# Patient Record
Sex: FEMALE | Race: WHITE | NOT HISPANIC OR LATINO | Employment: OTHER | URBAN - METROPOLITAN AREA
[De-identification: names, ages, dates, MRNs, and addresses within clinical notes are randomized per-mention and may not be internally consistent; named-entity substitution may affect disease eponyms.]

---

## 2017-01-03 ENCOUNTER — PATIENT MESSAGE (OUTPATIENT)
Dept: ENDOCRINOLOGY | Facility: CLINIC | Age: 67
End: 2017-01-03

## 2017-01-06 RX ORDER — MONTELUKAST SODIUM 10 MG/1
TABLET ORAL
Qty: 90 TABLET | Refills: 3 | Status: SHIPPED | OUTPATIENT
Start: 2017-01-06 | End: 2017-11-29

## 2017-01-19 RX ORDER — POTASSIUM CHLORIDE 20 MEQ/1
TABLET, EXTENDED RELEASE ORAL
Qty: 90 TABLET | Refills: 0 | Status: SHIPPED | OUTPATIENT
Start: 2017-01-19 | End: 2017-04-15 | Stop reason: SDUPTHER

## 2017-01-22 ENCOUNTER — PATIENT MESSAGE (OUTPATIENT)
Dept: ENDOCRINOLOGY | Facility: CLINIC | Age: 67
End: 2017-01-22

## 2017-01-23 ENCOUNTER — CLINICAL SUPPORT (OUTPATIENT)
Dept: CARDIOLOGY | Facility: CLINIC | Age: 67
End: 2017-01-23
Payer: MEDICARE

## 2017-01-23 ENCOUNTER — LAB VISIT (OUTPATIENT)
Dept: LAB | Facility: HOSPITAL | Age: 67
End: 2017-01-23
Attending: NURSE PRACTITIONER
Payer: MEDICARE

## 2017-01-23 DIAGNOSIS — I50.9 CHF (NYHA CLASS III, ACC/AHA STAGE C): ICD-10-CM

## 2017-01-23 DIAGNOSIS — Z95.810 ICD (IMPLANTABLE CARDIOVERTER-DEFIBRILLATOR) IN PLACE: Chronic | ICD-10-CM

## 2017-01-23 DIAGNOSIS — I44.7 LBBB (LEFT BUNDLE BRANCH BLOCK): ICD-10-CM

## 2017-01-23 DIAGNOSIS — Z79.4 TYPE 2 DIABETES MELLITUS WITH HYPERGLYCEMIA, WITH LONG-TERM CURRENT USE OF INSULIN: ICD-10-CM

## 2017-01-23 DIAGNOSIS — E11.65 TYPE 2 DIABETES MELLITUS WITH HYPERGLYCEMIA, WITH LONG-TERM CURRENT USE OF INSULIN: ICD-10-CM

## 2017-01-23 LAB
ALBUMIN SERPL BCP-MCNC: 4 G/DL
ALP SERPL-CCNC: 82 U/L
ALT SERPL W/O P-5'-P-CCNC: 25 U/L
ANION GAP SERPL CALC-SCNC: 9 MMOL/L
AST SERPL-CCNC: 19 U/L
BILIRUB SERPL-MCNC: 1.8 MG/DL
BUN SERPL-MCNC: 18 MG/DL
CALCIUM SERPL-MCNC: 9.8 MG/DL
CHLORIDE SERPL-SCNC: 105 MMOL/L
CO2 SERPL-SCNC: 26 MMOL/L
CREAT SERPL-MCNC: 0.9 MG/DL
EST. GFR  (AFRICAN AMERICAN): >60 ML/MIN/1.73 M^2
EST. GFR  (NON AFRICAN AMERICAN): >60 ML/MIN/1.73 M^2
GLUCOSE SERPL-MCNC: 117 MG/DL
POTASSIUM SERPL-SCNC: 4.6 MMOL/L
PROT SERPL-MCNC: 7.2 G/DL
SODIUM SERPL-SCNC: 140 MMOL/L

## 2017-01-23 PROCEDURE — 93295 DEV INTERROG REMOTE 1/2/MLT: CPT | Mod: ,,, | Performed by: INTERNAL MEDICINE

## 2017-01-23 PROCEDURE — 93296 REM INTERROG EVL PM/IDS: CPT | Mod: PBBFAC,PO | Performed by: INTERNAL MEDICINE

## 2017-01-24 ENCOUNTER — PATIENT MESSAGE (OUTPATIENT)
Dept: ENDOCRINOLOGY | Facility: CLINIC | Age: 67
End: 2017-01-24

## 2017-01-24 DIAGNOSIS — I50.9 CHF (NYHA CLASS III, ACC/AHA STAGE C): ICD-10-CM

## 2017-01-24 DIAGNOSIS — Z95.810 ICD (IMPLANTABLE CARDIOVERTER-DEFIBRILLATOR) IN PLACE: Primary | ICD-10-CM

## 2017-01-24 LAB
ESTIMATED AVG GLUCOSE: 186 MG/DL
HBA1C MFR BLD HPLC: 8.1 %

## 2017-01-27 ENCOUNTER — OFFICE VISIT (OUTPATIENT)
Dept: ENDOCRINOLOGY | Facility: CLINIC | Age: 67
End: 2017-01-27
Payer: MEDICARE

## 2017-01-27 VITALS
DIASTOLIC BLOOD PRESSURE: 78 MMHG | HEART RATE: 83 BPM | WEIGHT: 205.56 LBS | HEIGHT: 62 IN | SYSTOLIC BLOOD PRESSURE: 112 MMHG | BODY MASS INDEX: 37.83 KG/M2

## 2017-01-27 DIAGNOSIS — I50.9 CHF (NYHA CLASS III, ACC/AHA STAGE C): ICD-10-CM

## 2017-01-27 DIAGNOSIS — E78.5 HYPERLIPIDEMIA, UNSPECIFIED HYPERLIPIDEMIA TYPE: Chronic | ICD-10-CM

## 2017-01-27 DIAGNOSIS — E66.9 OBESITY (BMI 30-39.9): ICD-10-CM

## 2017-01-27 DIAGNOSIS — I10 ESSENTIAL HYPERTENSION: ICD-10-CM

## 2017-01-27 DIAGNOSIS — I25.10 CORONARY ARTERY DISEASE INVOLVING NATIVE CORONARY ARTERY WITHOUT ANGINA PECTORIS, UNSPECIFIED WHETHER NATIVE OR TRANSPLANTED HEART: ICD-10-CM

## 2017-01-27 DIAGNOSIS — Z79.4 TYPE 2 DIABETES MELLITUS WITH COMPLICATION, WITH LONG-TERM CURRENT USE OF INSULIN: Primary | ICD-10-CM

## 2017-01-27 DIAGNOSIS — E89.0 POSTOPERATIVE HYPOTHYROIDISM: ICD-10-CM

## 2017-01-27 DIAGNOSIS — Z95.810 ICD (IMPLANTABLE CARDIOVERTER-DEFIBRILLATOR) IN PLACE: Chronic | ICD-10-CM

## 2017-01-27 DIAGNOSIS — E11.65 TYPE 2 DIABETES MELLITUS WITH HYPERGLYCEMIA, WITHOUT LONG-TERM CURRENT USE OF INSULIN: ICD-10-CM

## 2017-01-27 DIAGNOSIS — C73 THYROID CANCER: Chronic | ICD-10-CM

## 2017-01-27 DIAGNOSIS — E11.8 TYPE 2 DIABETES MELLITUS WITH COMPLICATION, WITH LONG-TERM CURRENT USE OF INSULIN: Primary | ICD-10-CM

## 2017-01-27 DIAGNOSIS — Z87.19 HISTORY OF PANCREATITIS: ICD-10-CM

## 2017-01-27 PROCEDURE — 99999 PR PBB SHADOW E&M-EST. PATIENT-LVL IV: CPT | Mod: PBBFAC,,, | Performed by: NURSE PRACTITIONER

## 2017-01-27 PROCEDURE — 99214 OFFICE O/P EST MOD 30 MIN: CPT | Mod: S$PBB,,, | Performed by: NURSE PRACTITIONER

## 2017-01-27 PROCEDURE — 99214 OFFICE O/P EST MOD 30 MIN: CPT | Mod: PBBFAC,PO | Performed by: NURSE PRACTITIONER

## 2017-01-27 RX ORDER — METFORMIN HYDROCHLORIDE 500 MG/1
500 TABLET, EXTENDED RELEASE ORAL 2 TIMES DAILY WITH MEALS
Qty: 180 TABLET | Refills: 3 | Status: SHIPPED | OUTPATIENT
Start: 2017-01-27 | End: 2018-01-17 | Stop reason: SDUPTHER

## 2017-01-27 NOTE — PROGRESS NOTES
CC: Ms. Mckenzie Mari arrives today for management of Type 2 DM and review of chronic medical conditions.     HPI: Ms. Mckenzie Mari was diagnosed with Type 2 DM in 2004. She was diagnosed based on lab work. Initial treatment consisted of metformin and glimepiride. Insulin added in 8/2016 - began Toujeo. She states that she felt that this caused nausea, abd pain, chest pain. Lantus caused throat swelling, left arm pain. She was then changed to Novolog 70/30 but this was only used for a short period because her insurance didn't cover.  + FH of DM in maternal aunt and cousin. Denies hospitalizations due to DM. Previously seen in endocrine by MAGALI Gruber, NP. Also follows with by Dr. Ramos for DM history of thyroid cancer/post-surgical hypothyroidism.     Since last visit, we converted her insulin to Humalog 50-50 since she had tolerated both Humalog and protamine in the past.  She is afraid to try other insulins due to past experiences with new medications.  Significant allergy list.    She recently realized that she was only taking half of her prescribed metformin dose. She added 500 mg at night last week and has noticed an improvement in her readings.     BG readings are checked 2x/day. Brings log.             Hypoglycemia: No but feels symptomatic with BG < 100    Missing Insulin/PO medication doses: No  Timing prandial insulin 5-15 minutes before meals: taking PM dose after dinner    Exercise: No    Dietary Habits: Eats 3 meals/day. Each meal contains carb. Rare snacking. Avoids sugary drinks.    She follows with cardiology for cardiomyopathy, CAD. Goes every 6 months - 1 year.      CURRENT DIABETIC MEDS: metformin  mg TWICE DAILY, Humalog 50-50  24 units BID  Vial or pen: pen  Glucometer type: One Touch Verio    Previous DM treatments:   Invokana - nausea  Glimepiride - stopped when prandial insulin added  Touejo -  Nausea, abd pain, chest pain  Lantus - throat swelling, left arm pain  Novolog  "70/30 - not covered by insurance    Last Eye Exam: 2016 - sees outside provider. Recent cataract sx.   Last Podiatry Exam: no    REVIEW OF SYSTEMS  Constitutional: no c/o fatigue, weakness, or weight loss  Eyes: + blurry vision when BG >300  Cardiac: no palpitations, denies chest pain.  Respiratory: c/o occasional cough. Reports chronic dyspnea but able to walk. Cardiologist aware.  GI: no c/o abdominal pain, diarrhea, or nausea.   Skin: no lesions or rashes.  Neuro: no numbness, tingling, or parasthesias.  Endocrine: denies polyphagia, polydipsia, polyuria      Personally reviewed Past Medical, Surgical, Social History.    Vital Signs  Visit Vitals    /78 (BP Location: Right arm, Patient Position: Sitting, BP Method: Manual)    Pulse 83    Ht 5' 2" (1.575 m)    Wt 93.2 kg (205 lb 9.3 oz)    BMI 37.6 kg/m2       Personally reviewed the below labs:    Hemoglobin A1C   Date Value Ref Range Status   01/23/2017 8.1 (H) 4.5 - 6.2 % Final     Comment:     According to ADA guidelines, hemoglobin A1C <7.0% represents  optimal control in non-pregnant diabetic patients.  Different  metrics may apply to specific populations.   Standards of Medical Care in Diabetes - 2016.  For the purpose of screening for the presence of diabetes:  <5.7%     Consistent with the absence of diabetes  5.7-6.4%  Consistent with increasing risk for diabetes   (prediabetes)  >or=6.5%  Consistent with diabetes  Currently no consensus exists for use of hemoglobin A1C  for diagnosis of diabetes for children.     10/05/2016 9.2 (H) 4.5 - 6.2 % Final     Comment:     According to ADA guidelines, hemoglobin A1C <7.0% represents  optimal control in non-pregnant diabetic patients.  Different  metrics may apply to specific populations.   Standards of Medical Care in Diabetes - 2016.  For the purpose of screening for the presence of diabetes:  <5.7%     Consistent with the absence of diabetes  5.7-6.4%  Consistent with increasing risk for diabetes "   (prediabetes)  >or=6.5%  Consistent with diabetes  Currently no consensus exists for use of hemoglobin A1C  for diagnosis of diabetes for children.     08/29/2016 9.5 (H) 4.5 - 6.2 % Final     Comment:     According to ADA guidelines, hemoglobin A1C <7.0% represents  optimal control in non-pregnant diabetic patients.  Different  metrics may apply to specific populations.   Standards of Medical Care in Diabetes - 2016.  For the purpose of screening for the presence of diabetes:  <5.7%     Consistent with the absence of diabetes  5.7-6.4%  Consistent with increasing risk for diabetes   (prediabetes)  >or=6.5%  Consistent with diabetes  Currently no consensus exists for use of hemoglobin A1C  for diagnosis of diabetes for children.         Chemistry        Component Value Date/Time     01/23/2017 0734    K 4.6 01/23/2017 0734     01/23/2017 0734    CO2 26 01/23/2017 0734    BUN 18 01/23/2017 0734    CREATININE 0.9 01/23/2017 0734     (H) 01/23/2017 0734        Component Value Date/Time    CALCIUM 9.8 01/23/2017 0734    ALKPHOS 82 01/23/2017 0734    AST 19 01/23/2017 0734    ALT 25 01/23/2017 0734    BILITOT 1.8 (H) 01/23/2017 0734          Lab Results   Component Value Date    CHOL 154 05/30/2016    CHOL 306 (H) 02/18/2016    CHOL 265 (H) 11/17/2015     Lab Results   Component Value Date    HDL 51 05/30/2016    HDL 46 02/18/2016    HDL 45 11/17/2015     Lab Results   Component Value Date    LDLCALC 74.4 05/30/2016    LDLCALC 202.8 (H) 02/18/2016    LDLCALC 165.4 (H) 11/17/2015     Lab Results   Component Value Date    TRIG 143 05/30/2016    TRIG 286 (H) 02/18/2016    TRIG 273 (H) 11/17/2015     Lab Results   Component Value Date    CHOLHDL 33.1 05/30/2016    CHOLHDL 15.0 (L) 02/18/2016    CHOLHDL 17.0 (L) 11/17/2015       Lab Results   Component Value Date    MICALBCREAT Unable to calculate 06/09/2015     Lab Results   Component Value Date    TSH 0.918 10/05/2016       Estimated Creatinine  Clearance: 66.3 mL/min (based on Cr of 0.9).    Vit D, 25-Hydroxy   Date Value Ref Range Status   11/08/2014 51 30 - 96 ng/mL Final     Comment:     Vitamin D deficiency.........<10 ng/mL                              Vitamin D insufficiency......10-29 ng/mL       Vitamin D sufficiency........> or equal to 30 ng/mL  Vitamin D toxicity............>100 ng/mL         PHYSICAL EXAMINATION  Constitutional: Appears well, no distress  Neck: Supple, trachea midline; transverse scar to anterior neck from thyroidectomy.   Respiratory: CTA, even and unlabored.  Cardiovascular: RRR, no murmurs, no carotid bruits. DP pulses 2+ bilaterally; trace edema to BLE.   Lymph: no cervical or supraclavicular lymphadenopathy.  Skin: warm and dry; no lipohypertrophy, or acanthosis nigracans observed.  Neuro: DTR 2+ BUE/1+ BLE.  Feet: appropriate footwear.      Assessment/Plan  1. Type 2 diabetes mellitus with complication, without long-term current use of insulin  -- A1c has decreased to 8.1% and recent glucoses suggest better control more recently.   -- continue current medications.  -- discussed proper timing of insulin before breakfast and dinner.   -- check BG 4x/day.     -- Discussed diagnosis of DM, A1c goals, progression of disease, long term complications and tx options.  Advised patient to check BG before activities, such as driving or exercise.  -- Reviewed hypoglycemia management: treat with 1/2 glass of juice, 1/2 can regular coke, or 4 glucose tablets. Monitor and repeat treatment every 15 minutes until BG is >70 Then have a snack, which includes a complex carbohydrate and protein.     2. Coronary artery disease involving native coronary artery without angina pectoris, unspecified whether native or transplanted heart  -- avoid hyopglycemia   3. CHF (NYHA class III, ACC/AHA stage C)  -- follows with cardiology   4. ICD (implantable cardioverter-defibrillator) in place  -- as above   5. Thyroid cancer  -- recommend annual follow  up with Dr. Ramos. Due in 10/2017   6. Postoperative hypothyroidism  -- follows with Dr. Ramos  -- Continue Synthroid at current dose.  Lab Results   Component Value Date    TSH 0.918 10/05/2016      7. Essential hypertension  -- controlled  -- on ARB   8. Hyperlipidemia, unspecified hyperlipidemia type  -- continue statin  -- managed by cardiologist  Lab Results   Component Value Date    LDLCALC 74.4 05/30/2016      9. Obesity (BMI 30-39.9)  -- increases insulin resistance  Body mass index is 37.6 kg/(m^2).   10. History of pancreatitis  -- avoid GLP-1RA and DPP IV-i         FOLLOW UP  Return in about 3 months (around 4/27/2017).   Patient instructed to bring BG logs to each follow up   Patient encouraged to call for any BG/medication issues, concerns, or questions.    Orders Placed This Encounter   Procedures    Hemoglobin A1c    Comprehensive metabolic panel    Lipid panel    Microalbumin/creatinine urine ratio

## 2017-01-27 NOTE — MR AVS SNAPSHOT
[facility]  [address]  [place] [address]  Phone: [phone]  Fax: [phone]                  Mckenzie Mari   2017 10:00 AM   Office Visit    Description:  Female : 1950   Provider:  SAAD Giordano FNP-C   Department:  [facility]           Reason for Visit     Diabetes Mellitus           Diagnoses this Visit        Comments    Type 2 diabetes mellitus with complication, with long-term current use of insulin    -  Primary     Coronary artery disease involving native coronary artery without angina pectoris, unspecified whether native or transplanted heart         CHF (NYHA class III, ACC/AHA stage C)         ICD (implantable cardioverter-defibrillator) in place         Essential hypertension         Hyperlipidemia, unspecified hyperlipidemia type         Thyroid cancer         Postoperative hypothyroidism         History of pancreatitis         Obesity (BMI 30-39.9)         Type 2 diabetes mellitus with hyperglycemia, without long-term current use of insulin                To Do List           Future Appointments        Provider Department Dept Phone    2017 7:00 AM SPECIMEN, [place] [facility] [phone]    2017 8:00 AM LAB, [place] [facility] [phone]    2017 11:00 AM [place] [facility] [phone]    2017 11:30 AM Joseph Hansen MD [facility] Card[facility] [phone]    2017 9:30 AM SAAD Giordano,[place] [facility] Endocrinology [phone]      Goals (5 Years of Data)     None      Follow-Up and Disposition     Return in about 3 months (around 2017).       These Medications        Disp Refills Start End    metformin (GLUCOPHAGE-XR) 500 MG 24 hr tablet 180 tablet 3 2017     Take 1 tablet (500 mg total) by mouth 2 (two) times daily with meals. - Oral    Pharmacy: [facility] #[id number] - [place] -  [address]  #: [phone]         [facility]     [facility] Nurse Care Line - 24/7 Assistance  Registered nurses in the [facility] provide clinical advisement, health education, appointment booking, and other advisory services.  Call for this free service at [phone].             Medications           Message regarding Medications     Verify the changes and/or additions to your medication regime listed below are the same as discussed with your clinician today.  If any of these changes or additions are incorrect, please notify your healthcare provider.             Verify that the below list of medications is an accurate representation of the medications you are currently taking.  If none reported, the list may be blank. If incorrect, please contact your healthcare provider. Carry this list with you in case of emergency.           Current Medications     aspirin (ECOTRIN) 81 MG EC tablet Take 81 mg by mouth once daily.    azithromycin (Z-DIDIER) 250 MG tablet Take this antibiotic for 5 days total according to the following instructions: Take 2 po on Day #1, then take one po daily on days 2-5    biotin 2,500 mcg Cap Take 5,000 mcg by mouth.    blood sugar diagnostic Strp 1 strip by Misc.(Non-Drug; Combo Route) route 4 (four) times daily. One Touch Verio strips    CALCIUM CARBONATE/VITAMIN D3 (VITAMIN D-3 ORAL) Take by mouth.    carvedilol (COREG) 25 MG tablet TAKE 1 TABLET (25 MG TOTAL) BY MOUTH 2 (TWO) TIMES DAILY WITH MEALS.    CRESTOR 10 mg tablet TAKE 1 TABLET BY MOUTH EVERY DAY    esomeprazole (NEXIUM) 40 MG capsule TAKE 1 CAPSULE BY MOUTH EVERY DAY    estradiol (ESTRING) 2 mg vaginal ring Place 2 mg vaginally every 3 (three) months. follow package directions     furosemide (LASIX) 40 MG tablet Take 1 tablet (40 mg total) by mouth once daily.    insulin lispro protamin-lispro 100 unit/mL (50-50) InPn Inject 24 Units into the skin 2 (two) times daily before meals. Take before  "breakfast and dinner    lactobacillus rhamnosus GG (CULTURELLE) 10 billion cell capsule Take 1 capsule by mouth once daily.    lancets (ACCU-CHEK FASTCLIX) Misc 1 each by Misc.(Non-Drug; Combo Route) route 3 (three) times daily.    metformin (GLUCOPHAGE-XR) 500 MG 24 hr tablet Take 1 tablet (500 mg total) by mouth 2 (two) times daily with meals.    montelukast (SINGULAIR) 10 mg tablet TAKE 1 TABLET BY MOUTH EVERY EVENING.    multivit-mineral-iron-lutein (CENTRUM SILVER ULTRA WOMEN'S) Tab Take by mouth.    pen needle, diabetic (BD ULTRA-FINE SHANTELLE PEN NEEDLES) 32 gauge x 5/32" Ndle Uses up to 4 daily, on multiple daily insulin injections    potassium chloride SA (K-DUR,KLOR-CON) 20 MEQ tablet TAKE 1 TABLET EVERY DAY    spironolactone (ALDACTONE) 25 MG tablet TAKE 1 TABLET EVERY DAY    SYNTHROID 137 mcg Tab tablet Take 1 tablet (137 mcg total) by mouth before breakfast.    valsartan (DIOVAN) 80 MG tablet TAKE 1 TABLET (80 MG TOTAL) BY MOUTH ONCE DAILY.           Clinical Reference Information           Vital Signs - Last Recorded  Most recent update: 1/27/2017 10:02 AM by Benton Gallardo LPN    BP Pulse Ht Wt BMI    112/78 (BP Location: Right arm, Patient Position: Sitting, BP Method: Manual) 83 5' 2" (1.575 m) 93.2 kg (205 lb 9.3 oz) 37.6 kg/m2      Blood Pressure          Most Recent Value    BP  112/78      Allergies as of 1/27/2017     Cayenne Pepper    Lantus [Insulin Glargine]    Other    Adhesive Tape-silicones    Amoxil [Amoxicillin]    Aspirin    Avelox [Moxifloxacin]    Betadine [Povidone-iodine]    Cephalexin    Doxycycline    Erythromycin    Hydrocodone    Lactose Intolerance [Lactase]    Latex    Levaquin [Levofloxacin]    Morphine    Tramadol    Latex, Natural Rubber    Penicillins    Sulfa (Sulfonamide Antibiotics)      Immunizations Administered on Date of Encounter - 1/27/2017     None      Orders Placed During Today's Visit     Future Labs/Procedures Expected by Expires    Comprehensive metabolic " panel  1/27/2017 3/28/2018    Hemoglobin A1c  1/27/2017 3/28/2018    Lipid panel  1/27/2017 3/28/2018    Microalbumin/creatinine urine ratio  1/27/2017 3/28/2018

## 2017-01-30 RX ORDER — SPIRONOLACTONE 25 MG/1
TABLET ORAL
Qty: 90 TABLET | Refills: 1 | Status: SHIPPED | OUTPATIENT
Start: 2017-01-30 | End: 2017-08-05 | Stop reason: SDUPTHER

## 2017-02-01 ENCOUNTER — OFFICE VISIT (OUTPATIENT)
Dept: FAMILY MEDICINE | Facility: CLINIC | Age: 67
End: 2017-02-01
Payer: MEDICARE

## 2017-02-01 ENCOUNTER — NURSE TRIAGE (OUTPATIENT)
Dept: ADMINISTRATIVE | Facility: CLINIC | Age: 67
End: 2017-02-01

## 2017-02-01 VITALS
OXYGEN SATURATION: 96 % | WEIGHT: 206.38 LBS | HEART RATE: 81 BPM | DIASTOLIC BLOOD PRESSURE: 72 MMHG | SYSTOLIC BLOOD PRESSURE: 126 MMHG | BODY MASS INDEX: 37.98 KG/M2 | HEIGHT: 62 IN

## 2017-02-01 DIAGNOSIS — J20.9 ACUTE BRONCHITIS, UNSPECIFIED ORGANISM: ICD-10-CM

## 2017-02-01 DIAGNOSIS — J02.9 SORE THROAT: Primary | ICD-10-CM

## 2017-02-01 DIAGNOSIS — J00 ACUTE NASOPHARYNGITIS: ICD-10-CM

## 2017-02-01 DIAGNOSIS — J45.21 MILD INTERMITTENT ASTHMA WITH ACUTE EXACERBATION: ICD-10-CM

## 2017-02-01 PROCEDURE — 99999 PR PBB SHADOW E&M-EST. PATIENT-LVL III: CPT | Mod: PBBFAC,,, | Performed by: INTERNAL MEDICINE

## 2017-02-01 PROCEDURE — 99213 OFFICE O/P EST LOW 20 MIN: CPT | Mod: PBBFAC,PO | Performed by: INTERNAL MEDICINE

## 2017-02-01 PROCEDURE — 99213 OFFICE O/P EST LOW 20 MIN: CPT | Mod: S$PBB,,, | Performed by: INTERNAL MEDICINE

## 2017-02-01 RX ORDER — FLUTICASONE PROPIONATE 50 MCG
2 SPRAY, SUSPENSION (ML) NASAL DAILY
Qty: 16 G | Refills: 11 | Status: SHIPPED | OUTPATIENT
Start: 2017-02-01 | End: 2017-03-14

## 2017-02-01 RX ORDER — FLUTICASONE PROPIONATE AND SALMETEROL 250; 50 UG/1; UG/1
1 POWDER RESPIRATORY (INHALATION) 2 TIMES DAILY
Qty: 1 EACH | Refills: 1 | Status: SHIPPED | OUTPATIENT
Start: 2017-02-01 | End: 2017-03-14

## 2017-02-01 RX ORDER — AZITHROMYCIN 250 MG/1
TABLET, FILM COATED ORAL
Qty: 6 TABLET | Refills: 0 | Status: SHIPPED | OUTPATIENT
Start: 2017-02-01 | End: 2017-02-06

## 2017-02-01 NOTE — PROGRESS NOTES
This 66-year-old white female, patient of Dr. Gold, presents today with   approximate two to three-day history of arthralgia and somewhat progressive sore   throat, slight nasal congestion and rhinorrhea, mostly clear, but some purulent   discharge and also in the last day-and-a-half productive cough both   green-yellow and clear sputum.  Weakness and fatigue apparently also along with   the sore throat.  No significant dysphagia noted at present, has not been on   antibiotics prior or any history of infectious contact by history.    Also no nausea, vomiting, diarrhea, acute arthropathy or skin lesions or   exanthems found or associated.    The patient has significant comorbidities and also allergies and sensitivities,   particularly and especially with antibiotics.  She has had rather significant   cardiomyopathy in the past by history remarkably cleared with her condition and   in the remote past was placed on the cardiac transplant list, but taken off   subsequently with her improvement.    Importantly, no further evidence of abnormalities in her review of systems.  No   evidence of severe chills, sweats, or headache, neck stiffness or pleurisy or   hemoptysis or surgery associated.  No evidence of unstable angina, CHF, or   arrhythmia.    PHYSICAL EXAMINATION:  GENERAL:  Reveals a well-developed and well-nourished white female.  VITAL SIGNS:  See vital signs and measurements.  Clinically afebrile.  ARTERIES AND VEINS:  Good peripheral pulsation.  SKIN:  Clear.  No lesions or exanthem.  NODES:  None felt, but slight tenderness noted in the right proximal anterior   cervical region but no clear or distinct gland noted.  No further adenopathy.  HEENT:  Pharynx is slightly reddened and edematous, but no exudate.  There was   definite evidence of erythema and edema noted bilaterally, greater on the left   and slight purulent exudate noted in the left nasal antrum.  The patient has few   coarse rales noted in  the upper anterior lung fields with a few transient   wheezes noted in the upper anterior lung fields.  HEART:  Regular rhythm, 68 beats per minute.  No gallop or arrhythmia.  ABDOMEN:  Soft.  No localized pain, tenderness, or organomegaly.    IMPRESSION:  At this time;  1.  Acute URI and possible early rhinosinusitis.  2.  Early bronchitis with asthmatic component.    RECOMMENDATIONS:  At this time, forced cold fluids.  Continue her nasal saline   two sprays t.i.d. in each nostril as she has been along with Claritin 10 mg p.o.   daily, add prescription for Flonase two sprays daily in each nostril, Z-Nestor,   Delsyn as she has had before on a p.r.n. cough basis and Advair 250/50 one   b.i.d. Instructions will be included in her prescription.    The patient was counseled regarding her present condition to call or contact if   no improvement or worsening over the next 3-5 days and precautions regarding any   other cardiopulmonary symptoms.            /anaid [id number] blank(s)        TERESA/PATRICIA  dd: 02/01/2017 15:34:38 (CST)  td: 02/02/2017 01:06:46 (CST)  Doc ID   #[id number]  Job ID #[id number]    CC:    This office note has been dictated.

## 2017-02-01 NOTE — MR AVS SNAPSHOT
[facility]  [address] [facility]  [facility] [address]  Phone: [phone]  Fax: [phone]                  Mckenzie Mari   2017 3:00 PM   Office Visit    Description:  Female : 1950   Provider:  Ton Fraser MD   Department:  [facility]           Reason for Visit     Congestion, Sore throat, Chest pain           Diagnoses this Visit        Comments    Sore throat    -  Primary     Acute nasopharyngitis         Acute bronchitis, unspecified organism         Mild intermittent asthma with acute exacerbation                To Do List           Future Appointments        Provider Department Dept Phone    2017 7:00 AM SPECIMEN, [place] [facility] [phone]    2017 8:00 AM LAB, [place] [facility] [phone]    2017 11:00 AM [place] [facility] Cardiology [phone]    2017 11:30 AM Joseph Hansen MD [facility] Cardiology [phone]    2017 9:30 AM SAAD Giordano,FNP-C [place] - Endocrinology [phone]      Goals (5 Years of Data)     None      Follow-Up and Disposition     Return if symptoms worsen or fail to improve.       These Medications        Disp Refills Start End    fluticasone (FLONASE) 50 mcg/actuation nasal spray 16 g 11 2017     2 sprays by Each Nare route once daily. - Each Nare    Pharmacy: [facility]/pharmacy #[id number] - [place] - [address] Ph #: [phone]       azithromycin (Z-DIDIER) 250 MG tablet 6 tablet 0 2017    Take 2 tablets by mouth on day 1; Take 1 tablet by mouth on days 2-5    Pharmacy: [facility]/pharmacy #[id number] - [place] - [address] Ph #: [phone]       fluticasone-salmeterol 250-50 mcg/dose (ADVAIR) 250-50 mcg/dose diskus inhaler 1 each 1 2017    Inhale 1 puff into the lungs 2 (two) times daily. Controller - Inhalation    Pharmacy: [facility]/pharmacy #[id number] - [place] [address] Ph  #: [phone]       Notes to Pharmacy: Please provide instructions of use.w.b. [facility]     [facility] -  Assistance  Registered nurses in the [facility] provide clinical advisement, health education, appointment booking, and other advisory services.  Call for this free service at [phone].             Medications           Message regarding Medications     Verify the changes and/or additions to your medication regime listed below are the same as discussed with your clinician today.  If any of these changes or additions are incorrect, please notify your healthcare provider.        START taking these NEW medications        Refills    fluticasone (FLONASE) 50 mcg/actuation nasal spray 11    Si sprays by Each Nare route once daily.    Class: Normal    Route: Each Nare    azithromycin (Z-DIDIER) 250 MG tablet 0    Sig: Take 2 tablets by mouth on day 1; Take 1 tablet by mouth on days 2-5    Class: Normal    fluticasone-salmeterol 250-50 mcg/dose (ADVAIR) 250-50 mcg/dose diskus inhaler 1    Sig: Inhale 1 puff into the lungs 2 (two) times daily. Controller    Class: Normal    Route: Inhalation           Verify that the below list of medications is an accurate representation of the medications you are currently taking.  If none reported, the list may be blank. If incorrect, please contact your healthcare provider. Carry this list with you in case of emergency.           Current Medications     aspirin (ECOTRIN) 81 MG EC tablet Take 81 mg by mouth once daily.    azithromycin (Z-DIDIER) 250 MG tablet Take 2 tablets by mouth on day 1; Take 1 tablet by mouth on days 2-5    biotin 2,500 mcg Cap Take 5,000 mcg by mouth.    blood sugar diagnostic Strp 1 strip by Misc.(Non-Drug; Combo Route) route 4 (four) times daily. One Touch Verio strips    CALCIUM CARBONATE/VITAMIN D3 (VITAMIN D-3 ORAL) Take by mouth.    carvedilol (COREG) 25 MG tablet TAKE 1 TABLET (25 MG TOTAL) BY MOUTH  "2 (TWO) TIMES DAILY WITH MEALS.    CRESTOR 10 mg tablet TAKE 1 TABLET BY MOUTH EVERY DAY    esomeprazole (NEXIUM) 40 MG capsule TAKE 1 CAPSULE BY MOUTH EVERY DAY    estradiol (ESTRING) 2 mg vaginal ring Place 2 mg vaginally every 3 (three) months. follow package directions     fluticasone (FLONASE) 50 mcg/actuation nasal spray 2 sprays by Each Nare route once daily.    fluticasone-salmeterol 250-50 mcg/dose (ADVAIR) 250-50 mcg/dose diskus inhaler Inhale 1 puff into the lungs 2 (two) times daily. Controller    furosemide (LASIX) 40 MG tablet Take 1 tablet (40 mg total) by mouth once daily.    insulin lispro protamin-lispro 100 unit/mL (50-50) InPn Inject 24 Units into the skin 2 (two) times daily before meals. Take before breakfast and dinner    lactobacillus rhamnosus GG (CULTURELLE) 10 billion cell capsule Take 1 capsule by mouth once daily.    lancets (ACCU-CHEK FASTCLIX) Misc 1 each by Misc.(Non-Drug; Combo Route) route 3 (three) times daily.    metformin (GLUCOPHAGE-XR) 500 MG 24 hr tablet Take 1 tablet (500 mg total) by mouth 2 (two) times daily with meals.    montelukast (SINGULAIR) 10 mg tablet TAKE 1 TABLET BY MOUTH EVERY EVENING.    multivit-mineral-iron-lutein (CENTRUM SILVER ULTRA WOMEN'S) Tab Take by mouth.    pen needle, diabetic (BD ULTRA-FINE SHANTELLE PEN NEEDLES) 32 gauge x 5/32" Ndle Uses up to 4 daily, on multiple daily insulin injections    potassium chloride SA (K-DUR,KLOR-CON) 20 MEQ tablet TAKE 1 TABLET EVERY DAY    spironolactone (ALDACTONE) 25 MG tablet TAKE 1 TABLET EVERY DAY    SYNTHROID 137 mcg Tab tablet Take 1 tablet (137 mcg total) by mouth before breakfast.    valsartan (DIOVAN) 80 MG tablet TAKE 1 TABLET (80 MG TOTAL) BY MOUTH ONCE DAILY.           Clinical Reference Information           Vital Signs - Last Recorded  Most recent update: 2/1/2017  3:10 PM by Krissy L. Sandifer, LPN    BP Pulse Ht Wt SpO2 BMI    126/72 81 5' 2" (1.575 m) 93.6 kg (206 lb 5.6 oz) 96% 37.74 kg/m2      Blood " Pressure          Most Recent Value    BP  126/72      Allergies as of 2/1/2017     Cayenne Pepper    Lantus [Insulin Glargine]    Other    Adhesive Tape-silicones    Amoxil [Amoxicillin]    Aspirin    Avelox [Moxifloxacin]    Betadine [Povidone-iodine]    Cephalexin    Doxycycline    Erythromycin    Hydrocodone    Lactose Intolerance [Lactase]    Latex    Levaquin [Levofloxacin]    Morphine    Tramadol    Latex, Natural Rubber    Penicillins    Sulfa (Sulfonamide Antibiotics)      Immunizations Administered on Date of Encounter - 2/1/2017     None      Instructions    See dictation.[email or web address].

## 2017-02-01 NOTE — TELEPHONE ENCOUNTER
Reason for Disposition   Patient wants to be seen    Protocols used: ST CHEST PAIN-A-[place]    Mckenzie is calling to report she has been having chest congestion, sore throat and has chest pain off and on.  States she feels the chest pain is from the congestion.  Appointment scheduled.

## 2017-02-09 ENCOUNTER — PATIENT MESSAGE (OUTPATIENT)
Dept: CARDIOLOGY | Facility: CLINIC | Age: 67
End: 2017-02-09

## 2017-02-09 RX ORDER — FUROSEMIDE 40 MG/1
40 TABLET ORAL DAILY
Qty: 90 TABLET | Refills: 1 | Status: SHIPPED | OUTPATIENT
Start: 2017-02-09 | End: 2017-08-05 | Stop reason: SDUPTHER

## 2017-02-14 ENCOUNTER — PATIENT MESSAGE (OUTPATIENT)
Dept: FAMILY MEDICINE | Facility: CLINIC | Age: 67
End: 2017-02-14

## 2017-02-16 ENCOUNTER — TELEPHONE (OUTPATIENT)
Dept: FAMILY MEDICINE | Facility: CLINIC | Age: 67
End: 2017-02-16

## 2017-02-16 DIAGNOSIS — J32.9 CHRONIC SINUSITIS, UNSPECIFIED LOCATION: Primary | ICD-10-CM

## 2017-02-16 NOTE — TELEPHONE ENCOUNTER
Attempted to contact pt in regards to message below , no answer and unable to leave a message. CLC

## 2017-02-16 NOTE — TELEPHONE ENCOUNTER
Please call the patient and tell her after careful review particularly of her anabiotic ALLERGIES, it would be further advisable to see ENT prior to determine the extent of her present condition.  An ENT consult of an urgent type ordered.  WB

## 2017-02-16 NOTE — TELEPHONE ENCOUNTER
There are several ENTs in the area.  As far as I know they're all very good I just don't have that much familiarity with them.  I know Dr. Haas is very good, Dr. Jarrett as well.

## 2017-02-16 NOTE — TELEPHONE ENCOUNTER
Informed patient od advice per Dr. Fraser. PAtient states she does not care for ENT at [facility]. She wants to see someone else. Does not know who. Is there someone you would recommend?

## 2017-02-17 NOTE — TELEPHONE ENCOUNTER
Notified patient of ENTs that Dr Gold had mentioned. Patient verbalized understanding and stated that she had seen them on the Internet and was considering them.

## 2017-03-01 ENCOUNTER — PATIENT MESSAGE (OUTPATIENT)
Dept: ENDOCRINOLOGY | Facility: CLINIC | Age: 67
End: 2017-03-01

## 2017-03-02 ENCOUNTER — TELEPHONE (OUTPATIENT)
Dept: CARDIOLOGY | Facility: CLINIC | Age: 67
End: 2017-03-02

## 2017-03-09 ENCOUNTER — TELEPHONE (OUTPATIENT)
Dept: CARDIOLOGY | Facility: CLINIC | Age: 67
End: 2017-03-09

## 2017-03-09 NOTE — TELEPHONE ENCOUNTER
----- Message from Maddi Duran sent at 3/9/2017 10:12 AM CST -----  Contact: Dr Gaurav Mcdowell   Wants call back   Regarding  Cardiac clearance   Fax    Call back      Date of surgery 03 15 2017

## 2017-03-14 ENCOUNTER — PATIENT MESSAGE (OUTPATIENT)
Dept: CARDIOLOGY | Facility: CLINIC | Age: 67
End: 2017-03-14

## 2017-03-14 ENCOUNTER — TELEPHONE (OUTPATIENT)
Dept: CARDIOLOGY | Facility: CLINIC | Age: 67
End: 2017-03-14

## 2017-03-14 NOTE — TELEPHONE ENCOUNTER
Patient is scheduled to have Endoscopic sinus surgery under general anesthesia. Does she need to stop any blood thinners.

## 2017-03-14 NOTE — TELEPHONE ENCOUNTER
LMOM for Kaia letting her know clearance was not done yet, but to call in am to see if he will authorize.

## 2017-03-14 NOTE — TELEPHONE ENCOUNTER
----- Message from Barbara Honeycutt sent at 3/14/2017  3:16 PM CDT -----  Contact: Kaia graham/ Dr Song graham/ Dr Zuleta calling in regards to following up on the Cardiac Clearance. The patient is scheduled for surgery tomorrow, 3/15/17. She is sending another fax now. Please advise. Call to pod. No answer.  Call back Kaia at .  Thanks!

## 2017-03-15 PROBLEM — J34.89 NASAL OBSTRUCTION: Status: ACTIVE | Noted: 2017-03-15

## 2017-03-15 PROBLEM — J34.2 DEVIATED NASAL SEPTUM: Status: ACTIVE | Noted: 2017-03-15

## 2017-03-15 PROBLEM — J01.00 SUBACUTE MAXILLARY SINUSITIS: Status: ACTIVE | Noted: 2017-03-15

## 2017-03-15 PROBLEM — J34.3 HYPERTROPHY OF NASAL TURBINATES: Status: ACTIVE | Noted: 2017-03-15

## 2017-03-15 NOTE — TELEPHONE ENCOUNTER
----- Message from Liv Hodge sent at 3/14/2017  4:40 PM CDT -----  Patient is having surgery tomorrow on her nose. Office was supposed to have sent her surgery clearance from last week to Dr. Zuleta. This is urgent and his office needs this now. Patient states that office has the fax number.

## 2017-03-20 ENCOUNTER — TELEPHONE (OUTPATIENT)
Dept: CARDIOLOGY | Facility: CLINIC | Age: 67
End: 2017-03-20

## 2017-03-20 NOTE — TELEPHONE ENCOUNTER
----- Message from Cyn Torrez sent at 3/20/2017  8:05 AM CDT -----  Contact: Tyrel Mari   called regarding the patient being at [facility] for chest pains and difficulty breathing., Told to f/u as soon as possible. Please contact [phone] ([place])

## 2017-03-20 NOTE — TELEPHONE ENCOUNTER
"Spoke with patient she has been having chest pains and shortness of breath since Friday.  S/p sinus surgery.  She went to [facility] ER for evaluation Friday and was released and told to see Dr. Mtz this week.  I asked patient if she notified her surgeon to make him aware of her sob and chest pain since having surgery she replied"just shut up and give me the yaquelin appointment"! " I explained to her that I was going to give her a appointment but she need to inform her surgeon what she was feeling just incase he wanted to order tests or see her in office.  She replied"Are you refusing to give me an appoinment"? I explained to her that I would inform Dr. Mtz in am of this situation, and that I would put her on his scheduled for tomorrow.    "

## 2017-03-21 ENCOUNTER — OFFICE VISIT (OUTPATIENT)
Dept: CARDIOLOGY | Facility: CLINIC | Age: 67
End: 2017-03-21
Payer: MEDICARE

## 2017-03-21 VITALS
BODY MASS INDEX: 38.14 KG/M2 | WEIGHT: 207.25 LBS | HEART RATE: 75 BPM | DIASTOLIC BLOOD PRESSURE: 74 MMHG | SYSTOLIC BLOOD PRESSURE: 134 MMHG | HEIGHT: 62 IN

## 2017-03-21 DIAGNOSIS — Z79.4 TYPE 2 DIABETES MELLITUS WITH COMPLICATION, WITH LONG-TERM CURRENT USE OF INSULIN: ICD-10-CM

## 2017-03-21 DIAGNOSIS — Z95.810 ICD (IMPLANTABLE CARDIOVERTER-DEFIBRILLATOR) IN PLACE: Chronic | ICD-10-CM

## 2017-03-21 DIAGNOSIS — I44.7 LBBB (LEFT BUNDLE BRANCH BLOCK): ICD-10-CM

## 2017-03-21 DIAGNOSIS — E11.8 TYPE 2 DIABETES MELLITUS WITH COMPLICATION, WITH LONG-TERM CURRENT USE OF INSULIN: ICD-10-CM

## 2017-03-21 DIAGNOSIS — I50.9 CHF (NYHA CLASS III, ACC/AHA STAGE C): Primary | ICD-10-CM

## 2017-03-21 DIAGNOSIS — I10 ESSENTIAL HYPERTENSION: ICD-10-CM

## 2017-03-21 PROCEDURE — 99999 PR PBB SHADOW E&M-EST. PATIENT-LVL II: CPT | Mod: PBBFAC,,, | Performed by: INTERNAL MEDICINE

## 2017-03-21 PROCEDURE — 99212 OFFICE O/P EST SF 10 MIN: CPT | Mod: PBBFAC,PO | Performed by: INTERNAL MEDICINE

## 2017-03-21 PROCEDURE — 99214 OFFICE O/P EST MOD 30 MIN: CPT | Mod: S$PBB,,, | Performed by: INTERNAL MEDICINE

## 2017-03-21 RX ORDER — AZITHROMYCIN 250 MG/1
TABLET, FILM COATED ORAL
COMMUNITY
Start: 2017-03-15 | End: 2017-04-25

## 2017-03-21 NOTE — PROGRESS NOTES
Subjective:    Patient ID:  Mckenzie Mari is a 66 y.o. female who presents for follow-up of Chest Pain      HPI  Admitted to [facility] last week with atypical chest pain, (-) cardiac w/u  Doing ok now    Review of Systems   Constitution: Negative for decreased appetite, weakness, malaise/fatigue, weight gain and weight loss.   Cardiovascular: Negative for chest pain, dyspnea on exertion, leg swelling, palpitations and syncope.   Respiratory: Negative for cough and shortness of breath.    Gastrointestinal: Negative.    All other systems reviewed and are negative.       Objective:    Physical Exam   Constitutional: She is oriented to person, place, and time. She appears well-developed and well-nourished.   HENT:   Head: Normocephalic.   Eyes: Pupils are equal, round, and reactive to light.   Neck: Normal range of motion. Neck supple. No JVD present. Carotid bruit is not present. No thyromegaly present.   Cardiovascular: Normal rate, regular rhythm, normal heart sounds, intact distal pulses and normal pulses.  PMI is not displaced.  Exam reveals no gallop.    No murmur heard.  Pulmonary/Chest: Effort normal and breath sounds normal.   Abdominal: Soft. Normal appearance. She exhibits no mass. There is no hepatosplenomegaly. There is no tenderness.   Musculoskeletal: Normal range of motion. She exhibits no edema.   Neurological: She is alert and oriented to person, place, and time. She has normal strength and normal reflexes. No sensory deficit.   Skin: Skin is warm and intact.   Psychiatric: She has a normal mood and affect.   Nursing note and vitals reviewed.        Assessment:       1. CHF (NYHA class III, ACC/AHA stage C)    2. LBBB (left bundle branch block)    3. Essential hypertension    4. Type 2 diabetes mellitus with complication, with long-term current use of insulin    5. ICD (implantable cardioverter-defibrillator) in place         Plan:     Continue all cardiac medications  Regular exercise program  9 m  f/u

## 2017-03-26 ENCOUNTER — PATIENT MESSAGE (OUTPATIENT)
Dept: ENDOCRINOLOGY | Facility: CLINIC | Age: 67
End: 2017-03-26

## 2017-03-26 RX ORDER — FLUCONAZOLE 150 MG/1
TABLET ORAL
Qty: 1 TABLET | Refills: 1 | Status: SHIPPED | OUTPATIENT
Start: 2017-03-26 | End: 2017-08-23 | Stop reason: SDUPTHER

## 2017-03-26 RX ORDER — ROSUVASTATIN CALCIUM 10 MG/1
TABLET, COATED ORAL
Qty: 90 TABLET | Refills: 1 | Status: SHIPPED | OUTPATIENT
Start: 2017-03-26 | End: 2017-09-22 | Stop reason: SDUPTHER

## 2017-03-28 RX ORDER — LANCETS 33 GAUGE
1 EACH MISCELLANEOUS 4 TIMES DAILY
Qty: 150 EACH | Refills: 11 | Status: SHIPPED | OUTPATIENT
Start: 2017-03-28 | End: 2017-04-05 | Stop reason: SDUPTHER

## 2017-04-05 DIAGNOSIS — E11.8 TYPE 2 DIABETES MELLITUS WITH COMPLICATION, WITH LONG-TERM CURRENT USE OF INSULIN: Primary | ICD-10-CM

## 2017-04-05 DIAGNOSIS — Z79.4 TYPE 2 DIABETES MELLITUS WITH COMPLICATION, WITH LONG-TERM CURRENT USE OF INSULIN: Primary | ICD-10-CM

## 2017-04-05 RX ORDER — LANCETS 33 GAUGE
1 EACH MISCELLANEOUS 4 TIMES DAILY
Qty: 150 EACH | Refills: 11 | Status: SHIPPED | OUTPATIENT
Start: 2017-04-05 | End: 2017-04-05 | Stop reason: SDUPTHER

## 2017-04-05 RX ORDER — LANCETS 33 GAUGE
1 EACH MISCELLANEOUS 4 TIMES DAILY
Qty: 150 EACH | Refills: 11 | Status: SHIPPED | OUTPATIENT
Start: 2017-04-05 | End: 2019-03-08 | Stop reason: CLARIF

## 2017-04-05 NOTE — TELEPHONE ENCOUNTER
Spoke with [facility], states the rx for the lancets needs to have the ICD 10 code an a signature, will resend the rx to MAGALI Lucio NP.

## 2017-04-12 ENCOUNTER — PATIENT MESSAGE (OUTPATIENT)
Dept: ENDOCRINOLOGY | Facility: CLINIC | Age: 67
End: 2017-04-12

## 2017-04-12 RX ORDER — GLIMEPIRIDE 4 MG/1
4 TABLET ORAL
Qty: 90 TABLET | Refills: 3 | Status: SHIPPED | OUTPATIENT
Start: 2017-04-12 | End: 2017-11-14

## 2017-04-15 RX ORDER — POTASSIUM CHLORIDE 20 MEQ/1
TABLET, EXTENDED RELEASE ORAL
Qty: 90 TABLET | Refills: 0 | Status: SHIPPED | OUTPATIENT
Start: 2017-04-15 | End: 2017-07-26 | Stop reason: SDUPTHER

## 2017-04-20 ENCOUNTER — LAB VISIT (OUTPATIENT)
Dept: LAB | Facility: HOSPITAL | Age: 67
End: 2017-04-20
Attending: NURSE PRACTITIONER
Payer: MEDICARE

## 2017-04-20 ENCOUNTER — PATIENT MESSAGE (OUTPATIENT)
Dept: ENDOCRINOLOGY | Facility: CLINIC | Age: 67
End: 2017-04-20

## 2017-04-20 DIAGNOSIS — Z79.4 TYPE 2 DIABETES MELLITUS WITH COMPLICATION, WITH LONG-TERM CURRENT USE OF INSULIN: ICD-10-CM

## 2017-04-20 DIAGNOSIS — E11.8 TYPE 2 DIABETES MELLITUS WITH COMPLICATION, WITH LONG-TERM CURRENT USE OF INSULIN: ICD-10-CM

## 2017-04-20 LAB
CREAT UR-MCNC: 145 MG/DL
MICROALBUMIN UR DL<=1MG/L-MCNC: 11 UG/ML
MICROALBUMIN/CREATININE RATIO: 7.6 UG/MG

## 2017-04-20 PROCEDURE — 82570 ASSAY OF URINE CREATININE: CPT

## 2017-04-21 ENCOUNTER — PATIENT MESSAGE (OUTPATIENT)
Dept: ENDOCRINOLOGY | Facility: CLINIC | Age: 67
End: 2017-04-21

## 2017-04-25 ENCOUNTER — OFFICE VISIT (OUTPATIENT)
Dept: CARDIOLOGY | Facility: CLINIC | Age: 67
End: 2017-04-25
Payer: MEDICARE

## 2017-04-25 ENCOUNTER — CLINICAL SUPPORT (OUTPATIENT)
Dept: CARDIOLOGY | Facility: CLINIC | Age: 67
End: 2017-04-25
Payer: MEDICARE

## 2017-04-25 VITALS
WEIGHT: 200 LBS | HEIGHT: 62 IN | DIASTOLIC BLOOD PRESSURE: 71 MMHG | SYSTOLIC BLOOD PRESSURE: 121 MMHG | HEART RATE: 70 BPM | BODY MASS INDEX: 36.8 KG/M2

## 2017-04-25 DIAGNOSIS — E78.5 HYPERLIPIDEMIA, UNSPECIFIED HYPERLIPIDEMIA TYPE: Chronic | ICD-10-CM

## 2017-04-25 DIAGNOSIS — I50.9 CHF (NYHA CLASS III, ACC/AHA STAGE C): ICD-10-CM

## 2017-04-25 DIAGNOSIS — I44.7 LBBB (LEFT BUNDLE BRANCH BLOCK): ICD-10-CM

## 2017-04-25 DIAGNOSIS — Z95.810 ICD (IMPLANTABLE CARDIOVERTER-DEFIBRILLATOR) IN PLACE: ICD-10-CM

## 2017-04-25 DIAGNOSIS — E11.8 TYPE 2 DIABETES MELLITUS WITH COMPLICATION, WITH LONG-TERM CURRENT USE OF INSULIN: ICD-10-CM

## 2017-04-25 DIAGNOSIS — I50.42 CHRONIC COMBINED SYSTOLIC AND DIASTOLIC CONGESTIVE HEART FAILURE: Chronic | ICD-10-CM

## 2017-04-25 DIAGNOSIS — Z95.810 ICD (IMPLANTABLE CARDIOVERTER-DEFIBRILLATOR) IN PLACE: Chronic | ICD-10-CM

## 2017-04-25 DIAGNOSIS — I25.10 CORONARY ARTERY DISEASE INVOLVING NATIVE CORONARY ARTERY WITHOUT ANGINA PECTORIS, UNSPECIFIED WHETHER NATIVE OR TRANSPLANTED HEART: Primary | ICD-10-CM

## 2017-04-25 DIAGNOSIS — I10 ESSENTIAL HYPERTENSION: ICD-10-CM

## 2017-04-25 DIAGNOSIS — Z79.4 TYPE 2 DIABETES MELLITUS WITH COMPLICATION, WITH LONG-TERM CURRENT USE OF INSULIN: ICD-10-CM

## 2017-04-25 PROCEDURE — 93284 PRGRMG EVAL IMPLANTABLE DFB: CPT | Mod: PBBFAC,PO | Performed by: INTERNAL MEDICINE

## 2017-04-25 PROCEDURE — 99212 OFFICE O/P EST SF 10 MIN: CPT | Mod: PBBFAC,PO,25 | Performed by: INTERNAL MEDICINE

## 2017-04-25 PROCEDURE — 99214 OFFICE O/P EST MOD 30 MIN: CPT | Mod: S$PBB,,, | Performed by: INTERNAL MEDICINE

## 2017-04-25 PROCEDURE — 99999 PR PBB SHADOW E&M-EST. PATIENT-LVL II: CPT | Mod: PBBFAC,,, | Performed by: INTERNAL MEDICINE

## 2017-04-25 NOTE — PROGRESS NOTES
Subjective:    Patient ID:  Mckenzie Mari is a 66 y.o. female who presents for follow-up of Cough (worse at night) and Shortness of Breath      HPI  She comes with no chest pain, though shortness of breath at nite      Review of Systems   Constitution: Negative for decreased appetite, weakness, malaise/fatigue, weight gain and weight loss.   Cardiovascular: Negative for chest pain, dyspnea on exertion, leg swelling, palpitations and syncope.   Respiratory: Negative for cough and shortness of breath.    Gastrointestinal: Negative.    All other systems reviewed and are negative.       Objective:    Physical Exam   Constitutional: She is oriented to person, place, and time. She appears well-developed and well-nourished.   HENT:   Head: Normocephalic.   Eyes: Pupils are equal, round, and reactive to light.   Neck: Normal range of motion. Neck supple. No JVD present. Carotid bruit is not present. No thyromegaly present.   Cardiovascular: Normal rate, regular rhythm, normal heart sounds, intact distal pulses and normal pulses.  PMI is not displaced.  Exam reveals no gallop.    No murmur heard.  Pulmonary/Chest: Effort normal and breath sounds normal.   Abdominal: Soft. Normal appearance. She exhibits no mass. There is no hepatosplenomegaly. There is no tenderness.   Musculoskeletal: Normal range of motion. She exhibits no edema.   Neurological: She is alert and oriented to person, place, and time. She has normal strength and normal reflexes. No sensory deficit.   Skin: Skin is warm and intact.   Psychiatric: She has a normal mood and affect.   Nursing note and vitals reviewed.        Assessment:       1. Coronary artery disease involving native coronary artery without angina pectoris, unspecified whether native or transplanted heart    2. Chronic combined systolic and diastolic congestive heart failure    3. CHF (NYHA class III, ACC/AHA stage C)    4. Essential hypertension    5. LBBB (left bundle branch block)    6.  Type 2 diabetes mellitus with complication, with long-term current use of insulin    7. Hyperlipidemia, unspecified hyperlipidemia type    8. ICD (implantable cardioverter-defibrillator) in place         Plan:     Continue all cardiac medications  Regular exercise program  Weight loss  9 m f/u

## 2017-04-28 ENCOUNTER — OFFICE VISIT (OUTPATIENT)
Dept: ENDOCRINOLOGY | Facility: CLINIC | Age: 67
End: 2017-04-28
Payer: MEDICARE

## 2017-04-28 VITALS
HEART RATE: 83 BPM | SYSTOLIC BLOOD PRESSURE: 110 MMHG | DIASTOLIC BLOOD PRESSURE: 62 MMHG | BODY MASS INDEX: 37.47 KG/M2 | HEIGHT: 62 IN | WEIGHT: 203.63 LBS

## 2017-04-28 DIAGNOSIS — E89.0 POSTOPERATIVE HYPOTHYROIDISM: ICD-10-CM

## 2017-04-28 DIAGNOSIS — Z87.19 HISTORY OF PANCREATITIS: ICD-10-CM

## 2017-04-28 DIAGNOSIS — C73 THYROID CANCER: Chronic | ICD-10-CM

## 2017-04-28 DIAGNOSIS — I50.9 CHF (NYHA CLASS III, ACC/AHA STAGE C): ICD-10-CM

## 2017-04-28 DIAGNOSIS — Z79.4 TYPE 2 DIABETES MELLITUS WITH COMPLICATION, WITH LONG-TERM CURRENT USE OF INSULIN: Primary | ICD-10-CM

## 2017-04-28 DIAGNOSIS — I10 ESSENTIAL HYPERTENSION: ICD-10-CM

## 2017-04-28 DIAGNOSIS — Z95.810 ICD (IMPLANTABLE CARDIOVERTER-DEFIBRILLATOR) IN PLACE: Chronic | ICD-10-CM

## 2017-04-28 DIAGNOSIS — I25.10 CORONARY ARTERY DISEASE INVOLVING NATIVE CORONARY ARTERY WITHOUT ANGINA PECTORIS, UNSPECIFIED WHETHER NATIVE OR TRANSPLANTED HEART: ICD-10-CM

## 2017-04-28 DIAGNOSIS — E66.9 OBESITY (BMI 30-39.9): ICD-10-CM

## 2017-04-28 DIAGNOSIS — E11.8 TYPE 2 DIABETES MELLITUS WITH COMPLICATION, WITH LONG-TERM CURRENT USE OF INSULIN: Primary | ICD-10-CM

## 2017-04-28 DIAGNOSIS — E78.5 HYPERLIPIDEMIA, UNSPECIFIED HYPERLIPIDEMIA TYPE: Chronic | ICD-10-CM

## 2017-04-28 PROCEDURE — 99214 OFFICE O/P EST MOD 30 MIN: CPT | Mod: S$PBB,,, | Performed by: NURSE PRACTITIONER

## 2017-04-28 PROCEDURE — 99215 OFFICE O/P EST HI 40 MIN: CPT | Mod: PBBFAC,PO | Performed by: NURSE PRACTITIONER

## 2017-04-28 PROCEDURE — 99999 PR PBB SHADOW E&M-EST. PATIENT-LVL V: CPT | Mod: PBBFAC,,, | Performed by: NURSE PRACTITIONER

## 2017-04-28 NOTE — PROGRESS NOTES
CC: Ms. Mckenzie Mari arrives today for management of Type 2 DM and review of chronic medical conditions.     HPI: Ms. Mckenzie Mari was diagnosed with Type 2 DM in 2004. She was diagnosed based on lab work. Initial treatment consisted of metformin and glimepiride. Insulin added in 8/2016 - began Toujeo. She states that she felt that this caused nausea, abd pain, chest pain. Lantus caused throat swelling, left arm pain. She was then changed to Novolog 70/30 but this was only used for a short period because her insurance didn't cover.  Then changed to Humalog 50-50 in 12/2016.  + FH of DM in maternal aunt and cousin. Denies hospitalizations due to DM. Previously seen in endocrine by MAGALI Gruber, NP. Also follows with by Dr. Ramos for DM history of thyroid cancer/post-surgical hypothyroidism.       BG readings are checked 2-3x/day. Brings log.                  Hypoglycemia: No but feels symptomatic with BG < 120. States she feels like she can't pick herself up off of couch.    Missing Insulin/PO medication doses: No  Timing prandial insulin 5-15 minutes before meals: yes    Exercise: No    Dietary Habits: Eats 3 meals/day. Rare snacking. Avoids sugary drinks.    She follows regularly with cardiology for cardiomyopathy, CAD.     Recently had sinus surgery.        CURRENT DIABETIC MEDS: metformin  mg BID, Humalog 50-50  24 units BID.  Vial or pen: pen  Glucometer type: One Touch Verio    Previous DM treatments:   Invokana - nausea  Glimepiride - stopped when prandial insulin added  Touejo -  Nausea, abd pain, chest pain  Lantus - throat swelling, left arm pain  Novolog 70/30 - not covered by insurance    Last Eye Exam: 2016 - sees outside provider. Recent cataract sx.   Last Podiatry Exam: no    REVIEW OF SYSTEMS  Constitutional: no c/o fatigue, weakness, or weight loss  Eyes: denies visual disturbances  Cardiac: no palpitations, denies chest pain.  Respiratory: c/o occasional cough.   GI: no c/o  "abdominal pain, diarrhea. When BG < 120, feels nauseated.   Skin: no lesions or rashes. + easy bruising.  Neuro: + numbness, tingling in BLE that waxes and wanes.  Endocrine: denies polyphagia, polydipsia, polyuria      Personally reviewed Past Medical, Surgical, Social History.    Vital Signs  /62 (BP Location: Right arm, Patient Position: Sitting, BP Method: Manual)  Pulse 83  Ht 5' 2" (1.575 m)  Wt 92.4 kg (203 lb 9.5 oz)  BMI 37.24 kg/m2    Personally reviewed the below labs:    Hemoglobin A1C   Date Value Ref Range Status   04/20/2017 7.3 (H) 4.5 - 6.2 % Final     Comment:     According to ADA guidelines, hemoglobin A1C <7.0% represents  optimal control in non-pregnant diabetic patients.  Different  metrics may apply to specific populations.   Standards of Medical Care in Diabetes - 2016.  For the purpose of screening for the presence of diabetes:  <5.7%     Consistent with the absence of diabetes  5.7-6.4%  Consistent with increasing risk for diabetes   (prediabetes)  >or=6.5%  Consistent with diabetes  Currently no consensus exists for use of hemoglobin A1C  for diagnosis of diabetes for children.     01/23/2017 8.1 (H) 4.5 - 6.2 % Final     Comment:     According to ADA guidelines, hemoglobin A1C <7.0% represents  optimal control in non-pregnant diabetic patients.  Different  metrics may apply to specific populations.   Standards of Medical Care in Diabetes - 2016.  For the purpose of screening for the presence of diabetes:  <5.7%     Consistent with the absence of diabetes  5.7-6.4%  Consistent with increasing risk for diabetes   (prediabetes)  >or=6.5%  Consistent with diabetes  Currently no consensus exists for use of hemoglobin A1C  for diagnosis of diabetes for children.     10/05/2016 9.2 (H) 4.5 - 6.2 % Final     Comment:     According to ADA guidelines, hemoglobin A1C <7.0% represents  optimal control in non-pregnant diabetic patients.  Different  metrics may apply to specific populations. "   Standards of Medical Care in Diabetes - 2016.  For the purpose of screening for the presence of diabetes:  <5.7%     Consistent with the absence of diabetes  5.7-6.4%  Consistent with increasing risk for diabetes   (prediabetes)  >or=6.5%  Consistent with diabetes  Currently no consensus exists for use of hemoglobin A1C  for diagnosis of diabetes for children.         Chemistry        Component Value Date/Time     04/20/2017 0731    K 4.4 04/20/2017 0731     04/20/2017 0731    CO2 26 04/20/2017 0731    BUN 16 04/20/2017 0731    CREATININE 0.9 04/20/2017 0731     (H) 04/20/2017 0731        Component Value Date/Time    CALCIUM 9.5 04/20/2017 0731    ALKPHOS 77 04/20/2017 0731    AST 19 04/20/2017 0731    ALT 25 04/20/2017 0731    BILITOT 1.4 (H) 04/20/2017 0731          Lab Results   Component Value Date    CHOL 148 04/20/2017    CHOL 154 05/30/2016    CHOL 306 (H) 02/18/2016     Lab Results   Component Value Date    HDL 47 04/20/2017    HDL 51 05/30/2016    HDL 46 02/18/2016     Lab Results   Component Value Date    LDLCALC 62.4 (L) 04/20/2017    LDLCALC 74.4 05/30/2016    LDLCALC 202.8 (H) 02/18/2016     Lab Results   Component Value Date    TRIG 193 (H) 04/20/2017    TRIG 143 05/30/2016    TRIG 286 (H) 02/18/2016     Lab Results   Component Value Date    CHOLHDL 31.8 04/20/2017    CHOLHDL 33.1 05/30/2016    CHOLHDL 15.0 (L) 02/18/2016       Lab Results   Component Value Date    MICALBCREAT 7.6 04/20/2017     Lab Results   Component Value Date    TSH 0.918 10/05/2016       CrCl cannot be calculated (Unknown ideal weight.).    Vit D, 25-Hydroxy   Date Value Ref Range Status   11/08/2014 51 30 - 96 ng/mL Final     Comment:     Vitamin D deficiency.........<10 ng/mL                              Vitamin D insufficiency......10-29 ng/mL       Vitamin D sufficiency........> or equal to 30 ng/mL  Vitamin D toxicity............>100 ng/mL         PHYSICAL EXAMINATION  Constitutional: Appears well, no  distress  Neck: Supple, trachea midline; transverse scar to anterior neck from thyroidectomy.   Respiratory: CTA, even and unlabored.  Cardiovascular: RRR, no murmurs, no carotid bruits. DP pulses 1+ bilaterally; trace edema to BLE.   Lymph: no cervical or supraclavicular lymphadenopathy.  Skin: warm and dry; no lipohypertrophy, or acanthosis nigracans observed.  Neuro: DTR 2+ BUE/1+ BLE.  Feet: appropriate footwear.        A1c goal < 8%, due to CAD, CHF, and since she is afraid to try other insulins due to past experiences with new medications.        Assessment/Plan  1. Type 2 diabetes mellitus with complication, without long-term current use of insulin  -- A1c has improved significantly. Has hypoglycemic symptoms with BG < 120  -- continue current medications. Would not increase dose at this time due to high threshold for hypo symptoms.   -- continue metformin  -- check BG 4x/day.     -- Discussed diagnosis of DM, A1c goals, progression of disease, long term complications and tx options.  Advised patient to check BG before activities, such as driving or exercise.  -- Reviewed hypoglycemia management: treat with 1/2 glass of juice, 1/2 can regular coke, or 4 glucose tablets. Monitor and repeat treatment every 15 minutes until BG is >70 Then have a snack, which includes a complex carbohydrate and protein.     2. Coronary artery disease involving native coronary artery without angina pectoris, unspecified whether native or transplanted heart  -- avoid hyopglycemia   3. CHF (NYHA class III, ACC/AHA stage C)  -- follows with cardiology   4. ICD (implantable cardioverter-defibrillator) in place  -- as above   5. Thyroid cancer  -- recommend annual follow up with Dr. Ramos. Due in 10/2017   6. Postoperative hypothyroidism  -- follows with Dr. Ramos  -- Continue Synthroid at current dose.  Lab Results   Component Value Date    TSH 0.918 10/05/2016      7. Essential hypertension  -- controlled  -- on ARB   8.  Hyperlipidemia, unspecified hyperlipidemia type  -- continue statin  -- managed by cardiologist  Lab Results   Component Value Date    LDLCALC 62.4 (L) 04/20/2017      9. Obesity (BMI 30-39.9)  -- increases insulin resistance  Body mass index is 37.24 kg/(m^2).   10. History of pancreatitis  -- avoid GLP-1RA and DPP IV-i         FOLLOW UP  Return in about 4 months (around 8/28/2017).   Patient instructed to bring BG logs to each follow up   Patient encouraged to call for any BG/medication issues, concerns, or questions.    Orders Placed This Encounter   Procedures    Hemoglobin A1c    Basic metabolic panel

## 2017-04-28 NOTE — MR AVS SNAPSHOT
[facility]in[facility]  [address]  [place] [address]  Phone: [phone]  Fax: [phone]                  Mckenzie Mari   2017 9:30 AM   Office Visit    Description:  Female : 1950   Provider:  SAAD Giordano,ROSALBA   Department:  [place] - Endocrinology           Reason for Visit     Diabetes Mellitus           Diagnoses this Visit        Comments    Type 2 diabetes mellitus with complication, with long-term current use of insulin    -  Primary     Coronary artery disease involving native coronary artery without angina pectoris, unspecified whether native or transplanted heart         CHF (NYHA class III, ACC/AHA stage C)         ICD (implantable cardioverter-defibrillator) in place         Thyroid cancer         Postoperative hypothyroidism         Essential hypertension         Hyperlipidemia, unspecified hyperlipidemia type         Obesity (BMI 30-39.9)         History of pancreatitis                To Do List           Future Appointments        Provider Department Dept Phone    2017 10:30 AM LAB, [place] [facility] [phone]    2017 9:30 AM SAAD Giordano,ROSALBA [facility] [phone]      Goals (5 Years of Data)     None      Follow-Up and Disposition     Return in about 4 months (around 2017).      [facility]     [facility] Care [facility] -  Assistance  Unless otherwise directed by your provider, please contact [facility], our nurse care line that is available for  assistance.     Registered nurses in the [facility] provide: appointment scheduling, clinical advisement, health education, and other advisory services.  Call: [phone] (toll free)               Medications           Message regarding Medications     Verify the changes and/or additions to your medication regime listed below are the same as discussed with your clinician today.   "If any of these changes or additions are incorrect, please notify your healthcare provider.             Verify that the below list of medications is an accurate representation of the medications you are currently taking.  If none reported, the list may be blank. If incorrect, please contact your healthcare provider. Carry this list with you in case of emergency.           Current Medications     biotin 2,500 mcg Cap Take 5,000 mcg by mouth.    blood sugar diagnostic Strp 1 strip by Misc.(Non-Drug; Combo Route) route 4 (four) times daily. One Touch Verio strips    CALCIUM CARBONATE/VITAMIN D3 (VITAMIN D-3 ORAL) Take by mouth.    carvedilol (COREG) 25 MG tablet TAKE 1 TABLET (25 MG TOTAL) BY MOUTH 2 (TWO) TIMES DAILY WITH MEALS.    esomeprazole (NEXIUM) 40 MG capsule TAKE 1 CAPSULE BY MOUTH EVERY DAY    estradiol (ESTRING) 2 mg vaginal ring Place 2 mg vaginally every 3 (three) months. follow package directions     fluconazole (DIFLUCAN) 150 MG Tab TAKE 1 TABLET (150 MG TOTAL) BY MOUTH ONCE DAILY.    furosemide (LASIX) 40 MG tablet Take 1 tablet (40 mg total) by mouth once daily.    glimepiride (AMARYL) 4 MG tablet Take 1 tablet (4 mg total) by mouth before breakfast.    insulin lispro protamin-lispro 100 unit/mL (50-50) InPn Inject 24 Units into the skin 2 (two) times daily before meals.    lactobacillus rhamnosus GG (CULTURELLE) 10 billion cell capsule Take 1 capsule by mouth once daily.    lancets 33 gauge Misc 1 lancet by Misc.(Non-Drug; Combo Route) route 4 (four) times daily. Medically necessary, on insulin. ICD 10 code E11.8.    metformin (GLUCOPHAGE-XR) 500 MG 24 hr tablet Take 1 tablet (500 mg total) by mouth 2 (two) times daily with meals.    montelukast (SINGULAIR) 10 mg tablet TAKE 1 TABLET BY MOUTH EVERY EVENING.    multivit-mineral-iron-lutein (CENTRUM SILVER ULTRA WOMEN'S) Tab Take by mouth.    pen needle, diabetic (BD ULTRA-FINE SHANTELLE PEN NEEDLES) 32 gauge x 5/32" Ndle Uses up to 4 daily, on multiple " "daily insulin injections    potassium chloride SA (K-DUR,KLOR-CON) 20 MEQ tablet TAKE 1 TABLET EVERY DAY    rosuvastatin (CRESTOR) 10 MG tablet TAKE 1 TABLET BY MOUTH EVERY DAY    spironolactone (ALDACTONE) 25 MG tablet TAKE 1 TABLET EVERY DAY    SYNTHROID 137 mcg Tab tablet Take 1 tablet (137 mcg total) by mouth before breakfast.    valsartan (DIOVAN) 80 MG tablet TAKE 1 TABLET (80 MG TOTAL) BY MOUTH ONCE DAILY.           Clinical Reference Information           Your Vitals Were     BP Pulse Height Weight BMI    110/62 (BP Location: Right arm, Patient Position: Sitting, BP Method: Manual) 83 5' 2" (1.575 m) 92.4 kg (203 lb 9.5 oz) 37.24 kg/m2      Blood Pressure          Most Recent Value    BP  110/62      Allergies as of 4/28/2017     Cayenne Pepper    Lantus [Insulin Glargine]    Other    Adhesive Tape-silicones    Amoxil [Amoxicillin]    Aspirin    Avelox [Moxifloxacin]    Betadine [Povidone-iodine]    Cephalexin    Doxycycline    Erythromycin    Hydrocodone    Lactose Intolerance [Lactase]    Latex    Levaquin [Levofloxacin]    Morphine    Tramadol    Latex, Natural Rubber    Penicillins    Sulfa (Sulfonamide Antibiotics)      Immunizations Administered on Date of Encounter - 4/28/2017     None      Orders Placed During Today's Visit     Future Labs/Procedures Expected by Expires    Basic metabolic panel  4/28/2017 6/27/2018    Hemoglobin A1c  4/28/2017 6/27/2018      Language Assistance Services     ATTENTION: Language assistance services are available, free of charge. Please call [phone].      ATENCIÓN: Si habla español, tiene a cochran disposición servicios gratuitos de asistencia lingüística. Llame al [phone].     [facility] Ý: N?u b?n nói Ti?ng Vi?t, có các d?ch v? h? tr? ngôn ng? mi?n phí dành cho b?n. G?i s? [phone].         [facility] complies with applicable Federal civil rights laws and does not discriminate on the basis of race, color, national origin, age, disability, or " sex.

## 2017-06-19 PROBLEM — J01.00 SUBACUTE MAXILLARY SINUSITIS: Status: RESOLVED | Noted: 2017-03-15 | Resolved: 2017-06-19

## 2017-06-29 ENCOUNTER — PATIENT OUTREACH (OUTPATIENT)
Dept: ADMINISTRATIVE | Facility: HOSPITAL | Age: 67
End: 2017-06-29

## 2017-06-29 NOTE — PROGRESS NOTES
Overdue eye exam report, due your annual diabetic eye exam, osteoporosis screening, and possibly some immunizations (pneumonia, shingles, and tetanus)

## 2017-07-06 ENCOUNTER — PATIENT MESSAGE (OUTPATIENT)
Dept: ENDOCRINOLOGY | Facility: CLINIC | Age: 67
End: 2017-07-06

## 2017-07-11 ENCOUNTER — PATIENT MESSAGE (OUTPATIENT)
Dept: FAMILY MEDICINE | Facility: CLINIC | Age: 67
End: 2017-07-11

## 2017-07-24 RX ORDER — LEVOTHYROXINE SODIUM 137 UG/1
137 TABLET ORAL
Qty: 30 TABLET | Refills: 11 | Status: SHIPPED | OUTPATIENT
Start: 2017-07-24 | End: 2018-03-07 | Stop reason: SDUPTHER

## 2017-07-25 ENCOUNTER — OFFICE VISIT (OUTPATIENT)
Dept: PRIMARY CARE CLINIC | Facility: CLINIC | Age: 67
End: 2017-07-25
Payer: MEDICARE

## 2017-07-25 ENCOUNTER — PATIENT OUTREACH (OUTPATIENT)
Dept: ADMINISTRATIVE | Facility: HOSPITAL | Age: 67
End: 2017-07-25

## 2017-07-25 VITALS
HEART RATE: 80 BPM | HEIGHT: 62 IN | WEIGHT: 206.56 LBS | OXYGEN SATURATION: 96 % | TEMPERATURE: 98 F | BODY MASS INDEX: 38.01 KG/M2

## 2017-07-25 DIAGNOSIS — J01.00 ACUTE MAXILLARY SINUSITIS, RECURRENCE NOT SPECIFIED: Primary | ICD-10-CM

## 2017-07-25 PROCEDURE — 99999 PR PBB SHADOW E&M-EST. PATIENT-LVL V: CPT | Mod: PBBFAC,,, | Performed by: NURSE PRACTITIONER

## 2017-07-25 PROCEDURE — 99213 OFFICE O/P EST LOW 20 MIN: CPT | Mod: S$PBB,,, | Performed by: NURSE PRACTITIONER

## 2017-07-25 PROCEDURE — 99215 OFFICE O/P EST HI 40 MIN: CPT | Mod: PBBFAC,PO | Performed by: NURSE PRACTITIONER

## 2017-07-25 RX ORDER — AZITHROMYCIN 250 MG/1
TABLET, FILM COATED ORAL
Qty: 6 TABLET | Refills: 0 | Status: SHIPPED | OUTPATIENT
Start: 2017-07-25 | End: 2017-08-29

## 2017-07-25 NOTE — PATIENT INSTRUCTIONS
Sinus infections have many causes, including viral and bacterial infections.    Sinus infections that resolve in 7-10 days are typically viral.  Antibiotics do not cure viral infections and should not be used, as over-use of antibiotics can develop resistant organisms that are much harder to cure.    The following medications have been prescribed for you today:    Azithromycin antibiotic  Flonase or saline nasal spray to reduce swelling and congestion in the sinuses.    In addition, the following symptomatic measures may be helpful:    HOME CARE(1):  · Drink plenty of water, hot tea, and other liquids to stay well hydrated. This thins the mucus and promotes sinus drainage.  · Apply heat to the painful areas of the face. Use a towel soaked in warm water. Or,  the shower and direct the hot spray onto your face. This is a good way to inhale warm water vapor and get heat on your face at the same time. (Cover your mouth and nose with your hands so you can still breathe as you do this.)  · Use a vaporizer with products such as Vicks VapoRub (contains menthol) at night. Suck on peppermint, menthol or eucalyptus hard candies during the day.  · An expectorant containing guaifenesin (such as Robitussin, Mucinex), helps to thin the mucus and promote drainage from the sinuses.  · Over-the-counter decongestants (pseudoephedrine or phenylephrine) may be used unless you have high blood pressure. Nasal sprays work the fastest. Use one that contains phenylephrine (Ag-synephrine, Sinex and others) or oxymetazoline (Afrin). First blow the nose gently to remove mucus, then apply the drops. Do not use these medicines more often than directed on the label or for more than three to five days or symptoms may worsen. You may also use tablets containing pseudoephedrine (Sudafed). Many sinus remedies combine ingredients, which may increase side effects. Read the labels or ask the pharmacist for help. NOTE: Persons with high blood  "pressure should not use decongestants or cold medications with the letter "D" in the name. They can raise blood pressure.  Instead, choose a product designated for people with high blood pressure (HBP on the label, like Coricidin HBP).  · Antihistamines are useful if allergies are a cause of your sinusitis. The mildest one is chlorpheniramine (available without a prescription). The dose for adults is 8-12mg three times a day. [NOTE: Do not use chlorpheniramine if you have glaucoma or if you are a man with trouble urinating due to an enlarged prostate.] Claritin (loratidine) is an antihistamine that causes less drowsiness and is a good alternative for daytime use.  Allegra is another medication that is similar.  · Do not use nasal rinses or irrigation during an acute sinus infection, unless advised by your doctor. Rinsing may spread the infection to other sinuses.  · You may use acetaminophen (Tylenol) or ibuprofen (Motrin, Advil) to control pain, unless another pain medicine was prescribed. [ NOTE: If you have chronic liver or kidney disease or ever had a stomach ulcer, talk with your doctor before using these medicines.] (Aspirin should never be used in anyone under 18 years of age who is ill with a fever. It may cause severe liver damage.)  · Finish the full course of antibiotic, if prescribed, even if you are feeling better after a few days.  FOLLOW UP with your primary care provider in one week or as instructed if not improving.  GET PROMPT MEDICAL ATTENTION if any of the following occur:  · Facial pain or headache becomes more severe  · Stiff neck  · Unusual drowsiness or confusion, or not acting like your normal self  · Swelling of the forehead or eyelids  · Vision problems including blurred or double vision  · Fever of 100.4ºF (38ºC) or higher, or as directed by your healthcare provider  · Seizure  (1) © 1849-1518 [employer], [address], [place] [address]. All rights reserved. This " information is not intended as a substitute for professional medical care. Always follow your healthcare professional's instructions.    If not better in 3-5 days, worse or concerns, call.    If you have any questions, please call.  You can reach us at [phone] Monday through Friday (except holidays) 12 nooon to 8 p.m.    Thank you for using the [facility]!    SAAD Salazar, CNP, FNP-BC  [facility]  [facility]

## 2017-07-25 NOTE — PROGRESS NOTES
"Mckenzie Mari is a 66 y.o. female patient of Bacilio Gold MD who presents to the clinic today for   Chief Complaint   Patient presents with    Sinusitis     started 1 week, drainage from nose, green and yellow in color    Cough     non productive, has not been on any sinus medication    Sore Throat   .    HPI    Pt, who is not known to me, reports a new problem to me:  "sinus", including crustiness of the nose, congestion, soreness on the right side of the nose, no RN--more "dried" up but can blow out colored mucus, post nasal drip.  Feels feverish but hasn't documented an infection.    These symptoms began 1 week  ago and status is unchanged.     Symptoms are + acute.    Pt denies the following symptoms:  fever    Aggravating factors include nothing .    Relieving factors include nothing tried .    OTC Medications tried are nothing tried.    Prescription medications taken for symptoms are none.    Pertinent medical history:  H/o chronic sinusitis and rhinitis (see 2012 note from Dr. Ruano).    Smoking status:  nonsmoker    ROS    Constitutional:   No  fever, no fatigue, no change in appetite.    Head:   + headache  Ears:   No pain.  Eyes:  Cakiness and runny eyes from allergies  Nose:   + right max sinus pain, no congestion, no runny nose but can blow out colored mucus, + post nasal drip.  Throat:  Mild scratchy ST pain.    Heart:  No change palpitations, chest pain.    Lungs:  + difficulty breathing r/t heart problems intermittently, has been coughing, no sputum product    GI/:  Drip is causing stomach ache and diarrhea    MS:  No new bone, joint or muscle problems.    Skin:  No rashes, itching.      PAST MEDICAL HISTORY:  Past Medical History:   Diagnosis Date    Allergy     Arthritis     Breast cancer 2005    s/p lumpectomy, chemo and now cancer free over 5 years    Bundle branch block     Cardiomyopathy     EF 35 - 40%    CHF (congestive heart failure)     FC II    Chronic sinusitis     " CVID (common variable immunodeficiency)     Diabetes mellitus     GERD (gastroesophageal reflux disease)     Headache(784.0)     HTN (hypertension)     Hyperlipidemia     Hypothyroid 7/25/2012    Otitis media     Presence of combination internal cardiac defibrillator (ICD) and pacemaker     Thyroid cancer     Thyroid cancer 7/25/2012    Thyroid disease     hypothyroid after papillary thyroid cancer with thyroid resection       PAST SURGICAL HISTORY:  Past Surgical History:   Procedure Laterality Date    BREAST LUMPECTOMY      left    CARDIAC PACEMAKER PLACEMENT      CATARACT EXTRACTION W/  INTRAOCULAR LENS IMPLANT      left eye    CHOLECYSTECTOMY      COLONOSCOPY N/A 5/11/2016    Procedure: COLONOSCOPY;  Surgeon: Alfonso Serrano Jr., MD;  Location: [facility];  Service: Endoscopy;  Laterality: N/A;    COLONOSCOPY W/ POLYPECTOMY  10/05/2009    [facility]   One 2 mm polyp in the cecum.  TUBULAR ADENOMA.  Tortuous colon.    ESOPHAGOGASTRODUODENOSCOPY  09/12/2006    [facility]   Dysphagia.  NERD.  Patulous LES.  Atrophic mucosa.  Benign fundal polyps.  Dilated, 42 Fr.    HYSTERECTOMY      JOINT REPLACEMENT Bilateral     pacemaker defibrillator      THYROID SURGERY  x2    TONSILLECTOMY         SOCIAL HISTORY:  Social History     Social History    Marital status:      Spouse name: N/A    Number of children: N/A    Years of education: N/A     Occupational History    Not on file.     Social History Main Topics    Smoking status: Never Smoker    Smokeless tobacco: Never Used    Alcohol use No    Drug use: No    Sexual activity: Not on file     Other Topics Concern    Not on file     Social History Narrative    No narrative on file       FAMILY HISTORY:  Family History   Problem Relation Age of Onset    Allergic rhinitis Mother     Cancer Mother      bladder    Heart disease Mother     Allergic rhinitis Father     Heart disease Father     Allergic rhinitis Brother     Heart disease  "Brother     Cancer Brother      lung    Cancer Maternal Aunt      breast     Cancer Paternal Aunt       breast    Heart disease Brother     Diabetes Paternal Aunt     Cancer Paternal Aunt      lung    Allergic rhinitis Sister     Angioedema Neg Hx     Asthma Neg Hx     Atopy Neg Hx     Eczema Neg Hx     Immunodeficiency Neg Hx     Urticaria Neg Hx        ALLERGIES AND MEDICATIONS: updated and reviewed.  Review of patient's allergies indicates:   Allergen Reactions    Cayenne pepper Swelling    Lantus [insulin glargine] Shortness Of Breath and Swelling     Toujeo also    Other Nausea Only     "Mycin" drugs    Adhesive tape-silicones Itching    Amoxil [amoxicillin]      Once diagnosed with penicillin allergy.  Underwent skin testing that was apparently negative in approximately 2011.  However developed a rash and swelling after taking amoxicillin in 2012.    Aspirin Swelling     Other reaction(s): Stomach upset    Avelox [moxifloxacin] Itching    Betadine [povidone-iodine] Itching    Cephalexin Other (See Comments)     Blurred vision    Doxycycline Itching    Erythromycin      Other reaction(s): Stomach upset  Other reaction(s): Flatulence    Hydrocodone      Other reaction(s): Rash  Other reaction(s): Hives    Lactose intolerance [lactase] Diarrhea    Latex      Other reaction(s): Rash    Levaquin [levofloxacin] Hives     Other reaction(s): Achilles tendinitis/bursitis    Morphine Itching     Other reaction(s): Nausea    Tramadol Other (See Comments)     Other reaction(s): twitching of muscles  Other reaction(s): jumping of muscles  Pain in muscles    Penicillins Rash     Other reaction(s): Stomach upset  Other reaction(s): Rash  Other reaction(s): Itching  Other reaction(s): Hives  Other reaction(s): Edema    Sulfa (sulfonamide antibiotics) Rash     Other reaction(s): Unknown     Current Outpatient Prescriptions   Medication Sig Dispense Refill    blood sugar diagnostic Strp 1 strip " "by Misc.(Non-Drug; Combo Route) route 4 (four) times daily. One Touch Verio strips 150 each 12    CALCIUM CARBONATE/VITAMIN D3 (VITAMIN D-3 ORAL) Take by mouth.      carvedilol (COREG) 25 MG tablet TAKE 1 TABLET (25 MG TOTAL) BY MOUTH 2 (TWO) TIMES DAILY WITH MEALS. 180 tablet 3    esomeprazole (NEXIUM) 40 MG capsule TAKE 1 CAPSULE BY MOUTH EVERY DAY 90 capsule 1    estradiol (ESTRING) 2 mg vaginal ring Place 2 mg vaginally every 3 (three) months. follow package directions       fluconazole (DIFLUCAN) 150 MG Tab TAKE 1 TABLET (150 MG TOTAL) BY MOUTH ONCE DAILY. 1 tablet 1    furosemide (LASIX) 40 MG tablet Take 1 tablet (40 mg total) by mouth once daily. (Patient taking differently: Take 40 mg by mouth daily as needed. ) 90 tablet 1    glimepiride (AMARYL) 4 MG tablet Take 1 tablet (4 mg total) by mouth before breakfast. 90 tablet 3    insulin lispro protamin-lispro 100 unit/mL (50-50) InPn Inject 24 Units into the skin 2 (two) times daily before meals. 1 Box 6    lactobacillus rhamnosus GG (CULTURELLE) 10 billion cell capsule Take 1 capsule by mouth once daily.      lancets 33 gauge Misc 1 lancet by Misc.(Non-Drug; Combo Route) route 4 (four) times daily. Medically necessary, on insulin. ICD 10 code E11.8. 150 each 11    metformin (GLUCOPHAGE-XR) 500 MG 24 hr tablet Take 1 tablet (500 mg total) by mouth 2 (two) times daily with meals. 180 tablet 3    montelukast (SINGULAIR) 10 mg tablet TAKE 1 TABLET BY MOUTH EVERY EVENING. (Patient taking differently: TAKE 1 TABLET BY MOUTH EVERY AM) 90 tablet 3    multivit-mineral-iron-lutein (CENTRUM SILVER ULTRA WOMEN'S) Tab Take by mouth.      pen needle, diabetic (BD ULTRA-FINE SHANTELLE PEN NEEDLES) 32 gauge x 5/32" Ndle Uses up to 4 daily, on multiple daily insulin injections 400 each 3    potassium chloride SA (K-DUR,KLOR-CON) 20 MEQ tablet TAKE 1 TABLET EVERY DAY 90 tablet 0    rosuvastatin (CRESTOR) 10 MG tablet TAKE 1 TABLET BY MOUTH EVERY DAY 90 tablet 1    " spironolactone (ALDACTONE) 25 MG tablet TAKE 1 TABLET EVERY DAY 90 tablet 1    SYNTHROID 137 mcg Tab tablet TAKE 1 TABLET (137 MCG TOTAL) BY MOUTH BEFORE BREAKFAST. 30 tablet 11    valsartan (DIOVAN) 80 MG tablet TAKE 1 TABLET (80 MG TOTAL) BY MOUTH ONCE DAILY. 90 tablet 3    biotin 2,500 mcg Cap Take 5,000 mcg by mouth.       No current facility-administered medications for this visit.        PHYSICAL EXAM    Alert, coop 66 y.o. female patient in no acute distress.    Vitals:    07/25/17 1740   Pulse: 80   Temp: 98.3 °F (36.8 °C)     VS reviewed.  VS stable.  CC, nursing note, medications & PMH all reviewed today.    Head:  Normocephalic, atraumatic.    EENT:  EACs patent.  TMs no erythema, dull LR, no effusions, no TM perforation.                              Eye lids normal, no discharge present.       Sinus tenderness to palp--bilat max.               Nares--+ edema, + small amount d/c present.    Pharynx not injected.                Tonsils absent.    No anterior, no posterior cervical lymph nodes palpable.    NO submental, submandibular or supraclavicular lymph nodes palp.             Resp:  Respirations even, unlabored   Lungs CTA bilat.  No wheezing.  No crackles.  Moves air to bases bilat.    Heart:  RRR, no MRG.    MS:  Ambulates normally.             NEURO:  Alert and oriented x 4.  Responds appropriately during interaction.    Skin:  Warm, dry, color good.    Acute maxillary sinusitis, recurrence not specified  -     azithromycin (ZITHROMAX Z-DIDIER) 250 MG tablet; Take 2 pills day 1, then 1 pill day 2-5.  Dispense: 6 tablet; Refill: 0      Pt today presents with 7 days of unimproved max sinus tenderness, green nasal d/c, congestion and sinus pressure.  She has many, many medication allergies and states zpack is what she can take and it helps sinus infection..    This is a new problem to me.  No work up is planned.        Pt advised to perform comfort measures recommended on patient instruction sheet  .    If not better in 3-5 days, the patient is advised to call us.  If worse or concerns, the patient is advised to call us.  Explained exam findings, diagnosis and treatment plan to patient.  Questions answered and patient states understanding.

## 2017-07-25 NOTE — PROGRESS NOTES
Eye exam report, due your annual diabetic eye exam, osteoporosis screening, and possibly some immunizations (tetanus, shingles, and pneumonia)

## 2017-07-25 NOTE — Clinical Note
Bacilio Gold MD,  I saw your patient today in the [facility].  If you have any questions, please do not hesitate to contact me.  Thank you!  SKIP Salazar

## 2017-07-25 NOTE — LETTER
August 7, 2017    Mckenzie Mari  [address] [address]             [facility]  [address]  [place] [address]  Phone: [phone] Dear Mrs. Mari:    We have tried to reach you by mychart unsuccessfully.    [facility] is committed to your overall health.  To help you get the most out of each of your visits, we will review your information to make sure you are up to date on all of your recommended tests and/or procedures.       We have Dr. Bacilio Gold listed as your primary care provider. He has found that you may be due for your annual diabetic eye exam, osteoporosis screening, and possibly some immunizations (tetanus, shingles, and pneumonia).     Medicare does not cover all immunizations to be given in the clinic.  Check your benefits to ensure that you do not need to receive your immunizations at the pharmacy.     If you have had any of the above done at an outside facility, please let us know so I can update your record.  If you have a copy of these records, please provide a copy for us to scan into your chart.  If not, please provide that provider/facilities contact information so that we may obtain copies from that facility.     Otherwise, please schedule these appointments at your earliest convenience.  You are due for your annual follow up with Dr. Gold in December of 2017.     If you have any questions or concerns, please don't hesitate to call.    Thank you for letting us care for you,  Barb Torrez LPN Clinical Care Coordinator  [facility] [place]  [phone]

## 2017-07-25 NOTE — PROGRESS NOTES
Mckenzie Mari is a 66 y.o. female patient of Bacilio Gold MD who presents to the clinic today for   Chief Complaint   Patient presents with    Sinusitis     started 1 week, drainage from nose, green and yellow in color    Cough     non productive, has not been on any sinus medication    Sore Throat   .    HPI    Pt, who is *** known to me, reports *** .    These symptoms began ***  ago and status is ***.     Symptoms are *** acute  *** exac of chronic ***.    Pt denies the following symptoms:  ***    Aggravating factors include *** .    Relieving factors include *** .    OTC Medications tried are ***.    Prescription medications taken for symptoms are ***.    Pertinent medical history:  ***.    Smoking status:  ***    ROS    Constitutional:   ***  fever, *** fatigue, *** in appetite.    Head:   *** headache  Ears:   *** pain.  Eyes:  No sxs  Nose:   *** sinus pain, *** congestion, *** runny nose, *** post nasal drip.  Throat:  *** ST pain, *** exudate, *** difficulty swallowing ***.    Heart:  No palpitations, chest pain.    Lungs:  *** difficulty breathing, *** coughing, *** sputum production, *** wheezing.              Symptoms are *** acquired.    GI/:  No sxs    MS:  No new bone, joint or muscle problems.    Skin:  No rashes, itching.      PAST MEDICAL HISTORY:  Past Medical History:   Diagnosis Date    Allergy     Arthritis     Breast cancer 2005    s/p lumpectomy, chemo and now cancer free over 5 years    Bundle branch block     Cardiomyopathy     EF 35 - 40%    CHF (congestive heart failure)     FC II    Chronic sinusitis     CVID (common variable immunodeficiency)     Diabetes mellitus     GERD (gastroesophageal reflux disease)     Headache(784.0)     HTN (hypertension)     Hyperlipidemia     Hypothyroid 7/25/2012    Otitis media     Presence of combination internal cardiac defibrillator (ICD) and pacemaker     Thyroid cancer     Thyroid cancer 7/25/2012    Thyroid disease      hypothyroid after papillary thyroid cancer with thyroid resection       PAST SURGICAL HISTORY:  Past Surgical History:   Procedure Laterality Date    BREAST LUMPECTOMY      left    CARDIAC PACEMAKER PLACEMENT      CATARACT EXTRACTION W/  INTRAOCULAR LENS IMPLANT      left eye    CHOLECYSTECTOMY      COLONOSCOPY N/A 5/11/2016    Procedure: COLONOSCOPY;  Surgeon: Alfonso Serrano Jr., MD;  Location: [facility];  Service: Endoscopy;  Laterality: N/A;    COLONOSCOPY W/ POLYPECTOMY  10/05/2009    [facility]   One 2 mm polyp in the cecum.  TUBULAR ADENOMA.  Tortuous colon.    ESOPHAGOGASTRODUODENOSCOPY  09/12/2006    [facility]   Dysphagia.  NERD.  Patulous LES.  Atrophic mucosa.  Benign fundal polyps.  Dilated, 42 Fr.    HYSTERECTOMY      JOINT REPLACEMENT Bilateral     pacemaker defibrillator      THYROID SURGERY  x2    TONSILLECTOMY         SOCIAL HISTORY:  Social History     Social History    Marital status:      Spouse name: N/A    Number of children: N/A    Years of education: N/A     Occupational History    Not on file.     Social History Main Topics    Smoking status: Never Smoker    Smokeless tobacco: Never Used    Alcohol use No    Drug use: No    Sexual activity: Not on file     Other Topics Concern    Not on file     Social History Narrative    No narrative on file       FAMILY HISTORY:  Family History   Problem Relation Age of Onset    Allergic rhinitis Mother     Cancer Mother      bladder    Heart disease Mother     Allergic rhinitis Father     Heart disease Father     Allergic rhinitis Brother     Heart disease Brother     Cancer Brother      lung    Cancer Maternal Aunt      breast     Cancer Paternal Aunt       breast    Heart disease Brother     Diabetes Paternal Aunt     Cancer Paternal Aunt      lung    Allergic rhinitis Sister     Angioedema Neg Hx     Asthma Neg Hx     Atopy Neg Hx     Eczema Neg Hx     Immunodeficiency Neg Hx     Urticaria Neg Hx   "      ALLERGIES AND MEDICATIONS: updated and reviewed.  Review of patient's allergies indicates:   Allergen Reactions    Cayenne pepper Swelling    Lantus [insulin glargine] Shortness Of Breath and Swelling     Toujeo also    Other Nausea Only     "Mycin" drugs    Adhesive tape-silicones Itching    Amoxil [amoxicillin]      Once diagnosed with penicillin allergy.  Underwent skin testing that was apparently negative in approximately 2011.  However developed a rash and swelling after taking amoxicillin in 2012.    Aspirin Swelling     Other reaction(s): Stomach upset    Avelox [moxifloxacin] Itching    Betadine [povidone-iodine] Itching    Cephalexin Other (See Comments)     Blurred vision    Doxycycline Itching    Erythromycin      Other reaction(s): Stomach upset  Other reaction(s): Flatulence    Hydrocodone      Other reaction(s): Rash  Other reaction(s): Hives    Lactose intolerance [lactase] Diarrhea    Latex      Other reaction(s): Rash    Levaquin [levofloxacin] Hives     Other reaction(s): Achilles tendinitis/bursitis    Morphine Itching     Other reaction(s): Nausea    Tramadol Other (See Comments)     Other reaction(s): twitching of muscles  Other reaction(s): jumping of muscles  Pain in muscles    Penicillins Rash     Other reaction(s): Stomach upset  Other reaction(s): Rash  Other reaction(s): Itching  Other reaction(s): Hives  Other reaction(s): Edema    Sulfa (sulfonamide antibiotics) Rash     Other reaction(s): Unknown     Current Outpatient Prescriptions   Medication Sig Dispense Refill    blood sugar diagnostic Strp 1 strip by Misc.(Non-Drug; Combo Route) route 4 (four) times daily. One Touch Verio strips 150 each 12    CALCIUM CARBONATE/VITAMIN D3 (VITAMIN D-3 ORAL) Take by mouth.      carvedilol (COREG) 25 MG tablet TAKE 1 TABLET (25 MG TOTAL) BY MOUTH 2 (TWO) TIMES DAILY WITH MEALS. 180 tablet 3    esomeprazole (NEXIUM) 40 MG capsule TAKE 1 CAPSULE BY MOUTH EVERY DAY 90 " "capsule 1    estradiol (ESTRING) 2 mg vaginal ring Place 2 mg vaginally every 3 (three) months. follow package directions       fluconazole (DIFLUCAN) 150 MG Tab TAKE 1 TABLET (150 MG TOTAL) BY MOUTH ONCE DAILY. 1 tablet 1    furosemide (LASIX) 40 MG tablet Take 1 tablet (40 mg total) by mouth once daily. (Patient taking differently: Take 40 mg by mouth daily as needed. ) 90 tablet 1    glimepiride (AMARYL) 4 MG tablet Take 1 tablet (4 mg total) by mouth before breakfast. 90 tablet 3    insulin lispro protamin-lispro 100 unit/mL (50-50) InPn Inject 24 Units into the skin 2 (two) times daily before meals. 1 Box 6    lactobacillus rhamnosus GG (CULTURELLE) 10 billion cell capsule Take 1 capsule by mouth once daily.      lancets 33 gauge Misc 1 lancet by Misc.(Non-Drug; Combo Route) route 4 (four) times daily. Medically necessary, on insulin. ICD 10 code E11.8. 150 each 11    metformin (GLUCOPHAGE-XR) 500 MG 24 hr tablet Take 1 tablet (500 mg total) by mouth 2 (two) times daily with meals. 180 tablet 3    montelukast (SINGULAIR) 10 mg tablet TAKE 1 TABLET BY MOUTH EVERY EVENING. (Patient taking differently: TAKE 1 TABLET BY MOUTH EVERY AM) 90 tablet 3    multivit-mineral-iron-lutein (CENTRUM SILVER ULTRA WOMEN'S) Tab Take by mouth.      pen needle, diabetic (BD ULTRA-FINE SHANTELLE PEN NEEDLES) 32 gauge x 5/32" Ndle Uses up to 4 daily, on multiple daily insulin injections 400 each 3    potassium chloride SA (K-DUR,KLOR-CON) 20 MEQ tablet TAKE 1 TABLET EVERY DAY 90 tablet 0    rosuvastatin (CRESTOR) 10 MG tablet TAKE 1 TABLET BY MOUTH EVERY DAY 90 tablet 1    spironolactone (ALDACTONE) 25 MG tablet TAKE 1 TABLET EVERY DAY 90 tablet 1    SYNTHROID 137 mcg Tab tablet TAKE 1 TABLET (137 MCG TOTAL) BY MOUTH BEFORE BREAKFAST. 30 tablet 11    valsartan (DIOVAN) 80 MG tablet TAKE 1 TABLET (80 MG TOTAL) BY MOUTH ONCE DAILY. 90 tablet 3    biotin 2,500 mcg Cap Take 5,000 mcg by mouth.       No current " facility-administered medications for this visit.              PHYSICAL EXAM    Alert, coop 66 y.o. female patient in *** distress ***.    Vitals:    07/25/17 1740   Pulse: 80   Temp: 98.3 °F (36.8 °C)     VS reviewed.  VS ***.  CC, nursing note, medications & PMH all reviewed today.    Head:  Normocephalic, atraumatic.    EENT:  EACs ***.  TMs *** erythema, *** LR, *** effusions (suppurative; nonsupurative), *** TM perforation.                              Eye lids normal, no discharge present.       Sinus tenderness to palp--***.               Nares--*** edema, *** d/c present.    Pharynx *** injected.                Tonsils *** absent, *** erythematous , *** enlarged, *** exudate present.    *** anterior, *** posterior cervical lymph nodes palpable.    *** submental, submandibular or supraclavicular lymph nodes palp.             Resp:  Respirations ***   Lungs *** bilat.  *** wheezing.  *** crackles.  *** air to bases bilat.    Heart:  RRR, no MRG.    MS:  Ambulates ***.             NEURO:  Alert and oriented x 4.  Responds appropriately during interaction.    Skin:  Warm, dry, color good.

## 2017-07-26 RX ORDER — POTASSIUM CHLORIDE 20 MEQ/1
TABLET, EXTENDED RELEASE ORAL
Qty: 90 TABLET | Refills: 1 | Status: SHIPPED | OUTPATIENT
Start: 2017-07-26 | End: 2018-02-08 | Stop reason: SDUPTHER

## 2017-08-05 RX ORDER — SPIRONOLACTONE 25 MG/1
TABLET ORAL
Qty: 90 TABLET | Refills: 1 | Status: SHIPPED | OUTPATIENT
Start: 2017-08-05 | End: 2018-01-18 | Stop reason: SDUPTHER

## 2017-08-07 ENCOUNTER — CLINICAL SUPPORT (OUTPATIENT)
Dept: CARDIOLOGY | Facility: CLINIC | Age: 67
End: 2017-08-07
Payer: MEDICARE

## 2017-08-07 DIAGNOSIS — Z95.810 ICD (IMPLANTABLE CARDIOVERTER-DEFIBRILLATOR) IN PLACE: ICD-10-CM

## 2017-08-07 DIAGNOSIS — I50.9 CHF (NYHA CLASS III, ACC/AHA STAGE C): ICD-10-CM

## 2017-08-07 PROCEDURE — 93295 DEV INTERROG REMOTE 1/2/MLT: CPT | Mod: ,,, | Performed by: INTERNAL MEDICINE

## 2017-08-07 PROCEDURE — 93296 REM INTERROG EVL PM/IDS: CPT | Mod: PBBFAC,PO | Performed by: INTERNAL MEDICINE

## 2017-08-07 RX ORDER — FUROSEMIDE 40 MG/1
40 TABLET ORAL DAILY
Qty: 90 TABLET | Refills: 1 | Status: SHIPPED | OUTPATIENT
Start: 2017-08-07 | End: 2019-02-04 | Stop reason: SDUPTHER

## 2017-08-07 RX ORDER — CARVEDILOL 25 MG/1
TABLET ORAL
Qty: 180 TABLET | Refills: 3 | Status: SHIPPED | OUTPATIENT
Start: 2017-08-07 | End: 2018-11-08 | Stop reason: SDUPTHER

## 2017-08-09 DIAGNOSIS — I50.9 CHF (NYHA CLASS III, ACC/AHA STAGE C): ICD-10-CM

## 2017-08-09 DIAGNOSIS — Z95.810 ICD (IMPLANTABLE CARDIOVERTER-DEFIBRILLATOR) IN PLACE: Primary | ICD-10-CM

## 2017-08-11 ENCOUNTER — TELEPHONE (OUTPATIENT)
Dept: FAMILY MEDICINE | Facility: CLINIC | Age: 67
End: 2017-08-11

## 2017-08-11 NOTE — TELEPHONE ENCOUNTER
----- Message from Idania Angelo sent at 8/11/2017 11:35 AM CDT -----  Please call patient to schedule an annual appointment for the month of December.  Please call patient at [phone].

## 2017-08-23 RX ORDER — FLUCONAZOLE 150 MG/1
TABLET ORAL
Qty: 1 TABLET | Refills: 1 | Status: SHIPPED | OUTPATIENT
Start: 2017-08-23 | End: 2017-12-17 | Stop reason: SDUPTHER

## 2017-08-25 ENCOUNTER — LAB VISIT (OUTPATIENT)
Dept: LAB | Facility: HOSPITAL | Age: 67
End: 2017-08-25
Attending: FAMILY MEDICINE
Payer: MEDICARE

## 2017-08-25 DIAGNOSIS — E11.8 TYPE 2 DIABETES MELLITUS WITH COMPLICATION, WITH LONG-TERM CURRENT USE OF INSULIN: ICD-10-CM

## 2017-08-25 DIAGNOSIS — Z79.4 TYPE 2 DIABETES MELLITUS WITH COMPLICATION, WITH LONG-TERM CURRENT USE OF INSULIN: ICD-10-CM

## 2017-08-25 LAB
ANION GAP SERPL CALC-SCNC: 12 MMOL/L
BUN SERPL-MCNC: 20 MG/DL
CALCIUM SERPL-MCNC: 9.5 MG/DL
CHLORIDE SERPL-SCNC: 106 MMOL/L
CO2 SERPL-SCNC: 24 MMOL/L
CREAT SERPL-MCNC: 0.8 MG/DL
EST. GFR  (AFRICAN AMERICAN): >60 ML/MIN/1.73 M^2
EST. GFR  (NON AFRICAN AMERICAN): >60 ML/MIN/1.73 M^2
ESTIMATED AVG GLUCOSE: 186 MG/DL
GLUCOSE SERPL-MCNC: 131 MG/DL
HBA1C MFR BLD HPLC: 8.1 %
POTASSIUM SERPL-SCNC: 4.1 MMOL/L
SODIUM SERPL-SCNC: 142 MMOL/L

## 2017-08-25 PROCEDURE — 83036 HEMOGLOBIN GLYCOSYLATED A1C: CPT

## 2017-08-25 PROCEDURE — 80048 BASIC METABOLIC PNL TOTAL CA: CPT

## 2017-08-25 PROCEDURE — 36415 COLL VENOUS BLD VENIPUNCTURE: CPT | Mod: PO

## 2017-08-28 RX ORDER — VALSARTAN 80 MG/1
TABLET ORAL
Qty: 90 TABLET | Refills: 3 | Status: SHIPPED | OUTPATIENT
Start: 2017-08-28 | End: 2018-05-15 | Stop reason: SDUPTHER

## 2017-08-29 ENCOUNTER — OFFICE VISIT (OUTPATIENT)
Dept: ENDOCRINOLOGY | Facility: CLINIC | Age: 67
End: 2017-08-29
Payer: MEDICARE

## 2017-08-29 VITALS
SYSTOLIC BLOOD PRESSURE: 112 MMHG | WEIGHT: 207.81 LBS | HEIGHT: 62 IN | DIASTOLIC BLOOD PRESSURE: 72 MMHG | BODY MASS INDEX: 38.24 KG/M2 | HEART RATE: 77 BPM

## 2017-08-29 DIAGNOSIS — Z79.4 TYPE 2 DIABETES MELLITUS WITH COMPLICATION, WITH LONG-TERM CURRENT USE OF INSULIN: Primary | ICD-10-CM

## 2017-08-29 DIAGNOSIS — I25.10 CORONARY ARTERY DISEASE INVOLVING NATIVE CORONARY ARTERY WITHOUT ANGINA PECTORIS, UNSPECIFIED WHETHER NATIVE OR TRANSPLANTED HEART: ICD-10-CM

## 2017-08-29 DIAGNOSIS — E78.5 HYPERLIPIDEMIA, UNSPECIFIED HYPERLIPIDEMIA TYPE: Chronic | ICD-10-CM

## 2017-08-29 DIAGNOSIS — E66.9 OBESITY (BMI 30-39.9): ICD-10-CM

## 2017-08-29 DIAGNOSIS — I10 ESSENTIAL HYPERTENSION: ICD-10-CM

## 2017-08-29 DIAGNOSIS — C73 THYROID CANCER: Chronic | ICD-10-CM

## 2017-08-29 DIAGNOSIS — E89.0 POSTOPERATIVE HYPOTHYROIDISM: ICD-10-CM

## 2017-08-29 DIAGNOSIS — I50.9 CHF (NYHA CLASS III, ACC/AHA STAGE C): ICD-10-CM

## 2017-08-29 DIAGNOSIS — E11.8 TYPE 2 DIABETES MELLITUS WITH COMPLICATION, WITH LONG-TERM CURRENT USE OF INSULIN: Primary | ICD-10-CM

## 2017-08-29 DIAGNOSIS — Z87.19 HISTORY OF PANCREATITIS: ICD-10-CM

## 2017-08-29 DIAGNOSIS — Z95.810 ICD (IMPLANTABLE CARDIOVERTER-DEFIBRILLATOR) IN PLACE: Chronic | ICD-10-CM

## 2017-08-29 PROCEDURE — 1126F AMNT PAIN NOTED NONE PRSNT: CPT | Mod: ,,, | Performed by: NURSE PRACTITIONER

## 2017-08-29 PROCEDURE — 99999 PR PBB SHADOW E&M-EST. PATIENT-LVL V: CPT | Mod: PBBFAC,,, | Performed by: NURSE PRACTITIONER

## 2017-08-29 PROCEDURE — 99214 OFFICE O/P EST MOD 30 MIN: CPT | Mod: S$PBB,,, | Performed by: NURSE PRACTITIONER

## 2017-08-29 PROCEDURE — 3045F PR MOST RECENT HEMOGLOBIN A1C LEVEL 7.0-9.0%: CPT | Mod: ,,, | Performed by: NURSE PRACTITIONER

## 2017-08-29 PROCEDURE — 4010F ACE/ARB THERAPY RXD/TAKEN: CPT | Mod: ,,, | Performed by: NURSE PRACTITIONER

## 2017-08-29 PROCEDURE — 99215 OFFICE O/P EST HI 40 MIN: CPT | Mod: PBBFAC,PO | Performed by: NURSE PRACTITIONER

## 2017-08-29 PROCEDURE — 3078F DIAST BP <80 MM HG: CPT | Mod: ,,, | Performed by: NURSE PRACTITIONER

## 2017-08-29 PROCEDURE — 1159F MED LIST DOCD IN RCRD: CPT | Mod: ,,, | Performed by: NURSE PRACTITIONER

## 2017-08-29 PROCEDURE — 3074F SYST BP LT 130 MM HG: CPT | Mod: ,,, | Performed by: NURSE PRACTITIONER

## 2017-08-29 NOTE — PROGRESS NOTES
"CC: Ms. Mckenzie Mari arrives today for management of Type 2 DM and review of chronic medical conditions.     HPI: Ms. Mckenzie Mari was diagnosed with Type 2 DM in 2004. She was diagnosed based on lab work. Initial treatment consisted of metformin and glimepiride. Insulin added in 8/2016 - began Toujeo. She states that she felt that this caused nausea, abd pain, chest pain. Lantus caused throat swelling, left arm pain. She was then changed to Novolog 70/30 but this was only used for a short period because her insurance didn't cover.  Then changed to Humalog 50-50 in 12/2016.  + FH of DM in maternal aunt and cousin. Denies hospitalizations due to DM. Previously seen in endocrine by MAGALI Gruber, NP. Also follows with by Dr. Ramos for DM history of thyroid cancer/post-surgical hypothyroidism.     She reports that last month was a "bad month," due to birthdays, anniversaries, travel.     BG readings are checked 2-3x/day. Brings log.                  Hypoglycemia: No but feels symptomatic with BG < 130. She states she has lethargy and feels ill overall. She would rather stay around 140-150.     Missing Insulin/PO medication doses: No  Timing prandial insulin 5-15 minutes before meals: yes    Exercise: No    Dietary Habits: Eats 3 meals/day. Rare snacking. Avoids sugary drinks.    She follows regularly with cardiology for cardiomyopathy, CAD.     Her adult daughter and her adult grandson are living with her. She reports that this has caused stress in the household.      CURRENT DIABETIC MEDS: metformin  mg BID, glimepiride 4 mg daily, Humalog 50-50  24 units BID.  Vial or pen: pen  Glucometer type: One Touch Verio    Previous DM treatments:   Invokana - nausea  Glimepiride - stopped when prandial insulin added  Touejo -  Nausea, abd pain, chest pain  Lantus - throat swelling, left arm pain  Novolog 70/30 - not covered by insurance    Last Eye Exam: 5/2017 - sees outside provider. Denies DR  Last " "Podiatry Exam: no    REVIEW OF SYSTEMS  Constitutional: no c/o fatigue, weakness, or weight loss. + weight gain  Eyes: + occasional blurry vision  Cardiac: no palpitations, chest pain.  Respiratory: c/o occasional cough.   GI: no c/o abdominal pain, nausea.  Skin: no lesions or rashes.   Neuro: + occasional numbness, tingling in BLE that waxes and wanes.  Endocrine: denies polyphagia, polydipsia, polyuria      Personally reviewed Past Medical, Surgical, Social History.    Vital Signs  /72   Pulse 77   Ht 5' 2" (1.575 m)   Wt 94.3 kg (207 lb 12.5 oz)   BMI 38.00 kg/m²     Personally reviewed the below labs:    Hemoglobin A1C   Date Value Ref Range Status   08/25/2017 8.1 (H) 4.0 - 5.6 % Final     Comment:     According to ADA guidelines, hemoglobin A1c <7.0% represents  optimal control in non-pregnant diabetic patients. Different  metrics may apply to specific patient populations.   Standards of Medical Care in Diabetes-2016.  For the purpose of screening for the presence of diabetes:  <5.7%     Consistent with the absence of diabetes  5.7-6.4%  Consistent with increasing risk for diabetes   (prediabetes)  >or=6.5%  Consistent with diabetes  Currently, no consensus exists for use of hemoglobin A1c  for diagnosis of diabetes for children.  This Hemoglobin A1c assay has significant interference with fetal   hemoglobin   (HbF). The results are invalid for patients with abnormal amounts of   HbF,   including those with known Hereditary Persistence   of Fetal Hemoglobin. Heterozygous hemoglobin variants (HbAS, HbAC,   HbAD, HbAE, HbA2) do not significantly interfere with this assay;   however, presence of multiple variants in a sample may impact the %   interference.     04/20/2017 7.3 (H) 4.5 - 6.2 % Final     Comment:     According to ADA guidelines, hemoglobin A1C <7.0% represents  optimal control in non-pregnant diabetic patients.  Different  metrics may apply to specific populations.   Standards of Medical " Care in Diabetes - 2016.  For the purpose of screening for the presence of diabetes:  <5.7%     Consistent with the absence of diabetes  5.7-6.4%  Consistent with increasing risk for diabetes   (prediabetes)  >or=6.5%  Consistent with diabetes  Currently no consensus exists for use of hemoglobin A1C  for diagnosis of diabetes for children.     01/23/2017 8.1 (H) 4.5 - 6.2 % Final     Comment:     According to ADA guidelines, hemoglobin A1C <7.0% represents  optimal control in non-pregnant diabetic patients.  Different  metrics may apply to specific populations.   Standards of Medical Care in Diabetes - 2016.  For the purpose of screening for the presence of diabetes:  <5.7%     Consistent with the absence of diabetes  5.7-6.4%  Consistent with increasing risk for diabetes   (prediabetes)  >or=6.5%  Consistent with diabetes  Currently no consensus exists for use of hemoglobin A1C  for diagnosis of diabetes for children.         Chemistry        Component Value Date/Time     08/25/2017 0744    K 4.1 08/25/2017 0744     08/25/2017 0744    CO2 24 08/25/2017 0744    BUN 20 08/25/2017 0744    CREATININE 0.8 08/25/2017 0744     (H) 08/25/2017 0744        Component Value Date/Time    CALCIUM 9.5 08/25/2017 0744    ALKPHOS 77 04/20/2017 0731    AST 19 04/20/2017 0731    ALT 25 04/20/2017 0731    BILITOT 1.4 (H) 04/20/2017 0731          Lab Results   Component Value Date    CHOL 148 04/20/2017    CHOL 154 05/30/2016    CHOL 306 (H) 02/18/2016     Lab Results   Component Value Date    HDL 47 04/20/2017    HDL 51 05/30/2016    HDL 46 02/18/2016     Lab Results   Component Value Date    LDLCALC 62.4 (L) 04/20/2017    LDLCALC 74.4 05/30/2016    LDLCALC 202.8 (H) 02/18/2016     Lab Results   Component Value Date    TRIG 193 (H) 04/20/2017    TRIG 143 05/30/2016    TRIG 286 (H) 02/18/2016     Lab Results   Component Value Date    CHOLHDL 31.8 04/20/2017    CHOLHDL 33.1 05/30/2016    CHOLHDL 15.0 (L) 02/18/2016        Lab Results   Component Value Date    MICALBCREAT 7.6 04/20/2017     Lab Results   Component Value Date    TSH 0.918 10/05/2016       Estimated Creatinine Clearance: 74 mL/min (based on Cr of 0.8).    Vit D, 25-Hydroxy   Date Value Ref Range Status   11/08/2014 51 30 - 96 ng/mL Final     Comment:     Vitamin D deficiency.........<10 ng/mL                              Vitamin D insufficiency......10-29 ng/mL       Vitamin D sufficiency........> or equal to 30 ng/mL  Vitamin D toxicity............>100 ng/mL         PHYSICAL EXAMINATION  Constitutional: Appears well, no distress  Neck: Supple, trachea midline; transverse scar to anterior neck from thyroidectomy.   Respiratory: CTA, even and unlabored.  Cardiovascular: RRR, no murmurs, no carotid bruits. DP pulses 1+ bilaterally; trace edema to BLE.   Lymph: no cervical or supraclavicular lymphadenopathy.  Skin: warm and dry; no lipohypertrophy, or acanthosis nigracans observed.  Neuro: no loss of protective sensation via 10 gm monofilament or vibratory exam bilaterally, DTR 2+ BUE/2+BLE.  Feet: no open wounds or callouses; appropriate footwear.        A1c goal < 8%, due to CAD, CHF, and since she is afraid to try other insulins due to past experiences with new medications.        Assessment/Plan  1. Type 2 diabetes mellitus with complication, without long-term current use of insulin  -- A1c is elevated. Has hypoglycemic symptoms with BG < 120 and she is very verbal about not wanting glucoses in this range. We discussed consequences of hyperglycemia.   -- increase AM Humalog 50-50 to 28 units. Continue 24 units with dinner.   -- continue metformin  -- can do a trial period off of glimepiride. Not sure if this is adding much benefit.  -- check BG 4x/day.     -- Discussed diagnosis of DM, A1c goals, progression of disease, long term complications and tx options.  Advised patient to check BG before activities, such as driving or exercise.  -- Reviewed hypoglycemia  management: treat with 1/2 glass of juice, 1/2 can regular coke, or 4 glucose tablets. Monitor and repeat treatment every 15 minutes until BG is >70 Then have a snack, which includes a complex carbohydrate and protein.     2. Coronary artery disease involving native coronary artery without angina pectoris, unspecified whether native or transplanted heart  -- avoid hyopglycemia   3. CHF (NYHA class III, ACC/AHA stage C)  -- follows with cardiology   4. ICD (implantable cardioverter-defibrillator) in place  -- as above   5. Thyroid cancer  -- recommend annual follow up with Dr. Ramos. Due in 10/2017   6. Postoperative hypothyroidism  -- follows with Dr. Ramos  -- Continue Synthroid at current dose.  Lab Results   Component Value Date    TSH 0.918 10/05/2016      7. Essential hypertension  -- controlled  -- on ARB   8. Hyperlipidemia, unspecified hyperlipidemia type  -- continue statin  -- managed by cardiologist  Lab Results   Component Value Date    LDLCALC 62.4 (L) 04/20/2017      9. Obesity (BMI 30-39.9)  -- increases insulin resistance  Body mass index is 38 kg/m².   10. History of pancreatitis  -- avoid GLP-1RA and DPP IV-i         FOLLOW UP  Return in about 3 months (around 11/29/2017).   Patient instructed to bring BG logs to each follow up   Patient encouraged to call for any BG/medication issues, concerns, or questions.    Orders Placed This Encounter   Procedures    Hemoglobin A1c    Comprehensive metabolic panel    TSH     DIABETES FOOT EXAM

## 2017-09-22 RX ORDER — ROSUVASTATIN CALCIUM 10 MG/1
TABLET, COATED ORAL
Qty: 90 TABLET | Refills: 1 | Status: SHIPPED | OUTPATIENT
Start: 2017-09-22 | End: 2018-03-26 | Stop reason: SDUPTHER

## 2017-09-22 NOTE — PROGRESS NOTES
Refill Authorization Note     is requesting a refill authorization.    Brief assessment and rational for refill: APPROVE: prr  Name of medication: ROSUVASTATIN CALCIUM 10 MG TAB  How patient will take medication: t1t po daily  Amount/Quantity of medication ordered: 90d            Refills Authorized: Yes  If authorized number of refills: 1        Medication Therapy Plan: Last lipids 4/2017.  Will approve for 6 mo   Comments:   Lab Results   Component Value Date    CHOL 148 04/20/2017    CHOL 154 05/30/2016    CHOL 306 (H) 02/18/2016     Lab Results   Component Value Date    HDL 47 04/20/2017    HDL 51 05/30/2016    HDL 46 02/18/2016     Lab Results   Component Value Date    LDLCALC 62.4 (L) 04/20/2017    LDLCALC 74.4 05/30/2016    LDLCALC 202.8 (H) 02/18/2016     Lab Results   Component Value Date    TRIG 193 (H) 04/20/2017    TRIG 143 05/30/2016    TRIG 286 (H) 02/18/2016     Lab Results   Component Value Date    CHOLHDL 31.8 04/20/2017    CHOLHDL 33.1 05/30/2016    CHOLHDL 15.0 (L) 02/18/2016

## 2017-10-06 PROBLEM — C50.112 MALIGNANT NEOPLASM OF CENTRAL PORTION OF LEFT FEMALE BREAST: Status: ACTIVE | Noted: 2017-10-06

## 2017-10-20 ENCOUNTER — PATIENT MESSAGE (OUTPATIENT)
Dept: ENDOCRINOLOGY | Facility: CLINIC | Age: 67
End: 2017-10-20

## 2017-11-09 ENCOUNTER — CLINICAL SUPPORT (OUTPATIENT)
Dept: CARDIOLOGY | Facility: CLINIC | Age: 67
End: 2017-11-09
Payer: MEDICARE

## 2017-11-09 DIAGNOSIS — I50.9 CHF (NYHA CLASS III, ACC/AHA STAGE C): ICD-10-CM

## 2017-11-09 DIAGNOSIS — Z95.810 ICD (IMPLANTABLE CARDIOVERTER-DEFIBRILLATOR) IN PLACE: ICD-10-CM

## 2017-11-09 PROCEDURE — 93284 PRGRMG EVAL IMPLANTABLE DFB: CPT | Mod: PBBFAC,PO | Performed by: INTERNAL MEDICINE

## 2017-11-14 ENCOUNTER — OFFICE VISIT (OUTPATIENT)
Dept: PRIMARY CARE CLINIC | Facility: CLINIC | Age: 67
End: 2017-11-14
Payer: MEDICARE

## 2017-11-14 VITALS
BODY MASS INDEX: 38.09 KG/M2 | DIASTOLIC BLOOD PRESSURE: 74 MMHG | HEART RATE: 76 BPM | TEMPERATURE: 99 F | SYSTOLIC BLOOD PRESSURE: 130 MMHG | OXYGEN SATURATION: 97 % | WEIGHT: 207 LBS | HEIGHT: 62 IN

## 2017-11-14 DIAGNOSIS — J01.00 ACUTE MAXILLARY SINUSITIS, RECURRENCE NOT SPECIFIED: Primary | ICD-10-CM

## 2017-11-14 PROCEDURE — 99999 PR PBB SHADOW E&M-EST. PATIENT-LVL V: CPT | Mod: PBBFAC,,, | Performed by: NURSE PRACTITIONER

## 2017-11-14 PROCEDURE — 99213 OFFICE O/P EST LOW 20 MIN: CPT | Mod: S$PBB,,, | Performed by: NURSE PRACTITIONER

## 2017-11-14 PROCEDURE — 99215 OFFICE O/P EST HI 40 MIN: CPT | Mod: PBBFAC,PO | Performed by: NURSE PRACTITIONER

## 2017-11-14 RX ORDER — AZITHROMYCIN 250 MG/1
TABLET, FILM COATED ORAL
Qty: 6 TABLET | Refills: 0 | Status: SHIPPED | OUTPATIENT
Start: 2017-11-14 | End: 2017-11-19

## 2017-11-14 NOTE — MEDICAL/APP STUDENT
"Subjective:       Patient ID: Mckenzie Mari is a 67 y.o. female.    Chief Complaint: Sinusitis    HPI     Patient presents to clinic with complaint of of sore throat, PND, rhinorrhea, sinus pressure and pain, with green/yellow nasal drainage at night and clear drainage during the day. Reports feeling chills and body aches. Is taking Aleve with some improvement. Denies any cough. States her SOB occurs in the afternoon but is her "norm".       Review of Systems   Constitutional: Positive for chills and fatigue (body aches). Negative for fever.   HENT: Positive for congestion, postnasal drip, rhinorrhea, sinus pain, sinus pressure, sneezing and sore throat.    Respiratory: Positive for shortness of breath (no change from baseline). Negative for cough.    Cardiovascular: Negative.    Gastrointestinal: Positive for abdominal pain and nausea. Negative for vomiting.   Genitourinary: Negative.    Musculoskeletal: Positive for myalgias.   Skin: Negative for rash and wound.   Neurological: Negative.  Negative for headaches.       Objective:      Physical Exam   Constitutional: She is oriented to person, place, and time. She appears well-developed and well-nourished.   HENT:   Head: Normocephalic and atraumatic.   Right Ear: A middle ear effusion is present.   Left Ear: A middle ear effusion is present.   Nose: Mucosal edema and rhinorrhea present. Right sinus exhibits maxillary sinus tenderness and frontal sinus tenderness. Left sinus exhibits maxillary sinus tenderness and frontal sinus tenderness.   Mouth/Throat: Uvula is midline, oropharynx is clear and moist and mucous membranes are normal. No oropharyngeal exudate, posterior oropharyngeal edema or posterior oropharyngeal erythema.   Neck: Normal range of motion. Neck supple.   Cardiovascular: Normal rate, regular rhythm, normal heart sounds and intact distal pulses.    Pulmonary/Chest: Effort normal and breath sounds normal. No respiratory distress. She has no wheezes. " She exhibits no tenderness.   Lymphadenopathy:     She has cervical adenopathy.   Neurological: She is alert and oriented to person, place, and time.   Skin: Skin is warm and dry. Capillary refill takes less than 2 seconds.   Psychiatric: She has a normal mood and affect. Her behavior is normal.       Assessment:       No diagnosis found.    Plan:       Acute maxillary sinusitis, recurrence not specified  -     azithromycin (Z-DIDIER) 250 MG tablet; Take 2 tablets by mouth on day 1; Take 1 tablet by mouth on days 2-5  Dispense: 6 tablet; Refill: 0    RTC if no improvement or worsen in 3-5 days. Stay hydrated and rest. Take OTC analgesics for symptom relief.  Medication reviewed with patient, no questions or concerns at this time.

## 2017-11-14 NOTE — PATIENT INSTRUCTIONS
Sinusitis (Antibiotic Treatment)    The sinuses are air-filled spaces within the bones of the face. They connect to the inside of the nose. Sinusitis is an inflammation of the tissue lining the sinus cavity. Sinus inflammation can occur during a cold. It can also be due to allergies to pollens and other particles in the air. Sinusitis can cause symptoms of sinus congestion and fullness. A sinus infection causes fever, headache and facial pain. There is often green or yellow drainage from the nose or into the back of the throat (post-nasal drip). You have been given antibiotics to treat this condition.  Home care:  · Take the full course of antibiotics as instructed. Do not stop taking them, even if you feel better.  · Drink plenty of water, hot tea, and other liquids. This may help thin mucus. It also may promote sinus drainage.  · Heat may help soothe painful areas of the face. Use a towel soaked in hot water. Or,  the shower and direct the hot spray onto your face. Using a vaporizer along with a menthol rub at night may also help.   · An expectorant containing guaifenesin may help thin the mucus and promote drainage from the sinuses.  · Over-the-counter decongestants may be used unless a similar medicine was prescribed. Nasal sprays work the fastest. Use one that contains phenylephrine or oxymetazoline. First blow the nose gently. Then use the spray. Do not use these medicines more often than directed on the label or symptoms may get worse. You may also use tablets containing pseudoephedrine. Avoid products that combine ingredients, because side effects may be increased. Read labels. You can also ask the pharmacist for help. (NOTE: Persons with high blood pressure should not use decongestants. They can raise blood pressure.)  · Over-the-counter antihistamines may help if allergies contributed to your sinusitis.    · Do not use nasal rinses or irrigation during an acute sinus infection, unless told to by  your health care provider. Rinsing may spread the infection to other sinuses.  · Use acetaminophen or ibuprofen to control pain, unless another pain medicine was prescribed. (If you have chronic liver or kidney disease or ever had a stomach ulcer, talk with your doctor before using these medicines. Aspirin should never be used in anyone under 18 years of age who is ill with a fever. It may cause severe liver damage.)  · Don't smoke. This can worsen symptoms.  Follow-up care  Follow up with your healthcare provider or our staff if you are not improving within the next week.  When to seek medical advice  Call your healthcare provider if any of these occur:  · Facial pain or headache becoming more severe  · Stiff neck  · Unusual drowsiness or confusion  · Swelling of the forehead or eyelids  · Vision problems, including blurred or double vision  · Fever of 100.4ºF (38ºC) or higher, or as directed by your healthcare provider  · Seizure  · Breathing problems  · Symptoms not resolving within 10 days  Date Last Reviewed: 4/13/2015  © 3444-4534 [employer]. [address] [place] [address]. All rights reserved. This information is not intended as a substitute for professional medical care. Always follow your healthcare professional's instructions.    If you are not better in 3-5 days, if worse or you have concerns or questions, please do not hesitate to call.  You can reach us at [phone] Tuesday through Friday (except holidays) 10 a.m. to 6 p.m.    Thank you for using the [facility]!    SAAD Salazar, CNP, FNP-BC  [facility]  [facility]

## 2017-11-14 NOTE — Clinical Note
Bacilio Gold MD,  I saw your patient today in the [facility].  If you have any questions, please do not hesitate to contact me.  Thank you!  SKIP Salazar

## 2017-11-15 NOTE — PROGRESS NOTES
"Subjective:       Patient ID: Mckenzie Mari is a 67 y.o. female.     Chief Complaint: Sinusitis     HPI      Patient presents to clinic with complaint of of sore throat, PND, rhinorrhea, sinus pressure and pain, with green/yellow nasal drainage at night and clear drainage during the day. Reports feeling chills and body aches. Is taking Aleve with some improvement. Denies any cough. States her SOB occurs in the afternoon but is her "norm".         Review of Systems   Constitutional: Positive for chills and fatigue (body aches). Negative for fever.   HENT: Positive for congestion, postnasal drip, rhinorrhea, sinus pain, max sinus pressure, sneezing and sore throat.    Respiratory: Positive for shortness of breath (no change from baseline). Negative for cough.    Cardiovascular: Negative.    Gastrointestinal: Positive for abdominal pain and nausea briefly this morning, maybe from the drip or cough. Negative for vomiting.   Genitourinary: Negative.    Musculoskeletal: Positive for myalgias.   Skin: Negative for rash and wound.   Neurological: Negative.  Negative for headaches.       Objective:      Physical Exam   Constitutional: She is oriented to person, place, and time. She appears well-developed and well-nourished.   HENT:   Head: Normocephalic and atraumatic.   Right Ear:TMs with diffuse LR bilat.   Left Ear: TMs with diffuse LR bilat.  Nose: Mucosal edema and rhinorrhea present. Right sinus exhibits maxillary sinus tenderness. Left sinus exhibits maxillary sinus tenderness.   Mouth/Throat: Uvula is midline, oropharynx is clear and moist and mucous membranes are normal. No oropharyngeal exudate, posterior oropharyngeal edema or posterior oropharyngeal erythema.   Neck: Normal range of motion. Neck supple.   Cardiovascular: Normal rate, regular rhythm, normal heart sounds and intact distal pulses.    Pulmonary/Chest: Effort normal and breath sounds normal. No respiratory distress. She has no wheezes. She exhibits " no tenderness.   Lymphadenopathy:     She has cervical adenopathy (bilat submandibular).   Neurological: She is alert and oriented to person, place, and time.   Skin: Skin is warm and dry. Capillary refill takes less than 2 seconds.   Psychiatric: She has a normal mood and affect. Her behavior is normal.       Acute maxillary sinusitis, recurrence not specified  -     azithromycin (Z-DIDIER) 250 MG tablet; Take 2 tablets by mouth on day 1; Take 1 tablet by mouth on days 2-5  Dispense: 6 tablet; Refill: 0      Pt today presents with worsening sinus pain, drip, cough, swollen glands and sinus pressure with purulent d/c for > a week.   Feels feverish.  Also with h/o fibromyalgia and CHF.    This is a new problem to me.  No work up is planned.        Pt advised to perform comfort measures recommended on patient instruction sheet .    If not better in 3-5 days, the patient is advised to call us.  If worse or concerns, the patient is advised to call us.  Explained exam findings, diagnosis and treatment plan to patient.  Questions answered and patient states understanding.

## 2017-11-20 ENCOUNTER — LAB VISIT (OUTPATIENT)
Dept: LAB | Facility: HOSPITAL | Age: 67
End: 2017-11-20
Attending: NURSE PRACTITIONER
Payer: MEDICARE

## 2017-11-20 DIAGNOSIS — Z79.4 TYPE 2 DIABETES MELLITUS WITH COMPLICATION, WITH LONG-TERM CURRENT USE OF INSULIN: ICD-10-CM

## 2017-11-20 DIAGNOSIS — E11.8 TYPE 2 DIABETES MELLITUS WITH COMPLICATION, WITH LONG-TERM CURRENT USE OF INSULIN: ICD-10-CM

## 2017-11-20 LAB
ALBUMIN SERPL BCP-MCNC: 3.6 G/DL
ALP SERPL-CCNC: 80 U/L
ALT SERPL W/O P-5'-P-CCNC: 25 U/L
ANION GAP SERPL CALC-SCNC: 10 MMOL/L
AST SERPL-CCNC: 21 U/L
BILIRUB SERPL-MCNC: 1.9 MG/DL
BUN SERPL-MCNC: 14 MG/DL
CALCIUM SERPL-MCNC: 9.5 MG/DL
CHLORIDE SERPL-SCNC: 104 MMOL/L
CO2 SERPL-SCNC: 25 MMOL/L
CREAT SERPL-MCNC: 0.9 MG/DL
EST. GFR  (AFRICAN AMERICAN): >60 ML/MIN/1.73 M^2
EST. GFR  (NON AFRICAN AMERICAN): >60 ML/MIN/1.73 M^2
GLUCOSE SERPL-MCNC: 172 MG/DL
POTASSIUM SERPL-SCNC: 4.1 MMOL/L
PROT SERPL-MCNC: 6.7 G/DL
SODIUM SERPL-SCNC: 139 MMOL/L
TSH SERPL DL<=0.005 MIU/L-ACNC: 0.94 UIU/ML

## 2017-11-20 PROCEDURE — 80053 COMPREHEN METABOLIC PANEL: CPT

## 2017-11-20 PROCEDURE — 36415 COLL VENOUS BLD VENIPUNCTURE: CPT | Mod: PO

## 2017-11-20 PROCEDURE — 84443 ASSAY THYROID STIM HORMONE: CPT

## 2017-11-21 ENCOUNTER — PATIENT MESSAGE (OUTPATIENT)
Dept: ENDOCRINOLOGY | Facility: CLINIC | Age: 67
End: 2017-11-21

## 2017-11-27 ENCOUNTER — PATIENT OUTREACH (OUTPATIENT)
Dept: ADMINISTRATIVE | Facility: HOSPITAL | Age: 67
End: 2017-11-27

## 2017-11-27 NOTE — LETTER
December 4, 2017    Mckenzie Nulller  [address] [address] [facility] [address]             [facility]  [address]  [facility] [address]  Phone: [phone] Dear Mrs. Mari:    We have tried to reach you by mychart unsuccessfully.    [facility] is committed to your overall health.  To help you get the most out of each of your visits, we will review your information to make sure you are up to date on all of your recommended tests and/or procedures.       Dr. Bacilio Gold has found that your chart shows you may be due for some labs for your diabetes, osteoporosis screening, and possibly some immunizations (tetanus, shingles, pneumonia, and flu).     Medicare does not cover all immunizations to be given in the clinic.  Check your benefits to ensure that you do not need to receive your immunizations at the pharmacy.     If you have had any of the above done at another facility, please bring the records or information with you so that your record at [facility] will be complete.  If you would like to schedule any of these, please contact me.     If you are currently taking medication, please bring it with you to your appointment for review.     Also, if you have any type of Advanced Directives, please bring them with you to your office visit so we may scan them into your chart.     If you have any questions or concerns, please don't hesitate to call.    Thank you for letting us care for you,  Barb Torrez LPN Clinical Care Coordinator  [facility] [place]  [phone]

## 2017-11-29 ENCOUNTER — OFFICE VISIT (OUTPATIENT)
Dept: ENDOCRINOLOGY | Facility: CLINIC | Age: 67
End: 2017-11-29
Payer: MEDICARE

## 2017-11-29 ENCOUNTER — TELEPHONE (OUTPATIENT)
Dept: ENDOCRINOLOGY | Facility: CLINIC | Age: 67
End: 2017-11-29

## 2017-11-29 ENCOUNTER — PATIENT MESSAGE (OUTPATIENT)
Dept: ENDOCRINOLOGY | Facility: CLINIC | Age: 67
End: 2017-11-29

## 2017-11-29 VITALS
WEIGHT: 209.19 LBS | HEIGHT: 62 IN | HEART RATE: 76 BPM | DIASTOLIC BLOOD PRESSURE: 70 MMHG | SYSTOLIC BLOOD PRESSURE: 130 MMHG | BODY MASS INDEX: 38.5 KG/M2

## 2017-11-29 DIAGNOSIS — E78.5 HYPERLIPIDEMIA, UNSPECIFIED HYPERLIPIDEMIA TYPE: Chronic | ICD-10-CM

## 2017-11-29 DIAGNOSIS — E11.65 TYPE 2 DIABETES MELLITUS WITH HYPERGLYCEMIA, WITHOUT LONG-TERM CURRENT USE OF INSULIN: ICD-10-CM

## 2017-11-29 DIAGNOSIS — Z79.4 TYPE 2 DIABETES MELLITUS WITH COMPLICATION, WITH LONG-TERM CURRENT USE OF INSULIN: Primary | ICD-10-CM

## 2017-11-29 DIAGNOSIS — I10 ESSENTIAL HYPERTENSION: ICD-10-CM

## 2017-11-29 DIAGNOSIS — I25.10 CORONARY ARTERY DISEASE INVOLVING NATIVE CORONARY ARTERY WITHOUT ANGINA PECTORIS, UNSPECIFIED WHETHER NATIVE OR TRANSPLANTED HEART: ICD-10-CM

## 2017-11-29 DIAGNOSIS — Z95.810 ICD (IMPLANTABLE CARDIOVERTER-DEFIBRILLATOR) IN PLACE: Chronic | ICD-10-CM

## 2017-11-29 DIAGNOSIS — C73 THYROID CANCER: Chronic | ICD-10-CM

## 2017-11-29 DIAGNOSIS — I50.9 CHF (NYHA CLASS III, ACC/AHA STAGE C): ICD-10-CM

## 2017-11-29 DIAGNOSIS — Z87.19 HISTORY OF PANCREATITIS: ICD-10-CM

## 2017-11-29 DIAGNOSIS — E66.9 OBESITY (BMI 30-39.9): ICD-10-CM

## 2017-11-29 DIAGNOSIS — E11.8 TYPE 2 DIABETES MELLITUS WITH COMPLICATION, WITH LONG-TERM CURRENT USE OF INSULIN: Primary | ICD-10-CM

## 2017-11-29 DIAGNOSIS — E89.0 POSTOPERATIVE HYPOTHYROIDISM: ICD-10-CM

## 2017-11-29 PROCEDURE — 99999 PR PBB SHADOW E&M-EST. PATIENT-LVL V: CPT | Mod: PBBFAC,,, | Performed by: NURSE PRACTITIONER

## 2017-11-29 PROCEDURE — 99215 OFFICE O/P EST HI 40 MIN: CPT | Mod: S$PBB,,, | Performed by: NURSE PRACTITIONER

## 2017-11-29 PROCEDURE — 99215 OFFICE O/P EST HI 40 MIN: CPT | Mod: PBBFAC,PN | Performed by: NURSE PRACTITIONER

## 2017-11-29 RX ORDER — INSULIN LISPRO 100 [IU]/ML
12 INJECTION, SOLUTION INTRAVENOUS; SUBCUTANEOUS
Qty: 15 ML | Refills: 6 | Status: SHIPPED | OUTPATIENT
Start: 2017-11-29 | End: 2018-07-06 | Stop reason: SDUPTHER

## 2017-11-29 RX ORDER — INSULIN DEGLUDEC 100 U/ML
30 INJECTION, SOLUTION SUBCUTANEOUS NIGHTLY
Qty: 15 ML | Refills: 6 | Status: SHIPPED | OUTPATIENT
Start: 2017-11-29 | End: 2017-12-29 | Stop reason: SDUPTHER

## 2017-11-29 RX ORDER — PEN NEEDLE, DIABETIC 30 GX3/16"
NEEDLE, DISPOSABLE MISCELLANEOUS
Qty: 400 EACH | Refills: 3 | Status: SHIPPED | OUTPATIENT
Start: 2017-11-29 | End: 2018-05-31 | Stop reason: SDUPTHER

## 2017-11-29 NOTE — PATIENT INSTRUCTIONS
Hypoglycemia (Low Blood Sugar)     Fast-acting sugar includes a cup of nonfat milk.     Too little sugar (glucose) in your blood is called hypoglycemia or low blood sugar. Low blood sugar usually means anything lower than 70 mg/dL. Talk with your healthcare provider about your target range and what level is too low for you. Diabetes itself doesnt cause low blood sugar. But some of the treatments for diabetes, such as pills or insulin, may raise your risk for it. Low blood sugar may cause you to pass out or have a seizure. So always treat low blood sugar right away, but don't overeat.  Special note: Always carry a source of fast-acting sugar and a snack in case of hypoglycemia.   What you may notice  If you have low blood sugar, you may have one or more of these symptoms:  · Shakiness or dizziness  · Cold, clammy skin or sweating  · Feelings of hunger  · Headache  · Nervousness  · A hard, fast heartbeat  · Weakness  · Confusion or irritability  · Blurred vision  · Having nightmares or waking up confused or sweating  · Numbness or tingling in the lips or tongue  What you should do  Here are tips to follow if you have hypoglycemia:   · First check your blood sugar. If it is too low (out of your target range), eat or drink 15 to 20 grams of fast-acting sugar. This may be 3 to 4 glucose tablets, 4 ounces (half a cup) of fruit juice or regular (nondiet) soda, 8 ounces (1 cup) of fat-free milk, or 1 tablespoon of honey. Dont take more than this, or your blood sugar may go too high.  · Wait 15 minutes. Then recheck your blood sugar if you can.  · If your blood sugar is still too low, repeat the steps above and check your blood sugar again. If your blood sugar still has not returned to your target range, contact your healthcare provider or seek emergency care.  · Once your blood sugar returns to target range, eat a snack or meal.  Preventing low blood sugar  Things you can do include the following:   · If your condition  needs a strict treatment plan, eat your meals and snacks at the same times each day. Dont skip meals!  · If your treatment plan lets you change when you eat and what you eat, learn how to change the time and dose of your rapid-acting insulin to match this.   · Ask your healthcare provider if it is safe for you to drink alcohol. Never drink on an empty stomach.  · Take your medicine at the prescribed times.  · Always carry a source of fast-acting sugar and a snack when youre away from home.  Other things to do  Additional tips include the following:  · Carry a medical ID card, a compact USB drive, or wear a medical alert bracelet or necklace. It should say that you have diabetes. It should also say what to do if you pass out or have a seizure.  · Make sure your family, friends, and coworkers know the signs of low blood sugar. Tell them what to do if your blood sugar falls very low and you cant treat yourself.  · Keep a glucagon emergency kit handy. Be sure your family, friends, and coworkers know how and when to use it. Check it regularly and replace the glucagon before it expires.  · Talk with your health care team about other things you can do to prevent low blood sugar.     If you have unexplained hypoglycemia or hypoglycemia several times, call your healthcare provider.   Date Last Reviewed: 5/1/2016  © 7413-1479 [employer]. [address] [place] [address], [place] [address]. All rights reserved. This information is not intended as a substitute for professional medical care. Always follow your healthcare professional's instructions.

## 2017-11-29 NOTE — TELEPHONE ENCOUNTER
Please contact patient's pharmacy regarding the Tresiba that was sent in this afternoon. She states it is expensive but I'm wondering if they need a PA. She had allergic reactions to both Lantus and Toujeo so can't use those. Remaining option would be Levemir. Thank you

## 2017-11-29 NOTE — PROGRESS NOTES
"CC: Ms. Mckenzie Mari arrives today for management of Type 2 DM and review of chronic medical conditions.     HPI: Ms. Mckenzie Mari was diagnosed with Type 2 DM in 2004. She was diagnosed based on lab work. Initial treatment consisted of metformin and glimepiride. Insulin added in 8/2016 - began Toujeo. She states that she felt that this caused nausea, abd pain, chest pain. Lantus caused throat swelling, left arm pain. She was then changed to Novolog 70/30 but this was only used for a short period because her insurance didn't cover.  Then changed to Humalog 50-50 in 12/2016.  + FH of DM in maternal aunt and cousin. Denies hospitalizations due to DM. Previously seen in endocrine by MAGALI Gruber, NP. Also follows with by Dr. Ramos for DM history of thyroid cancer/post-surgical hypothyroidism.   Of note, she has a h/o pancreatitis.     At last visit, morning insulin dose was increased.     She is upset about insulin. She thinks that he diabetes worsened when she began using and reports seeing higher sugars after dose increases. Also has had weight gain.     BG readings are checked 2-3x/day. Brings log.               Hypoglycemia: No but feels symptomatic with BG < 120.     Missing Insulin/PO medication doses: No  Timing prandial insulin 5-15 minutes before meals: yes    Exercise: No    Dietary Habits: Eats 2-3 meals/day. Rare snacking. Avoids sugary drinks.    She follows regularly with cardiology for cardiomyopathy, CAD.  Last seen in August.    Has had a "bad month," due to death of her best friend.        CURRENT DIABETIC MEDS: metformin  mg BID, Humalog 50-50  28 units QAM, 24 units QPM.  Vial or pen: pen  Glucometer type: One Touch Verio    Previous DM treatments:   Invokana - nausea  Glimepiride - stopped when prandial insulin added  Touejo -  Nausea, abd pain, chest pain  Lantus - throat swelling, left arm pain  Novolog 70/30 - not covered by insurance    Last Eye Exam: 5/2017 - sees outside " "provider. Denies   Last Podiatry Exam: no    REVIEW OF SYSTEMS  Constitutional: no c/o weakness or weight loss. + weight gain, fatigue  Eyes: no c/o visual disturbances.   Cardiac: no palpitations, chest pain.  Respiratory: c/o occasional cough.   GI: no c/o abdominal pain. + rare occasions of nausea.  Skin: no lesions or rashes.   Neuro: + occasional numbness, tingling in left leg and hands. This waxes and wanes.  Endocrine: denies polyphagia, polydipsia, polyuria      Personally reviewed Past Medical, Surgical, Social History.    Vital Signs  /70   Pulse 76   Ht 5' 2" (1.575 m)   Wt 94.9 kg (209 lb 3.5 oz)   BMI 38.27 kg/m²     Personally reviewed the below labs:    Hemoglobin A1C   Date Value Ref Range Status   08/25/2017 8.1 (H) 4.0 - 5.6 % Final     Comment:     According to ADA guidelines, hemoglobin A1c <7.0% represents  optimal control in non-pregnant diabetic patients. Different  metrics may apply to specific patient populations.   Standards of Medical Care in Diabetes-2016.  For the purpose of screening for the presence of diabetes:  <5.7%     Consistent with the absence of diabetes  5.7-6.4%  Consistent with increasing risk for diabetes   (prediabetes)  >or=6.5%  Consistent with diabetes  Currently, no consensus exists for use of hemoglobin A1c  for diagnosis of diabetes for children.  This Hemoglobin A1c assay has significant interference with fetal   hemoglobin   (HbF). The results are invalid for patients with abnormal amounts of   HbF,   including those with known Hereditary Persistence   of Fetal Hemoglobin. Heterozygous hemoglobin variants (HbAS, HbAC,   HbAD, HbAE, HbA2) do not significantly interfere with this assay;   however, presence of multiple variants in a sample may impact the %   interference.     04/20/2017 7.3 (H) 4.5 - 6.2 % Final     Comment:     According to ADA guidelines, hemoglobin A1C <7.0% represents  optimal control in non-pregnant diabetic patients.  " Different  metrics may apply to specific populations.   Standards of Medical Care in Diabetes - 2016.  For the purpose of screening for the presence of diabetes:  <5.7%     Consistent with the absence of diabetes  5.7-6.4%  Consistent with increasing risk for diabetes   (prediabetes)  >or=6.5%  Consistent with diabetes  Currently no consensus exists for use of hemoglobin A1C  for diagnosis of diabetes for children.     01/23/2017 8.1 (H) 4.5 - 6.2 % Final     Comment:     According to ADA guidelines, hemoglobin A1C <7.0% represents  optimal control in non-pregnant diabetic patients.  Different  metrics may apply to specific populations.   Standards of Medical Care in Diabetes - 2016.  For the purpose of screening for the presence of diabetes:  <5.7%     Consistent with the absence of diabetes  5.7-6.4%  Consistent with increasing risk for diabetes   (prediabetes)  >or=6.5%  Consistent with diabetes  Currently no consensus exists for use of hemoglobin A1C  for diagnosis of diabetes for children.         Chemistry        Component Value Date/Time     11/20/2017 1151    K 4.1 11/20/2017 1151     11/20/2017 1151    CO2 25 11/20/2017 1151    BUN 14 11/20/2017 1151    CREATININE 0.9 11/20/2017 1151     (H) 11/20/2017 1151        Component Value Date/Time    CALCIUM 9.5 11/20/2017 1151    ALKPHOS 80 11/20/2017 1151    AST 21 11/20/2017 1151    ALT 25 11/20/2017 1151    BILITOT 1.9 (H) 11/20/2017 1151          Lab Results   Component Value Date    CHOL 148 04/20/2017    CHOL 154 05/30/2016    CHOL 306 (H) 02/18/2016     Lab Results   Component Value Date    HDL 47 04/20/2017    HDL 51 05/30/2016    HDL 46 02/18/2016     Lab Results   Component Value Date    LDLCALC 62.4 (L) 04/20/2017    LDLCALC 74.4 05/30/2016    LDLCALC 202.8 (H) 02/18/2016     Lab Results   Component Value Date    TRIG 193 (H) 04/20/2017    TRIG 143 05/30/2016    TRIG 286 (H) 02/18/2016     Lab Results   Component Value Date    CHOLHDL  31.8 04/20/2017    CHOLHDL 33.1 05/30/2016    CHOLHDL 15.0 (L) 02/18/2016       Lab Results   Component Value Date    MICALBCREAT 7.6 04/20/2017     Lab Results   Component Value Date    TSH 0.944 11/20/2017       CrCl cannot be calculated (Patient's most recent lab result is older than the maximum 7 days allowed.).    Vit D, 25-Hydroxy   Date Value Ref Range Status   11/08/2014 51 30 - 96 ng/mL Final     Comment:     Vitamin D deficiency.........<10 ng/mL                              Vitamin D insufficiency......10-29 ng/mL       Vitamin D sufficiency........> or equal to 30 ng/mL  Vitamin D toxicity............>100 ng/mL         PHYSICAL EXAMINATION  Constitutional: Appears well, no distress  Neck: Supple, trachea midline; transverse scar to anterior neck from thyroidectomy.   Respiratory: CTA, even and unlabored.  Cardiovascular: RRR, no murmurs, no carotid bruits. DP pulses 1+ bilaterally; no edema.  Lymph: no cervical or supraclavicular lymphadenopathy.  Skin: warm and dry; no lipohypertrophy, or acanthosis nigracans observed.  Neuro: DTR 2+ BUE/1+BLE. Previously, no loss of protective sensation via 10 gm monofilament or vibratory exam bilaterally  Feet: appropriate footwear.        A1c goal < 8%, due to CAD, CHF.        Assessment/Plan  1. Type 2 diabetes mellitus with complication, without long-term current use of insulin  -- Unfortunately, A1c was not linked to recent labs so was not drawn. Pt declined to have this obtained, as she is a hard stick.   -- BG logs contain elevated readings. Very lengthy discussion regarding the MOA of insulin and that her elevated readings are due to insufficient doses and/or limitations of Humalog 50/50.  -- after much discussion, she is willing to try new insulin regimen, as pills will not be able to achieve a favorable effect.  -- start Tresiba 30 units QHS  -- Humalog 12 units with each meal. Skip dose if skipping meal. Start correction scale, target 150, ISF 50. Discussed  proper use of sliding scale.   -- continue metformin  -- check BG 4x/day.     -- Discussed diagnosis of DM, A1c goals, progression of disease, long term complications and tx options.  Advised patient to check BG before activities, such as driving or exercise.  -- Reviewed hypoglycemia management: treat with 1/2 glass of juice, 1/2 can regular coke, or 4 glucose tablets. Monitor and repeat treatment every 15 minutes until BG is >70 Then have a snack, which includes a complex carbohydrate and protein.     2. Coronary artery disease involving native coronary artery without angina pectoris, unspecified whether native or transplanted heart  -- avoid hyopglycemia   3. CHF (NYHA class III, ACC/AHA stage C)  -- follows with cardiology   4. ICD (implantable cardioverter-defibrillator) in place  -- as above   5. Thyroid cancer  -- recommend annual follow up with Dr. Ramos.   -- will schedule at her next visit. *   6. Postoperative hypothyroidism  -- follows with Dr. Ramos  -- Continue Synthroid at current dose.  Lab Results   Component Value Date    TSH 0.944 2017      7. Essential hypertension  -- controlled  -- on ARB   8. Hyperlipidemia, unspecified hyperlipidemia type  -- continue statin  -- managed by cardiologist  Lab Results   Component Value Date    LDLCALC 62.4 (L) 2017      9. Obesity (BMI 30-39.9)  -- increases insulin resistance  Body mass index is 38.27 kg/m².   10. History of pancreatitis  -- avoid GLP-1RA and DPP IV-i         Total Time and Counselin minutes, >50% time spent counseling as noted above in #1 A/P.       FOLLOW UP  Return in about 4 weeks (around 2017).   Patient instructed to bring BG logs to each follow up   Patient encouraged to call for any BG/medication issues, concerns, or questions.

## 2017-11-30 ENCOUNTER — TELEPHONE (OUTPATIENT)
Dept: ENDOCRINOLOGY | Facility: CLINIC | Age: 67
End: 2017-11-30

## 2017-11-30 ENCOUNTER — PATIENT MESSAGE (OUTPATIENT)
Dept: ENDOCRINOLOGY | Facility: CLINIC | Age: 67
End: 2017-11-30

## 2017-11-30 NOTE — TELEPHONE ENCOUNTER
Spoke with [facility], [facility] will need a PA, will refax PA information to be done by office.

## 2017-12-01 ENCOUNTER — TELEPHONE (OUTPATIENT)
Dept: ENDOCRINOLOGY | Facility: CLINIC | Age: 67
End: 2017-12-01

## 2017-12-01 ENCOUNTER — PATIENT MESSAGE (OUTPATIENT)
Dept: ENDOCRINOLOGY | Facility: CLINIC | Age: 67
End: 2017-12-01

## 2017-12-11 ENCOUNTER — HOSPITAL ENCOUNTER (OUTPATIENT)
Dept: RADIOLOGY | Facility: HOSPITAL | Age: 67
Discharge: HOME | End: 2017-12-11
Attending: FAMILY MEDICINE
Payer: MEDICARE

## 2017-12-11 ENCOUNTER — OFFICE VISIT (OUTPATIENT)
Dept: FAMILY MEDICINE | Facility: CLINIC | Age: 67
End: 2017-12-11
Payer: MEDICARE

## 2017-12-11 VITALS
BODY MASS INDEX: 38.91 KG/M2 | DIASTOLIC BLOOD PRESSURE: 82 MMHG | HEART RATE: 76 BPM | SYSTOLIC BLOOD PRESSURE: 124 MMHG | TEMPERATURE: 98 F | WEIGHT: 212.75 LBS | RESPIRATION RATE: 20 BRPM

## 2017-12-11 DIAGNOSIS — I10 ESSENTIAL HYPERTENSION: Primary | ICD-10-CM

## 2017-12-11 DIAGNOSIS — Z78.0 MENOPAUSE: ICD-10-CM

## 2017-12-11 DIAGNOSIS — E78.5 HYPERLIPIDEMIA, UNSPECIFIED HYPERLIPIDEMIA TYPE: ICD-10-CM

## 2017-12-11 DIAGNOSIS — E03.9 HYPOTHYROIDISM, UNSPECIFIED TYPE: ICD-10-CM

## 2017-12-11 PROCEDURE — 99214 OFFICE O/P EST MOD 30 MIN: CPT | Mod: PBBFAC,PN | Performed by: FAMILY MEDICINE

## 2017-12-11 PROCEDURE — 99999 PR PBB SHADOW E&M-EST. PATIENT-LVL IV: CPT | Mod: PBBFAC,,, | Performed by: FAMILY MEDICINE

## 2017-12-11 PROCEDURE — 99214 OFFICE O/P EST MOD 30 MIN: CPT | Mod: S$PBB,,, | Performed by: FAMILY MEDICINE

## 2017-12-11 PROCEDURE — 77080 DXA BONE DENSITY AXIAL: CPT | Mod: 26,,, | Performed by: RADIOLOGY

## 2017-12-11 PROCEDURE — 77080 DXA BONE DENSITY AXIAL: CPT | Mod: TC,PO

## 2017-12-11 NOTE — PROGRESS NOTES
"Subjective:     THIS DOCUMENT WAS MADE IN PART WITH [employer]ATION SOFTWARE.  OCCASIONALLY THIS SOFTWARE WILL MISINTERPRET WORDS OR PHRASES.     Patient ID: Mckenzie Mari is a 67 y.o. female.    Chief Complaint: Annual Exam (moles on back, back pain, arthritis); Sinus Problem; and Abdominal Pain (lower, 2 weeks )    HPI    This is a 67-year-old female here today for her "annual exam"    She has a history of uncontrolled type 2 diabetes.  She is now being managed by endocrinology.  She is on insulin.  She reports that she has gained weight and her sugar has increased since starting insulin.  Her last A1c was 8.1 back in August.    Hypertension is chronic stable and well-controlled.    Hyperlipidemia has been stable on Crestor    Hypothyroidism, history of thyroid cancer.  TSH is currently normal    'moles on back', changing color     'arthritis', multiple joints, back , hip, with weather change  Low to mid back pain, down in to lower buttocks  apercreme helps  Worse with prolonged sitting    Feeling depressed, lost close friend    'sinuses'   , She has chronic nasal congestion, and ALLERGY symptoms.  She has a history of recurring sinusitis.  She reports in the last few weeks she's had increasing congestion.  She's currently not taking any medication for this.  In the past she reports she has not had much success with antihistamines or nasal steroids.    Active Ambulatory Problems     Diagnosis Date Noted    CHF (congestive heart failure)     CHF (NYHA class III, ACC/AHA stage C)     Hyperlipidemia     Chest pain 01/18/2012    HTN (hypertension)     CVID (common variable immunodeficiency)     Chronic sinusitis     ALLERGIC RHINITIS 01/23/2012    Penicillin allergy 01/30/2012    Thyroid cancer 07/25/2012    Postoperative hypothyroidism 07/25/2012    LBBB (left bundle branch block) 10/11/2012    ICD (implantable cardioverter-defibrillator) in place 10/23/2012    Sinusitis 10/01/2013    Dyspnea on " effort 03/25/2014    Chest pain on exertion 03/25/2014    CAD (coronary artery disease) 04/17/2014    Type 2 diabetes mellitus with complication, with long-term current use of insulin 05/11/2014    Myoclonus     History of colon polyps 05/11/2016    Obesity (BMI 30-39.9) 08/31/2016    History of pancreatitis 08/31/2016    Nasal obstruction 03/15/2017    Deviated nasal septum 03/15/2017    Hypertrophy of nasal turbinates 03/15/2017    Malignant neoplasm of central portion of left female breast 10/06/2017     Resolved Ambulatory Problems     Diagnosis Date Noted    Subacute maxillary sinusitis 03/15/2017     Past Medical History:   Diagnosis Date    Allergy     Arthritis     Breast cancer 2005    Bundle branch block     Cardiomyopathy     CHF (congestive heart failure)     Chronic sinusitis     CVID (common variable immunodeficiency)     Diabetes mellitus     GERD (gastroesophageal reflux disease)     Headache(784.0)     HTN (hypertension)     Hyperlipidemia     Hypothyroid 7/25/2012    Otitis media     Presence of combination internal cardiac defibrillator (ICD) and pacemaker     Thyroid cancer     Thyroid cancer 7/25/2012    Thyroid disease          Review of Systems   Constitutional: Positive for unexpected weight change. Negative for activity change.   HENT: Negative for hearing loss, rhinorrhea and trouble swallowing.    Eyes: Negative for discharge and visual disturbance.   Respiratory: Negative for chest tightness and wheezing.    Cardiovascular: Negative for chest pain and palpitations.   Gastrointestinal: Negative for blood in stool, constipation, diarrhea and vomiting.   Endocrine: Negative for polydipsia and polyuria.   Genitourinary: Negative for difficulty urinating, hematuria and menstrual problem.   Musculoskeletal: Positive for arthralgias and joint swelling. Negative for neck pain.   Neurological: Negative for weakness and headaches.   Psychiatric/Behavioral: Positive  for dysphoric mood. Negative for confusion.       Objective:      Physical Exam   Constitutional: She is oriented to person, place, and time. She appears well-developed and well-nourished.   HENT:   Head: Normocephalic and atraumatic.   Right Ear: Tympanic membrane, external ear and ear canal normal.   Left Ear: Tympanic membrane, external ear and ear canal normal.   Eyes: No scleral icterus.   Cardiovascular: Normal rate, regular rhythm and normal heart sounds.    No murmur heard.  Pulmonary/Chest: Effort normal and breath sounds normal. No respiratory distress.   Neurological: She is alert and oriented to person, place, and time.   Skin: Skin is dry. No rash noted. She is not diaphoretic.   Psychiatric: She has a normal mood and affect. Her behavior is normal.   Vitals reviewed.    multiple SKs on back, nothing   Assessment:       1. Essential hypertension    2. Hyperlipidemia, unspecified hyperlipidemia type    3. Hypothyroidism, unspecified type    4. Uncontrolled type 2 diabetes mellitus with complication, with long-term current use of insulin        Plan:       Mckenzie was seen today for annual exam, sinus problem and abdominal pain.    Diagnoses and all orders for this visit:    Essential hypertension  Stable and well controlled    Hyperlipidemia, unspecified hyperlipidemia type  Stable    Hypothyroidism, unspecified type  Stable    Uncontrolled type 2 diabetes mellitus with complication, with long-term current use of insulin  She will continue to follow with endocrinology    Menopause  -     DXA Bone Density Spine And Hip; Future    For back and hips, stretching heat, declined PT for now  Will report if worsening or not improving         Rx for Pneumovax 23, and Tdap  Recommend trying histamine and restarting Flonase.  She'll let me know if symptoms worsen.    Routine follow back in 6 months.

## 2017-12-15 ENCOUNTER — PATIENT MESSAGE (OUTPATIENT)
Dept: FAMILY MEDICINE | Facility: CLINIC | Age: 67
End: 2017-12-15

## 2017-12-15 DIAGNOSIS — J01.90 ACUTE SINUSITIS, RECURRENCE NOT SPECIFIED, UNSPECIFIED LOCATION: Primary | ICD-10-CM

## 2017-12-15 RX ORDER — AZITHROMYCIN 250 MG/1
TABLET, FILM COATED ORAL
Qty: 6 TABLET | Refills: 0 | Status: SHIPPED | OUTPATIENT
Start: 2017-12-15 | End: 2017-12-20

## 2017-12-17 DIAGNOSIS — E11.65 TYPE 2 DIABETES MELLITUS WITH HYPERGLYCEMIA, WITH LONG-TERM CURRENT USE OF INSULIN: ICD-10-CM

## 2017-12-17 DIAGNOSIS — Z79.4 TYPE 2 DIABETES MELLITUS WITH HYPERGLYCEMIA, WITH LONG-TERM CURRENT USE OF INSULIN: ICD-10-CM

## 2017-12-17 RX ORDER — FLUCONAZOLE 150 MG/1
TABLET ORAL
Qty: 1 TABLET | Refills: 1 | Status: SHIPPED | OUTPATIENT
Start: 2017-12-17 | End: 2018-03-19 | Stop reason: SDUPTHER

## 2017-12-18 RX ORDER — BLOOD-GLUCOSE METER
EACH MISCELLANEOUS
Qty: 150 STRIP | Refills: 7 | Status: SHIPPED | OUTPATIENT
Start: 2017-12-18 | End: 2018-04-30 | Stop reason: SDUPTHER

## 2017-12-29 ENCOUNTER — PATIENT MESSAGE (OUTPATIENT)
Dept: ENDOCRINOLOGY | Facility: CLINIC | Age: 67
End: 2017-12-29

## 2017-12-29 ENCOUNTER — OFFICE VISIT (OUTPATIENT)
Dept: ENDOCRINOLOGY | Facility: CLINIC | Age: 67
End: 2017-12-29
Payer: MEDICARE

## 2017-12-29 VITALS
WEIGHT: 209.31 LBS | HEIGHT: 62 IN | SYSTOLIC BLOOD PRESSURE: 118 MMHG | RESPIRATION RATE: 18 BRPM | HEART RATE: 84 BPM | BODY MASS INDEX: 38.52 KG/M2 | DIASTOLIC BLOOD PRESSURE: 68 MMHG

## 2017-12-29 DIAGNOSIS — C73 THYROID CANCER: Chronic | ICD-10-CM

## 2017-12-29 DIAGNOSIS — E66.9 OBESITY (BMI 30-39.9): ICD-10-CM

## 2017-12-29 DIAGNOSIS — E78.5 HYPERLIPIDEMIA, UNSPECIFIED HYPERLIPIDEMIA TYPE: Chronic | ICD-10-CM

## 2017-12-29 DIAGNOSIS — Z87.19 HISTORY OF PANCREATITIS: ICD-10-CM

## 2017-12-29 DIAGNOSIS — I10 ESSENTIAL HYPERTENSION: ICD-10-CM

## 2017-12-29 DIAGNOSIS — E11.8 TYPE 2 DIABETES MELLITUS WITH COMPLICATION, WITH LONG-TERM CURRENT USE OF INSULIN: Primary | ICD-10-CM

## 2017-12-29 DIAGNOSIS — I50.9 CHF (NYHA CLASS III, ACC/AHA STAGE C): ICD-10-CM

## 2017-12-29 DIAGNOSIS — I25.10 CORONARY ARTERY DISEASE INVOLVING NATIVE CORONARY ARTERY WITHOUT ANGINA PECTORIS, UNSPECIFIED WHETHER NATIVE OR TRANSPLANTED HEART: ICD-10-CM

## 2017-12-29 DIAGNOSIS — Z95.810 ICD (IMPLANTABLE CARDIOVERTER-DEFIBRILLATOR) IN PLACE: Chronic | ICD-10-CM

## 2017-12-29 DIAGNOSIS — Z79.4 TYPE 2 DIABETES MELLITUS WITH COMPLICATION, WITH LONG-TERM CURRENT USE OF INSULIN: Primary | ICD-10-CM

## 2017-12-29 DIAGNOSIS — E89.0 POSTOPERATIVE HYPOTHYROIDISM: ICD-10-CM

## 2017-12-29 PROCEDURE — 99214 OFFICE O/P EST MOD 30 MIN: CPT | Mod: S$PBB,,, | Performed by: NURSE PRACTITIONER

## 2017-12-29 PROCEDURE — 99215 OFFICE O/P EST HI 40 MIN: CPT | Mod: PBBFAC,PN | Performed by: NURSE PRACTITIONER

## 2017-12-29 PROCEDURE — 99999 PR PBB SHADOW E&M-EST. PATIENT-LVL V: CPT | Mod: PBBFAC,,, | Performed by: NURSE PRACTITIONER

## 2017-12-29 RX ORDER — INSULIN DEGLUDEC 100 U/ML
33 INJECTION, SOLUTION SUBCUTANEOUS NIGHTLY
Qty: 15 ML | Refills: 6
Start: 2017-12-29 | End: 2018-04-06 | Stop reason: SDUPTHER

## 2017-12-29 NOTE — PATIENT INSTRUCTIONS
Hypoglycemia (Low Blood Sugar)     Fast-acting sugar includes a cup of nonfat milk.     Too little sugar (glucose) in your blood is called hypoglycemia or low blood sugar. Low blood sugar usually means anything lower than 70 mg/dL. Talk with your healthcare provider about your target range and what level is too low for you. Diabetes itself doesnt cause low blood sugar. But some of the treatments for diabetes, such as pills or insulin, may raise your risk for it. Low blood sugar may cause you to pass out or have a seizure. So always treat low blood sugar right away, but don't overeat.  Special note: Always carry a source of fast-acting sugar and a snack in case of hypoglycemia.   What you may notice  If you have low blood sugar, you may have one or more of these symptoms:  · Shakiness or dizziness  · Cold, clammy skin or sweating  · Feelings of hunger  · Headache  · Nervousness  · A hard, fast heartbeat  · Weakness  · Confusion or irritability  · Blurred vision  · Having nightmares or waking up confused or sweating  · Numbness or tingling in the lips or tongue  What you should do  Here are tips to follow if you have hypoglycemia:   · First check your blood sugar. If it is too low (out of your target range), eat or drink 15 to 20 grams of fast-acting sugar. This may be 3 to 4 glucose tablets, 4 ounces (half a cup) of fruit juice or regular (nondiet) soda, 8 ounces (1 cup) of fat-free milk, or 1 tablespoon of honey. Dont take more than this, or your blood sugar may go too high.  · Wait 15 minutes. Then recheck your blood sugar if you can.  · If your blood sugar is still too low, repeat the steps above and check your blood sugar again. If your blood sugar still has not returned to your target range, contact your healthcare provider or seek emergency care.  · Once your blood sugar returns to target range, eat a snack or meal.  Preventing low blood sugar  Things you can do include the following:   · If your condition  needs a strict treatment plan, eat your meals and snacks at the same times each day. Dont skip meals!  · If your treatment plan lets you change when you eat and what you eat, learn how to change the time and dose of your rapid-acting insulin to match this.   · Ask your healthcare provider if it is safe for you to drink alcohol. Never drink on an empty stomach.  · Take your medicine at the prescribed times.  · Always carry a source of fast-acting sugar and a snack when youre away from home.  Other things to do  Additional tips include the following:  · Carry a medical ID card, a compact USB drive, or wear a medical alert bracelet or necklace. It should say that you have diabetes. It should also say what to do if you pass out or have a seizure.  · Make sure your family, friends, and coworkers know the signs of low blood sugar. Tell them what to do if your blood sugar falls very low and you cant treat yourself.  · Keep a glucagon emergency kit handy. Be sure your family, friends, and coworkers know how and when to use it. Check it regularly and replace the glucagon before it expires.  · Talk with your health care team about other things you can do to prevent low blood sugar.     If you have unexplained hypoglycemia or hypoglycemia several times, call your healthcare provider.   Date Last Reviewed: 5/1/2016  © 9164-0990 [employer]. [address] [place] [address], [place] [address]. All rights reserved. This information is not intended as a substitute for professional medical care. Always follow your healthcare professional's instructions.

## 2017-12-29 NOTE — PROGRESS NOTES
CC: Ms. Mckenzie Mari arrives today for management of Type 2 DM and review of chronic medical conditions.     HPI: Ms. Mckenzie Mari was diagnosed with Type 2 DM in 2004. She was diagnosed based on lab work. Initial treatment consisted of metformin and glimepiride. Insulin added in 8/2016 - began Toujeo. She states that she felt that this caused nausea, abd pain, chest pain. Lantus caused throat swelling, left arm pain. She was then changed to Novolog 70/30 but this was only used for a short period because her insurance didn't cover.  Then changed to Humalog 50-50 in 12/2016.  + FH of DM in maternal aunt and cousin. Denies hospitalizations due to DM. Previously seen in endocrine by MAGALI Gruber, NP. Also follows with by Dr. Ramos for DM history of thyroid cancer/post-surgical hypothyroidism.   Of note, she has a h/o pancreatitis.     At last visit, her regimen was changed to MDI with Tresiba and Humalog. She believes insulin has worsened her diabetes and has not been happy about weight gain.     She presents for BG log review today.     BG readings are checked 2-3x/day. Brings log.               Hypoglycemia: No but feels symptomatic with BG < 130.     Missing Insulin/PO medication doses: No  Timing prandial insulin 5-15 minutes before meals: yes    Exercise: No    Dietary Habits: Eats 2-3 meals/day. Rare snacking but recently has been snacking more often on cookies, bread during the holidays. Avoids sugary drinks.    She follows regularly with cardiology for cardiomyopathy, CAD.         CURRENT DIABETIC MEDS: metformin  mg BID, Tresiba 30 units QHS, Humalog 12 units AC + correction scale, target 150, ISF 50.  Vial or pen: pen  Glucometer type: One Touch Verio    Previous DM treatments:   Invokana - nausea  Glimepiride - stopped when prandial insulin added  Touejo -  Nausea, abd pain, chest pain  Lantus - throat swelling, left arm pain  Novolog 70/30 - not covered by insurance    Last Eye Exam:  "5/2017 - sees outside provider. Denies   Last Podiatry Exam: no    REVIEW OF SYSTEMS  Constitutional: no c/o weakness, fatigue, or weight loss.   Eyes: no c/o visual disturbances.   Cardiac: no palpitations, chest pain.  Respiratory: c/o occasional cough.   GI: no c/o abdominal pain, nausea.  Skin: no lesions or rashes.   Neuro: + occasional numbness, tingling in left leg and hands. This waxes and wanes.  Endocrine: denies polyphagia, polydipsia, polyuria      Personally reviewed Past Medical, Surgical, Social History.    Vital Signs  /68   Pulse 84   Resp 18   Ht 5' 2" (1.575 m)   Wt 95 kg (209 lb 5.2 oz)   BMI 38.29 kg/m²     Personally reviewed the below labs:    Hemoglobin A1C   Date Value Ref Range Status   08/25/2017 8.1 (H) 4.0 - 5.6 % Final     Comment:     According to ADA guidelines, hemoglobin A1c <7.0% represents  optimal control in non-pregnant diabetic patients. Different  metrics may apply to specific patient populations.   Standards of Medical Care in Diabetes-2016.  For the purpose of screening for the presence of diabetes:  <5.7%     Consistent with the absence of diabetes  5.7-6.4%  Consistent with increasing risk for diabetes   (prediabetes)  >or=6.5%  Consistent with diabetes  Currently, no consensus exists for use of hemoglobin A1c  for diagnosis of diabetes for children.  This Hemoglobin A1c assay has significant interference with fetal   hemoglobin   (HbF). The results are invalid for patients with abnormal amounts of   HbF,   including those with known Hereditary Persistence   of Fetal Hemoglobin. Heterozygous hemoglobin variants (HbAS, HbAC,   HbAD, HbAE, HbA2) do not significantly interfere with this assay;   however, presence of multiple variants in a sample may impact the %   interference.     04/20/2017 7.3 (H) 4.5 - 6.2 % Final     Comment:     According to ADA guidelines, hemoglobin A1C <7.0% represents  optimal control in non-pregnant diabetic patients.  " Different  metrics may apply to specific populations.   Standards of Medical Care in Diabetes - 2016.  For the purpose of screening for the presence of diabetes:  <5.7%     Consistent with the absence of diabetes  5.7-6.4%  Consistent with increasing risk for diabetes   (prediabetes)  >or=6.5%  Consistent with diabetes  Currently no consensus exists for use of hemoglobin A1C  for diagnosis of diabetes for children.     01/23/2017 8.1 (H) 4.5 - 6.2 % Final     Comment:     According to ADA guidelines, hemoglobin A1C <7.0% represents  optimal control in non-pregnant diabetic patients.  Different  metrics may apply to specific populations.   Standards of Medical Care in Diabetes - 2016.  For the purpose of screening for the presence of diabetes:  <5.7%     Consistent with the absence of diabetes  5.7-6.4%  Consistent with increasing risk for diabetes   (prediabetes)  >or=6.5%  Consistent with diabetes  Currently no consensus exists for use of hemoglobin A1C  for diagnosis of diabetes for children.         Chemistry        Component Value Date/Time     11/20/2017 1151    K 4.1 11/20/2017 1151     11/20/2017 1151    CO2 25 11/20/2017 1151    BUN 14 11/20/2017 1151    CREATININE 0.9 11/20/2017 1151     (H) 11/20/2017 1151        Component Value Date/Time    CALCIUM 9.5 11/20/2017 1151    ALKPHOS 80 11/20/2017 1151    AST 21 11/20/2017 1151    ALT 25 11/20/2017 1151    BILITOT 1.9 (H) 11/20/2017 1151          Lab Results   Component Value Date    CHOL 148 04/20/2017    CHOL 154 05/30/2016    CHOL 306 (H) 02/18/2016     Lab Results   Component Value Date    HDL 47 04/20/2017    HDL 51 05/30/2016    HDL 46 02/18/2016     Lab Results   Component Value Date    LDLCALC 62.4 (L) 04/20/2017    LDLCALC 74.4 05/30/2016    LDLCALC 202.8 (H) 02/18/2016     Lab Results   Component Value Date    TRIG 193 (H) 04/20/2017    TRIG 143 05/30/2016    TRIG 286 (H) 02/18/2016     Lab Results   Component Value Date    CHOLHDL  31.8 04/20/2017    CHOLHDL 33.1 05/30/2016    CHOLHDL 15.0 (L) 02/18/2016       Lab Results   Component Value Date    MICALBCREAT 7.6 04/20/2017     Lab Results   Component Value Date    TSH 0.944 11/20/2017       CrCl cannot be calculated (Patient's most recent lab result is older than the maximum 7 days allowed.).    Vit D, 25-Hydroxy   Date Value Ref Range Status   11/08/2014 51 30 - 96 ng/mL Final     Comment:     Vitamin D deficiency.........<10 ng/mL                              Vitamin D insufficiency......10-29 ng/mL       Vitamin D sufficiency........> or equal to 30 ng/mL  Vitamin D toxicity............>100 ng/mL               A1c goal < 8%, due to CAD, CHF.        Assessment/Plan  1. Type 2 diabetes mellitus with complication, without long-term current use of insulin  -- Glucoses are improving but she is still having higher numbers  -- I recommended increasing Tresiba to 33 units QHS. She may not do this since she believes that dose increases cause hyperglycemia.   -- continue with Humalog 12 units with each meal + correction scale, target 150, ISF 50. Discussed proper use of sliding scale.   -- continue low dose metformin  -- check BG 4x/day.     -- Discussed diagnosis of DM, A1c goals, progression of disease, long term complications and tx options.  Advised patient to check BG before activities, such as driving or exercise.  -- Reviewed hypoglycemia management: treat with 1/2 glass of juice, 1/2 can regular coke, or 4 glucose tablets. Monitor and repeat treatment every 15 minutes until BG is >70 Then have a snack, which includes a complex carbohydrate and protein.     2. Coronary artery disease involving native coronary artery without angina pectoris, unspecified whether native or transplanted heart  -- avoid hyopglycemia   3. CHF (NYHA class III, ACC/AHA stage C)  -- follows with cardiology   4. ICD (implantable cardioverter-defibrillator) in place  -- as above   5. Thyroid cancer  -- schedule annual  follow up with endocrinologist    6. Postoperative hypothyroidism  -- follows with Dr. Ramos  -- Continue Synthroid at current dose.  Lab Results   Component Value Date    TSH 0.944 11/20/2017      7. Essential hypertension  -- controlled  -- on ARB   8. Hyperlipidemia, unspecified hyperlipidemia type  -- continue statin  -- managed by cardiologist  -- triglycerides elevated.   Lab Results   Component Value Date    LDLCALC 62.4 (L) 04/20/2017      9. Obesity (BMI 30-39.9)  -- increases insulin resistance  Body mass index is 38.29 kg/m².   10. History of pancreatitis  -- avoid GLP-1RA and DPP IV-i          FOLLOW UP  Return in about 3 months (around 3/29/2018).   Patient instructed to bring BG logs to each follow up.   Patient encouraged to call for any BG/medication issues, concerns, or questions.      Orders Placed This Encounter   Procedures    Hemoglobin A1c    Comprehensive metabolic panel

## 2018-01-17 DIAGNOSIS — Z79.4 TYPE 2 DIABETES MELLITUS WITH COMPLICATION, WITH LONG-TERM CURRENT USE OF INSULIN: ICD-10-CM

## 2018-01-17 DIAGNOSIS — E11.8 TYPE 2 DIABETES MELLITUS WITH COMPLICATION, WITH LONG-TERM CURRENT USE OF INSULIN: ICD-10-CM

## 2018-01-18 RX ORDER — METFORMIN HYDROCHLORIDE 500 MG/1
500 TABLET, EXTENDED RELEASE ORAL 2 TIMES DAILY WITH MEALS
Qty: 180 TABLET | Refills: 3 | Status: SHIPPED | OUTPATIENT
Start: 2018-01-18 | End: 2018-07-11 | Stop reason: SDUPTHER

## 2018-01-18 NOTE — PROGRESS NOTES
Refill Authorization Note     is requesting a refill authorization.    Brief assessment and rationale for refill: APPROVE: prr  Amount/Quantity of medication ordered: 90d         Refills Authorized: Yes  If authorized number of refills: 0           Medication Therapy Plan: Kidney labs WNL: approve 3 more mo   Name and strength of medication: SPIRONOLACTONE 25 MG TABLET  How patient will take medication: t1t po daily   Medication reconciliation completed: No  Comments:   Lab Results   Component Value Date    CREATININE 0.9 11/20/2017    BUN 14 11/20/2017     11/20/2017    K 4.1 11/20/2017     11/20/2017    CO2 25 11/20/2017      BP Readings from Last 3 Encounters:   12/29/17 118/68   12/11/17 124/82   11/29/17 130/70

## 2018-01-19 RX ORDER — SPIRONOLACTONE 25 MG/1
TABLET ORAL
Qty: 90 TABLET | Refills: 0 | Status: SHIPPED | OUTPATIENT
Start: 2018-01-19 | End: 2018-04-16 | Stop reason: SDUPTHER

## 2018-02-11 RX ORDER — POTASSIUM CHLORIDE 20 MEQ/1
TABLET, EXTENDED RELEASE ORAL
Qty: 90 TABLET | Refills: 1 | Status: SHIPPED | OUTPATIENT
Start: 2018-02-11 | End: 2018-08-11 | Stop reason: SDUPTHER

## 2018-03-07 ENCOUNTER — PATIENT MESSAGE (OUTPATIENT)
Dept: FAMILY MEDICINE | Facility: CLINIC | Age: 68
End: 2018-03-07

## 2018-03-08 RX ORDER — LEVOTHYROXINE SODIUM 137 UG/1
137 TABLET ORAL
Qty: 90 TABLET | Refills: 2 | Status: SHIPPED | OUTPATIENT
Start: 2018-03-08 | End: 2018-12-01 | Stop reason: SDUPTHER

## 2018-03-08 NOTE — TELEPHONE ENCOUNTER
Refill Authorization Note     is requesting a refill authorization.    Brief assessment and rationale for refill: APPROVE; prr  Amount/Quantity of medication ordered: 90d         Refills Authorized: Yes  If authorized number of refills: 2           Medication Therapy Plan: Pt requesting 90d supply instead of 30d supply; saw endocrine and also agrees to continue current dosage; joycelyn 9 more mo   Name and strength of medication: SYNTHROID 137 mcg Tab tablet  How patient will take medication: t1t po daily   Medication reconciliation completed: No  Comments:   Lab Results   Component Value Date    TSH 0.944 11/20/2017    O0TFCXL 6.3 04/22/2009    FREET4 1.05 06/07/2016

## 2018-03-19 RX ORDER — FLUCONAZOLE 150 MG/1
TABLET ORAL
Qty: 1 TABLET | Refills: 1 | Status: SHIPPED | OUTPATIENT
Start: 2018-03-19 | End: 2018-08-21

## 2018-03-26 DIAGNOSIS — E11.65 TYPE 2 DIABETES MELLITUS WITH HYPERGLYCEMIA, WITH LONG-TERM CURRENT USE OF INSULIN: ICD-10-CM

## 2018-03-26 DIAGNOSIS — E78.5 HYPERLIPIDEMIA, UNSPECIFIED HYPERLIPIDEMIA TYPE: Primary | ICD-10-CM

## 2018-03-26 DIAGNOSIS — Z79.4 TYPE 2 DIABETES MELLITUS WITH HYPERGLYCEMIA, WITH LONG-TERM CURRENT USE OF INSULIN: ICD-10-CM

## 2018-03-26 RX ORDER — BLOOD-GLUCOSE METER
EACH MISCELLANEOUS
Qty: 150 STRIP | Refills: 11 | Status: SHIPPED | OUTPATIENT
Start: 2018-03-26 | End: 2018-04-06 | Stop reason: SDUPTHER

## 2018-03-27 RX ORDER — ROSUVASTATIN CALCIUM 10 MG/1
TABLET, COATED ORAL
Qty: 90 TABLET | Refills: 0 | Status: SHIPPED | OUTPATIENT
Start: 2018-03-27 | End: 2018-07-07 | Stop reason: SDUPTHER

## 2018-03-27 NOTE — PROGRESS NOTES
Refill Authorization Note     is requesting a refill authorization.    Brief assessment and rationale for refill: APPROVE: needs labs  Amount/Quantity of medication ordered: 90d         Refills Authorized: Yes  If authorized number of refills: 0        Medication-related problems identified: Requires labs  Medication Therapy Plan: Will order lipids; NTBS; joycelyn 3 more   Name and strength of medication: ROSUVASTATIN CALCIUM 10 MG TAB  How patient will take medication: t1t po daily   Medication reconciliation completed: No  Comments:   Lab Results   Component Value Date    CHOL 148 04/20/2017    CHOL 154 05/30/2016    CHOL 306 (H) 02/18/2016     Lab Results   Component Value Date    HDL 47 04/20/2017    HDL 51 05/30/2016    HDL 46 02/18/2016     Lab Results   Component Value Date    LDLCALC 62.4 (L) 04/20/2017    LDLCALC 74.4 05/30/2016    LDLCALC 202.8 (H) 02/18/2016     Lab Results   Component Value Date    TRIG 193 (H) 04/20/2017    TRIG 143 05/30/2016    TRIG 286 (H) 02/18/2016     Lab Results   Component Value Date    CHOLHDL 31.8 04/20/2017    CHOLHDL 33.1 05/30/2016    CHOLHDL 15.0 (L) 02/18/2016

## 2018-03-29 ENCOUNTER — LAB VISIT (OUTPATIENT)
Dept: LAB | Facility: HOSPITAL | Age: 68
End: 2018-03-29
Attending: NURSE PRACTITIONER
Payer: MEDICARE

## 2018-03-29 DIAGNOSIS — Z79.4 TYPE 2 DIABETES MELLITUS WITH COMPLICATION, WITH LONG-TERM CURRENT USE OF INSULIN: ICD-10-CM

## 2018-03-29 DIAGNOSIS — E11.8 TYPE 2 DIABETES MELLITUS WITH COMPLICATION, WITH LONG-TERM CURRENT USE OF INSULIN: ICD-10-CM

## 2018-03-29 DIAGNOSIS — E78.5 HYPERLIPIDEMIA, UNSPECIFIED HYPERLIPIDEMIA TYPE: ICD-10-CM

## 2018-03-29 LAB
ALBUMIN SERPL BCP-MCNC: 3.8 G/DL
ALP SERPL-CCNC: 81 U/L
ALT SERPL W/O P-5'-P-CCNC: 25 U/L
ANION GAP SERPL CALC-SCNC: 7 MMOL/L
AST SERPL-CCNC: 18 U/L
BILIRUB SERPL-MCNC: 1.8 MG/DL
BUN SERPL-MCNC: 17 MG/DL
CALCIUM SERPL-MCNC: 9.5 MG/DL
CHLORIDE SERPL-SCNC: 104 MMOL/L
CHOLEST SERPL-MCNC: 131 MG/DL
CHOLEST/HDLC SERPL: 2.8 {RATIO}
CO2 SERPL-SCNC: 28 MMOL/L
CREAT SERPL-MCNC: 0.8 MG/DL
EST. GFR  (AFRICAN AMERICAN): >60 ML/MIN/1.73 M^2
EST. GFR  (NON AFRICAN AMERICAN): >60 ML/MIN/1.73 M^2
ESTIMATED AVG GLUCOSE: 186 MG/DL
GLUCOSE SERPL-MCNC: 183 MG/DL
HBA1C MFR BLD HPLC: 8.1 %
HDLC SERPL-MCNC: 47 MG/DL
HDLC SERPL: 35.9 %
LDLC SERPL CALC-MCNC: 54.4 MG/DL
NONHDLC SERPL-MCNC: 84 MG/DL
POTASSIUM SERPL-SCNC: 4.5 MMOL/L
PROT SERPL-MCNC: 6.9 G/DL
SODIUM SERPL-SCNC: 139 MMOL/L
TRIGL SERPL-MCNC: 148 MG/DL

## 2018-03-29 PROCEDURE — 80061 LIPID PANEL: CPT

## 2018-03-29 PROCEDURE — 83036 HEMOGLOBIN GLYCOSYLATED A1C: CPT

## 2018-03-29 PROCEDURE — 80053 COMPREHEN METABOLIC PANEL: CPT

## 2018-03-29 PROCEDURE — 36415 COLL VENOUS BLD VENIPUNCTURE: CPT | Mod: PN

## 2018-04-04 ENCOUNTER — PATIENT MESSAGE (OUTPATIENT)
Dept: FAMILY MEDICINE | Facility: CLINIC | Age: 68
End: 2018-04-04

## 2018-04-05 ENCOUNTER — PATIENT MESSAGE (OUTPATIENT)
Dept: FAMILY MEDICINE | Facility: CLINIC | Age: 68
End: 2018-04-05

## 2018-04-06 ENCOUNTER — OFFICE VISIT (OUTPATIENT)
Dept: ENDOCRINOLOGY | Facility: CLINIC | Age: 68
End: 2018-04-06
Payer: MEDICARE

## 2018-04-06 VITALS
SYSTOLIC BLOOD PRESSURE: 120 MMHG | HEART RATE: 62 BPM | HEIGHT: 62 IN | WEIGHT: 210.88 LBS | BODY MASS INDEX: 38.8 KG/M2 | DIASTOLIC BLOOD PRESSURE: 70 MMHG

## 2018-04-06 DIAGNOSIS — I25.10 CORONARY ARTERY DISEASE INVOLVING NATIVE CORONARY ARTERY WITHOUT ANGINA PECTORIS, UNSPECIFIED WHETHER NATIVE OR TRANSPLANTED HEART: ICD-10-CM

## 2018-04-06 DIAGNOSIS — R17 ELEVATED BILIRUBIN: ICD-10-CM

## 2018-04-06 DIAGNOSIS — E78.5 HYPERLIPIDEMIA, UNSPECIFIED HYPERLIPIDEMIA TYPE: Chronic | ICD-10-CM

## 2018-04-06 DIAGNOSIS — E66.9 OBESITY (BMI 30-39.9): ICD-10-CM

## 2018-04-06 DIAGNOSIS — Z87.19 HISTORY OF PANCREATITIS: ICD-10-CM

## 2018-04-06 DIAGNOSIS — E11.8 TYPE 2 DIABETES MELLITUS WITH COMPLICATION, WITH LONG-TERM CURRENT USE OF INSULIN: Primary | ICD-10-CM

## 2018-04-06 DIAGNOSIS — E89.0 POSTOPERATIVE HYPOTHYROIDISM: ICD-10-CM

## 2018-04-06 DIAGNOSIS — I10 ESSENTIAL HYPERTENSION: ICD-10-CM

## 2018-04-06 DIAGNOSIS — I50.42 CHRONIC COMBINED SYSTOLIC AND DIASTOLIC CONGESTIVE HEART FAILURE: Chronic | ICD-10-CM

## 2018-04-06 DIAGNOSIS — Z79.4 TYPE 2 DIABETES MELLITUS WITH COMPLICATION, WITH LONG-TERM CURRENT USE OF INSULIN: Primary | ICD-10-CM

## 2018-04-06 PROCEDURE — 99999 PR PBB SHADOW E&M-EST. PATIENT-LVL V: CPT | Mod: PBBFAC,,, | Performed by: NURSE PRACTITIONER

## 2018-04-06 PROCEDURE — 99214 OFFICE O/P EST MOD 30 MIN: CPT | Mod: S$PBB,,, | Performed by: NURSE PRACTITIONER

## 2018-04-06 PROCEDURE — 99215 OFFICE O/P EST HI 40 MIN: CPT | Mod: PBBFAC,PN | Performed by: NURSE PRACTITIONER

## 2018-04-06 RX ORDER — INSULIN DEGLUDEC 100 U/ML
36 INJECTION, SOLUTION SUBCUTANEOUS NIGHTLY
Qty: 15 ML | Refills: 6
Start: 2018-04-06 | End: 2018-07-11 | Stop reason: SDUPTHER

## 2018-04-06 NOTE — PROGRESS NOTES
CC: Ms. Mckenzie Mari arrives today for management of Type 2 DM and review of chronic medical conditions.     HPI: Ms. Mckenzie Mari was diagnosed with Type 2 DM in 2004. She was diagnosed based on lab work. Initial treatment consisted of metformin and glimepiride. Insulin added in 8/2016 - began Toujeo. She states that she felt that this caused nausea, abd pain, chest pain. Lantus caused throat swelling, left arm pain. She was then changed to Novolog 70/30 but this was only used for a short period because her insurance didn't cover.  Then changed to Humalog 50-50 in 12/2016. In 2017, she began MDI with Tresiba and Humalog. + FH of DM in maternal aunt and cousin. Denies hospitalizations due to DM. Previously seen in endocrine by MAGALI Gruber, NP. Also follows with by Dr. Ramos for DM history of thyroid cancer/post-surgical hypothyroidism.   Of note, she has a h/o pancreatitis.     Last seen by me in December, at which time increased Tresiba dose was recommended.    She believes that insulin has worsened her diabetes and that her body rejects the insulin, therefore, doesn't want to increase the doses by much.  This has made dose titrations near impossible.     BG readings are checked 2-3x/day. Brings log.               Hypoglycemia: No but feels symptomatic with BG < 140.  She states that she gets headaches and feels ill. She claims that she does not want glucoses <140.    Missing Insulin/PO medication doses: No  Timing prandial insulin 5-15 minutes before meals: yes    Dietary Habits: Eats 3 meals/day. Occasional snacking. Avoids sugary drinks.    She follows annually with cardiology for cardiomyopathy, CAD.      She reports a history of fatty liver. Last abdominal US was in 2015.      CURRENT DIABETIC MEDS: metformin  mg BID, Tresiba 33 units QHS, Humalog 12 units AC + correction scale, target 150, ISF 50.  Vial or pen: pen  Glucometer type: One Touch Verio    Previous DM treatments:   Invokana -  "nausea  Glimepiride - stopped when prandial insulin added  Touejo -  Nausea, abd pain, chest pain  Lantus - throat swelling, left arm pain  Novolog 70/30 - not covered by insurance    Last Eye Exam: 5/2017 - sees outside provider. Denies   Last Podiatry Exam: no    REVIEW OF SYSTEMS  Constitutional: no c/o weakness, fatigue, or weight loss.   Eyes: no c/o visual disturbances.   Cardiac: no palpitations, chest pain. Reports occasional palpitations.  Respiratory: denies cough. C/o occasional dyspnea  GI: no c/o abdominal pain, nausea.  Skin: no lesions or rashes.   Neuro: denies numbness, tingling, paresthesias.   Endocrine: denies polyphagia, polydipsia, polyuria      Personally reviewed Past Medical, Surgical, Social History.    Vital Signs  /70   Pulse 62   Ht 5' 2" (1.575 m)   Wt 95.7 kg (210 lb 13.9 oz)   BMI 38.57 kg/m²     Personally reviewed the below labs:    Hemoglobin A1C   Date Value Ref Range Status   03/29/2018 8.1 (H) 4.0 - 5.6 % Final     Comment:     According to ADA guidelines, hemoglobin A1c <7.0% represents  optimal control in non-pregnant diabetic patients. Different  metrics may apply to specific patient populations.   Standards of Medical Care in Diabetes-2016.  For the purpose of screening for the presence of diabetes:  <5.7%     Consistent with the absence of diabetes  5.7-6.4%  Consistent with increasing risk for diabetes   (prediabetes)  >or=6.5%  Consistent with diabetes  Currently, no consensus exists for use of hemoglobin A1c  for diagnosis of diabetes for children.  This Hemoglobin A1c assay has significant interference with fetal   hemoglobin   (HbF). The results are invalid for patients with abnormal amounts of   HbF,   including those with known Hereditary Persistence   of Fetal Hemoglobin. Heterozygous hemoglobin variants (HbAS, HbAC,   HbAD, HbAE, HbA2) do not significantly interfere with this assay;   however, presence of multiple variants in a sample may impact the % "   interference.     08/25/2017 8.1 (H) 4.0 - 5.6 % Final     Comment:     According to ADA guidelines, hemoglobin A1c <7.0% represents  optimal control in non-pregnant diabetic patients. Different  metrics may apply to specific patient populations.   Standards of Medical Care in Diabetes-2016.  For the purpose of screening for the presence of diabetes:  <5.7%     Consistent with the absence of diabetes  5.7-6.4%  Consistent with increasing risk for diabetes   (prediabetes)  >or=6.5%  Consistent with diabetes  Currently, no consensus exists for use of hemoglobin A1c  for diagnosis of diabetes for children.  This Hemoglobin A1c assay has significant interference with fetal   hemoglobin   (HbF). The results are invalid for patients with abnormal amounts of   HbF,   including those with known Hereditary Persistence   of Fetal Hemoglobin. Heterozygous hemoglobin variants (HbAS, HbAC,   HbAD, HbAE, HbA2) do not significantly interfere with this assay;   however, presence of multiple variants in a sample may impact the %   interference.     04/20/2017 7.3 (H) 4.5 - 6.2 % Final     Comment:     According to ADA guidelines, hemoglobin A1C <7.0% represents  optimal control in non-pregnant diabetic patients.  Different  metrics may apply to specific populations.   Standards of Medical Care in Diabetes - 2016.  For the purpose of screening for the presence of diabetes:  <5.7%     Consistent with the absence of diabetes  5.7-6.4%  Consistent with increasing risk for diabetes   (prediabetes)  >or=6.5%  Consistent with diabetes  Currently no consensus exists for use of hemoglobin A1C  for diagnosis of diabetes for children.         Chemistry        Component Value Date/Time     03/29/2018 0926    K 4.5 03/29/2018 0926     03/29/2018 0926    CO2 28 03/29/2018 0926    BUN 17 03/29/2018 0926    CREATININE 0.8 03/29/2018 0926     (H) 03/29/2018 0926        Component Value Date/Time    CALCIUM 9.5 03/29/2018 0926     ALKPHOS 81 03/29/2018 0926    AST 18 03/29/2018 0926    ALT 25 03/29/2018 0926    BILITOT 1.8 (H) 03/29/2018 0926          Lab Results   Component Value Date    CHOL 131 03/29/2018    CHOL 148 04/20/2017    CHOL 154 05/30/2016     Lab Results   Component Value Date    HDL 47 03/29/2018    HDL 47 04/20/2017    HDL 51 05/30/2016     Lab Results   Component Value Date    LDLCALC 54.4 (L) 03/29/2018    LDLCALC 62.4 (L) 04/20/2017    LDLCALC 74.4 05/30/2016     Lab Results   Component Value Date    TRIG 148 03/29/2018    TRIG 193 (H) 04/20/2017    TRIG 143 05/30/2016     Lab Results   Component Value Date    CHOLHDL 35.9 03/29/2018    CHOLHDL 31.8 04/20/2017    CHOLHDL 33.1 05/30/2016       Lab Results   Component Value Date    MICALBCREAT 7.6 04/20/2017     Lab Results   Component Value Date    TSH 0.944 11/20/2017       CrCl cannot be calculated (Patient's most recent lab result is older than the maximum 7 days allowed.).    Vit D, 25-Hydroxy   Date Value Ref Range Status   11/08/2014 51 30 - 96 ng/mL Final     Comment:     Vitamin D deficiency.........<10 ng/mL                              Vitamin D insufficiency......10-29 ng/mL       Vitamin D sufficiency........> or equal to 30 ng/mL  Vitamin D toxicity............>100 ng/mL         PHYSICAL EXAMINATION  Constitutional: Appears well, no distress  Neck: Supple, trachea midline; transverse scar to anterior neck from thyroidectomy.   Respiratory: CTA, even and unlabored.  Cardiovascular: RRR, no murmurs, no carotid bruits. DP pulses 1+ bilaterally; no edema.  Lymph: no cervical or supraclavicular lymphadenopathy.  Skin: warm and dry; no lipohypertrophy, or acanthosis nigracans observed.  Neuro: DTR diminished to BUE/BLE. Previously, no loss of protective sensation via 10 gm monofilament or vibratory exam bilaterally  Feet: appropriate footwear.      A1c goal < 8%, due to CAD, CHF.        Assessment/Plan  1. Type 2 diabetes mellitus with complication, without long-term  current use of insulin  -- A1c remains elevated. Glucoses globally elevated on logs. She doesn't want level to decrease below 140, which complicates insulin dose adjustments.   -- cannot use Soliqua, due to Lantus allergy. Would not use Actos, due to CHF. GLP-1RA and DPP4-I contraindicated due to h/o pancreatitis.   -- IncreaseTresiba to 36 units QHS. If BG remain mostly >150, increase to 38 units.   -- continue Humalog 12 units with each meal + correction scale, target 150, ISF 50.   -- continue low dose metformin   -- check BG 4x/day.     -- Discussed diagnosis of DM, A1c goals, progression of disease, long term complications and tx options.  Advised patient to check BG before activities, such as driving or exercise.  -- Reviewed hypoglycemia management: treat with 1/2 glass of juice, 1/2 can regular coke, or 4 glucose tablets. Monitor and repeat treatment every 15 minutes until BG is >70 Then have a snack, which includes a complex carbohydrate and protein.     2. Coronary artery disease involving native coronary artery without angina pectoris, unspecified whether native or transplanted heart  -- avoid hyopglycemia   3. CHF (NYHA class III, ACC/AHA stage C)  -- follows with cardiology   4. Postoperative hypothyroidism  -- follows with Dr. Ramos  -- Continue Synthroid at current dose.  Lab Results   Component Value Date    TSH 0.944 11/20/2017      5. Essential hypertension  -- controlled  -- on ARB   6. Hyperlipidemia, unspecified hyperlipidemia type  -- continue statin  -- managed by cardiologist  -- triglycerides elevated.   Lab Results   Component Value Date    LDLCALC 54.4 (L) 03/29/2018      7. Obesity (BMI 30-39.9)  -- increases insulin resistance  Body mass index is 38.57 kg/m².   8. History of pancreatitis  -- avoid GLP-1RA and DPP IV-i   9. Elevated bilirubin -- discussed with Dr. Gold  -- he will order additional workup          FOLLOW UP  Follow-up in about 3 months (around 7/6/2018).   Patient  instructed to bring BG logs to each follow up.   Patient encouraged to call for any BG/medication issues, concerns, or questions.      Orders Placed This Encounter   Procedures    Hemoglobin A1c    Microalbumin/creatinine urine ratio

## 2018-04-12 ENCOUNTER — OFFICE VISIT (OUTPATIENT)
Dept: FAMILY MEDICINE | Facility: CLINIC | Age: 68
End: 2018-04-12
Payer: MEDICARE

## 2018-04-12 VITALS
BODY MASS INDEX: 38.44 KG/M2 | HEART RATE: 74 BPM | WEIGHT: 208.88 LBS | HEIGHT: 62 IN | DIASTOLIC BLOOD PRESSURE: 66 MMHG | OXYGEN SATURATION: 96 % | SYSTOLIC BLOOD PRESSURE: 108 MMHG | TEMPERATURE: 98 F

## 2018-04-12 DIAGNOSIS — R17 ELEVATED BILIRUBIN: ICD-10-CM

## 2018-04-12 DIAGNOSIS — J02.9 SORE THROAT: ICD-10-CM

## 2018-04-12 DIAGNOSIS — L50.9 URTICARIA: Primary | ICD-10-CM

## 2018-04-12 PROCEDURE — 87081 CULTURE SCREEN ONLY: CPT

## 2018-04-12 PROCEDURE — 99214 OFFICE O/P EST MOD 30 MIN: CPT | Mod: S$PBB,,, | Performed by: FAMILY MEDICINE

## 2018-04-12 PROCEDURE — 99999 PR PBB SHADOW E&M-EST. PATIENT-LVL IV: CPT | Mod: PBBFAC,,, | Performed by: FAMILY MEDICINE

## 2018-04-12 PROCEDURE — 99214 OFFICE O/P EST MOD 30 MIN: CPT | Mod: PBBFAC,PN | Performed by: FAMILY MEDICINE

## 2018-04-12 RX ORDER — FAMOTIDINE 20 MG/1
20 TABLET, FILM COATED ORAL 2 TIMES DAILY
Qty: 28 TABLET | Refills: 1 | Status: SHIPPED | OUTPATIENT
Start: 2018-04-12 | End: 2018-08-21

## 2018-04-12 RX ORDER — TRIAMCINOLONE ACETONIDE 0.25 MG/G
CREAM TOPICAL 2 TIMES DAILY
Qty: 80 G | Refills: 3 | Status: SHIPPED | OUTPATIENT
Start: 2018-04-12 | End: 2018-08-21

## 2018-04-12 RX ORDER — LEVOCETIRIZINE DIHYDROCHLORIDE 5 MG/1
5 TABLET, FILM COATED ORAL NIGHTLY
Qty: 14 TABLET | Refills: 1 | Status: SHIPPED | OUTPATIENT
Start: 2018-04-12 | End: 2018-08-21

## 2018-04-12 NOTE — PROGRESS NOTES
"Subjective:     THIS DOCUMENT WAS MADE IN PART WITH [employer] DICTATION SOFTWARE.  OCCASIONALLY THIS SOFTWARE WILL MISINTERPRET WORDS OR PHRASES.     Patient ID: Mckenzie Mari is a 67 y.o. female.    Chief Complaint: Rash (pt c/o rash on "arms, ears, eyeballs, and scalp" states everything is  very itching x about 1 week )    HPI      sore throat, about one week. Mild. Thought it was allergies but now has developed a rash. No fever or chills but according to patient "my body is different and I don't run fevers" mild nasal congestion. No cough or shortness of breath out of proportion to baseline     rash, itchy multiyear damages rash on arms trunks and legs. No obvious trigger. Food allergies that are known include cayenne pepper. Nothing else. She has not started any new medications and has not taken any medications known to causality.     Elevated bilirubin, product but mildly worsening. No jaundice. No abdominal pain. No recent illness other than the aforementioned sore throat and rash. No recent abdominal imaging. She does have a history of cancer    Active Ambulatory Problems     Diagnosis Date Noted    CHF (congestive heart failure)     CHF (NYHA class III, ACC/AHA stage C)     Hyperlipidemia     Chest pain 01/18/2012    HTN (hypertension)     CVID (common variable immunodeficiency)     Chronic sinusitis     ALLERGIC RHINITIS 01/23/2012    Penicillin allergy 01/30/2012    Thyroid cancer 07/25/2012    Postoperative hypothyroidism 07/25/2012    LBBB (left bundle branch block) 10/11/2012    ICD (implantable cardioverter-defibrillator) in place 10/23/2012    Sinusitis 10/01/2013    Dyspnea on effort 03/25/2014    Chest pain on exertion 03/25/2014    CAD (coronary artery disease) 04/17/2014    Type 2 diabetes mellitus with complication, with long-term current use of insulin 05/11/2014    Myoclonus     History of colon polyps 05/11/2016    Obesity (BMI 30-39.9) 08/31/2016    History of " pancreatitis 08/31/2016    Nasal obstruction 03/15/2017    Deviated nasal septum 03/15/2017    Hypertrophy of nasal turbinates 03/15/2017    Malignant neoplasm of central portion of left female breast 10/06/2017     Resolved Ambulatory Problems     Diagnosis Date Noted    Subacute maxillary sinusitis 03/15/2017     Past Medical History:   Diagnosis Date    Allergy     Arthritis     Breast cancer 2005    Bundle branch block     Cardiomyopathy     CHF (congestive heart failure)     Chronic sinusitis     CVID (common variable immunodeficiency)     Diabetes mellitus     GERD (gastroesophageal reflux disease)     Headache(784.0)     HTN (hypertension)     Hyperlipidemia     Hypothyroid 7/25/2012    Otitis media     Presence of combination internal cardiac defibrillator (ICD) and pacemaker     Thyroid cancer     Thyroid cancer 7/25/2012    Thyroid disease          Review of Systems   Constitutional: Positive for activity change. Negative for chills, fever and unexpected weight change.   HENT: Positive for congestion and sore throat.    Eyes: Negative for visual disturbance.   Respiratory: Negative for shortness of breath and wheezing.    Cardiovascular: Negative for chest pain and palpitations.   Gastrointestinal: Negative for abdominal pain, nausea and vomiting.   Genitourinary: Negative.    Skin: Positive for rash. Negative for wound.   Neurological: Negative.    Hematological: Negative.    Psychiatric/Behavioral: Negative.        Objective:      Physical Exam   Constitutional: She is oriented to person, place, and time. She appears well-developed and well-nourished.   HENT:   Head: Normocephalic and atraumatic.   Right Ear: Tympanic membrane, external ear and ear canal normal.   Left Ear: Tympanic membrane, external ear and ear canal normal.   Throat no erythema and no exudate.   Eyes: No scleral icterus.   Cardiovascular: Normal rate, regular rhythm and normal heart sounds.    No murmur  heard.  Pulmonary/Chest: Effort normal and breath sounds normal. No stridor. No respiratory distress. She has no wheezes. She has no rales.   Neurological: She is alert and oriented to person, place, and time.   Skin: Skin is dry. No rash noted. She is not diaphoretic.   Diffuse dry skin. She has a mild urticarial rash prominent on the arms trunk and legs.   Psychiatric: She has a normal mood and affect. Her behavior is normal.   Vitals reviewed.      Assessment:       1. Urticaria    2. Sore throat    3. Elevated bilirubin        Plan:       Mckenzie was seen today for rash.    Diagnoses and all orders for this visit:    Urticaria  -     levocetirizine (XYZAL) 5 MG tablet; Take 1 tablet (5 mg total) by mouth every evening.  -     famotidine (PEPCID) 20 MG tablet; Take 1 tablet (20 mg total) by mouth 2 (two) times daily.   will try to avoid steroids if possible because of her diabetes but have worsening may reconsider. If this persists may need to consult with allergy and immunology. This could be a result of many possibilities that I'm unable to narrow down at this time. Could be food allergy could be medication allergy or other  Sore throat  -     Strep A culture, throat    Elevated bilirubin  -     Hepatic function panel; Future  -     US Abdomen Complete; Future    Other orders  -     triamcinolone acetonide 0.025% (KENALOG) 0.025 % cream; Apply topically 2 (two) times daily.

## 2018-04-13 ENCOUNTER — PATIENT MESSAGE (OUTPATIENT)
Dept: FAMILY MEDICINE | Facility: CLINIC | Age: 68
End: 2018-04-13

## 2018-04-15 LAB — BACTERIA THROAT CULT: NORMAL

## 2018-04-17 RX ORDER — SPIRONOLACTONE 25 MG/1
TABLET ORAL
Qty: 90 TABLET | Refills: 0 | Status: SHIPPED | OUTPATIENT
Start: 2018-04-17 | End: 2018-05-17 | Stop reason: SDUPTHER

## 2018-04-24 ENCOUNTER — HOSPITAL ENCOUNTER (OUTPATIENT)
Dept: RADIOLOGY | Facility: HOSPITAL | Age: 68
Discharge: HOME | End: 2018-04-24
Attending: FAMILY MEDICINE
Payer: MEDICARE

## 2018-04-24 DIAGNOSIS — R17 ELEVATED BILIRUBIN: ICD-10-CM

## 2018-04-24 PROCEDURE — 76700 US EXAM ABDOM COMPLETE: CPT | Mod: TC,PO

## 2018-04-24 PROCEDURE — 76700 US EXAM ABDOM COMPLETE: CPT | Mod: 26,,, | Performed by: RADIOLOGY

## 2018-04-28 ENCOUNTER — PATIENT MESSAGE (OUTPATIENT)
Dept: ENDOCRINOLOGY | Facility: CLINIC | Age: 68
End: 2018-04-28

## 2018-04-28 ENCOUNTER — PATIENT MESSAGE (OUTPATIENT)
Dept: FAMILY MEDICINE | Facility: CLINIC | Age: 68
End: 2018-04-28

## 2018-04-28 DIAGNOSIS — E11.65 TYPE 2 DIABETES MELLITUS WITH HYPERGLYCEMIA, WITH LONG-TERM CURRENT USE OF INSULIN: ICD-10-CM

## 2018-04-28 DIAGNOSIS — Z79.4 TYPE 2 DIABETES MELLITUS WITH HYPERGLYCEMIA, WITH LONG-TERM CURRENT USE OF INSULIN: ICD-10-CM

## 2018-04-29 ENCOUNTER — PATIENT MESSAGE (OUTPATIENT)
Dept: FAMILY MEDICINE | Facility: CLINIC | Age: 68
End: 2018-04-29

## 2018-04-30 DIAGNOSIS — Z79.4 TYPE 2 DIABETES MELLITUS WITH HYPERGLYCEMIA, WITH LONG-TERM CURRENT USE OF INSULIN: ICD-10-CM

## 2018-04-30 DIAGNOSIS — E11.65 TYPE 2 DIABETES MELLITUS WITH HYPERGLYCEMIA, WITH LONG-TERM CURRENT USE OF INSULIN: ICD-10-CM

## 2018-05-15 ENCOUNTER — PATIENT MESSAGE (OUTPATIENT)
Dept: FAMILY MEDICINE | Facility: CLINIC | Age: 68
End: 2018-05-15

## 2018-05-15 DIAGNOSIS — I10 ESSENTIAL HYPERTENSION: Primary | ICD-10-CM

## 2018-05-15 DIAGNOSIS — K21.00 REFLUX ESOPHAGITIS: ICD-10-CM

## 2018-05-16 ENCOUNTER — PATIENT MESSAGE (OUTPATIENT)
Dept: CARDIOLOGY | Facility: CLINIC | Age: 68
End: 2018-05-16

## 2018-05-16 RX ORDER — VALSARTAN 80 MG/1
80 TABLET ORAL DAILY
Qty: 90 TABLET | Refills: 3 | Status: SHIPPED | OUTPATIENT
Start: 2018-05-16 | End: 2019-03-15

## 2018-05-16 RX ORDER — SPIRONOLACTONE 25 MG/1
25 TABLET ORAL DAILY
Qty: 90 TABLET | Refills: 0 | OUTPATIENT
Start: 2018-05-16

## 2018-05-16 NOTE — PROGRESS NOTES
Refill Authorization Note     is requesting a refill authorization.    Brief assessment and rationale for refill: Quick DC: RTS; needs labs                       Medication-related problems identified: Requires labs  Medication Therapy Plan: Will order CMP/magnes/b12 to be escheduled with other lab   Name and strength of medication: spironolactone (ALDACTONE) 25 MG tablet        Comments:   BP Readings from Last 3 Encounters:   04/12/18 108/66   04/06/18 120/70   12/29/17 118/68      Lab Results   Component Value Date    CREATININE 0.8 03/29/2018    BUN 17 03/29/2018     03/29/2018    K 4.5 03/29/2018     03/29/2018    CO2 28 03/29/2018

## 2018-05-17 RX ORDER — SPIRONOLACTONE 25 MG/1
25 TABLET ORAL DAILY
Qty: 90 TABLET | Refills: 0 | Status: SHIPPED | OUTPATIENT
Start: 2018-05-17 | End: 2018-08-26 | Stop reason: SDUPTHER

## 2018-05-18 ENCOUNTER — TELEPHONE (OUTPATIENT)
Dept: CARDIOLOGY | Facility: CLINIC | Age: 68
End: 2018-05-18

## 2018-05-18 NOTE — TELEPHONE ENCOUNTER
----- Message from Demi Mcclure sent at 5/18/2018  9:21 AM CDT -----  Contact: self  Patient [phone] has scheduled an annual check up with Dr Hansen for Thursday 06 21 18 at 9:45am/does she need any testing before her appt?/

## 2018-05-31 ENCOUNTER — PATIENT MESSAGE (OUTPATIENT)
Dept: ENDOCRINOLOGY | Facility: CLINIC | Age: 68
End: 2018-05-31

## 2018-05-31 ENCOUNTER — PATIENT MESSAGE (OUTPATIENT)
Dept: CARDIOLOGY | Facility: CLINIC | Age: 68
End: 2018-05-31

## 2018-05-31 DIAGNOSIS — E11.8 TYPE 2 DIABETES MELLITUS WITH COMPLICATION, WITH LONG-TERM CURRENT USE OF INSULIN: ICD-10-CM

## 2018-05-31 DIAGNOSIS — Z79.4 TYPE 2 DIABETES MELLITUS WITH COMPLICATION, WITH LONG-TERM CURRENT USE OF INSULIN: ICD-10-CM

## 2018-05-31 RX ORDER — PEN NEEDLE, DIABETIC 30 GX3/16"
NEEDLE, DISPOSABLE MISCELLANEOUS
Qty: 400 EACH | Refills: 3 | Status: SHIPPED | OUTPATIENT
Start: 2018-05-31 | End: 2019-01-14 | Stop reason: SDUPTHER

## 2018-05-31 NOTE — TELEPHONE ENCOUNTER
Contacted the pharmacy inference to patient medication request for spironoalctone and they said that the medication has bin filled.  I contacted the patient and told her what the pharmacy said.  The patient agrees and understands.

## 2018-06-21 ENCOUNTER — OFFICE VISIT (OUTPATIENT)
Dept: CARDIOLOGY | Facility: CLINIC | Age: 68
End: 2018-06-21
Payer: MEDICARE

## 2018-06-21 VITALS
DIASTOLIC BLOOD PRESSURE: 81 MMHG | SYSTOLIC BLOOD PRESSURE: 134 MMHG | WEIGHT: 209.88 LBS | HEART RATE: 87 BPM | BODY MASS INDEX: 38.62 KG/M2 | HEIGHT: 62 IN

## 2018-06-21 DIAGNOSIS — Z95.810 ICD (IMPLANTABLE CARDIOVERTER-DEFIBRILLATOR) IN PLACE: Chronic | ICD-10-CM

## 2018-06-21 DIAGNOSIS — I25.10 CORONARY ARTERY DISEASE INVOLVING NATIVE CORONARY ARTERY WITHOUT ANGINA PECTORIS, UNSPECIFIED WHETHER NATIVE OR TRANSPLANTED HEART: ICD-10-CM

## 2018-06-21 DIAGNOSIS — I50.9 CHF (NYHA CLASS III, ACC/AHA STAGE C): Primary | ICD-10-CM

## 2018-06-21 DIAGNOSIS — I44.7 LBBB (LEFT BUNDLE BRANCH BLOCK): ICD-10-CM

## 2018-06-21 DIAGNOSIS — I10 ESSENTIAL HYPERTENSION: ICD-10-CM

## 2018-06-21 PROCEDURE — 99214 OFFICE O/P EST MOD 30 MIN: CPT | Mod: S$PBB,,, | Performed by: INTERNAL MEDICINE

## 2018-06-21 PROCEDURE — 99999 PR PBB SHADOW E&M-EST. PATIENT-LVL II: CPT | Mod: PBBFAC,,, | Performed by: INTERNAL MEDICINE

## 2018-06-21 PROCEDURE — 99212 OFFICE O/P EST SF 10 MIN: CPT | Mod: PBBFAC,PO | Performed by: INTERNAL MEDICINE

## 2018-06-21 NOTE — PROGRESS NOTES
Subjective:    Patient ID:  Mckenzie Mari is a 67 y.o. female who presents for follow-up of CHF    HPI  She comes with no complaints, no chest pain, no shortness of breath  FC II    Review of Systems   Constitution: Negative for decreased appetite, weakness, malaise/fatigue, weight gain and weight loss.   Cardiovascular: Negative for chest pain, dyspnea on exertion, leg swelling, palpitations and syncope.   Respiratory: Negative for cough and shortness of breath.    Gastrointestinal: Negative.    All other systems reviewed and are negative.       Objective:    Physical Exam   Constitutional: She is oriented to person, place, and time. She appears well-developed and well-nourished.   HENT:   Head: Normocephalic.   Eyes: Pupils are equal, round, and reactive to light.   Neck: Normal range of motion. Neck supple. No JVD present. Carotid bruit is not present. No thyromegaly present.   Cardiovascular: Normal rate, regular rhythm, normal heart sounds, intact distal pulses and normal pulses.  PMI is not displaced.  Exam reveals no gallop.    No murmur heard.  Pulmonary/Chest: Effort normal and breath sounds normal.   Abdominal: Soft. Normal appearance. She exhibits no mass. There is no hepatosplenomegaly. There is no tenderness.   Musculoskeletal: Normal range of motion. She exhibits no edema.   Neurological: She is alert and oriented to person, place, and time. She has normal strength and normal reflexes. No sensory deficit.   Skin: Skin is warm and intact.   Psychiatric: She has a normal mood and affect.   Nursing note and vitals reviewed.        Assessment:       1. CHF (NYHA class III, ACC/AHA stage C)    2. LBBB (left bundle branch block)    3. Essential hypertension    4. Coronary artery disease involving native coronary artery without angina pectoris, unspecified whether native or transplanted heart    5. ICD (implantable cardioverter-defibrillator) in place         Plan:   Echocardiogram; call with  results  Continue all cardiac medications  Regular exercise program  Weight loss  9 m f/u

## 2018-06-26 ENCOUNTER — CLINICAL SUPPORT (OUTPATIENT)
Dept: CARDIOLOGY | Facility: CLINIC | Age: 68
End: 2018-06-26
Attending: INTERNAL MEDICINE
Payer: MEDICARE

## 2018-06-26 VITALS
HEART RATE: 74 BPM | HEIGHT: 62 IN | WEIGHT: 209 LBS | BODY MASS INDEX: 38.46 KG/M2 | SYSTOLIC BLOOD PRESSURE: 126 MMHG | DIASTOLIC BLOOD PRESSURE: 82 MMHG

## 2018-06-26 DIAGNOSIS — I10 ESSENTIAL HYPERTENSION: ICD-10-CM

## 2018-06-26 DIAGNOSIS — Z95.810 ICD (IMPLANTABLE CARDIOVERTER-DEFIBRILLATOR) IN PLACE: Chronic | ICD-10-CM

## 2018-06-26 DIAGNOSIS — I50.9 CHF (NYHA CLASS III, ACC/AHA STAGE C): ICD-10-CM

## 2018-06-26 DIAGNOSIS — I25.10 CORONARY ARTERY DISEASE INVOLVING NATIVE CORONARY ARTERY WITHOUT ANGINA PECTORIS, UNSPECIFIED WHETHER NATIVE OR TRANSPLANTED HEART: ICD-10-CM

## 2018-06-26 DIAGNOSIS — I44.7 LBBB (LEFT BUNDLE BRANCH BLOCK): ICD-10-CM

## 2018-06-26 PROCEDURE — 99999 PR PBB SHADOW E&M-EST. PATIENT-LVL II: CPT | Mod: PBBFAC,,,

## 2018-06-26 PROCEDURE — 99212 OFFICE O/P EST SF 10 MIN: CPT | Mod: PBBFAC,PO,25

## 2018-06-26 PROCEDURE — 93306 TTE W/DOPPLER COMPLETE: CPT | Mod: PBBFAC,PO | Performed by: INTERNAL MEDICINE

## 2018-06-27 LAB
ASCENDING AORTA: 2.82 CM
AV MEAN GRADIENT: 4.12 MMHG
AV PEAK GRADIENT: 7.13 MMHG
AV VALVE AREA: 2.1 CM2
BSA FOR ECHO PROCEDURE: 2.04 M2
CV ECHO LV RWT: 0.36 CM
DOP CALC AO PEAK VEL: 1.34 M/S
DOP CALC AO VTI: 26.52 CM
DOP CALC LVOT AREA: 3.08 CM2
DOP CALC LVOT DIAMETER: 1.98 CM
DOP CALC LVOT STROKE VOLUME: 55.73 CM3
DOP CALCLVOT PEAK VEL VTI: 18.11 CM
E WAVE DECELERATION TIME: 172.29 MSEC
E/A RATIO: 0.76
E/E' RATIO: 16.2
ECHO LV POSTERIOR WALL: 0.77 CM (ref 0.6–1.1)
FRACTIONAL SHORTENING: 31 % (ref 28–44)
INTERVENTRICULAR SEPTUM: 0.81 CM (ref 0.6–1.1)
IVRT: 0.16 MSEC
LA MAJOR: 4.52 CM
LA MINOR: 4.74 CM
LA WIDTH: 2.32 CM
LEFT ATRIUM SIZE: 2.93 CM
LEFT ATRIUM VOLUME INDEX: 13.1 ML/M2
LEFT ATRIUM VOLUME: 26.74 CM3
LEFT INTERNAL DIMENSION IN SYSTOLE: 3.01 CM (ref 2.1–4)
LEFT VENTRICLE MASS INDEX: 51.8 G/M2
LEFT VENTRICULAR INTERNAL DIMENSION IN DIASTOLE: 4.35 CM (ref 3.5–6)
LEFT VENTRICULAR MASS: 105.61 G
LV LATERAL E/E' RATIO: 13.5
LV SEPTAL E/E' RATIO: 20.25
MV PEAK A VEL: 1.06 M/S
MV PEAK E VEL: 0.81 M/S
MV STENOSIS PRESSURE HALF TIME: 49.96 MS
MV VALVE AREA P 1/2 METHOD: 4.4 CM2
PISA TR MAX VEL: 2.69 M/S
PULM VEIN S/D RATIO: 1.25
PV PEAK D VEL: 0.4 M/S
PV PEAK S VEL: 0.5 M/S
RA MAJOR: 3.86 CM
RA PRESSURE: 3 MMHG
RA WIDTH: 2.47 CM
RIGHT VENTRICULAR END-DIASTOLIC DIMENSION: 2.72 CM
SINUS: 3.02 CM
STJ: 2.44 CM
TDI LATERAL: 0.06
TDI SEPTAL: 0.04
TDI: 0.05
TR MAX PG: 28.94 MMHG
TRICUSPID ANNULAR PLANE SYSTOLIC EXCURSION: 0.02 CM
TV REST PULMONARY ARTERY PRESSURE: 31.94 MMHG

## 2018-07-06 ENCOUNTER — LAB VISIT (OUTPATIENT)
Dept: LAB | Facility: HOSPITAL | Age: 68
End: 2018-07-06
Attending: FAMILY MEDICINE
Payer: MEDICARE

## 2018-07-06 ENCOUNTER — PATIENT MESSAGE (OUTPATIENT)
Dept: ENDOCRINOLOGY | Facility: CLINIC | Age: 68
End: 2018-07-06

## 2018-07-06 DIAGNOSIS — R17 ELEVATED BILIRUBIN: ICD-10-CM

## 2018-07-06 DIAGNOSIS — Z79.4 TYPE 2 DIABETES MELLITUS WITH COMPLICATION, WITH LONG-TERM CURRENT USE OF INSULIN: ICD-10-CM

## 2018-07-06 DIAGNOSIS — K21.00 REFLUX ESOPHAGITIS: ICD-10-CM

## 2018-07-06 DIAGNOSIS — E11.8 TYPE 2 DIABETES MELLITUS WITH COMPLICATION, WITH LONG-TERM CURRENT USE OF INSULIN: ICD-10-CM

## 2018-07-06 DIAGNOSIS — I10 ESSENTIAL HYPERTENSION: ICD-10-CM

## 2018-07-06 LAB
ALBUMIN SERPL BCP-MCNC: 3.7 G/DL
ALBUMIN SERPL BCP-MCNC: 3.7 G/DL
ALP SERPL-CCNC: 77 U/L
ALP SERPL-CCNC: 77 U/L
ALT SERPL W/O P-5'-P-CCNC: 23 U/L
ALT SERPL W/O P-5'-P-CCNC: 23 U/L
ANION GAP SERPL CALC-SCNC: 11 MMOL/L
AST SERPL-CCNC: 19 U/L
AST SERPL-CCNC: 19 U/L
BILIRUB DIRECT SERPL-MCNC: 0.6 MG/DL
BILIRUB SERPL-MCNC: 1.6 MG/DL
BILIRUB SERPL-MCNC: 1.6 MG/DL
BUN SERPL-MCNC: 19 MG/DL
CALCIUM SERPL-MCNC: 9.5 MG/DL
CHLORIDE SERPL-SCNC: 103 MMOL/L
CO2 SERPL-SCNC: 26 MMOL/L
CREAT SERPL-MCNC: 0.9 MG/DL
EST. GFR  (AFRICAN AMERICAN): >60 ML/MIN/1.73 M^2
EST. GFR  (NON AFRICAN AMERICAN): >60 ML/MIN/1.73 M^2
ESTIMATED AVG GLUCOSE: 192 MG/DL
GLUCOSE SERPL-MCNC: 185 MG/DL
HBA1C MFR BLD HPLC: 8.3 %
MAGNESIUM SERPL-MCNC: 1.7 MG/DL
POTASSIUM SERPL-SCNC: 4.4 MMOL/L
PROT SERPL-MCNC: 6.5 G/DL
PROT SERPL-MCNC: 6.5 G/DL
SODIUM SERPL-SCNC: 140 MMOL/L
VIT B12 SERPL-MCNC: 504 PG/ML

## 2018-07-06 PROCEDURE — 80053 COMPREHEN METABOLIC PANEL: CPT

## 2018-07-06 PROCEDURE — 36415 COLL VENOUS BLD VENIPUNCTURE: CPT | Mod: PN

## 2018-07-06 PROCEDURE — 83036 HEMOGLOBIN GLYCOSYLATED A1C: CPT

## 2018-07-06 PROCEDURE — 82607 VITAMIN B-12: CPT

## 2018-07-06 PROCEDURE — 83735 ASSAY OF MAGNESIUM: CPT

## 2018-07-06 RX ORDER — INSULIN LISPRO 100 [IU]/ML
12 INJECTION, SOLUTION INTRAVENOUS; SUBCUTANEOUS
Qty: 15 SYRINGE | Refills: 6 | Status: SHIPPED | OUTPATIENT
Start: 2018-07-06 | End: 2018-07-11 | Stop reason: SDUPTHER

## 2018-07-07 DIAGNOSIS — E78.5 HYPERLIPIDEMIA, UNSPECIFIED HYPERLIPIDEMIA TYPE: ICD-10-CM

## 2018-07-09 NOTE — PROGRESS NOTES
Refill Authorization Note     is requesting a refill authorization.    Brief assessment and rationale for refill: APPROVE;prr  Amount/Quantity of medication ordered: 90d  Date of last appointment: 4/12/2018     Refills Authorized: Yes  If authorized number of refills: 2           Medication Therapy Plan: HLD- commented as stable (12/17); nov:7/11/18; Approve 9 more months   Name and strength of medication: rosuvastatin (CRESTOR) 10 MG tablet  How patient will take medication: t1t po qd  Medication reconciliation completed: No  Comments:   Lab Results   Component Value Date    CHOL 131 03/29/2018    CHOL 148 04/20/2017    CHOL 154 05/30/2016     Lab Results   Component Value Date    HDL 47 03/29/2018    HDL 47 04/20/2017    HDL 51 05/30/2016     Lab Results   Component Value Date    LDLCALC 54.4 (L) 03/29/2018    LDLCALC 62.4 (L) 04/20/2017    LDLCALC 74.4 05/30/2016     Lab Results   Component Value Date    TRIG 148 03/29/2018    TRIG 193 (H) 04/20/2017    TRIG 143 05/30/2016     Lab Results   Component Value Date    CHOLHDL 35.9 03/29/2018    CHOLHDL 31.8 04/20/2017    CHOLHDL 33.1 05/30/2016

## 2018-07-10 RX ORDER — ROSUVASTATIN CALCIUM 10 MG/1
TABLET, COATED ORAL
Qty: 90 TABLET | Refills: 2 | Status: SHIPPED | OUTPATIENT
Start: 2018-07-10 | End: 2019-05-28 | Stop reason: SDUPTHER

## 2018-07-11 ENCOUNTER — OFFICE VISIT (OUTPATIENT)
Dept: ENDOCRINOLOGY | Facility: CLINIC | Age: 68
End: 2018-07-11
Payer: MEDICARE

## 2018-07-11 ENCOUNTER — OFFICE VISIT (OUTPATIENT)
Dept: FAMILY MEDICINE | Facility: CLINIC | Age: 68
End: 2018-07-11
Payer: MEDICARE

## 2018-07-11 VITALS
BODY MASS INDEX: 38.64 KG/M2 | WEIGHT: 210 LBS | SYSTOLIC BLOOD PRESSURE: 124 MMHG | HEIGHT: 62 IN | HEART RATE: 84 BPM | DIASTOLIC BLOOD PRESSURE: 70 MMHG

## 2018-07-11 VITALS
OXYGEN SATURATION: 96 % | WEIGHT: 210.75 LBS | SYSTOLIC BLOOD PRESSURE: 126 MMHG | DIASTOLIC BLOOD PRESSURE: 60 MMHG | TEMPERATURE: 98 F | HEART RATE: 76 BPM | BODY MASS INDEX: 38.78 KG/M2 | HEIGHT: 62 IN

## 2018-07-11 DIAGNOSIS — R17 ELEVATED BILIRUBIN: ICD-10-CM

## 2018-07-11 DIAGNOSIS — Z79.4 TYPE 2 DIABETES MELLITUS WITH COMPLICATION, WITH LONG-TERM CURRENT USE OF INSULIN: ICD-10-CM

## 2018-07-11 DIAGNOSIS — K76.0 FATTY LIVER DISEASE, NONALCOHOLIC: ICD-10-CM

## 2018-07-11 DIAGNOSIS — E66.9 OBESITY (BMI 30-39.9): ICD-10-CM

## 2018-07-11 DIAGNOSIS — E89.0 POSTOPERATIVE HYPOTHYROIDISM: ICD-10-CM

## 2018-07-11 DIAGNOSIS — E11.8 TYPE 2 DIABETES MELLITUS WITH COMPLICATION, WITH LONG-TERM CURRENT USE OF INSULIN: ICD-10-CM

## 2018-07-11 DIAGNOSIS — E11.40 TYPE 2 DIABETES MELLITUS WITH DIABETIC NEUROPATHY, WITH LONG-TERM CURRENT USE OF INSULIN: ICD-10-CM

## 2018-07-11 DIAGNOSIS — E78.5 HYPERLIPIDEMIA, UNSPECIFIED HYPERLIPIDEMIA TYPE: Chronic | ICD-10-CM

## 2018-07-11 DIAGNOSIS — E11.8 TYPE 2 DIABETES MELLITUS WITH COMPLICATION, WITH LONG-TERM CURRENT USE OF INSULIN: Primary | ICD-10-CM

## 2018-07-11 DIAGNOSIS — Z79.4 TYPE 2 DIABETES MELLITUS WITH DIABETIC NEUROPATHY, WITH LONG-TERM CURRENT USE OF INSULIN: ICD-10-CM

## 2018-07-11 DIAGNOSIS — I10 ESSENTIAL HYPERTENSION: ICD-10-CM

## 2018-07-11 DIAGNOSIS — Z87.19 HISTORY OF PANCREATITIS: ICD-10-CM

## 2018-07-11 DIAGNOSIS — E03.9 HYPOTHYROIDISM, UNSPECIFIED TYPE: ICD-10-CM

## 2018-07-11 DIAGNOSIS — Z79.4 TYPE 2 DIABETES MELLITUS WITH COMPLICATION, WITH LONG-TERM CURRENT USE OF INSULIN: Primary | ICD-10-CM

## 2018-07-11 DIAGNOSIS — I50.9 CHF (NYHA CLASS III, ACC/AHA STAGE C): ICD-10-CM

## 2018-07-11 DIAGNOSIS — I10 ESSENTIAL HYPERTENSION: Primary | ICD-10-CM

## 2018-07-11 DIAGNOSIS — I25.10 CORONARY ARTERY DISEASE INVOLVING NATIVE CORONARY ARTERY WITHOUT ANGINA PECTORIS, UNSPECIFIED WHETHER NATIVE OR TRANSPLANTED HEART: ICD-10-CM

## 2018-07-11 PROCEDURE — 99999 PR PBB SHADOW E&M-EST. PATIENT-LVL III: CPT | Mod: PBBFAC,,, | Performed by: FAMILY MEDICINE

## 2018-07-11 PROCEDURE — 99215 OFFICE O/P EST HI 40 MIN: CPT | Mod: PBBFAC,PN | Performed by: NURSE PRACTITIONER

## 2018-07-11 PROCEDURE — 99213 OFFICE O/P EST LOW 20 MIN: CPT | Mod: PBBFAC,27,PN | Performed by: FAMILY MEDICINE

## 2018-07-11 PROCEDURE — 99214 OFFICE O/P EST MOD 30 MIN: CPT | Mod: S$PBB,,, | Performed by: FAMILY MEDICINE

## 2018-07-11 PROCEDURE — 99215 OFFICE O/P EST HI 40 MIN: CPT | Mod: S$PBB,,, | Performed by: NURSE PRACTITIONER

## 2018-07-11 PROCEDURE — 99999 PR PBB SHADOW E&M-EST. PATIENT-LVL V: CPT | Mod: PBBFAC,,, | Performed by: NURSE PRACTITIONER

## 2018-07-11 RX ORDER — INSULIN LISPRO 100 [IU]/ML
14 INJECTION, SOLUTION INTRAVENOUS; SUBCUTANEOUS
Qty: 15 SYRINGE | Refills: 6
Start: 2018-07-11 | End: 2018-12-04

## 2018-07-11 RX ORDER — INSULIN DEGLUDEC 100 U/ML
40 INJECTION, SOLUTION SUBCUTANEOUS NIGHTLY
Qty: 15 ML | Refills: 6
Start: 2018-07-11 | End: 2018-07-11 | Stop reason: SDUPTHER

## 2018-07-11 RX ORDER — INSULIN DEGLUDEC 100 U/ML
40 INJECTION, SOLUTION SUBCUTANEOUS DAILY
Qty: 15 ML | Refills: 6
Start: 2018-07-11 | End: 2018-09-10

## 2018-07-11 RX ORDER — METFORMIN HYDROCHLORIDE 500 MG/1
1000 TABLET, EXTENDED RELEASE ORAL 2 TIMES DAILY WITH MEALS
Qty: 360 TABLET | Refills: 3 | Status: SHIPPED | OUTPATIENT
Start: 2018-07-11 | End: 2018-12-04

## 2018-07-11 NOTE — PROGRESS NOTES
"CC: Ms. Mckenzie Mari arrives today for management of Type 2 DM and review of chronic medical conditions.     HPI: Ms. Mckenzie Mari was diagnosed with Type 2 DM in 2004. She was diagnosed based on lab work. Initial treatment consisted of metformin and glimepiride. Insulin added in 8/2016 - began Toujeo. She states that she felt that this caused nausea, abd pain, chest pain. Lantus caused throat swelling, left arm pain. She was then changed to Novolog 70/30 but this was only used for a short period because her insurance didn't cover.  Then changed to Humalog 50-50 in 12/2016. In 2017, she began MDI with Tresiba and Humalog. + FH of DM in maternal aunt and cousin. Denies hospitalizations due to DM. Previously seen in endocrine by Richard Gupta, and MAGALI Eng NP. She has a history of thyroid cancer/post-surgical hypothyroidism.   Of note, she has a h/o pancreatitis.     Last seen by me in April. Her Tresiba dose was increased.     Insulin management is challenging because patient believes insulin has worsened her diabetes and that her "body doesn't work like other people's."  She doesn't want her numbers less than 150 mg/dL and doesn't like increasing her insulin doses because of this reason.     BG readings are checked 2-3x/day. Brings log.                   Hypoglycemia: No but feels symptomatic with BG < 150.  She states that she gets headaches and feels ill. She claims that she does not want glucoses <150.    Missing Insulin/PO medication doses: No  Timing prandial insulin 5-15 minutes before meals: yes    Dietary Habits: Eats 3 meals/day. Snacking on chips but counts out her serving. Avoids sugary drinks.    She follows annually with cardiology for CHF, CAD.  Recently saw Dr. Hansen and states that everything is stable.     She had workup for elevated bilirubin with recent abdominal US in April, which revealed fatty infiltration. Liver enzymes remain stable.      CURRENT DIABETIC MEDS: metformin XR " "500 mg BID, Tresiba 36 units QHS, Humalog 12 units AC + correction scale, target 150, ISF 50.  Vial or pen: pen  Glucometer type: One Touch Verio    Previous DM treatments:   Invokana - nausea  Glimepiride - stopped when prandial insulin added  Touejo -  Nausea, abd pain, chest pain  Lantus - throat swelling, left arm pain  Novolog 70/30 - not covered by insurance    Last Eye Exam: 8/2017 - sees outside provider. Denies DR  Last Podiatry Exam: no    REVIEW OF SYSTEMS  Constitutional: no c/o weakness or weight loss. + fatigue  Eyes: no c/o visual disturbances.   Cardiac: no palpitations, chest pain.  Respiratory: denies cough. C/o occasional dyspnea  GI: no c/o abdominal pain, nausea. + H/o pancreatitis.   Skin: no lesions or rashes. Reports ~ 3 episodes of hives over the past month.   Neuro: denies numbness, tingling, paresthesias. Reports occasional headaches.   Endocrine: denies polyphagia, polydipsia, polyuria      Personally reviewed Past Medical, Surgical, Social History.    Vital Signs  /70   Pulse 84   Ht 5' 2" (1.575 m)   Wt 95.2 kg (209 lb 15.8 oz)   BMI 38.41 kg/m²     Personally reviewed the below labs:    Hemoglobin A1C   Date Value Ref Range Status   07/06/2018 8.3 (H) 4.0 - 5.6 % Final     Comment:     ADA Screening Guidelines:  5.7-6.4%  Consistent with prediabetes  >or=6.5%  Consistent with diabetes  High levels of fetal hemoglobin interfere with the HbA1C  assay. Heterozygous hemoglobin variants (HbS, HgC, etc)do  not significantly interfere with this assay.   However, presence of multiple variants may affect accuracy.     03/29/2018 8.1 (H) 4.0 - 5.6 % Final     Comment:     According to ADA guidelines, hemoglobin A1c <7.0% represents  optimal control in non-pregnant diabetic patients. Different  metrics may apply to specific patient populations.   Standards of Medical Care in Diabetes-2016.  For the purpose of screening for the presence of diabetes:  <5.7%     Consistent with the absence " of diabetes  5.7-6.4%  Consistent with increasing risk for diabetes   (prediabetes)  >or=6.5%  Consistent with diabetes  Currently, no consensus exists for use of hemoglobin A1c  for diagnosis of diabetes for children.  This Hemoglobin A1c assay has significant interference with fetal   hemoglobin   (HbF). The results are invalid for patients with abnormal amounts of   HbF,   including those with known Hereditary Persistence   of Fetal Hemoglobin. Heterozygous hemoglobin variants (HbAS, HbAC,   HbAD, HbAE, HbA2) do not significantly interfere with this assay;   however, presence of multiple variants in a sample may impact the %   interference.     08/25/2017 8.1 (H) 4.0 - 5.6 % Final     Comment:     According to ADA guidelines, hemoglobin A1c <7.0% represents  optimal control in non-pregnant diabetic patients. Different  metrics may apply to specific patient populations.   Standards of Medical Care in Diabetes-2016.  For the purpose of screening for the presence of diabetes:  <5.7%     Consistent with the absence of diabetes  5.7-6.4%  Consistent with increasing risk for diabetes   (prediabetes)  >or=6.5%  Consistent with diabetes  Currently, no consensus exists for use of hemoglobin A1c  for diagnosis of diabetes for children.  This Hemoglobin A1c assay has significant interference with fetal   hemoglobin   (HbF). The results are invalid for patients with abnormal amounts of   HbF,   including those with known Hereditary Persistence   of Fetal Hemoglobin. Heterozygous hemoglobin variants (HbAS, HbAC,   HbAD, HbAE, HbA2) do not significantly interfere with this assay;   however, presence of multiple variants in a sample may impact the %   interference.         Chemistry        Component Value Date/Time     07/06/2018 0823    K 4.4 07/06/2018 0823     07/06/2018 0823    CO2 26 07/06/2018 0823    BUN 19 07/06/2018 0823    CREATININE 0.9 07/06/2018 0823     (H) 07/06/2018 0823        Component Value  Date/Time    CALCIUM 9.5 07/06/2018 0823    ALKPHOS 77 07/06/2018 0823    ALKPHOS 77 07/06/2018 0823    AST 19 07/06/2018 0823    AST 19 07/06/2018 0823    ALT 23 07/06/2018 0823    ALT 23 07/06/2018 0823    BILITOT 1.6 (H) 07/06/2018 0823    BILITOT 1.6 (H) 07/06/2018 0823          Lab Results   Component Value Date    CHOL 131 03/29/2018    CHOL 148 04/20/2017    CHOL 154 05/30/2016     Lab Results   Component Value Date    HDL 47 03/29/2018    HDL 47 04/20/2017    HDL 51 05/30/2016     Lab Results   Component Value Date    LDLCALC 54.4 (L) 03/29/2018    LDLCALC 62.4 (L) 04/20/2017    LDLCALC 74.4 05/30/2016     Lab Results   Component Value Date    TRIG 148 03/29/2018    TRIG 193 (H) 04/20/2017    TRIG 143 05/30/2016     Lab Results   Component Value Date    CHOLHDL 35.9 03/29/2018    CHOLHDL 31.8 04/20/2017    CHOLHDL 33.1 05/30/2016       Lab Results   Component Value Date    MICALBCREAT 4.7 07/06/2018     Lab Results   Component Value Date    TSH 0.944 11/20/2017       CrCl cannot be calculated (Unknown ideal weight.).    Vit D, 25-Hydroxy   Date Value Ref Range Status   11/08/2014 51 30 - 96 ng/mL Final     Comment:     Vitamin D deficiency.........<10 ng/mL                              Vitamin D insufficiency......10-29 ng/mL       Vitamin D sufficiency........> or equal to 30 ng/mL  Vitamin D toxicity............>100 ng/mL         PHYSICAL EXAMINATION  Constitutional: Appears well, no distress  Neck: Supple, trachea midline; transverse scar to anterior neck from thyroidectomy.   Respiratory: CTA, even and unlabored.  Cardiovascular: RRR, no murmurs, no carotid bruits. DP pulses 1+ bilaterally; no edema.  Lymph: no cervical or supraclavicular lymphadenopathy.  Skin: warm and dry; no lipohypertrophy, or acanthosis nigracans observed.  Neuro: DTR diminished to BUE/BLE. No loss of protective sensation via 10 gm monofilament. Vibratory exam mildly decreased, bilaterally  Feet: appropriate footwear. No open  wounds or calluses.      A1c goal < 7.5%, due to CAD, CHF, desire for glucose to remain >150.         Assessment/Plan  1. Type 2 diabetes mellitus with diabetic neuropathy, with long-term current use of insulin  -- Uncontrolled. A1c is elevated. Appears to need further insulin dose adjustment. She would rather glucoses remain >150 mg/dL. I explained that she would likely become acclimated to glucoses below this level but she declined aiming for this goal.   -- Offered changing Tresiba to Levemir to try to rule out Tresiba as the cause of recent allergic symptoms but she declined. I would expect her symptoms to be more persistent if this was caused by her insulins. She has seen allergist in the past.   -- Increase Tresiba to 40 units and change timing to QAM, per her request. Advised pt to hold PM dose the night before.   -- Increase Humalog to 14 units with each meal + correction scale, target 150, ISF 50.   -- increase metformin XR to 1000 mg BID. Will continue to monitor LFTs.  -- check BG 4x/day.   -- cannot use Soliqua, due to Lantus allergy. Would not use Actos, due to CHF. GLP-1RA and DPP4-I contraindicated due to h/o pancreatitis.     -- Discussed diagnosis of DM, A1c goals, progression of disease, long term complications and tx options.  Advised patient to check BG before activities, such as driving or exercise.  -- Reviewed hypoglycemia management: treat with 1/2 glass of juice, 1/2 can regular coke, or 4 glucose tablets. Monitor and repeat treatment every 15 minutes until BG is >70 Then have a snack, which includes a complex carbohydrate and protein.     2. Coronary artery disease involving native coronary artery without angina pectoris, unspecified whether native or transplanted heart  -- avoid hyopglycemia   3. CHF (NYHA class III, ACC/AHA stage C)  -- stable, follows with cardiology   4. Postoperative hypothyroidism  -- stable  -- Continue Synthroid at current dose.  -- TSH with RTC  Lab Results    Component Value Date    TSH 0.944 2017      5. Essential hypertension  -- controlled  -- on ARB   6. Hyperlipidemia, unspecified hyperlipidemia type  -- controlled  -- continue statin  -- managed by cardiology  Lab Results   Component Value Date    LDLCALC 54.4 (L) 2018      7. Obesity (BMI 30-39.9)  -- increases insulin resistance  Body mass index is 38.41 kg/m².   8. History of pancreatitis  -- avoid GLP-1RA and DPP4-i        Total Time and Counselin minutes, >50% time spent counseling as noted above in #1 A/P.         FOLLOW UP  Follow-up in about 5 months (around 2018). Per pt's request, she would like to wait until I am back from leave.   Patient instructed to bring BG logs to each follow up.   Patient encouraged to call for any BG/medication issues, concerns, or questions.      Orders Placed This Encounter   Procedures    Hemoglobin A1c    Comprehensive metabolic panel    TSH     DIABETES FOOT EXAM

## 2018-07-11 NOTE — PROGRESS NOTES
The the THIS DOCUMENT WAS MADE IN PART WITH VOICE RECOGNITION SOFTWARE.  OCCASIONALLY THIS SOFTWARE WILL MISINTERPRET WORDS OR PHRASES.      Mckenzie Mari  1950    Subjective     Chief Complaint   Patient presents with    Follow-up       HPI      Essential hypertension   Chronic condition that is stable and well-controlled.  Hyperlipidemia, unspecified hyperlipidemia type   Chronic condition improved on most recent tests including triglycerides which are typically elevated    Type 2 diabetes mellitus with complication, with long-term current use of insulin   she is following with endocrinology and they are managing this. But we did discuss current treatment and recent changes.    Elevated bilirubin   She has an elevated total bilirubin and a mildly elevated direct bilirubin. Recent workup did not reveal obvious cause other than possibly fatty liver. She is also on a few medications that could contribute to this. But there has been mild improvement on most recent labs. She states she has been trying to eat a healthier diet which may have had the positive impact    Fatty liver disease, nonalcoholic   as above    Hypothyroidism, unspecified type   this has been stable      One episode of PND,  She denies any significant edema or orthopnea. I did mention the possibility of sleep apnea but refuse polysomnogram, would not even discuss the details. Completely closed minded to this possibility.    Active Ambulatory Problems     Diagnosis Date Noted    CHF (congestive heart failure)     CHF (NYHA class III, ACC/AHA stage C)     Hyperlipidemia     Chest pain 01/18/2012    HTN (hypertension)     CVID (common variable immunodeficiency)     Chronic sinusitis     ALLERGIC RHINITIS 01/23/2012    Penicillin allergy 01/30/2012    History of thyroid cancer 07/25/2012    Postoperative hypothyroidism 07/25/2012    LBBB (left bundle branch block) 10/11/2012    ICD (implantable cardioverter-defibrillator) in place  10/23/2012    Sinusitis 10/01/2013    Dyspnea on effort 03/25/2014    Chest pain on exertion 03/25/2014    CAD (coronary artery disease) 04/17/2014    Type 2 diabetes mellitus with diabetic neuropathy, with long-term current use of insulin 05/11/2014    Myoclonus     History of colon polyps 05/11/2016    Obesity (BMI 30-39.9) 08/31/2016    History of pancreatitis 08/31/2016    Nasal obstruction 03/15/2017    Deviated nasal septum 03/15/2017    Hypertrophy of nasal turbinates 03/15/2017    Malignant neoplasm of central portion of left female breast 10/06/2017     Resolved Ambulatory Problems     Diagnosis Date Noted    Subacute maxillary sinusitis 03/15/2017     Past Medical History:   Diagnosis Date    Allergy     Arthritis     Breast cancer 2005    Bundle branch block     Cardiomyopathy     CHF (congestive heart failure)     Chronic sinusitis     CVID (common variable immunodeficiency)     Diabetes mellitus     GERD (gastroesophageal reflux disease)     Headache(784.0)     HTN (hypertension)     Hyperlipidemia     Hypothyroid 7/25/2012    Otitis media     Presence of combination internal cardiac defibrillator (ICD) and pacemaker     Thyroid cancer     Thyroid cancer 7/25/2012    Thyroid disease          Review of Systems   Constitutional: Positive for activity change. Negative for chills, fever and unexpected weight change.   HENT: Positive for congestion and sinus pressure.         Has recently had some congestion and sinus pressure, was concerned about developing sinusitis but symptoms seem a little better in the last few days   Eyes: Negative for visual disturbance.   Respiratory: Negative for shortness of breath and wheezing.    Cardiovascular: Negative for chest pain and palpitations.   Gastrointestinal: Negative for abdominal pain, nausea and vomiting.   Genitourinary: Negative.    Skin: Positive for rash. Negative for wound.   Neurological: Negative.    Hematological:  "Negative.    Psychiatric/Behavioral: Negative.        Objective     Physical Exam   Constitutional: She is oriented to person, place, and time. She appears well-developed and well-nourished.   HENT:   Head: Normocephalic and atraumatic.   Right Ear: Tympanic membrane, external ear and ear canal normal.   Left Ear: Tympanic membrane, external ear and ear canal normal.   Nose: Mucosal edema present. No epistaxis. Right sinus exhibits frontal sinus tenderness. Right sinus exhibits no maxillary sinus tenderness. Left sinus exhibits frontal sinus tenderness. Left sinus exhibits no maxillary sinus tenderness.   Mouth/Throat: Uvula is midline and oropharynx is clear and moist. No oropharyngeal exudate, posterior oropharyngeal edema, posterior oropharyngeal erythema or tonsillar abscesses.   Throat no erythema and no exudate.   Eyes: No scleral icterus.   Cardiovascular: Normal rate, regular rhythm and normal heart sounds.    No murmur heard.  Pulmonary/Chest: Effort normal and breath sounds normal. No stridor. No respiratory distress. She has no wheezes. She has no rales.   Neurological: She is alert and oriented to person, place, and time.   Skin: Skin is dry. No rash noted. She is not diaphoretic.   Psychiatric: She has a normal mood and affect. Her behavior is normal.   Vitals reviewed.    Vitals:    07/11/18 1537   BP: 126/60   BP Location: Right arm   Patient Position: Sitting   BP Method: Large (Manual)   Pulse: 76   Temp: 97.9 °F (36.6 °C)   TempSrc: Oral   SpO2: 96%   Weight: 95.6 kg (210 lb 12.2 oz)   Height: 5' 2" (1.575 m)     Results for orders placed or performed in visit on 07/06/18   Hemoglobin A1c   Result Value Ref Range    Hemoglobin A1C 8.3 (H) 4.0 - 5.6 %    Estimated Avg Glucose 192 (H) 68 - 131 mg/dL   Hepatic function panel   Result Value Ref Range    Total Protein 6.5 6.0 - 8.4 g/dL    Albumin 3.7 3.5 - 5.2 g/dL    Total Bilirubin 1.6 (H) 0.1 - 1.0 mg/dL    Bilirubin, Direct 0.6 (H) 0.1 - 0.3 mg/dL "    AST 19 10 - 40 U/L    ALT 23 10 - 44 U/L    Alkaline Phosphatase 77 55 - 135 U/L   Comprehensive metabolic panel   Result Value Ref Range    Sodium 140 136 - 145 mmol/L    Potassium 4.4 3.5 - 5.1 mmol/L    Chloride 103 95 - 110 mmol/L    CO2 26 23 - 29 mmol/L    Glucose 185 (H) 70 - 110 mg/dL    BUN, Bld 19 8 - 23 mg/dL    Creatinine 0.9 0.5 - 1.4 mg/dL    Calcium 9.5 8.7 - 10.5 mg/dL    Total Protein 6.5 6.0 - 8.4 g/dL    Albumin 3.7 3.5 - 5.2 g/dL    Total Bilirubin 1.6 (H) 0.1 - 1.0 mg/dL    Alkaline Phosphatase 77 55 - 135 U/L    AST 19 10 - 40 U/L    ALT 23 10 - 44 U/L    Anion Gap 11 8 - 16 mmol/L    eGFR if African American >60.0 >60 mL/min/1.73 m^2    eGFR if non African American >60.0 >60 mL/min/1.73 m^2   Magnesium   Result Value Ref Range    Magnesium 1.7 1.6 - 2.6 mg/dL   Vitamin B12   Result Value Ref Range    Vitamin B-12 504 210 - 950 pg/mL         Mckenzie was seen today for follow-up.    Diagnoses and all orders for this visit:    Essential hypertension   stable  Hyperlipidemia, unspecified hyperlipidemia type  -     Lipid panel; Future   improved  Type 2 diabetes mellitus with complication, with long-term current use of insulin   reviewed numbers and medications. I gave her positive encouragement and she will continue to follow with endocrinology  Elevated bilirubin   mild improvement. Possibly multifactorial including fatty liver changes, possibly a few of her medications could be contribute in. At this time though I think benefit of current meds outweighs the risk. Discuss possible referral to hepatology and liver biopsy but she declined and I think this is reasonable as long as numbers are stable and or mildly improving. Repeat again in about 4 to 5 months  Fatty liver disease, nonalcoholic   as above  Hypothyroidism, unspecified type  -     TSH; Future

## 2018-08-13 RX ORDER — POTASSIUM CHLORIDE 20 MEQ/1
TABLET, EXTENDED RELEASE ORAL
Qty: 90 TABLET | Refills: 1 | Status: SHIPPED | OUTPATIENT
Start: 2018-08-13 | End: 2019-02-02 | Stop reason: SDUPTHER

## 2018-08-13 NOTE — PROGRESS NOTES
Refill Authorization Note     is requesting a refill authorization.    Brief assessment and rationale for refill: APPROVE: prr  Amount/Quantity of medication ordered: 90d        Refills Authorized: Yes  If authorized number of refills: 1           Medication Therapy Plan: Labs WNL; approve 6 more  Name and strength of medication: POTASSIUM CL ER 20 MEQ TABLET  How patient will take medication: t1t qd  Medication reconciliation completed: No  Comments:   Lab Results   Component Value Date    CREATININE 0.9 07/06/2018    BUN 19 07/06/2018     07/06/2018    K 4.4 07/06/2018     07/06/2018    CO2 26 07/06/2018

## 2018-08-20 ENCOUNTER — PATIENT MESSAGE (OUTPATIENT)
Dept: FAMILY MEDICINE | Facility: CLINIC | Age: 68
End: 2018-08-20

## 2018-08-21 ENCOUNTER — PATIENT MESSAGE (OUTPATIENT)
Dept: FAMILY MEDICINE | Facility: CLINIC | Age: 68
End: 2018-08-21

## 2018-08-21 ENCOUNTER — HOSPITAL ENCOUNTER (OUTPATIENT)
Dept: RADIOLOGY | Facility: HOSPITAL | Age: 68
Discharge: HOME | End: 2018-08-21
Attending: INTERNAL MEDICINE
Payer: MEDICARE

## 2018-08-21 ENCOUNTER — TELEPHONE (OUTPATIENT)
Dept: FAMILY MEDICINE | Facility: CLINIC | Age: 68
End: 2018-08-21

## 2018-08-21 ENCOUNTER — OFFICE VISIT (OUTPATIENT)
Dept: FAMILY MEDICINE | Facility: CLINIC | Age: 68
End: 2018-08-21
Payer: MEDICARE

## 2018-08-21 VITALS
DIASTOLIC BLOOD PRESSURE: 76 MMHG | HEIGHT: 62 IN | WEIGHT: 212 LBS | SYSTOLIC BLOOD PRESSURE: 130 MMHG | BODY MASS INDEX: 39.01 KG/M2 | TEMPERATURE: 98 F | HEART RATE: 72 BPM

## 2018-08-21 DIAGNOSIS — R19.7 DIARRHEA, UNSPECIFIED TYPE: ICD-10-CM

## 2018-08-21 DIAGNOSIS — R10.84 DIFFUSE ABDOMINAL PAIN: ICD-10-CM

## 2018-08-21 DIAGNOSIS — R10.84 DIFFUSE ABDOMINAL PAIN: Primary | ICD-10-CM

## 2018-08-21 DIAGNOSIS — E11.40 TYPE 2 DIABETES MELLITUS WITH DIABETIC NEUROPATHY, WITH LONG-TERM CURRENT USE OF INSULIN: ICD-10-CM

## 2018-08-21 DIAGNOSIS — I10 ESSENTIAL HYPERTENSION: ICD-10-CM

## 2018-08-21 DIAGNOSIS — E89.0 POSTOPERATIVE HYPOTHYROIDISM: ICD-10-CM

## 2018-08-21 DIAGNOSIS — R14.0 ABDOMINAL DISTENSION: ICD-10-CM

## 2018-08-21 DIAGNOSIS — Z87.19 HISTORY OF HEMORRHOIDS: ICD-10-CM

## 2018-08-21 DIAGNOSIS — K52.9 COLITIS: Primary | ICD-10-CM

## 2018-08-21 DIAGNOSIS — I50.9 CONGESTIVE HEART FAILURE, UNSPECIFIED HF CHRONICITY, UNSPECIFIED HEART FAILURE TYPE: Chronic | ICD-10-CM

## 2018-08-21 DIAGNOSIS — Z79.4 TYPE 2 DIABETES MELLITUS WITH DIABETIC NEUROPATHY, WITH LONG-TERM CURRENT USE OF INSULIN: ICD-10-CM

## 2018-08-21 PROCEDURE — 74177 CT ABD & PELVIS W/CONTRAST: CPT | Mod: 26,,, | Performed by: RADIOLOGY

## 2018-08-21 PROCEDURE — 99999 PR PBB SHADOW E&M-EST. PATIENT-LVL IV: CPT | Mod: PBBFAC,,, | Performed by: INTERNAL MEDICINE

## 2018-08-21 PROCEDURE — 74019 RADEX ABDOMEN 2 VIEWS: CPT | Mod: TC,PN

## 2018-08-21 PROCEDURE — 74019 RADEX ABDOMEN 2 VIEWS: CPT | Mod: 26,,, | Performed by: RADIOLOGY

## 2018-08-21 PROCEDURE — 99214 OFFICE O/P EST MOD 30 MIN: CPT | Mod: PBBFAC,25,PN | Performed by: INTERNAL MEDICINE

## 2018-08-21 PROCEDURE — 25500020 PHARM REV CODE 255: Mod: PO | Performed by: INTERNAL MEDICINE

## 2018-08-21 PROCEDURE — 74177 CT ABD & PELVIS W/CONTRAST: CPT | Mod: TC,PO

## 2018-08-21 PROCEDURE — 99214 OFFICE O/P EST MOD 30 MIN: CPT | Mod: S$PBB,,, | Performed by: INTERNAL MEDICINE

## 2018-08-21 RX ORDER — HYDROCORTISONE ACETATE 25 MG/1
25 SUPPOSITORY RECTAL 2 TIMES DAILY
Qty: 20 SUPPOSITORY | Refills: 0 | Status: SHIPPED | OUTPATIENT
Start: 2018-08-21 | End: 2018-08-31

## 2018-08-21 RX ORDER — CLARITHROMYCIN 500 MG/1
500 TABLET, FILM COATED ORAL EVERY 12 HOURS
Qty: 20 TABLET | Refills: 0 | Status: SHIPPED | OUTPATIENT
Start: 2018-08-21 | End: 2018-10-18

## 2018-08-21 RX ORDER — METRONIDAZOLE 500 MG/1
500 TABLET ORAL 3 TIMES DAILY
Qty: 30 TABLET | Refills: 0 | Status: SHIPPED | OUTPATIENT
Start: 2018-08-21 | End: 2018-12-04

## 2018-08-21 RX ADMIN — IOHEXOL 100 ML: 350 INJECTION, SOLUTION INTRAVENOUS at 02:08

## 2018-08-21 RX ADMIN — IOHEXOL 30 ML: 350 INJECTION, SOLUTION INTRAVENOUS at 02:08

## 2018-08-21 NOTE — PATIENT INSTRUCTIONS
Advised to go to ER; pt declines; aware of potential life threatening scenario; still declines; to get Xray abdomen, CT abd/pelvis and labs as stat; to reconsider going to ER if unimproved or worsens.

## 2018-08-21 NOTE — TELEPHONE ENCOUNTER
----- Message from Liv Hodge sent at 8/20/2018  4:06 PM CDT -----  Type:  Sooner Apoointment Request    Caller is requesting a sooner appointment.  Caller declined first available appointment listed below.  Caller will not accept being placed on the waitlist and is requesting a message be sent to doctor.    Name of Caller:  Patient  When is the first available appointment?  9/18/18  Symptoms:  Soreness in abdolmen  Best Call Back Number:  [phone]  Additional Information:  Only wants to see Dr. Gold

## 2018-08-21 NOTE — PROGRESS NOTES
"Subjective:       Patient ID: Mckenzie Mari is a 68 y.o. female.    Chief Complaint: Abdominal Pain and Bloated    HPI   abdominal bloating and discussed with patient at length  Chronic issue of which she has had been followed by GI physician.  She has a history of GERD on Nexium also has been having diarrhea recently does not feel she is impacted.  She does not want medication for her anxiety she has no flatulence but belches occasionally and no heartburn no nausea vomiting blood in stools or black stools no melena or bright red blood per rectum noted last solid BM was yesterday morning she has been averaging 1-2 per day last BM was yesterday evening as diarrhea though.      Objective:      Vitals:    08/21/18 0848   BP: 130/76   Pulse: 72   Temp: 98.4 °F (36.9 °C)   Weight: 96.2 kg (211 lb 15.6 oz)   Height: 5' 2" (1.575 m)     Body mass index is 38.77 kg/m².    Physical Exam   Constitutional: She is oriented to person, place, and time. She appears well-developed and well-nourished.   HENT:   Head: Normocephalic and atraumatic.   Eyes: EOM are normal.   Neck: Normal range of motion. Neck supple. No thyromegaly present.   Cardiovascular: Normal rate, regular rhythm and normal heart sounds. Exam reveals no gallop.   No murmur heard.  Pulmonary/Chest: Effort normal and breath sounds normal. No respiratory distress. She has no wheezes. She has no rales.   Abdominal: Soft. Bowel sounds are normal. She exhibits no distension. There is no tenderness. There is no rebound and no guarding.   Rectal exam declined; diffuse tenderness to palp worse lower abd and suprapubic. Dullness to percussion in lower quads w tympany to percussion other areas. Also w opal flank tenderness to palp. And no post CVA tenderness to palp. R mid abd tenderness more as well.    Musculoskeletal: Normal range of motion. She exhibits edema.   Obese LE's but w min edema; no calf tenderness to palp.   Lymphadenopathy:     She has no cervical " adenopathy.   Neurological: She is alert and oriented to person, place, and time.   Moves all 4 extremities fine.   Skin: No rash noted.   Psychiatric: She has a normal mood and affect. Her behavior is normal. Thought content normal.   Vitals reviewed.      Assessment:       1. Diffuse abdominal pain    2. Abdominal distension    3. Diarrhea, unspecified type    4. Essential hypertension    5. Postoperative hypothyroidism    6. Congestive heart failure, unspecified HF chronicity, unspecified heart failure type    7. Type 2 diabetes mellitus with diabetic neuropathy, with long-term current use of insulin        Plan:       Diffuse abdominal pain  -     CBC auto differential; Future; Expected date: 08/21/2018  -     Comprehensive metabolic panel; Future; Expected date: 08/21/2018  -     Urinalysis; Future; Expected date: 08/21/2018  -     Urinalysis Microscopic; Future; Expected date: 08/21/2018  -     Urine culture; Future; Expected date: 08/21/2018  -     Amylase; Future; Expected date: 08/21/2018  -     Lipase; Future; Expected date: 08/21/2018  -     X-Ray Abdomen Flat And Erect; Future; Expected date: 08/21/2018  -     CT Abdomen Pelvis W Wo Contrast; Future; Expected date: 08/21/2018  -     Creatinine, serum; Future; Expected date: 08/21/2018  -     metroNIDAZOLE (FLAGYL) 500 MG tablet; Take 1 tablet (500 mg total) by mouth 3 (three) times daily.  Dispense: 30 tablet; Refill: 0    Abdominal distension  -     CBC auto differential; Future; Expected date: 08/21/2018  -     Comprehensive metabolic panel; Future; Expected date: 08/21/2018  -     Urinalysis; Future; Expected date: 08/21/2018  -     Urinalysis Microscopic; Future; Expected date: 08/21/2018  -     Urine culture; Future; Expected date: 08/21/2018  -     Amylase; Future; Expected date: 08/21/2018  -     Lipase; Future; Expected date: 08/21/2018  -     X-Ray Abdomen Flat And Erect; Future; Expected date: 08/21/2018  -     CT Abdomen Pelvis W Wo Contrast;  Future; Expected date: 08/21/2018  -     Creatinine, serum; Future; Expected date: 08/21/2018  -     metroNIDAZOLE (FLAGYL) 500 MG tablet; Take 1 tablet (500 mg total) by mouth 3 (three) times daily.  Dispense: 30 tablet; Refill: 0    Diarrhea, unspecified type  -     CBC auto differential; Future; Expected date: 08/21/2018  -     Comprehensive metabolic panel; Future; Expected date: 08/21/2018  -     Amylase; Future; Expected date: 08/21/2018  -     Lipase; Future; Expected date: 08/21/2018  -     X-Ray Abdomen Flat And Erect; Future; Expected date: 08/21/2018  -     CT Abdomen Pelvis W Wo Contrast; Future; Expected date: 08/21/2018  -     Creatinine, serum; Future; Expected date: 08/21/2018  -     metroNIDAZOLE (FLAGYL) 500 MG tablet; Take 1 tablet (500 mg total) by mouth 3 (three) times daily.  Dispense: 30 tablet; Refill: 0    Essential hypertension. Maintain < 2 Gm Na a day diet, and monitor BP at home; keep a log.    Postoperative hypothyroidism    Congestive heart failure, unspecified HF chronicity, unspecified heart failure type  -     CBC auto differential; Future; Expected date: 08/21/2018  -     Comprehensive metabolic panel; Future; Expected date: 08/21/2018    Type 2 diabetes mellitus with diabetic neuropathy, with long-term current use of insulin  -     Comprehensive metabolic panel; Future; Expected date: 08/21/2018    Other orders  -     Cancel: CT Abdomen Pelvis W Wo Contrast; Future; Expected date: 08/21/2018

## 2018-08-22 ENCOUNTER — TELEPHONE (OUTPATIENT)
Dept: FAMILY MEDICINE | Facility: CLINIC | Age: 68
End: 2018-08-22

## 2018-08-22 ENCOUNTER — LAB VISIT (OUTPATIENT)
Dept: LAB | Facility: HOSPITAL | Age: 68
End: 2018-08-22
Attending: INTERNAL MEDICINE
Payer: MEDICARE

## 2018-08-22 DIAGNOSIS — R14.0 ABDOMINAL DISTENSION: ICD-10-CM

## 2018-08-22 DIAGNOSIS — R10.84 DIFFUSE ABDOMINAL PAIN: ICD-10-CM

## 2018-08-22 LAB
BILIRUB UR QL STRIP: NEGATIVE
CLARITY UR REFRACT.AUTO: CLEAR
COLOR UR AUTO: YELLOW
GLUCOSE UR QL STRIP: NEGATIVE
HGB UR QL STRIP: NEGATIVE
KETONES UR QL STRIP: NEGATIVE
LEUKOCYTE ESTERASE UR QL STRIP: NEGATIVE
MICROSCOPIC COMMENT: NORMAL
NITRITE UR QL STRIP: NEGATIVE
PH UR STRIP: 5 [PH] (ref 5–8)
PROT UR QL STRIP: NEGATIVE
SP GR UR STRIP: 1.02 (ref 1–1.03)
URN SPEC COLLECT METH UR: NORMAL
UROBILINOGEN UR STRIP-ACNC: NEGATIVE EU/DL

## 2018-08-22 PROCEDURE — 81001 URINALYSIS AUTO W/SCOPE: CPT

## 2018-08-22 PROCEDURE — 87086 URINE CULTURE/COLONY COUNT: CPT

## 2018-08-22 NOTE — TELEPHONE ENCOUNTER
Case discussed by patient by phone.  Including results of the abdominal x-ray showing no acute bowel abnormalities and CT of the abdomen and pelvis showing some circular wall thickening in the distal rectum raising the question of possible colitis or mi inflammation.  Patient is started on Flagyl and is starting to feel better with some improvement noted. She can take Zithromax lysine with further review of her antibiotics.  She has an extensive antibiotic allergy listing.  Will add Biaxin 500 mg every 12 hr for 10 days.  Also consult GI medicine Dr Serrano.  Pt had total colonoscopy 05/11/2016 with external hemorrhoids noted and small internal hemorrhoids; also place the patient on Anusol HC suppository twice a day for 10 days

## 2018-08-22 NOTE — TELEPHONE ENCOUNTER
----- Message from Barbara Honeycutt sent at 8/22/2018  8:38 AM CDT -----  Contact: Shanice graham/ [facility]  Type:  Pharmacy Calling to Clarify an RX    Name of Caller:  Shanice  [facility]  Prescription Name:  hydrocortisone (ANUSOL-HC) 25 mg suppository  What do they need to clarify?:  na  Best Call Back Number:    [id number] [place] - [address]  [address]  [place] [address]  Phone: [phone] Fax: [phone]  Additional Information: Calling to speak with the Nurse about the Hydrocortisone being too pricey and it isn't covered. She is requesting something that is covered and cheaper. Please advise.

## 2018-08-23 LAB — BACTERIA UR CULT: NORMAL

## 2018-08-23 NOTE — TELEPHONE ENCOUNTER
Please inquire on Anusol HC suppositories if she requested generic or if that is the Mu-ism price...

## 2018-08-26 RX ORDER — SPIRONOLACTONE 25 MG/1
TABLET ORAL
Qty: 90 TABLET | Refills: 0 | Status: SHIPPED | OUTPATIENT
Start: 2018-08-26 | End: 2018-11-08 | Stop reason: SDUPTHER

## 2018-08-27 NOTE — TELEPHONE ENCOUNTER
Please inquire from patient if that price was the generic Anusol HC suppository price or the brand name.  Please also ask what has worked for her in the past which is cheaper as well

## 2018-08-28 ENCOUNTER — PATIENT MESSAGE (OUTPATIENT)
Dept: FAMILY MEDICINE | Facility: CLINIC | Age: 68
End: 2018-08-28

## 2018-08-28 DIAGNOSIS — L03.039 ONYCHIA OF TOE, UNSPECIFIED LATERALITY: ICD-10-CM

## 2018-08-28 DIAGNOSIS — L60.9 NAIL PROBLEM: Primary | ICD-10-CM

## 2018-08-28 DIAGNOSIS — E11.8 TYPE 2 DIABETES MELLITUS WITH COMPLICATION, UNSPECIFIED WHETHER LONG TERM INSULIN USE: ICD-10-CM

## 2018-08-31 DIAGNOSIS — E11.9 TYPE 2 DIABETES MELLITUS WITHOUT COMPLICATION, UNSPECIFIED WHETHER LONG TERM INSULIN USE: ICD-10-CM

## 2018-09-05 ENCOUNTER — CLINICAL SUPPORT (OUTPATIENT)
Dept: CARDIOLOGY | Facility: CLINIC | Age: 68
End: 2018-09-05
Attending: INTERNAL MEDICINE
Payer: MEDICARE

## 2018-09-05 DIAGNOSIS — Z95.810 ICD (IMPLANTABLE CARDIOVERTER-DEFIBRILLATOR) IN PLACE: Primary | ICD-10-CM

## 2018-09-05 DIAGNOSIS — Z95.810 ICD (IMPLANTABLE CARDIOVERTER-DEFIBRILLATOR) IN PLACE: ICD-10-CM

## 2018-09-05 DIAGNOSIS — I50.9 CHF (NYHA CLASS III, ACC/AHA STAGE C): ICD-10-CM

## 2018-09-05 PROCEDURE — 93295 DEV INTERROG REMOTE 1/2/MLT: CPT | Mod: ,,, | Performed by: INTERNAL MEDICINE

## 2018-09-09 DIAGNOSIS — E11.8 TYPE 2 DIABETES MELLITUS WITH COMPLICATION, WITH LONG-TERM CURRENT USE OF INSULIN: ICD-10-CM

## 2018-09-09 DIAGNOSIS — Z79.4 TYPE 2 DIABETES MELLITUS WITH COMPLICATION, WITH LONG-TERM CURRENT USE OF INSULIN: ICD-10-CM

## 2018-09-10 RX ORDER — INSULIN DEGLUDEC 100 U/ML
INJECTION, SOLUTION SUBCUTANEOUS
Qty: 15 SYRINGE | Refills: 6 | Status: SHIPPED | OUTPATIENT
Start: 2018-09-10 | End: 2018-10-30 | Stop reason: SDUPTHER

## 2018-09-17 ENCOUNTER — OFFICE VISIT (OUTPATIENT)
Dept: PODIATRY | Facility: CLINIC | Age: 68
End: 2018-09-17
Payer: MEDICARE

## 2018-09-17 VITALS — WEIGHT: 212.06 LBS | HEIGHT: 62 IN | BODY MASS INDEX: 39.02 KG/M2

## 2018-09-17 DIAGNOSIS — E11.40 TYPE 2 DIABETES MELLITUS WITH DIABETIC NEUROPATHY, WITH LONG-TERM CURRENT USE OF INSULIN: ICD-10-CM

## 2018-09-17 DIAGNOSIS — L60.3 NAIL DYSTROPHY: Primary | ICD-10-CM

## 2018-09-17 DIAGNOSIS — Z79.4 TYPE 2 DIABETES MELLITUS WITH DIABETIC NEUROPATHY, WITH LONG-TERM CURRENT USE OF INSULIN: ICD-10-CM

## 2018-09-17 PROCEDURE — 99203 OFFICE O/P NEW LOW 30 MIN: CPT | Mod: S$PBB,,, | Performed by: PODIATRIST

## 2018-09-17 PROCEDURE — 99213 OFFICE O/P EST LOW 20 MIN: CPT | Mod: PBBFAC,PN | Performed by: PODIATRIST

## 2018-09-17 PROCEDURE — 87106 FUNGI IDENTIFICATION YEAST: CPT

## 2018-09-17 PROCEDURE — 99999 PR PBB SHADOW E&M-EST. PATIENT-LVL III: CPT | Mod: PBBFAC,,, | Performed by: PODIATRIST

## 2018-09-17 PROCEDURE — 87102 FUNGUS ISOLATION CULTURE: CPT

## 2018-09-17 NOTE — LETTER
September 17, 2018      Bacilio Gold MD  [address] [facility] [address] [place] [address]           [facility]  [address] [facility] [address] [place] [address]  Phone: [phone]          Patient: Mckenzie Mari   MR Number: [id number]   YOB: 1950   Date of Visit: 9/17/2018       Dear Dr. Bacilio Gold:    Thank you for referring Mckenzie Mari to me for evaluation. Attached you will find relevant portions of my assessment and plan of care.    If you have questions, please do not hesitate to call me. I look forward to following Mckenzie Mari along with you.    Sincerely,    August Garcia DPM    Enclosure  CC:  No Recipients    If you would like to receive this communication electronically, please contact [email or web address] or [phone] to request more information on [facility] access.    For providers and/or their staff who would like to refer a patient to [facility], please contact us through our one-stop-shop provider referral line, [facility], at [phone].    If you feel you have received this communication in error or would no longer like to receive these types of communications, please e-mail [email or web address]

## 2018-09-17 NOTE — PROGRESS NOTES
Subjective:      Patient ID: Mckenzie Mari is a 68 y.o. female.    Chief Complaint: Nail Problem (rt great toe)  Patient relates injuring the nail of the Rt. Great toe, several days ago, with subsequent lifting of the nail plate and discoloration.  Relates treating the injured portion of the nail by trimming it as close to the cuticle as possible.  States that once she experienced pain with self treatment, she decided it was time to be evaluated in clinic.  With today's exam, she denies experiencing pain in the nail.  Relates concern regarding the appearance of the affected nail plate and inquires as to whether the nail needs to be surgically removed.  Denies any additional pedal complaints.     Past Medical History:   Diagnosis Date    Allergy     Arthritis     Breast cancer 2005    s/p lumpectomy, chemo and now cancer free over 5 years    Bundle branch block     Cardiomyopathy     EF 35 - 40%    CHF (congestive heart failure)     FC II    Chronic sinusitis     CVID (common variable immunodeficiency)     Diabetes mellitus     GERD (gastroesophageal reflux disease)     Headache(784.0)     HTN (hypertension)     Hyperlipidemia     Hypothyroid 7/25/2012    Otitis media     Presence of combination internal cardiac defibrillator (ICD) and pacemaker     Thyroid cancer     Thyroid cancer 7/25/2012    Thyroid disease     hypothyroid after papillary thyroid cancer with thyroid resection       Past Surgical History:   Procedure Laterality Date    BREAST LUMPECTOMY      left    CARDIAC PACEMAKER PLACEMENT      CATARACT EXTRACTION W/  INTRAOCULAR LENS IMPLANT      left eye    CATHETERIZATION, HEART, LEFT Left 4/17/2014    [facility] CATH LAB    CAUTERIZATION OF TURBINATES N/A 3/15/2017    [facility] at [facility] OR    CHOLECYSTECTOMY      COLONOSCOPY N/A 5/11/2016    Procedure: COLONOSCOPY;  Surgeon: Alfonso Serrano Jr., MD;  Location: [facility]  ENDO;  Service: Endoscopy;  Laterality: N/A;    COLONOSCOPY N/A 5/11/2016    Performed by Alfonso Serrano Jr., MD at [facility] ENDO    COLONOSCOPY W/ POLYPECTOMY  10/05/2009    [facility]   One 2 mm polyp in the cecum.  TUBULAR ADENOMA.  Tortuous colon.    EGD (ESOPHAGOGASTRODUODENOSCOPY) N/A 2/16/2012    [facility] ENDO    ESOPHAGOGASTRODUODENOSCOPY  09/12/2006    [facility]   Dysphagia.  NERD.  Patulous LES.  Atrophic mucosa.  Benign fundal polyps.  Dilated, 42 Fr.    HYSTERECTOMY      JOINT REPLACEMENT Bilateral     pacemaker defibrillator      SEPTOPLASTY ENDOSCOPIC Bilateral 3/15/2017    [facility] at [facility] OR    SINUS SURGERY FUNCTIONAL ENDOSCOPIC WITH NAVIGATION Bilateral 3/15/2017    [facility] at [facility] OR    THYROID SURGERY  x2    TONSILLECTOMY         Family History   Problem Relation Age of Onset    Allergic rhinitis Mother     Cancer Mother         bladder    Heart disease Mother     Allergic rhinitis Father     Heart disease Father     Allergic rhinitis Brother     Heart disease Brother     Cancer Brother         lung    Cancer Maternal Aunt         breast     Cancer Paternal Aunt          breast    Heart disease Brother     Diabetes Paternal Aunt     Cancer Paternal Aunt         lung    Allergic rhinitis Sister     Angioedema Neg Hx     Asthma Neg Hx     Atopy Neg Hx     Eczema Neg Hx     Immunodeficiency Neg Hx     Urticaria Neg Hx        Social History     Socioeconomic History    Marital status:      Spouse name: None    Number of children: None    Years of education: None    Highest education level: None   Social Needs    Financial resource strain: None    Food insecurity - worry: None    Food insecurity - inability: None    Transportation needs - medical: None    Transportation needs - non-medical: None   Occupational History    None   Tobacco Use    Smoking status: Never Smoker     Smokeless tobacco: Never Used   Substance and Sexual Activity    Alcohol use: No    Drug use: No    Sexual activity: None   Other Topics Concern    None   Social History Narrative    None       Current Outpatient Medications   Medication Sig Dispense Refill    blood sugar diagnostic (ONETOUCH VERIO) Strp 1 each by Misc.(Non-Drug; Combo Route) route 4 (four) times daily. Dx code e11.8 150 strip 11    CALCIUM CARBONATE/VITAMIN D3 (VITAMIN D-3 ORAL) Take by mouth.      carvedilol (COREG) 25 MG tablet TAKE 1 TABLET (25 MG TOTAL) BY MOUTH 2 (TWO) TIMES DAILY WITH MEALS. 180 tablet 3    clarithromycin (BIAXIN) 500 MG tablet Take 1 tablet (500 mg total) by mouth every 12 (twelve) hours. For 10 days. Can take zithromycin. 20 tablet 0    esomeprazole (NEXIUM) 40 MG capsule TAKE 1 CAPSULE BY MOUTH EVERY DAY 90 capsule 1    estradiol (ESTRING) 2 mg vaginal ring Place 2 mg vaginally every 3 (three) months. follow package directions       furosemide (LASIX) 40 MG tablet TAKE 1 TABLET (40 MG TOTAL) BY MOUTH ONCE DAILY. (Patient taking differently: Take 40 mg by mouth as needed. ) 90 tablet 1    insulin lispro (HUMALOG KWIKPEN INSULIN) 100 unit/mL InPn pen Inject 14 Units into the skin 3 (three) times daily before meals. (Patient taking differently: Inject 14 Units into the skin 3 (three) times daily before meals. 14-16 units) 15 Syringe 6    lactobacillus rhamnosus GG (CULTURELLE) 10 billion cell capsule Take 1 capsule by mouth once daily.      lancets 33 gauge Misc 1 lancet by Misc.(Non-Drug; Combo Route) route 4 (four) times daily. Medically necessary, on insulin. ICD 10 code E11.8. 150 each 11    metFORMIN (GLUCOPHAGE-XR) 500 MG 24 hr tablet Take 2 tablets (1,000 mg total) by mouth 2 (two) times daily with meals. 360 tablet 3    metroNIDAZOLE (FLAGYL) 500 MG tablet Take 1 tablet (500 mg total) by mouth 3 (three) times daily. 30 tablet 0    multivit-mineral-iron-lutein (CENTRUM SILVER ULTRA WOMEN'S) Tab Take  "by mouth.      pen needle, diabetic (BD ULTRA-FINE SHANTELLE PEN NEEDLE) 32 gauge x 5/32" Ndle Uses up to 4 daily, on multiple daily insulin injections 400 each 3    potassium chloride SA (K-DUR,KLOR-CON) 20 MEQ tablet TAKE 1 TABLET EVERY DAY 90 tablet 1    rosuvastatin (CRESTOR) 10 MG tablet TAKE 1 TABLET BY MOUTH EVERY DAY 90 tablet 2    spironolactone (ALDACTONE) 25 MG tablet TAKE 1 TABLET BY MOUTH EVERY DAY 90 tablet 0    SYNTHROID 137 mcg Tab tablet Take 1 tablet (137 mcg total) by mouth before breakfast. 90 tablet 2    TRESIBA FLEXTOUCH U-100 100 unit/mL (3 mL) InPn INJECT 30 UNITS INTO THE SKIN EVERY EVENING. 15 Syringe 6    valsartan (DIOVAN) 80 MG tablet Take 1 tablet (80 mg total) by mouth once daily. 90 tablet 3     No current facility-administered medications for this visit.        Review of patient's allergies indicates:   Allergen Reactions    Cayenne pepper Swelling    Lantus [insulin glargine] Shortness Of Breath and Swelling     Toujeo also    Other Nausea Only     "Mycin" drugs    Adhesive tape-silicones Itching    Amoxil [amoxicillin]      Once diagnosed with penicillin allergy.  Underwent skin testing that was apparently negative in approximately 2011.  However developed a rash and swelling after taking amoxicillin in 2012.    Aspirin Swelling     Other reaction(s): Stomach upset    Avelox [moxifloxacin] Itching    Betadine [povidone-iodine] Itching    Cephalexin Other (See Comments)     Blurred vision    Doxycycline Itching    Erythromycin      Other reaction(s): Stomach upset  Other reaction(s): Flatulence    Hydrocodone      Other reaction(s): Rash  Other reaction(s): Hives    Lactose intolerance [lactase] Diarrhea    Latex      Other reaction(s): Rash    Levaquin [levofloxacin] Hives     Other reaction(s): Achilles tendinitis/bursitis    Morphine Itching     Other reaction(s): Nausea    Tramadol Other (See Comments)     Other reaction(s): twitching of muscles  Other " reaction(s): jumping of muscles  Pain in muscles    Penicillins Rash     Other reaction(s): Stomach upset  Other reaction(s): Rash  Other reaction(s): Itching  Other reaction(s): Hives  Other reaction(s): Edema    Sulfa (sulfonamide antibiotics) Rash     Other reaction(s): Unknown         Review of Systems   Constitution: Negative for chills and fever.   Cardiovascular: Negative for claudication.   Skin: Positive for color change and nail changes.   Musculoskeletal: Positive for arthritis and joint swelling. Negative for muscle cramps, muscle weakness and myalgias.   Neurological: Positive for numbness.   Psychiatric/Behavioral: Negative for altered mental status.           Objective:      Physical Exam   Constitutional: She is oriented to person, place, and time. She appears well-developed and well-nourished. No distress.   Cardiovascular:   Pulses:       Dorsalis pedis pulses are 2+ on the right side, and 2+ on the left side.        Posterior tibial pulses are 2+ on the right side, and 2+ on the left side.   CFT is < 3 seconds bilateral.  Pedal hair growth is decreased bilateral.  Mild varicosities noted bilateral.  Mild nonpitting edema noted to bilateral lower extremity.  Toes are warm to touch bilateral.     Musculoskeletal: She exhibits edema. She exhibits no tenderness.   Muscle strength 5/5 in all muscle groups bilateral.  No tenderness nor crepitation with ROM of foot/ankle joints bilateral.  No tenderness with palpation of bilateral foot and ankle.     Neurological: She is alert and oriented to person, place, and time. She has normal strength.   Light touch intact bilateral.    Skin: Skin is warm, dry and intact. Capillary refill takes less than 2 seconds. Bruising and ecchymosis noted. No abrasion, no burn, no laceration, no lesion, no petechiae and no rash noted. She is not diaphoretic. No erythema. No pallor.   Pedal skin has normal turgor, temperature, and texture bilateral.  Avulsion of 3/4 of the  distal nail plate of the RT. Hallux with exposed, calloused nail bed.  Remaing 1/4 of nail is ecchymotic and firmly adhered to the adjacent nail folds.  Remaining toenails x 9 appear normotrophic. Examination of the skin reveals no evidence of significant maceration, rashes, open lesions, suspicious appearing nevi or other concerning lesions.              Assessment:       Encounter Diagnoses   Name Primary?    Nail dystrophy Yes    Type 2 diabetes mellitus with diabetic neuropathy, with long-term current use of insulin          Plan:       Mckenzie was seen today for nail problem.    Diagnoses and all orders for this visit:    Nail dystrophy  -     CULTURE, FUNGUS    Type 2 diabetes mellitus with diabetic neuropathy, with long-term current use of insulin      I counseled the patient on her conditions, their implications and medical management.    With the patient's verbal consent, the remaining portion of the affected nail was trimmed with sterile nail nippers and filed smooth with the electric .  This was performed without incident, and the patient tolerated this quite well.      A portion of the nail was sent to Micro to determine if fungal elements are present.  If so, will further discussed treatment options to address onychomycosis.    Advised to begin applying petroleum based moisturizer to the exposed nail bed of the Rt. Hallux daily to discourage callus build up.    Advised to inspect feet once daily for signs of skin break down or localized infection.    RTC in 1 year for routine diabetic foot exam.    August Garcia DPM

## 2018-09-24 LAB
AV DELAY - LONGEST: 120 MSEC
AV DELAY - SHORTEST: 80 MSEC
BATTERY VOLTAGE (V): 3.06 V
CHARGE TIME (SEC): 10.1 SEC
HV IMPEDANCE (OHM): 64 OHM
IMPEDANCE RA LEAD (DONOR): 1170 OHMS
IMPEDANCE RA LEAD (NATIVE): 461 OHMS
IMPEDANCE RA LEAD: 447 OHMS
P/R-WAVE RA LEAD (DONOR): 10.3 MV
P/R-WAVE RA LEAD (NATIVE): 15.4 MV
P/R-WAVE RA LEAD: 3 MV
PV DELAY - FIXED: -40 MSEC
THRESHOLD MS RA LEAD: 0.4 MS
THRESHOLD V RA LEAD: 0.7 V

## 2018-09-25 ENCOUNTER — OFFICE VISIT (OUTPATIENT)
Dept: FAMILY MEDICINE | Facility: CLINIC | Age: 68
End: 2018-09-25
Payer: MEDICARE

## 2018-09-25 VITALS
SYSTOLIC BLOOD PRESSURE: 118 MMHG | DIASTOLIC BLOOD PRESSURE: 70 MMHG | BODY MASS INDEX: 37.77 KG/M2 | HEIGHT: 62 IN | WEIGHT: 205.25 LBS | TEMPERATURE: 98 F | HEART RATE: 60 BPM

## 2018-09-25 DIAGNOSIS — E03.9 HYPOTHYROIDISM, UNSPECIFIED TYPE: ICD-10-CM

## 2018-09-25 DIAGNOSIS — E11.8 TYPE 2 DIABETES MELLITUS WITH COMPLICATION, UNSPECIFIED WHETHER LONG TERM INSULIN USE: ICD-10-CM

## 2018-09-25 DIAGNOSIS — I10 ESSENTIAL HYPERTENSION: Primary | ICD-10-CM

## 2018-09-25 PROCEDURE — 99213 OFFICE O/P EST LOW 20 MIN: CPT | Mod: PBBFAC,PN,25 | Performed by: FAMILY MEDICINE

## 2018-09-25 PROCEDURE — 99214 OFFICE O/P EST MOD 30 MIN: CPT | Mod: S$PBB,,, | Performed by: FAMILY MEDICINE

## 2018-09-25 PROCEDURE — 99999 PR PBB SHADOW E&M-EST. PATIENT-LVL III: CPT | Mod: PBBFAC,,, | Performed by: FAMILY MEDICINE

## 2018-09-25 PROCEDURE — 90662 IIV NO PRSV INCREASED AG IM: CPT | Mod: PBBFAC,PN

## 2018-09-25 NOTE — PROGRESS NOTES
THIS DOCUMENT WAS MADE IN PART WITH VOICE RECOGNITION SOFTWARE.  OCCASIONALLY THIS SOFTWARE WILL MISINTERPRET WORDS OR PHRASES.      Mckenzie Mari  1950    Mckenzie was seen today for follow-up.    Diagnoses and all orders for this visit:    Essential hypertension  This is a chronic mission that is monitored regularly. It is currently stable and under satisfactory control. Medications reviewed. No adverse side effects or complications.    Hypothyroidism, unspecified type  This is a chronic condition that is also stable and under satisfactory control on the levothyroxine. Medication and lands reviewed    Type 2 diabetes mellitus with complication, unspecified whether long term insulin use   this is a chronic condition. She is followed by endocrinology, but we did review her recent labs and location. We had to discontinue Metformin because of side effects and complications. Therefore sought to make up for this and other places including diet and exercise.  Other orders  -     Influenza - High Dose (65+) (PF) (IM)          Subjective     Chief Complaint   Patient presents with    Follow-up       HPI    Breast biopsy yesterday, Dr. Tay, she will, keep me informed.    abd sx resolved after stopping metformin     Hypertension stable well-controlled     diabetes unfortunately worsening and nonideal    HPI elements addressed above in the assessment and plan including problems, diagnosis, stability/instability,  improving/worsening, and chronicity will not be duplicated in this section. Any important additional HPI topics will be discussed here if needed.    Active Ambulatory Problems     Diagnosis Date Noted    CHF (congestive heart failure)     CHF (NYHA class III, ACC/AHA stage C)     Hyperlipidemia     Chest pain 01/18/2012    HTN (hypertension)     CVID (common variable immunodeficiency)     Chronic sinusitis     ALLERGIC RHINITIS 01/23/2012    Penicillin allergy 01/30/2012    History of thyroid  cancer 07/25/2012    Postoperative hypothyroidism 07/25/2012    LBBB (left bundle branch block) 10/11/2012    ICD (implantable cardioverter-defibrillator) in place 10/23/2012    Sinusitis 10/01/2013    Dyspnea on effort 03/25/2014    Chest pain on exertion 03/25/2014    CAD (coronary artery disease) 04/17/2014    Type 2 diabetes mellitus with diabetic neuropathy, with long-term current use of insulin 05/11/2014    Myoclonus     History of colon polyps 05/11/2016    Obesity (BMI 30-39.9) 08/31/2016    History of pancreatitis 08/31/2016    Nasal obstruction 03/15/2017    Deviated nasal septum 03/15/2017    Hypertrophy of nasal turbinates 03/15/2017    Malignant neoplasm of central portion of left female breast 10/06/2017     Resolved Ambulatory Problems     Diagnosis Date Noted    Subacute maxillary sinusitis 03/15/2017     Past Medical History:   Diagnosis Date    Allergy     Arthritis     Breast cancer 2005    Bundle branch block     Cardiomyopathy     CHF (congestive heart failure)     Chronic sinusitis     CVID (common variable immunodeficiency)     Diabetes mellitus     GERD (gastroesophageal reflux disease)     Headache(784.0)     HTN (hypertension)     Hyperlipidemia     Hypothyroid 7/25/2012    Otitis media     Presence of combination internal cardiac defibrillator (ICD) and pacemaker     Thyroid cancer     Thyroid cancer 7/25/2012    Thyroid disease          Review of Systems   Constitutional: Positive for fatigue. Negative for fever and unexpected weight change.   HENT: Negative for sinus pressure and trouble swallowing.    Eyes: Negative for visual disturbance.   Respiratory: Negative for cough, chest tightness and shortness of breath.    Cardiovascular: Negative for chest pain, palpitations and leg swelling.   Gastrointestinal: Negative for abdominal pain, blood in stool, constipation, diarrhea, nausea and vomiting.   Genitourinary: Negative for dysuria, frequency and  "hematuria.   Musculoskeletal: Negative for arthralgias, gait problem, myalgias and neck pain.   Skin: Negative for rash and wound.   Neurological: Negative for dizziness, tremors, syncope, numbness and headaches.   Psychiatric/Behavioral: Negative for dysphoric mood and sleep disturbance. The patient is not nervous/anxious.        Objective     Physical Exam   Constitutional: She is oriented to person, place, and time. She appears well-developed and well-nourished.   HENT:   Head: Normocephalic and atraumatic.   Eyes: No scleral icterus.   Cardiovascular: Normal rate, regular rhythm and normal heart sounds.   No murmur heard.  Pulmonary/Chest: Effort normal and breath sounds normal. No respiratory distress.   Neurological: She is alert and oriented to person, place, and time.   Skin: Skin is dry. No rash noted. She is not diaphoretic.   Psychiatric: She has a normal mood and affect. Her behavior is normal.   Vitals reviewed.    Vitals:    09/25/18 1516   BP: 118/70   BP Location: Right arm   Patient Position: Sitting   BP Method: Large (Manual)   Pulse: 60   Temp: 98.3 °F (36.8 °C)   TempSrc: Oral   Weight: 93.1 kg (205 lb 4 oz)   Height: 5' 2" (1.575 m)       MOST RECENT LABS IN OUR ELECTRONIC MEDICAL RECORD:           "

## 2018-10-02 ENCOUNTER — OFFICE VISIT (OUTPATIENT)
Dept: FAMILY MEDICINE | Facility: CLINIC | Age: 68
End: 2018-10-02
Payer: MEDICARE

## 2018-10-02 VITALS
BODY MASS INDEX: 38.09 KG/M2 | WEIGHT: 207 LBS | HEART RATE: 73 BPM | DIASTOLIC BLOOD PRESSURE: 84 MMHG | SYSTOLIC BLOOD PRESSURE: 130 MMHG | RESPIRATION RATE: 18 BRPM | OXYGEN SATURATION: 97 % | HEIGHT: 62 IN

## 2018-10-02 DIAGNOSIS — Z79.4 TYPE 2 DIABETES MELLITUS WITH DIABETIC NEUROPATHY, WITH LONG-TERM CURRENT USE OF INSULIN: ICD-10-CM

## 2018-10-02 DIAGNOSIS — J02.9 PHARYNGITIS, UNSPECIFIED ETIOLOGY: Primary | ICD-10-CM

## 2018-10-02 DIAGNOSIS — E11.40 TYPE 2 DIABETES MELLITUS WITH DIABETIC NEUROPATHY, WITH LONG-TERM CURRENT USE OF INSULIN: ICD-10-CM

## 2018-10-02 DIAGNOSIS — R09.82 POST-NASAL DRIP: ICD-10-CM

## 2018-10-02 DIAGNOSIS — R06.7 SNEEZING: ICD-10-CM

## 2018-10-02 LAB
CTP QC/QA: YES
S PYO RRNA THROAT QL PROBE: NEGATIVE

## 2018-10-02 PROCEDURE — 87880 STREP A ASSAY W/OPTIC: CPT | Mod: PBBFAC,PO | Performed by: NURSE PRACTITIONER

## 2018-10-02 PROCEDURE — 99215 OFFICE O/P EST HI 40 MIN: CPT | Mod: PBBFAC,PO | Performed by: NURSE PRACTITIONER

## 2018-10-02 PROCEDURE — 99999 PR PBB SHADOW E&M-EST. PATIENT-LVL V: CPT | Mod: PBBFAC,,, | Performed by: NURSE PRACTITIONER

## 2018-10-02 PROCEDURE — 99213 OFFICE O/P EST LOW 20 MIN: CPT | Mod: S$PBB,,, | Performed by: NURSE PRACTITIONER

## 2018-10-02 RX ORDER — LEVOCETIRIZINE DIHYDROCHLORIDE 5 MG/1
5 TABLET, FILM COATED ORAL NIGHTLY
Qty: 30 TABLET | Refills: 1 | Status: SHIPPED | OUTPATIENT
Start: 2018-10-02 | End: 2018-12-04

## 2018-10-02 NOTE — PATIENT INSTRUCTIONS
Warm salt water gargle    Pharyngitis (Sore Throat), Report Pending    Pharyngitis (sore throat) is often due to a virus. It can also be caused by the streptococcus, or strep, bacterium, often called strep throat. Both viral and strep infections can cause throat pain that is worse when swallowing, aching all over with headache, and fever. Both types of infections are contagious. They may be spread by coughing, kissing, or touching others after touching your mouth or nose.  A test has been done to find out whether you (or your child, if your child is the patient) have strep throat. Call this facility or your healthcare provider if you were not given your test results. If the test is positive for strep infection, you will need to take antibiotic medicines. A prescription can be called into your pharmacy at that time. If the test is negative, you probably have a viral pharyngitis. This does not need to be treated with antibiotics. Until you receive the results of the strep test, you should stay home from work. If your child is being tested, he or she should stay home from school.  Home care  · Rest at home. Drink plenty of fluids so you won't get dehydrated.  · If the test is positive for strep, don't go to work or school for the first 2 days of taking the antibiotics. After this time, you will not be contagious. You can then return to work or school if you are feeling better.   · Take the antibiotic medicine for the full 10 days, even if you feel better. This is very important to make sure the infection is treated. It is also important to prevent drug-resistant germs from developing. If you were given an antibiotic shot, you won't need more antibiotics.  · For children: Use acetaminophen for fever, fussiness, or discomfort. In infants older than 6 months of age, you may use ibuprofen instead of acetaminophen. Talk with your child's healthcare provider before giving these medicines if your child has chronic liver or  kidney disease or ever had a stomach ulcer or GI bleeding. Never give aspirin to a child under 18 years of age who is ill with a fever. It may cause severe liver damage.  · For adults: Use acetaminophen or ibuprofen to control pain or fever, unless another medicine was prescribed for this. Talk with your healthcare provider before taking these medicines if you have chronic liver or kidney disease or ever had a stomach ulcer or GI bleeding.  · Use throat lozenges or numbing throat sprays to help reduce pain. Gargling with warm salt water will also help reduce throat pain. For this, dissolve 1/2 teaspoon of salt in 1 glass of warm water. To help soothe a sore throat, children can sip on juice or a popsicle. Children 5 years and older can also suck on a lollipop or hard candy.  · Don't eat salty or spicy foods. These can irritate the throat.  Follow-up care  Follow up with your healthcare provider or our staff if you don't get better over the next week.  When to seek medical advice  Call your healthcare provider right away if any of these occur:  · Fever as directed by your healthcare provider. For children, seek care if:  ¨ Your child is of any age and has repeated fevers above 104°F (40°C).  ¨ Your child is younger than 2 years of age and has a fever of 100.4°F (38°C) that continues for more than 1 day.  ¨ Your child is 2 years old or older and has a fever of 100.4°F (38°C) that continues for more than 3 days.  · New or worsening ear pain, sinus pain, or headache  · Painful lumps in the back of neck  · Stiff neck  · Lymph nodes are getting larger  · Inability to swallow liquids, excessive drooling, or inability to open mouth wide due to throat pain  · Signs of dehydration (very dark urine or no urine, sunken eyes, dizziness)  · Trouble breathing or noisy breathing  · Muffled voice  · New rash  · Child appears to be getting sicker  Date Last Reviewed: 4/13/2015  © 3745-0463 [employer]. [address] [place] [address]  Road, [place] [address]. All rights reserved. This information is not intended as a substitute for professional medical care. Always follow your healthcare professional's instructions.

## 2018-10-02 NOTE — PROGRESS NOTES
Subjective:       Patient ID: Mckenzie Mari is a 68 y.o. female.    Chief Complaint: Sore Throat  She was last seen in primary care by Alla on 09/25/2018. This is her first time seeing me in the clinic.  Sore Throat    This is a new problem. The current episode started yesterday. Neither side of throat is experiencing more pain than the other. There has been no fever. The pain is at a severity of 4/10. The pain is mild. Associated symptoms include trouble swallowing. Pertinent negatives include no coughing or shortness of breath. Treatments tried: warm salt water, neti. The treatment provided no relief.      She is here today for complaints of sore throat.  Vitals:    10/02/18 0944   BP: 130/84   Pulse: 73   Resp: 18     Review of Systems   Constitutional: Negative for fever.   HENT: Positive for sore throat and trouble swallowing.    Respiratory: Negative for cough and shortness of breath.        Lab Results   Component Value Date    HGBA1C 8.3 (H) 07/06/2018     Objective:      Physical Exam   Constitutional: She is oriented to person, place, and time. Vital signs are normal. She appears well-developed and well-nourished. She is active and cooperative.   HENT:   Head: Normocephalic and atraumatic.   Right Ear: Hearing, tympanic membrane, external ear and ear canal normal.   Left Ear: Hearing, tympanic membrane, external ear and ear canal normal.   Nose: Nose normal.   Mouth/Throat: Uvula is midline, oropharynx is clear and moist and mucous membranes are normal.   Eyes: Lids are normal.   Neck: Trachea normal, normal range of motion, full passive range of motion without pain and phonation normal.   Cardiovascular: Normal rate, regular rhythm and normal heart sounds.   Pulmonary/Chest: Effort normal and breath sounds normal.   Abdominal: Soft. Bowel sounds are normal. There is no tenderness.       Musculoskeletal: Normal range of motion.   Lymphadenopathy:        Head (right side): No submental, no  submandibular, no tonsillar, no preauricular, no posterior auricular and no occipital adenopathy present.        Head (left side): No submental, no submandibular, no tonsillar, no preauricular, no posterior auricular and no occipital adenopathy present.     She has no cervical adenopathy.   Neurological: She is alert and oriented to person, place, and time.   Skin: Skin is warm, dry and intact.   Psychiatric: She has a normal mood and affect. Her speech is normal and behavior is normal. Judgment and thought content normal.   Nursing note and vitals reviewed.      Assessment & Plan:       Pharyngitis, unspecified etiology  -     POCT Rapid Strep A    Post-nasal drip  -     levocetirizine (XYZAL) 5 MG tablet; Take 1 tablet (5 mg total) by mouth every evening.  Dispense: 30 tablet; Refill: 1    Sneezing  -     levocetirizine (XYZAL) 5 MG tablet; Take 1 tablet (5 mg total) by mouth every evening.  Dispense: 30 tablet; Refill: 1    Type 2 diabetes mellitus with diabetic neuropathy, with long-term current use of insulin             Medication List           Accurate as of 10/2/18 11:59 PM. If you have any questions, ask your nurse or doctor.               START taking these medications    levocetirizine 5 MG tablet  Commonly known as:  XYZAL  Take 1 tablet (5 mg total) by mouth every evening.  Started by:  Dalila Palmer, YENNIFER, APRN        CHANGE how you take these medications    furosemide 40 MG tablet  Commonly known as:  LASIX  TAKE 1 TABLET (40 MG TOTAL) BY MOUTH ONCE DAILY.  What changed:    · when to take this  · reasons to take this     insulin lispro 100 unit/mL Inpn pen  Commonly known as:  HumaLOG KwikPen Insulin  Inject 14 Units into the skin 3 (three) times daily before meals.  What changed:  additional instructions        CONTINUE taking these medications    blood sugar diagnostic Strp  Commonly known as:  ONETOUCH VERIO  1 each by Misc.(Non-Drug; Combo Route) route 4 (four) times daily. Dx code e11.8    "  carvedilol 25 MG tablet  Commonly known as:  COREG  TAKE 1 TABLET (25 MG TOTAL) BY MOUTH 2 (TWO) TIMES DAILY WITH MEALS.     CENTRUM SILVER ULTRA WOMEN'S Tab  Generic drug:  multivit-mineral-iron-lutein     clarithromycin 500 MG tablet  Commonly known as:  BIAXIN  Take 1 tablet (500 mg total) by mouth every 12 (twelve) hours. For 10 days. Can take zithromycin.     esomeprazole 40 MG capsule  Commonly known as:  NEXIUM  TAKE 1 CAPSULE BY MOUTH EVERY DAY     estradiol 2 mg (7.5 mcg /24 hour) vaginal ring  Commonly known as:  ESTRING     Lactobacillus rhamnosus GG 10 billion cell capsule  Commonly known as:  CULTURELLE     lancets 33 gauge Misc  1 lancet by Misc.(Non-Drug; Combo Route) route 4 (four) times daily. Medically necessary, on insulin. ICD 10 code E11.8.     metFORMIN 500 MG 24 hr tablet  Commonly known as:  GLUCOPHAGE-XR  Take 2 tablets (1,000 mg total) by mouth 2 (two) times daily with meals.     metroNIDAZOLE 500 MG tablet  Commonly known as:  FLAGYL  Take 1 tablet (500 mg total) by mouth 3 (three) times daily.     pen needle, diabetic 32 gauge x 5/32" Ndle  Commonly known as:  BD ULTRA-FINE SHANTELLE PEN NEEDLE  Uses up to 4 daily, on multiple daily insulin injections     potassium chloride SA 20 MEQ tablet  Commonly known as:  K-DUR,KLOR-CON  TAKE 1 TABLET EVERY DAY     rosuvastatin 10 MG tablet  Commonly known as:  CRESTOR  TAKE 1 TABLET BY MOUTH EVERY DAY     spironolactone 25 MG tablet  Commonly known as:  ALDACTONE  TAKE 1 TABLET BY MOUTH EVERY DAY     SYNTHROID 137 MCG Tab tablet  Generic drug:  levothyroxine  Take 1 tablet (137 mcg total) by mouth before breakfast.     TRESIBA FLEXTOUCH U-100 100 unit/mL (3 mL) Inpn  Generic drug:  insulin degludec  INJECT 30 UNITS INTO THE SKIN EVERY EVENING.     valsartan 80 MG tablet  Commonly known as:  DIOVAN  Take 1 tablet (80 mg total) by mouth once daily.     VITAMIN D-3 ORAL           Where to Get Your Medications      These medications were sent to " [id number] - [place] - [address]  [address], [place] [address]    Phone:  [phone]   · levocetirizine 5 MG tablet       No Follow-up on file.

## 2018-10-03 ENCOUNTER — PATIENT MESSAGE (OUTPATIENT)
Dept: FAMILY MEDICINE | Facility: CLINIC | Age: 68
End: 2018-10-03

## 2018-10-03 DIAGNOSIS — J02.9 PHARYNGITIS, UNSPECIFIED ETIOLOGY: Primary | ICD-10-CM

## 2018-10-03 RX ORDER — AZITHROMYCIN 250 MG/1
TABLET, FILM COATED ORAL
Qty: 6 TABLET | Refills: 0 | Status: SHIPPED | OUTPATIENT
Start: 2018-10-03 | End: 2018-10-08

## 2018-10-09 ENCOUNTER — LAB VISIT (OUTPATIENT)
Dept: LAB | Facility: HOSPITAL | Age: 68
End: 2018-10-09
Attending: INTERNAL MEDICINE
Payer: MEDICARE

## 2018-10-09 DIAGNOSIS — Z17.0 MALIGNANT NEOPLASM OF CENTRAL PORTION OF LEFT BREAST IN FEMALE, ESTROGEN RECEPTOR POSITIVE: ICD-10-CM

## 2018-10-09 DIAGNOSIS — C50.112 MALIGNANT NEOPLASM OF CENTRAL PORTION OF LEFT BREAST IN FEMALE, ESTROGEN RECEPTOR POSITIVE: ICD-10-CM

## 2018-10-09 LAB
ALBUMIN SERPL BCP-MCNC: 4.3 G/DL
ALP SERPL-CCNC: 81 U/L
ALT SERPL W/O P-5'-P-CCNC: 39 U/L
ANION GAP SERPL CALC-SCNC: 7 MMOL/L
AST SERPL-CCNC: 18 U/L
BASOPHILS # BLD AUTO: 0.05 K/UL
BASOPHILS NFR BLD: 0.7 %
BILIRUB SERPL-MCNC: 1.6 MG/DL
BUN SERPL-MCNC: 21 MG/DL
CALCIUM SERPL-MCNC: 9.3 MG/DL
CHLORIDE SERPL-SCNC: 103 MMOL/L
CO2 SERPL-SCNC: 29 MMOL/L
CREAT SERPL-MCNC: 0.84 MG/DL
DIFFERENTIAL METHOD: NORMAL
EOSINOPHIL # BLD AUTO: 0.3 K/UL
EOSINOPHIL NFR BLD: 3.9 %
ERYTHROCYTE [DISTWIDTH] IN BLOOD BY AUTOMATED COUNT: 13.2 %
EST. GFR  (AFRICAN AMERICAN): >60 ML/MIN/1.73 M^2
EST. GFR  (NON AFRICAN AMERICAN): >60 ML/MIN/1.73 M^2
GLUCOSE SERPL-MCNC: 179 MG/DL
HCT VFR BLD AUTO: 44.6 %
HGB BLD-MCNC: 14.4 G/DL
LYMPHOCYTES # BLD AUTO: 2.4 K/UL
LYMPHOCYTES NFR BLD: 32 %
MCH RBC QN AUTO: 30.1 PG
MCHC RBC AUTO-ENTMCNC: 32.3 G/DL
MCV RBC AUTO: 93 FL
MONOCYTES # BLD AUTO: 0.7 K/UL
MONOCYTES NFR BLD: 9.3 %
NEUTROPHILS # BLD AUTO: 4.1 K/UL
NEUTROPHILS NFR BLD: 54.1 %
NRBC BLD-RTO: 0 /100 WBC
PLATELET # BLD AUTO: 220 K/UL
PMV BLD AUTO: 11 FL
POTASSIUM SERPL-SCNC: 4.3 MMOL/L
PROT SERPL-MCNC: 7.2 G/DL
RBC # BLD AUTO: 4.79 M/UL
SODIUM SERPL-SCNC: 139 MMOL/L
WBC # BLD AUTO: 7.5 K/UL

## 2018-10-09 PROCEDURE — 80053 COMPREHEN METABOLIC PANEL: CPT | Mod: PN

## 2018-10-09 PROCEDURE — 36415 COLL VENOUS BLD VENIPUNCTURE: CPT | Mod: PN

## 2018-10-09 PROCEDURE — 85025 COMPLETE CBC W/AUTO DIFF WBC: CPT

## 2018-10-09 PROCEDURE — 85025 COMPLETE CBC W/AUTO DIFF WBC: CPT | Mod: PN

## 2018-10-09 PROCEDURE — 80053 COMPREHEN METABOLIC PANEL: CPT

## 2018-10-17 LAB — FUNGUS SPEC CULT: NORMAL

## 2018-10-18 DIAGNOSIS — B35.1 ONYCHOMYCOSIS DUE TO DERMATOPHYTE: Primary | ICD-10-CM

## 2018-10-18 RX ORDER — FLUCONAZOLE 150 MG/1
TABLET ORAL
Qty: 30 TABLET | Refills: 0 | Status: SHIPPED | OUTPATIENT
Start: 2018-10-18 | End: 2019-11-19

## 2018-10-30 ENCOUNTER — PATIENT MESSAGE (OUTPATIENT)
Dept: ENDOCRINOLOGY | Facility: CLINIC | Age: 68
End: 2018-10-30

## 2018-10-30 DIAGNOSIS — Z79.4 TYPE 2 DIABETES MELLITUS WITH COMPLICATION, WITH LONG-TERM CURRENT USE OF INSULIN: ICD-10-CM

## 2018-10-30 DIAGNOSIS — E11.8 TYPE 2 DIABETES MELLITUS WITH COMPLICATION, WITH LONG-TERM CURRENT USE OF INSULIN: ICD-10-CM

## 2018-10-30 RX ORDER — INSULIN DEGLUDEC 100 U/ML
INJECTION, SOLUTION SUBCUTANEOUS
Qty: 15 SYRINGE | Refills: 11 | Status: SHIPPED | OUTPATIENT
Start: 2018-10-30 | End: 2018-12-04

## 2018-11-08 ENCOUNTER — PATIENT MESSAGE (OUTPATIENT)
Dept: FAMILY MEDICINE | Facility: CLINIC | Age: 68
End: 2018-11-08

## 2018-11-08 RX ORDER — CARVEDILOL 25 MG/1
TABLET ORAL
Qty: 180 TABLET | Refills: 1 | Status: SHIPPED | OUTPATIENT
Start: 2018-11-08 | End: 2019-10-20 | Stop reason: SDUPTHER

## 2018-11-08 RX ORDER — SPIRONOLACTONE 25 MG/1
25 TABLET ORAL DAILY
Qty: 90 TABLET | Refills: 1 | Status: SHIPPED | OUTPATIENT
Start: 2018-11-08 | End: 2019-06-08 | Stop reason: SDUPTHER

## 2018-11-08 RX ORDER — CARVEDILOL 25 MG/1
TABLET ORAL
Qty: 180 TABLET | Refills: 3 | Status: SHIPPED | OUTPATIENT
Start: 2018-11-08 | End: 2018-11-08 | Stop reason: SDUPTHER

## 2018-11-08 RX ORDER — SPIRONOLACTONE 25 MG/1
TABLET ORAL
Qty: 90 TABLET | Refills: 0 | Status: SHIPPED | OUTPATIENT
Start: 2018-11-08 | End: 2018-11-08 | Stop reason: SDUPTHER

## 2018-11-26 ENCOUNTER — PATIENT MESSAGE (OUTPATIENT)
Dept: PODIATRY | Facility: CLINIC | Age: 68
End: 2018-11-26

## 2018-11-30 ENCOUNTER — LAB VISIT (OUTPATIENT)
Dept: LAB | Facility: HOSPITAL | Age: 68
End: 2018-11-30
Attending: FAMILY MEDICINE
Payer: MEDICARE

## 2018-11-30 DIAGNOSIS — E11.8 TYPE 2 DIABETES MELLITUS WITH COMPLICATION, WITH LONG-TERM CURRENT USE OF INSULIN: ICD-10-CM

## 2018-11-30 DIAGNOSIS — E03.9 HYPOTHYROIDISM, UNSPECIFIED TYPE: ICD-10-CM

## 2018-11-30 DIAGNOSIS — E78.5 HYPERLIPIDEMIA, UNSPECIFIED HYPERLIPIDEMIA TYPE: Chronic | ICD-10-CM

## 2018-11-30 DIAGNOSIS — Z79.4 TYPE 2 DIABETES MELLITUS WITH COMPLICATION, WITH LONG-TERM CURRENT USE OF INSULIN: ICD-10-CM

## 2018-11-30 LAB
ALBUMIN SERPL BCP-MCNC: 3.6 G/DL
ALP SERPL-CCNC: 87 U/L
ALT SERPL W/O P-5'-P-CCNC: 20 U/L
ANION GAP SERPL CALC-SCNC: 7 MMOL/L
AST SERPL-CCNC: 18 U/L
BILIRUB SERPL-MCNC: 1.4 MG/DL
BUN SERPL-MCNC: 15 MG/DL
CALCIUM SERPL-MCNC: 9.3 MG/DL
CHLORIDE SERPL-SCNC: 104 MMOL/L
CHOLEST SERPL-MCNC: 156 MG/DL
CHOLEST/HDLC SERPL: 3.4 {RATIO}
CO2 SERPL-SCNC: 27 MMOL/L
CREAT SERPL-MCNC: 0.8 MG/DL
EST. GFR  (AFRICAN AMERICAN): >60 ML/MIN/1.73 M^2
EST. GFR  (NON AFRICAN AMERICAN): >60 ML/MIN/1.73 M^2
ESTIMATED AVG GLUCOSE: 197 MG/DL
GLUCOSE SERPL-MCNC: 176 MG/DL
HBA1C MFR BLD HPLC: 8.5 %
HDLC SERPL-MCNC: 46 MG/DL
HDLC SERPL: 29.5 %
LDLC SERPL CALC-MCNC: 79 MG/DL
NONHDLC SERPL-MCNC: 110 MG/DL
POTASSIUM SERPL-SCNC: 4.5 MMOL/L
PROT SERPL-MCNC: 6.3 G/DL
SODIUM SERPL-SCNC: 138 MMOL/L
TRIGL SERPL-MCNC: 155 MG/DL
TSH SERPL DL<=0.005 MIU/L-ACNC: 1.25 UIU/ML
TSH SERPL DL<=0.005 MIU/L-ACNC: 1.25 UIU/ML

## 2018-11-30 PROCEDURE — 80061 LIPID PANEL: CPT

## 2018-11-30 PROCEDURE — 84443 ASSAY THYROID STIM HORMONE: CPT

## 2018-11-30 PROCEDURE — 83036 HEMOGLOBIN GLYCOSYLATED A1C: CPT

## 2018-11-30 PROCEDURE — 80053 COMPREHEN METABOLIC PANEL: CPT

## 2018-11-30 PROCEDURE — 36415 COLL VENOUS BLD VENIPUNCTURE: CPT | Mod: PN

## 2018-12-01 RX ORDER — LEVOTHYROXINE SODIUM 137 UG/1
137 TABLET ORAL
Qty: 90 TABLET | Refills: 1 | Status: SHIPPED | OUTPATIENT
Start: 2018-12-01 | End: 2019-06-28 | Stop reason: SDUPTHER

## 2018-12-04 ENCOUNTER — OFFICE VISIT (OUTPATIENT)
Dept: ENDOCRINOLOGY | Facility: CLINIC | Age: 68
End: 2018-12-04
Payer: MEDICARE

## 2018-12-04 VITALS
HEART RATE: 68 BPM | BODY MASS INDEX: 38.44 KG/M2 | SYSTOLIC BLOOD PRESSURE: 102 MMHG | HEIGHT: 62 IN | WEIGHT: 208.88 LBS | DIASTOLIC BLOOD PRESSURE: 74 MMHG

## 2018-12-04 DIAGNOSIS — I10 ESSENTIAL HYPERTENSION: ICD-10-CM

## 2018-12-04 DIAGNOSIS — Z79.4 TYPE 2 DIABETES MELLITUS WITH COMPLICATION, WITH LONG-TERM CURRENT USE OF INSULIN: ICD-10-CM

## 2018-12-04 DIAGNOSIS — I50.9 CONGESTIVE HEART FAILURE, UNSPECIFIED HF CHRONICITY, UNSPECIFIED HEART FAILURE TYPE: Chronic | ICD-10-CM

## 2018-12-04 DIAGNOSIS — Z79.4 TYPE 2 DIABETES MELLITUS WITH DIABETIC NEUROPATHY, WITH LONG-TERM CURRENT USE OF INSULIN: Primary | ICD-10-CM

## 2018-12-04 DIAGNOSIS — E11.8 TYPE 2 DIABETES MELLITUS WITH COMPLICATION, WITH LONG-TERM CURRENT USE OF INSULIN: ICD-10-CM

## 2018-12-04 DIAGNOSIS — I25.10 CORONARY ARTERY DISEASE INVOLVING NATIVE CORONARY ARTERY WITHOUT ANGINA PECTORIS, UNSPECIFIED WHETHER NATIVE OR TRANSPLANTED HEART: ICD-10-CM

## 2018-12-04 DIAGNOSIS — E89.0 POSTOPERATIVE HYPOTHYROIDISM: ICD-10-CM

## 2018-12-04 DIAGNOSIS — E78.5 HYPERLIPIDEMIA, UNSPECIFIED HYPERLIPIDEMIA TYPE: Chronic | ICD-10-CM

## 2018-12-04 DIAGNOSIS — E11.40 TYPE 2 DIABETES MELLITUS WITH DIABETIC NEUROPATHY, WITH LONG-TERM CURRENT USE OF INSULIN: Primary | ICD-10-CM

## 2018-12-04 PROCEDURE — 99214 OFFICE O/P EST MOD 30 MIN: CPT | Mod: S$PBB,,, | Performed by: NURSE PRACTITIONER

## 2018-12-04 PROCEDURE — 99999 PR PBB SHADOW E&M-EST. PATIENT-LVL V: CPT | Mod: PBBFAC,,, | Performed by: NURSE PRACTITIONER

## 2018-12-04 PROCEDURE — 99215 OFFICE O/P EST HI 40 MIN: CPT | Mod: PBBFAC,PN | Performed by: NURSE PRACTITIONER

## 2018-12-04 RX ORDER — INSULIN LISPRO 100 [IU]/ML
16 INJECTION, SOLUTION INTRAVENOUS; SUBCUTANEOUS
Qty: 15 SYRINGE | Refills: 6
Start: 2018-12-04 | End: 2019-03-08 | Stop reason: SDUPTHER

## 2018-12-04 RX ORDER — INSULIN DEGLUDEC 100 U/ML
INJECTION, SOLUTION SUBCUTANEOUS
Qty: 15 SYRINGE | Refills: 11
Start: 2018-12-04 | End: 2018-12-18 | Stop reason: SDUPTHER

## 2018-12-04 NOTE — PROGRESS NOTES
CC: Ms. Mckenzie Mari arrives today for management of Type 2 DM and review of chronic medical conditions.     HPI: Ms. Mckenzie Mari was diagnosed with Type 2 DM in 2004. She was diagnosed based on lab work. Initial treatment consisted of metformin and glimepiride. Insulin added in 8/2016 - began Toujeo. She states that she felt that this caused nausea, abd pain, chest pain. Lantus caused throat swelling, left arm pain. She was then changed to Novolog 70/30 but this was only used for a short period because her insurance didn't cover.  Then changed to Humalog 50-50 in 12/2016. In 2017, she began MDI with Tresiba and Humalog. + FH of DM in maternal aunt and cousin. Denies hospitalizations due to DM. Previously seen in endocrine by Richard Gupta, and MAGALI Eng, ARIELLE. She has a history of thyroid cancer/post-surgical hypothyroidism.   Of note, she has a h/o pancreatitis.   Insulin management is challenging because patient believes insulin is causing elevated BG levels.    Last seen by me in July. Both insulin doses were increased and metformin dose increased. However, she stopped taking metformin due to headaches.     She states that she had a stressful October - had 3 deaths in her group of friends/family.     BG readings are checked 2-3x/day. Brings log.                 Hypoglycemia: No but feels symptomatic with BG < 120.  She feels fatigued, lightheaded.  She prefers glucoses remain >150.    Missing Insulin/PO medication doses: No  Timing prandial insulin 5-15 minutes before meals: yes    Dietary Habits: Eats 3 meals/day. Tries to avoid snacking but goes through periods when she craves chocolate.  Avoids sugary drinks.    She follows annually with cardiology for CHF, CAD.  She plans to discuss w her cardiologist changing her valsartan due to recall.          CURRENT DIABETIC MEDS: Tresiba 40 units QHS, Humalog 14 units AC + correction scale, target 150, ISF 50.  Vial or pen: pen  Glucometer type: One Touch  "Verio    Previous DM treatments:   Invokana - nausea  Glimepiride - stopped when prandial insulin added  Touejo -  Nausea, abd pain, chest pain  Lantus - throat swelling, left arm pain  Novolog 70/30 - not covered by insurance  Metformin - headaches    Last Eye Exam: 4/10/2018 - sees Dr. Mcgregor. Denies DRSlava   Last Podiatry Exam: no    REVIEW OF SYSTEMS  Constitutional: no c/o weakness or weight loss. + fatigue related sleep habits. Some nights she may only sleep 3 hours.   Eyes: no c/o visual disturbances.   Cardiac: no palpitations, chest pain.  Respiratory: c/o occasional dry cough, dyspnea.   GI: no c/o abdominal pain, nausea. + H/o pancreatitis.   Skin: no lesions or rashes.   Neuro: reports occasional numbness, tingling that comes and goes.   Endocrine: denies polyphagia, polydipsia, polyuria      Personally reviewed Past Medical, Surgical, Social History.    Vital Signs  /74   Pulse 68   Ht 5' 2" (1.575 m)   Wt 94.7 kg (208 lb 14.2 oz)   BMI 38.21 kg/m²     Personally reviewed the below labs:    Hemoglobin A1C   Date Value Ref Range Status   11/30/2018 8.5 (H) 4.0 - 5.6 % Final     Comment:     ADA Screening Guidelines:  5.7-6.4%  Consistent with prediabetes  >or=6.5%  Consistent with diabetes  High levels of fetal hemoglobin interfere with the HbA1C  assay. Heterozygous hemoglobin variants (HbS, HgC, etc)do  not significantly interfere with this assay.   However, presence of multiple variants may affect accuracy.     07/06/2018 8.3 (H) 4.0 - 5.6 % Final     Comment:     ADA Screening Guidelines:  5.7-6.4%  Consistent with prediabetes  >or=6.5%  Consistent with diabetes  High levels of fetal hemoglobin interfere with the HbA1C  assay. Heterozygous hemoglobin variants (HbS, HgC, etc)do  not significantly interfere with this assay.   However, presence of multiple variants may affect accuracy.     03/29/2018 8.1 (H) 4.0 - 5.6 % Final     Comment:     According to ADA guidelines, hemoglobin A1c <7.0% " represents  optimal control in non-pregnant diabetic patients. Different  metrics may apply to specific patient populations.   Standards of Medical Care in Diabetes-2016.  For the purpose of screening for the presence of diabetes:  <5.7%     Consistent with the absence of diabetes  5.7-6.4%  Consistent with increasing risk for diabetes   (prediabetes)  >or=6.5%  Consistent with diabetes  Currently, no consensus exists for use of hemoglobin A1c  for diagnosis of diabetes for children.  This Hemoglobin A1c assay has significant interference with fetal   hemoglobin   (HbF). The results are invalid for patients with abnormal amounts of   HbF,   including those with known Hereditary Persistence   of Fetal Hemoglobin. Heterozygous hemoglobin variants (HbAS, HbAC,   HbAD, HbAE, HbA2) do not significantly interfere with this assay;   however, presence of multiple variants in a sample may impact the %   interference.         Chemistry        Component Value Date/Time     11/30/2018 0800    K 4.5 11/30/2018 0800     11/30/2018 0800    CO2 27 11/30/2018 0800    BUN 15 11/30/2018 0800    CREATININE 0.8 11/30/2018 0800     (H) 11/30/2018 0800        Component Value Date/Time    CALCIUM 9.3 11/30/2018 0800    ALKPHOS 87 11/30/2018 0800    AST 18 11/30/2018 0800    ALT 20 11/30/2018 0800    BILITOT 1.4 (H) 11/30/2018 0800          Lab Results   Component Value Date    CHOL 156 11/30/2018    CHOL 131 03/29/2018    CHOL 148 04/20/2017     Lab Results   Component Value Date    HDL 46 11/30/2018    HDL 47 03/29/2018    HDL 47 04/20/2017     Lab Results   Component Value Date    LDLCALC 79.0 11/30/2018    LDLCALC 54.4 (L) 03/29/2018    LDLCALC 62.4 (L) 04/20/2017     Lab Results   Component Value Date    TRIG 155 (H) 11/30/2018    TRIG 148 03/29/2018    TRIG 193 (H) 04/20/2017     Lab Results   Component Value Date    CHOLHDL 29.5 11/30/2018    CHOLHDL 35.9 03/29/2018    CHOLHDL 31.8 04/20/2017       Lab Results    Component Value Date    MICALBCREAT 4.7 07/06/2018     Lab Results   Component Value Date    TSH 1.245 11/30/2018    TSH 1.245 11/30/2018       Estimated Creatinine Clearance: 73.3 mL/min (based on SCr of 0.8 mg/dL).    Vit D, 25-Hydroxy   Date Value Ref Range Status   11/08/2014 51 30 - 96 ng/mL Final     Comment:     Vitamin D deficiency.........<10 ng/mL                              Vitamin D insufficiency......10-29 ng/mL       Vitamin D sufficiency........> or equal to 30 ng/mL  Vitamin D toxicity............>100 ng/mL         PHYSICAL EXAMINATION  Constitutional: Appears well, no distress  Neck: Supple, trachea midline; transverse scar to anterior neck from thyroidectomy.   Respiratory: CTA, even and unlabored.  Cardiovascular: RRR, no murmurs, no carotid bruits. DP pulses 1+ bilaterally; no edema.  Lymph: no cervical or supraclavicular lymphadenopathy.  Skin: warm and dry; no lipohypertrophy, or acanthosis nigracans observed.  Neuro: DTR diminished to BUE/BLE. Previously, no loss of protective sensation via 10 gm monofilament. Vibratory exam mildly decreased, bilaterally  Feet: appropriate footwear.       A1c goal < 7.5%, due to CAD, CHF, desire for glucose to remain >150.         Assessment/Plan  1. Type 2 diabetes mellitus with diabetic neuropathy, with long-term current use of insulin  -- Uncontrolled. A1c is elevated. Insulin titration is challenging because she would rather glucoses remain >150 mg/dL.   -- Increase Tresiba to 48 units QAM. She would like to start with 45 units for the first week.   -- Increase Humalog to 16 units with each meal. Change correction scale to target 180, ISF 25.   -- check BG 4x/day.   -- Diabetes Education for diet. I suspect variable carb content is contributing to hyperglycemia. Consistency would likely improve her readings.     -- cannot use Soliqua, due to Lantus allergy. Would not use Actos, due to CHF. GLP-1RA and DPP4-I contraindicated due to h/o pancreatitis.  Cannot tolerate metformin.     -- Discussed diagnosis of DM, A1c goals, progression of disease, long term complications and tx options.  Advised patient to check BG before activities, such as driving or exercise.  -- Reviewed hypoglycemia management: treat with 1/2 glass of juice, 1/2 can regular coke, or 4 glucose tablets. Monitor and repeat treatment every 15 minutes until BG is >70 Then have a snack, which includes a complex carbohydrate and protein.    -- takes statin and ARB     2. Coronary artery disease involving native coronary artery without angina pectoris, unspecified whether native or transplanted heart  -- stable, avoid hyopglycemia   3. CHF (NYHA class III, ACC/AHA stage C)  -- follows with cardiology   4. Postoperative hypothyroidism  -- stable  -- Continue Synthroid at current dose.   5. Essential hypertension  -- controlled on valsartan  -- I offered to change med, due to recall but she'd rather wait and discuss with cardiology.    6. Hyperlipidemia, unspecified hyperlipidemia type  -- acceptable. TRG marginally elevated.   -- optimize DM control  -- continue statin   7. Obesity (BMI 30-39.9)  -- increases insulin resistance  Body mass index is 38.21 kg/m².   8. History of pancreatitis  -- avoid GLP-1RA and DPP4-i           FOLLOW UP  Follow-up in about 3 months (around 3/4/2019).   Patient instructed to bring BG logs to each follow up.   Patient encouraged to call for any BG/medication issues, concerns, or questions.      Orders Placed This Encounter   Procedures    Hemoglobin A1c    Basic metabolic panel    Ambulatory Referral to Diabetes Education

## 2018-12-11 ENCOUNTER — CLINICAL SUPPORT (OUTPATIENT)
Dept: CARDIOLOGY | Facility: CLINIC | Age: 68
End: 2018-12-11
Attending: INTERNAL MEDICINE
Payer: MEDICARE

## 2018-12-11 DIAGNOSIS — I50.9 CHF (NYHA CLASS III, ACC/AHA STAGE C): ICD-10-CM

## 2018-12-11 DIAGNOSIS — Z95.810 ICD (IMPLANTABLE CARDIOVERTER-DEFIBRILLATOR) IN PLACE: ICD-10-CM

## 2018-12-11 LAB
AV DELAY - LONGEST: 120 MSEC
AV DELAY - SHORTEST: 80 MSEC
BATTERY VOLTAGE (V): 3.03 V
HV IMPEDANCE (OHM): 65 OHM
IMPEDANCE RA LEAD (DONOR): 1098 OHMS
IMPEDANCE RA LEAD (NATIVE): 461 OHMS
IMPEDANCE RA LEAD: 447 OHMS
P/R-WAVE RA LEAD (DONOR): 12.9 MV
P/R-WAVE RA LEAD (NATIVE): 16.4 MV
P/R-WAVE RA LEAD: 2.6 MV
PV DELAY - FIXED: -40 MSEC
THRESHOLD MS RA LEAD (DONOR): 0.4 MS
THRESHOLD MS RA LEAD (NATIVE): 0.4 MS
THRESHOLD MS RA LEAD: 0.4 MS
THRESHOLD MS RV LEAD: 1 V
THRESHOLD V RA LEAD (DONOR): 0.6 V
THRESHOLD V RA LEAD (NATIVE): 0.6 V
THRESHOLD V RA LEAD: 0.6 V
THRESHOLD V RV LEAD: 0.8 V
VV DELAY: 0 MSEC

## 2018-12-11 PROCEDURE — 93284 PRGRMG EVAL IMPLANTABLE DFB: CPT | Mod: PBBFAC,PO | Performed by: INTERNAL MEDICINE

## 2018-12-17 ENCOUNTER — PATIENT MESSAGE (OUTPATIENT)
Dept: ENDOCRINOLOGY | Facility: CLINIC | Age: 68
End: 2018-12-17

## 2018-12-17 DIAGNOSIS — Z79.4 TYPE 2 DIABETES MELLITUS WITH DIABETIC NEUROPATHY, WITH LONG-TERM CURRENT USE OF INSULIN: ICD-10-CM

## 2018-12-17 DIAGNOSIS — E11.40 TYPE 2 DIABETES MELLITUS WITH DIABETIC NEUROPATHY, WITH LONG-TERM CURRENT USE OF INSULIN: ICD-10-CM

## 2018-12-18 ENCOUNTER — OFFICE VISIT (OUTPATIENT)
Dept: FAMILY MEDICINE | Facility: CLINIC | Age: 68
End: 2018-12-18
Payer: MEDICARE

## 2018-12-18 VITALS
HEIGHT: 62 IN | TEMPERATURE: 98 F | DIASTOLIC BLOOD PRESSURE: 54 MMHG | HEART RATE: 54 BPM | WEIGHT: 211.19 LBS | SYSTOLIC BLOOD PRESSURE: 116 MMHG | RESPIRATION RATE: 18 BRPM | BODY MASS INDEX: 38.86 KG/M2

## 2018-12-18 DIAGNOSIS — Z79.4 TYPE 2 DIABETES MELLITUS WITH DIABETIC NEUROPATHY, WITH LONG-TERM CURRENT USE OF INSULIN: ICD-10-CM

## 2018-12-18 DIAGNOSIS — E11.40 TYPE 2 DIABETES MELLITUS WITH DIABETIC NEUROPATHY, WITH LONG-TERM CURRENT USE OF INSULIN: ICD-10-CM

## 2018-12-18 DIAGNOSIS — E03.9 HYPOTHYROIDISM, UNSPECIFIED TYPE: ICD-10-CM

## 2018-12-18 DIAGNOSIS — I10 ESSENTIAL HYPERTENSION: Primary | ICD-10-CM

## 2018-12-18 PROCEDURE — 99213 OFFICE O/P EST LOW 20 MIN: CPT | Mod: PBBFAC,PN | Performed by: FAMILY MEDICINE

## 2018-12-18 PROCEDURE — 99214 OFFICE O/P EST MOD 30 MIN: CPT | Mod: S$PBB,,, | Performed by: FAMILY MEDICINE

## 2018-12-18 PROCEDURE — 99999 PR PBB SHADOW E&M-EST. PATIENT-LVL III: CPT | Mod: PBBFAC,,, | Performed by: FAMILY MEDICINE

## 2018-12-18 RX ORDER — INSULIN DEGLUDEC 100 U/ML
INJECTION, SOLUTION SUBCUTANEOUS
Qty: 45 ML | Refills: 3 | Status: SHIPPED | OUTPATIENT
Start: 2018-12-18 | End: 2019-01-24

## 2018-12-18 NOTE — PROGRESS NOTES
THIS DOCUMENT WAS MADE IN PART WITH VOICE RECOGNITION SOFTWARE.  OCCASIONALLY THIS SOFTWARE WILL MISINTERPRET WORDS OR PHRASES.      Mckenzie Mari  1950    Mckenzie was seen today for hypertension.    Diagnoses and all orders for this visit:    Essential hypertension  This chronic condition is currently stable.    Hypothyroidism, unspecified type  This chronic condition is currently stable.    Type 2 diabetes mellitus with diabetic neuropathy, with long-term current use of insulin  This is a chronic condition, not ideal control, worsening control. She does follow with endocrinology but we do discuss and review this as well so it is documented appropriately. She continues to believe that her diabetes worsens as the medication dose is increased and feels it is because of medication. I advised her to do her best to improve diet, and exercise and to follow with endocrinology    Rx for Pneumovax  Declines Shingrix    Subjective     Chief Complaint   Patient presents with    Hypertension     follow up        HPI     all pertinent HPI elements addressed above.    HPI elements addressed above in the assessment and plan including problems, diagnosis, stability/instability,  improving/worsening, and chronicity will not be duplicated in this section. Any important additional HPI topics will be discussed here if needed.    Active Ambulatory Problems     Diagnosis Date Noted    CHF (congestive heart failure)     CHF (NYHA class III, ACC/AHA stage C)     Hyperlipidemia     Chest pain 01/18/2012    HTN (hypertension)     CVID (common variable immunodeficiency)     Chronic sinusitis     ALLERGIC RHINITIS 01/23/2012    Penicillin allergy 01/30/2012    History of thyroid cancer 07/25/2012    Postoperative hypothyroidism 07/25/2012    LBBB (left bundle branch block) 10/11/2012    ICD (implantable cardioverter-defibrillator) in place 10/23/2012    Sinusitis 10/01/2013    Dyspnea on effort 03/25/2014    Chest  pain on exertion 03/25/2014    CAD (coronary artery disease) 04/17/2014    Type 2 diabetes mellitus with diabetic neuropathy, with long-term current use of insulin 05/11/2014    Myoclonus     History of colon polyps 05/11/2016    Obesity (BMI 30-39.9) 08/31/2016    History of pancreatitis 08/31/2016    Nasal obstruction 03/15/2017    Deviated nasal septum 03/15/2017    Hypertrophy of nasal turbinates 03/15/2017    Malignant neoplasm of central portion of left female breast 10/06/2017     Resolved Ambulatory Problems     Diagnosis Date Noted    Subacute maxillary sinusitis 03/15/2017     Past Medical History:   Diagnosis Date    Allergy     Arthritis     Breast cancer 2005    Bundle branch block     Cardiomyopathy     CHF (congestive heart failure)     Chronic sinusitis     CVID (common variable immunodeficiency)     Diabetes mellitus     GERD (gastroesophageal reflux disease)     Headache(784.0)     HTN (hypertension)     Hyperlipidemia     Hypothyroid 7/25/2012    Otitis media     Presence of combination internal cardiac defibrillator (ICD) and pacemaker     Thyroid cancer     Thyroid cancer 7/25/2012    Thyroid disease          Review of Systems   Constitutional: Positive for fatigue. Negative for chills, fever and unexpected weight change.   HENT: Positive for congestion.    Eyes: Negative for visual disturbance.   Respiratory: Negative for shortness of breath and wheezing.    Cardiovascular: Negative for chest pain and palpitations.   Gastrointestinal: Negative for abdominal pain, nausea and vomiting.   Endocrine: Negative for polydipsia and polyuria.   Genitourinary: Negative.    Skin: Negative for wound.   Neurological: Negative.    Hematological: Negative.    Psychiatric/Behavioral: Negative.        Objective     Physical Exam   Constitutional: She is oriented to person, place, and time. She appears well-developed and well-nourished.   HENT:   Head: Normocephalic and atraumatic.  "  Eyes: No scleral icterus.   Cardiovascular: Regular rhythm and normal heart sounds. Bradycardia present.   No murmur heard.  Pulmonary/Chest: Effort normal and breath sounds normal. No stridor. No respiratory distress. She has no wheezes.   Neurological: She is alert and oriented to person, place, and time.   Skin: Skin is dry. No rash noted. She is not diaphoretic.   Psychiatric: She has a normal mood and affect. Her behavior is normal.   Vitals reviewed.    Vitals:    12/18/18 1407   BP: (!) 116/54   BP Location: Left arm   Patient Position: Sitting   BP Method: X-Large (Manual)   Pulse: (!) 54   Resp: 18   Temp: 97.9 °F (36.6 °C)   TempSrc: Oral   Weight: 95.8 kg (211 lb 3.2 oz)   Height: 5' 2" (1.575 m)       MOST RECENT LABS IN OUR ELECTRONIC MEDICAL RECORD:     Results for orders placed or performed in visit on 12/11/18   Cardiac device check - In Clinic (CUPID ONLY-[facility], [facility], [place])   Result Value Ref Range    AV Delay - Longest 120 msec    AV Delay - Shortest 80 msec    PV Delay - Fixed -40 msec    VV Delay 0 msec    Battery Voltage (V) 3.03 V    HV Impedance (Ohm) 65 Ohm    P/R-wave RA Lead 2.6 mV    P/R-wave RA Lead (native) 16.4 mV    P/R-wave RA Lead (donor) 12.9 mV    Impedance RA Lead 447 Ohms    Impedance RA Lead (native) 461 Ohms    Impedance RA Lead (donor) 1,098 Ohms    Threshold V RA Lead 0.6 V    Theshold ms RA Lead 0.4 ms    Threshold V RA Lead (native) 0.6 V    Threshold ms RA Lead (native) 0.4 ms    Threshold V RA Lead (donor) 0.6 V    Threshold ms RA Lead (donor) 0.4 ms    Threshold V RV Lead 0.8 V    Threshold ms RV Lead 1.0 V         "

## 2018-12-28 ENCOUNTER — PATIENT MESSAGE (OUTPATIENT)
Dept: FAMILY MEDICINE | Facility: CLINIC | Age: 68
End: 2018-12-28

## 2019-01-03 ENCOUNTER — PATIENT MESSAGE (OUTPATIENT)
Dept: FAMILY MEDICINE | Facility: CLINIC | Age: 69
End: 2019-01-03

## 2019-01-04 ENCOUNTER — CLINICAL SUPPORT (OUTPATIENT)
Dept: DIABETES | Facility: CLINIC | Age: 69
End: 2019-01-04
Payer: MEDICARE

## 2019-01-04 VITALS — HEIGHT: 62 IN | WEIGHT: 210.19 LBS | BODY MASS INDEX: 38.68 KG/M2

## 2019-01-04 DIAGNOSIS — E11.40 TYPE 2 DIABETES MELLITUS WITH DIABETIC NEUROPATHY, WITH LONG-TERM CURRENT USE OF INSULIN: Primary | ICD-10-CM

## 2019-01-04 DIAGNOSIS — Z79.4 TYPE 2 DIABETES MELLITUS WITH DIABETIC NEUROPATHY, WITH LONG-TERM CURRENT USE OF INSULIN: Primary | ICD-10-CM

## 2019-01-04 PROCEDURE — 99999 PR PBB SHADOW E&M-EST. PATIENT-LVL I: CPT | Mod: PBBFAC,,,

## 2019-01-04 PROCEDURE — G0108 DIAB MANAGE TRN  PER INDIV: HCPCS | Mod: PBBFAC,PN | Performed by: DIETITIAN, REGISTERED

## 2019-01-04 PROCEDURE — 99999 PR PBB SHADOW E&M-EST. PATIENT-LVL I: ICD-10-PCS | Mod: PBBFAC,,,

## 2019-01-04 PROCEDURE — 99211 OFF/OP EST MAY X REQ PHY/QHP: CPT | Mod: PBBFAC,PN

## 2019-01-04 NOTE — PROGRESS NOTES
Diabetes Education  Author: Vivi Cruz RD  Date: 1/4/2019    Diabetes Care Management Summary  Diabetes Education Record Assessment/Progress: Initial  Current Diabetes Risk Level: Low     Diabetes Type  Diabetes Type : Type II    Diabetes History  Diabetes Diagnosis: >10 years(Diagnosed in 2004)  Current Treatment: Insulin(Tresiba 48 units QAM, Humalog 16 units AC plus correction (ISF 25, Target 180))  Reviewed Problem List with Patient: Yes    Health Maintenance was reviewed today with patient. Discussed with patient importance of routine eye exams, foot exams/foot care, blood work (i.e.: A1c, microalbumin, and lipid), dental visits, yearly flu vaccine, and pneumonia vaccine as indicated by PCP. Patient verbalized understanding.     Health Maintenance Topics with due status: Not Due       Topic Last Completion Date    Colonoscopy 05/11/2016    DEXA SCAN 12/11/2017    Eye Exam 04/10/2018    Urine Microalbumin 07/06/2018    Foot Exam 07/11/2018    Mammogram 09/18/2018    Lipid Panel 11/30/2018    Hemoglobin A1c 11/30/2018    High Dose Statin 12/25/2018     Health Maintenance Due   Topic Date Due    TETANUS VACCINE  07/27/1969    Zoster Vaccine  07/27/2011       Nutrition  Meal Planning: water, 3 meals per day, snacks between meal, eats out seldom.  Patient states daughter and grandson moved in and that she and her spouse keep a small refrigerator of food in their bedroom to prevent it all from being eaten.    What type of sweetener do you use?: Sweet N Low  What type of beverages do you drink?: milk, water, diet soda/tea  Meal Plan 24 Hour Recall - Breakfast: apple cinnamon oatmeal packet made with water and creamer OR egg, 2 slices warren  Meal Plan 24 Hour Recall - Lunch: pork roast, small sweet potato, water  Meal Plan 24 Hour Recall - Dinner: sirloin, broccoli, rice  Meal Plan 24 Hour Recall - Snack: chocolate, prepared dessert (likes to cook desserts)    Monitoring   Monitoring: One Touch Verio IQ  Self  Monitoring : 3 times daily before meals  Blood Glucose Logs: Yes, no hypoglycemia, continues with prandial excursions mostly with increased eating in late afternoon or evening  Do you use a personal continuous glucose monitor?: No  In the last month, how often have you had a low blood sugar reaction?: never(Patient states she is symptomatic with glucose readings less than 150, she is not comfortable with pre prandial glucose readings in 120s )  Can you tell when your blood sugar is too high?: no          Exercise   Exercise Type: none(Has treadmill at home)    Current Diabetes Treatment   Current Treatment: Insulin(Tresiba 48 units QAM, Humalog 16 units AC plus correction (ISF 25, Target 180))    Social History  Preferred Learning Method: Face to Face, Reading Materials  Primary Support: Self, Spouse  Smoking Status: Never a Smoker  Alcohol Use: Never    Barriers to Change  Barriers to Change: None  Learning Challenges : None    Readiness to Learn   Readiness to Learn : Acceptance    Cultural Influences  Cultural Influences: None    Diabetes Education Assessment/Progress  Diabetes Disease Process (diabetes disease process and treatment options): Discussion, Comprehends Key Points.  Hgb A1c gradually increasing over past year.  Patient feels that since starting insulin therapy, her glucose level have worsened.  Discussed goal Hgb A1c of 7%--this will be difficult to obtain as patient would like glucose levels to remain 150 mg/dL or more. Emphasized need to stop Hgb A1c from rising gradually.       Nutrition (Incorporating nutritional management into one's lifestyle): Discussion, Demonstration, Return Demonstration, Instructed, Demonstrates Understanding/Competency (verbalizes/demonstrates).  Patient overall does not have poor diet---struggles with evening snacking and also is not incorporating many non-starchy vegetables at meals to increase satiety.  Patient states that her  is post gastric bypass and eats  "drastically different from her.  Discussed reading food labels for CHO content--patient encouraged to read total CHO and not sugar content on labels.  Patient given a CHO budget for meals and snacks.  Discussed patient trying to "budget" for dessert or chocolate at meals instead of simply adding after a meal.  Discussed better low/no carb snack options since most of snacks are being eaten in the evening.      Physical Activity (incorporating physical activity into one's lifestyle): Discussion, Instructed, Comprehends Key Points.  Patient is considering restarting to use her home treadmill.     Medications (states correct name, dose, onset, peak, duration, side effects & timing of meds): Discussion, Comprehends Key Points.  Compliant with diabetic medication as prescribed.  States she is taking Humalog 5-15 minutes prior to eating meals.      Monitoring (monitoring blood glucose/other parameters & using results): Discussion, Written Materials Provided, Comprehends Key Points.  Continue checking glucose 3 times daily before meals.  Patient given additional glucose logs as requested.      Behavioral (readiness for change, lifestyle practices, self-care behaviors): Discussion, Comprehends Key Points.  Patient in agreement to work on adding non starch vegetables at lunch and dinner.      Goals  Patient has selected/evaluated goals during today's session: Yes, selected  Healthy Eating: Set(Add non starchy veetables to lunch and dinner.  Have a protein and CHO source at breakfast)  Start Date: 01/04/19  Target Date: 04/04/19     Diabetes Care Plan/Intervention  Education Plan/Intervention:   1.  Continue checking glucose levels 3 times daily before meals.    2.  Add non starchy vegetables to lunch and dinner.  Also, work on balancing breakfast with a protein and a CHO source.  3.  Decrease evening snacking--better snack options (low/no carb) discussed with patient.  4.  Restart walking--goal of 20-30 minutes most days.  "     Diabetes Meal Plan  Calories: 1800  Carbohydrate Per Meal: 30-45g  Carbohydrate Per Snack : 7-15g    Today's Self-Management Care Plan was developed with the patient's input and is based on barriers identified during today's assessment.    The long and short-term goals in the care plan were written with the patient/caregiver's input. The patient has agreed to work toward these goals to improve her overall diabetes control.      The patient received a copy of today's self-management plan and verbalized understanding of the care plan, goals, and all of today's instructions.      The patient was encouraged to communicate with her physician and care team regarding her condition(s) and treatment.  I provided the patient with my contact information today and encouraged her to contact me via phone or patient portal as needed.     Education Units of Time   Time Spent: 45 min

## 2019-01-14 ENCOUNTER — PATIENT MESSAGE (OUTPATIENT)
Dept: ENDOCRINOLOGY | Facility: CLINIC | Age: 69
End: 2019-01-14

## 2019-01-14 DIAGNOSIS — E11.8 TYPE 2 DIABETES MELLITUS WITH COMPLICATION, WITH LONG-TERM CURRENT USE OF INSULIN: ICD-10-CM

## 2019-01-14 DIAGNOSIS — E11.65 TYPE 2 DIABETES MELLITUS WITH HYPERGLYCEMIA, WITH LONG-TERM CURRENT USE OF INSULIN: ICD-10-CM

## 2019-01-14 DIAGNOSIS — Z79.4 TYPE 2 DIABETES MELLITUS WITH HYPERGLYCEMIA, WITH LONG-TERM CURRENT USE OF INSULIN: ICD-10-CM

## 2019-01-14 DIAGNOSIS — Z79.4 TYPE 2 DIABETES MELLITUS WITH COMPLICATION, WITH LONG-TERM CURRENT USE OF INSULIN: ICD-10-CM

## 2019-01-14 RX ORDER — PEN NEEDLE, DIABETIC 30 GX3/16"
NEEDLE, DISPOSABLE MISCELLANEOUS
Qty: 150 EACH | Refills: 11 | Status: SHIPPED | OUTPATIENT
Start: 2019-01-14 | End: 2020-03-26

## 2019-01-23 ENCOUNTER — TELEPHONE (OUTPATIENT)
Dept: ENDOCRINOLOGY | Facility: CLINIC | Age: 69
End: 2019-01-23

## 2019-01-23 ENCOUNTER — PATIENT MESSAGE (OUTPATIENT)
Dept: ENDOCRINOLOGY | Facility: CLINIC | Age: 69
End: 2019-01-23

## 2019-01-23 NOTE — TELEPHONE ENCOUNTER
Spoke with pt, states blood sugars have not changed since education appt, will fax logs as requested for further review

## 2019-01-23 NOTE — TELEPHONE ENCOUNTER
Please call pt and inquire how blood sugars have been since seeing Education. If still having many >180, please advise her to send log for review in case she needs further adjustment.

## 2019-01-24 ENCOUNTER — TELEPHONE (OUTPATIENT)
Dept: ENDOCRINOLOGY | Facility: CLINIC | Age: 69
End: 2019-01-24

## 2019-01-24 ENCOUNTER — PATIENT MESSAGE (OUTPATIENT)
Dept: ENDOCRINOLOGY | Facility: CLINIC | Age: 69
End: 2019-01-24

## 2019-01-24 DIAGNOSIS — E11.40 TYPE 2 DIABETES MELLITUS WITH DIABETIC NEUROPATHY, WITH LONG-TERM CURRENT USE OF INSULIN: ICD-10-CM

## 2019-01-24 DIAGNOSIS — Z79.4 TYPE 2 DIABETES MELLITUS WITH DIABETIC NEUROPATHY, WITH LONG-TERM CURRENT USE OF INSULIN: ICD-10-CM

## 2019-01-24 RX ORDER — INSULIN DEGLUDEC 100 U/ML
INJECTION, SOLUTION SUBCUTANEOUS
Qty: 45 ML | Refills: 3
Start: 2019-01-24 | End: 2019-03-08

## 2019-01-24 NOTE — TELEPHONE ENCOUNTER
Spoke with pts , pt could not come to the phone, advised to check mychart for insulin dose adjustment, voiced understanding

## 2019-01-24 NOTE — TELEPHONE ENCOUNTER
Reviewed BG readings. please call pt and advise her to increase Tresiba to 54 units. Since she wants glucose >150, due to symptoms, we won't increase the Humalog at this time. If she eats a meal less containing than 30 grams of carbs, she can decrease Humalog dose by half (to 8 units). Otherwise, try to keep meals in 30-45 gram range and snacks < 15 grams, per Maria Teresa's recs from recent education appt. Thank you.

## 2019-01-29 ENCOUNTER — PATIENT MESSAGE (OUTPATIENT)
Dept: ENDOCRINOLOGY | Facility: CLINIC | Age: 69
End: 2019-01-29

## 2019-02-02 RX ORDER — POTASSIUM CHLORIDE 20 MEQ/1
TABLET, EXTENDED RELEASE ORAL
Qty: 90 TABLET | Refills: 1 | Status: SHIPPED | OUTPATIENT
Start: 2019-02-02 | End: 2019-03-04 | Stop reason: SDUPTHER

## 2019-02-03 ENCOUNTER — PATIENT MESSAGE (OUTPATIENT)
Dept: CARDIOLOGY | Facility: CLINIC | Age: 69
End: 2019-02-03

## 2019-02-04 RX ORDER — FUROSEMIDE 40 MG/1
40 TABLET ORAL DAILY
Qty: 90 TABLET | Refills: 1 | Status: SHIPPED | OUTPATIENT
Start: 2019-02-04 | End: 2020-09-04

## 2019-02-16 ENCOUNTER — PATIENT MESSAGE (OUTPATIENT)
Dept: ENDOCRINOLOGY | Facility: CLINIC | Age: 69
End: 2019-02-16

## 2019-03-04 ENCOUNTER — PATIENT MESSAGE (OUTPATIENT)
Dept: CARDIOLOGY | Facility: CLINIC | Age: 69
End: 2019-03-04

## 2019-03-04 ENCOUNTER — LAB VISIT (OUTPATIENT)
Dept: LAB | Facility: HOSPITAL | Age: 69
End: 2019-03-04
Attending: FAMILY MEDICINE
Payer: MEDICARE

## 2019-03-04 DIAGNOSIS — E11.40 TYPE 2 DIABETES MELLITUS WITH DIABETIC NEUROPATHY, WITH LONG-TERM CURRENT USE OF INSULIN: ICD-10-CM

## 2019-03-04 DIAGNOSIS — Z79.4 TYPE 2 DIABETES MELLITUS WITH DIABETIC NEUROPATHY, WITH LONG-TERM CURRENT USE OF INSULIN: ICD-10-CM

## 2019-03-04 LAB
ANION GAP SERPL CALC-SCNC: 6 MMOL/L
BUN SERPL-MCNC: 18 MG/DL
CALCIUM SERPL-MCNC: 9.9 MG/DL
CHLORIDE SERPL-SCNC: 106 MMOL/L
CO2 SERPL-SCNC: 29 MMOL/L
CREAT SERPL-MCNC: 0.9 MG/DL
EST. GFR  (AFRICAN AMERICAN): >60 ML/MIN/1.73 M^2
EST. GFR  (NON AFRICAN AMERICAN): >60 ML/MIN/1.73 M^2
ESTIMATED AVG GLUCOSE: 192 MG/DL
GLUCOSE SERPL-MCNC: 88 MG/DL
HBA1C MFR BLD HPLC: 8.3 %
POTASSIUM SERPL-SCNC: 4.4 MMOL/L
SODIUM SERPL-SCNC: 141 MMOL/L

## 2019-03-04 PROCEDURE — 83036 HEMOGLOBIN GLYCOSYLATED A1C: CPT

## 2019-03-04 PROCEDURE — 80048 BASIC METABOLIC PNL TOTAL CA: CPT

## 2019-03-04 PROCEDURE — 36415 COLL VENOUS BLD VENIPUNCTURE: CPT | Mod: PN

## 2019-03-04 RX ORDER — POTASSIUM CHLORIDE 20 MEQ/1
20 TABLET, EXTENDED RELEASE ORAL DAILY
Qty: 90 TABLET | Refills: 1 | Status: SHIPPED | OUTPATIENT
Start: 2019-03-04 | End: 2019-09-05 | Stop reason: SDUPTHER

## 2019-03-08 ENCOUNTER — PATIENT MESSAGE (OUTPATIENT)
Dept: ENDOCRINOLOGY | Facility: CLINIC | Age: 69
End: 2019-03-08

## 2019-03-08 ENCOUNTER — OFFICE VISIT (OUTPATIENT)
Dept: ENDOCRINOLOGY | Facility: CLINIC | Age: 69
End: 2019-03-08
Payer: MEDICARE

## 2019-03-08 VITALS
WEIGHT: 211.75 LBS | SYSTOLIC BLOOD PRESSURE: 110 MMHG | DIASTOLIC BLOOD PRESSURE: 70 MMHG | BODY MASS INDEX: 38.97 KG/M2 | HEIGHT: 62 IN | HEART RATE: 66 BPM

## 2019-03-08 DIAGNOSIS — E78.5 HYPERLIPIDEMIA, UNSPECIFIED HYPERLIPIDEMIA TYPE: Chronic | ICD-10-CM

## 2019-03-08 DIAGNOSIS — Z87.19 HISTORY OF PANCREATITIS: ICD-10-CM

## 2019-03-08 DIAGNOSIS — I50.9 CHF (NYHA CLASS III, ACC/AHA STAGE C): ICD-10-CM

## 2019-03-08 DIAGNOSIS — I10 ESSENTIAL HYPERTENSION: ICD-10-CM

## 2019-03-08 DIAGNOSIS — E66.9 OBESITY (BMI 30-39.9): ICD-10-CM

## 2019-03-08 DIAGNOSIS — I25.10 CORONARY ARTERY DISEASE INVOLVING NATIVE CORONARY ARTERY WITHOUT ANGINA PECTORIS, UNSPECIFIED WHETHER NATIVE OR TRANSPLANTED HEART: ICD-10-CM

## 2019-03-08 DIAGNOSIS — Z79.4 TYPE 2 DIABETES MELLITUS WITH DIABETIC NEUROPATHY, WITH LONG-TERM CURRENT USE OF INSULIN: Primary | ICD-10-CM

## 2019-03-08 DIAGNOSIS — E11.40 TYPE 2 DIABETES MELLITUS WITH DIABETIC NEUROPATHY, WITH LONG-TERM CURRENT USE OF INSULIN: Primary | ICD-10-CM

## 2019-03-08 DIAGNOSIS — E89.0 POSTOPERATIVE HYPOTHYROIDISM: ICD-10-CM

## 2019-03-08 PROCEDURE — 99214 OFFICE O/P EST MOD 30 MIN: CPT | Mod: S$PBB,,, | Performed by: NURSE PRACTITIONER

## 2019-03-08 PROCEDURE — 99214 PR OFFICE/OUTPT VISIT, EST, LEVL IV, 30-39 MIN: ICD-10-PCS | Mod: S$PBB,,, | Performed by: NURSE PRACTITIONER

## 2019-03-08 PROCEDURE — 99999 PR PBB SHADOW E&M-EST. PATIENT-LVL V: ICD-10-PCS | Mod: PBBFAC,,, | Performed by: NURSE PRACTITIONER

## 2019-03-08 PROCEDURE — 99215 OFFICE O/P EST HI 40 MIN: CPT | Mod: PBBFAC,PN | Performed by: NURSE PRACTITIONER

## 2019-03-08 PROCEDURE — 99999 PR PBB SHADOW E&M-EST. PATIENT-LVL V: CPT | Mod: PBBFAC,,, | Performed by: NURSE PRACTITIONER

## 2019-03-08 RX ORDER — INSULIN DEGLUDEC 200 U/ML
50 INJECTION, SOLUTION SUBCUTANEOUS DAILY
Qty: 27 ML | Refills: 3 | Status: SHIPPED | OUTPATIENT
Start: 2019-03-08 | End: 2019-07-12

## 2019-03-08 RX ORDER — INSULIN LISPRO 100 [IU]/ML
16 INJECTION, SOLUTION INTRAVENOUS; SUBCUTANEOUS
Qty: 5 BOX | Refills: 3 | Status: SHIPPED | OUTPATIENT
Start: 2019-03-08 | End: 2019-07-12

## 2019-03-08 NOTE — PROGRESS NOTES
"CC: Ms. Mckenzie Mari arrives today for management of Type 2 DM and review of chronic medical conditions.     HPI: Ms. Mckenzie Mari was diagnosed with Type 2 DM in 2004. She was diagnosed based on lab work. Initial treatment consisted of metformin and glimepiride. Insulin added in 8/2016 - began Toujeo. She states that she felt that this caused nausea, abd pain, chest pain. Lantus caused throat swelling, left arm pain. She was then changed to Novolog 70/30 but this was only used for a short period because her insurance didn't cover.  Then changed to Humalog 50-50 in 12/2016. In 2017, she began MDI with Tresiba and Humalog. + FH of DM in maternal aunt and cousin. Denies hospitalizations due to DM. Previously seen in endocrine by Richard Gupta, and MAGALI Eng NP. She has a history of thyroid cancer/post-surgical hypothyroidism.   Of note, she has a h/o pancreatitis.   Insulin management is challenging because patient believes insulin causes hyperglycemia.    She follows annually with cardiology for CHF, CAD.      Last seen by me in December. Both insulin doses were increased. Basal insulin was further increased in mid February.      She states she is hesitant to change insulin doses because her "body needs time to adjust."    She occasionally takes 18 units of Novolog, per correction scale, and believes this dose causes glucose to remain elevated.     BG readings are checked 3x/day. Brings log.               Hypoglycemia: No but feels symptomatic with BG < 120.  She feels fatigued, lightheaded.  She prefers glucoses remain >150.    Missing Insulin/PO medication doses: No  Timing prandial insulin 5-15 minutes before meals: yes    Dietary Habits: Eats 3 meals/day. Snacks after dinner on oatmeal bar or SF pudding. Avoids sugary drinks.    DM education: 1/2019    She reports that insulin can be expensive for 90 day supply but isn't interested in Relion insulins or Pt Assistance (also doubtful she would " "qualify).     CURRENT DIABETIC MEDS: Tresiba 50 units QHS, Humalog 16 units AC + correction scale, target 150, ISF 50.  Vial or pen: pen  Glucometer type: One Touch Verio     Previous DM treatments:   Invokana - nausea  Glimepiride - stopped when prandial insulin added  Touejo -  Nausea, abd pain, chest pain  Lantus - throat swelling, left arm pain  Novolog 70/30 - not covered by insurance  Metformin - headaches    Last Eye Exam: 4/2018 - sees Dr. Mcgregor. Denies DRSlava   Last Podiatry Exam: no    REVIEW OF SYSTEMS  Constitutional: no c/o weakness or weight loss. + fatigue   Eyes: no c/o visual disturbances.   Cardiac: no palpitations, chest pain.  Respiratory: no c/o dyspnea, c/o occasional dry cough  GI: no c/o abdominal pain, nausea. + H/o pancreatitis.   Skin: no lesions or rashes.   Neuro: reports occasional numbness, tingling in feet that comes and goes.   Endocrine: denies polyphagia, polydipsia, polyuria      Personally reviewed Past Medical, Surgical, Social History.    Vital Signs  /70   Pulse 66   Ht 5' 2" (1.575 m)   Wt 96.1 kg (211 lb 12 oz)   BMI 38.73 kg/m²     Personally reviewed the below labs:    Hemoglobin A1C   Date Value Ref Range Status   03/04/2019 8.3 (H) 4.0 - 5.6 % Final     Comment:     ADA Screening Guidelines:  5.7-6.4%  Consistent with prediabetes  >or=6.5%  Consistent with diabetes  High levels of fetal hemoglobin interfere with the HbA1C  assay. Heterozygous hemoglobin variants (HbS, HgC, etc)do  not significantly interfere with this assay.   However, presence of multiple variants may affect accuracy.     11/30/2018 8.5 (H) 4.0 - 5.6 % Final     Comment:     ADA Screening Guidelines:  5.7-6.4%  Consistent with prediabetes  >or=6.5%  Consistent with diabetes  High levels of fetal hemoglobin interfere with the HbA1C  assay. Heterozygous hemoglobin variants (HbS, HgC, etc)do  not significantly interfere with this assay.   However, presence of multiple variants may affect " accuracy.     07/06/2018 8.3 (H) 4.0 - 5.6 % Final     Comment:     ADA Screening Guidelines:  5.7-6.4%  Consistent with prediabetes  >or=6.5%  Consistent with diabetes  High levels of fetal hemoglobin interfere with the HbA1C  assay. Heterozygous hemoglobin variants (HbS, HgC, etc)do  not significantly interfere with this assay.   However, presence of multiple variants may affect accuracy.         Chemistry        Component Value Date/Time     03/04/2019 0907    K 4.4 03/04/2019 0907     03/04/2019 0907    CO2 29 03/04/2019 0907    BUN 18 03/04/2019 0907    CREATININE 0.9 03/04/2019 0907    GLU 88 03/04/2019 0907        Component Value Date/Time    CALCIUM 9.9 03/04/2019 0907    ALKPHOS 87 11/30/2018 0800    AST 18 11/30/2018 0800    ALT 20 11/30/2018 0800    BILITOT 1.4 (H) 11/30/2018 0800          Lab Results   Component Value Date    CHOL 156 11/30/2018    CHOL 131 03/29/2018    CHOL 148 04/20/2017     Lab Results   Component Value Date    HDL 46 11/30/2018    HDL 47 03/29/2018    HDL 47 04/20/2017     Lab Results   Component Value Date    LDLCALC 79.0 11/30/2018    LDLCALC 54.4 (L) 03/29/2018    LDLCALC 62.4 (L) 04/20/2017     Lab Results   Component Value Date    TRIG 155 (H) 11/30/2018    TRIG 148 03/29/2018    TRIG 193 (H) 04/20/2017     Lab Results   Component Value Date    CHOLHDL 29.5 11/30/2018    CHOLHDL 35.9 03/29/2018    CHOLHDL 31.8 04/20/2017       Lab Results   Component Value Date    MICALBCREAT 4.7 07/06/2018     Lab Results   Component Value Date    TSH 1.245 11/30/2018    TSH 1.245 11/30/2018       Estimated Creatinine Clearance: 65.6 mL/min (based on SCr of 0.9 mg/dL).    Vit D, 25-Hydroxy   Date Value Ref Range Status   11/08/2014 51 30 - 96 ng/mL Final     Comment:     Vitamin D deficiency.........<10 ng/mL                              Vitamin D insufficiency......10-29 ng/mL       Vitamin D sufficiency........> or equal to 30 ng/mL  Vitamin D toxicity............>100 ng/mL          PHYSICAL EXAMINATION  Constitutional: Appears well, no distress  Neck: Supple, trachea midline; transverse scar to anterior neck from thyroidectomy.   Respiratory: CTA, even and unlabored.  Cardiovascular: RRR, no murmurs, no carotid bruits. DP pulses 1+ bilaterally; no edema.  Lymph: no cervical or supraclavicular lymphadenopathy.  Skin: warm and dry; no lipohypertrophy, or acanthosis nigracans observed.  Neuro: DTR diminished to BUE/BLE. Previously, no loss of protective sensation via 10 gm monofilament. Vibratory exam mildly decreased, bilaterally  Feet: appropriate footwear.       A1c goal < 7.5%, due to CAD, CHF, desire for glucose to remain >150.         Assessment/Plan  1. Type 2 diabetes mellitus with diabetic neuropathy, with long-term current use of insulin  -- Uncontrolled. FBG 88 on labs, however, 220 with fingerstick that morning. Her meter is relatively new.   Orders Placed This Encounter   Procedures    GLUCOSE MONITORING CONTINUOUS MIN 72 HOURS   -- I suspect she could use more Tresiba at the least but would like to confirm glucose patterns with CGMS, due to inconsistency between recent lab and fingerstick.   -- she does not want to change Humalog dose because she believes that 18 units causes glucose to increase.   -- continue current insulin doses.   -- change Tresiba to U200 concentration  -- check BG 4x/day.     -- cannot use Soliqua, due to Lantus allergy. Would not use Actos, due to CHF. GLP-1RA and DPP4-I contraindicated due to h/o pancreatitis. Cannot tolerate metformin.     -- Discussed diagnosis of DM, A1c goals, progression of disease, long term complications and tx options.  Advised patient to check BG before activities, such as driving or exercise.  -- Reviewed hypoglycemia management: treat with 1/2 glass of juice, 1/2 can regular coke, or 4 glucose tablets. Monitor and repeat treatment every 15 minutes until BG is >70 Then have a snack, which includes a complex carbohydrate and  protein.    -- takes statin and ARB     2. Coronary artery disease involving native coronary artery without angina pectoris, unspecified whether native or transplanted heart  -- stable, avoid hyopglycemia   3. CHF (NYHA class III, ACC/AHA stage C)  -- follows with cardiology   4. Postoperative hypothyroidism  -- stable  -- Continue Synthroid at current dose.   5. Essential hypertension  -- controlled   -- continue ARB   6. Hyperlipidemia, unspecified hyperlipidemia type  -- acceptable. TRG marginally elevated.   -- optimize DM control  -- continue statin   7. Obesity (BMI 30-39.9)  -- increases insulin resistance  Body mass index is 38.73 kg/m².   8. History of pancreatitis  -- avoid GLP-1RA and DPP4-i           FOLLOW UP  Follow-up in about 4 weeks (around 4/5/2019). for CGMS review  Patient instructed to bring BG logs to each follow up.   Patient encouraged to call for any BG/medication issues, concerns, or questions.      Orders Placed This Encounter   Procedures    GLUCOSE MONITORING CONTINUOUS MIN 72 HOURS

## 2019-03-11 ENCOUNTER — PATIENT MESSAGE (OUTPATIENT)
Dept: ENDOCRINOLOGY | Facility: CLINIC | Age: 69
End: 2019-03-11

## 2019-03-15 ENCOUNTER — OFFICE VISIT (OUTPATIENT)
Dept: CARDIOLOGY | Facility: CLINIC | Age: 69
End: 2019-03-15
Payer: MEDICARE

## 2019-03-15 VITALS
HEART RATE: 69 BPM | DIASTOLIC BLOOD PRESSURE: 82 MMHG | SYSTOLIC BLOOD PRESSURE: 128 MMHG | BODY MASS INDEX: 39.23 KG/M2 | HEIGHT: 62 IN | WEIGHT: 213.19 LBS

## 2019-03-15 DIAGNOSIS — I44.7 LBBB (LEFT BUNDLE BRANCH BLOCK): ICD-10-CM

## 2019-03-15 DIAGNOSIS — I10 ESSENTIAL HYPERTENSION: ICD-10-CM

## 2019-03-15 DIAGNOSIS — Z95.810 ICD (IMPLANTABLE CARDIOVERTER-DEFIBRILLATOR) IN PLACE: Primary | Chronic | ICD-10-CM

## 2019-03-15 DIAGNOSIS — I50.9 CHF (NYHA CLASS III, ACC/AHA STAGE C): ICD-10-CM

## 2019-03-15 DIAGNOSIS — I25.10 CORONARY ARTERY DISEASE INVOLVING NATIVE CORONARY ARTERY WITHOUT ANGINA PECTORIS, UNSPECIFIED WHETHER NATIVE OR TRANSPLANTED HEART: ICD-10-CM

## 2019-03-15 PROCEDURE — 99214 OFFICE O/P EST MOD 30 MIN: CPT | Mod: S$PBB,,, | Performed by: INTERNAL MEDICINE

## 2019-03-15 PROCEDURE — 99214 PR OFFICE/OUTPT VISIT, EST, LEVL IV, 30-39 MIN: ICD-10-PCS | Mod: S$PBB,,, | Performed by: INTERNAL MEDICINE

## 2019-03-15 PROCEDURE — 99999 PR PBB SHADOW E&M-EST. PATIENT-LVL III: ICD-10-PCS | Mod: PBBFAC,,, | Performed by: INTERNAL MEDICINE

## 2019-03-15 PROCEDURE — 99999 PR PBB SHADOW E&M-EST. PATIENT-LVL III: CPT | Mod: PBBFAC,,, | Performed by: INTERNAL MEDICINE

## 2019-03-15 PROCEDURE — 99213 OFFICE O/P EST LOW 20 MIN: CPT | Mod: PBBFAC,PO | Performed by: INTERNAL MEDICINE

## 2019-03-15 RX ORDER — TELMISARTAN 20 MG/1
20 TABLET ORAL DAILY
Qty: 90 TABLET | Refills: 3 | Status: SHIPPED | OUTPATIENT
Start: 2019-03-15 | End: 2020-03-15

## 2019-03-15 NOTE — PROGRESS NOTES
Subjective:    Patient ID:  Mckenzie Mari is a 68 y.o. female who presents for follow-up of icm    HPI  She comes with no complaints, no chest pain, no shortness of breath  Lots of stress in her life    Review of Systems   Constitution: Negative for decreased appetite, weakness, malaise/fatigue, weight gain and weight loss.   Cardiovascular: Negative for chest pain, dyspnea on exertion, leg swelling, palpitations and syncope.   Respiratory: Negative for cough and shortness of breath.    Gastrointestinal: Negative.    All other systems reviewed and are negative.       Objective:      Physical Exam   Constitutional: She is oriented to person, place, and time. She appears well-developed and well-nourished.   HENT:   Head: Normocephalic.   Eyes: Pupils are equal, round, and reactive to light.   Neck: Normal range of motion. Neck supple. No JVD present. Carotid bruit is not present. No thyromegaly present.   Cardiovascular: Normal rate, regular rhythm, normal heart sounds, intact distal pulses and normal pulses. PMI is not displaced. Exam reveals no gallop.   No murmur heard.  Pulmonary/Chest: Effort normal and breath sounds normal.   Abdominal: Soft. Normal appearance. She exhibits no mass. There is no hepatosplenomegaly. There is no tenderness.   Musculoskeletal: Normal range of motion. She exhibits no edema.   Neurological: She is alert and oriented to person, place, and time. She has normal strength and normal reflexes. No sensory deficit.   Skin: Skin is warm and intact.   Psychiatric: She has a normal mood and affect.   Nursing note and vitals reviewed.        Assessment:       1. ICD (implantable cardioverter-defibrillator) in place    2. CHF (NYHA class III, ACC/AHA stage C)    3. Essential hypertension    4. LBBB (left bundle branch block)    5. Coronary artery disease involving native coronary artery without angina pectoris, unspecified whether native or transplanted heart         Plan:   Stop  diovan  telmisartan 20 qd  Continue all cardiac medications  Regular exercise program  Weight loss  9 m f/u with cali

## 2019-03-18 ENCOUNTER — CLINICAL SUPPORT (OUTPATIENT)
Dept: CARDIOLOGY | Facility: CLINIC | Age: 69
End: 2019-03-18
Attending: INTERNAL MEDICINE
Payer: MEDICARE

## 2019-03-18 ENCOUNTER — PATIENT MESSAGE (OUTPATIENT)
Dept: ENDOCRINOLOGY | Facility: CLINIC | Age: 69
End: 2019-03-18

## 2019-03-18 DIAGNOSIS — I50.9 CHF (NYHA CLASS III, ACC/AHA STAGE C): ICD-10-CM

## 2019-03-18 DIAGNOSIS — Z95.810 ICD (IMPLANTABLE CARDIOVERTER-DEFIBRILLATOR) IN PLACE: ICD-10-CM

## 2019-03-20 ENCOUNTER — PATIENT MESSAGE (OUTPATIENT)
Dept: ENDOCRINOLOGY | Facility: CLINIC | Age: 69
End: 2019-03-20

## 2019-03-20 ENCOUNTER — CLINICAL SUPPORT (OUTPATIENT)
Dept: DIABETES | Facility: CLINIC | Age: 69
End: 2019-03-20
Payer: MEDICARE

## 2019-03-20 DIAGNOSIS — Z79.4 TYPE 2 DIABETES MELLITUS WITH DIABETIC NEUROPATHY, WITH LONG-TERM CURRENT USE OF INSULIN: Primary | ICD-10-CM

## 2019-03-20 DIAGNOSIS — E11.40 TYPE 2 DIABETES MELLITUS WITH DIABETIC NEUROPATHY, WITH LONG-TERM CURRENT USE OF INSULIN: Primary | ICD-10-CM

## 2019-03-20 PROCEDURE — 99999 PR PBB SHADOW E&M-EST. PATIENT-LVL I: ICD-10-PCS | Mod: PBBFAC,,,

## 2019-03-20 PROCEDURE — 99211 OFF/OP EST MAY X REQ PHY/QHP: CPT | Mod: PBBFAC,PN

## 2019-03-20 PROCEDURE — 99999 PR PBB SHADOW E&M-EST. PATIENT-LVL I: CPT | Mod: PBBFAC,,,

## 2019-03-20 NOTE — PROGRESS NOTES
"DIABETES EDUCATOR NOTE   PLACEMENT OF FREESTYLE ERIKA PRO SENSOR  CONTINOUS GLUCOSE MONITORING SYSTEM (CGMS)    Patient is here in clinic today for placement of continuous glucose monitoring sensor.      Patient verified that they were here for CGMS procedure ordered by their provider and that they have a working glucose meter and supplies at home.   Patient provided with a Freestyle Erika Sensor and a copy of the Continuous Glucose Monitoring Patient Log to fill out during the study.   A detailed explanation of Continuous Glucose Monitoring was provided. Patient informed that this is a blind procedure and that they will not actually see the blood sugar tracing in real time.  Reviewed with patient the  patient education handout called "Your Freestyle Erika Pro sensor: What you need to know" to review self-care during the study to avoid sensor loosening or removal ie... bathing, swimming, dressing, and exercising.   Instructed patient to check blood sugar using home glucometer and to record the following on provided patient log sheets: Blood sugar taken at home, Meals and snacks, Activity, and Diabetes medications taken and dosage    Patient was brought to a private location.  Arm for insertion was selected and prepared and allowed to dry. Glucose Sensor Serial Number [id number]  was inserted to back of patient's right upper arm.    The following forms were given and reviewed in detail with patient and all questions answered.   · Continuous Glucose Monitoring Patient Log #[id number]  · Freestyle Manufacture Patient Handout "Your Freestyle Erika Pro Sensor: What you need to know"     Instructions: Time: 15 min   Insertion of sensor done individually in private:  Time: 15 minutes         "

## 2019-03-21 ENCOUNTER — CLINICAL SUPPORT (OUTPATIENT)
Dept: DIABETES | Facility: CLINIC | Age: 69
End: 2019-03-21
Payer: MEDICARE

## 2019-03-21 VITALS — WEIGHT: 212.94 LBS | BODY MASS INDEX: 38.95 KG/M2

## 2019-03-21 DIAGNOSIS — E11.40 TYPE 2 DIABETES MELLITUS WITH DIABETIC NEUROPATHY, WITH LONG-TERM CURRENT USE OF INSULIN: Primary | ICD-10-CM

## 2019-03-21 DIAGNOSIS — Z79.4 TYPE 2 DIABETES MELLITUS WITH DIABETIC NEUROPATHY, WITH LONG-TERM CURRENT USE OF INSULIN: Primary | ICD-10-CM

## 2019-03-21 PROCEDURE — 99999 PR PBB SHADOW E&M-EST. PATIENT-LVL I: CPT | Mod: PBBFAC,,,

## 2019-03-21 PROCEDURE — 99211 OFF/OP EST MAY X REQ PHY/QHP: CPT | Mod: PBBFAC,PO

## 2019-03-21 PROCEDURE — 99999 PR PBB SHADOW E&M-EST. PATIENT-LVL I: ICD-10-PCS | Mod: PBBFAC,,,

## 2019-03-21 NOTE — PROGRESS NOTES
"DIABETES EDUCATOR NOTE   PLACEMENT OF FREESTYLE ERIKA PRO SENSOR  CONTINOUS GLUCOSE MONITORING SYSTEM (CGMS)--Redo    Patient is here in clinic today for placement of continuous glucose monitoring sensor.  This is 2nd placement as patient knocked off the first sensor in less than an hour changing clothes.  Long discussion with patient to protect this area while sensor in and Skin Tac placed underneath sensor to increase adhesion to skin.  Patient counseled not to place any sticky bandages on top of sensor that would pull off sensor, can use Ace bandage if patient wants to cover the sensor.       Patient verified that they were here for CGMS procedure ordered by their provider and that they have a working glucose meter and supplies at home.   Patient provided with a Freestyle Erika Sensor and a copy of the Continuous Glucose Monitoring Patient Log to fill out during the study.   A detailed explanation of Continuous Glucose Monitoring was provided. Patient informed that this is a blind procedure and that they will not actually see the blood sugar tracing in real time.  Reviewed with patient the  patient education handout called "Your Freestyle Erika Pro sensor: What you need to know" to review self-care during the study to avoid sensor loosening or removal ie... bathing, swimming, dressing, and exercising.   Instructed patient to check blood sugar using home glucometer and to record the following on provided patient log sheets: Blood sugar taken at home, Meals and snacks, Activity, and Diabetes medications taken and dosage    Patient was brought to a private location.  Arm for insertion was selected and prepared and allowed to dry. Glucose Sensor Serial Number [id number]  was inserted to back of patient's right upper arm.    The following forms were given and reviewed in detail with patient and all questions answered.   · Continuous Glucose Monitoring Patient Log #[id number]  · Freestyle Manufacture Patient " "Handout "Your Freestyle Hung Pro Sensor: What you need to know"     Instructions: Time: 15 min   Insertion of sensor done individually in private:  Time: 15 minutes         "

## 2019-03-27 ENCOUNTER — CLINICAL SUPPORT (OUTPATIENT)
Dept: DIABETES | Facility: CLINIC | Age: 69
End: 2019-03-27
Payer: MEDICARE

## 2019-03-27 DIAGNOSIS — E11.40 TYPE 2 DIABETES MELLITUS WITH DIABETIC NEUROPATHY, WITH LONG-TERM CURRENT USE OF INSULIN: ICD-10-CM

## 2019-03-27 DIAGNOSIS — Z79.4 TYPE 2 DIABETES MELLITUS WITH DIABETIC NEUROPATHY, WITH LONG-TERM CURRENT USE OF INSULIN: ICD-10-CM

## 2019-03-27 PROCEDURE — 99212 OFFICE O/P EST SF 10 MIN: CPT | Mod: PBBFAC,PN

## 2019-03-27 PROCEDURE — 95250 CONT GLUC MNTR PHYS/QHP EQP: CPT | Mod: PBBFAC,PN

## 2019-03-27 PROCEDURE — 99999 PR PBB SHADOW E&M-EST. PATIENT-LVL II: ICD-10-PCS | Mod: PBBFAC,,,

## 2019-03-27 PROCEDURE — 99999 PR PBB SHADOW E&M-EST. PATIENT-LVL II: CPT | Mod: PBBFAC,,,

## 2019-03-27 NOTE — PROGRESS NOTES
DIABETES EDUCATOR NOTE   Return of the Freestyle Hung Pro Sensor and Patient Log.    Patient returned to clinic today to return Glucose Sensor and signed patient log form used in CGMS procedure.    The CGMS Sensor will be scanned and downloaded. All reports will be imported into the patient's electronic medical record.    Endocrine provider will complete data interpretation and make recommendations; will forward recommendations to the ordering provider for follow up with patient.

## 2019-03-30 ENCOUNTER — OFFICE VISIT (OUTPATIENT)
Dept: URGENT CARE | Facility: CLINIC | Age: 69
End: 2019-03-30
Payer: MEDICARE

## 2019-03-30 VITALS
WEIGHT: 200 LBS | TEMPERATURE: 99 F | HEIGHT: 62 IN | HEART RATE: 90 BPM | DIASTOLIC BLOOD PRESSURE: 71 MMHG | RESPIRATION RATE: 16 BRPM | OXYGEN SATURATION: 96 % | SYSTOLIC BLOOD PRESSURE: 123 MMHG | BODY MASS INDEX: 36.8 KG/M2

## 2019-03-30 DIAGNOSIS — J01.40 ACUTE PANSINUSITIS, RECURRENCE NOT SPECIFIED: Primary | ICD-10-CM

## 2019-03-30 LAB
CTP QC/QA: YES
FLUAV AG NPH QL: NEGATIVE
FLUBV AG NPH QL: NEGATIVE

## 2019-03-30 PROCEDURE — 99213 PR OFFICE/OUTPT VISIT, EST, LEVL III, 20-29 MIN: ICD-10-PCS | Mod: S$GLB,,, | Performed by: INTERNAL MEDICINE

## 2019-03-30 PROCEDURE — 99213 OFFICE O/P EST LOW 20 MIN: CPT | Mod: S$GLB,,, | Performed by: INTERNAL MEDICINE

## 2019-03-30 PROCEDURE — 87804 POCT INFLUENZA A/B: ICD-10-PCS | Mod: 59,QW,S$GLB, | Performed by: INTERNAL MEDICINE

## 2019-03-30 PROCEDURE — 87804 INFLUENZA ASSAY W/OPTIC: CPT | Mod: QW,S$GLB,, | Performed by: INTERNAL MEDICINE

## 2019-03-30 RX ORDER — METHYLPREDNISOLONE 4 MG/1
TABLET ORAL
Qty: 1 PACKAGE | Refills: 0 | Status: SHIPPED | OUTPATIENT
Start: 2019-03-30 | End: 2019-04-03

## 2019-03-30 RX ORDER — FLUTICASONE PROPIONATE 50 MCG
1 SPRAY, SUSPENSION (ML) NASAL 2 TIMES DAILY
Qty: 1 BOTTLE | Refills: 1 | Status: SHIPPED | OUTPATIENT
Start: 2019-03-30 | End: 2020-02-12

## 2019-03-30 RX ORDER — AZITHROMYCIN 500 MG/1
500 TABLET, FILM COATED ORAL DAILY
Qty: 5 TABLET | Refills: 0 | Status: SHIPPED | OUTPATIENT
Start: 2019-03-30 | End: 2019-04-03

## 2019-03-30 NOTE — PROGRESS NOTES
"Subjective:       Patient ID: Mckenzie Mari is a 68 y.o. female.    Vitals:  height is 5' 2" (1.575 m) and weight is 90.7 kg (200 lb). Her oral temperature is 98.6 °F (37 °C). Her blood pressure is 123/71 and her pulse is 90. Her respiration is 16 and oxygen saturation is 96%.     Chief Complaint: URI    Headaches, 99.9 temperature, purulent nasal drainage with teeth and facial pain, eyes hurt, sneezing, body aches & chills X 2; sore throat on and off for about a week, a little bit of cough. Her blood sugars have tolerated steroids in the past.     URI    This is a new problem. The current episode started in the past 7 days. The problem has been gradually worsening. Associated symptoms include congestion, coughing, headaches, nausea, sinus pain, sneezing, a sore throat, swollen glands and wheezing. Pertinent negatives include no ear pain, rash or vomiting.       Constitution: Positive for chills and fever. Negative for sweating and fatigue.   HENT: Positive for congestion, postnasal drip, sinus pain, sinus pressure and sore throat. Negative for ear pain, trouble swallowing and voice change.    Eyes: Negative for eye redness.   Respiratory: Positive for cough and wheezing. Negative for chest tightness, sputum production and shortness of breath.    Gastrointestinal: Positive for nausea. Negative for vomiting.   Musculoskeletal: Positive for muscle ache.   Skin: Negative for rash.   Allergic/Immunologic: Positive for sneezing. Negative for seasonal allergies.   Neurological: Positive for headaches.       Objective:      Physical Exam   Constitutional: She is oriented to person, place, and time. She appears well-developed and well-nourished. She is cooperative.  Non-toxic appearance. She does not appear ill. No distress.   HENT:   Head: Normocephalic and atraumatic.   Right Ear: Hearing, external ear and ear canal normal. A middle ear effusion is present.   Left Ear: Hearing, external ear and ear canal normal. A " middle ear effusion is present.   Nose: No mucosal edema, rhinorrhea or nasal deformity. Right sinus exhibits maxillary sinus tenderness and frontal sinus tenderness. Left sinus exhibits maxillary sinus tenderness and frontal sinus tenderness.   Mouth/Throat: Uvula is midline and mucous membranes are normal. Posterior oropharyngeal erythema present.   Eyes: Conjunctivae and lids are normal.   Neck: Trachea normal, full passive range of motion without pain and phonation normal. Neck supple.   Cardiovascular: Normal rate, regular rhythm, normal heart sounds, intact distal pulses and normal pulses.   Pulmonary/Chest: Effort normal and breath sounds normal. No respiratory distress.   Abdominal: Soft. Normal appearance and bowel sounds are normal. She exhibits no distension.   Musculoskeletal: Normal range of motion. She exhibits no edema or deformity.   Lymphadenopathy:     She has no cervical adenopathy.   Neurological: She is alert and oriented to person, place, and time. She exhibits normal muscle tone. Coordination normal.   Skin: Skin is warm, dry and intact. She is not diaphoretic. No pallor.   Psychiatric: She has a normal mood and affect. Her speech is normal and behavior is normal. Cognition and memory are normal.   Nursing note and vitals reviewed.      Assessment:       1. Acute pansinusitis, recurrence not specified        Plan:         Acute pansinusitis, recurrence not specified  -     POCT Influenza A/B  -     azithromycin (ZITHROMAX) 500 MG tablet; Take 1 tablet (500 mg total) by mouth once daily. for 5 days  Dispense: 5 tablet; Refill: 0  -     fluticasone (FLONASE) 50 mcg/actuation nasal spray; 1 spray (50 mcg total) by Each Nare route 2 (two) times daily.  Dispense: 1 Bottle; Refill: 1  -     methylPREDNISolone (MEDROL DOSEPACK) 4 mg tablet; Take all pills on each row once daily  Dispense: 1 Package; Refill: 0

## 2019-04-02 ENCOUNTER — TELEPHONE (OUTPATIENT)
Dept: URGENT CARE | Facility: CLINIC | Age: 69
End: 2019-04-02

## 2019-04-03 ENCOUNTER — OFFICE VISIT (OUTPATIENT)
Dept: ENDOCRINOLOGY | Facility: CLINIC | Age: 69
End: 2019-04-03
Payer: MEDICARE

## 2019-04-03 VITALS
HEART RATE: 67 BPM | SYSTOLIC BLOOD PRESSURE: 116 MMHG | DIASTOLIC BLOOD PRESSURE: 70 MMHG | HEIGHT: 62 IN | WEIGHT: 211.06 LBS | BODY MASS INDEX: 38.84 KG/M2

## 2019-04-03 DIAGNOSIS — I25.10 CORONARY ARTERY DISEASE INVOLVING NATIVE CORONARY ARTERY WITHOUT ANGINA PECTORIS, UNSPECIFIED WHETHER NATIVE OR TRANSPLANTED HEART: ICD-10-CM

## 2019-04-03 DIAGNOSIS — E11.40 TYPE 2 DIABETES MELLITUS WITH DIABETIC NEUROPATHY, WITH LONG-TERM CURRENT USE OF INSULIN: Primary | ICD-10-CM

## 2019-04-03 DIAGNOSIS — Z51.81 MEDICATION MONITORING ENCOUNTER: ICD-10-CM

## 2019-04-03 DIAGNOSIS — Z79.4 TYPE 2 DIABETES MELLITUS WITH DIABETIC NEUROPATHY, WITH LONG-TERM CURRENT USE OF INSULIN: Primary | ICD-10-CM

## 2019-04-03 PROCEDURE — 95251 CONT GLUC MNTR ANALYSIS I&R: CPT | Mod: ,,, | Performed by: NURSE PRACTITIONER

## 2019-04-03 PROCEDURE — 95251 PR GLUCOSE MONITOR, 72 HOUR, PHYS INTERP: ICD-10-PCS | Mod: ,,, | Performed by: NURSE PRACTITIONER

## 2019-04-03 PROCEDURE — 99215 OFFICE O/P EST HI 40 MIN: CPT | Mod: PBBFAC,PN | Performed by: NURSE PRACTITIONER

## 2019-04-03 PROCEDURE — 99213 OFFICE O/P EST LOW 20 MIN: CPT | Mod: S$PBB,,, | Performed by: NURSE PRACTITIONER

## 2019-04-03 PROCEDURE — 99999 PR PBB SHADOW E&M-EST. PATIENT-LVL V: CPT | Mod: PBBFAC,,, | Performed by: NURSE PRACTITIONER

## 2019-04-03 PROCEDURE — 99213 PR OFFICE/OUTPT VISIT, EST, LEVL III, 20-29 MIN: ICD-10-PCS | Mod: S$PBB,,, | Performed by: NURSE PRACTITIONER

## 2019-04-03 PROCEDURE — 99999 PR PBB SHADOW E&M-EST. PATIENT-LVL V: ICD-10-PCS | Mod: PBBFAC,,, | Performed by: NURSE PRACTITIONER

## 2019-04-03 NOTE — PROGRESS NOTES
CC: Ms. Mckenzie Mari arrives today for management of Type 2 DM and review of chronic medical conditions.     HPI: Ms. Mckenzie Mari was diagnosed with Type 2 DM in 2004. She was diagnosed based on lab work. Initial treatment consisted of metformin and glimepiride. Insulin added in 8/2016 - began Toujeo. She states that she felt that this caused nausea, abd pain, chest pain. Lantus caused throat swelling, left arm pain. She was then changed to Novolog 70/30 but this was only used for a short period because her insurance didn't cover.  Then changed to Humalog 50-50 in 12/2016. In 2017, she began MDI with Tresiba and Humalog. + FH of DM in maternal aunt and cousin. Denies hospitalizations due to DM. Previously seen in endocrine by Richard Gupta, and MAGALI Eng, ARIELLE. She has a history of thyroid cancer/post-surgical hypothyroidism.   Of note, she has a h/o pancreatitis.   Insulin management is challenging because patient believes insulin causes hyperglycemia.    She follows annually with cardiology for CHF, CAD.      Last seen by me 4 weeks ago. She presents today for CGMS results.    She is very upset today about news that she received from her breast surgeon on Monday. She may need single mastectomy due to scar tissue from previous lumpectomy and radiation.     Had steroid injection for sinus infection over the weekend and glucoses increased to 230s range. These have decreased back to normal and FBG was 105 this AM.     BG readings are checked 3x/day.     Hypoglycemia: No but she prefers glucoses remain >150, otherwise feels symptomatic     Missing Insulin/PO medication doses: No  Timing prandial insulin 5-15 minutes before meals: yes    Dietary Habits: Eats 3 meals/day. Snacks on 1 piece of chocolate or SF pudding. Avoids sugary drinks.    Last DM education: 1/2019     CURRENT DIABETIC MEDS: Tresiba 50 units QHS, Humalog 16 units AC + correction scale, target 150, ISF 50.  Vial or pen: pen  Glucometer type:  "One Touch Verio     Previous DM treatments:   Invokana - nausea  Glimepiride - stopped when prandial insulin added  Touejo -  Nausea, abd pain, chest pain  Lantus - throat swelling, left arm pain  Novolog 70/30 - not covered by insurance  Metformin - headaches    Last Eye Exam: 4/2018 - sees Dr. Mcgregor. Denies DRSlava   Last Podiatry Exam: no    REVIEW OF SYSTEMS  Constitutional: no c/o weakness or weight loss. + fatigue   Eyes: no c/o visual disturbances.   Cardiac: no palpitations, chest pain.  Respiratory: no c/o dyspnea, cough  GI: no c/o abdominal pain, nausea. + H/o pancreatitis.   Skin: no lesions or rashes.   Neuro: reports occasional numbness, tingling in feet that comes and goes.   Endocrine: denies polyphagia, polydipsia, polyuria      Personally reviewed Past Medical, Surgical, Social History.    Vital Signs  /70   Pulse 67   Ht 5' 2" (1.575 m)   Wt 95.8 kg (211 lb 1.5 oz)   BMI 38.61 kg/m²     Personally reviewed the below labs:    Hemoglobin A1C   Date Value Ref Range Status   03/04/2019 8.3 (H) 4.0 - 5.6 % Final     Comment:     ADA Screening Guidelines:  5.7-6.4%  Consistent with prediabetes  >or=6.5%  Consistent with diabetes  High levels of fetal hemoglobin interfere with the HbA1C  assay. Heterozygous hemoglobin variants (HbS, HgC, etc)do  not significantly interfere with this assay.   However, presence of multiple variants may affect accuracy.     11/30/2018 8.5 (H) 4.0 - 5.6 % Final     Comment:     ADA Screening Guidelines:  5.7-6.4%  Consistent with prediabetes  >or=6.5%  Consistent with diabetes  High levels of fetal hemoglobin interfere with the HbA1C  assay. Heterozygous hemoglobin variants (HbS, HgC, etc)do  not significantly interfere with this assay.   However, presence of multiple variants may affect accuracy.     07/06/2018 8.3 (H) 4.0 - 5.6 % Final     Comment:     ADA Screening Guidelines:  5.7-6.4%  Consistent with prediabetes  >or=6.5%  Consistent with diabetes  High " levels of fetal hemoglobin interfere with the HbA1C  assay. Heterozygous hemoglobin variants (HbS, HgC, etc)do  not significantly interfere with this assay.   However, presence of multiple variants may affect accuracy.         Chemistry        Component Value Date/Time     03/04/2019 0907    K 4.4 03/04/2019 0907     03/04/2019 0907    CO2 29 03/04/2019 0907    BUN 18 03/04/2019 0907    CREATININE 0.9 03/04/2019 0907    GLU 88 03/04/2019 0907        Component Value Date/Time    CALCIUM 9.9 03/04/2019 0907    ALKPHOS 87 11/30/2018 0800    AST 18 11/30/2018 0800    ALT 20 11/30/2018 0800    BILITOT 1.4 (H) 11/30/2018 0800          Lab Results   Component Value Date    CHOL 156 11/30/2018    CHOL 131 03/29/2018    CHOL 148 04/20/2017     Lab Results   Component Value Date    HDL 46 11/30/2018    HDL 47 03/29/2018    HDL 47 04/20/2017     Lab Results   Component Value Date    LDLCALC 79.0 11/30/2018    LDLCALC 54.4 (L) 03/29/2018    LDLCALC 62.4 (L) 04/20/2017     Lab Results   Component Value Date    TRIG 155 (H) 11/30/2018    TRIG 148 03/29/2018    TRIG 193 (H) 04/20/2017     Lab Results   Component Value Date    CHOLHDL 29.5 11/30/2018    CHOLHDL 35.9 03/29/2018    CHOLHDL 31.8 04/20/2017       Lab Results   Component Value Date    MICALBCREAT 4.7 07/06/2018     Lab Results   Component Value Date    TSH 1.245 11/30/2018    TSH 1.245 11/30/2018       CrCl cannot be calculated (Patient's most recent lab result is older than the maximum 7 days allowed.).    Vit D, 25-Hydroxy   Date Value Ref Range Status   11/08/2014 51 30 - 96 ng/mL Final     Comment:     Vitamin D deficiency.........<10 ng/mL                              Vitamin D insufficiency......10-29 ng/mL       Vitamin D sufficiency........> or equal to 30 ng/mL  Vitamin D toxicity............>100 ng/mL           CGMS results: Fasting glucose is acceptable for what patient will allow (feels symptomatic with glucose < 150). Two fasting glucoses didn't  correlate with SMBG glucose on her log. Minimal prandial excursions are noted. No true hypoglycemia but rebound noted after borderline nocturnal episode. Eats 3 meals/day, some of which are lower carb, and snacks on either SF pudding or small piece of chocolate.   Average glucose: 138  % above target: 2%  % in target: 98%  % below target: 0%              A1c goal < 7.5%, due to CAD, CHF, desire for glucose to remain >150.         Assessment/Plan  1. Type 2 diabetes mellitus with diabetic neuropathy, with long-term current use of insulin  -- Improving. Average glucose 133 mg/dL on CGMS.  -- continue current insulin doses.   -- check BG 4x/day and notify me for BG < 100 or > 180 consistently.     -- cannot use Soliqua, due to insulin glargine allergy. Would not use Actos, due to CHF. GLP-1RA and DPP4-I contraindicated due to h/o pancreatitis. Cannot tolerate metformin.     -- Discussed diagnosis of DM, A1c goals, progression of disease, long term complications and tx options.  Advised patient to check BG before activities, such as driving or exercise.  -- Reviewed hypoglycemia management: treat with 1/2 glass of juice, 1/2 can regular coke, or 4 glucose tablets. Monitor and repeat treatment every 15 minutes until BG is >70 Then have a snack, which includes a complex carbohydrate and protein.    -- takes statin and ARB     2. Coronary artery disease involving native coronary artery without angina pectoris, unspecified whether native or transplanted heart  -- stable, avoid hyopglycemia   3. Medication monitoring encounter -- Total Time and Counseling: 15 minutes, >50% time spent counseling as noted above in #1 A/P.            FOLLOW UP  Follow up in about 3 months (around 7/3/2019).   Patient instructed to bring BG logs to each follow up.   Patient encouraged to call for any BG/medication issues, concerns, or questions.      Orders Placed This Encounter   Procedures    Hemoglobin A1c    Comprehensive metabolic panel     Microalbumin/creatinine urine ratio

## 2019-04-04 ENCOUNTER — PATIENT OUTREACH (OUTPATIENT)
Dept: ADMINISTRATIVE | Facility: HOSPITAL | Age: 69
End: 2019-04-04

## 2019-04-14 ENCOUNTER — PATIENT MESSAGE (OUTPATIENT)
Dept: FAMILY MEDICINE | Facility: CLINIC | Age: 69
End: 2019-04-14

## 2019-04-15 ENCOUNTER — TELEPHONE (OUTPATIENT)
Dept: ADMINISTRATIVE | Facility: HOSPITAL | Age: 69
End: 2019-04-15

## 2019-04-15 NOTE — LETTER
April 30, 2019    Dr Mcgregor             [facility]  [address]  [facility] [address]  Phone: [phone] April 30, 2019     Patient: Mckenzie Mari    YOB: 1950   Date of Visit: 4/15/2019       To Whom It May Concern:    We are seeing Mckenzie Mari in the clinic at [facility].  Bacilio Gold MD is their PCP.  She/He has an outstanding lab/procedure at the time we reviewed their chart.  To help with our Health Maintenance records will you please supply the following report(s):      [x]  Eye Exam                           Please Fax to [facility] at .     Thank you for your help.  If my Care Coordinator can be of any assistance, you can call JR Langford at [phone].         If you have any questions or concerns, please don't hesitate to call.    Sincerely,        Bacilio Gold MD

## 2019-04-15 NOTE — LETTER
April 15, 2019    Dr Mcgregor             [facility]  [address]  [facility] [address]  Phone: [phone] April 15, 2019     Patient: Mckenzie Mari    YOB: 1950   Date of Visit: 4/15/2019       To Whom It May Concern:    We are seeing Mckenzie Mari in the clinic at [facility].  Bacilio Gold MD is their PCP.  She/He has an outstanding lab/procedure at the time we reviewed their chart.  To help with our Health Maintenance records will you please supply the following report(s):      [x]  Eye Exam                           Please Fax to [facility] at .     Thank you for your help.  If my Care Coordinator can be of any assistance, you can call JR Langford at [phone].         If you have any questions or concerns, please don't hesitate to call.    Sincerely,        Bacilio Gold MD

## 2019-04-18 ENCOUNTER — OFFICE VISIT (OUTPATIENT)
Dept: FAMILY MEDICINE | Facility: CLINIC | Age: 69
End: 2019-04-18
Payer: MEDICARE

## 2019-04-18 VITALS
TEMPERATURE: 98 F | DIASTOLIC BLOOD PRESSURE: 60 MMHG | RESPIRATION RATE: 16 BRPM | HEIGHT: 62 IN | SYSTOLIC BLOOD PRESSURE: 120 MMHG | WEIGHT: 211.88 LBS | BODY MASS INDEX: 38.99 KG/M2 | HEART RATE: 72 BPM

## 2019-04-18 DIAGNOSIS — E03.9 HYPOTHYROIDISM, UNSPECIFIED TYPE: ICD-10-CM

## 2019-04-18 DIAGNOSIS — I10 ESSENTIAL HYPERTENSION: Primary | ICD-10-CM

## 2019-04-18 DIAGNOSIS — E78.5 DYSLIPIDEMIA: ICD-10-CM

## 2019-04-18 DIAGNOSIS — J32.9 SINUSITIS, UNSPECIFIED CHRONICITY, UNSPECIFIED LOCATION: ICD-10-CM

## 2019-04-18 PROCEDURE — 99214 PR OFFICE/OUTPT VISIT, EST, LEVL IV, 30-39 MIN: ICD-10-PCS | Mod: S$PBB,,, | Performed by: FAMILY MEDICINE

## 2019-04-18 PROCEDURE — 99999 PR PBB SHADOW E&M-EST. PATIENT-LVL IV: ICD-10-PCS | Mod: PBBFAC,,, | Performed by: FAMILY MEDICINE

## 2019-04-18 PROCEDURE — 99214 OFFICE O/P EST MOD 30 MIN: CPT | Mod: PBBFAC,PN | Performed by: FAMILY MEDICINE

## 2019-04-18 PROCEDURE — 99214 OFFICE O/P EST MOD 30 MIN: CPT | Mod: S$PBB,,, | Performed by: FAMILY MEDICINE

## 2019-04-18 PROCEDURE — 99999 PR PBB SHADOW E&M-EST. PATIENT-LVL IV: CPT | Mod: PBBFAC,,, | Performed by: FAMILY MEDICINE

## 2019-04-18 RX ORDER — BLOOD-GLUCOSE METER
EACH MISCELLANEOUS
Refills: 11 | COMMUNITY
Start: 2019-03-12 | End: 2020-01-28

## 2019-04-18 RX ORDER — AZITHROMYCIN 250 MG/1
TABLET, FILM COATED ORAL
Qty: 11 TABLET | Refills: 0 | Status: SHIPPED | OUTPATIENT
Start: 2019-04-18 | End: 2019-06-04

## 2019-04-18 NOTE — PROGRESS NOTES
THIS DOCUMENT WAS MADE IN PART WITH VOICE RECOGNITION SOFTWARE.  OCCASIONALLY THIS SOFTWARE WILL MISINTERPRET WORDS OR PHRASES.      Mckenzie Mari  1950    Mckenzie was seen today for hypertension.    Diagnoses and all orders for this visit:    Essential hypertension  -     Lipid panel; Future    Hypothyroidism, unspecified type  -     TSH; Future    Dyslipidemia  -     Lipid panel; Future    Sinusitis, unspecified chronicity, unspecified location  -     azithromycin (Z-DIDIER) 250 MG tablet; Take this antibiotic for 5 days total according to the following instructions: Take 2 po on Day #1, then take one po daily on days 2-10        Subjective     Chief Complaint   Patient presents with    Hypertension     follow up        HPI    Sinus trouble.  Right facial pain and frontal pain.  Off and on for more than 3 weeks.  Three weeks ago did go to an urgent care was placed on Zithromax fell really well for about 2 weeks but he came back quickly.  She is having colored drainage, subjective fever and chills.  Postnasal drainage.  Some coughing.  No wheezing or shortness of breath.  Some aching and body aches.  No significant neck stiffness.  She also did receive a steroid pack.    Hypertension chronic stable controlled.    Type 2 diabetes, not ideal, relatively stable though.  Followed by endocrinology    HPI elements addressed above in the assessment and plan including problems, diagnosis, stability/instability,  improving/worsening, and chronicity will not be duplicated in this section. Any important additional HPI topics will be discussed here if needed.    Active Ambulatory Problems     Diagnosis Date Noted    CHF (congestive heart failure)     CHF (NYHA class III, ACC/AHA stage C)     Hyperlipidemia     Chest pain 01/18/2012    HTN (hypertension)     CVID (common variable immunodeficiency)     Chronic sinusitis     ALLERGIC RHINITIS 01/23/2012    Penicillin allergy 01/30/2012    History of thyroid  cancer 07/25/2012    Postoperative hypothyroidism 07/25/2012    LBBB (left bundle branch block) 10/11/2012    ICD (implantable cardioverter-defibrillator) in place 10/23/2012    Sinusitis 10/01/2013    Dyspnea on effort 03/25/2014    Chest pain on exertion 03/25/2014    CAD (coronary artery disease) 04/17/2014    Type 2 diabetes mellitus with diabetic neuropathy, with long-term current use of insulin 05/11/2014    Myoclonus     History of colon polyps 05/11/2016    Obesity (BMI 30-39.9) 08/31/2016    History of pancreatitis 08/31/2016    Nasal obstruction 03/15/2017    Deviated nasal septum 03/15/2017    Hypertrophy of nasal turbinates 03/15/2017    Malignant neoplasm of central portion of left female breast 10/06/2017     Resolved Ambulatory Problems     Diagnosis Date Noted    Subacute maxillary sinusitis 03/15/2017     Past Medical History:   Diagnosis Date    Allergy     Arthritis     Breast cancer 2005    Bundle branch block     Cardiomyopathy     CHF (congestive heart failure)     Chronic sinusitis     CVID (common variable immunodeficiency)     Diabetes mellitus     GERD (gastroesophageal reflux disease)     Headache(784.0)     HTN (hypertension)     Hyperlipidemia     Hypothyroid 7/25/2012    Otitis media     Presence of combination internal cardiac defibrillator (ICD) and pacemaker     Thyroid cancer     Thyroid cancer 7/25/2012    Thyroid disease        Review of Systems   Constitutional: Negative for activity change and unexpected weight change.   HENT: Positive for postnasal drip, sinus pressure and sinus pain. Negative for hearing loss, rhinorrhea and trouble swallowing.    Eyes: Positive for discharge. Negative for visual disturbance.   Respiratory: Negative for chest tightness and wheezing.    Cardiovascular: Negative for chest pain and palpitations.   Gastrointestinal: Negative for blood in stool, constipation, diarrhea and vomiting.   Endocrine: Negative for  "polydipsia and polyuria.   Genitourinary: Negative for difficulty urinating, dysuria, hematuria and menstrual problem.   Musculoskeletal: Positive for arthralgias and joint swelling. Negative for neck pain.   Neurological: Positive for headaches. Negative for weakness.   Psychiatric/Behavioral: Negative for confusion and dysphoric mood.       Objective     Physical Exam   Constitutional: She is oriented to person, place, and time. She appears well-developed and well-nourished. No distress.   HENT:   Head: Normocephalic and atraumatic.   Right Ear: Tympanic membrane, external ear and ear canal normal.   Left Ear: Tympanic membrane, external ear and ear canal normal.   Nose: Mucosal edema present. Right sinus exhibits maxillary sinus tenderness and frontal sinus tenderness.   Mouth/Throat: Oropharynx is clear and moist. No oropharyngeal exudate, posterior oropharyngeal edema, posterior oropharyngeal erythema or tonsillar abscesses.   Eyes: Pupils are equal, round, and reactive to light. Conjunctivae are normal. Right eye exhibits no discharge. Left eye exhibits no discharge. No scleral icterus.   Neck: Normal range of motion. Neck supple.   Cardiovascular: Normal rate, regular rhythm and normal heart sounds.   No murmur heard.  Pulmonary/Chest: Effort normal and breath sounds normal. No stridor. No respiratory distress. She has no wheezes.   Lymphadenopathy:     She has no cervical adenopathy.   Neurological: She is alert and oriented to person, place, and time.   Skin: Skin is dry. No rash noted. She is not diaphoretic.   Psychiatric: She has a normal mood and affect. Her behavior is normal.   Vitals reviewed.    Vitals:    04/18/19 1035   BP: 120/60   BP Location: Left arm   Patient Position: Sitting   BP Method: Large (Manual)   Pulse: 72   Resp: 16   Temp: 98.4 °F (36.9 °C)   TempSrc: Oral   Weight: 96.1 kg (211 lb 13.8 oz)   Height: 5' 2" (1.575 m)       "

## 2019-05-28 ENCOUNTER — PATIENT MESSAGE (OUTPATIENT)
Dept: CARDIOLOGY | Facility: CLINIC | Age: 69
End: 2019-05-28

## 2019-05-28 DIAGNOSIS — E78.5 HYPERLIPIDEMIA, UNSPECIFIED HYPERLIPIDEMIA TYPE: ICD-10-CM

## 2019-05-28 RX ORDER — ROSUVASTATIN CALCIUM 10 MG/1
10 TABLET, COATED ORAL DAILY
Qty: 90 TABLET | Refills: 2 | Status: SHIPPED | OUTPATIENT
Start: 2019-05-28 | End: 2020-07-02

## 2019-06-02 ENCOUNTER — PATIENT MESSAGE (OUTPATIENT)
Dept: FAMILY MEDICINE | Facility: CLINIC | Age: 69
End: 2019-06-02

## 2019-06-04 ENCOUNTER — OFFICE VISIT (OUTPATIENT)
Dept: FAMILY MEDICINE | Facility: CLINIC | Age: 69
End: 2019-06-04
Payer: MEDICARE

## 2019-06-04 VITALS
HEIGHT: 62 IN | BODY MASS INDEX: 39.66 KG/M2 | DIASTOLIC BLOOD PRESSURE: 64 MMHG | TEMPERATURE: 98 F | OXYGEN SATURATION: 96 % | WEIGHT: 215.5 LBS | SYSTOLIC BLOOD PRESSURE: 100 MMHG | HEART RATE: 71 BPM

## 2019-06-04 DIAGNOSIS — R39.9 UTI SYMPTOMS: Primary | ICD-10-CM

## 2019-06-04 LAB
BACTERIA #/AREA URNS AUTO: NORMAL /HPF
BILIRUB UR QL STRIP: NEGATIVE
CLARITY UR REFRACT.AUTO: ABNORMAL
COLOR UR AUTO: ABNORMAL
GLUCOSE UR QL STRIP: NEGATIVE
HGB UR QL STRIP: NEGATIVE
KETONES UR QL STRIP: NEGATIVE
LEUKOCYTE ESTERASE UR QL STRIP: NEGATIVE
MICROSCOPIC COMMENT: NORMAL
NITRITE UR QL STRIP: NEGATIVE
PH UR STRIP: 5 [PH] (ref 5–8)
PROT UR QL STRIP: NEGATIVE
RBC #/AREA URNS AUTO: 0 /HPF (ref 0–4)
SP GR UR STRIP: 1.02 (ref 1–1.03)
SQUAMOUS #/AREA URNS AUTO: 4 /HPF
URN SPEC COLLECT METH UR: ABNORMAL
WBC #/AREA URNS AUTO: 0 /HPF (ref 0–5)

## 2019-06-04 PROCEDURE — 99214 OFFICE O/P EST MOD 30 MIN: CPT | Mod: S$PBB,,, | Performed by: NURSE PRACTITIONER

## 2019-06-04 PROCEDURE — 99999 PR PBB SHADOW E&M-EST. PATIENT-LVL V: ICD-10-PCS | Mod: PBBFAC,,, | Performed by: NURSE PRACTITIONER

## 2019-06-04 PROCEDURE — 99215 OFFICE O/P EST HI 40 MIN: CPT | Mod: PBBFAC,PN | Performed by: NURSE PRACTITIONER

## 2019-06-04 PROCEDURE — 81001 URINALYSIS AUTO W/SCOPE: CPT

## 2019-06-04 PROCEDURE — 99999 PR PBB SHADOW E&M-EST. PATIENT-LVL V: CPT | Mod: PBBFAC,,, | Performed by: NURSE PRACTITIONER

## 2019-06-04 PROCEDURE — 99214 PR OFFICE/OUTPT VISIT, EST, LEVL IV, 30-39 MIN: ICD-10-PCS | Mod: S$PBB,,, | Performed by: NURSE PRACTITIONER

## 2019-06-04 PROCEDURE — 87086 URINE CULTURE/COLONY COUNT: CPT

## 2019-06-04 NOTE — PROGRESS NOTES
This dictation has been generated using Modal Fluency Dictation some phonetic errors may occur. Please contact author for clarification if needed.     Problem List Items Addressed This Visit     None      Visit Diagnoses     UTI symptoms    -  Primary    Relevant Orders    Urinalysis    Urine culture          Orders Placed This Encounter    Urine culture    Urinalysis     UTI symptoms.  Unable to void at the clinic.  Urine urine culture as above home collected      Follow up if symptoms worsen or fail to improve.    ________________________________________________________________  ________________________________________________________________      Chief Complaint   Patient presents with    Back Pain     lower/mid back pain     History of present illness  This 68 y.o. presents today for complaint of low back pain over the last 2-3 weeks.  She does have a history of a back injury but notes these symptoms are different.  Walking does seem to exacerbate to some extent but she also notes urinary symptoms since onset of different back pain.  She has had frequency and dysuria.  She denies any fever or chills.  She notes she has had a yeast infection currently being treated.  Review of systems  Denies fever and chills  No hematuria noted. Urinary frequency noted.  No nausea vomiting or diarrhea.  No abdominal pain.  Lumbar pain without radiculopathy.  No change in bowel or bladder habits/incontinence.  Patient denies rash or itching.  No urticaria.  Answers for HPI/ROS submitted by the patient on 6/4/2019   activity change: No  unexpected weight change: No  neck pain: No  hearing loss: No  rhinorrhea: No  trouble swallowing: No  eye discharge: No  visual disturbance: No  chest tightness: No  wheezing: No  chest pain: No  palpitations: No  blood in stool: No  constipation: No  vomiting: No  diarrhea: No  polydipsia: No  polyuria: No  difficulty urinating: No  hematuria: No  menstrual problem: No  dysuria: No  joint  swelling: No  arthralgias: Yes  headaches: No  weakness: No  confusion: No  dysphoric mood: No    Past medical and social history reviewed.  Patient new to me.  Follows with in the clinic.    Past Medical History:   Diagnosis Date    Allergy     Arthritis     Breast cancer 2005    s/p lumpectomy, chemo and now cancer free over 5 years    Bundle branch block     Cardiomyopathy     EF 35 - 40%    CHF (congestive heart failure)     FC II    Chronic sinusitis     CVID (common variable immunodeficiency)     Diabetes mellitus     GERD (gastroesophageal reflux disease)     Headache(784.0)     HTN (hypertension)     Hyperlipidemia     Hypothyroid 7/25/2012    Otitis media     Presence of combination internal cardiac defibrillator (ICD) and pacemaker     Thyroid cancer     Thyroid cancer 7/25/2012    Thyroid disease     hypothyroid after papillary thyroid cancer with thyroid resection       Past Surgical History:   Procedure Laterality Date    BREAST LUMPECTOMY      left    CARDIAC PACEMAKER PLACEMENT      CATARACT EXTRACTION W/  INTRAOCULAR LENS IMPLANT      left eye    CATHETERIZATION, HEART, LEFT Left 4/17/2014    [facility] CATH LAB    CAUTERIZATION OF TURBINATES N/A 3/15/2017    [facility] at [facility] OR    CHOLECYSTECTOMY      COLONOSCOPY N/A 5/11/2016    Per[facility] by Alfonso Serrano Jr., MD at [facility] ENDO    COLONOSCOPY W/ POLYPECTOMY  10/05/2009    [facility]   One 2 mm polyp in the cecum.  TUBULAR ADENOMA.  Tortuous colon.    EGD (ESOPHAGOGASTRODUODENOSCOPY) N/A 2/16/2012    [facility] Jr[facility] at [facility] ENDO    ESOPHAGOGASTRODUODENOSCOPY  09/12/2006    [facility]   Dysphagia.  NERD.  Patulous LES.  Atrophic mucosa.  Benign fundal polyps.  Dilated, 42 Fr.    HYSTERECTOMY      JOINT REPLACEMENT Bilateral     pacemaker defibrillator      SEPTOPLASTY ENDOSCOPIC Bilateral 3/15/2017    [facility] OR     SINUS SURGERY FUNCTIONAL ENDOSCOPIC WITH NAVIGATION Bilateral 3/15/2017    [facility] OR    THYROID SURGERY  x2    TONSILLECTOMY         Family History   Problem Relation Age of Onset    Allergic rhinitis Mother     Cancer Mother         bladder    Heart disease Mother     Allergic rhinitis Father     Heart disease Father     Allergic rhinitis Brother     Heart disease Brother     Cancer Brother         lung    Cancer Maternal Aunt         breast     Cancer Paternal Aunt          breast    Heart disease Brother     Diabetes Paternal Aunt     Cancer Paternal Aunt         lung    Allergic rhinitis Sister     Angioedema Neg Hx     Asthma Neg Hx     Atopy Neg Hx     Eczema Neg Hx     Immunodeficiency Neg Hx     Urticaria Neg Hx        Social History     Socioeconomic History    Marital status:      Spouse name: Not on file    Number of children: Not on file    Years of education: Not on file    Highest education level: Not on file   Occupational History    Not on file   Social Needs    Financial resource strain: Not hard at all    Food insecurity:     Worry: Never true     Inability: Never true    Transportation needs:     Medical: No     Non-medical: No   Tobacco Use    Smoking status: Never Smoker    Smokeless tobacco: Never Used   Substance and Sexual Activity    Alcohol use: No     Frequency: Never     Drinks per session: Patient refused     Binge frequency: Patient refused    Drug use: No    Sexual activity: Not on file   Lifestyle    Physical activity:     Days per week: 3 days     Minutes per session: 30 min    Stress: To some extent   Relationships    Social connections:     Talks on phone: More than three times a week     Gets together: Patient refused     Attends [employer] service: Not on file     Active member of club or organization: Patient refused     Attends meetings of clubs or organizations: Patient refused     Relationship  "status:    Other Topics Concern    Not on file   Social History Narrative    Not on file       Current Outpatient Medications   Medication Sig Dispense Refill    CALCIUM CARBONATE/VITAMIN D3 (VITAMIN D-3 ORAL) Take by mouth.      carvedilol (COREG) 25 MG tablet TAKE 1 TABLET (25 MG TOTAL) BY MOUTH 2 (TWO) TIMES DAILY WITH MEALS. 180 tablet 1    esomeprazole (NEXIUM) 40 MG capsule TAKE 1 CAPSULE BY MOUTH EVERY DAY 90 capsule 1    estradiol (ESTRING) 2 mg vaginal ring Place 2 mg vaginally every 3 (three) months. follow package directions       fluconazole (DIFLUCAN) 150 MG Tab Take one (150mg) tablet once weekly for 6 months. 30 tablet 0    insulin degludec (TRESIBA FLEXTOUCH U-200) 200 unit/mL (3 mL) InPn Inject 50 Units into the skin once daily. 27 mL 3    insulin lispro (HUMALOG KWIKPEN INSULIN) 100 unit/mL pen Inject 16 Units into the skin 3 (three) times daily before meals. Plus correction scale, max TDD 78 5 Box 3    lactobacillus rhamnosus GG (CULTURELLE) 10 billion cell capsule Take 1 capsule by mouth once daily.      multivit-mineral-iron-lutein (CENTRUM SILVER ULTRA WOMEN'S) Tab Take by mouth.      ONETOUCH VERIO Strp U QID UTD  11    pen needle, diabetic (BD ULTRA-FINE SHANTELLE PEN NEEDLE) 32 gauge x 5/32" Ndle Uses up to 4 daily, on multiple daily insulin injections. Dx code E11.8 150 each 11    potassium chloride SA (K-DUR,KLOR-CON) 20 MEQ tablet Take 1 tablet (20 mEq total) by mouth once daily. 90 tablet 1    rosuvastatin (CRESTOR) 10 MG tablet Take 1 tablet (10 mg total) by mouth once daily. 90 tablet 2    spironolactone (ALDACTONE) 25 MG tablet Take 1 tablet (25 mg total) by mouth once daily. 90 tablet 1    SYNTHROID 137 mcg Tab tablet TAKE 1 TABLET (137 MCG TOTAL) BY MOUTH BEFORE BREAKFAST. 90 tablet 1    telmisartan (MICARDIS) 20 MG Tab Take 1 tablet (20 mg total) by mouth once daily. 90 tablet 3    fluticasone (FLONASE) 50 mcg/actuation nasal spray 1 spray (50 mcg total) by " "Each Nare route 2 (two) times daily. 1 Bottle 1    furosemide (LASIX) 40 MG tablet Take 1 tablet (40 mg total) by mouth once daily. (Patient taking differently: Take 40 mg by mouth as needed. ) 90 tablet 1     No current facility-administered medications for this visit.        Review of patient's allergies indicates:   Allergen Reactions    Cayenne pepper Swelling    Lantus [insulin glargine] Shortness Of Breath and Swelling     Toujeo also    Adhesive      rash    Iodine and iodide containing products      Topical iodine reaction-rash/itching    Other Nausea Only     "Mycin" drugs    Adhesive tape-silicones Itching    Amoxil [amoxicillin]      Once diagnosed with penicillin allergy.  Underwent skin testing that was apparently negative in approximately 2011.  However developed a rash and swelling after taking amoxicillin in 2012.    Aspirin Swelling     Other reaction(s): Stomach upset    Avelox [moxifloxacin] Itching    Betadine [povidone-iodine] Itching    Cephalexin Other (See Comments)     Blurred vision    Doxycycline Itching    Erythromycin      Other reaction(s): Stomach upset  Other reaction(s): Flatulence    Hydrocodone      Other reaction(s): Rash  Other reaction(s): Hives    Lactose intolerance [lactase] Diarrhea    Latex      Other reaction(s): Rash    Levaquin [levofloxacin] Hives     Other reaction(s): Achilles tendinitis/bursitis    Morphine Itching     Other reaction(s): Nausea    Tramadol Other (See Comments)     Other reaction(s): twitching of muscles  Other reaction(s): jumping of muscles  Pain in muscles    Penicillins Rash     Other reaction(s): Stomach upset  Other reaction(s): Rash  Other reaction(s): Itching  Other reaction(s): Hives  Other reaction(s): Edema    Sulfa (sulfonamide antibiotics) Rash     Other reaction(s): Unknown       Physical examination  Vitals Reviewed  Gen. Well-dressed well-nourished   Skin warm dry and intact.  No rashes noted.  Neck is supple " without adenopathy  Chest.  Respirations are even unlabored.  Lungs are clear to auscultation.  Cardiac regular rate and rhythm.  No chest wall adenopathy noted.  Abdomen is soft and not distended.  Bowel sounds are present.  No tenderness during palpation of the abdomen.  No Hepatosplenomegaly noted.  No hernia noted.  No CVA tenderness to percussion.    Neuro. Awake alert oriented x4.  Normal judgment and cognition noted.  Extremities no clubbing cyanosis or edema noted.     Call or return to clinic prn if these symptoms worsen or fail to improve as anticipated.

## 2019-06-06 LAB
BACTERIA UR CULT: NORMAL
BACTERIA UR CULT: NORMAL

## 2019-06-07 ENCOUNTER — PATIENT MESSAGE (OUTPATIENT)
Dept: FAMILY MEDICINE | Facility: CLINIC | Age: 69
End: 2019-06-07

## 2019-06-08 RX ORDER — SPIRONOLACTONE 25 MG/1
TABLET ORAL
Qty: 90 TABLET | Refills: 1 | Status: SHIPPED | OUTPATIENT
Start: 2019-06-08 | End: 2019-12-02 | Stop reason: SDUPTHER

## 2019-06-09 ENCOUNTER — PATIENT MESSAGE (OUTPATIENT)
Dept: FAMILY MEDICINE | Facility: CLINIC | Age: 69
End: 2019-06-09

## 2019-06-09 DIAGNOSIS — R39.9 UTI SYMPTOMS: Primary | ICD-10-CM

## 2019-06-10 ENCOUNTER — PATIENT MESSAGE (OUTPATIENT)
Dept: FAMILY MEDICINE | Facility: CLINIC | Age: 69
End: 2019-06-10

## 2019-06-11 ENCOUNTER — LAB VISIT (OUTPATIENT)
Dept: LAB | Facility: HOSPITAL | Age: 69
End: 2019-06-11
Attending: NURSE PRACTITIONER
Payer: MEDICARE

## 2019-06-11 DIAGNOSIS — R39.9 UTI SYMPTOMS: ICD-10-CM

## 2019-06-11 LAB
BACTERIA #/AREA URNS AUTO: ABNORMAL /HPF
BILIRUB UR QL STRIP: NEGATIVE
CLARITY UR REFRACT.AUTO: CLEAR
COLOR UR AUTO: YELLOW
GLUCOSE UR QL STRIP: ABNORMAL
HGB UR QL STRIP: NEGATIVE
HYALINE CASTS UR QL AUTO: 2 /LPF
KETONES UR QL STRIP: NEGATIVE
LEUKOCYTE ESTERASE UR QL STRIP: NEGATIVE
MICROSCOPIC COMMENT: ABNORMAL
NITRITE UR QL STRIP: NEGATIVE
PH UR STRIP: 5 [PH] (ref 5–8)
PROT UR QL STRIP: NEGATIVE
RBC #/AREA URNS AUTO: 0 /HPF (ref 0–4)
SP GR UR STRIP: 1.02 (ref 1–1.03)
SQUAMOUS #/AREA URNS AUTO: 1 /HPF
URN SPEC COLLECT METH UR: ABNORMAL
WBC #/AREA URNS AUTO: 1 /HPF (ref 0–5)

## 2019-06-11 PROCEDURE — 81001 URINALYSIS AUTO W/SCOPE: CPT

## 2019-06-11 PROCEDURE — 87086 URINE CULTURE/COLONY COUNT: CPT

## 2019-06-13 ENCOUNTER — PATIENT MESSAGE (OUTPATIENT)
Dept: FAMILY MEDICINE | Facility: CLINIC | Age: 69
End: 2019-06-13

## 2019-06-13 DIAGNOSIS — R30.0 DYSURIA: Primary | ICD-10-CM

## 2019-06-13 LAB
BACTERIA UR CULT: NORMAL
BACTERIA UR CULT: NORMAL

## 2019-06-27 ENCOUNTER — CLINICAL SUPPORT (OUTPATIENT)
Dept: CARDIOLOGY | Facility: CLINIC | Age: 69
End: 2019-06-27
Attending: INTERNAL MEDICINE
Payer: MEDICARE

## 2019-06-27 DIAGNOSIS — Z95.810 ICD (IMPLANTABLE CARDIOVERTER-DEFIBRILLATOR) IN PLACE: ICD-10-CM

## 2019-06-27 DIAGNOSIS — I44.7 LBBB (LEFT BUNDLE BRANCH BLOCK): ICD-10-CM

## 2019-06-27 PROCEDURE — 99999 PR PBB SHADOW E&M-EST. PATIENT-LVL I: ICD-10-PCS | Mod: PBBFAC,,,

## 2019-06-27 PROCEDURE — 99999 PR PBB SHADOW E&M-EST. PATIENT-LVL I: CPT | Mod: PBBFAC,,,

## 2019-06-27 PROCEDURE — 99211 OFF/OP EST MAY X REQ PHY/QHP: CPT | Mod: PBBFAC,PO

## 2019-06-28 ENCOUNTER — LAB VISIT (OUTPATIENT)
Dept: LAB | Facility: HOSPITAL | Age: 69
End: 2019-06-28
Attending: NURSE PRACTITIONER
Payer: MEDICARE

## 2019-06-28 ENCOUNTER — OFFICE VISIT (OUTPATIENT)
Dept: FAMILY MEDICINE | Facility: CLINIC | Age: 69
End: 2019-06-28
Payer: MEDICARE

## 2019-06-28 VITALS
TEMPERATURE: 98 F | WEIGHT: 215.38 LBS | SYSTOLIC BLOOD PRESSURE: 120 MMHG | HEART RATE: 75 BPM | DIASTOLIC BLOOD PRESSURE: 74 MMHG | OXYGEN SATURATION: 98 % | BODY MASS INDEX: 39.63 KG/M2 | RESPIRATION RATE: 18 BRPM | HEIGHT: 62 IN

## 2019-06-28 DIAGNOSIS — E11.40 TYPE 2 DIABETES MELLITUS WITH DIABETIC NEUROPATHY, WITH LONG-TERM CURRENT USE OF INSULIN: ICD-10-CM

## 2019-06-28 DIAGNOSIS — E03.9 HYPOTHYROIDISM, UNSPECIFIED TYPE: ICD-10-CM

## 2019-06-28 DIAGNOSIS — I10 ESSENTIAL HYPERTENSION: ICD-10-CM

## 2019-06-28 DIAGNOSIS — B96.89 ACUTE BACTERIAL SINUSITIS: Primary | ICD-10-CM

## 2019-06-28 DIAGNOSIS — Z79.4 TYPE 2 DIABETES MELLITUS WITH DIABETIC NEUROPATHY, WITH LONG-TERM CURRENT USE OF INSULIN: ICD-10-CM

## 2019-06-28 DIAGNOSIS — J01.90 ACUTE BACTERIAL SINUSITIS: Primary | ICD-10-CM

## 2019-06-28 DIAGNOSIS — E78.5 DYSLIPIDEMIA: ICD-10-CM

## 2019-06-28 LAB
ALBUMIN SERPL BCP-MCNC: 3.6 G/DL (ref 3.5–5.2)
ALP SERPL-CCNC: 82 U/L (ref 55–135)
ALT SERPL W/O P-5'-P-CCNC: 22 U/L (ref 10–44)
ANION GAP SERPL CALC-SCNC: 9 MMOL/L (ref 8–16)
AST SERPL-CCNC: 18 U/L (ref 10–40)
BILIRUB SERPL-MCNC: 1.2 MG/DL (ref 0.1–1)
BUN SERPL-MCNC: 15 MG/DL (ref 8–23)
CALCIUM SERPL-MCNC: 9.7 MG/DL (ref 8.7–10.5)
CHLORIDE SERPL-SCNC: 104 MMOL/L (ref 95–110)
CHOLEST SERPL-MCNC: 201 MG/DL (ref 120–199)
CHOLEST/HDLC SERPL: 4.3 {RATIO} (ref 2–5)
CO2 SERPL-SCNC: 25 MMOL/L (ref 23–29)
CREAT SERPL-MCNC: 0.8 MG/DL (ref 0.5–1.4)
EST. GFR  (AFRICAN AMERICAN): >60 ML/MIN/1.73 M^2
EST. GFR  (NON AFRICAN AMERICAN): >60 ML/MIN/1.73 M^2
GLUCOSE SERPL-MCNC: 129 MG/DL (ref 70–110)
HDLC SERPL-MCNC: 47 MG/DL (ref 40–75)
HDLC SERPL: 23.4 % (ref 20–50)
LDLC SERPL CALC-MCNC: 119.6 MG/DL (ref 63–159)
NONHDLC SERPL-MCNC: 154 MG/DL
POTASSIUM SERPL-SCNC: 4 MMOL/L (ref 3.5–5.1)
PROT SERPL-MCNC: 6.7 G/DL (ref 6–8.4)
SODIUM SERPL-SCNC: 138 MMOL/L (ref 136–145)
TRIGL SERPL-MCNC: 172 MG/DL (ref 30–150)
TSH SERPL DL<=0.005 MIU/L-ACNC: 0.91 UIU/ML (ref 0.4–4)

## 2019-06-28 PROCEDURE — 99999 PR PBB SHADOW E&M-EST. PATIENT-LVL III: CPT | Mod: PBBFAC,,, | Performed by: FAMILY MEDICINE

## 2019-06-28 PROCEDURE — 99999 PR PBB SHADOW E&M-EST. PATIENT-LVL III: ICD-10-PCS | Mod: PBBFAC,,, | Performed by: FAMILY MEDICINE

## 2019-06-28 PROCEDURE — 99213 OFFICE O/P EST LOW 20 MIN: CPT | Mod: PBBFAC,PN | Performed by: FAMILY MEDICINE

## 2019-06-28 PROCEDURE — 80053 COMPREHEN METABOLIC PANEL: CPT

## 2019-06-28 PROCEDURE — 99213 PR OFFICE/OUTPT VISIT, EST, LEVL III, 20-29 MIN: ICD-10-PCS | Mod: S$PBB,,, | Performed by: FAMILY MEDICINE

## 2019-06-28 PROCEDURE — 80061 LIPID PANEL: CPT

## 2019-06-28 PROCEDURE — 83036 HEMOGLOBIN GLYCOSYLATED A1C: CPT

## 2019-06-28 PROCEDURE — 84443 ASSAY THYROID STIM HORMONE: CPT

## 2019-06-28 PROCEDURE — 36415 COLL VENOUS BLD VENIPUNCTURE: CPT | Mod: PN

## 2019-06-28 PROCEDURE — 99213 OFFICE O/P EST LOW 20 MIN: CPT | Mod: S$PBB,,, | Performed by: FAMILY MEDICINE

## 2019-06-28 RX ORDER — ALBUTEROL SULFATE 90 UG/1
2 AEROSOL, METERED RESPIRATORY (INHALATION) EVERY 6 HOURS PRN
Qty: 18 G | Refills: 0 | Status: SHIPPED | OUTPATIENT
Start: 2019-06-28 | End: 2019-11-19

## 2019-06-28 RX ORDER — LEVOTHYROXINE SODIUM 137 UG/1
TABLET ORAL
Qty: 90 TABLET | Refills: 1 | Status: SHIPPED | OUTPATIENT
Start: 2019-06-28 | End: 2019-12-29

## 2019-06-28 RX ORDER — AZITHROMYCIN 250 MG/1
TABLET, FILM COATED ORAL
Qty: 11 TABLET | Refills: 0 | Status: SHIPPED | OUTPATIENT
Start: 2019-06-28 | End: 2019-09-18

## 2019-06-28 NOTE — PROGRESS NOTES
"  Mckenzie Mari  1950    Mckenzie was seen today for chest congestion, sore throat and fever.    Diagnoses and all orders for this visit:    Sinusitis, unspecified chronicity, unspecified location  -     azithromycin (Z-DIDIER) 250 MG tablet; Take this antibiotic for 5 days total according to the following instructions: Take 2 po on Day #1, then take one po daily on days 2-10  -     albuterol (PROAIR HFA) 90 mcg/actuation inhaler; Inhale 2 puffs into the lungs every 6 (six) hours as needed for Wheezing.  Side effects and precautions of medication use reviewed with patient, expressed understanding. No questions or concerns. Expected course of illness and sx tx incl otc med use reviewed. Notify MD if sx persist or worsen.       Subjective     Chief Complaint   Patient presents with    Chest Congestion    Sore Throat    Fever       Ms. Mckenzie Mari is a 67yo F who presented today with a sore throat and feeling feverish.    She states that this feels very similar to sinus infections she has had in the past but with added chest congestion.  She could feel it coming on for at least 1-2 weeks but she has been feeling worse the past few days and decided to come in.  She feels feverish in the mornings and evenings but is not febrile on her thermometer at home.  She has a "gravely" sore throat, headache around her eyes, sneeze,and dry cough.  She experienced excessive sweating while sleeping 2d ago and felt nauseous and clammy when she woke up.  She feels chest congestion but states that it doesn't feel like the cardiac congestion she has had in the past.  She had 1 episode of right ear pain that resolved around the time her symptoms started.    She also still has some intermittent lower back pain and dysuria, but she is seeing urology next week.        Active Ambulatory Problems     Diagnosis Date Noted    CHF (congestive heart failure)     CHF (NYHA class III, ACC/AHA stage C)     Hyperlipidemia     Chest pain " 01/18/2012    HTN (hypertension)     CVID (common variable immunodeficiency)     Chronic sinusitis     ALLERGIC RHINITIS 01/23/2012    Penicillin allergy 01/30/2012    History of thyroid cancer 07/25/2012    Postoperative hypothyroidism 07/25/2012    LBBB (left bundle branch block) 10/11/2012    ICD (implantable cardioverter-defibrillator) in place 10/23/2012    Sinusitis 10/01/2013    Dyspnea on effort 03/25/2014    Chest pain on exertion 03/25/2014    CAD (coronary artery disease) 04/17/2014    Type 2 diabetes mellitus with diabetic neuropathy, with long-term current use of insulin 05/11/2014    Myoclonus     History of colon polyps 05/11/2016    Obesity (BMI 30-39.9) 08/31/2016    History of pancreatitis 08/31/2016    Nasal obstruction 03/15/2017    Deviated nasal septum 03/15/2017    Hypertrophy of nasal turbinates 03/15/2017    Malignant neoplasm of central portion of left female breast 10/06/2017     Resolved Ambulatory Problems     Diagnosis Date Noted    Subacute maxillary sinusitis 03/15/2017     Past Medical History:   Diagnosis Date    Allergy     Arthritis     Breast cancer 2005    Bundle branch block     Cardiomyopathy     CHF (congestive heart failure)     Chronic sinusitis     CVID (common variable immunodeficiency)     Diabetes mellitus     GERD (gastroesophageal reflux disease)     Headache(784.0)     HTN (hypertension)     Hyperlipidemia     Hypothyroid 7/25/2012    Otitis media     Presence of combination internal cardiac defibrillator (ICD) and pacemaker     Thyroid cancer     Thyroid cancer 7/25/2012    Thyroid disease          Review of Systems   Constitutional: Positive for chills and fever. Negative for fatigue.   HENT: Positive for congestion, postnasal drip, rhinorrhea, sinus pressure, sneezing and sore throat.    Respiratory: Positive for cough (dry) and wheezing (intermittet end expiratory wheeze). Negative for chest tightness and shortness of  "breath.    Cardiovascular: Negative for chest pain, palpitations and leg swelling.   Gastrointestinal: Positive for nausea. Negative for blood in stool, constipation, diarrhea and vomiting.   Genitourinary: Positive for dysuria and flank pain. Negative for difficulty urinating.   Musculoskeletal: Negative for joint swelling and myalgias.   Neurological: Positive for headaches. Negative for light-headedness.       Objective     Physical Exam   Constitutional: She is oriented to person, place, and time. She appears well-developed and well-nourished. No distress.   HENT:   Head: Normocephalic and atraumatic.   Right Ear: External ear normal. Tympanic membrane is not erythematous. A middle ear effusion is present.   Left Ear: External ear normal. Tympanic membrane is not erythematous. A middle ear effusion is present.   Nose: Mucosal edema and rhinorrhea present.   Mouth/Throat: Uvula is midline and oropharynx is clear and moist. No oropharyngeal exudate.   Cobblestoning op   Eyes: Conjunctivae are normal. Right eye exhibits no discharge. Left eye exhibits no discharge.   Neck: Normal range of motion. Neck supple.   Cardiovascular: Normal rate and regular rhythm.   Murmur heard.  Pulmonary/Chest: Effort normal and breath sounds normal. No respiratory distress. She has no wheezes.   Abdominal: Soft. Bowel sounds are normal. She exhibits no distension. There is no tenderness.   Lymphadenopathy:     She has no cervical adenopathy.   Neurological: She is alert and oriented to person, place, and time.   Skin: Skin is warm and dry.   Nursing note and vitals reviewed.    Vitals:    06/28/19 0807   BP: 120/74   BP Location: Right arm   Patient Position: Sitting   BP Method: X-Large (Manual)   Pulse: 75   Resp: 18   Temp: 98.1 °F (36.7 °C)   TempSrc: Oral   SpO2: 98%   Weight: 97.7 kg (215 lb 6.2 oz)   Height: 5' 2" (1.575 m)       MOST RECENT LABS IN OUR ELECTRONIC MEDICAL RECORD:       Mckenzie was seen today for chest " congestion, sore throat and fever.    Diagnoses and all orders for this visit:    Sinusitis, unspecified chronicity, unspecified location  -     azithromycin (Z-DIDIER) 250 MG tablet; Take this antibiotic for 5 days total according to the following instructions: Take 2 po on Day #1, then take one po daily on days 2-10  -     albuterol (PROAIR HFA) 90 mcg/actuation inhaler; Inhale 2 puffs into the lungs every 6 (six) hours as needed for Wheezing.      Pio Dolan, MS4        I have seen and examined the patient independently, and reviewed the note per medical student. I agree with the assessment and plan as documented.  Side effects and precautions of medication use reviewed with patient, expressed understanding. No questions or concerns. Expected course of illness and sx tx incl otc med use reviewed. Notify MD if sx persist or worsen.   Reviewed: mucinex, humidifier, nasal saline rinse, flonase. To start zpak if not improving. Recommend albuterol trial in lieu of prednisone given DM2.   Sandra Olivier MD

## 2019-06-29 LAB
ESTIMATED AVG GLUCOSE: 174 MG/DL (ref 68–131)
HBA1C MFR BLD HPLC: 7.7 % (ref 4–5.6)

## 2019-07-03 ENCOUNTER — OFFICE VISIT (OUTPATIENT)
Dept: UROLOGY | Facility: CLINIC | Age: 69
End: 2019-07-03
Payer: MEDICARE

## 2019-07-03 VITALS — BODY MASS INDEX: 39.56 KG/M2 | WEIGHT: 214.94 LBS | HEIGHT: 62 IN

## 2019-07-03 DIAGNOSIS — R10.9 FLANK PAIN: ICD-10-CM

## 2019-07-03 DIAGNOSIS — R30.0 DYSURIA: Primary | ICD-10-CM

## 2019-07-03 LAB
BILIRUB SERPL-MCNC: ABNORMAL MG/DL
BLOOD URINE, POC: ABNORMAL
COLOR, POC UA: ABNORMAL
GLUCOSE UR QL STRIP: ABNORMAL
KETONES UR QL STRIP: ABNORMAL
LEUKOCYTE ESTERASE URINE, POC: ABNORMAL
NITRITE, POC UA: ABNORMAL
PH, POC UA: 5.5
PROTEIN, POC: ABNORMAL
SPECIFIC GRAVITY, POC UA: 1025
UROBILINOGEN, POC UA: ABNORMAL

## 2019-07-03 PROCEDURE — 81002 URINALYSIS NONAUTO W/O SCOPE: CPT | Mod: PBBFAC,PO | Performed by: UROLOGY

## 2019-07-03 PROCEDURE — 99213 OFFICE O/P EST LOW 20 MIN: CPT | Mod: PBBFAC,PO | Performed by: UROLOGY

## 2019-07-03 PROCEDURE — 99204 PR OFFICE/OUTPT VISIT, NEW, LEVL IV, 45-59 MIN: ICD-10-PCS | Mod: S$PBB,,, | Performed by: UROLOGY

## 2019-07-03 PROCEDURE — 99204 OFFICE O/P NEW MOD 45 MIN: CPT | Mod: S$PBB,,, | Performed by: UROLOGY

## 2019-07-03 PROCEDURE — 99999 PR PBB SHADOW E&M-EST. PATIENT-LVL III: ICD-10-PCS | Mod: PBBFAC,,, | Performed by: UROLOGY

## 2019-07-03 PROCEDURE — 99999 PR PBB SHADOW E&M-EST. PATIENT-LVL III: CPT | Mod: PBBFAC,,, | Performed by: UROLOGY

## 2019-07-03 NOTE — PROGRESS NOTES
Subjective:       Patient ID: Mckenzie Mari is a 68 y.o. female.    Chief Complaint: Dysuria (consult)    HPI     68-year-old with bilateral back pain which has been an ongoing problem for several years. She has known disk problems in her back but feels that this is something different.  She has no urinary symptoms.  She denies hematuria but says she has occasional burning sensation when she voids.  Her UA is clear today.  She gets rare urinary tract infections.  She has no history of stones.  She has never smoked.  her mother has a history of bladder cancer.  CT scan was obtained 9 months ago and I reviewed those images.  Her kidneys appear normal at that time there were no stones or obstruction.  There is a left renal cyst.  Urine dipstick shows negative for all components.    Past Medical History:   Diagnosis Date    Allergy     Arthritis     Breast cancer 2005    s/p lumpectomy, chemo and now cancer free over 5 years    Bundle branch block     Cardiomyopathy     EF 35 - 40%    CHF (congestive heart failure)     FC II    Chronic sinusitis     CVID (common variable immunodeficiency)     Diabetes mellitus     GERD (gastroesophageal reflux disease)     Headache(784.0)     HTN (hypertension)     Hyperlipidemia     Hypothyroid 7/25/2012    Otitis media     Presence of combination internal cardiac defibrillator (ICD) and pacemaker     Thyroid cancer     Thyroid cancer 7/25/2012    Thyroid disease     hypothyroid after papillary thyroid cancer with thyroid resection     Past Surgical History:   Procedure Laterality Date    BREAST LUMPECTOMY      left    CARDIAC PACEMAKER PLACEMENT      CATARACT EXTRACTION W/  INTRAOCULAR LENS IMPLANT      left eye    CATHETERIZATION, HEART, LEFT Left 4/17/2014    Per[facility] C[facility] LAB    CAUTERIZATION OF TURBINATES N/A 3/15/2017    [facility] OR    CHOLECYSTECTOMY      COLONOSCOPY N/A 5/11/2016     Performed by Alfonso Serrano Jr., MD at [facility] ENDO    COLONOSCOPY W/ POLYPECTOMY  10/05/2009    MONI   One 2 mm polyp in the cecum.  TUBULAR ADENOMA.  Tortuous colon.    EGD (ESOPHAGOGASTRODUODENOSCOPY) N/A 2/16/2012    Performed by Alfonso Serrano Jr., MD at [facility] ENDO    ESOPHAGOGASTRODUODENOSCOPY  09/12/2006    MONI   Dysphagia.  NERD.  Patulous LES.  Atrophic mucosa.  Benign fundal polyps.  Dilated, 42 Fr.    HYSTERECTOMY      JOINT REPLACEMENT Bilateral     pacemaker defibrillator      SEPTOPLASTY ENDOSCOPIC Bilateral 3/15/2017    Performed by G[facility]av [facility]uleta MD at [facility] OR    SINUS SURGERY FUNCTIONAL ENDOSCOPIC WITH NAVIGATION Bilateral 3/15/2017    Performed by Gaurav [facility]uleta MD at [facility] OR    THYROID SURGERY  x2    TONSILLECTOMY         Current Outpatient Medications:     albuterol (PROAIR HFA) 90 mcg/actuation inhaler, Inhale 2 puffs into the lungs every 6 (six) hours as needed for Wheezing., Disp: 18 g, Rfl: 0    azithromycin (Z-DIDIER) 250 MG tablet, Take this antibiotic for 5 days total according to the following instructions: Take 2 po on Day #1, then take one po daily on days 2-10, Disp: 11 tablet, Rfl: 0    CALCIUM CARBONATE/VITAMIN D3 (VITAMIN D-3 ORAL), Take by mouth., Disp: , Rfl:     carvedilol (COREG) 25 MG tablet, TAKE 1 TABLET (25 MG TOTAL) BY MOUTH 2 (TWO) TIMES DAILY WITH MEALS., Disp: 180 tablet, Rfl: 1    esomeprazole (NEXIUM) 40 MG capsule, TAKE 1 CAPSULE BY MOUTH EVERY DAY, Disp: 90 capsule, Rfl: 1    estradiol (ESTRING) 2 mg vaginal ring, Place 2 mg vaginally every 3 (three) months. follow package directions , Disp: , Rfl:     fluconazole (DIFLUCAN) 150 MG Tab, Take one (150mg) tablet once weekly for 6 months., Disp: 30 tablet, Rfl: 0    fluticasone (FLONASE) 50 mcg/actuation nasal spray, 1 spray (50 mcg total) by Each Nare route 2 (two) times daily., Disp: 1 Bottle, Rfl: 1    furosemide (LASIX) 40 MG tablet, Take 1 tablet (40 mg total) by mouth once daily.  "(Patient taking differently: Take 40 mg by mouth as needed. ), Disp: 90 tablet, Rfl: 1    insulin degludec (TRESIBA FLEXTOUCH U-200) 200 unit/mL (3 mL) InPn, Inject 50 Units into the skin once daily., Disp: 27 mL, Rfl: 3    insulin lispro (HUMALOG KWIKPEN INSULIN) 100 unit/mL pen, Inject 16 Units into the skin 3 (three) times daily before meals. Plus correction scale, max TDD 78, Disp: 5 Box, Rfl: 3    lactobacillus rhamnosus GG (CULTURELLE) 10 billion cell capsule, Take 1 capsule by mouth once daily., Disp: , Rfl:     multivit-mineral-iron-lutein (CENTRUM SILVER ULTRA WOMEN'S) Tab, Take by mouth., Disp: , Rfl:     ONETOUCH VERIO Strp, U QID UTD, Disp: , Rfl: 11    pen needle, diabetic (BD ULTRA-FINE SHANTELLE PEN NEEDLE) 32 gauge x 5/32" Ndle, Uses up to 4 daily, on multiple daily insulin injections. Dx code E11.8, Disp: 150 each, Rfl: 11    potassium chloride SA (K-DUR,KLOR-CON) 20 MEQ tablet, Take 1 tablet (20 mEq total) by mouth once daily., Disp: 90 tablet, Rfl: 1    rosuvastatin (CRESTOR) 10 MG tablet, Take 1 tablet (10 mg total) by mouth once daily., Disp: 90 tablet, Rfl: 2    spironolactone (ALDACTONE) 25 MG tablet, TAKE 1 TABLET BY MOUTH EVERY DAY, Disp: 90 tablet, Rfl: 1    SYNTHROID 137 mcg Tab tablet, TAKE 1 TABLET BY MOUTH EVERY MORNING BEFORE BREAKFAST, Disp: 90 tablet, Rfl: 1    telmisartan (MICARDIS) 20 MG Tab, Take 1 tablet (20 mg total) by mouth once daily., Disp: 90 tablet, Rfl: 3      Review of Systems   Constitutional: Negative for fever.   Eyes: Negative for visual disturbance.   Respiratory: Negative for shortness of breath.    Cardiovascular: Negative for chest pain.   Gastrointestinal: Negative for abdominal pain.   Genitourinary: Negative for dysuria, flank pain and hematuria.   Musculoskeletal: Positive for back pain.   Skin: Negative for rash.   Neurological: Negative for seizures.   Psychiatric/Behavioral: Negative for confusion.       Objective:      Physical Exam "   Constitutional: She is oriented to person, place, and time. She appears well-developed and well-nourished.   HENT:   Head: Normocephalic.   Eyes: Conjunctivae and EOM are normal.   Neck: Normal range of motion.   Cardiovascular: Normal rate.   Pulmonary/Chest: Effort normal.   Abdominal: Soft. She exhibits no distension and no mass. There is generalized tenderness. There is no CVA tenderness.   Genitourinary:   Genitourinary Comments: Bladder non-tender and nondistended  No CVA tenderness   Musculoskeletal: She exhibits no edema.   Neurological: She is alert and oriented to person, place, and time.   Skin: Skin is warm and dry. No rash noted. No erythema.   Psychiatric: She has a normal mood and affect. Her behavior is normal.   Vitals reviewed.      Assessment:       1. Dysuria    2. Flank pain        Plan:       Dysuria  -     POCT URINE DIPSTICK WITHOUT MICROSCOPE    Flank pain  -     US Retroperitoneal Complete (Kidney and; Future; Expected date: 07/03/2019

## 2019-07-03 NOTE — LETTER
July 3, 2019      Derek Holcomb, NP  [address]  [place] [address]           [facility]  [address] [facility] [address] [place] [address]  Phone: [phone]  Fax: [phone]          Patient: Mckenzie Mari   MR Number: [id number]   YOB: 1950   Date of Visit: 7/3/2019       Dear Derek Holcomb:    Thank you for referring Mckenzie Mari to me for evaluation. Attached you will find relevant portions of my assessment and plan of care.    If you have questions, please do not hesitate to call me. I look forward to following Mckenzie Mari along with you.    Sincerely,    NAKIA Daniels MD    Enclosure  CC:  No Recipients    If you would like to receive this communication electronically, please contact [email or web address] or [phone] to request more information on [employer] Link access.    For providers and/or their staff who would like to refer a patient to [facility], please contact us through our one-stop-shop provider referral line, [facility], at [phone].    If you feel you have received this communication in error or would no longer like to receive these types of communications, please e-mail [email or web address]

## 2019-07-05 DIAGNOSIS — Z95.810 ICD (IMPLANTABLE CARDIOVERTER-DEFIBRILLATOR) IN PLACE: Primary | ICD-10-CM

## 2019-07-05 DIAGNOSIS — I44.7 LBBB (LEFT BUNDLE BRANCH BLOCK): ICD-10-CM

## 2019-07-12 ENCOUNTER — OFFICE VISIT (OUTPATIENT)
Dept: ENDOCRINOLOGY | Facility: CLINIC | Age: 69
End: 2019-07-12
Payer: MEDICARE

## 2019-07-12 VITALS
DIASTOLIC BLOOD PRESSURE: 70 MMHG | HEART RATE: 66 BPM | HEIGHT: 62 IN | SYSTOLIC BLOOD PRESSURE: 138 MMHG | BODY MASS INDEX: 39.92 KG/M2 | WEIGHT: 216.94 LBS

## 2019-07-12 DIAGNOSIS — Z79.4 TYPE 2 DIABETES MELLITUS WITH DIABETIC NEUROPATHY, WITH LONG-TERM CURRENT USE OF INSULIN: Primary | ICD-10-CM

## 2019-07-12 DIAGNOSIS — Z87.19 HISTORY OF PANCREATITIS: ICD-10-CM

## 2019-07-12 DIAGNOSIS — E11.40 TYPE 2 DIABETES MELLITUS WITH DIABETIC NEUROPATHY, WITH LONG-TERM CURRENT USE OF INSULIN: Primary | ICD-10-CM

## 2019-07-12 DIAGNOSIS — I25.10 CORONARY ARTERY DISEASE INVOLVING NATIVE CORONARY ARTERY WITHOUT ANGINA PECTORIS, UNSPECIFIED WHETHER NATIVE OR TRANSPLANTED HEART: ICD-10-CM

## 2019-07-12 DIAGNOSIS — E66.9 OBESITY (BMI 30-39.9): ICD-10-CM

## 2019-07-12 DIAGNOSIS — E89.0 POSTOPERATIVE HYPOTHYROIDISM: ICD-10-CM

## 2019-07-12 DIAGNOSIS — I50.9 CHF (NYHA CLASS III, ACC/AHA STAGE C): ICD-10-CM

## 2019-07-12 DIAGNOSIS — E78.5 HYPERLIPIDEMIA, UNSPECIFIED HYPERLIPIDEMIA TYPE: Chronic | ICD-10-CM

## 2019-07-12 DIAGNOSIS — G47.9 SLEEP DISTURBANCE: ICD-10-CM

## 2019-07-12 DIAGNOSIS — I10 ESSENTIAL HYPERTENSION: ICD-10-CM

## 2019-07-12 PROCEDURE — 99999 PR PBB SHADOW E&M-EST. PATIENT-LVL V: ICD-10-PCS | Mod: PBBFAC,,, | Performed by: NURSE PRACTITIONER

## 2019-07-12 PROCEDURE — 99214 OFFICE O/P EST MOD 30 MIN: CPT | Mod: S$PBB,,, | Performed by: NURSE PRACTITIONER

## 2019-07-12 PROCEDURE — 99999 PR PBB SHADOW E&M-EST. PATIENT-LVL V: CPT | Mod: PBBFAC,,, | Performed by: NURSE PRACTITIONER

## 2019-07-12 PROCEDURE — 99215 OFFICE O/P EST HI 40 MIN: CPT | Mod: PBBFAC,PN | Performed by: NURSE PRACTITIONER

## 2019-07-12 PROCEDURE — 99214 PR OFFICE/OUTPT VISIT, EST, LEVL IV, 30-39 MIN: ICD-10-PCS | Mod: S$PBB,,, | Performed by: NURSE PRACTITIONER

## 2019-07-12 RX ORDER — INSULIN LISPRO 100 [IU]/ML
16 INJECTION, SOLUTION INTRAVENOUS; SUBCUTANEOUS
Qty: 5 BOX | Refills: 3
Start: 2019-07-12 | End: 2019-11-13

## 2019-07-12 RX ORDER — INSULIN DEGLUDEC 200 U/ML
52 INJECTION, SOLUTION SUBCUTANEOUS DAILY
Qty: 27 ML | Refills: 3 | Status: SHIPPED | OUTPATIENT
Start: 2019-07-12 | End: 2019-11-13 | Stop reason: SDUPTHER

## 2019-07-12 NOTE — PROGRESS NOTES
CC: Ms. Mckenzie Mari arrives today for management of Type 2 DM and review of chronic medical conditions.     HPI: Ms. Mckenzie Mari was diagnosed with Type 2 DM in 2004. She was diagnosed based on lab work. Initial treatment consisted of metformin and glimepiride. Insulin added in 8/2016 - began Toujeo. She states that she felt that this caused nausea, abd pain, chest pain. Lantus caused throat swelling, left arm pain. She was then changed to Novolog 70/30 but this was only used for a short period because her insurance didn't cover.  Then changed to Humalog 50-50 in 12/2016. In 2017, she began MDI with Tresiba and Humalog. + FH of DM in maternal aunt and cousin. Denies hospitalizations due to DM. Previously seen in endocrine by Richard Gupta, and MAGALI Eng, ARIELLE. She has a history of thyroid cancer/post-surgical hypothyroidism.   Of note, she has a h/o pancreatitis.   Insulin management is challenging because patient believes insulin causes hyperglycemia.    She follows annually with cardiology for CHF, CAD.      Last seen by me in April.     She states that she gets headaches and feels listless with glucoses < 150.     BG readings are checked 3x/day.     Hypoglycemia: No     Missing Insulin/PO medication doses: No  Timing prandial insulin 5-15 minutes before meals: yes    Dietary Habits: Eats 2-3 meals/day, based on her sleep pattern. Snacks on [place] cheese snack or small ice cream stick or SF pudding. Avoids sugary drinks.    Last DM education: 1/2019    Her sleeping pattern has been irregular lately. For example. She woke up early this AM at 1:00 am and has been awake since. She plans to go to sleep after her appt until later this evening.   She states that her  has not told her that she stops breathing while sleeping or snores.     CURRENT DIABETIC MEDS: Tresiba 50 units QAM, Humalog 16 units AC + correction scale, target 180, ISF 25.  Vial or pen: pen  Glucometer type: One Touch Verio  "    Previous DM treatments:   Invokana - nausea  Glimepiride - stopped when prandial insulin added  Touejo -  Nausea, abd pain, chest pain  Lantus - throat swelling, left arm pain  Novolog 70/30 - not covered by insurance  Metformin - headaches    Last Eye Exam: 4/9/2019 - sees Dr. Mcgregor. No  Report on file.  Last Podiatry Exam: no    REVIEW OF SYSTEMS  Constitutional: no c/o weakness or weight loss. + fatigue   Eyes: no c/o visual disturbances.   Cardiac: no palpitations, chest pain. Reports occasional BLE edema. Takes furosemide as needed.  Respiratory: no c/o dyspnea, cough.  GI: no c/o abdominal pain, nausea. + H/o pancreatitis.   Skin: no lesions or rashes.   Musculoskeletal: reports back pain that she attributes to previous injury and worsening arthritis.  Neuro: reports occasional numbness, tingling in feet that comes and goes.   Endocrine: denies polyphagia, polydipsia, polyuria      Personally reviewed Past Medical, Surgical, Social History.    Vital Signs  /70   Pulse 66   Ht 5' 2" (1.575 m)   Wt 98.4 kg (216 lb 14.9 oz)   BMI 39.68 kg/m²     Personally reviewed the below labs:    Hemoglobin A1C   Date Value Ref Range Status   06/28/2019 7.7 (H) 4.0 - 5.6 % Final     Comment:     ADA Screening Guidelines:  5.7-6.4%  Consistent with prediabetes  >or=6.5%  Consistent with diabetes  High levels of fetal hemoglobin interfere with the HbA1C  assay. Heterozygous hemoglobin variants (HbS, HgC, etc)do  not significantly interfere with this assay.   However, presence of multiple variants may affect accuracy.     03/04/2019 8.3 (H) 4.0 - 5.6 % Final     Comment:     ADA Screening Guidelines:  5.7-6.4%  Consistent with prediabetes  >or=6.5%  Consistent with diabetes  High levels of fetal hemoglobin interfere with the HbA1C  assay. Heterozygous hemoglobin variants (HbS, HgC, etc)do  not significantly interfere with this assay.   However, presence of multiple variants may affect accuracy.   "   11/30/2018 8.5 (H) 4.0 - 5.6 % Final     Comment:     ADA Screening Guidelines:  5.7-6.4%  Consistent with prediabetes  >or=6.5%  Consistent with diabetes  High levels of fetal hemoglobin interfere with the HbA1C  assay. Heterozygous hemoglobin variants (HbS, HgC, etc)do  not significantly interfere with this assay.   However, presence of multiple variants may affect accuracy.         Chemistry        Component Value Date/Time     06/28/2019 0720    K 4.0 06/28/2019 0720     06/28/2019 0720    CO2 25 06/28/2019 0720    BUN 15 06/28/2019 0720    CREATININE 0.8 06/28/2019 0720     (H) 06/28/2019 0720        Component Value Date/Time    CALCIUM 9.7 06/28/2019 0720    ALKPHOS 82 06/28/2019 0720    AST 18 06/28/2019 0720    ALT 22 06/28/2019 0720    BILITOT 1.2 (H) 06/28/2019 0720          Lab Results   Component Value Date    CHOL 201 (H) 06/28/2019    CHOL 156 11/30/2018    CHOL 131 03/29/2018     Lab Results   Component Value Date    HDL 47 06/28/2019    HDL 46 11/30/2018    HDL 47 03/29/2018     Lab Results   Component Value Date    LDLCALC 119.6 06/28/2019    LDLCALC 79.0 11/30/2018    LDLCALC 54.4 (L) 03/29/2018     Lab Results   Component Value Date    TRIG 172 (H) 06/28/2019    TRIG 155 (H) 11/30/2018    TRIG 148 03/29/2018     Lab Results   Component Value Date    CHOLHDL 23.4 06/28/2019    CHOLHDL 29.5 11/30/2018    CHOLHDL 35.9 03/29/2018       Lab Results   Component Value Date    MICALBCREAT 14.0 06/28/2019     Lab Results   Component Value Date    TSH 0.911 06/28/2019       CrCl cannot be calculated (Patient's most recent lab result is older than the maximum 7 days allowed.).    Vit D, 25-Hydroxy   Date Value Ref Range Status   11/08/2014 51 30 - 96 ng/mL Final     Comment:     Vitamin D deficiency.........<10 ng/mL                              Vitamin D insufficiency......10-29 ng/mL       Vitamin D sufficiency........> or equal to 30 ng/mL  Vitamin D toxicity............>100 ng/mL            March 2019 CGMS results: Fasting glucose is acceptable for what patient will allow (feels symptomatic with glucose < 150). Two fasting glucoses didn't correlate with SMBG glucose on her log. Minimal prandial excursions are noted. No true hypoglycemia but rebound noted after borderline nocturnal episode. Eats 3 meals/day, some of which are lower carb, and snacks on either SF pudding or small piece of chocolate.   Average glucose: 138  % above target: 2%  % in target: 98%  % below target: 0%      PHYSICAL EXAMINATION  Constitutional: Appears well, no distress  Neck: Supple, trachea midline; transverse scar to anterior neck from thyroidectomy.   Respiratory: CTA, even and unlabored.  Cardiovascular: RRR, no murmurs, no carotid bruits. DP pulses 1+ bilaterally; no edema.  Lymph: no cervical or supraclavicular lymphadenopathy.  Skin: warm and dry; no lipohypertrophy, or acanthosis nigracans observed.  Neuro: DTR diminished to BUE/BLE. Previously, no loss of protective sensation via 10 gm monofilament. Vibratory exam mildly decreased, bilaterally  Feet: appropriate footwear.       A1c goal < 7.5%, due to CAD, CHF, desire for glucose to remain >150.         Assessment/Plan  1. Type 2 diabetes mellitus with diabetic neuropathy, with long-term current use of insulin  -- Improving. Continues with hyperglycemia across the board but without much variability.  -- slightly increase Tresiba to 52 units.  -- continue Humalog 16 units AC + SSI (180/25)  -- check BG 4x/day   -- Would not use Actos, due to CHF. GLP-1RA and DPP4-I contraindicated due to h/o pancreatitis. Cannot tolerate metformin.     -- Discussed diagnosis of DM, A1c goals, progression of disease, long term complications and tx options.  Advised patient to check BG before activities, such as driving or exercise.  -- Reviewed hypoglycemia management: treat with 1/2 glass of juice, 1/2 can regular coke, or 4 glucose tablets. Monitor and repeat treatment every 15  minutes until BG is >70 Then have a snack, which includes a complex carbohydrate and protein.    -- takes statin and ARB     2. Coronary artery disease involving native coronary artery without angina pectoris, unspecified whether native or transplanted heart  -- stable, avoid hyopglycemia     3. CHF (NYHA class III, ACC/AHA stage C)  -- follows with cardiology   4. Postoperative hypothyroidism  -- stable  -- Continue Synthroid at current dose.   5. Essential hypertension  -- controlled   -- continue ARB   6. Hyperlipidemia, unspecified hyperlipidemia type  -- uncontrolled with elevated triglycerides.   -- optimize DM control  -- continue statin   7. Obesity (BMI 30-39.9)  -- increases insulin resistance  Body mass index is 38.73 kg/m².   8. History of pancreatitis  -- avoid GLP-1RA and DPP4-i   9. Sleep disturbance -- we discussed helpful tactics to help improve sleep, such as limiting daytime naps so she can sleep better at night.   -- if persistent, may consider sleep study       FOLLOW UP  Follow up in about 4 months (around 11/12/2019).   Patient instructed to bring BG logs to each follow up.   Patient encouraged to call for any BG/medication issues, concerns, or questions.      Orders Placed This Encounter   Procedures    Hemoglobin A1c    Basic metabolic panel

## 2019-08-16 ENCOUNTER — CLINICAL SUPPORT (OUTPATIENT)
Dept: CARDIOLOGY | Facility: CLINIC | Age: 69
End: 2019-08-16
Attending: INTERNAL MEDICINE
Payer: MEDICARE

## 2019-08-16 DIAGNOSIS — I44.7 LBBB (LEFT BUNDLE BRANCH BLOCK): ICD-10-CM

## 2019-08-16 DIAGNOSIS — Z95.810 ICD (IMPLANTABLE CARDIOVERTER-DEFIBRILLATOR) IN PLACE: ICD-10-CM

## 2019-09-08 RX ORDER — POTASSIUM CHLORIDE 20 MEQ/1
TABLET, EXTENDED RELEASE ORAL
Qty: 90 TABLET | Refills: 0 | Status: SHIPPED | OUTPATIENT
Start: 2019-09-08 | End: 2019-12-02 | Stop reason: SDUPTHER

## 2019-09-18 ENCOUNTER — OFFICE VISIT (OUTPATIENT)
Dept: FAMILY MEDICINE | Facility: CLINIC | Age: 69
End: 2019-09-18
Payer: MEDICARE

## 2019-09-18 ENCOUNTER — TELEPHONE (OUTPATIENT)
Dept: ENDOCRINOLOGY | Facility: CLINIC | Age: 69
End: 2019-09-18

## 2019-09-18 ENCOUNTER — HOSPITAL ENCOUNTER (OUTPATIENT)
Dept: RADIOLOGY | Facility: HOSPITAL | Age: 69
Discharge: HOME | End: 2019-09-18
Attending: FAMILY MEDICINE
Payer: MEDICARE

## 2019-09-18 VITALS
HEIGHT: 62 IN | HEART RATE: 64 BPM | WEIGHT: 216.94 LBS | SYSTOLIC BLOOD PRESSURE: 110 MMHG | DIASTOLIC BLOOD PRESSURE: 70 MMHG | BODY MASS INDEX: 39.92 KG/M2

## 2019-09-18 DIAGNOSIS — R51.9 ACUTE INTRACTABLE HEADACHE, UNSPECIFIED HEADACHE TYPE: Primary | ICD-10-CM

## 2019-09-18 DIAGNOSIS — G44.89 CHRONIC MIXED HEADACHE SYNDROME: ICD-10-CM

## 2019-09-18 DIAGNOSIS — R51.9 ACUTE INTRACTABLE HEADACHE, UNSPECIFIED HEADACHE TYPE: ICD-10-CM

## 2019-09-18 DIAGNOSIS — I10 ESSENTIAL HYPERTENSION: ICD-10-CM

## 2019-09-18 PROCEDURE — 70450 CT HEAD WITHOUT CONTRAST: ICD-10-PCS | Mod: 26,,, | Performed by: RADIOLOGY

## 2019-09-18 PROCEDURE — 99999 PR PBB SHADOW E&M-EST. PATIENT-LVL III: CPT | Mod: PBBFAC,,, | Performed by: FAMILY MEDICINE

## 2019-09-18 PROCEDURE — 99214 PR OFFICE/OUTPT VISIT, EST, LEVL IV, 30-39 MIN: ICD-10-PCS | Mod: S$PBB,,, | Performed by: FAMILY MEDICINE

## 2019-09-18 PROCEDURE — 99999 PR PBB SHADOW E&M-EST. PATIENT-LVL III: ICD-10-PCS | Mod: PBBFAC,,, | Performed by: FAMILY MEDICINE

## 2019-09-18 PROCEDURE — 70450 CT HEAD/BRAIN W/O DYE: CPT | Mod: 26,,, | Performed by: RADIOLOGY

## 2019-09-18 PROCEDURE — 99213 OFFICE O/P EST LOW 20 MIN: CPT | Mod: PBBFAC,25,PN | Performed by: FAMILY MEDICINE

## 2019-09-18 PROCEDURE — 70450 CT HEAD/BRAIN W/O DYE: CPT | Mod: TC,PO

## 2019-09-18 PROCEDURE — 99214 OFFICE O/P EST MOD 30 MIN: CPT | Mod: S$PBB,,, | Performed by: FAMILY MEDICINE

## 2019-09-18 NOTE — PROGRESS NOTES
THIS DOCUMENT WAS MADE IN PART WITH VOICE RECOGNITION SOFTWARE.  OCCASIONALLY THIS SOFTWARE WILL MISINTERPRET WORDS OR PHRASES.      Mckenzie Mari  1950    Mckenzie was seen today for follow-up.    Diagnoses and all orders for this visit:    Acute intractable headache, unspecified headache type  -     CT Head Without Contrast; Future  -     Sedimentation rate; Future  Sed rate to screen for temporal arteritis.  Because of the new headache and intractability, feel imaging is warranted.  She has an iodine allergy and because of her cardiac device cannot have an MRI so will start within plain CT head.  If normal but symptoms persist consider Neurology.   If symptoms worsen, fail to improve, or significantly change then contact us or follow back for reevaluation.    Hypertension is stable.  Diabetes chronic not stable, I defer this to Endocrinology    Subjective     Chief Complaint   Patient presents with    Follow-up     5 months--       HPI    Headache, right side, side of face feels funny,   Sore in temporal area  Not remembering some words   x 2 weeks  Back in august teeth on right side sore, went to dentist, antibiotics help teeth, then headaches started    Stress  Glucose high    HPI elements addressed above in the assessment and plan including problems, diagnosis, stability/instability,  improving/worsening, and chronicity will not be duplicated in this section. Any important additional HPI topics will be discussed here if needed.    Active Ambulatory Problems     Diagnosis Date Noted    CHF (congestive heart failure)     CHF (NYHA class III, ACC/AHA stage C)     Hyperlipidemia     Chest pain 01/18/2012    HTN (hypertension)     CVID (common variable immunodeficiency)     Chronic sinusitis     ALLERGIC RHINITIS 01/23/2012    Penicillin allergy 01/30/2012    History of thyroid cancer 07/25/2012    Postoperative hypothyroidism 07/25/2012    LBBB (left bundle branch block) 10/11/2012    ICD  (implantable cardioverter-defibrillator) in place 10/23/2012    Sinusitis 10/01/2013    Dyspnea on effort 03/25/2014    Chest pain on exertion 03/25/2014    CAD (coronary artery disease) 04/17/2014    Type 2 diabetes mellitus with diabetic neuropathy, with long-term current use of insulin 05/11/2014    Myoclonus     History of colon polyps 05/11/2016    Obesity (BMI 30-39.9) 08/31/2016    History of pancreatitis 08/31/2016    Nasal obstruction 03/15/2017    Deviated nasal septum 03/15/2017    Hypertrophy of nasal turbinates 03/15/2017    Malignant neoplasm of central portion of left female breast 10/06/2017     Resolved Ambulatory Problems     Diagnosis Date Noted    Subacute maxillary sinusitis 03/15/2017     Past Medical History:   Diagnosis Date    Allergy     Arthritis     Breast cancer 2005    Bundle branch block     Cardiomyopathy     Diabetes mellitus     GERD (gastroesophageal reflux disease)     Headache(784.0)     Hypothyroid 7/25/2012    Otitis media     Presence of combination internal cardiac defibrillator (ICD) and pacemaker     Thyroid cancer     Thyroid cancer 7/25/2012    Thyroid disease          Review of Systems   Constitutional: Negative for activity change and unexpected weight change.   HENT: Negative for hearing loss, rhinorrhea and trouble swallowing.    Eyes: Negative for discharge and visual disturbance.   Respiratory: Negative for chest tightness and wheezing.    Cardiovascular: Negative for chest pain and palpitations.   Gastrointestinal: Negative for blood in stool, constipation, diarrhea and vomiting.   Endocrine: Negative for polydipsia and polyuria.   Genitourinary: Negative for difficulty urinating, dysuria, hematuria and menstrual problem.   Musculoskeletal: Negative for arthralgias, joint swelling and neck pain.   Neurological: Positive for headaches. Negative for weakness.   Psychiatric/Behavioral: Negative for confusion and dysphoric mood.  "      Objective     Physical Exam   Constitutional: She is oriented to person, place, and time. She appears well-developed and well-nourished.   HENT:   Head: Normocephalic and atraumatic.   Right Ear: Tympanic membrane, external ear and ear canal normal.   Left Ear: Tympanic membrane, external ear and ear canal normal.   Nose: Mucosal edema present. Right sinus exhibits no maxillary sinus tenderness and no frontal sinus tenderness. Left sinus exhibits no maxillary sinus tenderness and no frontal sinus tenderness.   Mouth/Throat: Oropharynx is clear and moist.   Eyes: Pupils are equal, round, and reactive to light. Conjunctivae and EOM are normal. Right eye exhibits no discharge. No scleral icterus.   I am unable to see adequately for funduscopic exam but there are no gross apparent abnormalities   Neck: Normal range of motion. Neck supple. No JVD present. No tracheal deviation present. No thyromegaly present.   Cardiovascular: Normal rate, regular rhythm and normal heart sounds.   No murmur heard.  Pulmonary/Chest: Effort normal and breath sounds normal. No respiratory distress.   Neurological: She is alert and oriented to person, place, and time.   Skin: Skin is dry. No rash noted. She is not diaphoretic.   Psychiatric: She has a normal mood and affect. Her behavior is normal.   Vitals reviewed.    Vitals:    09/18/19 1041   BP: 110/70   Pulse: 64   Weight: 98.4 kg (216 lb 14.9 oz)   Height: 5' 2" (1.575 m)       MOST RECENT LABS IN OUR ELECTRONIC MEDICAL RECORD:     Results for orders placed or performed in visit on 07/03/19   POCT URINE DIPSTICK WITHOUT MICROSCOPE   Result Value Ref Range    Color, UA y/c     Spec Grav UA 1,025     pH, UA 5.5     WBC, UA n     Nitrite, UA n     Protein tr     Glucose, UA n     Ketones, UA n     Urobilinogen, UA n     Bilirubin n     Blood, UA n          "

## 2019-09-19 ENCOUNTER — PATIENT MESSAGE (OUTPATIENT)
Dept: FAMILY MEDICINE | Facility: CLINIC | Age: 69
End: 2019-09-19

## 2019-09-19 DIAGNOSIS — B96.89 ACUTE BACTERIAL SINUSITIS: ICD-10-CM

## 2019-09-19 DIAGNOSIS — J01.90 ACUTE BACTERIAL SINUSITIS: ICD-10-CM

## 2019-09-19 RX ORDER — AZITHROMYCIN 250 MG/1
TABLET, FILM COATED ORAL
Qty: 11 TABLET | Refills: 0 | Status: SHIPPED | OUTPATIENT
Start: 2019-09-19 | End: 2019-10-14

## 2019-10-09 ENCOUNTER — LAB VISIT (OUTPATIENT)
Dept: LAB | Facility: HOSPITAL | Age: 69
End: 2019-10-09
Attending: INTERNAL MEDICINE
Payer: MEDICARE

## 2019-10-09 DIAGNOSIS — C50.112 MALIGNANT NEOPLASM OF CENTRAL PORTION OF LEFT BREAST IN FEMALE, ESTROGEN RECEPTOR POSITIVE: ICD-10-CM

## 2019-10-09 DIAGNOSIS — Z17.0 MALIGNANT NEOPLASM OF CENTRAL PORTION OF LEFT BREAST IN FEMALE, ESTROGEN RECEPTOR POSITIVE: ICD-10-CM

## 2019-10-09 LAB
ALBUMIN SERPL BCP-MCNC: 4.2 G/DL (ref 3.5–5.2)
ALP SERPL-CCNC: 79 U/L (ref 38–145)
ALT SERPL W/O P-5'-P-CCNC: 25 U/L (ref 10–44)
ANION GAP SERPL CALC-SCNC: 8 MMOL/L (ref 8–16)
AST SERPL-CCNC: 29 U/L (ref 14–36)
BASOPHILS # BLD AUTO: 0.06 K/UL (ref 0–0.2)
BASOPHILS NFR BLD: 0.7 % (ref 0–1.9)
BILIRUB SERPL-MCNC: 1.8 MG/DL (ref 0.2–1.3)
BUN SERPL-MCNC: 18 MG/DL (ref 7–18)
CALCIUM SERPL-MCNC: 9.8 MG/DL (ref 8.4–10.2)
CHLORIDE SERPL-SCNC: 101 MMOL/L (ref 95–110)
CO2 SERPL-SCNC: 29 MMOL/L (ref 22–31)
CREAT SERPL-MCNC: 0.75 MG/DL (ref 0.5–1.4)
DIFFERENTIAL METHOD: ABNORMAL
EOSINOPHIL # BLD AUTO: 0.5 K/UL (ref 0–0.5)
EOSINOPHIL NFR BLD: 5.6 % (ref 0–8)
ERYTHROCYTE [DISTWIDTH] IN BLOOD BY AUTOMATED COUNT: 13.1 % (ref 11.5–14.5)
EST. GFR  (AFRICAN AMERICAN): >60 ML/MIN/1.73 M^2
EST. GFR  (NON AFRICAN AMERICAN): >60 ML/MIN/1.73 M^2
GLUCOSE SERPL-MCNC: 193 MG/DL (ref 70–110)
HCT VFR BLD AUTO: 44.6 % (ref 37–48.5)
HGB BLD-MCNC: 14.6 G/DL (ref 12–16)
IMM GRANULOCYTES # BLD AUTO: 0.07 K/UL (ref 0–0.04)
IMM GRANULOCYTES NFR BLD AUTO: 0.8 % (ref 0–0.5)
LYMPHOCYTES # BLD AUTO: 2 K/UL (ref 1–4.8)
LYMPHOCYTES NFR BLD: 23.2 % (ref 18–48)
MCH RBC QN AUTO: 30.8 PG (ref 27–31)
MCHC RBC AUTO-ENTMCNC: 32.7 G/DL (ref 32–36)
MCV RBC AUTO: 94 FL (ref 82–98)
MONOCYTES # BLD AUTO: 0.8 K/UL (ref 0.3–1)
MONOCYTES NFR BLD: 9.3 % (ref 4–15)
NEUTROPHILS # BLD AUTO: 5.2 K/UL (ref 1.8–7.7)
NEUTROPHILS NFR BLD: 60.4 % (ref 38–73)
NRBC BLD-RTO: 0 /100 WBC
PLATELET # BLD AUTO: 204 K/UL (ref 150–350)
PMV BLD AUTO: 10.8 FL (ref 9.2–12.9)
POTASSIUM SERPL-SCNC: 4.7 MMOL/L (ref 3.5–5.1)
PROT SERPL-MCNC: 7.1 G/DL (ref 6–8.4)
RBC # BLD AUTO: 4.74 M/UL (ref 4–5.4)
SODIUM SERPL-SCNC: 138 MMOL/L (ref 136–145)
WBC # BLD AUTO: 8.56 K/UL (ref 3.9–12.7)

## 2019-10-09 PROCEDURE — 36415 COLL VENOUS BLD VENIPUNCTURE: CPT | Mod: PN

## 2019-10-09 PROCEDURE — 80053 COMPREHEN METABOLIC PANEL: CPT | Mod: PN

## 2019-10-09 PROCEDURE — 80053 COMPREHEN METABOLIC PANEL: CPT

## 2019-10-09 PROCEDURE — 85025 COMPLETE CBC W/AUTO DIFF WBC: CPT

## 2019-10-09 PROCEDURE — 85025 COMPLETE CBC W/AUTO DIFF WBC: CPT | Mod: PN

## 2019-10-18 ENCOUNTER — CLINICAL SUPPORT (OUTPATIENT)
Dept: CARDIOLOGY | Facility: CLINIC | Age: 69
End: 2019-10-18
Payer: MEDICARE

## 2019-10-18 PROCEDURE — 93297 CARDIAC DEVICE CHECK - REMOTE: ICD-10-PCS | Mod: ,,, | Performed by: INTERNAL MEDICINE

## 2019-10-18 PROCEDURE — 93297 REM INTERROG DEV EVAL ICPMS: CPT | Mod: ,,, | Performed by: INTERNAL MEDICINE

## 2019-10-20 RX ORDER — CARVEDILOL 25 MG/1
TABLET ORAL
Qty: 180 TABLET | Refills: 1 | Status: SHIPPED | OUTPATIENT
Start: 2019-10-20 | End: 2020-04-19

## 2019-10-31 ENCOUNTER — PATIENT MESSAGE (OUTPATIENT)
Dept: ENDOCRINOLOGY | Facility: CLINIC | Age: 69
End: 2019-10-31

## 2019-10-31 RX ORDER — LANCETS 33 GAUGE
EACH MISCELLANEOUS
Qty: 150 EACH | Refills: 11 | Status: SHIPPED | OUTPATIENT
Start: 2019-10-31 | End: 2020-01-28

## 2019-11-05 ENCOUNTER — PATIENT MESSAGE (OUTPATIENT)
Dept: FAMILY MEDICINE | Facility: CLINIC | Age: 69
End: 2019-11-05

## 2019-11-05 DIAGNOSIS — R30.0 DYSURIA: Primary | ICD-10-CM

## 2019-11-05 NOTE — TELEPHONE ENCOUNTER
Pt is having painful/irriating when urinating, pt states has been happening for three days  Requested an order for a urine to be collected  Pt scheduled 11/6 for blood work  Please advise

## 2019-11-06 ENCOUNTER — LAB VISIT (OUTPATIENT)
Dept: LAB | Facility: HOSPITAL | Age: 69
End: 2019-11-06
Attending: FAMILY MEDICINE
Payer: MEDICARE

## 2019-11-06 DIAGNOSIS — Z79.4 TYPE 2 DIABETES MELLITUS WITH DIABETIC NEUROPATHY, WITH LONG-TERM CURRENT USE OF INSULIN: ICD-10-CM

## 2019-11-06 DIAGNOSIS — E11.40 TYPE 2 DIABETES MELLITUS WITH DIABETIC NEUROPATHY, WITH LONG-TERM CURRENT USE OF INSULIN: ICD-10-CM

## 2019-11-06 LAB
ANION GAP SERPL CALC-SCNC: 8 MMOL/L (ref 8–16)
BUN SERPL-MCNC: 14 MG/DL (ref 8–23)
CALCIUM SERPL-MCNC: 9.6 MG/DL (ref 8.7–10.5)
CHLORIDE SERPL-SCNC: 102 MMOL/L (ref 95–110)
CO2 SERPL-SCNC: 28 MMOL/L (ref 23–29)
CREAT SERPL-MCNC: 0.9 MG/DL (ref 0.5–1.4)
EST. GFR  (AFRICAN AMERICAN): >60 ML/MIN/1.73 M^2
EST. GFR  (NON AFRICAN AMERICAN): >60 ML/MIN/1.73 M^2
ESTIMATED AVG GLUCOSE: 197 MG/DL (ref 68–131)
GLUCOSE SERPL-MCNC: 149 MG/DL (ref 70–110)
HBA1C MFR BLD HPLC: 8.5 % (ref 4–5.6)
POTASSIUM SERPL-SCNC: 3.9 MMOL/L (ref 3.5–5.1)
SODIUM SERPL-SCNC: 138 MMOL/L (ref 136–145)

## 2019-11-06 PROCEDURE — 83036 HEMOGLOBIN GLYCOSYLATED A1C: CPT

## 2019-11-06 PROCEDURE — 80048 BASIC METABOLIC PNL TOTAL CA: CPT

## 2019-11-06 PROCEDURE — 36415 COLL VENOUS BLD VENIPUNCTURE: CPT | Mod: PN

## 2019-11-13 ENCOUNTER — PATIENT MESSAGE (OUTPATIENT)
Dept: ENDOCRINOLOGY | Facility: CLINIC | Age: 69
End: 2019-11-13

## 2019-11-13 ENCOUNTER — OFFICE VISIT (OUTPATIENT)
Dept: ENDOCRINOLOGY | Facility: CLINIC | Age: 69
End: 2019-11-13
Payer: MEDICARE

## 2019-11-13 VITALS
HEART RATE: 76 BPM | DIASTOLIC BLOOD PRESSURE: 56 MMHG | BODY MASS INDEX: 38.95 KG/M2 | HEIGHT: 63 IN | WEIGHT: 219.81 LBS | SYSTOLIC BLOOD PRESSURE: 118 MMHG

## 2019-11-13 DIAGNOSIS — Z87.19 HISTORY OF PANCREATITIS: ICD-10-CM

## 2019-11-13 DIAGNOSIS — Z79.4 TYPE 2 DIABETES MELLITUS WITH DIABETIC NEUROPATHY, WITH LONG-TERM CURRENT USE OF INSULIN: Primary | ICD-10-CM

## 2019-11-13 DIAGNOSIS — E66.9 OBESITY (BMI 30-39.9): ICD-10-CM

## 2019-11-13 DIAGNOSIS — I50.9 CHF (NYHA CLASS III, ACC/AHA STAGE C): ICD-10-CM

## 2019-11-13 DIAGNOSIS — I10 ESSENTIAL HYPERTENSION: ICD-10-CM

## 2019-11-13 DIAGNOSIS — I25.10 CORONARY ARTERY DISEASE INVOLVING NATIVE CORONARY ARTERY WITHOUT ANGINA PECTORIS, UNSPECIFIED WHETHER NATIVE OR TRANSPLANTED HEART: ICD-10-CM

## 2019-11-13 DIAGNOSIS — F43.9 SITUATIONAL STRESS: ICD-10-CM

## 2019-11-13 DIAGNOSIS — E89.0 POSTOPERATIVE HYPOTHYROIDISM: ICD-10-CM

## 2019-11-13 DIAGNOSIS — E78.5 HYPERLIPIDEMIA, UNSPECIFIED HYPERLIPIDEMIA TYPE: Chronic | ICD-10-CM

## 2019-11-13 DIAGNOSIS — E11.40 TYPE 2 DIABETES MELLITUS WITH DIABETIC NEUROPATHY, WITH LONG-TERM CURRENT USE OF INSULIN: Primary | ICD-10-CM

## 2019-11-13 PROCEDURE — 99214 PR OFFICE/OUTPT VISIT, EST, LEVL IV, 30-39 MIN: ICD-10-PCS | Mod: S$PBB,,, | Performed by: NURSE PRACTITIONER

## 2019-11-13 PROCEDURE — 99999 PR PBB SHADOW E&M-EST. PATIENT-LVL V: CPT | Mod: PBBFAC,,, | Performed by: NURSE PRACTITIONER

## 2019-11-13 PROCEDURE — 99214 OFFICE O/P EST MOD 30 MIN: CPT | Mod: S$PBB,,, | Performed by: NURSE PRACTITIONER

## 2019-11-13 PROCEDURE — 99215 OFFICE O/P EST HI 40 MIN: CPT | Mod: PBBFAC,PN | Performed by: NURSE PRACTITIONER

## 2019-11-13 PROCEDURE — 99999 PR PBB SHADOW E&M-EST. PATIENT-LVL V: ICD-10-PCS | Mod: PBBFAC,,, | Performed by: NURSE PRACTITIONER

## 2019-11-13 RX ORDER — INSULIN DEGLUDEC 200 U/ML
54 INJECTION, SOLUTION SUBCUTANEOUS DAILY
Qty: 9 ML | Refills: 6 | Status: SHIPPED | OUTPATIENT
Start: 2019-11-13 | End: 2020-02-05 | Stop reason: SDUPTHER

## 2019-11-13 RX ORDER — INSULIN LISPRO 100 [IU]/ML
18 INJECTION, SOLUTION INTRAVENOUS; SUBCUTANEOUS
Qty: 5 BOX | Refills: 3
Start: 2019-11-13 | End: 2020-05-06

## 2019-11-13 NOTE — PROGRESS NOTES
CC: Ms. Mckenzie Mari arrives today for management of Type 2 DM and review of chronic medical conditions.     HPI: Ms. Mckenzie Mari was diagnosed with Type 2 DM in 2004. She was diagnosed based on lab work. Initial treatment consisted of metformin and glimepiride. Insulin added in 8/2016 - began Toujeo. She states that she felt that this caused nausea, abd pain, chest pain. Lantus caused throat swelling, left arm pain. She was then changed to Novolog 70/30 but this was only used for a short period because her insurance didn't cover.  Then changed to Humalog 50-50 in 12/2016. In 2017, she began MDI with Tresiba and Humalog. + FH of DM in maternal aunt and cousin. Denies hospitalizations due to DM. Previously seen in endocrine by Richard Gupta, and MAGALI Eng, ARIELLE. She has a history of thyroid cancer/post-surgical hypothyroidism.   Of note, she has a h/o pancreatitis.   Insulin management is challenging because patient believes insulin causes hyperglycemia.    She follows annually with cardiology for CHF, CAD.      Last seen by me in July. At this time, Tresiba dose was increased.    She has had a very stressful past few months with her brother's rapidly deteriorating health. He passed away last month. Then her dog became ill.     BG readings are checked 3x/day.             Hypoglycemia: No, however, gets headaches and feels listless with glucoses < 150.     Missing Insulin/PO medication doses: No  Timing prandial insulin 5-15 minutes before meals: yes    Dietary Habits: Eats 3 meals/day. Snacks on SF pudding, [place] cheese/nut packs, small piece of chocoloate. Avoids sugary drinks.    Last DM education: 1/2019       CURRENT DIABETIC MEDS: Tresiba 54 units QAM, Humalog 16 units AC + correction scale, target 180, ISF 25.  Vial or pen: pen  Glucometer type: One Touch Verio     Previous DM treatments:   Invokana - nausea  Glimepiride - stopped when prandial insulin added  Touejo -  Nausea, abd pain, chest  "pain  Lantus - throat swelling, left arm pain  Novolog 70/30 - not covered by insurance  Metformin - headaches    Last Eye Exam: 4/9/2019 - sees Dr. Mcgregor. No DRSlava Report on file.  Last Podiatry Exam: no    REVIEW OF SYSTEMS  Constitutional: no c/o weakness or weight loss. + fatigue   Eyes: no c/o visual disturbances.   Cardiac: no palpitations, chest pain.   Respiratory: no c/o dyspnea, cough.  GI: no c/o abdominal pain, nausea. + H/o pancreatitis.   Skin: no lesions or rashes.   Neuro: reports rare occasions of numbness, tingling in feet   Endocrine: denies polyphagia, polydipsia, polyuria      Personally reviewed Past Medical, Surgical, Social History.    Vital Signs  BP (!) 118/56   Pulse 76   Ht 5' 3" (1.6 m)   Wt 99.7 kg (219 lb 12.8 oz)   BMI 38.94 kg/m²     Personally reviewed the below labs:    Hemoglobin A1C   Date Value Ref Range Status   11/06/2019 8.5 (H) 4.0 - 5.6 % Final     Comment:     ADA Screening Guidelines:  5.7-6.4%  Consistent with prediabetes  >or=6.5%  Consistent with diabetes  High levels of fetal hemoglobin interfere with the HbA1C  assay. Heterozygous hemoglobin variants (HbS, HgC, etc)do  not significantly interfere with this assay.   However, presence of multiple variants may affect accuracy.     06/28/2019 7.7 (H) 4.0 - 5.6 % Final     Comment:     ADA Screening Guidelines:  5.7-6.4%  Consistent with prediabetes  >or=6.5%  Consistent with diabetes  High levels of fetal hemoglobin interfere with the HbA1C  assay. Heterozygous hemoglobin variants (HbS, HgC, etc)do  not significantly interfere with this assay.   However, presence of multiple variants may affect accuracy.     03/04/2019 8.3 (H) 4.0 - 5.6 % Final     Comment:     ADA Screening Guidelines:  5.7-6.4%  Consistent with prediabetes  >or=6.5%  Consistent with diabetes  High levels of fetal hemoglobin interfere with the HbA1C  assay. Heterozygous hemoglobin variants (HbS, HgC, etc)do  not significantly interfere with this " assay.   However, presence of multiple variants may affect accuracy.         Chemistry        Component Value Date/Time     11/06/2019 0806    K 3.9 11/06/2019 0806     11/06/2019 0806    CO2 28 11/06/2019 0806    BUN 14 11/06/2019 0806    CREATININE 0.9 11/06/2019 0806     (H) 11/06/2019 0806        Component Value Date/Time    CALCIUM 9.6 11/06/2019 0806    ALKPHOS 79 10/09/2019 1205    AST 29 10/09/2019 1205    ALT 25 10/09/2019 1205    BILITOT 1.8 (H) 10/09/2019 1205          Lab Results   Component Value Date    CHOL 201 (H) 06/28/2019    CHOL 156 11/30/2018    CHOL 131 03/29/2018     Lab Results   Component Value Date    HDL 47 06/28/2019    HDL 46 11/30/2018    HDL 47 03/29/2018     Lab Results   Component Value Date    LDLCALC 119.6 06/28/2019    LDLCALC 79.0 11/30/2018    LDLCALC 54.4 (L) 03/29/2018     Lab Results   Component Value Date    TRIG 172 (H) 06/28/2019    TRIG 155 (H) 11/30/2018    TRIG 148 03/29/2018     Lab Results   Component Value Date    CHOLHDL 23.4 06/28/2019    CHOLHDL 29.5 11/30/2018    CHOLHDL 35.9 03/29/2018       Lab Results   Component Value Date    MICALBCREAT 14.0 06/28/2019     Lab Results   Component Value Date    TSH 0.911 06/28/2019       CrCl cannot be calculated (Patient's most recent lab result is older than the maximum 7 days allowed.).    Vit D, 25-Hydroxy   Date Value Ref Range Status   11/08/2014 51 30 - 96 ng/mL Final     Comment:     Vitamin D deficiency.........<10 ng/mL                              Vitamin D insufficiency......10-29 ng/mL       Vitamin D sufficiency........> or equal to 30 ng/mL  Vitamin D toxicity............>100 ng/mL           March 2019 CGMS results: Fasting glucose is acceptable for what patient will allow (feels symptomatic with glucose < 150). Two fasting glucoses didn't correlate with SMBG glucose on her log. Minimal prandial excursions are noted. No true hypoglycemia but rebound noted after borderline nocturnal episode.  Eats 3 meals/day, some of which are lower carb, and snacks on either SF pudding or small piece of chocolate.   Average glucose: 138  % above target: 2%  % in target: 98%  % below target: 0%      PHYSICAL EXAMINATION  Constitutional: Appears well, no distress  Neck: Supple, trachea midline; transverse scar to anterior neck from thyroidectomy.   Respiratory: CTA, even and unlabored.  Cardiovascular: RRR, no murmurs, no carotid bruits. DP pulses 1+ bilaterally; no edema.  Lymph: no cervical or supraclavicular lymphadenopathy.  Skin: warm and dry; no lipohypertrophy, or acanthosis nigracans observed.  Neuro: DTR diminished to BUE/BLE. No loss of protective sensation via 10 gm monofilament. Vibratory exam mildly decreased, bilaterally.  Feet: appropriate footwear. No open wounds. + calluses noted to lateral aspects of halluces, heels.      A1c goal < 7.5%, due to CAD, CHF, patient's desire for glucose to remain >150.         Assessment/Plan  1. Type 2 diabetes mellitus with diabetic neuropathy, with long-term current use of insulin  -- Worsening. Pt has had increased stress over the past few months but this is now improving. Appears to need more prandial coverage. Pt will not tolerate fasting glucoses lower than present state.   -- continue Tresiba 54 units QAM  -- increase Humalog to 18 units AC + SSI (180/25)  -- check BG 4x/day   -- Would not use Actos, due to CHF. GLP-1RA and DPP4-I contraindicated due to h/o pancreatitis. Cannot tolerate metformin.     -- Discussed diagnosis of DM, A1c goals, progression of disease, long term complications and tx options.  Advised patient to check BG before activities, such as driving or exercise.  -- Reviewed hypoglycemia management: treat with 1/2 glass of juice, 1/2 can regular coke, or 4 glucose tablets. Monitor and repeat treatment every 15 minutes until BG is >70 Then have a snack, which includes a complex carbohydrate and protein.    -- takes statin and ARB     2. Coronary  artery disease involving native coronary artery without angina pectoris, unspecified whether native or transplanted heart  -- stable, avoid hyopglycemia     3. CHF (NYHA class III, ACC/AHA stage C)  -- follows with cardiology   4. Postoperative hypothyroidism  -- stable  -- Continue Synthroid at current dose.  -- TSH with RTC   5. Essential hypertension  -- controlled   -- continue ARB   6. Hyperlipidemia, unspecified hyperlipidemia type  -- uncontrolled with elevated triglycerides.   -- optimize DM control   -- continue statin   7. Obesity (BMI 30-39.9)  -- increases insulin resistance  Body mass index is 38.94 kg/m².   8. History of pancreatitis  -- avoid GLP-1RA and DPP4-i   9. Situational stress -- this is recently improving.   -- may worsen DM control.        FOLLOW UP  Follow up in about 4 months (around 3/13/2020).   Patient instructed to bring BG logs to each follow up.   Patient encouraged to call for any BG/medication issues, concerns, or questions.      Orders Placed This Encounter   Procedures    Hemoglobin A1c    Comprehensive metabolic panel    TSH    HM DIABETES FOOT EXAM

## 2019-11-19 ENCOUNTER — HOSPITAL ENCOUNTER (OUTPATIENT)
Dept: RADIOLOGY | Facility: HOSPITAL | Age: 69
Discharge: HOME | End: 2019-11-19
Attending: FAMILY MEDICINE
Payer: MEDICARE

## 2019-11-19 ENCOUNTER — OFFICE VISIT (OUTPATIENT)
Dept: FAMILY MEDICINE | Facility: CLINIC | Age: 69
End: 2019-11-19
Payer: MEDICARE

## 2019-11-19 VITALS
HEART RATE: 70 BPM | DIASTOLIC BLOOD PRESSURE: 76 MMHG | HEIGHT: 63 IN | SYSTOLIC BLOOD PRESSURE: 138 MMHG | BODY MASS INDEX: 39.05 KG/M2 | WEIGHT: 220.38 LBS | TEMPERATURE: 99 F | OXYGEN SATURATION: 96 %

## 2019-11-19 DIAGNOSIS — B96.89 ACUTE BACTERIAL BRONCHITIS: Primary | ICD-10-CM

## 2019-11-19 DIAGNOSIS — J20.8 ACUTE BACTERIAL BRONCHITIS: Primary | ICD-10-CM

## 2019-11-19 DIAGNOSIS — B96.89 ACUTE BACTERIAL BRONCHITIS: ICD-10-CM

## 2019-11-19 DIAGNOSIS — J20.8 ACUTE BACTERIAL BRONCHITIS: ICD-10-CM

## 2019-11-19 PROCEDURE — 99214 OFFICE O/P EST MOD 30 MIN: CPT | Mod: PBBFAC,25,PN | Performed by: FAMILY MEDICINE

## 2019-11-19 PROCEDURE — 71046 X-RAY EXAM CHEST 2 VIEWS: CPT | Mod: 26,,, | Performed by: RADIOLOGY

## 2019-11-19 PROCEDURE — 71046 X-RAY EXAM CHEST 2 VIEWS: CPT | Mod: TC,PN

## 2019-11-19 PROCEDURE — 71046 XR CHEST PA AND LATERAL: ICD-10-PCS | Mod: 26,,, | Performed by: RADIOLOGY

## 2019-11-19 PROCEDURE — 99999 PR PBB SHADOW E&M-EST. PATIENT-LVL IV: ICD-10-PCS | Mod: PBBFAC,,, | Performed by: FAMILY MEDICINE

## 2019-11-19 PROCEDURE — 99214 PR OFFICE/OUTPT VISIT, EST, LEVL IV, 30-39 MIN: ICD-10-PCS | Mod: S$PBB,,, | Performed by: FAMILY MEDICINE

## 2019-11-19 PROCEDURE — 99214 OFFICE O/P EST MOD 30 MIN: CPT | Mod: S$PBB,,, | Performed by: FAMILY MEDICINE

## 2019-11-19 PROCEDURE — 99999 PR PBB SHADOW E&M-EST. PATIENT-LVL IV: CPT | Mod: PBBFAC,,, | Performed by: FAMILY MEDICINE

## 2019-11-19 RX ORDER — AZITHROMYCIN 250 MG/1
TABLET, FILM COATED ORAL
Qty: 6 TABLET | Refills: 0 | Status: SHIPPED | OUTPATIENT
Start: 2019-11-19 | End: 2019-11-22

## 2019-11-19 RX ORDER — ALBUTEROL SULFATE 90 UG/1
2 AEROSOL, METERED RESPIRATORY (INHALATION) EVERY 6 HOURS PRN
Qty: 1 INHALER | Refills: 2 | Status: SHIPPED | OUTPATIENT
Start: 2019-11-19 | End: 2020-02-12

## 2019-11-19 NOTE — PROGRESS NOTES
THIS DOCUMENT WAS MADE IN PART WITH VOICE RECOGNITION SOFTWARE.  OCCASIONALLY THIS SOFTWARE WILL MISINTERPRET WORDS OR PHRASES.      Mckenzie Mari  1950    Mckenzie was seen today for wheezing, chest pain, fever, chills and generalized body aches.    Diagnoses and all orders for this visit:    Acute bacterial bronchitis  -     X-Ray Chest PA And Lateral; Future  -     azithromycin (Z-DIDIER) 250 MG tablet; Take this antibiotic for 5 days total according to the following instructions: Take 2 po on Day #1, then take one po daily on days 2-5  -     albuterol (PROVENTIL HFA) 90 mcg/actuation inhaler; Inhale 2 puffs into the lungs every 6 (six) hours as needed for Wheezing. Rescue    Patient was advised if symptoms worsen, fail to improve, or significantly change then contact us or follow back for reevaluation.      Subjective     Chief Complaint   Patient presents with    Wheezing    Chest Pain    Fever    Chills    Generalized Body Aches       Wheezing    This is a new problem. The current episode started 1 to 4 weeks ago. The problem occurs intermittently. The problem has been gradually worsening. Associated symptoms include coughing, a fever, headaches and a sore throat. Pertinent negatives include no abdominal pain, diarrhea, ear pain, rash or vomiting. Nothing aggravates the symptoms. She has tried OTC cough suppressant for the symptoms. The treatment provided no relief.   Fever    This is a new problem. The current episode started today. The problem occurs intermittently. The problem has been gradually worsening. The maximum temperature noted was 99 to 99.9 F. The temperature was taken using an oral thermometer. Associated symptoms include congestion, coughing, headaches, a sore throat and wheezing. Pertinent negatives include no abdominal pain, diarrhea, ear pain, muscle aches, nausea, rash, sleepiness, urinary pain or vomiting. She has tried NSAIDs and fluids for the symptoms. The treatment provided  mild relief.           Active Ambulatory Problems     Diagnosis Date Noted    CHF (congestive heart failure)     CHF (NYHA class III, ACC/AHA stage C)     Hyperlipidemia     Chest pain 01/18/2012    HTN (hypertension)     CVID (common variable immunodeficiency)     Chronic sinusitis     ALLERGIC RHINITIS 01/23/2012    Penicillin allergy 01/30/2012    History of thyroid cancer 07/25/2012    Postoperative hypothyroidism 07/25/2012    LBBB (left bundle branch block) 10/11/2012    ICD (implantable cardioverter-defibrillator) in place 10/23/2012    Sinusitis 10/01/2013    Dyspnea on effort 03/25/2014    Chest pain on exertion 03/25/2014    CAD (coronary artery disease) 04/17/2014    Type 2 diabetes mellitus with diabetic neuropathy, with long-term current use of insulin 05/11/2014    Myoclonus     History of colon polyps 05/11/2016    Obesity (BMI 30-39.9) 08/31/2016    History of pancreatitis 08/31/2016    Nasal obstruction 03/15/2017    Deviated nasal septum 03/15/2017    Hypertrophy of nasal turbinates 03/15/2017    Malignant neoplasm of central portion of left female breast 10/06/2017     Resolved Ambulatory Problems     Diagnosis Date Noted    Subacute maxillary sinusitis 03/15/2017     Past Medical History:   Diagnosis Date    Allergy     Arthritis     Breast cancer 2005    Bundle branch block     Cardiomyopathy     Diabetes mellitus     GERD (gastroesophageal reflux disease)     Headache(784.0)     Hypothyroid 7/25/2012    Otitis media     Presence of combination internal cardiac defibrillator (ICD) and pacemaker     Thyroid cancer     Thyroid cancer 7/25/2012    Thyroid disease          Review of Systems   Constitutional: Positive for fever.   HENT: Positive for congestion and sore throat. Negative for ear pain.    Eyes: Negative for visual disturbance.   Respiratory: Positive for cough, chest tightness (with wheezing) and wheezing.         Cough worse when flat, no  "increase in shortness of breath when flat   Cardiovascular: Positive for leg swelling (mild, usual amount).   Gastrointestinal: Negative for abdominal pain, diarrhea, nausea and vomiting.   Endocrine: Negative for polydipsia and polyuria.   Genitourinary: Negative for dysuria.   Skin: Negative for rash.   Neurological: Positive for headaches.       Objective     Physical Exam   Constitutional: She is oriented to person, place, and time. She appears well-developed and well-nourished. No distress.   HENT:   Head: Normocephalic and atraumatic.   Right Ear: Tympanic membrane, external ear and ear canal normal.   Left Ear: Tympanic membrane, external ear and ear canal normal.   Nose: Mucosal edema present.   Mouth/Throat: Oropharynx is clear and moist. No oropharyngeal exudate, posterior oropharyngeal edema, posterior oropharyngeal erythema or tonsillar abscesses.   Eyes: Pupils are equal, round, and reactive to light. Conjunctivae are normal. Right eye exhibits no discharge. Left eye exhibits no discharge. No scleral icterus.   Neck: Normal range of motion. Neck supple. No JVD present.   Cardiovascular: Normal rate, regular rhythm and normal heart sounds.   No murmur heard.  Pulmonary/Chest: Effort normal.   Scattered coarse sounds, no distress   Lymphadenopathy:     She has no cervical adenopathy.   Neurological: She is alert and oriented to person, place, and time.   Skin: Skin is dry. No rash noted. She is not diaphoretic.   Psychiatric: She has a normal mood and affect. Her behavior is normal.   Vitals reviewed.    Vitals:    11/19/19 0803   BP: 138/76   BP Location: Right arm   Patient Position: Sitting   BP Method: Large (Manual)   Pulse: 70   Temp: 98.6 °F (37 °C)   TempSrc: Oral   SpO2: 96%   Weight: 100 kg (220 lb 5.6 oz)   Height: 5' 3" (1.6 m)       MOST RECENT LABS IN OUR ELECTRONIC MEDICAL RECORD:     Results for orders placed or performed in visit on 11/06/19   Hemoglobin A1c   Result Value Ref Range    " Hemoglobin A1C 8.5 (H) 4.0 - 5.6 %    Estimated Avg Glucose 197 (H) 68 - 131 mg/dL   Basic metabolic panel   Result Value Ref Range    Sodium 138 136 - 145 mmol/L    Potassium 3.9 3.5 - 5.1 mmol/L    Chloride 102 95 - 110 mmol/L    CO2 28 23 - 29 mmol/L    Glucose 149 (H) 70 - 110 mg/dL    BUN, Bld 14 8 - 23 mg/dL    Creatinine 0.9 0.5 - 1.4 mg/dL    Calcium 9.6 8.7 - 10.5 mg/dL    Anion Gap 8 8 - 16 mmol/L    eGFR if African American >60.0 >60 mL/min/1.73 m^2    eGFR if non African American >60.0 >60 mL/min/1.73 m^2

## 2019-11-20 ENCOUNTER — PATIENT MESSAGE (OUTPATIENT)
Dept: FAMILY MEDICINE | Facility: CLINIC | Age: 69
End: 2019-11-20

## 2019-11-20 NOTE — TELEPHONE ENCOUNTER
Spoke to patient and reviewed message. Patient will see if she improved overnight, if worsens will go to Urgent Care or ER. Patient will call back tomorrow to review and schedule with available provider if needed.

## 2019-11-20 NOTE — TELEPHONE ENCOUNTER
The x-ray was assessed. To summarize there are no worrisome findings or emergent concern. More concern would be if symptoms are worsening. The fever may not resolve immediately after taking the first dose or even second or even third dose of an antibiotic. What I look at is if the person is overall starting to feel better. And it may take a few days to feel better. If however the person is progressively worsening, certainly if rapidly worsening then reevaluation must occur. If symptoms are worse than maybe to repeat an xray and compare. If the primary problem is wheezing then we could consider a steroid but given her already not so well-controlled diabetes this is a concern but breathing must come first. So assess her tonight or tomorrow she may need to see someone in my absence if worsening.

## 2019-11-22 ENCOUNTER — PATIENT MESSAGE (OUTPATIENT)
Dept: FAMILY MEDICINE | Facility: CLINIC | Age: 69
End: 2019-11-22

## 2019-11-22 ENCOUNTER — LAB VISIT (OUTPATIENT)
Dept: LAB | Facility: HOSPITAL | Age: 69
End: 2019-11-22
Attending: FAMILY MEDICINE
Payer: MEDICARE

## 2019-11-22 ENCOUNTER — OFFICE VISIT (OUTPATIENT)
Dept: FAMILY MEDICINE | Facility: CLINIC | Age: 69
End: 2019-11-22
Payer: MEDICARE

## 2019-11-22 VITALS
WEIGHT: 215.06 LBS | DIASTOLIC BLOOD PRESSURE: 68 MMHG | HEIGHT: 63 IN | SYSTOLIC BLOOD PRESSURE: 138 MMHG | BODY MASS INDEX: 38.11 KG/M2 | OXYGEN SATURATION: 92 % | TEMPERATURE: 100 F | HEART RATE: 92 BPM

## 2019-11-22 DIAGNOSIS — J32.9 SINUSITIS, UNSPECIFIED CHRONICITY, UNSPECIFIED LOCATION: Primary | ICD-10-CM

## 2019-11-22 DIAGNOSIS — J32.9 SINUSITIS, UNSPECIFIED CHRONICITY, UNSPECIFIED LOCATION: ICD-10-CM

## 2019-11-22 DIAGNOSIS — J02.9 SORE THROAT: ICD-10-CM

## 2019-11-22 LAB
BASOPHILS # BLD AUTO: 0.05 K/UL (ref 0–0.2)
BASOPHILS NFR BLD: 0.7 % (ref 0–1.9)
DIFFERENTIAL METHOD: ABNORMAL
EOSINOPHIL # BLD AUTO: 0.2 K/UL (ref 0–0.5)
EOSINOPHIL NFR BLD: 2.3 % (ref 0–8)
ERYTHROCYTE [DISTWIDTH] IN BLOOD BY AUTOMATED COUNT: 12.8 % (ref 11.5–14.5)
HCT VFR BLD AUTO: 45.2 % (ref 37–48.5)
HGB BLD-MCNC: 14.9 G/DL (ref 12–16)
IMM GRANULOCYTES # BLD AUTO: 0.04 K/UL (ref 0–0.04)
IMM GRANULOCYTES NFR BLD AUTO: 0.5 % (ref 0–0.5)
LYMPHOCYTES # BLD AUTO: 1.1 K/UL (ref 1–4.8)
LYMPHOCYTES NFR BLD: 14.2 % (ref 18–48)
MCH RBC QN AUTO: 31.6 PG (ref 27–31)
MCHC RBC AUTO-ENTMCNC: 33 G/DL (ref 32–36)
MCV RBC AUTO: 96 FL (ref 82–98)
MONOCYTES # BLD AUTO: 0.9 K/UL (ref 0.3–1)
MONOCYTES NFR BLD: 12.3 % (ref 4–15)
NEUTROPHILS # BLD AUTO: 5.2 K/UL (ref 1.8–7.7)
NEUTROPHILS NFR BLD: 70 % (ref 38–73)
NRBC BLD-RTO: 0 /100 WBC
PLATELET # BLD AUTO: 157 K/UL (ref 150–350)
PMV BLD AUTO: 11.8 FL (ref 9.2–12.9)
RBC # BLD AUTO: 4.71 M/UL (ref 4–5.4)
WBC # BLD AUTO: 7.47 K/UL (ref 3.9–12.7)

## 2019-11-22 PROCEDURE — 1125F PR PAIN SEVERITY QUANTIFIED, PAIN PRESENT: ICD-10-PCS | Mod: ,,, | Performed by: FAMILY MEDICINE

## 2019-11-22 PROCEDURE — 1159F PR MEDICATION LIST DOCUMENTED IN MEDICAL RECORD: ICD-10-PCS | Mod: ,,, | Performed by: FAMILY MEDICINE

## 2019-11-22 PROCEDURE — 36415 COLL VENOUS BLD VENIPUNCTURE: CPT | Mod: PN

## 2019-11-22 PROCEDURE — 99999 PR PBB SHADOW E&M-EST. PATIENT-LVL III: CPT | Mod: PBBFAC,,, | Performed by: FAMILY MEDICINE

## 2019-11-22 PROCEDURE — 85025 COMPLETE CBC W/AUTO DIFF WBC: CPT

## 2019-11-22 PROCEDURE — 99214 OFFICE O/P EST MOD 30 MIN: CPT | Mod: S$PBB,,, | Performed by: FAMILY MEDICINE

## 2019-11-22 PROCEDURE — 99999 PR PBB SHADOW E&M-EST. PATIENT-LVL III: ICD-10-PCS | Mod: PBBFAC,,, | Performed by: FAMILY MEDICINE

## 2019-11-22 PROCEDURE — 99214 PR OFFICE/OUTPT VISIT, EST, LEVL IV, 30-39 MIN: ICD-10-PCS | Mod: S$PBB,,, | Performed by: FAMILY MEDICINE

## 2019-11-22 PROCEDURE — 1125F AMNT PAIN NOTED PAIN PRSNT: CPT | Mod: ,,, | Performed by: FAMILY MEDICINE

## 2019-11-22 PROCEDURE — 1159F MED LIST DOCD IN RCRD: CPT | Mod: ,,, | Performed by: FAMILY MEDICINE

## 2019-11-22 PROCEDURE — 99213 OFFICE O/P EST LOW 20 MIN: CPT | Mod: PBBFAC,PN | Performed by: FAMILY MEDICINE

## 2019-11-22 RX ORDER — AZITHROMYCIN 500 MG/1
500 TABLET, FILM COATED ORAL DAILY
Qty: 5 TABLET | Refills: 0 | Status: SHIPPED | OUTPATIENT
Start: 2019-11-22 | End: 2020-02-12

## 2019-11-22 RX ORDER — PREDNISONE 20 MG/1
40 TABLET ORAL DAILY
Qty: 10 TABLET | Refills: 0 | Status: SHIPPED | OUTPATIENT
Start: 2019-11-22 | End: 2019-11-27

## 2019-11-22 NOTE — TELEPHONE ENCOUNTER
----- Message from Domingo Paulino sent at 11/22/2019 11:33 AM CST -----  Contact: same  Patient called in and stated she was just seen on 11/19 & was told to call today if she was not feeling any better and patient stated she is not.  Patient would like a call back at [phone]

## 2019-11-22 NOTE — PROGRESS NOTES
" THIS DOCUMENT WAS MADE IN PART WITH VOICE RECOGNITION SOFTWARE.  OCCASIONALLY THIS SOFTWARE WILL MISINTERPRET WORDS OR PHRASES.      Mckenzie Mari  1950    Mckenzie was seen today for fever, cough, chest congestion and nasal congestion.    Diagnoses and all orders for this visit:    Sinusitis, unspecified chronicity, unspecified location  -     azithromycin (ZITHROMAX) 500 MG tablet; Take 1 tablet (500 mg total) by mouth once daily.  -     predniSONE (DELTASONE) 20 MG tablet; Take 2 tablets (40 mg total) by mouth once daily. for 5 days  -     CBC auto differential; Future    Sore throat  -     POCT Influenza A/B  -     POCT Rapid Strep A     Primary symptoms are related to sinus congestion, pressure, postnasal drainage, fixed freedoms and cough. Antibiotic choices are limited so will increase the dosage of Zithromax. After careful consideration and discussion will place her on five days of prednisone. This will raise her glucose but with refractory symptoms of fuel this will help the most. She will monitor closely and keep me informed if not improving    Subjective     Chief Complaint   Patient presents with    Fever    Cough    Chest Congestion    Nasal Congestion       HPI    Sore throat, swollen glands, fever, fatigue  Cough, no wheezing or shortness of breath  Still  "Fever" though today below 101. She's worried because "my body never runs a fever" primary symptom seems to be the cough and some thick mucus. No orthopnea. No wheezing. No difficulty with antibiotics. She does have a history of congestive heart failure though has not had any exacerbation in many years. I did review her chest x-ray and look at it personally today. There is some poor expansion but no obvious infiltrate.    Active Ambulatory Problems     Diagnosis Date Noted    CHF (congestive heart failure)     CHF (NYHA class III, ACC/AHA stage C)     Hyperlipidemia     Chest pain 01/18/2012    HTN (hypertension)     CVID (common " variable immunodeficiency)     Chronic sinusitis     ALLERGIC RHINITIS 01/23/2012    Penicillin allergy 01/30/2012    History of thyroid cancer 07/25/2012    Postoperative hypothyroidism 07/25/2012    LBBB (left bundle branch block) 10/11/2012    ICD (implantable cardioverter-defibrillator) in place 10/23/2012    Sinusitis 10/01/2013    Dyspnea on effort 03/25/2014    Chest pain on exertion 03/25/2014    CAD (coronary artery disease) 04/17/2014    Type 2 diabetes mellitus with diabetic neuropathy, with long-term current use of insulin 05/11/2014    Myoclonus     History of colon polyps 05/11/2016    Obesity (BMI 30-39.9) 08/31/2016    History of pancreatitis 08/31/2016    Nasal obstruction 03/15/2017    Deviated nasal septum 03/15/2017    Hypertrophy of nasal turbinates 03/15/2017    Malignant neoplasm of central portion of left female breast 10/06/2017     Resolved Ambulatory Problems     Diagnosis Date Noted    Subacute maxillary sinusitis 03/15/2017     Past Medical History:   Diagnosis Date    Allergy     Arthritis     Breast cancer 2005    Bundle branch block     Cardiomyopathy     Diabetes mellitus     GERD (gastroesophageal reflux disease)     Headache(784.0)     Hypothyroid 7/25/2012    Otitis media     Presence of combination internal cardiac defibrillator (ICD) and pacemaker     Thyroid cancer     Thyroid cancer 7/25/2012    Thyroid disease          Review of Systems   HENT: Positive for congestion and postnasal drip.    Respiratory: Positive for cough. Negative for chest tightness and wheezing.    Cardiovascular: Negative for chest pain.   Gastrointestinal: Negative.    Genitourinary: Negative.    Musculoskeletal: Negative.        Objective     Physical Exam   Constitutional: She is oriented to person, place, and time. She appears well-developed and well-nourished. No distress.   HENT:   Head: Normocephalic and atraumatic.   Right Ear: Tympanic membrane, external ear and  "ear canal normal.   Left Ear: Tympanic membrane, external ear and ear canal normal.   Nose: Mucosal edema present. Right sinus exhibits maxillary sinus tenderness. Left sinus exhibits maxillary sinus tenderness.   Mouth/Throat: Oropharynx is clear and moist. No oropharyngeal exudate, posterior oropharyngeal edema, posterior oropharyngeal erythema or tonsillar abscesses.   Eyes: Pupils are equal, round, and reactive to light. Conjunctivae are normal. Right eye exhibits no discharge. Left eye exhibits no discharge. No scleral icterus.   Neck: Normal range of motion. Neck supple. No JVD present.   Cardiovascular: Normal rate, regular rhythm and normal heart sounds.   No murmur heard.  Pulmonary/Chest: Effort normal. No respiratory distress. She has no wheezes.   Lungs are clear today   Lymphadenopathy:     She has no cervical adenopathy.   Neurological: She is alert and oriented to person, place, and time.   Skin: Skin is dry. No rash noted. She is not diaphoretic.   Psychiatric: She has a normal mood and affect. Her behavior is normal.   Vitals reviewed.    Vitals:    11/22/19 1448   BP: 138/68   Pulse: 92   Temp: 100 °F (37.8 °C)   TempSrc: Oral   SpO2: (!) 92%   Weight: 97.6 kg (215 lb 0.9 oz)   Height: 5' 3" (1.6 m)       MOST RECENT LABS IN OUR ELECTRONIC MEDICAL RECORD:     Results for orders placed or performed in visit on 11/22/19   CBC auto differential   Result Value Ref Range    WBC 7.47 3.90 - 12.70 K/uL    RBC 4.71 4.00 - 5.40 M/uL    Hemoglobin 14.9 12.0 - 16.0 g/dL    Hematocrit 45.2 37.0 - 48.5 %    Mean Corpuscular Volume 96 82 - 98 fL    Mean Corpuscular Hemoglobin 31.6 (H) 27.0 - 31.0 pg    Mean Corpuscular Hemoglobin Conc 33.0 32.0 - 36.0 g/dL    RDW 12.8 11.5 - 14.5 %    Platelets 157 150 - 350 K/uL    MPV 11.8 9.2 - 12.9 fL    Immature Granulocytes 0.5 0.0 - 0.5 %    Gran # (ANC) 5.2 1.8 - 7.7 K/uL    Immature Grans (Abs) 0.04 0.00 - 0.04 K/uL    Lymph # 1.1 1.0 - 4.8 K/uL    Mono # 0.9 0.3 - 1.0 " K/uL    Eos # 0.2 0.0 - 0.5 K/uL    Baso # 0.05 0.00 - 0.20 K/uL    nRBC 0 0 /100 WBC    Gran% 70.0 38.0 - 73.0 %    Lymph% 14.2 (L) 18.0 - 48.0 %    Mono% 12.3 4.0 - 15.0 %    Eosinophil% 2.3 0.0 - 8.0 %    Basophil% 0.7 0.0 - 1.9 %    Differential Method Automated

## 2019-11-26 ENCOUNTER — PATIENT MESSAGE (OUTPATIENT)
Dept: FAMILY MEDICINE | Facility: CLINIC | Age: 69
End: 2019-11-26

## 2019-11-27 ENCOUNTER — TELEPHONE (OUTPATIENT)
Dept: FAMILY MEDICINE | Facility: CLINIC | Age: 69
End: 2019-11-27

## 2019-11-27 DIAGNOSIS — R05.9 COUGH: Primary | ICD-10-CM

## 2019-11-27 RX ORDER — BENZONATATE 100 MG/1
100 CAPSULE ORAL 3 TIMES DAILY PRN
Qty: 30 CAPSULE | Refills: 0 | Status: SHIPPED | OUTPATIENT
Start: 2019-11-27 | End: 2019-12-07

## 2019-11-27 NOTE — TELEPHONE ENCOUNTER
Spoke with pt states she has been using otc delsym cough medication and the albuterol does not really help.   Pharmacy is correct on file

## 2019-11-27 NOTE — TELEPHONE ENCOUNTER
I have sent benzonatate. If symptoms are worsening despite this or developing new symptoms, she should be seen.

## 2019-12-02 RX ORDER — SPIRONOLACTONE 25 MG/1
TABLET ORAL
Qty: 90 TABLET | Refills: 1 | Status: SHIPPED | OUTPATIENT
Start: 2019-12-02 | End: 2020-06-11

## 2019-12-02 RX ORDER — POTASSIUM CHLORIDE 20 MEQ/1
TABLET, EXTENDED RELEASE ORAL
Qty: 90 TABLET | Refills: 0 | Status: SHIPPED | OUTPATIENT
Start: 2019-12-02 | End: 2020-03-15

## 2019-12-12 ENCOUNTER — CLINICAL SUPPORT (OUTPATIENT)
Dept: CARDIOLOGY | Facility: CLINIC | Age: 69
End: 2019-12-12
Attending: INTERNAL MEDICINE
Payer: MEDICARE

## 2019-12-12 DIAGNOSIS — Z95.810 ICD (IMPLANTABLE CARDIOVERTER-DEFIBRILLATOR) IN PLACE: ICD-10-CM

## 2019-12-12 DIAGNOSIS — I44.7 LBBB (LEFT BUNDLE BRANCH BLOCK): ICD-10-CM

## 2019-12-12 PROCEDURE — 99999 PR PBB SHADOW E&M-EST. PATIENT-LVL I: CPT | Mod: PBBFAC,,,

## 2019-12-12 PROCEDURE — 99211 OFF/OP EST MAY X REQ PHY/QHP: CPT | Mod: PBBFAC,PO

## 2019-12-12 PROCEDURE — 99999 PR PBB SHADOW E&M-EST. PATIENT-LVL I: ICD-10-PCS | Mod: PBBFAC,,,

## 2019-12-29 RX ORDER — LEVOTHYROXINE SODIUM 137 UG/1
TABLET ORAL
Qty: 90 TABLET | Refills: 1 | Status: SHIPPED | OUTPATIENT
Start: 2019-12-29 | End: 2020-06-22

## 2020-01-28 ENCOUNTER — TELEPHONE (OUTPATIENT)
Dept: ENDOCRINOLOGY | Facility: CLINIC | Age: 70
End: 2020-01-28

## 2020-01-28 DIAGNOSIS — Z79.4 TYPE 2 DIABETES MELLITUS WITH DIABETIC NEUROPATHY, WITH LONG-TERM CURRENT USE OF INSULIN: Primary | ICD-10-CM

## 2020-01-28 DIAGNOSIS — E11.40 TYPE 2 DIABETES MELLITUS WITH DIABETIC NEUROPATHY, WITH LONG-TERM CURRENT USE OF INSULIN: Primary | ICD-10-CM

## 2020-01-28 RX ORDER — INSULIN PUMP SYRINGE, 3 ML
EACH MISCELLANEOUS
Qty: 1 EACH | Refills: 0 | Status: SHIPPED | OUTPATIENT
Start: 2020-01-28 | End: 2021-02-23

## 2020-01-28 RX ORDER — LANCETS
1 EACH MISCELLANEOUS 4 TIMES DAILY
Qty: 100 EACH | Refills: 1 | Status: SHIPPED | OUTPATIENT
Start: 2020-01-28 | End: 2020-03-31

## 2020-01-28 NOTE — TELEPHONE ENCOUNTER
Pharmacy states patient has new monitor and is now using accu-chek ely. They want to know if you can send a new rx for this brand.They want to know if you can send a new Rx for softclix lancet Please advise

## 2020-02-04 ENCOUNTER — PATIENT MESSAGE (OUTPATIENT)
Dept: ENDOCRINOLOGY | Facility: CLINIC | Age: 70
End: 2020-02-04

## 2020-02-05 DIAGNOSIS — Z79.4 TYPE 2 DIABETES MELLITUS WITH DIABETIC NEUROPATHY, WITH LONG-TERM CURRENT USE OF INSULIN: ICD-10-CM

## 2020-02-05 DIAGNOSIS — E11.40 TYPE 2 DIABETES MELLITUS WITH DIABETIC NEUROPATHY, WITH LONG-TERM CURRENT USE OF INSULIN: ICD-10-CM

## 2020-02-05 RX ORDER — INSULIN DEGLUDEC 200 U/ML
56 INJECTION, SOLUTION SUBCUTANEOUS DAILY
Qty: 27 ML | Refills: 3 | Status: SHIPPED | OUTPATIENT
Start: 2020-02-05 | End: 2020-03-13

## 2020-02-12 ENCOUNTER — OFFICE VISIT (OUTPATIENT)
Dept: FAMILY MEDICINE | Facility: CLINIC | Age: 70
End: 2020-02-12
Payer: MEDICARE

## 2020-02-12 VITALS
WEIGHT: 218.94 LBS | RESPIRATION RATE: 18 BRPM | SYSTOLIC BLOOD PRESSURE: 110 MMHG | BODY MASS INDEX: 38.79 KG/M2 | TEMPERATURE: 98 F | HEART RATE: 68 BPM | HEIGHT: 63 IN | DIASTOLIC BLOOD PRESSURE: 62 MMHG

## 2020-02-12 DIAGNOSIS — J01.91 ACUTE RECURRENT SINUSITIS, UNSPECIFIED LOCATION: Primary | ICD-10-CM

## 2020-02-12 PROCEDURE — 99214 PR OFFICE/OUTPT VISIT, EST, LEVL IV, 30-39 MIN: ICD-10-PCS | Mod: S$PBB,,, | Performed by: FAMILY MEDICINE

## 2020-02-12 PROCEDURE — 99999 PR PBB SHADOW E&M-EST. PATIENT-LVL III: ICD-10-PCS | Mod: PBBFAC,,, | Performed by: FAMILY MEDICINE

## 2020-02-12 PROCEDURE — 99999 PR PBB SHADOW E&M-EST. PATIENT-LVL III: CPT | Mod: PBBFAC,,, | Performed by: FAMILY MEDICINE

## 2020-02-12 PROCEDURE — 99214 OFFICE O/P EST MOD 30 MIN: CPT | Mod: S$PBB,,, | Performed by: FAMILY MEDICINE

## 2020-02-12 PROCEDURE — 99213 OFFICE O/P EST LOW 20 MIN: CPT | Mod: PBBFAC,PN | Performed by: FAMILY MEDICINE

## 2020-02-12 RX ORDER — FLUTICASONE PROPIONATE 50 MCG
2 SPRAY, SUSPENSION (ML) NASAL DAILY
Qty: 16 G | Refills: 3
Start: 2020-02-12 | End: 2020-08-14 | Stop reason: SDUPTHER

## 2020-02-12 RX ORDER — AZITHROMYCIN 500 MG/1
500 TABLET, FILM COATED ORAL DAILY
Qty: 7 TABLET | Refills: 0 | Status: SHIPPED | OUTPATIENT
Start: 2020-02-12 | End: 2020-03-26 | Stop reason: SDUPTHER

## 2020-02-12 NOTE — PROGRESS NOTES
THIS DOCUMENT WAS MADE IN PART WITH VOICE RECOGNITION SOFTWARE.  OCCASIONALLY THIS SOFTWARE WILL MISINTERPRET WORDS OR PHRASES.      Mckenzie Mari  1950    Mckenzie was seen today for sore throat and post nasal drip.    Diagnoses and all orders for this visit:    Acute recurrent sinusitis, unspecified location        Subjective     Chief Complaint   Patient presents with    Sore Throat     Patient reported her sx started x 1 week ago     post nasal drip       Sore Throat    This is a new problem. The current episode started in the past 7 days. The problem has been gradually worsening. Neither side of throat is experiencing more pain than the other. There has been no fever. The pain is at a severity of 2/10. The pain is mild. Associated symptoms include congestion, headaches, a hoarse voice, swollen glands and trouble swallowing. Pertinent negatives include no abdominal pain, coughing, diarrhea, drooling, ear discharge, ear pain, plugged ear sensation, neck pain, shortness of breath, stridor or vomiting. She has had no exposure to strep or mono. She has tried NSAIDs for the symptoms. The treatment provided mild relief.   Sinusitis   This is a recurrent problem. The current episode started 1 to 4 weeks ago. The problem has been gradually worsening since onset. There has been no fever. The pain is moderate. Associated symptoms include congestion, headaches, a hoarse voice, sinus pressure, a sore throat and swollen glands. Pertinent negatives include no coughing, ear pain, neck pain or shortness of breath.         Active Ambulatory Problems     Diagnosis Date Noted    CHF (congestive heart failure)     CHF (NYHA class III, ACC/AHA stage C)     Hyperlipidemia     Chest pain 01/18/2012    HTN (hypertension)     CVID (common variable immunodeficiency)     Chronic sinusitis     ALLERGIC RHINITIS 01/23/2012    Penicillin allergy 01/30/2012    History of thyroid cancer 07/25/2012    Postoperative  hypothyroidism 07/25/2012    LBBB (left bundle branch block) 10/11/2012    ICD (implantable cardioverter-defibrillator) in place 10/23/2012    Sinusitis 10/01/2013    Dyspnea on effort 03/25/2014    Chest pain on exertion 03/25/2014    CAD (coronary artery disease) 04/17/2014    Type 2 diabetes mellitus with diabetic neuropathy, with long-term current use of insulin 05/11/2014    Myoclonus     History of colon polyps 05/11/2016    Obesity (BMI 30-39.9) 08/31/2016    History of pancreatitis 08/31/2016    Nasal obstruction 03/15/2017    Deviated nasal septum 03/15/2017    Hypertrophy of nasal turbinates 03/15/2017    Malignant neoplasm of central portion of left female breast 10/06/2017     Resolved Ambulatory Problems     Diagnosis Date Noted    Subacute maxillary sinusitis 03/15/2017     Past Medical History:   Diagnosis Date    Allergy     Arthritis     Breast cancer 2005    Bundle branch block     Cardiomyopathy     Diabetes mellitus     GERD (gastroesophageal reflux disease)     Headache(784.0)     Hypothyroid 7/25/2012    Otitis media     Presence of combination internal cardiac defibrillator (ICD) and pacemaker     Thyroid cancer     Thyroid cancer 7/25/2012    Thyroid disease          Review of Systems   HENT: Positive for congestion, hoarse voice, postnasal drip, sinus pressure, sinus pain, sore throat and trouble swallowing. Negative for drooling, ear discharge and ear pain.    Respiratory: Negative for cough, shortness of breath and stridor.    Gastrointestinal: Negative for abdominal pain, diarrhea and vomiting.   Musculoskeletal: Negative for neck pain.   Neurological: Positive for headaches.       Objective     Physical Exam   Constitutional: She is oriented to person, place, and time. She appears well-developed and well-nourished. No distress.   HENT:   Head: Normocephalic and atraumatic.   Right Ear: Tympanic membrane, external ear and ear canal normal.   Left Ear:  "Tympanic membrane, external ear and ear canal normal.   Nose: Mucosal edema present. Right sinus exhibits maxillary sinus tenderness. Left sinus exhibits maxillary sinus tenderness.   Mouth/Throat: Oropharynx is clear and moist. No oropharyngeal exudate, posterior oropharyngeal edema, posterior oropharyngeal erythema or tonsillar abscesses.   Eyes: Pupils are equal, round, and reactive to light. Conjunctivae are normal. Right eye exhibits no discharge. Left eye exhibits no discharge. No scleral icterus.   Neck: Normal range of motion. Neck supple. No JVD present.   Cardiovascular: Normal rate, regular rhythm and normal heart sounds.   No murmur heard.  Pulmonary/Chest: Effort normal and breath sounds normal. No respiratory distress. She has no wheezes. She has no rales.   Lymphadenopathy:     She has no cervical adenopathy.   Neurological: She is alert and oriented to person, place, and time.   Skin: Skin is dry. No rash noted. She is not diaphoretic.   Psychiatric: She has a normal mood and affect. Her behavior is normal.   Vitals reviewed.    Vitals:    02/12/20 1027   BP: 110/62   BP Location: Right arm   Patient Position: Sitting   BP Method: Large (Manual)   Pulse: 68   Resp: 18   Temp: 98.4 °F (36.9 °C)   TempSrc: Oral   Weight: 99.3 kg (218 lb 14.7 oz)   Height: 5' 3" (1.6 m)       MOST RECENT LABS IN OUR ELECTRONIC MEDICAL RECORD:     Results for orders placed or performed in visit on 11/22/19   CBC auto differential   Result Value Ref Range    WBC 7.47 3.90 - 12.70 K/uL    RBC 4.71 4.00 - 5.40 M/uL    Hemoglobin 14.9 12.0 - 16.0 g/dL    Hematocrit 45.2 37.0 - 48.5 %    Mean Corpuscular Volume 96 82 - 98 fL    Mean Corpuscular Hemoglobin 31.6 (H) 27.0 - 31.0 pg    Mean Corpuscular Hemoglobin Conc 33.0 32.0 - 36.0 g/dL    RDW 12.8 11.5 - 14.5 %    Platelets 157 150 - 350 K/uL    MPV 11.8 9.2 - 12.9 fL    Immature Granulocytes 0.5 0.0 - 0.5 %    Gran # (ANC) 5.2 1.8 - 7.7 K/uL    Immature Grans (Abs) 0.04 0.00 " - 0.04 K/uL    Lymph # 1.1 1.0 - 4.8 K/uL    Mono # 0.9 0.3 - 1.0 K/uL    Eos # 0.2 0.0 - 0.5 K/uL    Baso # 0.05 0.00 - 0.20 K/uL    nRBC 0 0 /100 WBC    Gran% 70.0 38.0 - 73.0 %    Lymph% 14.2 (L) 18.0 - 48.0 %    Mono% 12.3 4.0 - 15.0 %    Eosinophil% 2.3 0.0 - 8.0 %    Basophil% 0.7 0.0 - 1.9 %    Differential Method Automated

## 2020-03-06 ENCOUNTER — LAB VISIT (OUTPATIENT)
Dept: LAB | Facility: HOSPITAL | Age: 70
End: 2020-03-06
Payer: MEDICARE

## 2020-03-06 DIAGNOSIS — E11.40 TYPE 2 DIABETES MELLITUS WITH DIABETIC NEUROPATHY, WITH LONG-TERM CURRENT USE OF INSULIN: ICD-10-CM

## 2020-03-06 DIAGNOSIS — Z79.4 TYPE 2 DIABETES MELLITUS WITH DIABETIC NEUROPATHY, WITH LONG-TERM CURRENT USE OF INSULIN: ICD-10-CM

## 2020-03-06 DIAGNOSIS — E89.0 POSTOPERATIVE HYPOTHYROIDISM: ICD-10-CM

## 2020-03-06 LAB
ALBUMIN SERPL BCP-MCNC: 3.8 G/DL (ref 3.5–5.2)
ALP SERPL-CCNC: 88 U/L (ref 55–135)
ALT SERPL W/O P-5'-P-CCNC: 22 U/L (ref 10–44)
ANION GAP SERPL CALC-SCNC: 9 MMOL/L (ref 8–16)
AST SERPL-CCNC: 32 U/L (ref 10–40)
BILIRUB SERPL-MCNC: 1.8 MG/DL (ref 0.1–1)
BUN SERPL-MCNC: 20 MG/DL (ref 8–23)
CALCIUM SERPL-MCNC: 9.8 MG/DL (ref 8.7–10.5)
CHLORIDE SERPL-SCNC: 104 MMOL/L (ref 95–110)
CO2 SERPL-SCNC: 28 MMOL/L (ref 23–29)
CREAT SERPL-MCNC: 0.9 MG/DL (ref 0.5–1.4)
EST. GFR  (AFRICAN AMERICAN): >60 ML/MIN/1.73 M^2
EST. GFR  (NON AFRICAN AMERICAN): >60 ML/MIN/1.73 M^2
GLUCOSE SERPL-MCNC: 113 MG/DL (ref 70–110)
POTASSIUM SERPL-SCNC: 4.8 MMOL/L (ref 3.5–5.1)
PROT SERPL-MCNC: 7.2 G/DL (ref 6–8.4)
SODIUM SERPL-SCNC: 141 MMOL/L (ref 136–145)
TSH SERPL DL<=0.005 MIU/L-ACNC: 0.72 UIU/ML (ref 0.4–4)

## 2020-03-06 PROCEDURE — 80053 COMPREHEN METABOLIC PANEL: CPT

## 2020-03-06 PROCEDURE — 83036 HEMOGLOBIN GLYCOSYLATED A1C: CPT

## 2020-03-06 PROCEDURE — 84443 ASSAY THYROID STIM HORMONE: CPT

## 2020-03-06 PROCEDURE — 36415 COLL VENOUS BLD VENIPUNCTURE: CPT | Mod: PN

## 2020-03-07 LAB
ESTIMATED AVG GLUCOSE: 200 MG/DL (ref 68–131)
HBA1C MFR BLD HPLC: 8.6 % (ref 4–5.6)

## 2020-03-11 ENCOUNTER — PATIENT OUTREACH (OUTPATIENT)
Dept: ADMINISTRATIVE | Facility: OTHER | Age: 70
End: 2020-03-11

## 2020-03-13 ENCOUNTER — OFFICE VISIT (OUTPATIENT)
Dept: ENDOCRINOLOGY | Facility: CLINIC | Age: 70
End: 2020-03-13
Payer: MEDICARE

## 2020-03-13 VITALS
WEIGHT: 218.94 LBS | BODY MASS INDEX: 38.79 KG/M2 | HEIGHT: 63 IN | HEART RATE: 65 BPM | SYSTOLIC BLOOD PRESSURE: 124 MMHG | DIASTOLIC BLOOD PRESSURE: 72 MMHG

## 2020-03-13 DIAGNOSIS — I25.10 CORONARY ARTERY DISEASE INVOLVING NATIVE CORONARY ARTERY WITHOUT ANGINA PECTORIS, UNSPECIFIED WHETHER NATIVE OR TRANSPLANTED HEART: ICD-10-CM

## 2020-03-13 DIAGNOSIS — E66.9 OBESITY (BMI 30-39.9): ICD-10-CM

## 2020-03-13 DIAGNOSIS — I50.9 CONGESTIVE HEART FAILURE, UNSPECIFIED HF CHRONICITY, UNSPECIFIED HEART FAILURE TYPE: Chronic | ICD-10-CM

## 2020-03-13 DIAGNOSIS — E89.0 POSTOPERATIVE HYPOTHYROIDISM: ICD-10-CM

## 2020-03-13 DIAGNOSIS — I10 ESSENTIAL HYPERTENSION: ICD-10-CM

## 2020-03-13 DIAGNOSIS — E11.40 TYPE 2 DIABETES MELLITUS WITH DIABETIC NEUROPATHY, WITH LONG-TERM CURRENT USE OF INSULIN: Primary | ICD-10-CM

## 2020-03-13 DIAGNOSIS — Z87.19 HISTORY OF PANCREATITIS: ICD-10-CM

## 2020-03-13 DIAGNOSIS — Z79.4 TYPE 2 DIABETES MELLITUS WITH DIABETIC NEUROPATHY, WITH LONG-TERM CURRENT USE OF INSULIN: Primary | ICD-10-CM

## 2020-03-13 DIAGNOSIS — E78.5 HYPERLIPIDEMIA, UNSPECIFIED HYPERLIPIDEMIA TYPE: Chronic | ICD-10-CM

## 2020-03-13 PROCEDURE — 99214 OFFICE O/P EST MOD 30 MIN: CPT | Mod: S$PBB,,, | Performed by: NURSE PRACTITIONER

## 2020-03-13 PROCEDURE — 99999 PR PBB SHADOW E&M-EST. PATIENT-LVL V: ICD-10-PCS | Mod: PBBFAC,,, | Performed by: NURSE PRACTITIONER

## 2020-03-13 PROCEDURE — 99999 PR PBB SHADOW E&M-EST. PATIENT-LVL V: CPT | Mod: PBBFAC,,, | Performed by: NURSE PRACTITIONER

## 2020-03-13 PROCEDURE — 99214 PR OFFICE/OUTPT VISIT, EST, LEVL IV, 30-39 MIN: ICD-10-PCS | Mod: S$PBB,,, | Performed by: NURSE PRACTITIONER

## 2020-03-13 PROCEDURE — 99215 OFFICE O/P EST HI 40 MIN: CPT | Mod: PBBFAC,PN | Performed by: NURSE PRACTITIONER

## 2020-03-13 RX ORDER — BACITRACIN ZINC 500 UNIT/G
OINTMENT (GRAM) TOPICAL
COMMUNITY
Start: 2020-03-09 | End: 2020-06-19

## 2020-03-13 RX ORDER — INSULIN DEGLUDEC 200 U/ML
60 INJECTION, SOLUTION SUBCUTANEOUS DAILY
Qty: 27 ML | Refills: 3
Start: 2020-03-13 | End: 2020-07-02

## 2020-03-13 NOTE — PROGRESS NOTES
CC: Ms. Mckenzie Mari arrives today for management of Type 2 DM and review of chronic medical conditions.     HPI: Ms. Mckenzie Mari was diagnosed with Type 2 DM in 2004. She was diagnosed based on lab work. Initial treatment consisted of metformin and glimepiride. Insulin added in 8/2016 - began Toujeo. She states that she felt that this caused nausea, abd pain, chest pain. Lantus caused throat swelling, left arm pain. She was then changed to Novolog 70/30 but this was only used for a short period because her insurance didn't cover.  Then changed to Humalog 50-50 in 12/2016. In 2017, she began MDI with Tresiba and Humalog. + FH of DM in maternal aunt and cousin. Denies hospitalizations due to DM. Previously seen in endocrine by Richard Gupta, and MAGALI Eng, ARIELLE. She has a history of thyroid cancer/post-surgical hypothyroidism.   Of note, she has a h/o pancreatitis.   Insulin management is challenging because patient believes insulin causes hyperglycemia.    She follows annually with cardiology for CHF, CAD.      Last seen by me in November. Humalog dose was increased at this time.     Today, she states that when she takes a total of 20 units of Humalog (18 + 2 of correction), her sugars are higher in the morning.     BG readings are checked 3x/day.               Hypoglycemia: No, however, gets headaches and feels symptoms with glucoses < 150.     Missing Insulin/PO medication doses: No  Timing prandial insulin 5-15 minutes before meals: yes    Dietary Habits: Eats 3 meals/day. Snacks on cheese, nuts, Greek yogurt with pinch of raisins, walnuts. Avoids sugary drinks.    Last DM education: 1/2019       CURRENT DIABETIC MEDS: Tresiba 56 units QAM, Humalog 18 units AC + correction scale, target 180, ISF 25.  Vial or pen: pen  Glucometer type: One Touch Verio     Previous DM treatments:   Invokana - nausea  Glimepiride - stopped when prandial insulin added  Touejo -  Nausea, abd pain, chest pain  Lantus - throat  "swelling, left arm pain  Novolog 70/30 - not covered by insurance  Metformin - headaches    Last Eye Exam: 2/26/2020 - No DRSlava Report on file. Cannot recall provider.   Last Podiatry Exam: no    REVIEW OF SYSTEMS  Constitutional: no c/o weakness or weight loss. + fatigue   Eyes: no c/o visual disturbances.   Cardiac: no palpitations, chest pain.   Respiratory: no cough. C/o dyspnea that is chronic.   GI: no c/o abdominal pain, nausea. + H/o pancreatitis.   Skin: no lesions or rashes.   Neuro: reports rare occasions of numbness, tingling in feet   Endocrine: denies polyphagia, polydipsia, polyuria      Personally reviewed Past Medical, Surgical, Social History.    Vital Signs  /72   Pulse 65   Ht 5' 3" (1.6 m)   Wt 99.3 kg (218 lb 14.7 oz)   BMI 38.78 kg/m²     Personally reviewed the below labs:    Hemoglobin A1C   Date Value Ref Range Status   03/06/2020 8.6 (H) 4.0 - 5.6 % Final     Comment:     ADA Screening Guidelines:  5.7-6.4%  Consistent with prediabetes  >or=6.5%  Consistent with diabetes  High levels of fetal hemoglobin interfere with the HbA1C  assay. Heterozygous hemoglobin variants (HbS, HgC, etc)do  not significantly interfere with this assay.   However, presence of multiple variants may affect accuracy.     11/06/2019 8.5 (H) 4.0 - 5.6 % Final     Comment:     ADA Screening Guidelines:  5.7-6.4%  Consistent with prediabetes  >or=6.5%  Consistent with diabetes  High levels of fetal hemoglobin interfere with the HbA1C  assay. Heterozygous hemoglobin variants (HbS, HgC, etc)do  not significantly interfere with this assay.   However, presence of multiple variants may affect accuracy.     06/28/2019 7.7 (H) 4.0 - 5.6 % Final     Comment:     ADA Screening Guidelines:  5.7-6.4%  Consistent with prediabetes  >or=6.5%  Consistent with diabetes  High levels of fetal hemoglobin interfere with the HbA1C  assay. Heterozygous hemoglobin variants (HbS, HgC, etc)do  not significantly interfere with this " assay.   However, presence of multiple variants may affect accuracy.         Chemistry        Component Value Date/Time     03/06/2020 0735    K 4.8 03/06/2020 0735     03/06/2020 0735    CO2 28 03/06/2020 0735    BUN 20 03/06/2020 0735    CREATININE 0.9 03/06/2020 0735     (H) 03/06/2020 0735        Component Value Date/Time    CALCIUM 9.8 03/06/2020 0735    ALKPHOS 88 03/06/2020 0735    AST 32 03/06/2020 0735    ALT 22 03/06/2020 0735    BILITOT 1.8 (H) 03/06/2020 0735          Lab Results   Component Value Date    CHOL 201 (H) 06/28/2019    CHOL 156 11/30/2018    CHOL 131 03/29/2018     Lab Results   Component Value Date    HDL 47 06/28/2019    HDL 46 11/30/2018    HDL 47 03/29/2018     Lab Results   Component Value Date    LDLCALC 119.6 06/28/2019    LDLCALC 79.0 11/30/2018    LDLCALC 54.4 (L) 03/29/2018     Lab Results   Component Value Date    TRIG 172 (H) 06/28/2019    TRIG 155 (H) 11/30/2018    TRIG 148 03/29/2018     Lab Results   Component Value Date    CHOLHDL 23.4 06/28/2019    CHOLHDL 29.5 11/30/2018    CHOLHDL 35.9 03/29/2018       Lab Results   Component Value Date    MICALBCREAT 14.0 06/28/2019     Lab Results   Component Value Date    TSH 0.720 03/06/2020       CrCl cannot be calculated (Patient's most recent lab result is older than the maximum 7 days allowed.).    Vit D, 25-Hydroxy   Date Value Ref Range Status   11/08/2014 51 30 - 96 ng/mL Final     Comment:     Vitamin D deficiency.........<10 ng/mL                              Vitamin D insufficiency......10-29 ng/mL       Vitamin D sufficiency........> or equal to 30 ng/mL  Vitamin D toxicity............>100 ng/mL           March 2019 CGMS results: Fasting glucose is acceptable for what patient will allow (feels symptomatic with glucose < 150). Two fasting glucoses didn't correlate with SMBG glucose on her log. Minimal prandial excursions are noted. No true hypoglycemia but rebound noted after borderline nocturnal episode.  Eats 3 meals/day, some of which are lower carb, and snacks on either SF pudding or small piece of chocolate.   Average glucose: 138  % above target: 2%  % in target: 98%  % below target: 0%      PHYSICAL EXAMINATION  Constitutional: Appears well, no distress.  Neck: Supple, trachea midline; transverse scar to anterior neck from thyroidectomy.   Respiratory: CTA, even and unlabored.  Cardiovascular: RRR, no murmurs, no carotid bruits. DP pulses 1+ bilaterally; no edema.  GI: active bowel sounds, no hernia  Skin: warm and dry; no lipohypertrophy, or acanthosis nigracans observed.  Neuro: DTR diminished to BUE/BLE. previously, no loss of protective sensation via 10 gm monofilament. Vibratory exam mildly decreased, bilaterally.  Feet: appropriate footwear.      A1c goal < 7.5%, due to CAD, CHF, patient's desire for glucose to remain >150.         Assessment/Plan  1. Type 2 diabetes mellitus with diabetic neuropathy, with long-term current use of insulin  -- Uncontrolled. While most glucoses are elevated, patient doesn't want her levels to drop much below 150. Glucoses on logs yield lower average than A1c suggests. Will order fructosamine with next follow up.   -- increase Tresiba to 60 units QAM  -- continue Humalog 18 units AC + SSI (180/25)  -- check BG 4x/day   -- Would not use Actos, due to CHF. GLP-1RA and DPP4-I contraindicated due to h/o pancreatitis. Cannot tolerate metformin.     -- Discussed diagnosis of DM, A1c goals, progression of disease, long term complications and tx options.  Advised patient to check BG before activities, such as driving or exercise.  -- Reviewed hypoglycemia management: treat with 1/2 glass of juice, 1/2 can regular coke, or 4 glucose tablets. Monitor and repeat treatment every 15 minutes until BG is >70 Then have a snack, which includes a complex carbohydrate and protein.    -- takes statin and ARB     2. Coronary artery disease involving native coronary artery without angina  pectoris, unspecified whether native or transplanted heart  -- stable, avoid hyopglycemia     3. CHF (NYHA class III, ACC/AHA stage C)  -- follows with cardiology   4. Postoperative hypothyroidism  -- stable  -- Continue Synthroid 137 mcg daily   5. Essential hypertension  -- controlled   -- continue ARB   6. Hyperlipidemia, unspecified hyperlipidemia type  -- uncontrolled with elevated triglycerides.   -- optimize DM control   -- continue Crestor   7. Obesity (BMI 30-39.9)  -- increases insulin resistance  Body mass index is 38.78 kg/m².   8. History of pancreatitis  -- avoid GLP-1RA and DPP4-i       FOLLOW UP  Follow up in about 4 months (around 7/13/2020).   Patient instructed to bring BG logs to each follow up.   Patient encouraged to call for any BG/medication issues, concerns, or questions.      Orders Placed This Encounter   Procedures    Hemoglobin A1c    Lipid panel    Basic metabolic panel    Microalbumin/creatinine urine ratio    Fructosamine

## 2020-03-15 RX ORDER — TELMISARTAN 20 MG/1
TABLET ORAL
Qty: 90 TABLET | Refills: 3 | Status: SHIPPED | OUTPATIENT
Start: 2020-03-15 | End: 2021-03-03

## 2020-03-15 RX ORDER — POTASSIUM CHLORIDE 20 MEQ/1
TABLET, EXTENDED RELEASE ORAL
Qty: 90 TABLET | Refills: 0 | Status: SHIPPED | OUTPATIENT
Start: 2020-03-15 | End: 2020-06-11

## 2020-03-25 ENCOUNTER — PATIENT MESSAGE (OUTPATIENT)
Dept: FAMILY MEDICINE | Facility: CLINIC | Age: 70
End: 2020-03-25

## 2020-03-25 DIAGNOSIS — J01.91 ACUTE RECURRENT SINUSITIS, UNSPECIFIED LOCATION: ICD-10-CM

## 2020-03-26 DIAGNOSIS — E11.8 TYPE 2 DIABETES MELLITUS WITH COMPLICATION, WITH LONG-TERM CURRENT USE OF INSULIN: ICD-10-CM

## 2020-03-26 DIAGNOSIS — Z79.4 TYPE 2 DIABETES MELLITUS WITH COMPLICATION, WITH LONG-TERM CURRENT USE OF INSULIN: ICD-10-CM

## 2020-03-26 RX ORDER — PEN NEEDLE, DIABETIC 30 GX3/16"
NEEDLE, DISPOSABLE MISCELLANEOUS
Qty: 400 EACH | Refills: 3 | Status: SHIPPED | OUTPATIENT
Start: 2020-03-26 | End: 2021-05-21

## 2020-03-26 RX ORDER — AZITHROMYCIN 500 MG/1
500 TABLET, FILM COATED ORAL DAILY
Qty: 7 TABLET | Refills: 0 | Status: SHIPPED | OUTPATIENT
Start: 2020-03-26 | End: 2020-06-19

## 2020-03-26 NOTE — TELEPHONE ENCOUNTER
Please advise as patient is refusing VV and is wanting something called in for a sinus infection.

## 2020-03-26 NOTE — TELEPHONE ENCOUNTER
I just do not think she is going to be agreeable at this time to comply with what everyone else is doing and she does have a history of chronic rhinitis.  I will authorize an antibiotic at this point but I can not promise I will continue do so.  At some point she will have to comply like everyone else in this difficult time.

## 2020-03-31 DIAGNOSIS — Z79.4 TYPE 2 DIABETES MELLITUS WITH DIABETIC NEUROPATHY, WITH LONG-TERM CURRENT USE OF INSULIN: ICD-10-CM

## 2020-03-31 DIAGNOSIS — E11.40 TYPE 2 DIABETES MELLITUS WITH DIABETIC NEUROPATHY, WITH LONG-TERM CURRENT USE OF INSULIN: ICD-10-CM

## 2020-03-31 RX ORDER — LANCETS
EACH MISCELLANEOUS
Qty: 400 EACH | Refills: 3 | Status: SHIPPED | OUTPATIENT
Start: 2020-03-31 | End: 2020-05-11 | Stop reason: SDUPTHER

## 2020-04-19 RX ORDER — CARVEDILOL 25 MG/1
TABLET ORAL
Qty: 180 TABLET | Refills: 1 | Status: SHIPPED | OUTPATIENT
Start: 2020-04-19 | End: 2020-10-15

## 2020-05-04 ENCOUNTER — TELEPHONE (OUTPATIENT)
Dept: ENDOCRINOLOGY | Facility: CLINIC | Age: 70
End: 2020-05-04

## 2020-05-06 ENCOUNTER — PATIENT MESSAGE (OUTPATIENT)
Dept: ADMINISTRATIVE | Facility: HOSPITAL | Age: 70
End: 2020-05-06

## 2020-05-06 DIAGNOSIS — Z79.4 TYPE 2 DIABETES MELLITUS WITH DIABETIC NEUROPATHY, WITH LONG-TERM CURRENT USE OF INSULIN: ICD-10-CM

## 2020-05-06 DIAGNOSIS — E11.40 TYPE 2 DIABETES MELLITUS WITH DIABETIC NEUROPATHY, WITH LONG-TERM CURRENT USE OF INSULIN: ICD-10-CM

## 2020-05-06 RX ORDER — INSULIN LISPRO 100 [IU]/ML
18 INJECTION, SOLUTION INTRAVENOUS; SUBCUTANEOUS
Qty: 75 ML | Refills: 3 | Status: SHIPPED | OUTPATIENT
Start: 2020-05-06 | End: 2020-12-23

## 2020-05-10 ENCOUNTER — PATIENT MESSAGE (OUTPATIENT)
Dept: ENDOCRINOLOGY | Facility: CLINIC | Age: 70
End: 2020-05-10

## 2020-05-11 DIAGNOSIS — Z79.4 TYPE 2 DIABETES MELLITUS WITH DIABETIC NEUROPATHY, WITH LONG-TERM CURRENT USE OF INSULIN: ICD-10-CM

## 2020-05-11 DIAGNOSIS — E11.40 TYPE 2 DIABETES MELLITUS WITH DIABETIC NEUROPATHY, WITH LONG-TERM CURRENT USE OF INSULIN: ICD-10-CM

## 2020-05-12 RX ORDER — LANCETS
EACH MISCELLANEOUS
Qty: 400 EACH | Refills: 3 | Status: SHIPPED | OUTPATIENT
Start: 2020-05-12 | End: 2020-10-22 | Stop reason: SDUPTHER

## 2020-05-13 ENCOUNTER — PATIENT MESSAGE (OUTPATIENT)
Dept: ENDOCRINOLOGY | Facility: CLINIC | Age: 70
End: 2020-05-13

## 2020-06-15 ENCOUNTER — PATIENT MESSAGE (OUTPATIENT)
Dept: ENDOCRINOLOGY | Facility: CLINIC | Age: 70
End: 2020-06-15

## 2020-06-19 ENCOUNTER — PATIENT MESSAGE (OUTPATIENT)
Dept: FAMILY MEDICINE | Facility: CLINIC | Age: 70
End: 2020-06-19

## 2020-06-19 ENCOUNTER — OFFICE VISIT (OUTPATIENT)
Dept: FAMILY MEDICINE | Facility: CLINIC | Age: 70
End: 2020-06-19
Payer: MEDICARE

## 2020-06-19 VITALS
BODY MASS INDEX: 38.65 KG/M2 | DIASTOLIC BLOOD PRESSURE: 66 MMHG | HEIGHT: 63 IN | TEMPERATURE: 99 F | HEART RATE: 76 BPM | RESPIRATION RATE: 18 BRPM | WEIGHT: 218.13 LBS | SYSTOLIC BLOOD PRESSURE: 130 MMHG

## 2020-06-19 DIAGNOSIS — J01.91 ACUTE RECURRENT SINUSITIS, UNSPECIFIED LOCATION: Primary | ICD-10-CM

## 2020-06-19 PROCEDURE — 99214 OFFICE O/P EST MOD 30 MIN: CPT | Mod: S$PBB,,, | Performed by: FAMILY MEDICINE

## 2020-06-19 PROCEDURE — 99999 PR PBB SHADOW E&M-EST. PATIENT-LVL V: CPT | Mod: PBBFAC,,, | Performed by: FAMILY MEDICINE

## 2020-06-19 PROCEDURE — 99999 PR PBB SHADOW E&M-EST. PATIENT-LVL V: ICD-10-PCS | Mod: PBBFAC,,, | Performed by: FAMILY MEDICINE

## 2020-06-19 PROCEDURE — 99215 OFFICE O/P EST HI 40 MIN: CPT | Mod: PBBFAC,PN | Performed by: FAMILY MEDICINE

## 2020-06-19 PROCEDURE — 99214 PR OFFICE/OUTPT VISIT, EST, LEVL IV, 30-39 MIN: ICD-10-PCS | Mod: S$PBB,,, | Performed by: FAMILY MEDICINE

## 2020-06-19 RX ORDER — AZITHROMYCIN 250 MG/1
TABLET, FILM COATED ORAL
Qty: 6 TABLET | Refills: 0 | Status: SHIPPED | OUTPATIENT
Start: 2020-06-19 | End: 2020-07-14 | Stop reason: SDUPTHER

## 2020-06-19 NOTE — TELEPHONE ENCOUNTER
Called patient gave her the next available with Dr. Gold which happened to be today. Patient gave a verbal understanding.

## 2020-06-19 NOTE — PROGRESS NOTES
THIS DOCUMENT WAS MADE IN PART WITH VOICE RECOGNITION SOFTWARE.  OCCASIONALLY THIS SOFTWARE WILL MISINTERPRET WORDS OR PHRASES.      Mckenzie Mari  1950    Mckenzie was seen today for post nasal drip.    Diagnoses and all orders for this visit:    Acute recurrent sinusitis, unspecified location  -     azithromycin (Z-DIDIER) 250 MG tablet; Take this antibiotic for 5 days total according to the following instructions: Take 2 po on Day #1, then take one po daily on days 2-5        Subjective     Chief Complaint   Patient presents with    post nasal drip     x 2 weeks       Sinusitis  This is a recurrent problem. Episode onset: x 2 weeks. The problem has been gradually worsening since onset. There has been no fever. The pain is moderate. Associated symptoms include congestion, coughing, headaches, sinus pressure, sneezing and a sore throat. Pertinent negatives include no chills, ear pain or shortness of breath. Past treatments include saline sprays. The treatment provided mild relief.           Active Ambulatory Problems     Diagnosis Date Noted    CHF (congestive heart failure)     CHF (NYHA class III, ACC/AHA stage C)     Hyperlipidemia     Chest pain 01/18/2012    HTN (hypertension)     CVID (common variable immunodeficiency)     Chronic sinusitis     ALLERGIC RHINITIS 01/23/2012    Penicillin allergy 01/30/2012    History of thyroid cancer 07/25/2012    Postoperative hypothyroidism 07/25/2012    LBBB (left bundle branch block) 10/11/2012    ICD (implantable cardioverter-defibrillator) in place 10/23/2012    Sinusitis 10/01/2013    Dyspnea on effort 03/25/2014    Chest pain on exertion 03/25/2014    CAD (coronary artery disease) 04/17/2014    Type 2 diabetes mellitus with diabetic neuropathy, with long-term current use of insulin 05/11/2014    Myoclonus     History of colon polyps 05/11/2016    Obesity (BMI 30-39.9) 08/31/2016    History of pancreatitis 08/31/2016    Nasal obstruction  03/15/2017    Deviated nasal septum 03/15/2017    Hypertrophy of nasal turbinates 03/15/2017    Malignant neoplasm of central portion of left female breast 10/06/2017     Resolved Ambulatory Problems     Diagnosis Date Noted    Subacute maxillary sinusitis 03/15/2017     Past Medical History:   Diagnosis Date    Allergy     Arthritis     Breast cancer 2005    Bundle branch block     Cardiomyopathy     Diabetes mellitus     GERD (gastroesophageal reflux disease)     Headache(784.0)     Hypothyroid 7/25/2012    Otitis media     Presence of combination internal cardiac defibrillator (ICD) and pacemaker     Thyroid cancer     Thyroid cancer 7/25/2012    Thyroid disease          Review of Systems   Constitutional: Negative for chills.   HENT: Positive for congestion, sinus pressure, sneezing and sore throat. Negative for ear pain.    Respiratory: Positive for cough. Negative for shortness of breath.    Neurological: Positive for headaches.       Objective     Physical Exam  Vitals signs reviewed.   Constitutional:       General: She is not in acute distress.     Appearance: She is well-developed. She is not diaphoretic.   HENT:      Head: Normocephalic and atraumatic.      Right Ear: Tympanic membrane, ear canal and external ear normal. There is no impacted cerumen.      Left Ear: Tympanic membrane, ear canal and external ear normal. There is no impacted cerumen.      Nose: Mucosal edema and congestion present.      Right Sinus: Maxillary sinus tenderness present.      Left Sinus: Maxillary sinus tenderness present.      Mouth/Throat:      Pharynx: No oropharyngeal exudate or posterior oropharyngeal erythema.      Tonsils: No tonsillar abscesses.   Eyes:      General: No scleral icterus.        Right eye: No discharge.         Left eye: No discharge.      Conjunctiva/sclera: Conjunctivae normal.      Pupils: Pupils are equal, round, and reactive to light.   Neck:      Musculoskeletal: Normal range of  "motion and neck supple.      Vascular: No JVD.   Cardiovascular:      Rate and Rhythm: Normal rate and regular rhythm.      Heart sounds: Normal heart sounds. No murmur.   Pulmonary:      Effort: Pulmonary effort is normal. No respiratory distress.      Breath sounds: No wheezing.   Lymphadenopathy:      Cervical: No cervical adenopathy.   Skin:     General: Skin is dry.      Findings: No rash.   Neurological:      Mental Status: She is alert and oriented to person, place, and time.   Psychiatric:         Behavior: Behavior normal.       Vitals:    06/19/20 1559   BP: 130/66   BP Location: Left arm   Patient Position: Sitting   BP Method: Large (Manual)   Pulse: 76   Resp: 18   Temp: 98.6 °F (37 °C)   TempSrc: Temporal   Weight: 98.9 kg (218 lb 2.3 oz)   Height: 5' 3" (1.6 m)       MOST RECENT LABS IN OUR ELECTRONIC MEDICAL RECORD:     Results for orders placed or performed in visit on 03/06/20   Hemoglobin A1c   Result Value Ref Range    Hemoglobin A1C 8.6 (H) 4.0 - 5.6 %    Estimated Avg Glucose 200 (H) 68 - 131 mg/dL   Comprehensive metabolic panel   Result Value Ref Range    Sodium 141 136 - 145 mmol/L    Potassium 4.8 3.5 - 5.1 mmol/L    Chloride 104 95 - 110 mmol/L    CO2 28 23 - 29 mmol/L    Glucose 113 (H) 70 - 110 mg/dL    BUN, Bld 20 8 - 23 mg/dL    Creatinine 0.9 0.5 - 1.4 mg/dL    Calcium 9.8 8.7 - 10.5 mg/dL    Total Protein 7.2 6.0 - 8.4 g/dL    Albumin 3.8 3.5 - 5.2 g/dL    Total Bilirubin 1.8 (H) 0.1 - 1.0 mg/dL    Alkaline Phosphatase 88 55 - 135 U/L    AST 32 10 - 40 U/L    ALT 22 10 - 44 U/L    Anion Gap 9 8 - 16 mmol/L    eGFR if African American >60.0 >60 mL/min/1.73 m^2    eGFR if non African American >60.0 >60 mL/min/1.73 m^2   TSH   Result Value Ref Range    TSH 0.720 0.400 - 4.000 uIU/mL           "

## 2020-06-25 ENCOUNTER — LAB VISIT (OUTPATIENT)
Dept: LAB | Facility: HOSPITAL | Age: 70
End: 2020-06-25
Attending: NURSE PRACTITIONER
Payer: MEDICARE

## 2020-06-25 DIAGNOSIS — Z79.4 TYPE 2 DIABETES MELLITUS WITH DIABETIC NEUROPATHY, WITH LONG-TERM CURRENT USE OF INSULIN: ICD-10-CM

## 2020-06-25 DIAGNOSIS — E11.40 TYPE 2 DIABETES MELLITUS WITH DIABETIC NEUROPATHY, WITH LONG-TERM CURRENT USE OF INSULIN: ICD-10-CM

## 2020-06-25 LAB
ANION GAP SERPL CALC-SCNC: 12 MMOL/L (ref 8–16)
BUN SERPL-MCNC: 15 MG/DL (ref 8–23)
CALCIUM SERPL-MCNC: 9 MG/DL (ref 8.7–10.5)
CHLORIDE SERPL-SCNC: 102 MMOL/L (ref 95–110)
CHOLEST SERPL-MCNC: 253 MG/DL (ref 120–199)
CHOLEST/HDLC SERPL: 5.3 {RATIO} (ref 2–5)
CO2 SERPL-SCNC: 23 MMOL/L (ref 23–29)
CREAT SERPL-MCNC: 0.8 MG/DL (ref 0.5–1.4)
EST. GFR  (AFRICAN AMERICAN): >60 ML/MIN/1.73 M^2
EST. GFR  (NON AFRICAN AMERICAN): >60 ML/MIN/1.73 M^2
ESTIMATED AVG GLUCOSE: 174 MG/DL (ref 68–131)
GLUCOSE SERPL-MCNC: 156 MG/DL (ref 70–110)
HBA1C MFR BLD HPLC: 7.7 % (ref 4–5.6)
HDLC SERPL-MCNC: 48 MG/DL (ref 40–75)
HDLC SERPL: 19 % (ref 20–50)
LDLC SERPL CALC-MCNC: 155.8 MG/DL (ref 63–159)
NONHDLC SERPL-MCNC: 205 MG/DL
POTASSIUM SERPL-SCNC: 4.5 MMOL/L (ref 3.5–5.1)
SODIUM SERPL-SCNC: 137 MMOL/L (ref 136–145)
TRIGL SERPL-MCNC: 246 MG/DL (ref 30–150)

## 2020-06-25 PROCEDURE — 83036 HEMOGLOBIN GLYCOSYLATED A1C: CPT

## 2020-06-25 PROCEDURE — 36415 COLL VENOUS BLD VENIPUNCTURE: CPT | Mod: PN

## 2020-06-25 PROCEDURE — 82985 ASSAY OF GLYCATED PROTEIN: CPT

## 2020-06-25 PROCEDURE — 80061 LIPID PANEL: CPT

## 2020-06-25 PROCEDURE — 80048 BASIC METABOLIC PNL TOTAL CA: CPT

## 2020-06-26 ENCOUNTER — CLINICAL SUPPORT (OUTPATIENT)
Dept: CARDIOLOGY | Facility: CLINIC | Age: 70
End: 2020-06-26
Payer: MEDICARE

## 2020-06-29 LAB — FRUCTOSAMINE SERPL-SCNC: 291 UMOL /L

## 2020-06-30 ENCOUNTER — PATIENT OUTREACH (OUTPATIENT)
Dept: ADMINISTRATIVE | Facility: OTHER | Age: 70
End: 2020-06-30

## 2020-06-30 NOTE — PROGRESS NOTES
Requested updates within Care Everywhere.  Patient's chart was reviewed for overdue ELIZABETH topics.  Immunizations reconciled.

## 2020-07-02 ENCOUNTER — OFFICE VISIT (OUTPATIENT)
Dept: ENDOCRINOLOGY | Facility: CLINIC | Age: 70
End: 2020-07-02
Payer: MEDICARE

## 2020-07-02 VITALS
WEIGHT: 220.25 LBS | HEIGHT: 63 IN | SYSTOLIC BLOOD PRESSURE: 110 MMHG | BODY MASS INDEX: 39.02 KG/M2 | HEART RATE: 76 BPM | DIASTOLIC BLOOD PRESSURE: 70 MMHG

## 2020-07-02 DIAGNOSIS — I50.9 CHF (NYHA CLASS III, ACC/AHA STAGE C): ICD-10-CM

## 2020-07-02 DIAGNOSIS — I10 ESSENTIAL HYPERTENSION: ICD-10-CM

## 2020-07-02 DIAGNOSIS — E89.0 POSTOPERATIVE HYPOTHYROIDISM: ICD-10-CM

## 2020-07-02 DIAGNOSIS — Z87.19 HISTORY OF PANCREATITIS: ICD-10-CM

## 2020-07-02 DIAGNOSIS — E78.5 HYPERLIPIDEMIA, UNSPECIFIED HYPERLIPIDEMIA TYPE: Chronic | ICD-10-CM

## 2020-07-02 DIAGNOSIS — E66.9 OBESITY (BMI 30-39.9): ICD-10-CM

## 2020-07-02 DIAGNOSIS — E11.40 TYPE 2 DIABETES MELLITUS WITH DIABETIC NEUROPATHY, WITH LONG-TERM CURRENT USE OF INSULIN: Primary | ICD-10-CM

## 2020-07-02 DIAGNOSIS — I25.10 CORONARY ARTERY DISEASE INVOLVING NATIVE CORONARY ARTERY WITHOUT ANGINA PECTORIS, UNSPECIFIED WHETHER NATIVE OR TRANSPLANTED HEART: ICD-10-CM

## 2020-07-02 DIAGNOSIS — Z79.4 TYPE 2 DIABETES MELLITUS WITH DIABETIC NEUROPATHY, WITH LONG-TERM CURRENT USE OF INSULIN: Primary | ICD-10-CM

## 2020-07-02 PROCEDURE — 99214 PR OFFICE/OUTPT VISIT, EST, LEVL IV, 30-39 MIN: ICD-10-PCS | Mod: S$PBB,,, | Performed by: NURSE PRACTITIONER

## 2020-07-02 PROCEDURE — 99999 PR PBB SHADOW E&M-EST. PATIENT-LVL V: ICD-10-PCS | Mod: PBBFAC,,, | Performed by: NURSE PRACTITIONER

## 2020-07-02 PROCEDURE — 99215 OFFICE O/P EST HI 40 MIN: CPT | Mod: PBBFAC,PN | Performed by: NURSE PRACTITIONER

## 2020-07-02 PROCEDURE — 99214 OFFICE O/P EST MOD 30 MIN: CPT | Mod: S$PBB,,, | Performed by: NURSE PRACTITIONER

## 2020-07-02 PROCEDURE — 99999 PR PBB SHADOW E&M-EST. PATIENT-LVL V: CPT | Mod: PBBFAC,,, | Performed by: NURSE PRACTITIONER

## 2020-07-02 RX ORDER — INSULIN DEGLUDEC 200 U/ML
58 INJECTION, SOLUTION SUBCUTANEOUS DAILY
Qty: 27 ML | Refills: 3
Start: 2020-07-02 | End: 2021-01-25 | Stop reason: SDUPTHER

## 2020-07-02 RX ORDER — ROSUVASTATIN CALCIUM 20 MG/1
20 TABLET, COATED ORAL NIGHTLY
Qty: 90 TABLET | Refills: 3 | Status: SHIPPED | OUTPATIENT
Start: 2020-07-02 | End: 2021-06-28

## 2020-07-02 NOTE — PROGRESS NOTES
CC: Ms. Mckenzie Mari arrives today for management of Type 2 DM and review of chronic medical conditions.     HPI: Ms. Mckenzie Mari was diagnosed with Type 2 DM in 2004. She was diagnosed based on lab work. Initial treatment consisted of metformin and glimepiride. Insulin added in 8/2016 - began Toujeo. She states that she felt that this caused nausea, abd pain, chest pain. Lantus caused throat swelling, left arm pain. She was then changed to Novolog 70/30 but this was only used for a short period because her insurance didn't cover.  Then changed to Humalog 50-50 in 12/2016. In 2017, she began MDI with Tresiba and Humalog. + FH of DM in maternal aunt and cousin. Denies hospitalizations due to DM. Previously seen in endocrine by Richard Gupta, and MAGALI Eng NP. She has a history of thyroid cancer/post-surgical hypothyroidism.   Of note, she has a h/o pancreatitis.   She follows annually with cardiology for CHF, CAD.    Insulin management is challenging because patient believes insulin causes hyperglycemia.    Last seen by me in March. Tresiba dose was increased at this time.    She states that she fell 1 week ago after tripping on raised pavement on her driveway when going out to get her mail after a rain shower. She landed on her face and the nose guards of her glasses caused some bruising to bridge of nose, which then led to bruising under her eyes. She had a small laceration on her forehead but didn't lose consciousness.     BG readings are checked 3x/day.             Hypoglycemia: No, however, gets headaches and feels symptoms of hypoglycemia with glucoses < 150.     Missing Insulin/PO medication doses: No  Timing prandial insulin 5-15 minutes before meals: yes    Exercise: walking most days for a few blocks and also swims.     Dietary Habits: Eats 3 meals/day. Occasional snacking on cookie. Avoids sugary drinks.    Last DM education: 1/2019       CURRENT DIABETIC MEDS: Tresiba 58 units QAM, Humalog 18  "units AC + correction scale, target 180, ISF 25  Vial or pen: pen  Glucometer type: One Touch Verio     Previous DM treatments:   Invokana - nausea  Glimepiride - stopped when prandial insulin added  Touejo -  Nausea, abd pain, chest pain  Lantus - throat swelling, left arm pain  Novolog 70/30 - not covered by insurance  Metformin - headaches    Last Eye Exam: 2/26/2020 - No DR. Report on file. Cannot recall provider.   Last Podiatry Exam: no    REVIEW OF SYSTEMS  Constitutional: no c/o weakness or weight loss. + fatigue   Eyes: no c/o visual disturbances.   Cardiac: no palpitations, chest pain.   Respiratory: no cough. C/o dyspnea that is chronic.   GI: no c/o abdominal pain, nausea. + H/o pancreatitis.   Skin: no lesions or rashes.   Neuro: + numbness, tingling in feet that come and go.  Endocrine: denies polyphagia, polydipsia, polyuria      Personally reviewed Past Medical, Surgical, Social History.    Vital Signs  /70   Pulse 76   Ht 5' 3" (1.6 m)   Wt 99.9 kg (220 lb 3.8 oz)   BMI 39.01 kg/m²     Personally reviewed the below labs:    Hemoglobin A1C   Date Value Ref Range Status   06/25/2020 7.7 (H) 4.0 - 5.6 % Final     Comment:     ADA Screening Guidelines:  5.7-6.4%  Consistent with prediabetes  >or=6.5%  Consistent with diabetes  High levels of fetal hemoglobin interfere with the HbA1C  assay. Heterozygous hemoglobin variants (HbS, HgC, etc)do  not significantly interfere with this assay.   However, presence of multiple variants may affect accuracy.     03/06/2020 8.6 (H) 4.0 - 5.6 % Final     Comment:     ADA Screening Guidelines:  5.7-6.4%  Consistent with prediabetes  >or=6.5%  Consistent with diabetes  High levels of fetal hemoglobin interfere with the HbA1C  assay. Heterozygous hemoglobin variants (HbS, HgC, etc)do  not significantly interfere with this assay.   However, presence of multiple variants may affect accuracy.     11/06/2019 8.5 (H) 4.0 - 5.6 % Final     Comment:     ADA Screening " Guidelines:  5.7-6.4%  Consistent with prediabetes  >or=6.5%  Consistent with diabetes  High levels of fetal hemoglobin interfere with the HbA1C  assay. Heterozygous hemoglobin variants (HbS, HgC, etc)do  not significantly interfere with this assay.   However, presence of multiple variants may affect accuracy.         Chemistry        Component Value Date/Time     06/25/2020 0726    K 4.5 06/25/2020 0726     06/25/2020 0726    CO2 23 06/25/2020 0726    BUN 15 06/25/2020 0726    CREATININE 0.8 06/25/2020 0726     (H) 06/25/2020 0726        Component Value Date/Time    CALCIUM 9.0 06/25/2020 0726    ALKPHOS 88 03/06/2020 0735    AST 32 03/06/2020 0735    ALT 22 03/06/2020 0735    BILITOT 1.8 (H) 03/06/2020 0735          Lab Results   Component Value Date    CHOL 253 (H) 06/25/2020    CHOL 201 (H) 06/28/2019    CHOL 156 11/30/2018     Lab Results   Component Value Date    HDL 48 06/25/2020    HDL 47 06/28/2019    HDL 46 11/30/2018     Lab Results   Component Value Date    LDLCALC 155.8 06/25/2020    LDLCALC 119.6 06/28/2019    LDLCALC 79.0 11/30/2018     Lab Results   Component Value Date    TRIG 246 (H) 06/25/2020    TRIG 172 (H) 06/28/2019    TRIG 155 (H) 11/30/2018     Lab Results   Component Value Date    CHOLHDL 19.0 (L) 06/25/2020    CHOLHDL 23.4 06/28/2019    CHOLHDL 29.5 11/30/2018       Lab Results   Component Value Date    MICALBCREAT 15.9 06/25/2020     Lab Results   Component Value Date    TSH 0.720 03/06/2020       CrCl cannot be calculated (Patient's most recent lab result is older than the maximum 7 days allowed.).    Vit D, 25-Hydroxy   Date Value Ref Range Status   11/08/2014 51 30 - 96 ng/mL Final     Comment:     Vitamin D deficiency.........<10 ng/mL                              Vitamin D insufficiency......10-29 ng/mL       Vitamin D sufficiency........> or equal to 30 ng/mL  Vitamin D toxicity............>100 ng/mL          Ref. Range 6/25/2020 07:27   FRUCTOSAMINE Latest Ref  Range: SeeBelow umol /L 291       March 2019 CGMS results: Fasting glucose is acceptable for what patient will allow (feels symptomatic with glucose < 150). Two fasting glucoses didn't correlate with SMBG glucose on her log. Minimal prandial excursions are noted. No true hypoglycemia but rebound noted after borderline nocturnal episode. Eats 3 meals/day, some of which are lower carb, and snacks on either SF pudding or small piece of chocolate.   Average glucose: 138  % above target: 2%  % in target: 98%  % below target: 0%      PHYSICAL EXAMINATION  Constitutional: Appears well, no distress.  Neck: Supple, trachea midline; transverse scar to anterior neck from thyroidectomy.   Respiratory: CTA, even and unlabored.  Cardiovascular: RRR, no murmurs, no carotid bruits. No edema.  GI: active bowel sounds, no hernia  Skin: warm and dry; no lipohypertrophy, or acanthosis nigracans observed.  Neuro: oriented to person, place, and time. Previously, no loss of protective sensation via 10 gm monofilament. Vibratory exam mildly decreased, bilaterally.  Feet: appropriate footwear.      A1c goal < 7.5%, due to CAD, CHF, patient's desire for glucose to remain >150.         Assessment/Plan  1. Type 2 diabetes mellitus with diabetic neuropathy, with long-term current use of insulin  -- Improving. Fructosamine level equates to a 2 week average blood sugar near 150, which is lower than what the A1c suggests for a 3 month average blood sugar.   -- continueTresiba 58 units QAM  -- continue Humalog 18 units AC + SSI (180/25)  -- check BG 3x/day   -- Would not use Actos, due to CHF. GLP-1RA and DPP4-I contraindicated due to h/o pancreatitis. Cannot tolerate metformin.     -- Discussed diagnosis of DM, A1c goals, progression of disease, long term complications and tx options.  Advised patient to check BG before activities, such as driving or exercise.  -- Reviewed hypoglycemia management: treat with 1/2 glass of juice, 1/2 can regular  coke, or 4 glucose tablets. Monitor and repeat treatment every 15 minutes until BG is >70 Then have a snack, which includes a complex carbohydrate and protein.    -- takes statin and ARB     2. Coronary artery disease involving native coronary artery without angina pectoris, unspecified whether native or transplanted heart  -- stable, avoid hyopglycemia     3. CHF (NYHA class III, ACC/AHA stage C)  -- follows with cardiology   4. Postoperative hypothyroidism  -- stable  -- Continue Synthroid 137 mcg daily   5. Essential hypertension  -- controlled   -- continue ARB   6. Hyperlipidemia, unspecified hyperlipidemia type  -- uncontrolled with elevated triglycerides and LDL  -- increase Crestor to 20 mg nightly  -- recheck lipid panel with RTC   7. Obesity (BMI 30-39.9)  -- increases insulin resistance  Body mass index is 39.01 kg/m².   8. History of pancreatitis  -- avoid GLP-1RA and DPP4-i       FOLLOW UP  Follow up in about 3 months (around 10/2/2020).   Patient instructed to bring BG logs to each follow up.   Patient encouraged to call for any BG/medication issues, concerns, or questions.      Orders Placed This Encounter   Procedures    Hemoglobin A1C    Comprehensive metabolic panel    Lipid Panel

## 2020-07-30 ENCOUNTER — TELEPHONE (OUTPATIENT)
Dept: CARDIOLOGY | Facility: CLINIC | Age: 70
End: 2020-07-30

## 2020-07-30 DIAGNOSIS — I44.7 LBBB (LEFT BUNDLE BRANCH BLOCK): ICD-10-CM

## 2020-07-30 DIAGNOSIS — Z95.810 ICD (IMPLANTABLE CARDIOVERTER-DEFIBRILLATOR) IN PLACE: Primary | ICD-10-CM

## 2020-07-30 NOTE — TELEPHONE ENCOUNTER
Spoke with pt about ICD appt.  Pt wanted to know when her next appt is.  Pt had last in clinic appt 12/2020 so pt is not due until 12/2021.  Pt has HM and is working properly.  Pt thought her battery might need to be changed.  Pt getting close to [facility] but not there yet.  Told pt that if she reaches [facility] before her December appt we will call her to come in to clinic.  Pt verbalized understanding.

## 2020-07-31 ENCOUNTER — CLINICAL SUPPORT (OUTPATIENT)
Dept: CARDIOLOGY | Facility: CLINIC | Age: 70
End: 2020-07-31
Payer: MEDICARE

## 2020-07-31 ENCOUNTER — PATIENT OUTREACH (OUTPATIENT)
Dept: ADMINISTRATIVE | Facility: OTHER | Age: 70
End: 2020-07-31

## 2020-07-31 DIAGNOSIS — Z95.810 PRESENCE OF AUTOMATIC (IMPLANTABLE) CARDIAC DEFIBRILLATOR: ICD-10-CM

## 2020-07-31 PROCEDURE — 93297 CARDIAC DEVICE CHECK - REMOTE: ICD-10-PCS | Mod: ,,, | Performed by: INTERNAL MEDICINE

## 2020-07-31 PROCEDURE — 93297 REM INTERROG DEV EVAL ICPMS: CPT | Mod: ,,, | Performed by: INTERNAL MEDICINE

## 2020-08-03 ENCOUNTER — OFFICE VISIT (OUTPATIENT)
Dept: CARDIOLOGY | Facility: CLINIC | Age: 70
End: 2020-08-03
Payer: MEDICARE

## 2020-08-03 VITALS
WEIGHT: 219.56 LBS | HEIGHT: 62 IN | DIASTOLIC BLOOD PRESSURE: 61 MMHG | BODY MASS INDEX: 40.4 KG/M2 | SYSTOLIC BLOOD PRESSURE: 108 MMHG | HEART RATE: 68 BPM

## 2020-08-03 DIAGNOSIS — I50.9 CONGESTIVE HEART FAILURE, UNSPECIFIED HF CHRONICITY, UNSPECIFIED HEART FAILURE TYPE: Primary | ICD-10-CM

## 2020-08-03 DIAGNOSIS — I44.7 LBBB (LEFT BUNDLE BRANCH BLOCK): ICD-10-CM

## 2020-08-03 DIAGNOSIS — E66.9 OBESITY (BMI 30-39.9): ICD-10-CM

## 2020-08-03 DIAGNOSIS — E78.5 HYPERLIPIDEMIA, UNSPECIFIED HYPERLIPIDEMIA TYPE: ICD-10-CM

## 2020-08-03 DIAGNOSIS — E11.40 TYPE 2 DIABETES MELLITUS WITH DIABETIC NEUROPATHY, WITH LONG-TERM CURRENT USE OF INSULIN: ICD-10-CM

## 2020-08-03 DIAGNOSIS — I10 ESSENTIAL HYPERTENSION: ICD-10-CM

## 2020-08-03 DIAGNOSIS — Z95.810 ICD (IMPLANTABLE CARDIOVERTER-DEFIBRILLATOR) IN PLACE: ICD-10-CM

## 2020-08-03 DIAGNOSIS — Z79.4 TYPE 2 DIABETES MELLITUS WITH DIABETIC NEUROPATHY, WITH LONG-TERM CURRENT USE OF INSULIN: ICD-10-CM

## 2020-08-03 DIAGNOSIS — I25.10 CORONARY ARTERY DISEASE INVOLVING NATIVE CORONARY ARTERY WITHOUT ANGINA PECTORIS, UNSPECIFIED WHETHER NATIVE OR TRANSPLANTED HEART: ICD-10-CM

## 2020-08-03 PROCEDURE — 99999 PR PBB SHADOW E&M-EST. PATIENT-LVL IV: ICD-10-PCS | Mod: PBBFAC,,, | Performed by: INTERNAL MEDICINE

## 2020-08-03 PROCEDURE — 99214 PR OFFICE/OUTPT VISIT, EST, LEVL IV, 30-39 MIN: ICD-10-PCS | Mod: S$PBB,,, | Performed by: INTERNAL MEDICINE

## 2020-08-03 PROCEDURE — 99214 OFFICE O/P EST MOD 30 MIN: CPT | Mod: S$PBB,,, | Performed by: INTERNAL MEDICINE

## 2020-08-03 PROCEDURE — 99999 PR PBB SHADOW E&M-EST. PATIENT-LVL IV: CPT | Mod: PBBFAC,,, | Performed by: INTERNAL MEDICINE

## 2020-08-03 PROCEDURE — 99214 OFFICE O/P EST MOD 30 MIN: CPT | Mod: PBBFAC,PO | Performed by: INTERNAL MEDICINE

## 2020-08-03 NOTE — PROGRESS NOTES
Subjective:    Patient ID:  Mckenzie Mari is a 70 y.o. female who presents for follow-up of Hypertension f/u and Shortness of Breath      HPI  She comes with no major complaints, no chest pain, though shortness of breath has become more prominent  FC II    Review of Systems   Constitution: Negative for decreased appetite, malaise/fatigue, weight gain and weight loss.   Cardiovascular: Negative for chest pain, dyspnea on exertion, leg swelling, palpitations and syncope.   Respiratory: Negative for cough and shortness of breath.    Gastrointestinal: Negative.    Neurological: Negative for weakness.   All other systems reviewed and are negative.       Objective:      Physical Exam   Constitutional: She is oriented to person, place, and time. She appears well-developed and well-nourished.   HENT:   Head: Normocephalic.   Eyes: Pupils are equal, round, and reactive to light.   Neck: Normal range of motion. Neck supple. No JVD present. Carotid bruit is not present. No thyromegaly present.   Cardiovascular: Normal rate, regular rhythm, normal heart sounds, intact distal pulses and normal pulses. PMI is not displaced. Exam reveals no gallop.   No murmur heard.  Pulmonary/Chest: Effort normal and breath sounds normal.   Abdominal: Soft. Normal appearance. She exhibits no mass. There is no hepatosplenomegaly. There is no abdominal tenderness.   Musculoskeletal: Normal range of motion.         General: No edema.   Neurological: She is alert and oriented to person, place, and time. She has normal strength and normal reflexes. No sensory deficit.   Skin: Skin is warm and intact.   Psychiatric: She has a normal mood and affect.   Nursing note and vitals reviewed.        Assessment:       1. Congestive heart failure, unspecified HF chronicity, unspecified heart failure type    2. ICD (implantable cardioverter-defibrillator) in place    3. LBBB (left bundle branch block)    4. Essential hypertension    5. Hyperlipidemia,  unspecified hyperlipidemia type    6. Coronary artery disease involving native coronary artery without angina pectoris, unspecified whether native or transplanted heart    7. Obesity (BMI 30-39.9)    8. Type 2 diabetes mellitus with diabetic neuropathy, with long-term current use of insulin         Plan:   [facility], echo; call with results  Continue all cardiac medications  Regular exercise program  Weight loss  9 m f/u if tests ok

## 2020-08-04 ENCOUNTER — CLINICAL SUPPORT (OUTPATIENT)
Dept: CARDIOLOGY | Facility: CLINIC | Age: 70
End: 2020-08-04
Attending: INTERNAL MEDICINE
Payer: MEDICARE

## 2020-08-04 VITALS — BODY MASS INDEX: 40.3 KG/M2 | WEIGHT: 219 LBS | HEIGHT: 62 IN

## 2020-08-04 LAB
ASCENDING AORTA: 3.1 CM
AV INDEX (PROSTH): 0.6
AV MEAN GRADIENT: 6 MMHG
AV PEAK GRADIENT: 9 MMHG
AV VALVE AREA: 2.1 CM2
AV VELOCITY RATIO: 0.65
BSA FOR ECHO PROCEDURE: 2.08 M2
CV ECHO LV RWT: 0.28 CM
DOP CALC AO PEAK VEL: 1.53 M/S
DOP CALC AO VTI: 36.99 CM
DOP CALC LVOT AREA: 3.5 CM2
DOP CALC LVOT DIAMETER: 2.12 CM
DOP CALC LVOT PEAK VEL: 0.99 M/S
DOP CALC LVOT STROKE VOLUME: 77.69 CM3
DOP CALCLVOT PEAK VEL VTI: 22.02 CM
E WAVE DECELERATION TIME: 201.11 MSEC
E/A RATIO: 1.23
E/E' RATIO: 15.85 M/S
ECHO LV POSTERIOR WALL: 0.69 CM (ref 0.6–1.1)
FRACTIONAL SHORTENING: 39 % (ref 28–44)
INTERVENTRICULAR SEPTUM: 0.95 CM (ref 0.6–1.1)
IVRT: 119.89 MSEC
LA MAJOR: 4.51 CM
LA MINOR: 4.71 CM
LA WIDTH: 4.05 CM
LEFT ATRIUM SIZE: 3.73 CM
LEFT ATRIUM VOLUME INDEX: 29.8 ML/M2
LEFT ATRIUM VOLUME: 59.17 CM3
LEFT INTERNAL DIMENSION IN SYSTOLE: 3.03 CM (ref 2.1–4)
LEFT VENTRICLE DIASTOLIC VOLUME INDEX: 57.65 ML/M2
LEFT VENTRICLE DIASTOLIC VOLUME: 114.54 ML
LEFT VENTRICLE MASS INDEX: 69 G/M2
LEFT VENTRICLE SYSTOLIC VOLUME INDEX: 18 ML/M2
LEFT VENTRICLE SYSTOLIC VOLUME: 35.81 ML
LEFT VENTRICULAR INTERNAL DIMENSION IN DIASTOLE: 4.93 CM (ref 3.5–6)
LEFT VENTRICULAR MASS: 136.86 G
LV LATERAL E/E' RATIO: 14.71 M/S
LV SEPTAL E/E' RATIO: 17.17 M/S
MV PEAK A VEL: 0.84 M/S
MV PEAK E VEL: 1.03 M/S
MV STENOSIS PRESSURE HALF TIME: 58.32 MS
MV VALVE AREA P 1/2 METHOD: 3.77 CM2
PISA MRMAX VEL: 0.04 M/S
PISA TR MAX VEL: 2.6 M/S
PULM VEIN S/D RATIO: 1.25
PV PEAK D VEL: 0.4 M/S
PV PEAK S VEL: 0.5 M/S
RA MAJOR: 3.45 CM
RA PRESSURE: 8 MMHG
RA WIDTH: 3.31 CM
RIGHT VENTRICULAR END-DIASTOLIC DIMENSION: 4.18 CM
SINUS: 2.73 CM
STJ: 2.56 CM
TDI LATERAL: 0.07 M/S
TDI SEPTAL: 0.06 M/S
TDI: 0.07 M/S
TR MAX PG: 27 MMHG
TRICUSPID ANNULAR PLANE SYSTOLIC EXCURSION: 2.54 CM
TV REST PULMONARY ARTERY PRESSURE: 35 MMHG

## 2020-08-04 PROCEDURE — 99999 PR PBB SHADOW E&M-EST. PATIENT-LVL II: CPT | Mod: PBBFAC,,,

## 2020-08-04 PROCEDURE — 99999 PR PBB SHADOW E&M-EST. PATIENT-LVL II: ICD-10-PCS | Mod: PBBFAC,,,

## 2020-08-04 PROCEDURE — 99212 OFFICE O/P EST SF 10 MIN: CPT | Mod: PBBFAC,PO

## 2020-08-05 ENCOUNTER — TELEPHONE (OUTPATIENT)
Dept: CARDIOLOGY | Facility: CLINIC | Age: 70
End: 2020-08-05

## 2020-08-11 ENCOUNTER — PATIENT MESSAGE (OUTPATIENT)
Dept: FAMILY MEDICINE | Facility: CLINIC | Age: 70
End: 2020-08-11

## 2020-08-11 ENCOUNTER — HOSPITAL ENCOUNTER (OUTPATIENT)
Dept: RADIOLOGY | Facility: HOSPITAL | Age: 70
Discharge: HOME | End: 2020-08-11
Attending: INTERNAL MEDICINE
Payer: MEDICARE

## 2020-08-11 ENCOUNTER — CLINICAL SUPPORT (OUTPATIENT)
Dept: CARDIOLOGY | Facility: CLINIC | Age: 70
End: 2020-08-11
Attending: INTERNAL MEDICINE
Payer: MEDICARE

## 2020-08-11 VITALS — HEIGHT: 62 IN | BODY MASS INDEX: 40.3 KG/M2 | WEIGHT: 219 LBS

## 2020-08-11 DIAGNOSIS — I25.10 CORONARY ARTERY DISEASE INVOLVING NATIVE CORONARY ARTERY WITHOUT ANGINA PECTORIS, UNSPECIFIED WHETHER NATIVE OR TRANSPLANTED HEART: ICD-10-CM

## 2020-08-11 DIAGNOSIS — E66.9 OBESITY (BMI 30-39.9): ICD-10-CM

## 2020-08-11 DIAGNOSIS — Z95.810 ICD (IMPLANTABLE CARDIOVERTER-DEFIBRILLATOR) IN PLACE: Chronic | ICD-10-CM

## 2020-08-11 DIAGNOSIS — I10 ESSENTIAL HYPERTENSION: ICD-10-CM

## 2020-08-11 DIAGNOSIS — E11.40 TYPE 2 DIABETES MELLITUS WITH DIABETIC NEUROPATHY, WITH LONG-TERM CURRENT USE OF INSULIN: ICD-10-CM

## 2020-08-11 DIAGNOSIS — E78.5 HYPERLIPIDEMIA, UNSPECIFIED HYPERLIPIDEMIA TYPE: Chronic | ICD-10-CM

## 2020-08-11 DIAGNOSIS — I44.7 LBBB (LEFT BUNDLE BRANCH BLOCK): ICD-10-CM

## 2020-08-11 DIAGNOSIS — Z79.4 TYPE 2 DIABETES MELLITUS WITH DIABETIC NEUROPATHY, WITH LONG-TERM CURRENT USE OF INSULIN: ICD-10-CM

## 2020-08-11 DIAGNOSIS — I50.9 CONGESTIVE HEART FAILURE, UNSPECIFIED HF CHRONICITY, UNSPECIFIED HEART FAILURE TYPE: Chronic | ICD-10-CM

## 2020-08-11 LAB
CV STRESS BASE HR: 74 BPM
DIASTOLIC BLOOD PRESSURE: 70 MMHG
NUC REST EJECTION FRACTION: 55
OHS CV CPX 1 MINUTE RECOVERY HEART RATE: 78 BPM
OHS CV CPX 85 PERCENT MAX PREDICTED HEART RATE MALE: 123
OHS CV CPX MAX PREDICTED HEART RATE: 145
OHS CV CPX PATIENT IS FEMALE: 1
OHS CV CPX PATIENT IS MALE: 0
OHS CV CPX PEAK DIASTOLIC BLOOD PRESSURE: 58 MMHG
OHS CV CPX PEAK HEAR RATE: 111 BPM
OHS CV CPX PEAK RATE PRESSURE PRODUCT: NORMAL
OHS CV CPX PEAK SYSTOLIC BLOOD PRESSURE: 119 MMHG
OHS CV CPX PERCENT MAX PREDICTED HEART RATE ACHIEVED: 76
OHS CV CPX RATE PRESSURE PRODUCT PRESENTING: 7918
SYSTOLIC BLOOD PRESSURE: 107 MMHG

## 2020-08-11 PROCEDURE — 78452 STRESS TEST WITH MYOCARDIAL PERFUSION (CUPID ONLY): ICD-10-PCS | Mod: 26,,, | Performed by: INTERNAL MEDICINE

## 2020-08-11 PROCEDURE — 93018 CV STRESS TEST I&R ONLY: CPT | Mod: S$PBB,,, | Performed by: INTERNAL MEDICINE

## 2020-08-11 PROCEDURE — 93017 CV STRESS TEST TRACING ONLY: CPT | Mod: PBBFAC,PO | Performed by: INTERNAL MEDICINE

## 2020-08-11 PROCEDURE — 93016 CV STRESS TEST SUPVJ ONLY: CPT | Mod: S$PBB,,, | Performed by: INTERNAL MEDICINE

## 2020-08-11 PROCEDURE — 93016 STRESS TEST WITH MYOCARDIAL PERFUSION (CUPID ONLY): ICD-10-PCS | Mod: S$PBB,,, | Performed by: INTERNAL MEDICINE

## 2020-08-11 PROCEDURE — 99999 PR PBB SHADOW E&M-EST. PATIENT-LVL I: ICD-10-PCS | Mod: PBBFAC,,,

## 2020-08-11 PROCEDURE — 78452 HT MUSCLE IMAGE SPECT MULT: CPT | Mod: 26,,, | Performed by: INTERNAL MEDICINE

## 2020-08-11 PROCEDURE — 93018 STRESS TEST WITH MYOCARDIAL PERFUSION (CUPID ONLY): ICD-10-PCS | Mod: S$PBB,,, | Performed by: INTERNAL MEDICINE

## 2020-08-11 PROCEDURE — 99211 OFF/OP EST MAY X REQ PHY/QHP: CPT | Mod: PBBFAC,25,PO

## 2020-08-11 PROCEDURE — 99999 PR PBB SHADOW E&M-EST. PATIENT-LVL I: CPT | Mod: PBBFAC,,,

## 2020-08-14 ENCOUNTER — OFFICE VISIT (OUTPATIENT)
Dept: FAMILY MEDICINE | Facility: CLINIC | Age: 70
End: 2020-08-14
Payer: MEDICARE

## 2020-08-14 VITALS
WEIGHT: 218.06 LBS | BODY MASS INDEX: 40.13 KG/M2 | HEIGHT: 62 IN | TEMPERATURE: 98 F | DIASTOLIC BLOOD PRESSURE: 66 MMHG | HEART RATE: 70 BPM | RESPIRATION RATE: 18 BRPM | SYSTOLIC BLOOD PRESSURE: 116 MMHG

## 2020-08-14 DIAGNOSIS — J01.91 ACUTE RECURRENT SINUSITIS, UNSPECIFIED LOCATION: ICD-10-CM

## 2020-08-14 DIAGNOSIS — H69.91 EUSTACHIAN TUBE DYSFUNCTION, RIGHT: ICD-10-CM

## 2020-08-14 DIAGNOSIS — N30.00 ACUTE CYSTITIS WITHOUT HEMATURIA: ICD-10-CM

## 2020-08-14 DIAGNOSIS — R39.9 LOWER URINARY TRACT SYMPTOMS (LUTS): Primary | ICD-10-CM

## 2020-08-14 PROCEDURE — 99215 OFFICE O/P EST HI 40 MIN: CPT | Mod: PBBFAC,PN | Performed by: FAMILY MEDICINE

## 2020-08-14 PROCEDURE — 99999 PR PBB SHADOW E&M-EST. PATIENT-LVL V: CPT | Mod: PBBFAC,,, | Performed by: FAMILY MEDICINE

## 2020-08-14 PROCEDURE — 99999 PR PBB SHADOW E&M-EST. PATIENT-LVL V: ICD-10-PCS | Mod: PBBFAC,,, | Performed by: FAMILY MEDICINE

## 2020-08-14 PROCEDURE — 99214 PR OFFICE/OUTPT VISIT, EST, LEVL IV, 30-39 MIN: ICD-10-PCS | Mod: S$PBB,,, | Performed by: FAMILY MEDICINE

## 2020-08-14 PROCEDURE — 99214 OFFICE O/P EST MOD 30 MIN: CPT | Mod: S$PBB,,, | Performed by: FAMILY MEDICINE

## 2020-08-14 RX ORDER — FLUTICASONE PROPIONATE 50 MCG
2 SPRAY, SUSPENSION (ML) NASAL DAILY
Qty: 16 G | Refills: 3
Start: 2020-08-14 | End: 2022-05-18

## 2020-08-14 RX ORDER — NITROFURANTOIN 25; 75 MG/1; MG/1
100 CAPSULE ORAL 2 TIMES DAILY
Qty: 10 CAPSULE | Refills: 0 | Status: SHIPPED | OUTPATIENT
Start: 2020-08-14 | End: 2020-10-02

## 2020-08-14 RX ORDER — LORATADINE 10 MG/1
10 TABLET ORAL DAILY
Qty: 30 TABLET | Refills: 11 | Status: SHIPPED | OUTPATIENT
Start: 2020-08-14 | End: 2020-10-02

## 2020-08-14 NOTE — PROGRESS NOTES
THIS DOCUMENT WAS MADE IN PART WITH VOICE RECOGNITION SOFTWARE.  OCCASIONALLY THIS SOFTWARE WILL MISINTERPRET WORDS OR PHRASES.    Assessment and Plan:    1. Lower urinary tract symptoms (LUTS)  - POCT urine dipstick without microscope    2. Acute recurrent sinusitis, unspecified location  - fluticasone propionate (FLONASE) 50 mcg/actuation nasal spray; 2 sprays (100 mcg total) by Each Nostril route once daily.  Dispense: 16 g; Refill: 3    3. Eustachian tube dysfunction, right  - loratadine (CLARITIN) 10 mg tablet; Take 1 tablet (10 mg total) by mouth once daily.  Dispense: 30 tablet; Refill: 11    4. Acute cystitis without hematuria  - nitrofurantoin, macrocrystal-monohydrate, (MACROBID) 100 MG capsule; Take 1 capsule (100 mg total) by mouth 2 (two) times daily.  Dispense: 10 capsule; Refill: 0        ______________________________________________________________________  Subjective:    Chief Complaint:  Chief Complaint   Patient presents with    Tinnitus     Patient reports right ear ringing and soreness x 2 weeks        HPI:  Mckenzie is a 70 y.o. year old     Right Ear symptoms  Pain / ringing  Duration 2 weeks  Associated with nasal congestion, sneezing, coughing  Physical exam suggest allergic rhinitis    Lower urinary tract symptoms  Frequency, urgency  History of recurrent urinary tract infections  Denies nausea, vomiting, diarrhea, fever  Urinalysis positive findings    Past Medical History:  Past Medical History:   Diagnosis Date    Allergy     Arthritis     Breast cancer 2005    s/p lumpectomy, chemo and now cancer free over 5 years    Bundle branch block     Cardiomyopathy     EF 35 - 40%    CHF (congestive heart failure)     FC II    Chronic sinusitis     CVID (common variable immunodeficiency)     Diabetes mellitus     GERD (gastroesophageal reflux disease)     Headache(784.0)     HTN (hypertension)     Hyperlipidemia     Hypothyroid 7/25/2012    Otitis media     Presence of  combination internal cardiac defibrillator (ICD) and pacemaker     Thyroid cancer     Thyroid cancer 7/25/2012    Thyroid disease     hypothyroid after papillary thyroid cancer with thyroid resection       Past Surgical History:  Past Surgical History:   Procedure Laterality Date    BREAST LUMPECTOMY      left    CARDIAC PACEMAKER PLACEMENT      CATARACT EXTRACTION W/  INTRAOCULAR LENS IMPLANT      left eye    CHOLECYSTECTOMY      COLONOSCOPY N/A 5/11/2016    Procedure: COLONOSCOPY;  Surgeon: Alfonso Serrano Jr., MD;  Location[facility];  Service: Endoscopy;  Laterality: N/A;    COLONOSCOPY W/ POLYPECTOMY  10/05/2009    [facility]   One 2 mm polyp in the cecum.  TUBULAR ADENOMA.  Tortuous colon.    ESOPHAGOGASTRODUODENOSCOPY  09/12/2006    [facility]   Dysphagia.  NERD.  Patulous LES.  Atrophic mucosa.  Benign fundal polyps.  Dilated, 42 Fr.    HYSTERECTOMY      JOINT REPLACEMENT Bilateral     pacemaker defibrillator      THYROID SURGERY  x2    TONSILLECTOMY         Family History:  Family History   Problem Relation Age of Onset    Allergic rhinitis Mother     Cancer Mother         bladder    Heart disease Mother     Allergic rhinitis Father     Heart disease Father     Allergic rhinitis Brother     Heart disease Brother     Cancer Brother         lung    Cancer Maternal Aunt         breast     Cancer Paternal Aunt          breast    Heart disease Brother     Diabetes Paternal Aunt     Cancer Paternal Aunt         lung    Allergic rhinitis Sister     Angioedema Neg Hx     Asthma Neg Hx     Atopy Neg Hx     Eczema Neg Hx     Immunodeficiency Neg Hx     Urticaria Neg Hx        Social History:  Social History     Socioeconomic History    Marital status:      Spouse name: Not on file    Number of children: Not on file    Years of education: Not on file    Highest education level: Not on file   Occupational History    Not on file   Social Needs    Financial resource strain: Not  hard at all    Food insecurity     Worry: Never true     Inability: Never true    Transportation needs     Medical: No     Non-medical: No   Tobacco Use    Smoking status: Never Smoker    Smokeless tobacco: Never Used   Substance and Sexual Activity    Alcohol use: No     Frequency: Never     Drinks per session: Patient refused     Binge frequency: Never    Drug use: No    Sexual activity: Not on file   Lifestyle    Physical activity     Days per week: 2 days     Minutes per session: 30 min    Stress: To some extent   Relationships    Social connections     Talks on phone: Patient refused     Gets together: Patient refused     Attends [employer] service: Not on file     Active member of club or organization: No     Attends meetings of clubs or organizations: Patient refused     Relationship status:    Other Topics Concern    Not on file   Social History Narrative    Not on file       Medications:  Current Outpatient Medications on File Prior to Visit   Medication Sig Dispense Refill    azithromycin (Z-DIDIER) 250 MG tablet Take this antibiotic for 10 days total according to the following instructions: Take 2 po on Day #1, then take one po daily on days 2-10 (Patient not taking: Reported on 8/3/2020) 11 tablet 0    blood sugar diagnostic Strp 1 strip by Misc.(Non-Drug; Combo Route) route 4 (four) times daily. For Accucheck Ginger meter. Dx code E11.40 400 each 3    blood-glucose meter kit Accucheck Ginger meter. Test blood glucose 4x/day. Dx code E11.40 1 each 0    CALCIUM CARBONATE/VITAMIN D3 (VITAMIN D-3 ORAL) Take by mouth.      carvediloL (COREG) 25 MG tablet TAKE 1 TABLET BY MOUTH TWICE DAILY WITH FOOD 180 tablet 1    esomeprazole (NEXIUM) 40 MG capsule TAKE 1 CAPSULE BY MOUTH EVERY DAY 90 capsule 1    estradiol (ESTRING) 2 mg vaginal ring Place 2 mg vaginally every 3 (three) months. follow package directions       fluticasone propionate (FLONASE) 50 mcg/actuation nasal spray 2 sprays (100  "mcg total) by Each Nostril route once daily. 16 g 3    furosemide (LASIX) 40 MG tablet Take 1 tablet (40 mg total) by mouth once daily. (Patient taking differently: Take 40 mg by mouth as needed. ) 90 tablet 1    insulin degludec (TRESIBA FLEXTOUCH U-200) 200 unit/mL (3 mL) InPn Inject 58 Units into the skin once daily. 27 mL 3    insulin lispro (HUMALOG KWIKPEN INSULIN) 100 unit/mL pen Inject 18 Units into the skin 3 (three) times daily before meals. Plus correction scale, mtdd 84u 75 mL 3    lactobacillus rhamnosus GG (CULTURELLE) 10 billion cell capsule Take 1 capsule by mouth once daily.      lancets (ACCU-CHEK SOFTCLIX LANCETS) Misc USE FOUR TIMES DAILY 400 each 3    pen needle, diabetic (BD ULTRA-FINE SHANTELLE PEN NEEDLE) 32 gauge x 5/32" Ndle USE FOUR TIMES DAILY WITH INSULIN INJECTIONS 400 each 3    potassium chloride SA (K-DUR,KLOR-CON) 20 MEQ tablet TAKE 1 TABLET(20 MEQ) BY MOUTH EVERY DAY 90 tablet 0    rosuvastatin (CRESTOR) 20 MG tablet Take 1 tablet (20 mg total) by mouth every evening. 90 tablet 3    spironolactone (ALDACTONE) 25 MG tablet TAKE 1 TABLET BY MOUTH EVERY DAY 90 tablet 1    SYNTHROID 137 mcg Tab tablet TAKE 1 TABLET BY MOUTH EVERY MORNING BEFORE BREAKFAST 90 tablet 1    telmisartan (MICARDIS) 20 MG Tab TAKE 1 TABLET(20 MG) BY MOUTH EVERY DAY 90 tablet 3    [DISCONTINUED] multivit-mineral-iron-lutein (CENTRUM SILVER ULTRA WOMEN'S) Tab Take by mouth.       No current facility-administered medications on file prior to visit.        Allergies:  Cayenne pepper, Lantus [insulin glargine], Adhesive, Iodine and iodide containing products, Other, Adhesive tape-silicones, Amoxil [amoxicillin], Aspirin, Avelox [moxifloxacin], Betadine [povidone-iodine], Cephalexin, Doxycycline, Erythromycin, Hydrocodone, Lactose intolerance [lactase], Latex, Levaquin [levofloxacin], Morphine, Tramadol, Penicillins, and Sulfa (sulfonamide antibiotics)    Immunizations:  Immunization History   Administered " "Date(s) Administered    Hepatitis A 10/02/2001    Hepatitis A, Pediatric/Adolescent, 2 Dose 10/02/2001    Hepatitis B 10/02/2001    Hepatitis B, Pediatric/Adolescent 10/02/2001    Influenza 10/06/2007, 10/18/2008, 09/10/2009, 09/30/2010, 09/16/2011, 09/19/2012    Influenza - High Dose - PF (65 years and older) 09/25/2018, 12/28/2018    Influenza - Quadrivalent 10/10/2014, 10/08/2015    Influenza - Trivalent (ADULT) 10/06/2007, 10/18/2008, 09/10/2009, 09/30/2010, 09/16/2011, 09/19/2012    Influenza A (H1N1) 2009 Monovalent - IM 11/10/2009    Influenza Split 09/15/2011, 09/19/2012    Pneumococcal Conjugate - 13 Valent 03/29/2016, 12/28/2018    Pneumococcal Polysaccharide - 23 Valent 09/12/2011       Review of Systems:  Review of Systems   HENT: Positive for congestion.    Genitourinary: Positive for frequency and urgency.   Neurological: Positive for dizziness.   All other systems reviewed and are negative.      Objective:    Vitals:  Vitals:    08/14/20 1338   Resp: 18   Temp: 98.1 °F (36.7 °C)   TempSrc: Temporal   Weight: 98.9 kg (218 lb 0.6 oz)   Height: 5' 2" (1.575 m)   PainSc:   4   PainLoc: Ear       Physical Exam  Constitutional:       General: She is not in acute distress.  HENT:      Head: Normocephalic and atraumatic.      Ears:        Nose: Mucosal edema and rhinorrhea present.   Eyes:      Pupils: Pupils are equal, round, and reactive to light.   Neck:      Musculoskeletal: Neck supple.   Cardiovascular:      Rate and Rhythm: Normal rate and regular rhythm.      Heart sounds: No murmur. No friction rub.   Pulmonary:      Effort: Pulmonary effort is normal.      Breath sounds: Normal breath sounds.   Abdominal:      General: Bowel sounds are normal. There is no distension.      Palpations: Abdomen is soft.      Tenderness: There is abdominal tenderness in the suprapubic area.       Skin:     General: Skin is warm and dry.      Findings: No rash.   Psychiatric:         Behavior: Behavior " normal.         Data:  No previous labs, imaging, or notes available.        Robbie Koo MD  [facility]

## 2020-09-25 ENCOUNTER — LAB VISIT (OUTPATIENT)
Dept: LAB | Facility: HOSPITAL | Age: 70
End: 2020-09-25
Attending: NURSE PRACTITIONER
Payer: MEDICARE

## 2020-09-25 DIAGNOSIS — Z79.4 TYPE 2 DIABETES MELLITUS WITH DIABETIC NEUROPATHY, WITH LONG-TERM CURRENT USE OF INSULIN: ICD-10-CM

## 2020-09-25 DIAGNOSIS — E11.40 TYPE 2 DIABETES MELLITUS WITH DIABETIC NEUROPATHY, WITH LONG-TERM CURRENT USE OF INSULIN: ICD-10-CM

## 2020-09-25 PROCEDURE — 80053 COMPREHEN METABOLIC PANEL: CPT

## 2020-09-25 PROCEDURE — 83036 HEMOGLOBIN GLYCOSYLATED A1C: CPT

## 2020-09-25 PROCEDURE — 80061 LIPID PANEL: CPT

## 2020-09-25 PROCEDURE — 36415 COLL VENOUS BLD VENIPUNCTURE: CPT | Mod: PN

## 2020-09-26 LAB
ALBUMIN SERPL BCP-MCNC: 3.6 G/DL (ref 3.5–5.2)
ALP SERPL-CCNC: 83 U/L (ref 55–135)
ALT SERPL W/O P-5'-P-CCNC: 18 U/L (ref 10–44)
ANION GAP SERPL CALC-SCNC: 11 MMOL/L (ref 8–16)
AST SERPL-CCNC: 19 U/L (ref 10–40)
BILIRUB SERPL-MCNC: 1.4 MG/DL (ref 0.1–1)
BUN SERPL-MCNC: 17 MG/DL (ref 8–23)
CALCIUM SERPL-MCNC: 9.4 MG/DL (ref 8.7–10.5)
CHLORIDE SERPL-SCNC: 105 MMOL/L (ref 95–110)
CHOLEST SERPL-MCNC: 125 MG/DL (ref 120–199)
CHOLEST/HDLC SERPL: 2.8 {RATIO} (ref 2–5)
CO2 SERPL-SCNC: 24 MMOL/L (ref 23–29)
CREAT SERPL-MCNC: 1 MG/DL (ref 0.5–1.4)
EST. GFR  (AFRICAN AMERICAN): >60 ML/MIN/1.73 M^2
EST. GFR  (NON AFRICAN AMERICAN): 57.6 ML/MIN/1.73 M^2
ESTIMATED AVG GLUCOSE: 174 MG/DL (ref 68–131)
GLUCOSE SERPL-MCNC: 153 MG/DL (ref 70–110)
HBA1C MFR BLD HPLC: 7.7 % (ref 4–5.6)
HDLC SERPL-MCNC: 45 MG/DL (ref 40–75)
HDLC SERPL: 36 % (ref 20–50)
LDLC SERPL CALC-MCNC: 53.8 MG/DL (ref 63–159)
NONHDLC SERPL-MCNC: 80 MG/DL
POTASSIUM SERPL-SCNC: 4.4 MMOL/L (ref 3.5–5.1)
PROT SERPL-MCNC: 6.4 G/DL (ref 6–8.4)
SODIUM SERPL-SCNC: 140 MMOL/L (ref 136–145)
TRIGL SERPL-MCNC: 131 MG/DL (ref 30–150)

## 2020-09-28 ENCOUNTER — PATIENT MESSAGE (OUTPATIENT)
Dept: ENDOCRINOLOGY | Facility: CLINIC | Age: 70
End: 2020-09-28

## 2020-09-29 ENCOUNTER — PATIENT MESSAGE (OUTPATIENT)
Dept: OTHER | Facility: OTHER | Age: 70
End: 2020-09-29

## 2020-10-01 ENCOUNTER — PATIENT OUTREACH (OUTPATIENT)
Dept: ADMINISTRATIVE | Facility: OTHER | Age: 70
End: 2020-10-01

## 2020-10-01 NOTE — PROGRESS NOTES
Health Maintenance Due   Topic Date Due    TETANUS VACCINE  07/27/1968    Shingles Vaccine (1 of 2) 07/27/2001    Pneumococcal Vaccine (65+ High/Highest Risk) (2 of 2 - PPSV23) 02/22/2019    Influenza Vaccine (1) 08/01/2020     Updates were requested from care everywhere.  Chart was reviewed for overdue Proactive [facility] Encounters (ELIZABETH) topics (CRS, Breast Cancer Screening, Eye exam)  Health Maintenance has been updated.  LINKS immunization registry triggered.  Immunizations were reconciled.

## 2020-10-02 ENCOUNTER — IMMUNIZATION (OUTPATIENT)
Dept: FAMILY MEDICINE | Facility: CLINIC | Age: 70
End: 2020-10-02
Payer: MEDICARE

## 2020-10-02 ENCOUNTER — OFFICE VISIT (OUTPATIENT)
Dept: ENDOCRINOLOGY | Facility: CLINIC | Age: 70
End: 2020-10-02
Payer: MEDICARE

## 2020-10-02 VITALS
SYSTOLIC BLOOD PRESSURE: 102 MMHG | WEIGHT: 221.56 LBS | HEIGHT: 62 IN | DIASTOLIC BLOOD PRESSURE: 70 MMHG | HEART RATE: 76 BPM | BODY MASS INDEX: 40.77 KG/M2

## 2020-10-02 DIAGNOSIS — E78.5 HYPERLIPIDEMIA, UNSPECIFIED HYPERLIPIDEMIA TYPE: ICD-10-CM

## 2020-10-02 DIAGNOSIS — I10 ESSENTIAL HYPERTENSION: ICD-10-CM

## 2020-10-02 DIAGNOSIS — Z87.19 HISTORY OF PANCREATITIS: ICD-10-CM

## 2020-10-02 DIAGNOSIS — I50.9 CHF (NYHA CLASS III, ACC/AHA STAGE C): ICD-10-CM

## 2020-10-02 DIAGNOSIS — E89.0 POSTOPERATIVE HYPOTHYROIDISM: ICD-10-CM

## 2020-10-02 DIAGNOSIS — I25.10 CORONARY ARTERY DISEASE INVOLVING NATIVE CORONARY ARTERY WITHOUT ANGINA PECTORIS, UNSPECIFIED WHETHER NATIVE OR TRANSPLANTED HEART: ICD-10-CM

## 2020-10-02 DIAGNOSIS — Z79.4 TYPE 2 DIABETES MELLITUS WITH DIABETIC NEUROPATHY, WITH LONG-TERM CURRENT USE OF INSULIN: Primary | ICD-10-CM

## 2020-10-02 DIAGNOSIS — E11.40 TYPE 2 DIABETES MELLITUS WITH DIABETIC NEUROPATHY, WITH LONG-TERM CURRENT USE OF INSULIN: Primary | ICD-10-CM

## 2020-10-02 DIAGNOSIS — E66.9 OBESITY (BMI 30-39.9): ICD-10-CM

## 2020-10-02 PROCEDURE — 99214 OFFICE O/P EST MOD 30 MIN: CPT | Mod: S$PBB,,, | Performed by: NURSE PRACTITIONER

## 2020-10-02 PROCEDURE — 99999 PR PBB SHADOW E&M-EST. PATIENT-LVL IV: CPT | Mod: PBBFAC,,, | Performed by: NURSE PRACTITIONER

## 2020-10-02 PROCEDURE — 99999 PR PBB SHADOW E&M-EST. PATIENT-LVL II: CPT | Mod: PBBFAC,,,

## 2020-10-02 PROCEDURE — 99212 OFFICE O/P EST SF 10 MIN: CPT | Mod: PBBFAC,27,PN,25

## 2020-10-02 PROCEDURE — 99999 PR PBB SHADOW E&M-EST. PATIENT-LVL IV: ICD-10-PCS | Mod: PBBFAC,,, | Performed by: NURSE PRACTITIONER

## 2020-10-02 PROCEDURE — 99214 OFFICE O/P EST MOD 30 MIN: CPT | Mod: PBBFAC,PN,25 | Performed by: NURSE PRACTITIONER

## 2020-10-02 PROCEDURE — 99999 PR PBB SHADOW E&M-EST. PATIENT-LVL II: ICD-10-PCS | Mod: PBBFAC,,,

## 2020-10-02 PROCEDURE — 90694 VACC AIIV4 NO PRSRV 0.5ML IM: CPT | Mod: PBBFAC,PN

## 2020-10-02 PROCEDURE — G0008 ADMIN INFLUENZA VIRUS VAC: HCPCS | Mod: PBBFAC,PN

## 2020-10-02 PROCEDURE — 99214 PR OFFICE/OUTPT VISIT, EST, LEVL IV, 30-39 MIN: ICD-10-PCS | Mod: S$PBB,,, | Performed by: NURSE PRACTITIONER

## 2020-10-02 NOTE — PROGRESS NOTES
CC: Ms. Mckenzie Mari arrives today for management of Type 2 DM and review of chronic medical conditions.     HPI: Ms. Mckenzie aMri was diagnosed with Type 2 DM in 2004. She was diagnosed based on lab work. Initial treatment consisted of metformin and glimepiride. Insulin added in 8/2016 - began Toujeo. She states that she felt that this caused nausea, abd pain, chest pain. Lantus caused throat swelling, left arm pain. She was then changed to Novolog 70/30 but this was only used for a short period because her insurance didn't cover.  Then changed to Humalog 50-50 in 12/2016. In 2017, she began MDI with Tresiba and Humalog. + FH of DM in maternal aunt and cousin. Denies hospitalizations due to DM. Previously seen in endocrine by Richard Gupta, and MAGALI Eng, ARIELLE. She has a history of thyroid cancer/post-surgical hypothyroidism.   Of note, she has a h/o pancreatitis.   She follows annually with cardiology for CHF, CAD.    Historically, insulin management has been challenging because patient believes insulin causes hyperglycemia.    Last seen by me in July.     BG readings are checked 3x/day. Most glucoses < 200.           Hypoglycemia: No, however, feels symptoms of hypoglycemia with glucoses < 140. Patient expresses that she doesn't want glucoses running less than 150.     Missing Insulin/PO medication doses: No  Timing prandial insulin 5-15 minutes before meals: yes    Exercise: Not recently.    Dietary Habits: Eats 3 meals/day. Occasional snacking. Avoids sugary drinks.    Last DM education: 1/2019       CURRENT DIABETIC MEDS: Tresiba 58 units QAM, Humalog 18 units AC + correction scale, target 180, ISF 25  Vial or pen: pen  Glucometer type: One Touch Verio     Previous DM treatments:   Invokana - nausea  Glimepiride - stopped when prandial insulin added  Touejo -  Nausea, abd pain, chest pain  Lantus - throat swelling, left arm pain  Novolog 70/30 - not covered by insurance  Metformin - headaches    Last  "Eye Exam: 2/26/2020 - No  Report on file. Cannot recall provider.   Last Podiatry Exam: no    REVIEW OF SYSTEMS  Constitutional: no c/o fatigue, weakness, or weight loss.   Eyes: no c/o visual disturbances.   Cardiac: no palpitations, chest pain.   Respiratory: no cough. C/o dyspnea that is chronic.   GI: no c/o abdominal pain, nausea. + H/o pancreatitis.   Skin: no lesions or rashes.   Neuro: + numbness, tingling in feet that wax and wane.  Endocrine: denies polyphagia, polydipsia, polyuria      Personally reviewed Past Medical, Surgical, Social History.    Vital Signs  /70   Pulse 76   Ht 5' 2" (1.575 m)   Wt 100.5 kg (221 lb 9 oz)   BMI 40.52 kg/m²     Personally reviewed the below labs:    Hemoglobin A1C   Date Value Ref Range Status   09/25/2020 7.7 (H) 4.0 - 5.6 % Final     Comment:     ADA Screening Guidelines:  5.7-6.4%  Consistent with prediabetes  >or=6.5%  Consistent with diabetes  High levels of fetal hemoglobin interfere with the HbA1C  assay. Heterozygous hemoglobin variants (HbS, HgC, etc)do  not significantly interfere with this assay.   However, presence of multiple variants may affect accuracy.     06/25/2020 7.7 (H) 4.0 - 5.6 % Final     Comment:     ADA Screening Guidelines:  5.7-6.4%  Consistent with prediabetes  >or=6.5%  Consistent with diabetes  High levels of fetal hemoglobin interfere with the HbA1C  assay. Heterozygous hemoglobin variants (HbS, HgC, etc)do  not significantly interfere with this assay.   However, presence of multiple variants may affect accuracy.     03/06/2020 8.6 (H) 4.0 - 5.6 % Final     Comment:     ADA Screening Guidelines:  5.7-6.4%  Consistent with prediabetes  >or=6.5%  Consistent with diabetes  High levels of fetal hemoglobin interfere with the HbA1C  assay. Heterozygous hemoglobin variants (HbS, HgC, etc)do  not significantly interfere with this assay.   However, presence of multiple variants may affect accuracy.         Chemistry        Component " Value Date/Time     09/25/2020 0713    K 4.4 09/25/2020 0713     09/25/2020 0713    CO2 24 09/25/2020 0713    BUN 17 09/25/2020 0713    CREATININE 1.0 09/25/2020 0713     (H) 09/25/2020 0713        Component Value Date/Time    CALCIUM 9.4 09/25/2020 0713    ALKPHOS 83 09/25/2020 0713    AST 19 09/25/2020 0713    ALT 18 09/25/2020 0713    BILITOT 1.4 (H) 09/25/2020 0713          Lab Results   Component Value Date    CHOL 125 09/25/2020    CHOL 253 (H) 06/25/2020    CHOL 201 (H) 06/28/2019     Lab Results   Component Value Date    HDL 45 09/25/2020    HDL 48 06/25/2020    HDL 47 06/28/2019     Lab Results   Component Value Date    LDLCALC 53.8 (L) 09/25/2020    LDLCALC 155.8 06/25/2020    LDLCALC 119.6 06/28/2019     Lab Results   Component Value Date    TRIG 131 09/25/2020    TRIG 246 (H) 06/25/2020    TRIG 172 (H) 06/28/2019     Lab Results   Component Value Date    CHOLHDL 36.0 09/25/2020    CHOLHDL 19.0 (L) 06/25/2020    CHOLHDL 23.4 06/28/2019       Lab Results   Component Value Date    MICALBCREAT 15.9 06/25/2020     Lab Results   Component Value Date    TSH 0.720 03/06/2020       CrCl cannot be calculated (Patient's most recent lab result is older than the maximum 7 days allowed.).    Vit D, 25-Hydroxy   Date Value Ref Range Status   11/08/2014 51 30 - 96 ng/mL Final     Comment:     Vitamin D deficiency.........<10 ng/mL                              Vitamin D insufficiency......10-29 ng/mL       Vitamin D sufficiency........> or equal to 30 ng/mL  Vitamin D toxicity............>100 ng/mL          Ref. Range 6/25/2020 07:27   FRUCTOSAMINE Latest Ref Range: SeeBelow umol /L 291       March 2019 CGMS results: Fasting glucose is acceptable for what patient will allow (feels symptomatic with glucose < 150). Two fasting glucoses didn't correlate with SMBG glucose on her log. Minimal prandial excursions are noted. No true hypoglycemia but rebound noted after borderline nocturnal episode. Eats 3  meals/day, some of which are lower carb, and snacks on either SF pudding or small piece of chocolate.   Average glucose: 138  % above target: 2%  % in target: 98%  % below target: 0%      PHYSICAL EXAMINATION  Constitutional: Appears well, no distress.  Neck: Supple, trachea midline; transverse scar to anterior neck from thyroidectomy.   Respiratory: CTA, even and unlabored.  Cardiovascular: RRR, no murmurs, no carotid bruits. No edema.  GI: active bowel sounds, no hernia  Skin: warm and dry; no lipohypertrophy, or acanthosis nigracans observed.  Neuro: DTR 1+ BUE/1+BLE. Previously, no loss of protective sensation via 10 gm monofilament. Vibratory exam mildly decreased, bilaterally.  Feet: appropriate footwear.      A1c goal < 7.5%, due to CAD, CHF, patient's desire for glucose to remain >150.         Assessment/Plan  1. Type 2 diabetes mellitus with diabetic neuropathy, with long-term current use of insulin  -- Stable. Most glucoses on logs run mid 100s. Due to patient's preference to keep glucoses ~ 150, we will not make dose changes today.  -- continue Tresiba 58 units QAM  -- continue Humalog 18 units AC + SSI (180/25)  -- check BG 3x/day   -- Would not use Actos, due to CHF. GLP-1RA and DPP4-I contraindicated due to h/o pancreatitis. Cannot tolerate metformin.   -- flu shot today    -- Discussed diagnosis of DM, A1c goals, progression of disease, long term complications and tx options.  Advised patient to check BG before activities, such as driving or exercise.  -- Reviewed hypoglycemia management: treat with 1/2 glass of juice, 1/2 can regular coke, or 4 glucose tablets. Monitor and repeat treatment every 15 minutes until BG is >70 Then have a snack, which includes a complex carbohydrate and protein.    -- takes statin and ARB     2. Coronary artery disease involving native coronary artery without angina pectoris, unspecified whether native or transplanted heart  -- stable, avoid hyopglycemia     3. CHF (NYHA  class III, ACC/AHA stage C)  -- stable  -- follows with cardiology   4. Postoperative hypothyroidism  -- stable  -- Continue Synthroid 137 mcg daily   5. Essential hypertension  -- controlled   -- continue ARB   6. Hyperlipidemia, unspecified hyperlipidemia type  -- much improved  -- continue Crestor 20 mg   7. Obesity (BMI 30-39.9)  -- increases insulin resistance   8. History of pancreatitis  -- avoid GLP-1RA and DPP4-i       FOLLOW UP  Follow up in about 4 months (around 2/2/2021).   Patient instructed to bring BG logs to each follow up.   Patient encouraged to call for any BG/medication issues, concerns, or questions.      Orders Placed This Encounter   Procedures    Hemoglobin A1C    Basic metabolic panel    TSH

## 2020-10-09 ENCOUNTER — LAB VISIT (OUTPATIENT)
Dept: LAB | Facility: HOSPITAL | Age: 70
End: 2020-10-09
Attending: NURSE PRACTITIONER
Payer: MEDICARE

## 2020-10-09 DIAGNOSIS — C50.112 MALIGNANT NEOPLASM OF CENTRAL PORTION OF LEFT BREAST IN FEMALE, ESTROGEN RECEPTOR POSITIVE: ICD-10-CM

## 2020-10-09 DIAGNOSIS — Z17.0 MALIGNANT NEOPLASM OF CENTRAL PORTION OF LEFT BREAST IN FEMALE, ESTROGEN RECEPTOR POSITIVE: ICD-10-CM

## 2020-10-09 LAB
ALBUMIN SERPL BCP-MCNC: 4.1 G/DL (ref 3.5–5.2)
ALP SERPL-CCNC: 75 U/L (ref 38–145)
ALT SERPL W/O P-5'-P-CCNC: 21 U/L (ref 0–35)
ANION GAP SERPL CALC-SCNC: 4 MMOL/L (ref 8–16)
AST SERPL-CCNC: 27 U/L (ref 14–36)
BASOPHILS # BLD AUTO: 0.04 K/UL (ref 0–0.2)
BASOPHILS NFR BLD: 0.6 % (ref 0–1.9)
BILIRUB SERPL-MCNC: 1.8 MG/DL (ref 0.2–1.3)
BUN SERPL-MCNC: 18 MG/DL (ref 7–18)
CALCIUM SERPL-MCNC: 9.9 MG/DL (ref 8.4–10.2)
CHLORIDE SERPL-SCNC: 103 MMOL/L (ref 95–110)
CO2 SERPL-SCNC: 33 MMOL/L (ref 22–31)
CREAT SERPL-MCNC: 0.74 MG/DL (ref 0.5–1.4)
DIFFERENTIAL METHOD: NORMAL
EOSINOPHIL # BLD AUTO: 0.2 K/UL (ref 0–0.5)
EOSINOPHIL NFR BLD: 2.9 % (ref 0–8)
ERYTHROCYTE [DISTWIDTH] IN BLOOD BY AUTOMATED COUNT: 13.2 % (ref 11.5–14.5)
EST. GFR  (AFRICAN AMERICAN): >60 ML/MIN/1.73 M^2
EST. GFR  (NON AFRICAN AMERICAN): >60 ML/MIN/1.73 M^2
GLUCOSE SERPL-MCNC: 157 MG/DL (ref 70–110)
HCT VFR BLD AUTO: 43.4 % (ref 37–48.5)
HGB BLD-MCNC: 13.9 G/DL (ref 12–16)
IMM GRANULOCYTES # BLD AUTO: 0.03 K/UL (ref 0–0.04)
IMM GRANULOCYTES NFR BLD AUTO: 0.4 % (ref 0–0.5)
LYMPHOCYTES # BLD AUTO: 1.8 K/UL (ref 1–4.8)
LYMPHOCYTES NFR BLD: 24.7 % (ref 18–48)
MCH RBC QN AUTO: 31 PG (ref 27–31)
MCHC RBC AUTO-ENTMCNC: 32 G/DL (ref 32–36)
MCV RBC AUTO: 97 FL (ref 82–98)
MONOCYTES # BLD AUTO: 0.7 K/UL (ref 0.3–1)
MONOCYTES NFR BLD: 10.1 % (ref 4–15)
NEUTROPHILS # BLD AUTO: 4.4 K/UL (ref 1.8–7.7)
NEUTROPHILS NFR BLD: 61.3 % (ref 38–73)
NRBC BLD-RTO: 0 /100 WBC
PLATELET # BLD AUTO: 199 K/UL (ref 150–350)
PMV BLD AUTO: 11.2 FL (ref 9.2–12.9)
POTASSIUM SERPL-SCNC: 4.4 MMOL/L (ref 3.5–5.1)
PROT SERPL-MCNC: 6.9 G/DL (ref 6–8.4)
RBC # BLD AUTO: 4.49 M/UL (ref 4–5.4)
SODIUM SERPL-SCNC: 140 MMOL/L (ref 136–145)
WBC # BLD AUTO: 7.24 K/UL (ref 3.9–12.7)

## 2020-10-09 PROCEDURE — 80053 COMPREHEN METABOLIC PANEL: CPT | Mod: PN

## 2020-10-09 PROCEDURE — 85025 COMPLETE CBC W/AUTO DIFF WBC: CPT

## 2020-10-09 PROCEDURE — 85025 COMPLETE CBC W/AUTO DIFF WBC: CPT | Mod: PN

## 2020-10-09 PROCEDURE — 36415 COLL VENOUS BLD VENIPUNCTURE: CPT | Mod: PN

## 2020-10-09 PROCEDURE — 80053 COMPREHEN METABOLIC PANEL: CPT

## 2020-10-22 ENCOUNTER — PATIENT MESSAGE (OUTPATIENT)
Dept: ENDOCRINOLOGY | Facility: CLINIC | Age: 70
End: 2020-10-22

## 2020-10-22 DIAGNOSIS — Z79.4 TYPE 2 DIABETES MELLITUS WITH DIABETIC NEUROPATHY, WITH LONG-TERM CURRENT USE OF INSULIN: ICD-10-CM

## 2020-10-22 DIAGNOSIS — E11.40 TYPE 2 DIABETES MELLITUS WITH DIABETIC NEUROPATHY, WITH LONG-TERM CURRENT USE OF INSULIN: ICD-10-CM

## 2020-10-22 RX ORDER — LANCETS
EACH MISCELLANEOUS
Qty: 400 EACH | Refills: 3 | OUTPATIENT
Start: 2020-10-22

## 2020-10-22 RX ORDER — LANCETS
EACH MISCELLANEOUS
Qty: 400 EACH | Refills: 3 | Status: SHIPPED | OUTPATIENT
Start: 2020-10-22 | End: 2021-02-23

## 2020-11-01 ENCOUNTER — CLINICAL SUPPORT (OUTPATIENT)
Dept: CARDIOLOGY | Facility: CLINIC | Age: 70
End: 2020-11-01
Payer: MEDICARE

## 2020-11-06 ENCOUNTER — CLINICAL SUPPORT (OUTPATIENT)
Dept: CARDIOLOGY | Facility: CLINIC | Age: 70
End: 2020-11-06
Payer: MEDICARE

## 2020-11-06 DIAGNOSIS — Z95.810 PRESENCE OF AUTOMATIC (IMPLANTABLE) CARDIAC DEFIBRILLATOR: ICD-10-CM

## 2020-11-06 PROCEDURE — 93295 DEV INTERROG REMOTE 1/2/MLT: CPT | Mod: ,,, | Performed by: INTERNAL MEDICINE

## 2020-11-06 PROCEDURE — 93295 CARDIAC DEVICE CHECK - REMOTE: ICD-10-PCS | Mod: ,,, | Performed by: INTERNAL MEDICINE

## 2020-12-09 ENCOUNTER — CLINICAL SUPPORT (OUTPATIENT)
Dept: CARDIOLOGY | Facility: CLINIC | Age: 70
End: 2020-12-09
Attending: INTERNAL MEDICINE
Payer: MEDICARE

## 2020-12-09 DIAGNOSIS — I44.7 LBBB (LEFT BUNDLE BRANCH BLOCK): ICD-10-CM

## 2020-12-09 DIAGNOSIS — Z95.810 ICD (IMPLANTABLE CARDIOVERTER-DEFIBRILLATOR) IN PLACE: ICD-10-CM

## 2020-12-09 PROCEDURE — 99211 OFF/OP EST MAY X REQ PHY/QHP: CPT | Mod: PBBFAC,PO

## 2020-12-09 PROCEDURE — 99999 PR PBB SHADOW E&M-EST. PATIENT-LVL I: CPT | Mod: PBBFAC,,,

## 2020-12-09 PROCEDURE — 99999 PR PBB SHADOW E&M-EST. PATIENT-LVL I: ICD-10-PCS | Mod: PBBFAC,,,

## 2020-12-11 ENCOUNTER — PATIENT MESSAGE (OUTPATIENT)
Dept: OTHER | Facility: OTHER | Age: 70
End: 2020-12-11

## 2021-01-04 ENCOUNTER — PATIENT MESSAGE (OUTPATIENT)
Dept: ADMINISTRATIVE | Facility: HOSPITAL | Age: 71
End: 2021-01-04

## 2021-01-12 ENCOUNTER — PATIENT MESSAGE (OUTPATIENT)
Dept: FAMILY MEDICINE | Facility: CLINIC | Age: 71
End: 2021-01-12

## 2021-01-12 RX ORDER — LEVOTHYROXINE SODIUM 137 UG/1
137 TABLET ORAL EVERY MORNING
Qty: 90 TABLET | Refills: 1 | Status: SHIPPED | OUTPATIENT
Start: 2021-01-12 | End: 2021-07-20 | Stop reason: SDUPTHER

## 2021-01-23 ENCOUNTER — PATIENT MESSAGE (OUTPATIENT)
Dept: ENDOCRINOLOGY | Facility: CLINIC | Age: 71
End: 2021-01-23

## 2021-01-23 DIAGNOSIS — E11.40 TYPE 2 DIABETES MELLITUS WITH DIABETIC NEUROPATHY, WITH LONG-TERM CURRENT USE OF INSULIN: ICD-10-CM

## 2021-01-23 DIAGNOSIS — Z79.4 TYPE 2 DIABETES MELLITUS WITH DIABETIC NEUROPATHY, WITH LONG-TERM CURRENT USE OF INSULIN: ICD-10-CM

## 2021-01-25 RX ORDER — INSULIN DEGLUDEC 200 U/ML
58 INJECTION, SOLUTION SUBCUTANEOUS DAILY
Qty: 27 ML | Refills: 3 | Status: SHIPPED | OUTPATIENT
Start: 2021-01-25 | End: 2021-02-23

## 2021-01-27 ENCOUNTER — PATIENT MESSAGE (OUTPATIENT)
Dept: ENDOCRINOLOGY | Facility: CLINIC | Age: 71
End: 2021-01-27

## 2021-01-30 ENCOUNTER — CLINICAL SUPPORT (OUTPATIENT)
Dept: CARDIOLOGY | Facility: CLINIC | Age: 71
End: 2021-01-30
Payer: MEDICARE

## 2021-02-04 ENCOUNTER — CLINICAL SUPPORT (OUTPATIENT)
Dept: CARDIOLOGY | Facility: CLINIC | Age: 71
End: 2021-02-04
Payer: MEDICARE

## 2021-02-04 DIAGNOSIS — Z95.810 PRESENCE OF AUTOMATIC (IMPLANTABLE) CARDIAC DEFIBRILLATOR: ICD-10-CM

## 2021-02-04 PROCEDURE — 93295 DEV INTERROG REMOTE 1/2/MLT: CPT | Mod: ,,, | Performed by: INTERNAL MEDICINE

## 2021-02-04 PROCEDURE — 93295 CARDIAC DEVICE CHECK - REMOTE: ICD-10-PCS | Mod: ,,, | Performed by: INTERNAL MEDICINE

## 2021-02-17 ENCOUNTER — LAB VISIT (OUTPATIENT)
Dept: LAB | Facility: HOSPITAL | Age: 71
End: 2021-02-17
Attending: NURSE PRACTITIONER
Payer: MEDICARE

## 2021-02-17 DIAGNOSIS — E11.40 TYPE 2 DIABETES MELLITUS WITH DIABETIC NEUROPATHY, WITH LONG-TERM CURRENT USE OF INSULIN: ICD-10-CM

## 2021-02-17 DIAGNOSIS — Z79.4 TYPE 2 DIABETES MELLITUS WITH DIABETIC NEUROPATHY, WITH LONG-TERM CURRENT USE OF INSULIN: ICD-10-CM

## 2021-02-17 DIAGNOSIS — E89.0 POSTOPERATIVE HYPOTHYROIDISM: ICD-10-CM

## 2021-02-17 LAB
ANION GAP SERPL CALC-SCNC: 7 MMOL/L (ref 8–16)
BUN SERPL-MCNC: 17 MG/DL (ref 8–23)
CALCIUM SERPL-MCNC: 9.1 MG/DL (ref 8.7–10.5)
CHLORIDE SERPL-SCNC: 103 MMOL/L (ref 95–110)
CO2 SERPL-SCNC: 30 MMOL/L (ref 23–29)
CREAT SERPL-MCNC: 0.9 MG/DL (ref 0.5–1.4)
EST. GFR  (AFRICAN AMERICAN): >60 ML/MIN/1.73 M^2
EST. GFR  (NON AFRICAN AMERICAN): >60 ML/MIN/1.73 M^2
ESTIMATED AVG GLUCOSE: 189 MG/DL (ref 68–131)
GLUCOSE SERPL-MCNC: 178 MG/DL (ref 70–110)
HBA1C MFR BLD: 8.2 % (ref 4–5.6)
POTASSIUM SERPL-SCNC: 4.5 MMOL/L (ref 3.5–5.1)
SODIUM SERPL-SCNC: 140 MMOL/L (ref 136–145)
TSH SERPL DL<=0.005 MIU/L-ACNC: 2.9 UIU/ML (ref 0.4–4)

## 2021-02-17 PROCEDURE — 84443 ASSAY THYROID STIM HORMONE: CPT

## 2021-02-17 PROCEDURE — 83036 HEMOGLOBIN GLYCOSYLATED A1C: CPT

## 2021-02-17 PROCEDURE — 36415 COLL VENOUS BLD VENIPUNCTURE: CPT | Mod: PN

## 2021-02-17 PROCEDURE — 80048 BASIC METABOLIC PNL TOTAL CA: CPT

## 2021-02-22 ENCOUNTER — PATIENT OUTREACH (OUTPATIENT)
Dept: ADMINISTRATIVE | Facility: OTHER | Age: 71
End: 2021-02-22

## 2021-02-23 ENCOUNTER — OFFICE VISIT (OUTPATIENT)
Dept: ENDOCRINOLOGY | Facility: CLINIC | Age: 71
End: 2021-02-23
Payer: MEDICARE

## 2021-02-23 VITALS
SYSTOLIC BLOOD PRESSURE: 120 MMHG | WEIGHT: 221 LBS | BODY MASS INDEX: 40.67 KG/M2 | HEART RATE: 72 BPM | DIASTOLIC BLOOD PRESSURE: 70 MMHG | HEIGHT: 62 IN

## 2021-02-23 DIAGNOSIS — I10 ESSENTIAL HYPERTENSION: ICD-10-CM

## 2021-02-23 DIAGNOSIS — E11.40 TYPE 2 DIABETES MELLITUS WITH DIABETIC NEUROPATHY, WITH LONG-TERM CURRENT USE OF INSULIN: Primary | ICD-10-CM

## 2021-02-23 DIAGNOSIS — E89.0 POSTOPERATIVE HYPOTHYROIDISM: ICD-10-CM

## 2021-02-23 DIAGNOSIS — E78.5 HYPERLIPIDEMIA, UNSPECIFIED HYPERLIPIDEMIA TYPE: ICD-10-CM

## 2021-02-23 DIAGNOSIS — E66.9 OBESITY (BMI 30-39.9): ICD-10-CM

## 2021-02-23 DIAGNOSIS — Z87.19 HISTORY OF PANCREATITIS: ICD-10-CM

## 2021-02-23 DIAGNOSIS — Z79.4 TYPE 2 DIABETES MELLITUS WITH DIABETIC NEUROPATHY, WITH LONG-TERM CURRENT USE OF INSULIN: Primary | ICD-10-CM

## 2021-02-23 DIAGNOSIS — M79.89 MASS OF SOFT TISSUE OF SHOULDER: ICD-10-CM

## 2021-02-23 DIAGNOSIS — I50.9 CHF (NYHA CLASS III, ACC/AHA STAGE C): ICD-10-CM

## 2021-02-23 DIAGNOSIS — I25.10 CORONARY ARTERY DISEASE INVOLVING NATIVE CORONARY ARTERY WITHOUT ANGINA PECTORIS, UNSPECIFIED WHETHER NATIVE OR TRANSPLANTED HEART: ICD-10-CM

## 2021-02-23 PROCEDURE — 99215 OFFICE O/P EST HI 40 MIN: CPT | Mod: PBBFAC,PN | Performed by: NURSE PRACTITIONER

## 2021-02-23 PROCEDURE — 99214 PR OFFICE/OUTPT VISIT, EST, LEVL IV, 30-39 MIN: ICD-10-PCS | Mod: S$PBB,,, | Performed by: NURSE PRACTITIONER

## 2021-02-23 PROCEDURE — 99214 OFFICE O/P EST MOD 30 MIN: CPT | Mod: S$PBB,,, | Performed by: NURSE PRACTITIONER

## 2021-02-23 PROCEDURE — 99999 PR PBB SHADOW E&M-EST. PATIENT-LVL V: ICD-10-PCS | Mod: PBBFAC,,, | Performed by: NURSE PRACTITIONER

## 2021-02-23 PROCEDURE — 99999 PR PBB SHADOW E&M-EST. PATIENT-LVL V: CPT | Mod: PBBFAC,,, | Performed by: NURSE PRACTITIONER

## 2021-02-23 RX ORDER — BLOOD-GLUCOSE METER
EACH MISCELLANEOUS
COMMUNITY
Start: 2021-01-17 | End: 2022-05-18

## 2021-02-23 RX ORDER — ESTRADIOL 0.1 MG/G
CREAM VAGINAL
COMMUNITY
Start: 2021-02-22 | End: 2023-11-28

## 2021-02-23 RX ORDER — INSULIN DEGLUDEC 200 U/ML
62 INJECTION, SOLUTION SUBCUTANEOUS DAILY
Qty: 27 ML | Refills: 3
Start: 2021-02-23 | End: 2022-02-10

## 2021-02-25 ENCOUNTER — OFFICE VISIT (OUTPATIENT)
Dept: FAMILY MEDICINE | Facility: CLINIC | Age: 71
End: 2021-02-25
Payer: MEDICARE

## 2021-02-25 VITALS
DIASTOLIC BLOOD PRESSURE: 66 MMHG | BODY MASS INDEX: 41.09 KG/M2 | HEART RATE: 79 BPM | SYSTOLIC BLOOD PRESSURE: 128 MMHG | OXYGEN SATURATION: 95 % | TEMPERATURE: 98 F | HEIGHT: 62 IN | WEIGHT: 223.31 LBS

## 2021-02-25 DIAGNOSIS — M79.89 SOFT TISSUE MASS: ICD-10-CM

## 2021-02-25 DIAGNOSIS — I25.10 CORONARY ARTERY DISEASE INVOLVING NATIVE CORONARY ARTERY WITHOUT ANGINA PECTORIS, UNSPECIFIED WHETHER NATIVE OR TRANSPLANTED HEART: ICD-10-CM

## 2021-02-25 DIAGNOSIS — D83.9 CVID (COMMON VARIABLE IMMUNODEFICIENCY): ICD-10-CM

## 2021-02-25 DIAGNOSIS — Z17.0 MALIGNANT NEOPLASM OF CENTRAL PORTION OF LEFT BREAST IN FEMALE, ESTROGEN RECEPTOR POSITIVE: Primary | ICD-10-CM

## 2021-02-25 DIAGNOSIS — I10 ESSENTIAL HYPERTENSION: ICD-10-CM

## 2021-02-25 DIAGNOSIS — J01.01 ACUTE RECURRENT MAXILLARY SINUSITIS: ICD-10-CM

## 2021-02-25 DIAGNOSIS — Z23 IMMUNIZATION DUE: ICD-10-CM

## 2021-02-25 DIAGNOSIS — E11.40 TYPE 2 DIABETES MELLITUS WITH DIABETIC NEUROPATHY, WITH LONG-TERM CURRENT USE OF INSULIN: ICD-10-CM

## 2021-02-25 DIAGNOSIS — Z78.0 POSTMENOPAUSAL: ICD-10-CM

## 2021-02-25 DIAGNOSIS — I50.9 CHF (NYHA CLASS III, ACC/AHA STAGE C): ICD-10-CM

## 2021-02-25 DIAGNOSIS — C50.112 MALIGNANT NEOPLASM OF CENTRAL PORTION OF LEFT BREAST IN FEMALE, ESTROGEN RECEPTOR POSITIVE: Primary | ICD-10-CM

## 2021-02-25 DIAGNOSIS — Z79.4 TYPE 2 DIABETES MELLITUS WITH DIABETIC NEUROPATHY, WITH LONG-TERM CURRENT USE OF INSULIN: ICD-10-CM

## 2021-02-25 DIAGNOSIS — Z95.810 ICD (IMPLANTABLE CARDIOVERTER-DEFIBRILLATOR) IN PLACE: ICD-10-CM

## 2021-02-25 DIAGNOSIS — E78.5 HYPERLIPIDEMIA, UNSPECIFIED HYPERLIPIDEMIA TYPE: ICD-10-CM

## 2021-02-25 PROCEDURE — 99214 OFFICE O/P EST MOD 30 MIN: CPT | Mod: S$PBB,,, | Performed by: FAMILY MEDICINE

## 2021-02-25 PROCEDURE — 99999 PR PBB SHADOW E&M-EST. PATIENT-LVL V: ICD-10-PCS | Mod: PBBFAC,,, | Performed by: FAMILY MEDICINE

## 2021-02-25 PROCEDURE — 99215 OFFICE O/P EST HI 40 MIN: CPT | Mod: PBBFAC,PN | Performed by: FAMILY MEDICINE

## 2021-02-25 PROCEDURE — 99214 PR OFFICE/OUTPT VISIT, EST, LEVL IV, 30-39 MIN: ICD-10-PCS | Mod: S$PBB,,, | Performed by: FAMILY MEDICINE

## 2021-02-25 PROCEDURE — 99999 PR PBB SHADOW E&M-EST. PATIENT-LVL V: CPT | Mod: PBBFAC,,, | Performed by: FAMILY MEDICINE

## 2021-02-25 RX ORDER — AZITHROMYCIN 250 MG/1
TABLET, FILM COATED ORAL
Qty: 6 TABLET | Refills: 0 | Status: SHIPPED | OUTPATIENT
Start: 2021-02-25 | End: 2021-03-02

## 2021-02-26 ENCOUNTER — HOSPITAL ENCOUNTER (OUTPATIENT)
Dept: RADIOLOGY | Facility: HOSPITAL | Age: 71
Discharge: HOME | End: 2021-02-26
Attending: FAMILY MEDICINE
Payer: MEDICARE

## 2021-02-26 DIAGNOSIS — M79.89 SOFT TISSUE MASS: ICD-10-CM

## 2021-02-26 PROCEDURE — 76536 US SOFT TISSUE HEAD NECK THYROID: ICD-10-PCS | Mod: 26,,, | Performed by: RADIOLOGY

## 2021-02-26 PROCEDURE — 76536 US EXAM OF HEAD AND NECK: CPT | Mod: 26,,, | Performed by: RADIOLOGY

## 2021-02-26 PROCEDURE — 76536 US EXAM OF HEAD AND NECK: CPT | Mod: TC,PO

## 2021-03-02 ENCOUNTER — PATIENT MESSAGE (OUTPATIENT)
Dept: FAMILY MEDICINE | Facility: CLINIC | Age: 71
End: 2021-03-02

## 2021-03-02 ENCOUNTER — NURSE TRIAGE (OUTPATIENT)
Dept: ADMINISTRATIVE | Facility: CLINIC | Age: 71
End: 2021-03-02

## 2021-03-02 DIAGNOSIS — R21 RASH: Primary | ICD-10-CM

## 2021-03-02 RX ORDER — HYDROXYZINE HYDROCHLORIDE 25 MG/1
25 TABLET, FILM COATED ORAL 3 TIMES DAILY PRN
Qty: 20 TABLET | Refills: 1 | Status: SHIPPED | OUTPATIENT
Start: 2021-03-02 | End: 2021-05-27

## 2021-03-11 ENCOUNTER — OFFICE VISIT (OUTPATIENT)
Dept: FAMILY MEDICINE | Facility: CLINIC | Age: 71
End: 2021-03-11
Payer: MEDICARE

## 2021-03-11 VITALS
SYSTOLIC BLOOD PRESSURE: 118 MMHG | HEIGHT: 62 IN | WEIGHT: 222.88 LBS | BODY MASS INDEX: 41.02 KG/M2 | HEART RATE: 65 BPM | TEMPERATURE: 98 F | DIASTOLIC BLOOD PRESSURE: 68 MMHG

## 2021-03-11 DIAGNOSIS — R21 RASH: Primary | ICD-10-CM

## 2021-03-11 DIAGNOSIS — J01.01 ACUTE RECURRENT MAXILLARY SINUSITIS: ICD-10-CM

## 2021-03-11 DIAGNOSIS — D17.0 LIPOMA OF NECK: ICD-10-CM

## 2021-03-11 DIAGNOSIS — Z23 NEED FOR VACCINATION: ICD-10-CM

## 2021-03-11 PROCEDURE — 99999 PR PBB SHADOW E&M-EST. PATIENT-LVL V: CPT | Mod: PBBFAC,,, | Performed by: FAMILY MEDICINE

## 2021-03-11 PROCEDURE — 99999 PR PBB SHADOW E&M-EST. PATIENT-LVL V: ICD-10-PCS | Mod: PBBFAC,,, | Performed by: FAMILY MEDICINE

## 2021-03-11 PROCEDURE — 99215 OFFICE O/P EST HI 40 MIN: CPT | Mod: PBBFAC,PN | Performed by: FAMILY MEDICINE

## 2021-03-11 PROCEDURE — 99213 PR OFFICE/OUTPT VISIT, EST, LEVL III, 20-29 MIN: ICD-10-PCS | Mod: S$PBB,,, | Performed by: FAMILY MEDICINE

## 2021-03-11 PROCEDURE — 99213 OFFICE O/P EST LOW 20 MIN: CPT | Mod: S$PBB,,, | Performed by: FAMILY MEDICINE

## 2021-03-11 RX ORDER — FAMOTIDINE 40 MG/1
40 TABLET, FILM COATED ORAL 2 TIMES DAILY
Qty: 10 TABLET | Refills: 0 | Status: SHIPPED | OUTPATIENT
Start: 2021-03-11 | End: 2021-05-27

## 2021-03-23 ENCOUNTER — PATIENT MESSAGE (OUTPATIENT)
Dept: FAMILY MEDICINE | Facility: CLINIC | Age: 71
End: 2021-03-23

## 2021-03-23 ENCOUNTER — PATIENT MESSAGE (OUTPATIENT)
Dept: ENDOCRINOLOGY | Facility: CLINIC | Age: 71
End: 2021-03-23

## 2021-03-24 RX ORDER — BLOOD-GLUCOSE CONTROL, NORMAL
EACH MISCELLANEOUS
Qty: 400 EACH | Refills: 3 | Status: SHIPPED | OUTPATIENT
Start: 2021-03-24 | End: 2022-05-18

## 2021-04-02 ENCOUNTER — PATIENT MESSAGE (OUTPATIENT)
Dept: ENDOCRINOLOGY | Facility: CLINIC | Age: 71
End: 2021-04-02

## 2021-04-05 ENCOUNTER — TELEPHONE (OUTPATIENT)
Dept: ENDOCRINOLOGY | Facility: CLINIC | Age: 71
End: 2021-04-05

## 2021-04-05 ENCOUNTER — PATIENT MESSAGE (OUTPATIENT)
Dept: ENDOCRINOLOGY | Facility: CLINIC | Age: 71
End: 2021-04-05

## 2021-05-05 ENCOUNTER — PATIENT OUTREACH (OUTPATIENT)
Dept: ADMINISTRATIVE | Facility: OTHER | Age: 71
End: 2021-05-05

## 2021-05-20 ENCOUNTER — LAB VISIT (OUTPATIENT)
Dept: LAB | Facility: HOSPITAL | Age: 71
End: 2021-05-20
Attending: NURSE PRACTITIONER
Payer: MEDICARE

## 2021-05-20 DIAGNOSIS — Z79.4 TYPE 2 DIABETES MELLITUS WITH DIABETIC NEUROPATHY, WITH LONG-TERM CURRENT USE OF INSULIN: ICD-10-CM

## 2021-05-20 DIAGNOSIS — E11.40 TYPE 2 DIABETES MELLITUS WITH DIABETIC NEUROPATHY, WITH LONG-TERM CURRENT USE OF INSULIN: ICD-10-CM

## 2021-05-20 LAB
ALBUMIN SERPL BCP-MCNC: 3.8 G/DL (ref 3.5–5.2)
ALP SERPL-CCNC: 84 U/L (ref 55–135)
ALT SERPL W/O P-5'-P-CCNC: 21 U/L (ref 10–44)
ANION GAP SERPL CALC-SCNC: 11 MMOL/L (ref 8–16)
AST SERPL-CCNC: 18 U/L (ref 10–40)
BILIRUB SERPL-MCNC: 1.9 MG/DL (ref 0.1–1)
BUN SERPL-MCNC: 18 MG/DL (ref 8–23)
CALCIUM SERPL-MCNC: 10 MG/DL (ref 8.7–10.5)
CHLORIDE SERPL-SCNC: 101 MMOL/L (ref 95–110)
CO2 SERPL-SCNC: 27 MMOL/L (ref 23–29)
CREAT SERPL-MCNC: 1 MG/DL (ref 0.5–1.4)
EST. GFR  (AFRICAN AMERICAN): >60 ML/MIN/1.73 M^2
EST. GFR  (NON AFRICAN AMERICAN): 57.6 ML/MIN/1.73 M^2
ESTIMATED AVG GLUCOSE: 183 MG/DL (ref 68–131)
GLUCOSE SERPL-MCNC: 139 MG/DL (ref 70–110)
HBA1C MFR BLD: 8 % (ref 4–5.6)
POTASSIUM SERPL-SCNC: 4.2 MMOL/L (ref 3.5–5.1)
PROT SERPL-MCNC: 6.9 G/DL (ref 6–8.4)
SODIUM SERPL-SCNC: 139 MMOL/L (ref 136–145)

## 2021-05-20 PROCEDURE — 83036 HEMOGLOBIN GLYCOSYLATED A1C: CPT | Performed by: NURSE PRACTITIONER

## 2021-05-20 PROCEDURE — 80053 COMPREHEN METABOLIC PANEL: CPT | Performed by: NURSE PRACTITIONER

## 2021-05-20 PROCEDURE — 36415 COLL VENOUS BLD VENIPUNCTURE: CPT | Mod: PN | Performed by: NURSE PRACTITIONER

## 2021-05-21 ENCOUNTER — PATIENT MESSAGE (OUTPATIENT)
Dept: ENDOCRINOLOGY | Facility: CLINIC | Age: 71
End: 2021-05-21

## 2021-05-27 ENCOUNTER — OFFICE VISIT (OUTPATIENT)
Dept: ENDOCRINOLOGY | Facility: CLINIC | Age: 71
End: 2021-05-27
Payer: MEDICARE

## 2021-05-27 VITALS
SYSTOLIC BLOOD PRESSURE: 120 MMHG | HEART RATE: 70 BPM | DIASTOLIC BLOOD PRESSURE: 60 MMHG | HEIGHT: 62 IN | WEIGHT: 222.25 LBS | BODY MASS INDEX: 40.9 KG/M2

## 2021-05-27 DIAGNOSIS — E89.0 POSTOPERATIVE HYPOTHYROIDISM: ICD-10-CM

## 2021-05-27 DIAGNOSIS — I10 ESSENTIAL HYPERTENSION: ICD-10-CM

## 2021-05-27 DIAGNOSIS — I50.9 CHF (NYHA CLASS III, ACC/AHA STAGE C): ICD-10-CM

## 2021-05-27 DIAGNOSIS — D22.9 NUMEROUS SKIN MOLES: ICD-10-CM

## 2021-05-27 DIAGNOSIS — Z87.19 HISTORY OF PANCREATITIS: ICD-10-CM

## 2021-05-27 DIAGNOSIS — Z79.4 TYPE 2 DIABETES MELLITUS WITH DIABETIC NEUROPATHY, WITH LONG-TERM CURRENT USE OF INSULIN: Primary | ICD-10-CM

## 2021-05-27 DIAGNOSIS — I25.10 CORONARY ARTERY DISEASE INVOLVING NATIVE CORONARY ARTERY WITHOUT ANGINA PECTORIS, UNSPECIFIED WHETHER NATIVE OR TRANSPLANTED HEART: ICD-10-CM

## 2021-05-27 DIAGNOSIS — E66.9 OBESITY (BMI 30-39.9): ICD-10-CM

## 2021-05-27 DIAGNOSIS — E78.5 HYPERLIPIDEMIA, UNSPECIFIED HYPERLIPIDEMIA TYPE: ICD-10-CM

## 2021-05-27 DIAGNOSIS — E11.40 TYPE 2 DIABETES MELLITUS WITH DIABETIC NEUROPATHY, WITH LONG-TERM CURRENT USE OF INSULIN: Primary | ICD-10-CM

## 2021-05-27 PROCEDURE — 99214 OFFICE O/P EST MOD 30 MIN: CPT | Mod: S$PBB,,, | Performed by: NURSE PRACTITIONER

## 2021-05-27 PROCEDURE — 99215 OFFICE O/P EST HI 40 MIN: CPT | Mod: PBBFAC,PN | Performed by: NURSE PRACTITIONER

## 2021-05-27 PROCEDURE — 99999 PR PBB SHADOW E&M-EST. PATIENT-LVL V: ICD-10-PCS | Mod: PBBFAC,,, | Performed by: NURSE PRACTITIONER

## 2021-05-27 PROCEDURE — 99214 PR OFFICE/OUTPT VISIT, EST, LEVL IV, 30-39 MIN: ICD-10-PCS | Mod: S$PBB,,, | Performed by: NURSE PRACTITIONER

## 2021-05-27 PROCEDURE — 99999 PR PBB SHADOW E&M-EST. PATIENT-LVL V: CPT | Mod: PBBFAC,,, | Performed by: NURSE PRACTITIONER

## 2021-06-07 ENCOUNTER — OFFICE VISIT (OUTPATIENT)
Dept: CARDIOLOGY | Facility: CLINIC | Age: 71
End: 2021-06-07
Payer: MEDICARE

## 2021-06-07 VITALS
BODY MASS INDEX: 41.62 KG/M2 | HEIGHT: 62 IN | SYSTOLIC BLOOD PRESSURE: 144 MMHG | HEART RATE: 67 BPM | DIASTOLIC BLOOD PRESSURE: 82 MMHG | WEIGHT: 226.19 LBS

## 2021-06-07 DIAGNOSIS — Z95.810 ICD (IMPLANTABLE CARDIOVERTER-DEFIBRILLATOR) IN PLACE: Chronic | ICD-10-CM

## 2021-06-07 DIAGNOSIS — R06.09 DOE (DYSPNEA ON EXERTION): Primary | ICD-10-CM

## 2021-06-07 DIAGNOSIS — E78.2 MIXED HYPERLIPIDEMIA: Chronic | ICD-10-CM

## 2021-06-07 DIAGNOSIS — R07.1 CHEST PAIN ON BREATHING: ICD-10-CM

## 2021-06-07 DIAGNOSIS — R07.2 PRECORDIAL PAIN: ICD-10-CM

## 2021-06-07 DIAGNOSIS — I10 ESSENTIAL HYPERTENSION: ICD-10-CM

## 2021-06-07 DIAGNOSIS — I44.7 LBBB (LEFT BUNDLE BRANCH BLOCK): ICD-10-CM

## 2021-06-07 PROCEDURE — 99999 PR PBB SHADOW E&M-EST. PATIENT-LVL IV: CPT | Mod: PBBFAC,,, | Performed by: INTERNAL MEDICINE

## 2021-06-07 PROCEDURE — 99214 PR OFFICE/OUTPT VISIT, EST, LEVL IV, 30-39 MIN: ICD-10-PCS | Mod: S$PBB,,, | Performed by: INTERNAL MEDICINE

## 2021-06-07 PROCEDURE — 99214 OFFICE O/P EST MOD 30 MIN: CPT | Mod: S$PBB,,, | Performed by: INTERNAL MEDICINE

## 2021-06-07 PROCEDURE — 99214 OFFICE O/P EST MOD 30 MIN: CPT | Mod: PBBFAC,PO | Performed by: INTERNAL MEDICINE

## 2021-06-07 PROCEDURE — 99999 PR PBB SHADOW E&M-EST. PATIENT-LVL IV: ICD-10-PCS | Mod: PBBFAC,,, | Performed by: INTERNAL MEDICINE

## 2021-06-09 ENCOUNTER — HOSPITAL ENCOUNTER (OUTPATIENT)
Dept: CARDIOLOGY | Facility: HOSPITAL | Age: 71
Discharge: HOME | End: 2021-06-09
Attending: INTERNAL MEDICINE
Payer: MEDICARE

## 2021-06-09 ENCOUNTER — HOSPITAL ENCOUNTER (OUTPATIENT)
Dept: RADIOLOGY | Facility: HOSPITAL | Age: 71
Discharge: HOME | End: 2021-06-09
Attending: INTERNAL MEDICINE
Payer: MEDICARE

## 2021-06-09 VITALS — HEIGHT: 62 IN | WEIGHT: 226 LBS | BODY MASS INDEX: 41.59 KG/M2

## 2021-06-09 DIAGNOSIS — R06.09 DOE (DYSPNEA ON EXERTION): ICD-10-CM

## 2021-06-09 DIAGNOSIS — R07.2 PRECORDIAL PAIN: ICD-10-CM

## 2021-06-09 LAB
CV PHARM DOSE: 0.4 MG
CV STRESS BASE HR: 60 BPM
DIASTOLIC BLOOD PRESSURE: 62 MMHG
NUC REST EJECTION FRACTION: 60
OHS CV CPX 1 MINUTE RECOVERY HEART RATE: 79 BPM
OHS CV CPX 85 PERCENT MAX PREDICTED HEART RATE MALE: 123
OHS CV CPX MAX PREDICTED HEART RATE: 145
OHS CV CPX PATIENT IS FEMALE: 1
OHS CV CPX PATIENT IS MALE: 0
OHS CV CPX PEAK DIASTOLIC BLOOD PRESSURE: 62 MMHG
OHS CV CPX PEAK HEAR RATE: 86 BPM
OHS CV CPX PEAK RATE PRESSURE PRODUCT: NORMAL
OHS CV CPX PEAK SYSTOLIC BLOOD PRESSURE: 127 MMHG
OHS CV CPX PERCENT MAX PREDICTED HEART RATE ACHIEVED: 59
OHS CV CPX RATE PRESSURE PRODUCT PRESENTING: 7620
SYSTOLIC BLOOD PRESSURE: 127 MMHG

## 2021-06-09 PROCEDURE — 93016 CV STRESS TEST SUPVJ ONLY: CPT | Mod: ,,, | Performed by: INTERNAL MEDICINE

## 2021-06-09 PROCEDURE — 93018 PR CARDIAC STRESS TST,INTERP/REPT ONLY: ICD-10-PCS | Mod: ,,, | Performed by: INTERNAL MEDICINE

## 2021-06-09 PROCEDURE — 78452 HT MUSCLE IMAGE SPECT MULT: CPT | Mod: 26,,, | Performed by: INTERNAL MEDICINE

## 2021-06-09 PROCEDURE — A9502 TC99M TETROFOSMIN: HCPCS | Mod: PO

## 2021-06-09 PROCEDURE — 63600175 PHARM REV CODE 636 W HCPCS: Mod: PO | Performed by: INTERNAL MEDICINE

## 2021-06-09 PROCEDURE — 93017 CV STRESS TEST TRACING ONLY: CPT | Mod: PO

## 2021-06-09 PROCEDURE — 78452 STRESS TEST WITH MYOCARDIAL PERFUSION (CUPID ONLY): ICD-10-PCS | Mod: 26,,, | Performed by: INTERNAL MEDICINE

## 2021-06-09 PROCEDURE — 93018 CV STRESS TEST I&R ONLY: CPT | Mod: ,,, | Performed by: INTERNAL MEDICINE

## 2021-06-09 PROCEDURE — 93016 STRESS TEST WITH MYOCARDIAL PERFUSION (CUPID ONLY): ICD-10-PCS | Mod: ,,, | Performed by: INTERNAL MEDICINE

## 2021-06-09 PROCEDURE — 78452 HT MUSCLE IMAGE SPECT MULT: CPT | Mod: PO

## 2021-06-09 RX ORDER — REGADENOSON 0.08 MG/ML
0.4 INJECTION, SOLUTION INTRAVENOUS
Status: COMPLETED | OUTPATIENT
Start: 2021-06-09 | End: 2021-06-09

## 2021-06-09 RX ADMIN — REGADENOSON 0.4 MG: 0.08 INJECTION, SOLUTION INTRAVENOUS at 01:06

## 2021-06-10 ENCOUNTER — PATIENT MESSAGE (OUTPATIENT)
Dept: CARDIOLOGY | Facility: CLINIC | Age: 71
End: 2021-06-10

## 2021-06-11 DIAGNOSIS — R06.02 SOB (SHORTNESS OF BREATH) ON EXERTION: ICD-10-CM

## 2021-06-11 DIAGNOSIS — R07.1 CHEST PAIN ON BREATHING: ICD-10-CM

## 2021-06-11 DIAGNOSIS — R94.39 ABNORMAL NUCLEAR STRESS TEST: Primary | ICD-10-CM

## 2021-06-11 RX ORDER — SODIUM CHLORIDE 9 MG/ML
INJECTION, SOLUTION INTRAVENOUS ONCE
Status: CANCELLED | OUTPATIENT
Start: 2021-06-11 | End: 2021-06-11

## 2021-06-23 ENCOUNTER — HOSPITAL ENCOUNTER (OUTPATIENT)
Dept: CARDIOLOGY | Facility: HOSPITAL | Age: 71
Discharge: HOME | End: 2021-06-23
Attending: INTERNAL MEDICINE
Payer: MEDICARE

## 2021-06-23 VITALS — HEIGHT: 62 IN | WEIGHT: 210 LBS | BODY MASS INDEX: 38.64 KG/M2

## 2021-06-23 PROCEDURE — 93306 TTE W/DOPPLER COMPLETE: CPT | Mod: PO

## 2021-06-23 PROCEDURE — 93306 TTE W/DOPPLER COMPLETE: CPT | Mod: 26,,, | Performed by: INTERNAL MEDICINE

## 2021-06-23 PROCEDURE — 93306 ECHO (CUPID ONLY): ICD-10-PCS | Mod: 26,,, | Performed by: INTERNAL MEDICINE

## 2021-06-24 ENCOUNTER — TELEPHONE (OUTPATIENT)
Dept: CARDIOLOGY | Facility: CLINIC | Age: 71
End: 2021-06-24

## 2021-06-24 LAB
ASCENDING AORTA: 2.97 CM
BSA FOR ECHO PROCEDURE: 2.04 M2
CV ECHO LV RWT: 0.41 CM
DOP CALC LVOT AREA: 3.1 CM2
DOP CALC LVOT DIAMETER: 1.98 CM
DOP CALC LVOT PEAK VEL: 0.95 M/S
DOP CALC LVOT STROKE VOLUME: 66.54 CM3
DOP CALCLVOT PEAK VEL VTI: 21.62 CM
E WAVE DECELERATION TIME: 161.62 MSEC
E/A RATIO: 1.15
E/E' RATIO: 20.4 M/S
ECHO LV POSTERIOR WALL: 0.98 CM (ref 0.6–1.1)
EJECTION FRACTION: 50 %
FRACTIONAL SHORTENING: 37 % (ref 28–44)
INTERVENTRICULAR SEPTUM: 1.02 CM (ref 0.6–1.1)
LA MAJOR: 4.73 CM
LA MINOR: 4.74 CM
LA WIDTH: 4.14 CM
LEFT ATRIUM SIZE: 3.45 CM
LEFT ATRIUM VOLUME INDEX: 29.5 ML/M2
LEFT ATRIUM VOLUME: 57.49 CM3
LEFT INTERNAL DIMENSION IN SYSTOLE: 2.98 CM (ref 2.1–4)
LEFT VENTRICLE DIASTOLIC VOLUME INDEX: 53.52 ML/M2
LEFT VENTRICLE DIASTOLIC VOLUME: 104.36 ML
LEFT VENTRICLE MASS INDEX: 86 G/M2
LEFT VENTRICLE SYSTOLIC VOLUME INDEX: 17.6 ML/M2
LEFT VENTRICLE SYSTOLIC VOLUME: 34.4 ML
LEFT VENTRICULAR INTERNAL DIMENSION IN DIASTOLE: 4.74 CM (ref 3.5–6)
LEFT VENTRICULAR MASS: 166.74 G
LV LATERAL E/E' RATIO: 17 M/S
LV SEPTAL E/E' RATIO: 25.5 M/S
MV A" WAVE DURATION": 8.75 MSEC
MV PEAK A VEL: 0.89 M/S
MV PEAK E VEL: 1.02 M/S
PISA MRMAX VEL: 0.04 M/S
PISA TR MAX VEL: 2.64 M/S
PULM VEIN S/D RATIO: 1.2
PV PEAK D VEL: 0.56 M/S
PV PEAK S VEL: 0.67 M/S
RA MAJOR: 3.95 CM
RA PRESSURE: 3 MMHG
RA WIDTH: 3.47 CM
RIGHT VENTRICULAR END-DIASTOLIC DIMENSION: 3.17 CM
RV TISSUE DOPPLER FREE WALL SYSTOLIC VELOCITY 1 (APICAL 4 CHAMBER VIEW): 13.6 CM/S
SINUS: 3.04 CM
STJ: 2.43 CM
TDI LATERAL: 0.06 M/S
TDI SEPTAL: 0.04 M/S
TDI: 0.05 M/S
TR MAX PG: 28 MMHG
TRICUSPID ANNULAR PLANE SYSTOLIC EXCURSION: 2.51 CM
TV REST PULMONARY ARTERY PRESSURE: 31 MMHG

## 2021-06-25 PROBLEM — R94.39 ABNORMAL NUCLEAR STRESS TEST: Status: ACTIVE | Noted: 2021-06-25

## 2021-06-28 ENCOUNTER — TELEPHONE (OUTPATIENT)
Dept: CARDIOLOGY | Facility: CLINIC | Age: 71
End: 2021-06-28

## 2021-06-30 DIAGNOSIS — E11.9 TYPE 2 DIABETES MELLITUS WITHOUT COMPLICATION, UNSPECIFIED WHETHER LONG TERM INSULIN USE: ICD-10-CM

## 2021-07-01 ENCOUNTER — TELEPHONE (OUTPATIENT)
Dept: ENDOCRINOLOGY | Facility: CLINIC | Age: 71
End: 2021-07-01

## 2021-07-07 ENCOUNTER — PATIENT MESSAGE (OUTPATIENT)
Dept: ADMINISTRATIVE | Facility: HOSPITAL | Age: 71
End: 2021-07-07

## 2021-07-09 ENCOUNTER — HOSPITAL ENCOUNTER (OUTPATIENT)
Dept: RADIOLOGY | Facility: HOSPITAL | Age: 71
Discharge: HOME | End: 2021-07-09
Attending: FAMILY MEDICINE
Payer: MEDICARE

## 2021-07-09 ENCOUNTER — OFFICE VISIT (OUTPATIENT)
Dept: FAMILY MEDICINE | Facility: CLINIC | Age: 71
End: 2021-07-09
Payer: MEDICARE

## 2021-07-09 ENCOUNTER — PATIENT MESSAGE (OUTPATIENT)
Dept: FAMILY MEDICINE | Facility: CLINIC | Age: 71
End: 2021-07-09

## 2021-07-09 VITALS
DIASTOLIC BLOOD PRESSURE: 68 MMHG | SYSTOLIC BLOOD PRESSURE: 130 MMHG | WEIGHT: 222.31 LBS | TEMPERATURE: 98 F | HEIGHT: 62 IN | RESPIRATION RATE: 14 BRPM | BODY MASS INDEX: 40.91 KG/M2 | HEART RATE: 72 BPM

## 2021-07-09 DIAGNOSIS — G89.29 CHRONIC BILATERAL THORACIC BACK PAIN: ICD-10-CM

## 2021-07-09 DIAGNOSIS — M54.6 CHRONIC BILATERAL THORACIC BACK PAIN: ICD-10-CM

## 2021-07-09 DIAGNOSIS — E11.40 TYPE 2 DIABETES MELLITUS WITH DIABETIC NEUROPATHY, WITH LONG-TERM CURRENT USE OF INSULIN: ICD-10-CM

## 2021-07-09 DIAGNOSIS — Z79.4 TYPE 2 DIABETES MELLITUS WITH DIABETIC NEUROPATHY, WITH LONG-TERM CURRENT USE OF INSULIN: ICD-10-CM

## 2021-07-09 DIAGNOSIS — R05.9 COUGH: Primary | ICD-10-CM

## 2021-07-09 PROCEDURE — 72070 XR THORACIC SPINE AP LATERAL: ICD-10-PCS | Mod: 26,,, | Performed by: RADIOLOGY

## 2021-07-09 PROCEDURE — 71046 X-RAY EXAM CHEST 2 VIEWS: CPT | Mod: TC,PN

## 2021-07-09 PROCEDURE — 99214 OFFICE O/P EST MOD 30 MIN: CPT | Mod: S$PBB,,, | Performed by: FAMILY MEDICINE

## 2021-07-09 PROCEDURE — 72070 X-RAY EXAM THORAC SPINE 2VWS: CPT | Mod: 26,,, | Performed by: RADIOLOGY

## 2021-07-09 PROCEDURE — 99999 PR PBB SHADOW E&M-EST. PATIENT-LVL V: CPT | Mod: PBBFAC,,, | Performed by: FAMILY MEDICINE

## 2021-07-09 PROCEDURE — U0005 INFEC AGEN DETEC AMPLI PROBE: HCPCS | Performed by: FAMILY MEDICINE

## 2021-07-09 PROCEDURE — 71046 XR CHEST PA AND LATERAL: ICD-10-PCS | Mod: 26,,, | Performed by: RADIOLOGY

## 2021-07-09 PROCEDURE — 99214 PR OFFICE/OUTPT VISIT, EST, LEVL IV, 30-39 MIN: ICD-10-PCS | Mod: S$PBB,,, | Performed by: FAMILY MEDICINE

## 2021-07-09 PROCEDURE — 99999 PR PBB SHADOW E&M-EST. PATIENT-LVL V: ICD-10-PCS | Mod: PBBFAC,,, | Performed by: FAMILY MEDICINE

## 2021-07-09 PROCEDURE — U0003 INFECTIOUS AGENT DETECTION BY NUCLEIC ACID (DNA OR RNA); SEVERE ACUTE RESPIRATORY SYNDROME CORONAVIRUS 2 (SARS-COV-2) (CORONAVIRUS DISEASE [COVID-19]), AMPLIFIED PROBE TECHNIQUE, MAKING USE OF HIGH THROUGHPUT TECHNOLOGIES AS DESCRIBED BY CMS-2020-01-R: HCPCS | Performed by: FAMILY MEDICINE

## 2021-07-09 PROCEDURE — 99215 OFFICE O/P EST HI 40 MIN: CPT | Mod: PBBFAC,PN | Performed by: FAMILY MEDICINE

## 2021-07-09 PROCEDURE — 72070 X-RAY EXAM THORAC SPINE 2VWS: CPT | Mod: TC,PN

## 2021-07-09 PROCEDURE — 71046 X-RAY EXAM CHEST 2 VIEWS: CPT | Mod: 26,,, | Performed by: RADIOLOGY

## 2021-07-09 RX ORDER — TIZANIDINE 4 MG/1
4 TABLET ORAL EVERY 8 HOURS PRN
Qty: 10 TABLET | Refills: 0 | Status: SHIPPED | OUTPATIENT
Start: 2021-07-09 | End: 2021-07-20

## 2021-07-09 RX ORDER — AZITHROMYCIN 250 MG/1
TABLET, FILM COATED ORAL
Qty: 6 TABLET | Refills: 0 | Status: SHIPPED | OUTPATIENT
Start: 2021-07-09 | End: 2021-07-14

## 2021-07-09 RX ORDER — ALBUTEROL SULFATE 90 UG/1
2 AEROSOL, METERED RESPIRATORY (INHALATION) EVERY 6 HOURS PRN
Qty: 18 G | Refills: 11 | Status: SHIPPED | OUTPATIENT
Start: 2021-07-09 | End: 2022-06-02

## 2021-07-10 LAB
SARS-COV-2 RNA RESP QL NAA+PROBE: NOT DETECTED
SARS-COV-2- CYCLE NUMBER: -1

## 2021-07-17 DIAGNOSIS — M54.6 CHRONIC BILATERAL THORACIC BACK PAIN: ICD-10-CM

## 2021-07-17 DIAGNOSIS — G89.29 CHRONIC BILATERAL THORACIC BACK PAIN: ICD-10-CM

## 2021-07-19 ENCOUNTER — PATIENT MESSAGE (OUTPATIENT)
Dept: FAMILY MEDICINE | Facility: CLINIC | Age: 71
End: 2021-07-19

## 2021-07-20 RX ORDER — LEVOTHYROXINE SODIUM 137 UG/1
137 TABLET ORAL EVERY MORNING
Qty: 90 TABLET | Refills: 1 | Status: SHIPPED | OUTPATIENT
Start: 2021-07-20 | End: 2022-01-10

## 2021-07-20 RX ORDER — TIZANIDINE 4 MG/1
TABLET ORAL
Qty: 10 TABLET | Refills: 1 | Status: SHIPPED | OUTPATIENT
Start: 2021-07-20 | End: 2021-10-12

## 2021-07-24 ENCOUNTER — PATIENT MESSAGE (OUTPATIENT)
Dept: FAMILY MEDICINE | Facility: CLINIC | Age: 71
End: 2021-07-24

## 2021-07-31 ENCOUNTER — OFFICE VISIT (OUTPATIENT)
Dept: URGENT CARE | Facility: CLINIC | Age: 71
End: 2021-07-31
Payer: MEDICARE

## 2021-07-31 ENCOUNTER — PATIENT OUTREACH (OUTPATIENT)
Dept: ADMINISTRATIVE | Facility: OTHER | Age: 71
End: 2021-07-31

## 2021-07-31 ENCOUNTER — PATIENT MESSAGE (OUTPATIENT)
Dept: FAMILY MEDICINE | Facility: CLINIC | Age: 71
End: 2021-07-31

## 2021-07-31 VITALS
WEIGHT: 222 LBS | SYSTOLIC BLOOD PRESSURE: 155 MMHG | RESPIRATION RATE: 19 BRPM | BODY MASS INDEX: 40.85 KG/M2 | DIASTOLIC BLOOD PRESSURE: 83 MMHG | OXYGEN SATURATION: 97 % | TEMPERATURE: 98 F | HEIGHT: 62 IN | HEART RATE: 70 BPM

## 2021-07-31 DIAGNOSIS — J02.9 SORE THROAT: Primary | ICD-10-CM

## 2021-07-31 DIAGNOSIS — J01.90 ACUTE NON-RECURRENT SINUSITIS, UNSPECIFIED LOCATION: ICD-10-CM

## 2021-07-31 LAB
CTP QC/QA: YES
SARS-COV-2 RDRP RESP QL NAA+PROBE: NEGATIVE

## 2021-07-31 PROCEDURE — U0002: ICD-10-PCS | Mod: QW,CR,S$GLB, | Performed by: EMERGENCY MEDICINE

## 2021-07-31 PROCEDURE — 99213 OFFICE O/P EST LOW 20 MIN: CPT | Mod: S$GLB,CS,, | Performed by: EMERGENCY MEDICINE

## 2021-07-31 PROCEDURE — U0002 COVID-19 LAB TEST NON-CDC: HCPCS | Mod: QW,CR,S$GLB, | Performed by: EMERGENCY MEDICINE

## 2021-07-31 PROCEDURE — 99213 PR OFFICE/OUTPT VISIT, EST, LEVL III, 20-29 MIN: ICD-10-PCS | Mod: S$GLB,CS,, | Performed by: EMERGENCY MEDICINE

## 2021-07-31 RX ORDER — CLINDAMYCIN HYDROCHLORIDE 150 MG/1
150 CAPSULE ORAL 3 TIMES DAILY
Qty: 15 CAPSULE | Refills: 0 | Status: SHIPPED | OUTPATIENT
Start: 2021-07-31 | End: 2021-08-05

## 2021-08-04 ENCOUNTER — OFFICE VISIT (OUTPATIENT)
Dept: DERMATOLOGY | Facility: CLINIC | Age: 71
End: 2021-08-04
Payer: MEDICARE

## 2021-08-04 VITALS — WEIGHT: 222 LBS | HEIGHT: 62 IN | BODY MASS INDEX: 40.85 KG/M2 | RESPIRATION RATE: 18 BRPM

## 2021-08-04 DIAGNOSIS — L82.1 SEBORRHEIC KERATOSES: Primary | ICD-10-CM

## 2021-08-04 DIAGNOSIS — D48.5 NEOPLASM OF UNCERTAIN BEHAVIOR OF SKIN: ICD-10-CM

## 2021-08-04 DIAGNOSIS — D22.9 MULTIPLE BENIGN NEVI: ICD-10-CM

## 2021-08-04 PROCEDURE — 11102 PR TANGENTIAL BIOPSY, SKIN, SINGLE LESION: ICD-10-PCS | Mod: S$PBB,,, | Performed by: DERMATOLOGY

## 2021-08-04 PROCEDURE — 11103 PR TANGENTIAL BIOPSY, SKIN, EA ADDTL LESION: ICD-10-PCS | Mod: 59,S$PBB,, | Performed by: DERMATOLOGY

## 2021-08-04 PROCEDURE — 11102 TANGNTL BX SKIN SINGLE LES: CPT | Mod: S$PBB,,, | Performed by: DERMATOLOGY

## 2021-08-04 PROCEDURE — 88304 TISSUE EXAM BY PATHOLOGIST: CPT | Mod: 26,,, | Performed by: PATHOLOGY

## 2021-08-04 PROCEDURE — 99215 OFFICE O/P EST HI 40 MIN: CPT | Mod: PBBFAC,PO,25 | Performed by: DERMATOLOGY

## 2021-08-04 PROCEDURE — 11102 TANGNTL BX SKIN SINGLE LES: CPT | Mod: PBBFAC,PO | Performed by: DERMATOLOGY

## 2021-08-04 PROCEDURE — 99999 PR PBB SHADOW E&M-EST. PATIENT-LVL V: CPT | Mod: PBBFAC,,, | Performed by: DERMATOLOGY

## 2021-08-04 PROCEDURE — 88304 PR  SURG PATH,LEVEL III: ICD-10-PCS | Mod: 26,,, | Performed by: PATHOLOGY

## 2021-08-04 PROCEDURE — 99203 PR OFFICE/OUTPT VISIT, NEW, LEVL III, 30-44 MIN: ICD-10-PCS | Mod: 25,S$PBB,, | Performed by: DERMATOLOGY

## 2021-08-04 PROCEDURE — 99999 PR PBB SHADOW E&M-EST. PATIENT-LVL V: ICD-10-PCS | Mod: PBBFAC,,, | Performed by: DERMATOLOGY

## 2021-08-04 PROCEDURE — 88304 TISSUE EXAM BY PATHOLOGIST: CPT | Performed by: PATHOLOGY

## 2021-08-04 PROCEDURE — 88305 TISSUE EXAM BY PATHOLOGIST: CPT | Performed by: PATHOLOGY

## 2021-08-04 PROCEDURE — 11103 TANGNTL BX SKIN EA SEP/ADDL: CPT | Mod: 59,PBBFAC,PO | Performed by: DERMATOLOGY

## 2021-08-04 PROCEDURE — 11103 TANGNTL BX SKIN EA SEP/ADDL: CPT | Mod: 59,S$PBB,, | Performed by: DERMATOLOGY

## 2021-08-04 PROCEDURE — 88305 TISSUE EXAM BY PATHOLOGIST: ICD-10-PCS | Mod: 26,,, | Performed by: PATHOLOGY

## 2021-08-04 PROCEDURE — 88305 TISSUE EXAM BY PATHOLOGIST: CPT | Mod: 26,,, | Performed by: PATHOLOGY

## 2021-08-04 PROCEDURE — 99203 OFFICE O/P NEW LOW 30 MIN: CPT | Mod: 25,S$PBB,, | Performed by: DERMATOLOGY

## 2021-08-09 ENCOUNTER — OFFICE VISIT (OUTPATIENT)
Dept: CARDIOLOGY | Facility: CLINIC | Age: 71
End: 2021-08-09
Payer: MEDICARE

## 2021-08-09 VITALS
BODY MASS INDEX: 40.82 KG/M2 | DIASTOLIC BLOOD PRESSURE: 79 MMHG | HEIGHT: 62 IN | WEIGHT: 221.81 LBS | HEART RATE: 68 BPM | SYSTOLIC BLOOD PRESSURE: 144 MMHG

## 2021-08-09 DIAGNOSIS — Z95.810 ICD (IMPLANTABLE CARDIOVERTER-DEFIBRILLATOR) IN PLACE: Chronic | ICD-10-CM

## 2021-08-09 DIAGNOSIS — E11.40 TYPE 2 DIABETES MELLITUS WITH DIABETIC NEUROPATHY, WITH LONG-TERM CURRENT USE OF INSULIN: Primary | ICD-10-CM

## 2021-08-09 DIAGNOSIS — I44.7 LBBB (LEFT BUNDLE BRANCH BLOCK): ICD-10-CM

## 2021-08-09 DIAGNOSIS — I50.22 CHRONIC SYSTOLIC CONGESTIVE HEART FAILURE: Chronic | ICD-10-CM

## 2021-08-09 DIAGNOSIS — I10 ESSENTIAL HYPERTENSION: ICD-10-CM

## 2021-08-09 DIAGNOSIS — Z79.4 TYPE 2 DIABETES MELLITUS WITH DIABETIC NEUROPATHY, WITH LONG-TERM CURRENT USE OF INSULIN: Primary | ICD-10-CM

## 2021-08-09 PROCEDURE — 99214 PR OFFICE/OUTPT VISIT, EST, LEVL IV, 30-39 MIN: ICD-10-PCS | Mod: S$PBB,,, | Performed by: INTERNAL MEDICINE

## 2021-08-09 PROCEDURE — 99214 OFFICE O/P EST MOD 30 MIN: CPT | Mod: PBBFAC,PO | Performed by: INTERNAL MEDICINE

## 2021-08-09 PROCEDURE — 99214 OFFICE O/P EST MOD 30 MIN: CPT | Mod: S$PBB,,, | Performed by: INTERNAL MEDICINE

## 2021-08-09 PROCEDURE — 99999 PR PBB SHADOW E&M-EST. PATIENT-LVL IV: ICD-10-PCS | Mod: PBBFAC,,, | Performed by: INTERNAL MEDICINE

## 2021-08-09 PROCEDURE — 99999 PR PBB SHADOW E&M-EST. PATIENT-LVL IV: CPT | Mod: PBBFAC,,, | Performed by: INTERNAL MEDICINE

## 2021-08-10 ENCOUNTER — LAB VISIT (OUTPATIENT)
Dept: LAB | Facility: HOSPITAL | Age: 71
End: 2021-08-10
Attending: NURSE PRACTITIONER
Payer: MEDICARE

## 2021-08-10 DIAGNOSIS — Z79.4 TYPE 2 DIABETES MELLITUS WITH DIABETIC NEUROPATHY, WITH LONG-TERM CURRENT USE OF INSULIN: ICD-10-CM

## 2021-08-10 DIAGNOSIS — E11.40 TYPE 2 DIABETES MELLITUS WITH DIABETIC NEUROPATHY, WITH LONG-TERM CURRENT USE OF INSULIN: ICD-10-CM

## 2021-08-10 LAB
ESTIMATED AVG GLUCOSE: 189 MG/DL (ref 68–131)
HBA1C MFR BLD: 8.2 % (ref 4–5.6)

## 2021-08-10 PROCEDURE — 36415 COLL VENOUS BLD VENIPUNCTURE: CPT | Mod: PN | Performed by: NURSE PRACTITIONER

## 2021-08-10 PROCEDURE — 83036 HEMOGLOBIN GLYCOSYLATED A1C: CPT | Performed by: NURSE PRACTITIONER

## 2021-08-10 PROCEDURE — 80048 BASIC METABOLIC PNL TOTAL CA: CPT | Performed by: NURSE PRACTITIONER

## 2021-08-10 PROCEDURE — 80061 LIPID PANEL: CPT | Performed by: NURSE PRACTITIONER

## 2021-08-11 LAB
ANION GAP SERPL CALC-SCNC: 11 MMOL/L (ref 8–16)
BUN SERPL-MCNC: 17 MG/DL (ref 8–23)
CALCIUM SERPL-MCNC: 9.3 MG/DL (ref 8.7–10.5)
CHLORIDE SERPL-SCNC: 103 MMOL/L (ref 95–110)
CHOLEST SERPL-MCNC: 143 MG/DL (ref 120–199)
CHOLEST/HDLC SERPL: 3 {RATIO} (ref 2–5)
CO2 SERPL-SCNC: 24 MMOL/L (ref 23–29)
CREAT SERPL-MCNC: 0.8 MG/DL (ref 0.5–1.4)
EST. GFR  (AFRICAN AMERICAN): >60 ML/MIN/1.73 M^2
EST. GFR  (NON AFRICAN AMERICAN): >60 ML/MIN/1.73 M^2
FINAL PATHOLOGIC DIAGNOSIS: NORMAL
GLUCOSE SERPL-MCNC: 138 MG/DL (ref 70–110)
GROSS: NORMAL
HDLC SERPL-MCNC: 48 MG/DL (ref 40–75)
HDLC SERPL: 33.6 % (ref 20–50)
LDLC SERPL CALC-MCNC: 68.4 MG/DL (ref 63–159)
Lab: NORMAL
MICROSCOPIC EXAM: NORMAL
NONHDLC SERPL-MCNC: 95 MG/DL
POTASSIUM SERPL-SCNC: 4.5 MMOL/L (ref 3.5–5.1)
SODIUM SERPL-SCNC: 138 MMOL/L (ref 136–145)
TRIGL SERPL-MCNC: 133 MG/DL (ref 30–150)

## 2021-08-13 ENCOUNTER — PATIENT MESSAGE (OUTPATIENT)
Dept: DERMATOLOGY | Facility: CLINIC | Age: 71
End: 2021-08-13

## 2021-08-16 ENCOUNTER — TELEPHONE (OUTPATIENT)
Dept: DERMATOLOGY | Facility: CLINIC | Age: 71
End: 2021-08-16

## 2021-08-25 ENCOUNTER — TELEPHONE (OUTPATIENT)
Dept: ENDOCRINOLOGY | Facility: CLINIC | Age: 71
End: 2021-08-25

## 2021-08-25 ENCOUNTER — OFFICE VISIT (OUTPATIENT)
Dept: ENDOCRINOLOGY | Facility: CLINIC | Age: 71
End: 2021-08-25
Payer: MEDICARE

## 2021-08-25 VITALS
HEART RATE: 63 BPM | BODY MASS INDEX: 41.49 KG/M2 | HEIGHT: 62 IN | SYSTOLIC BLOOD PRESSURE: 100 MMHG | WEIGHT: 225.44 LBS | DIASTOLIC BLOOD PRESSURE: 60 MMHG

## 2021-08-25 DIAGNOSIS — E11.40 TYPE 2 DIABETES MELLITUS WITH DIABETIC NEUROPATHY, WITH LONG-TERM CURRENT USE OF INSULIN: Primary | ICD-10-CM

## 2021-08-25 DIAGNOSIS — I10 ESSENTIAL HYPERTENSION: ICD-10-CM

## 2021-08-25 DIAGNOSIS — I25.10 CORONARY ARTERY DISEASE INVOLVING NATIVE CORONARY ARTERY WITHOUT ANGINA PECTORIS, UNSPECIFIED WHETHER NATIVE OR TRANSPLANTED HEART: ICD-10-CM

## 2021-08-25 DIAGNOSIS — I50.9 CHF (NYHA CLASS III, ACC/AHA STAGE C): ICD-10-CM

## 2021-08-25 DIAGNOSIS — E89.0 POSTOPERATIVE HYPOTHYROIDISM: ICD-10-CM

## 2021-08-25 DIAGNOSIS — Z79.4 TYPE 2 DIABETES MELLITUS WITH DIABETIC NEUROPATHY, WITH LONG-TERM CURRENT USE OF INSULIN: Primary | ICD-10-CM

## 2021-08-25 DIAGNOSIS — Z87.19 HISTORY OF PANCREATITIS: ICD-10-CM

## 2021-08-25 DIAGNOSIS — E78.5 HYPERLIPIDEMIA, UNSPECIFIED HYPERLIPIDEMIA TYPE: ICD-10-CM

## 2021-08-25 DIAGNOSIS — E66.9 OBESITY (BMI 30-39.9): ICD-10-CM

## 2021-08-25 PROCEDURE — 99999 PR PBB SHADOW E&M-EST. PATIENT-LVL V: CPT | Mod: PBBFAC,,, | Performed by: NURSE PRACTITIONER

## 2021-08-25 PROCEDURE — 99215 OFFICE O/P EST HI 40 MIN: CPT | Mod: PBBFAC,PN | Performed by: NURSE PRACTITIONER

## 2021-08-25 PROCEDURE — 99214 PR OFFICE/OUTPT VISIT, EST, LEVL IV, 30-39 MIN: ICD-10-PCS | Mod: S$PBB,,, | Performed by: NURSE PRACTITIONER

## 2021-08-25 PROCEDURE — 99999 PR PBB SHADOW E&M-EST. PATIENT-LVL V: ICD-10-PCS | Mod: PBBFAC,,, | Performed by: NURSE PRACTITIONER

## 2021-08-25 PROCEDURE — 99214 OFFICE O/P EST MOD 30 MIN: CPT | Mod: S$PBB,,, | Performed by: NURSE PRACTITIONER

## 2021-08-25 RX ORDER — EMPAGLIFLOZIN 10 MG/1
10 TABLET, FILM COATED ORAL DAILY
Qty: 30 TABLET | Refills: 11 | Status: SHIPPED | OUTPATIENT
Start: 2021-08-25 | End: 2022-08-15

## 2021-08-26 ENCOUNTER — PATIENT MESSAGE (OUTPATIENT)
Dept: ENDOCRINOLOGY | Facility: CLINIC | Age: 71
End: 2021-08-26

## 2021-08-26 ENCOUNTER — TELEPHONE (OUTPATIENT)
Dept: ENDOCRINOLOGY | Facility: CLINIC | Age: 71
End: 2021-08-26

## 2021-09-04 ENCOUNTER — PATIENT MESSAGE (OUTPATIENT)
Dept: ENDOCRINOLOGY | Facility: CLINIC | Age: 71
End: 2021-09-04

## 2021-09-04 ENCOUNTER — PATIENT MESSAGE (OUTPATIENT)
Dept: DERMATOLOGY | Facility: CLINIC | Age: 71
End: 2021-09-04

## 2021-09-10 ENCOUNTER — TELEPHONE (OUTPATIENT)
Dept: CARDIOLOGY | Facility: HOSPITAL | Age: 71
End: 2021-09-10

## 2021-09-10 DIAGNOSIS — I50.9 CONGESTIVE HEART FAILURE: ICD-10-CM

## 2021-09-11 ENCOUNTER — PATIENT MESSAGE (OUTPATIENT)
Dept: ENDOCRINOLOGY | Facility: CLINIC | Age: 71
End: 2021-09-11

## 2021-09-12 ENCOUNTER — CLINICAL SUPPORT (OUTPATIENT)
Dept: CARDIOLOGY | Facility: HOSPITAL | Age: 71
End: 2021-09-12
Payer: MEDICARE

## 2021-09-12 DIAGNOSIS — Z95.810 PRESENCE OF AUTOMATIC (IMPLANTABLE) CARDIAC DEFIBRILLATOR: ICD-10-CM

## 2021-09-15 ENCOUNTER — PATIENT MESSAGE (OUTPATIENT)
Dept: CARDIOLOGY | Facility: CLINIC | Age: 71
End: 2021-09-15

## 2021-09-15 DIAGNOSIS — Z03.818 ENCNTR FOR OBS FOR SUSP EXPSR TO OTH BIOLG AGENTS RULED OUT: Primary | ICD-10-CM

## 2021-09-18 ENCOUNTER — PATIENT MESSAGE (OUTPATIENT)
Dept: ENDOCRINOLOGY | Facility: CLINIC | Age: 71
End: 2021-09-18

## 2021-09-20 ENCOUNTER — PATIENT MESSAGE (OUTPATIENT)
Dept: ENDOCRINOLOGY | Facility: CLINIC | Age: 71
End: 2021-09-20

## 2021-09-22 ENCOUNTER — TELEPHONE (OUTPATIENT)
Dept: ENDOCRINOLOGY | Facility: CLINIC | Age: 71
End: 2021-09-22

## 2021-09-22 ENCOUNTER — PATIENT MESSAGE (OUTPATIENT)
Dept: ENDOCRINOLOGY | Facility: CLINIC | Age: 71
End: 2021-09-22

## 2021-09-22 DIAGNOSIS — E11.40 TYPE 2 DIABETES MELLITUS WITH DIABETIC NEUROPATHY, WITH LONG-TERM CURRENT USE OF INSULIN: Primary | ICD-10-CM

## 2021-09-22 DIAGNOSIS — Z79.4 TYPE 2 DIABETES MELLITUS WITH DIABETIC NEUROPATHY, WITH LONG-TERM CURRENT USE OF INSULIN: Primary | ICD-10-CM

## 2021-09-23 ENCOUNTER — TELEPHONE (OUTPATIENT)
Dept: DIABETES | Facility: CLINIC | Age: 71
End: 2021-09-23

## 2021-09-24 ENCOUNTER — PATIENT MESSAGE (OUTPATIENT)
Dept: ENDOCRINOLOGY | Facility: CLINIC | Age: 71
End: 2021-09-24

## 2021-09-24 ENCOUNTER — TELEPHONE (OUTPATIENT)
Dept: DIABETES | Facility: CLINIC | Age: 71
End: 2021-09-24

## 2021-09-27 ENCOUNTER — PATIENT MESSAGE (OUTPATIENT)
Dept: ENDOCRINOLOGY | Facility: CLINIC | Age: 71
End: 2021-09-27

## 2021-09-28 ENCOUNTER — PATIENT OUTREACH (OUTPATIENT)
Dept: ADMINISTRATIVE | Facility: OTHER | Age: 71
End: 2021-09-28

## 2021-09-28 ENCOUNTER — PATIENT OUTREACH (OUTPATIENT)
Dept: DIABETES | Facility: CLINIC | Age: 71
End: 2021-09-28

## 2021-09-30 ENCOUNTER — NUTRITION (OUTPATIENT)
Dept: DIABETES | Facility: CLINIC | Age: 71
End: 2021-09-30
Payer: MEDICARE

## 2021-09-30 DIAGNOSIS — E11.40 TYPE 2 DIABETES MELLITUS WITH DIABETIC NEUROPATHY, WITH LONG-TERM CURRENT USE OF INSULIN: ICD-10-CM

## 2021-09-30 DIAGNOSIS — Z79.4 TYPE 2 DIABETES MELLITUS WITH DIABETIC NEUROPATHY, WITH LONG-TERM CURRENT USE OF INSULIN: ICD-10-CM

## 2021-09-30 PROCEDURE — 99212 OFFICE O/P EST SF 10 MIN: CPT | Mod: PBBFAC,PO | Performed by: NUTRITIONIST

## 2021-09-30 PROCEDURE — G0108 DIAB MANAGE TRN  PER INDIV: HCPCS | Mod: PBBFAC,PO | Performed by: NUTRITIONIST

## 2021-09-30 PROCEDURE — 99999 PR PBB SHADOW E&M-EST. PATIENT-LVL II: CPT | Mod: PBBFAC,,, | Performed by: NUTRITIONIST

## 2021-09-30 PROCEDURE — 99999 PR PBB SHADOW E&M-EST. PATIENT-LVL II: ICD-10-PCS | Mod: PBBFAC,,, | Performed by: NUTRITIONIST

## 2021-10-01 ENCOUNTER — LAB VISIT (OUTPATIENT)
Dept: FAMILY MEDICINE | Facility: CLINIC | Age: 71
End: 2021-10-01
Payer: MEDICARE

## 2021-10-01 DIAGNOSIS — Z03.818 ENCNTR FOR OBS FOR SUSP EXPSR TO OTH BIOLG AGENTS RULED OUT: ICD-10-CM

## 2021-10-01 PROCEDURE — U0003 INFECTIOUS AGENT DETECTION BY NUCLEIC ACID (DNA OR RNA); SEVERE ACUTE RESPIRATORY SYNDROME CORONAVIRUS 2 (SARS-COV-2) (CORONAVIRUS DISEASE [COVID-19]), AMPLIFIED PROBE TECHNIQUE, MAKING USE OF HIGH THROUGHPUT TECHNOLOGIES AS DESCRIBED BY CMS-2020-01-R: HCPCS | Performed by: INTERNAL MEDICINE

## 2021-10-01 PROCEDURE — U0005 INFEC AGEN DETEC AMPLI PROBE: HCPCS | Performed by: INTERNAL MEDICINE

## 2021-10-02 LAB
SARS-COV-2 RNA RESP QL NAA+PROBE: NOT DETECTED
SARS-COV-2- CYCLE NUMBER: NORMAL

## 2021-10-04 DIAGNOSIS — Z95.810 PRESENCE OF AUTOMATIC (IMPLANTABLE) CARDIAC DEFIBRILLATOR: Primary | ICD-10-CM

## 2021-10-04 DIAGNOSIS — I44.7 LBBB (LEFT BUNDLE BRANCH BLOCK): ICD-10-CM

## 2021-10-04 PROBLEM — I50.9 CONGESTIVE HEART FAILURE: Status: ACTIVE | Noted: 2021-10-04

## 2021-10-08 ENCOUNTER — LAB VISIT (OUTPATIENT)
Dept: LAB | Facility: HOSPITAL | Age: 71
End: 2021-10-08
Attending: NURSE PRACTITIONER
Payer: MEDICARE

## 2021-10-08 DIAGNOSIS — Z17.0 MALIGNANT NEOPLASM OF CENTRAL PORTION OF LEFT BREAST IN FEMALE, ESTROGEN RECEPTOR POSITIVE: ICD-10-CM

## 2021-10-08 DIAGNOSIS — C50.112 MALIGNANT NEOPLASM OF CENTRAL PORTION OF LEFT BREAST IN FEMALE, ESTROGEN RECEPTOR POSITIVE: ICD-10-CM

## 2021-10-08 LAB
ALBUMIN SERPL BCP-MCNC: 3.8 G/DL (ref 3.5–5.2)
ALP SERPL-CCNC: 83 U/L (ref 55–135)
ALT SERPL W/O P-5'-P-CCNC: 18 U/L (ref 10–44)
ANION GAP SERPL CALC-SCNC: 8 MMOL/L (ref 8–16)
AST SERPL-CCNC: 14 U/L (ref 10–40)
BASOPHILS # BLD AUTO: 0.05 K/UL (ref 0–0.2)
BASOPHILS NFR BLD: 0.7 % (ref 0–1.9)
BILIRUB SERPL-MCNC: 1.2 MG/DL (ref 0.1–1)
BUN SERPL-MCNC: 16 MG/DL (ref 8–23)
CALCIUM SERPL-MCNC: 9.9 MG/DL (ref 8.7–10.5)
CHLORIDE SERPL-SCNC: 107 MMOL/L (ref 95–110)
CO2 SERPL-SCNC: 28 MMOL/L (ref 23–29)
CREAT SERPL-MCNC: 1 MG/DL (ref 0.5–1.4)
DIFFERENTIAL METHOD: ABNORMAL
EOSINOPHIL # BLD AUTO: 0.2 K/UL (ref 0–0.5)
EOSINOPHIL NFR BLD: 2.9 % (ref 0–8)
ERYTHROCYTE [DISTWIDTH] IN BLOOD BY AUTOMATED COUNT: 13.6 % (ref 11.5–14.5)
EST. GFR  (AFRICAN AMERICAN): >60 ML/MIN/1.73 M^2
EST. GFR  (NON AFRICAN AMERICAN): 57 ML/MIN/1.73 M^2
GLUCOSE SERPL-MCNC: 138 MG/DL (ref 70–110)
HCT VFR BLD AUTO: 43.8 % (ref 37–48.5)
HGB BLD-MCNC: 14.2 G/DL (ref 12–16)
IMM GRANULOCYTES # BLD AUTO: 0.04 K/UL (ref 0–0.04)
IMM GRANULOCYTES NFR BLD AUTO: 0.5 % (ref 0–0.5)
LYMPHOCYTES # BLD AUTO: 1.8 K/UL (ref 1–4.8)
LYMPHOCYTES NFR BLD: 24.3 % (ref 18–48)
MCH RBC QN AUTO: 31.1 PG (ref 27–31)
MCHC RBC AUTO-ENTMCNC: 32.4 G/DL (ref 32–36)
MCV RBC AUTO: 96 FL (ref 82–98)
MONOCYTES # BLD AUTO: 0.8 K/UL (ref 0.3–1)
MONOCYTES NFR BLD: 10.4 % (ref 4–15)
NEUTROPHILS # BLD AUTO: 4.5 K/UL (ref 1.8–7.7)
NEUTROPHILS NFR BLD: 61.2 % (ref 38–73)
NRBC BLD-RTO: 0 /100 WBC
PLATELET # BLD AUTO: 195 K/UL (ref 150–450)
PMV BLD AUTO: 10.8 FL (ref 9.2–12.9)
POTASSIUM SERPL-SCNC: 4.2 MMOL/L (ref 3.5–5.1)
PROT SERPL-MCNC: 7 G/DL (ref 6–8.4)
RBC # BLD AUTO: 4.56 M/UL (ref 4–5.4)
SODIUM SERPL-SCNC: 143 MMOL/L (ref 136–145)
WBC # BLD AUTO: 7.28 K/UL (ref 3.9–12.7)

## 2021-10-08 PROCEDURE — 80053 COMPREHEN METABOLIC PANEL: CPT | Mod: PN | Performed by: NURSE PRACTITIONER

## 2021-10-08 PROCEDURE — 85025 COMPLETE CBC W/AUTO DIFF WBC: CPT | Mod: PN | Performed by: NURSE PRACTITIONER

## 2021-10-08 PROCEDURE — 36415 COLL VENOUS BLD VENIPUNCTURE: CPT | Mod: PN | Performed by: NURSE PRACTITIONER

## 2021-10-12 ENCOUNTER — PROCEDURE VISIT (OUTPATIENT)
Dept: DERMATOLOGY | Facility: CLINIC | Age: 71
End: 2021-10-12
Payer: MEDICARE

## 2021-10-12 VITALS — HEIGHT: 62 IN | WEIGHT: 215 LBS | BODY MASS INDEX: 39.56 KG/M2

## 2021-10-12 DIAGNOSIS — C44.519 BASAL CELL CARCINOMA (BCC) OF BACK: Primary | ICD-10-CM

## 2021-10-12 PROCEDURE — 17262 PR DESTR MALIG TRUNK,EXTREM 1.1-2 CM: ICD-10-PCS | Mod: S$PBB,,, | Performed by: DERMATOLOGY

## 2021-10-12 PROCEDURE — 17262 DSTRJ MAL LES T/A/L 1.1-2.0: CPT | Mod: PBBFAC,PO | Performed by: DERMATOLOGY

## 2021-10-12 PROCEDURE — 17262 DSTRJ MAL LES T/A/L 1.1-2.0: CPT | Mod: S$PBB,,, | Performed by: DERMATOLOGY

## 2021-10-13 ENCOUNTER — CLINICAL SUPPORT (OUTPATIENT)
Dept: CARDIOLOGY | Facility: HOSPITAL | Age: 71
End: 2021-10-13
Attending: INTERNAL MEDICINE
Payer: MEDICARE

## 2021-10-13 DIAGNOSIS — I44.7 LBBB (LEFT BUNDLE BRANCH BLOCK): ICD-10-CM

## 2021-10-13 DIAGNOSIS — Z95.810 PRESENCE OF AUTOMATIC (IMPLANTABLE) CARDIAC DEFIBRILLATOR: ICD-10-CM

## 2021-10-13 PROCEDURE — 93284 CARDIAC DEVICE CHECK - IN CLINIC & HOSPITAL: ICD-10-PCS | Mod: 26,,, | Performed by: INTERNAL MEDICINE

## 2021-10-13 PROCEDURE — 93284 PRGRMG EVAL IMPLANTABLE DFB: CPT | Mod: 26,,, | Performed by: INTERNAL MEDICINE

## 2021-10-14 ENCOUNTER — PATIENT MESSAGE (OUTPATIENT)
Dept: ENDOCRINOLOGY | Facility: CLINIC | Age: 71
End: 2021-10-14
Payer: MEDICARE

## 2021-10-14 ENCOUNTER — NUTRITION (OUTPATIENT)
Dept: DIABETES | Facility: CLINIC | Age: 71
End: 2021-10-14
Payer: MEDICARE

## 2021-10-14 DIAGNOSIS — Z79.4 TYPE 2 DIABETES MELLITUS WITH DIABETIC NEUROPATHY, WITH LONG-TERM CURRENT USE OF INSULIN: Primary | ICD-10-CM

## 2021-10-14 DIAGNOSIS — E11.40 TYPE 2 DIABETES MELLITUS WITH DIABETIC NEUROPATHY, WITH LONG-TERM CURRENT USE OF INSULIN: Primary | ICD-10-CM

## 2021-10-14 PROCEDURE — 99999 PR PBB SHADOW E&M-EST. PATIENT-LVL II: CPT | Mod: PBBFAC,,, | Performed by: NUTRITIONIST

## 2021-10-14 PROCEDURE — 95249 CONT GLUC MNTR PT PROV EQP: CPT | Mod: PBBFAC,PO | Performed by: NUTRITIONIST

## 2021-10-14 PROCEDURE — 99212 OFFICE O/P EST SF 10 MIN: CPT | Mod: PBBFAC,PO,25 | Performed by: NUTRITIONIST

## 2021-10-14 PROCEDURE — 99999 PR PBB SHADOW E&M-EST. PATIENT-LVL II: ICD-10-PCS | Mod: PBBFAC,,, | Performed by: NUTRITIONIST

## 2021-10-15 ENCOUNTER — PATIENT OUTREACH (OUTPATIENT)
Dept: ADMINISTRATIVE | Facility: HOSPITAL | Age: 71
End: 2021-10-15

## 2021-10-20 ENCOUNTER — PATIENT OUTREACH (OUTPATIENT)
Dept: DIABETES | Facility: CLINIC | Age: 71
End: 2021-10-20

## 2021-10-21 ENCOUNTER — PATIENT MESSAGE (OUTPATIENT)
Dept: FAMILY MEDICINE | Facility: CLINIC | Age: 71
End: 2021-10-21

## 2021-11-04 ENCOUNTER — LAB VISIT (OUTPATIENT)
Dept: LAB | Facility: HOSPITAL | Age: 71
End: 2021-11-04
Attending: NURSE PRACTITIONER
Payer: MEDICARE

## 2021-11-04 DIAGNOSIS — Z79.4 TYPE 2 DIABETES MELLITUS WITH DIABETIC NEUROPATHY, WITH LONG-TERM CURRENT USE OF INSULIN: ICD-10-CM

## 2021-11-04 DIAGNOSIS — E11.40 TYPE 2 DIABETES MELLITUS WITH DIABETIC NEUROPATHY, WITH LONG-TERM CURRENT USE OF INSULIN: ICD-10-CM

## 2021-11-04 LAB
ALBUMIN SERPL BCP-MCNC: 3.8 G/DL (ref 3.5–5.2)
ALP SERPL-CCNC: 77 U/L (ref 55–135)
ALT SERPL W/O P-5'-P-CCNC: 21 U/L (ref 10–44)
ANION GAP SERPL CALC-SCNC: 8 MMOL/L (ref 8–16)
AST SERPL-CCNC: 19 U/L (ref 10–40)
BILIRUB SERPL-MCNC: 1.5 MG/DL (ref 0.1–1)
BUN SERPL-MCNC: 13 MG/DL (ref 8–23)
CALCIUM SERPL-MCNC: 9.2 MG/DL (ref 8.7–10.5)
CHLORIDE SERPL-SCNC: 102 MMOL/L (ref 95–110)
CO2 SERPL-SCNC: 28 MMOL/L (ref 23–29)
CREAT SERPL-MCNC: 0.8 MG/DL (ref 0.5–1.4)
EST. GFR  (AFRICAN AMERICAN): >60 ML/MIN/1.73 M^2
EST. GFR  (NON AFRICAN AMERICAN): >60 ML/MIN/1.73 M^2
ESTIMATED AVG GLUCOSE: 160 MG/DL (ref 68–131)
GLUCOSE SERPL-MCNC: 75 MG/DL (ref 70–110)
HBA1C MFR BLD: 7.2 % (ref 4–5.6)
POTASSIUM SERPL-SCNC: 4.5 MMOL/L (ref 3.5–5.1)
PROT SERPL-MCNC: 6.9 G/DL (ref 6–8.4)
SODIUM SERPL-SCNC: 138 MMOL/L (ref 136–145)

## 2021-11-04 PROCEDURE — 80053 COMPREHEN METABOLIC PANEL: CPT | Performed by: NURSE PRACTITIONER

## 2021-11-04 PROCEDURE — 36415 COLL VENOUS BLD VENIPUNCTURE: CPT | Mod: PN | Performed by: NURSE PRACTITIONER

## 2021-11-04 PROCEDURE — 83036 HEMOGLOBIN GLYCOSYLATED A1C: CPT | Performed by: NURSE PRACTITIONER

## 2021-11-11 ENCOUNTER — OFFICE VISIT (OUTPATIENT)
Dept: ENDOCRINOLOGY | Facility: CLINIC | Age: 71
End: 2021-11-11
Payer: MEDICARE

## 2021-11-11 ENCOUNTER — PATIENT MESSAGE (OUTPATIENT)
Dept: CARDIOLOGY | Facility: CLINIC | Age: 71
End: 2021-11-11
Payer: MEDICARE

## 2021-11-11 VITALS
BODY MASS INDEX: 40.38 KG/M2 | WEIGHT: 219.44 LBS | DIASTOLIC BLOOD PRESSURE: 70 MMHG | HEART RATE: 67 BPM | SYSTOLIC BLOOD PRESSURE: 114 MMHG | HEIGHT: 62 IN

## 2021-11-11 DIAGNOSIS — E11.40 TYPE 2 DIABETES MELLITUS WITH DIABETIC NEUROPATHY, WITH LONG-TERM CURRENT USE OF INSULIN: Primary | ICD-10-CM

## 2021-11-11 DIAGNOSIS — I50.9 CHF (NYHA CLASS III, ACC/AHA STAGE C): ICD-10-CM

## 2021-11-11 DIAGNOSIS — Z87.19 HISTORY OF PANCREATITIS: ICD-10-CM

## 2021-11-11 DIAGNOSIS — I25.10 CORONARY ARTERY DISEASE INVOLVING NATIVE CORONARY ARTERY WITHOUT ANGINA PECTORIS, UNSPECIFIED WHETHER NATIVE OR TRANSPLANTED HEART: ICD-10-CM

## 2021-11-11 DIAGNOSIS — E78.5 HYPERLIPIDEMIA, UNSPECIFIED HYPERLIPIDEMIA TYPE: ICD-10-CM

## 2021-11-11 DIAGNOSIS — E89.0 POSTOPERATIVE HYPOTHYROIDISM: ICD-10-CM

## 2021-11-11 DIAGNOSIS — Z79.4 TYPE 2 DIABETES MELLITUS WITH DIABETIC NEUROPATHY, WITH LONG-TERM CURRENT USE OF INSULIN: Primary | ICD-10-CM

## 2021-11-11 DIAGNOSIS — I10 ESSENTIAL HYPERTENSION: ICD-10-CM

## 2021-11-11 DIAGNOSIS — E66.9 OBESITY (BMI 30-39.9): ICD-10-CM

## 2021-11-11 PROCEDURE — 95251 PR GLUCOSE MONITOR, 72 HOUR, PHYS INTERP: ICD-10-PCS | Mod: ,,, | Performed by: NURSE PRACTITIONER

## 2021-11-11 PROCEDURE — 95251 CONT GLUC MNTR ANALYSIS I&R: CPT | Mod: ,,, | Performed by: NURSE PRACTITIONER

## 2021-11-11 PROCEDURE — 99215 OFFICE O/P EST HI 40 MIN: CPT | Mod: PBBFAC,PN | Performed by: NURSE PRACTITIONER

## 2021-11-11 PROCEDURE — 99214 PR OFFICE/OUTPT VISIT, EST, LEVL IV, 30-39 MIN: ICD-10-PCS | Mod: S$PBB,,, | Performed by: NURSE PRACTITIONER

## 2021-11-11 PROCEDURE — 99999 PR PBB SHADOW E&M-EST. PATIENT-LVL V: CPT | Mod: PBBFAC,,, | Performed by: NURSE PRACTITIONER

## 2021-11-11 PROCEDURE — 99999 PR PBB SHADOW E&M-EST. PATIENT-LVL V: ICD-10-PCS | Mod: PBBFAC,,, | Performed by: NURSE PRACTITIONER

## 2021-11-11 PROCEDURE — 99214 OFFICE O/P EST MOD 30 MIN: CPT | Mod: S$PBB,,, | Performed by: NURSE PRACTITIONER

## 2021-12-18 ENCOUNTER — PATIENT MESSAGE (OUTPATIENT)
Dept: DIABETES | Facility: CLINIC | Age: 71
End: 2021-12-18
Payer: MEDICARE

## 2022-01-10 RX ORDER — LEVOTHYROXINE SODIUM 137 UG/1
TABLET ORAL
Qty: 90 TABLET | Refills: 1 | Status: SHIPPED | OUTPATIENT
Start: 2022-01-10 | End: 2022-06-03

## 2022-01-12 ENCOUNTER — CLINICAL SUPPORT (OUTPATIENT)
Dept: CARDIOLOGY | Facility: HOSPITAL | Age: 72
End: 2022-01-12
Payer: MEDICARE

## 2022-01-12 DIAGNOSIS — Z95.810 PRESENCE OF AUTOMATIC (IMPLANTABLE) CARDIAC DEFIBRILLATOR: ICD-10-CM

## 2022-01-12 PROCEDURE — 93295 DEV INTERROG REMOTE 1/2/MLT: CPT | Mod: ,,, | Performed by: INTERNAL MEDICINE

## 2022-01-12 PROCEDURE — 93295 CARDIAC DEVICE CHECK - REMOTE: ICD-10-PCS | Mod: ,,, | Performed by: INTERNAL MEDICINE

## 2022-02-03 ENCOUNTER — LAB VISIT (OUTPATIENT)
Dept: LAB | Facility: HOSPITAL | Age: 72
End: 2022-02-03
Attending: NURSE PRACTITIONER
Payer: MEDICARE

## 2022-02-03 DIAGNOSIS — Z79.4 TYPE 2 DIABETES MELLITUS WITH DIABETIC NEUROPATHY, WITH LONG-TERM CURRENT USE OF INSULIN: ICD-10-CM

## 2022-02-03 DIAGNOSIS — E11.40 TYPE 2 DIABETES MELLITUS WITH DIABETIC NEUROPATHY, WITH LONG-TERM CURRENT USE OF INSULIN: ICD-10-CM

## 2022-02-03 LAB
ESTIMATED AVG GLUCOSE: 160 MG/DL (ref 68–131)
HBA1C MFR BLD: 7.2 % (ref 4–5.6)

## 2022-02-03 PROCEDURE — 84443 ASSAY THYROID STIM HORMONE: CPT | Performed by: NURSE PRACTITIONER

## 2022-02-03 PROCEDURE — 83036 HEMOGLOBIN GLYCOSYLATED A1C: CPT | Performed by: NURSE PRACTITIONER

## 2022-02-03 PROCEDURE — 36415 COLL VENOUS BLD VENIPUNCTURE: CPT | Mod: PN | Performed by: NURSE PRACTITIONER

## 2022-02-03 PROCEDURE — 80048 BASIC METABOLIC PNL TOTAL CA: CPT | Performed by: NURSE PRACTITIONER

## 2022-02-04 LAB
ANION GAP SERPL CALC-SCNC: 14 MMOL/L (ref 8–16)
BUN SERPL-MCNC: 17 MG/DL (ref 8–23)
CALCIUM SERPL-MCNC: 9.9 MG/DL (ref 8.7–10.5)
CHLORIDE SERPL-SCNC: 106 MMOL/L (ref 95–110)
CO2 SERPL-SCNC: 22 MMOL/L (ref 23–29)
CREAT SERPL-MCNC: 0.9 MG/DL (ref 0.5–1.4)
EST. GFR  (AFRICAN AMERICAN): >60 ML/MIN/1.73 M^2
EST. GFR  (NON AFRICAN AMERICAN): >60 ML/MIN/1.73 M^2
GLUCOSE SERPL-MCNC: 84 MG/DL (ref 70–110)
POTASSIUM SERPL-SCNC: 4.4 MMOL/L (ref 3.5–5.1)
SODIUM SERPL-SCNC: 142 MMOL/L (ref 136–145)
TSH SERPL DL<=0.005 MIU/L-ACNC: 0.76 UIU/ML (ref 0.4–4)

## 2022-02-10 ENCOUNTER — OFFICE VISIT (OUTPATIENT)
Dept: ENDOCRINOLOGY | Facility: CLINIC | Age: 72
End: 2022-02-10
Payer: MEDICARE

## 2022-02-10 VITALS
HEIGHT: 62 IN | SYSTOLIC BLOOD PRESSURE: 120 MMHG | BODY MASS INDEX: 40.47 KG/M2 | HEART RATE: 68 BPM | WEIGHT: 219.94 LBS | DIASTOLIC BLOOD PRESSURE: 80 MMHG

## 2022-02-10 DIAGNOSIS — E66.9 OBESITY (BMI 30-39.9): ICD-10-CM

## 2022-02-10 DIAGNOSIS — E11.40 TYPE 2 DIABETES MELLITUS WITH DIABETIC NEUROPATHY, WITH LONG-TERM CURRENT USE OF INSULIN: Primary | ICD-10-CM

## 2022-02-10 DIAGNOSIS — E78.5 HYPERLIPIDEMIA, UNSPECIFIED HYPERLIPIDEMIA TYPE: ICD-10-CM

## 2022-02-10 DIAGNOSIS — E89.0 POSTOPERATIVE HYPOTHYROIDISM: ICD-10-CM

## 2022-02-10 DIAGNOSIS — I25.10 CORONARY ARTERY DISEASE INVOLVING NATIVE CORONARY ARTERY WITHOUT ANGINA PECTORIS, UNSPECIFIED WHETHER NATIVE OR TRANSPLANTED HEART: ICD-10-CM

## 2022-02-10 DIAGNOSIS — Z87.19 HISTORY OF PANCREATITIS: ICD-10-CM

## 2022-02-10 DIAGNOSIS — I10 ESSENTIAL HYPERTENSION: ICD-10-CM

## 2022-02-10 DIAGNOSIS — Z79.4 TYPE 2 DIABETES MELLITUS WITH DIABETIC NEUROPATHY, WITH LONG-TERM CURRENT USE OF INSULIN: Primary | ICD-10-CM

## 2022-02-10 DIAGNOSIS — I50.9 CHF (NYHA CLASS III, ACC/AHA STAGE C): ICD-10-CM

## 2022-02-10 PROCEDURE — 95251 PR GLUCOSE MONITOR, 72 HOUR, PHYS INTERP: ICD-10-PCS | Mod: ,,, | Performed by: NURSE PRACTITIONER

## 2022-02-10 PROCEDURE — 99999 PR PBB SHADOW E&M-EST. PATIENT-LVL V: ICD-10-PCS | Mod: PBBFAC,,, | Performed by: NURSE PRACTITIONER

## 2022-02-10 PROCEDURE — 99214 PR OFFICE/OUTPT VISIT, EST, LEVL IV, 30-39 MIN: ICD-10-PCS | Mod: S$PBB,,, | Performed by: NURSE PRACTITIONER

## 2022-02-10 PROCEDURE — 95251 CONT GLUC MNTR ANALYSIS I&R: CPT | Mod: ,,, | Performed by: NURSE PRACTITIONER

## 2022-02-10 PROCEDURE — 99999 PR PBB SHADOW E&M-EST. PATIENT-LVL V: CPT | Mod: PBBFAC,,, | Performed by: NURSE PRACTITIONER

## 2022-02-10 PROCEDURE — 99214 OFFICE O/P EST MOD 30 MIN: CPT | Mod: S$PBB,,, | Performed by: NURSE PRACTITIONER

## 2022-02-10 PROCEDURE — 99215 OFFICE O/P EST HI 40 MIN: CPT | Mod: PBBFAC,PN | Performed by: NURSE PRACTITIONER

## 2022-02-10 RX ORDER — INSULIN DEGLUDEC 200 U/ML
50 INJECTION, SOLUTION SUBCUTANEOUS DAILY
Start: 2022-02-10 | End: 2022-02-14 | Stop reason: SDUPTHER

## 2022-02-10 RX ORDER — INSULIN LISPRO 100 [IU]/ML
INJECTION, SOLUTION INTRAVENOUS; SUBCUTANEOUS
Qty: 75 ML | Refills: 3
Start: 2022-02-10 | End: 2022-04-14 | Stop reason: SDUPTHER

## 2022-02-10 NOTE — PROGRESS NOTES
CC: Ms. Mckenzie Mari arrives today for management of Type 2 DM and review of chronic medical conditions.     HPI: Ms. Mckenzie Mari was diagnosed with Type 2 DM in 2004. She was diagnosed based on lab work. Initial treatment consisted of metformin and glimepiride. Insulin added in 8/2016 - began Toujeo. She states that she felt that this caused nausea, abd pain, chest pain. Lantus caused throat swelling, left arm pain. She was then changed to Novolog 70/30 but this was only used for a short period because her insurance didn't cover.  Then changed to Humalog 50-50 in 12/2016. In 2017, she began MDI with Tresiba and Humalog. Jardiance added in 8/2021. Began use of Dexcom G6 in 2021.  + FH of DM in maternal aunt and cousin. Denies hospitalizations due to DM. Previously seen in endocrine by Richard Gupta, and MAGALI Eng, ARIELLE. She has a history of thyroid cancer/post-surgical hypothyroidism.   Of note, she has a h/o pancreatitis.   She follows annually with cardiology for CHF, CAD.    Historically, insulin management has been challenging because patient believes insulin causes hyperglycemia.     Last seen by me in November.     BG monitoring per Dexcom G6. She states that her sensor recently was reading low but fingerstick was normal. Dexcom replaced this sensor.    Hypoglycemia: No, however, feels symptoms of hypoglycemia with glucoses < 140.      Missing Insulin/PO medication doses: No  Timing prandial insulin 5-15 minutes before meals: yes    Exercise: no    Dietary Habits: Eats 3 meals/day. Snacking occasionally. Avoids sugary drinks.    Last DM education: 1/2019         CURRENT DIABETIC MEDS: Jardiance 10 mg daily, Tresiba 56 units QAM, Humalog 16 units AC + correction scale, target 180, ISF 25  Vial or pen: pen  Glucometer type: [facility] True Metrix    Previous DM treatments:   Invokana - nausea  Glimepiride - stopped when prandial insulin added  Touejo -  Nausea, abd pain, chest pain  Lantus - throat  "swelling, left arm pain  Novolog 70/30 - not covered by insurance  Metformin - headaches    Last Eye Exam: 2/26/2020 - No D[facility]ava Report on file. Cannot recall provider.   Last Podiatry Exam: no    REVIEW OF SYSTEMS  Constitutional: no c/o fatigue, weakness. + 6# weight loss since starting Jardiance.   Eyes: no c/o visual disturbances.   Cardiac: no palpitations. + intermittent episodes of chest pain. Not occurring now. Follows with cardiology.   Respiratory: no cough. C/o dyspnea that is chronic. cardiology aware.   GI: no c/o abdominal pain, nausea. + H/o pancreatitis. Takes Culturelle for abdominal "soreness" that seems to relieve this symptom.  Skin: no rashes, lesions  Neuro: + numbness, tingling in feet   Endocrine: denies polyphagia, polydipsia, polyuria      Personally reviewed Past Medical, Surgical, Social History.    Vital Signs  /80   Pulse 68   Ht 5' 2" (1.575 m)   Wt 99.7 kg (219 lb 14.5 oz)   BMI 40.22 kg/m²     Personally reviewed the below labs:    Hemoglobin A1C   Date Value Ref Range Status   02/03/2022 7.2 (H) 4.0 - 5.6 % Final     Comment:     ADA Screening Guidelines:  5.7-6.4%  Consistent with prediabetes  >or=6.5%  Consistent with diabetes    High levels of fetal hemoglobin interfere with the HbA1C  assay. Heterozygous hemoglobin variants (HbS, HgC, etc)do  not significantly interfere with this assay.   However, presence of multiple variants may affect accuracy.     11/04/2021 7.2 (H) 4.0 - 5.6 % Final     Comment:     ADA Screening Guidelines:  5.7-6.4%  Consistent with prediabetes  >or=6.5%  Consistent with diabetes    High levels of fetal hemoglobin interfere with the HbA1C  assay. Heterozygous hemoglobin variants (HbS, HgC, etc)do  not significantly interfere with this assay.   However, presence of multiple variants may affect accuracy.     08/10/2021 8.2 (H) 4.0 - 5.6 % Final     Comment:     ADA Screening Guidelines:  5.7-6.4%  Consistent with prediabetes  >or=6.5%  Consistent " with diabetes    High levels of fetal hemoglobin interfere with the HbA1C  assay. Heterozygous hemoglobin variants (HbS, HgC, etc)do  not significantly interfere with this assay.   However, presence of multiple variants may affect accuracy.         Chemistry        Component Value Date/Time     02/03/2022 0750    K 4.4 02/03/2022 0750     02/03/2022 0750    CO2 22 (L) 02/03/2022 0750    BUN 17 02/03/2022 0750    CREATININE 0.9 02/03/2022 0750    GLU 84 02/03/2022 0750        Component Value Date/Time    CALCIUM 9.9 02/03/2022 0750    ALKPHOS 77 11/04/2021 0719    AST 19 11/04/2021 0719    ALT 21 11/04/2021 0719    BILITOT 1.5 (H) 11/04/2021 0719          Lab Results   Component Value Date    CHOL 143 08/10/2021    CHOL 125 09/25/2020    CHOL 253 (H) 06/25/2020     Lab Results   Component Value Date    HDL 48 08/10/2021    HDL 45 09/25/2020    HDL 48 06/25/2020     Lab Results   Component Value Date    LDLCALC 68.4 08/10/2021    LDLCALC 53.8 (L) 09/25/2020    LDLCALC 155.8 06/25/2020     Lab Results   Component Value Date    TRIG 133 08/10/2021    TRIG 131 09/25/2020    TRIG 246 (H) 06/25/2020     Lab Results   Component Value Date    CHOLHDL 33.6 08/10/2021    CHOLHDL 36.0 09/25/2020    CHOLHDL 19.0 (L) 06/25/2020       Lab Results   Component Value Date    MICALBCREAT 23.4 05/20/2021     Lab Results   Component Value Date    TSH 0.764 02/03/2022       CrCl cannot be calculated (Patient's most recent lab result is older than the maximum 7 days allowed.).    Vit D, 25-Hydroxy   Date Value Ref Range Status   11/08/2014 51 30 - 96 ng/mL Final     Comment:     Vitamin D deficiency.........<10 ng/mL                              Vitamin D insufficiency......10-29 ng/mL       Vitamin D sufficiency........> or equal to 30 ng/mL  Vitamin D toxicity............>100 ng/mL          Ref. Range 6/25/2020 07:27   FRUCTOSAMINE Latest Ref Range: SeeBelow umol /L 291       March 2019 CGMS results: Fasting glucose is  acceptable for what patient will allow (feels symptomatic with glucose < 150). Two fasting glucoses didn't correlate with SMBG glucose on her log. Minimal prandial excursions are noted. No true hypoglycemia but rebound noted after borderline nocturnal episode. Eats 3 meals/day, some of which are lower carb, and snacks on either SF pudding or small piece of chocolate.   Average glucose: 138  % above target: 2%  % in target: 98%  % below target: 0%      PHYSICAL EXAMINATION  Constitutional: Appears well, no distress.  Neck: Supple, trachea midline.  Respiratory: CTA, even and unlabored.  Cardiovascular: RRR, no murmurs, no carotid bruits.   GI: active bowel sounds, no hernia  Skin: warm and dry  Neuro: oriented to person, place, time.   Feet: appropriate footwear.  Protective Sensation (w/ 10 gram monofilament):  Right: Intact  Left: Intact    Visual Inspection:  Dry Skin -  Bilateral    Pedal Pulses:   Right: Present  Left: Present    Posterior tibialis:   Right:Present  Left: Present        DEXCOM DOWNLOAD: See media file for details. Fasting glucoses are within goal. Rare prandial excursions. One episode of nocturnal hypoglycemia that the patient can't determine whether this was accurate or a sensor issue.   Average glucose: 139 mg/dL  Above 250 mg/dL: <1 %  181-250 mg/dL: 6 %   mg/dL: 92 %  54-69 mg/dL: <1 %  Below 54 mg/dL: 1 %        Goals      HEMOGLOBIN A1C < 7.5        due to CAD, CHF, patient's desire for glucose to remain >150.       Assessment/Plan  1. Type 2 diabetes mellitus with diabetic neuropathy, with long-term current use of insulin  -- A1c remains within individualized goal.  -- can trial decreasing Tresiba 50 units QAM  -- can trial decreasing Humalog to 14 units. Continue correction scale.  -- continue Jardiance   -- BG monitoring per Dexcom G6  -- Would not use Actos, due to CHF. GLP-1RA and DPP4-I contraindicated due to h/o pancreatitis. Cannot tolerate metformin.     -- Discussed  diagnosis of DM, A1c goals, progression of disease, long term complications and tx options.  Advised patient to check BG before activities, such as driving or exercise.  -- Reviewed hypoglycemia management: treat with 1/2 glass of juice, 1/2 can regular coke, or 4 glucose tablets. Monitor and repeat treatment every 15 minutes until BG is >70 Then have a snack, which includes a complex carbohydrate and protein.    -- takes statin and ARB     2. Coronary artery disease involving native coronary artery without angina pectoris, unspecified whether native or transplanted heart  -- stable, avoid hyopglycemia  -- Jardiance will offer cardiac benefit.     3. CHF (NYHA class III, ACC/AHA stage C)  -- follows with cardiology   4. Postoperative hypothyroidism  -- stable  -- Continue Synthroid 137 mcg daily   5. Essential hypertension  -- controlled   -- continue ARB   6. Hyperlipidemia, unspecified hyperlipidemia type  -- controlled  -- continue Crestor every other day   7. Obesity (BMI 30-39.9)  -- improving   Body mass index is 40.22 kg/m².   8. History of pancreatitis  -- avoid GLP-1RA and DPP4-i       FOLLOW UP  Follow up in about 4 months (around 6/10/2022).   Patient instructed to bring BG logs to each follow up.   Patient encouraged to call for any BG/medication issues, concerns, or questions.      Orders Placed This Encounter   Procedures    Hemoglobin A1C    Microalbumin/Creatinine Ratio, Urine    Comprehensive Metabolic Panel

## 2022-02-11 ENCOUNTER — PATIENT MESSAGE (OUTPATIENT)
Dept: ENDOCRINOLOGY | Facility: CLINIC | Age: 72
End: 2022-02-11
Payer: MEDICARE

## 2022-02-11 DIAGNOSIS — E11.40 TYPE 2 DIABETES MELLITUS WITH DIABETIC NEUROPATHY, WITH LONG-TERM CURRENT USE OF INSULIN: ICD-10-CM

## 2022-02-11 DIAGNOSIS — Z79.4 TYPE 2 DIABETES MELLITUS WITH DIABETIC NEUROPATHY, WITH LONG-TERM CURRENT USE OF INSULIN: ICD-10-CM

## 2022-02-12 ENCOUNTER — PATIENT MESSAGE (OUTPATIENT)
Dept: ENDOCRINOLOGY | Facility: CLINIC | Age: 72
End: 2022-02-12
Payer: MEDICARE

## 2022-02-14 ENCOUNTER — TELEPHONE (OUTPATIENT)
Dept: ENDOCRINOLOGY | Facility: CLINIC | Age: 72
End: 2022-02-14
Payer: MEDICARE

## 2022-02-14 ENCOUNTER — OFFICE VISIT (OUTPATIENT)
Dept: FAMILY MEDICINE | Facility: CLINIC | Age: 72
End: 2022-02-14
Payer: MEDICARE

## 2022-02-14 ENCOUNTER — HOSPITAL ENCOUNTER (OUTPATIENT)
Dept: RADIOLOGY | Facility: HOSPITAL | Age: 72
Discharge: HOME | End: 2022-02-14
Attending: PHYSICIAN ASSISTANT
Payer: MEDICARE

## 2022-02-14 VITALS
BODY MASS INDEX: 40.67 KG/M2 | DIASTOLIC BLOOD PRESSURE: 66 MMHG | OXYGEN SATURATION: 97 % | HEART RATE: 65 BPM | HEIGHT: 62 IN | WEIGHT: 221 LBS | SYSTOLIC BLOOD PRESSURE: 102 MMHG

## 2022-02-14 DIAGNOSIS — R10.84 GENERALIZED ABDOMINAL DISCOMFORT: Primary | ICD-10-CM

## 2022-02-14 DIAGNOSIS — R10.84 GENERALIZED ABDOMINAL PAIN: ICD-10-CM

## 2022-02-14 DIAGNOSIS — R91.8 PULMONARY NODULES: ICD-10-CM

## 2022-02-14 PROCEDURE — 99214 PR OFFICE/OUTPT VISIT, EST, LEVL IV, 30-39 MIN: ICD-10-PCS | Mod: S$PBB,,, | Performed by: PHYSICIAN ASSISTANT

## 2022-02-14 PROCEDURE — 74176 CT ABD & PELVIS W/O CONTRAST: CPT | Mod: 26,,, | Performed by: RADIOLOGY

## 2022-02-14 PROCEDURE — 99999 PR PBB SHADOW E&M-EST. PATIENT-LVL V: CPT | Mod: PBBFAC,,, | Performed by: PHYSICIAN ASSISTANT

## 2022-02-14 PROCEDURE — 99999 PR PBB SHADOW E&M-EST. PATIENT-LVL V: ICD-10-PCS | Mod: PBBFAC,,, | Performed by: PHYSICIAN ASSISTANT

## 2022-02-14 PROCEDURE — 99215 OFFICE O/P EST HI 40 MIN: CPT | Mod: PBBFAC,25,PO | Performed by: PHYSICIAN ASSISTANT

## 2022-02-14 PROCEDURE — 74176 CT ABDOMEN PELVIS WITHOUT CONTRAST: ICD-10-PCS | Mod: 26,,, | Performed by: RADIOLOGY

## 2022-02-14 PROCEDURE — 74176 CT ABD & PELVIS W/O CONTRAST: CPT | Mod: TC,PO

## 2022-02-14 PROCEDURE — 99214 OFFICE O/P EST MOD 30 MIN: CPT | Mod: S$PBB,,, | Performed by: PHYSICIAN ASSISTANT

## 2022-02-14 RX ORDER — INSULIN DEGLUDEC 200 U/ML
50 INJECTION, SOLUTION SUBCUTANEOUS DAILY
Qty: 9 PEN | Refills: 3 | Status: SHIPPED | OUTPATIENT
Start: 2022-02-14 | End: 2022-06-08

## 2022-02-14 NOTE — PROGRESS NOTES
"Subjective:      Patient ID: Mckenzie Mari is a 71 y.o. female.    Chief Complaint: Abdominal Pain (Bloating when eating, abdomen gets hard, no constipation or diarrhea, sharp pains under breasts, bilateral, started on left side, feels like knot)  Patient is new to me.    HPI   Patient has PMH of breast and thyroid cancer, hypothyroidism, Type 2 DM, CHF, HTN, HLD, CAD, CVID, colon polyps, and pancreatitis.    Patient reports worsening abdominal bloating, burning in lower abdomen, and back pain for 10 days.  She reports she is straining a lot while having small BMs.  Taking culturelle without resolution of symptoms.  Never had this happen before.  Drinking plenty of water.  Diagnosed with IBS in the past.  History of diverticulitis.  Patient denies fever, chest pain, or new shortness of breath.    Blood sugar was 102 this morning.  Lab Results   Component Value Date    HGBA1C 7.2 (H) 02/03/2022     Review of Systems   Constitutional: Positive for appetite change. Negative for chills and fever.   HENT: Negative for ear pain.    Eyes: Negative for pain.   Respiratory: Positive for cough (slight intermittent) and shortness of breath (baseline).    Cardiovascular: Negative for chest pain.   Gastrointestinal: Positive for abdominal distention, abdominal pain, blood in stool (light) and diarrhea (one bout in the last 2-3 days). Negative for constipation, nausea and vomiting.   Musculoskeletal: Positive for back pain.   Neurological: Positive for headaches (intermittent). Negative for dizziness and light-headedness.   Psychiatric/Behavioral: Negative for sleep disturbance.       Objective:   /66   Pulse 65   Ht 5' 2" (1.575 m)   Wt 100.3 kg (221 lb 0.2 oz)   SpO2 97%   BMI 40.42 kg/m²      Physical Exam  Vitals reviewed.   Constitutional:       General: She is not in acute distress.     Appearance: Normal appearance. She is well-developed, well-groomed and well-nourished.   HENT:      Head: Normocephalic and " atraumatic.      Right Ear: Hearing and external ear normal.      Left Ear: Hearing and external ear normal.   Eyes:      General: Lids are normal.      Extraocular Movements: EOM normal.      Conjunctiva/sclera: Conjunctivae normal.   Neck:      Trachea: Phonation normal.   Cardiovascular:      Rate and Rhythm: Normal rate and regular rhythm.      Heart sounds: Normal heart sounds. No murmur heard.  No friction rub. No gallop.    Pulmonary:      Effort: Pulmonary effort is normal. No respiratory distress.      Breath sounds: Normal breath sounds. No decreased breath sounds, wheezing, rhonchi or rales.   Abdominal:      General: There is no distension.      Palpations: Abdomen is soft.      Tenderness: There is generalized abdominal tenderness.   Musculoskeletal:         General: Normal range of motion.      Cervical back: Normal range of motion.   Skin:     General: Skin is warm and dry.      Findings: No rash.   Neurological:      General: No focal deficit present.      Mental Status: She is alert and oriented to person, place, and time.   Psychiatric:         Attention and Perception: Attention normal.         Mood and Affect: Mood and affect and mood normal.         Speech: Speech normal.         Behavior: Behavior normal. Behavior is cooperative.         Judgment: Judgment normal.        Assessment:      1. Generalized abdominal discomfort    2. Pulmonary nodules       Plan:   1. Generalized abdominal discomfort  Differential includes diverticulitis or constipation.  Advised to take 1/2 capful of miralax in a tall glass of water and let us know if symptoms persist.  CT abdomen without contrast today.  - CBC Auto Differential; Future  - Comprehensive Metabolic Panel; Future    2. Pulmonary nodules  Schedule in 3 months.  - CT Chest Without Contrast; Future    Follow up as needed.  Patient agreed with plan and expressed understanding.    Thank you for allowing me to serve you,

## 2022-03-15 ENCOUNTER — TELEPHONE (OUTPATIENT)
Dept: FAMILY MEDICINE | Facility: CLINIC | Age: 72
End: 2022-03-15
Payer: MEDICARE

## 2022-03-15 NOTE — TELEPHONE ENCOUNTER
Called and spoke to patient. She would like to be seen tomorrow. She states she does not want a virtual visit, but someone can call her.

## 2022-03-15 NOTE — TELEPHONE ENCOUNTER
----- Message from Honey Geller sent at 3/15/2022  4:49 PM CDT -----  Type:  Sooner Apoointment Request    Caller is requesting a sooner appointment.  Caller declined first available appointment listed below.  Caller will not accept being placed on the waitlist and is requesting a message be sent to doctor.  Name of Caller:Mckenzie Mari     When is the first available appointment?4/26/22    Symptoms:congestion Pain in chest and  she coughs its yellow and green     Would the patient rather a call back or a response via [facility]? Call back     Best Call Back Number:[phone] or [phone] (mobile)     Additional Information:

## 2022-03-16 ENCOUNTER — OFFICE VISIT (OUTPATIENT)
Dept: URGENT CARE | Facility: CLINIC | Age: 72
End: 2022-03-16
Payer: MEDICARE

## 2022-03-16 VITALS
HEIGHT: 62 IN | HEART RATE: 68 BPM | RESPIRATION RATE: 17 BRPM | DIASTOLIC BLOOD PRESSURE: 100 MMHG | BODY MASS INDEX: 41.96 KG/M2 | TEMPERATURE: 98 F | OXYGEN SATURATION: 97 % | SYSTOLIC BLOOD PRESSURE: 140 MMHG | WEIGHT: 228 LBS

## 2022-03-16 DIAGNOSIS — J32.9 BACTERIAL SINUSITIS: Primary | ICD-10-CM

## 2022-03-16 DIAGNOSIS — R05.9 COUGH: ICD-10-CM

## 2022-03-16 DIAGNOSIS — R09.81 NASAL CONGESTION: ICD-10-CM

## 2022-03-16 DIAGNOSIS — B96.89 BACTERIAL SINUSITIS: Primary | ICD-10-CM

## 2022-03-16 LAB
CTP QC/QA: YES
CTP QC/QA: YES
POC MOLECULAR INFLUENZA A AGN: NEGATIVE
POC MOLECULAR INFLUENZA B AGN: NEGATIVE
SARS-COV-2 RDRP RESP QL NAA+PROBE: NEGATIVE

## 2022-03-16 PROCEDURE — U0002: ICD-10-PCS | Mod: QW,CR,S$GLB, | Performed by: PHYSICIAN ASSISTANT

## 2022-03-16 PROCEDURE — 71046 XR CHEST PA AND LATERAL: ICD-10-PCS | Mod: S$GLB,,, | Performed by: RADIOLOGY

## 2022-03-16 PROCEDURE — 71046 X-RAY EXAM CHEST 2 VIEWS: CPT | Mod: S$GLB,,, | Performed by: RADIOLOGY

## 2022-03-16 PROCEDURE — 87502 INFLUENZA DNA AMP PROBE: CPT | Mod: QW,S$GLB,, | Performed by: PHYSICIAN ASSISTANT

## 2022-03-16 PROCEDURE — 87502 POCT INFLUENZA A/B MOLECULAR: ICD-10-PCS | Mod: QW,S$GLB,, | Performed by: PHYSICIAN ASSISTANT

## 2022-03-16 PROCEDURE — U0002 COVID-19 LAB TEST NON-CDC: HCPCS | Mod: QW,CR,S$GLB, | Performed by: PHYSICIAN ASSISTANT

## 2022-03-16 PROCEDURE — 99213 PR OFFICE/OUTPT VISIT, EST, LEVL III, 20-29 MIN: ICD-10-PCS | Mod: S$GLB,CS,, | Performed by: PHYSICIAN ASSISTANT

## 2022-03-16 PROCEDURE — 99213 OFFICE O/P EST LOW 20 MIN: CPT | Mod: S$GLB,CS,, | Performed by: PHYSICIAN ASSISTANT

## 2022-03-16 RX ORDER — CLINDAMYCIN HYDROCHLORIDE 150 MG/1
150 CAPSULE ORAL 3 TIMES DAILY
Qty: 21 CAPSULE | Refills: 0 | Status: SHIPPED | OUTPATIENT
Start: 2022-03-16 | End: 2022-03-23

## 2022-03-16 NOTE — PROGRESS NOTES
"Subjective:       Patient ID: Mckenzie Mari is a 71 y.o. female.    Vitals:  height is 5' 2" (1.575 m) and weight is 103.4 kg (228 lb). Her temperature is 98 °F (36.7 °C). Her blood pressure is 140/100 (abnormal) and her pulse is 68. Her respiration is 17 and oxygen saturation is 97%.     Chief Complaint: Cough    Patient presents to clinic with cold symptoms. Patient states symptoms started 4-5 days ago.  Symptoms: nasal congestion, cough(greenish mucus), chest pain, postnasal drip, bilateral ear congestion.    Cough  This is a new problem. The current episode started 1 to 4 weeks ago. The problem has been gradually worsening. The problem occurs constantly. Associated symptoms include eye redness, nasal congestion, postnasal drip and wheezing. Pertinent negatives include no fever. She has tried nothing for the symptoms.       Constitution: Positive for fatigue. Negative for fever.   HENT: Positive for congestion, postnasal drip and sinus pressure.    Eyes: Positive for eye itching and eye redness.   Respiratory: Positive for chest tightness, cough and wheezing.    Musculoskeletal: Positive for arthritis.       Objective:      Physical Exam   Constitutional: She does not appear ill. No distress.   HENT:   Head: Normocephalic and atraumatic.   Ears:   Right Ear: Tympanic membrane, external ear and ear canal normal.   Left Ear: Tympanic membrane, external ear and ear canal normal.   Nose: Right sinus exhibits maxillary sinus tenderness. Right sinus exhibits no frontal sinus tenderness. Left sinus exhibits maxillary sinus tenderness. Left sinus exhibits no frontal sinus tenderness.   Mouth/Throat: Mucous membranes are moist. No oropharyngeal exudate or posterior oropharyngeal erythema. Oropharynx is clear.   Eyes: Conjunctivae are normal. Right eye exhibits no discharge. Left eye exhibits no discharge.      extraocular movement intact   Cardiovascular: Normal rate, regular rhythm and normal heart sounds.   No " murmur heard.  Pulmonary/Chest: Effort normal and breath sounds normal. She has no wheezes. She has no rhonchi. She has no rales.   Abdominal: Normal appearance.   Musculoskeletal: Normal range of motion.         General: Normal range of motion.   Neurological: no focal deficit. She is alert.   Skin: Skin is warm, dry and not pale. jaundice  Psychiatric: Her behavior is normal. Mood, judgment and thought content normal.   Nursing note and vitals reviewed.        Assessment:       1. Bacterial sinusitis    2. Nasal congestion    3. Cough          Plan:         Bacterial sinusitis    Nasal congestion  -     POCT COVID-19 Rapid Screening  -     POCT Influenza A/B MOLECULAR    Results for orders placed or performed in visit on 03/16/22   POCT COVID-19 Rapid Screening   Result Value Ref Range    POC Rapid COVID Negative Negative     Acceptable Yes    POCT Influenza A/B MOLECULAR   Result Value Ref Range    POC Molecular Influenza A Ag Negative Negative, Not Reported    POC Molecular Influenza B Ag Negative Negative, Not Reported     Acceptable Yes      *Note: Due to a large number of results and/or encounters for the requested time period, some results have not been displayed. A complete set of results can be found in Results Review.       Cough  -     X-Ray Chest PA And Lateral; Future; Expected date: 03/16/2022    X-Ray Chest PA And Lateral    Result Date: 3/16/2022  EXAMINATION: XR CHEST PA AND LATERAL CLINICAL HISTORY: Cough, unspecified TECHNIQUE: PA and lateral views of the chest were performed. COMPARISON: 07/09/2021 FINDINGS: The heart is not enlarged.  Anterior chest wall cardiac pacemaker with multiple leads remains in place.  Right hemidiaphragm slightly elevated.  Lungs clear of infiltrate and there is no pleural effusion.     No acute cardiopulmonary disease. Electronically signed by: Sanna Zamorano MD Date:    03/16/2022 Time:    11:04      Other orders  -     clindamycin  (CLEOCIN) 150 MG capsule; Take 1 capsule (150 mg total) by mouth 3 (three) times daily. for 7 days  Dispense: 21 capsule; Refill: 0    Discussed would try flonase at home to see if this produces resolution of symptoms. Patient is diabetic weith TTP over sinuses so Clinda prescribed. Recommend probiotic with antibiotic.     Patient Instructions   You must understand that you've received an Urgent Care treatment only and that you may be released before all your medical problems are known or treated. You, the patient, will arrange for follow up care as instructed.  Follow up with your PCP or specialty clinic as directed in the next 1-2 weeks if not improved or as needed.  You can call [phone] to schedule an appointment with the appropriate provider.  If your condition worsens we recommend that you receive another evaluation at the emergency room immediately or contact your primary medical clinics after hours call service to discuss your concerns.  Please return here or go to the Emergency Department for any concerns or worsening of condition.

## 2022-03-16 NOTE — PATIENT INSTRUCTIONS

## 2022-03-20 ENCOUNTER — PATIENT MESSAGE (OUTPATIENT)
Dept: FAMILY MEDICINE | Facility: CLINIC | Age: 72
End: 2022-03-20
Payer: MEDICARE

## 2022-03-21 ENCOUNTER — PATIENT MESSAGE (OUTPATIENT)
Dept: FAMILY MEDICINE | Facility: CLINIC | Age: 72
End: 2022-03-21
Payer: MEDICARE

## 2022-03-21 DIAGNOSIS — J22 LOWER RESPIRATORY TRACT INFECTION: Primary | ICD-10-CM

## 2022-03-22 ENCOUNTER — PATIENT MESSAGE (OUTPATIENT)
Dept: FAMILY MEDICINE | Facility: CLINIC | Age: 72
End: 2022-03-22
Payer: MEDICARE

## 2022-03-22 RX ORDER — AZITHROMYCIN 250 MG/1
TABLET, FILM COATED ORAL
Qty: 6 TABLET | Refills: 0 | Status: SHIPPED | OUTPATIENT
Start: 2022-03-22 | End: 2022-03-27

## 2022-04-09 ENCOUNTER — OFFICE VISIT (OUTPATIENT)
Dept: URGENT CARE | Facility: CLINIC | Age: 72
End: 2022-04-09
Payer: MEDICARE

## 2022-04-09 VITALS
SYSTOLIC BLOOD PRESSURE: 122 MMHG | HEART RATE: 66 BPM | HEIGHT: 62 IN | DIASTOLIC BLOOD PRESSURE: 75 MMHG | OXYGEN SATURATION: 98 % | BODY MASS INDEX: 41.78 KG/M2 | WEIGHT: 227.06 LBS | TEMPERATURE: 98 F
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
## 2022-04-09 DIAGNOSIS — S46.912A LEFT SHOULDER STRAIN, INITIAL ENCOUNTER: ICD-10-CM

## 2022-04-09 PROCEDURE — 73030 X-RAY EXAM OF SHOULDER: CPT | Mod: LT,S$GLB,, | Performed by: RADIOLOGY

## 2022-04-09 PROCEDURE — 99213 PR OFFICE/OUTPT VISIT, EST, LEVL III, 20-29 MIN: ICD-10-PCS | Mod: S$GLB,,, | Performed by: EMERGENCY MEDICINE

## 2022-04-09 PROCEDURE — 73030 XR SHOULDER TRAUMA 3 VIEW LEFT: ICD-10-PCS | Mod: LT,S$GLB,, | Performed by: RADIOLOGY

## 2022-04-09 PROCEDURE — 99213 OFFICE O/P EST LOW 20 MIN: CPT | Mod: S$GLB,,, | Performed by: EMERGENCY MEDICINE

## 2022-04-09 NOTE — PROGRESS NOTES
"Subjective:       Patient ID: Mckenzie Mari is a 71 y.o. female.    Vitals:  height is 5' 2" (1.575 m) and weight is 103 kg (227 lb 1.2 oz). Her temperature is 98.3 °F (36.8 °C). Her blood pressure is 122/75 and her pulse is 66. Her oxygen saturation is 98%.     Chief Complaint: Arm Pain (Fall and left arm pain)    Patient came in today with Left Arm pain from a fall that happened today    Arm Pain   The incident occurred less than 1 hour ago. Incident location: [facility]. The injury mechanism was a fall. The pain is present in the left elbow, left shoulder and upper left arm (arm). The pain does not radiate. The pain is at a severity of 6/10. The pain is moderate. The pain has been constant since the incident. Pertinent negatives include no chest pain, muscle weakness, numbness or tingling. Nothing aggravates the symptoms. She has tried nothing for the symptoms. The treatment provided no relief.       Cardiovascular: Negative for chest pain.   Neurological: Negative for numbness.       Objective:      Physical Exam   Constitutional: She is oriented to person, place, and time. She appears well-developed. She is cooperative. No distress.   HENT:   Head: Normocephalic and atraumatic.   Nose: Nose normal.   Mouth/Throat: Oropharynx is clear and moist and mucous membranes are normal.   Eyes: Conjunctivae and lids are normal.   Neck: Trachea normal and phonation normal. Neck supple.   Cardiovascular: Normal rate, regular rhythm, normal heart sounds and normal pulses.   Pulmonary/Chest: Effort normal and breath sounds normal.   Abdominal: Normal appearance and bowel sounds are normal. She exhibits no abdominal bruit, no pulsatile midline mass and no mass. Soft.   Musculoskeletal:         General: No deformity.      Comments: Clavicles nontender.  Left shoulder has minimal tenderness to palpation of the joint line and tenderness to flexion extension abduction and external rotation.  Right upper extremity and " both lower extremities have full range of motion and are nontender.  No bony spine tenderness, no bony thorax tenderness, no bony pelvic tenderness.   Neurological: She is alert and oriented to person, place, and time. She has normal strength and normal reflexes. No sensory deficit.   Skin: Skin is warm, dry, intact and not diaphoretic. Capillary refill takes less than 2 seconds.   Psychiatric: Her speech is normal and behavior is normal. Judgment and thought content normal.   Nursing note and vitals reviewed.    X-ray reveals degenerative changes but no fracture.  Patient will wear 1 of her slings and call her orthopedist for follow-up.  She is able to take Aleve at home.      Assessment:       1. Fall, initial encounter    2. Left shoulder strain, initial encounter          Plan:         Fall, initial encounter  -     XR SHOULDER TRAUMA 3 VIEW LEFT; Future; Expected date: 04/09/2022    Left shoulder strain, initial encounter

## 2022-04-12 ENCOUNTER — CLINICAL SUPPORT (OUTPATIENT)
Dept: CARDIOLOGY | Facility: HOSPITAL | Age: 72
End: 2022-04-12
Payer: MEDICARE

## 2022-04-12 DIAGNOSIS — Z95.810 PRESENCE OF AUTOMATIC (IMPLANTABLE) CARDIAC DEFIBRILLATOR: ICD-10-CM

## 2022-04-14 ENCOUNTER — PATIENT MESSAGE (OUTPATIENT)
Dept: ENDOCRINOLOGY | Facility: CLINIC | Age: 72
End: 2022-04-14
Payer: MEDICARE

## 2022-04-14 DIAGNOSIS — E11.40 TYPE 2 DIABETES MELLITUS WITH DIABETIC NEUROPATHY, WITH LONG-TERM CURRENT USE OF INSULIN: ICD-10-CM

## 2022-04-14 DIAGNOSIS — Z79.4 TYPE 2 DIABETES MELLITUS WITH DIABETIC NEUROPATHY, WITH LONG-TERM CURRENT USE OF INSULIN: ICD-10-CM

## 2022-04-14 RX ORDER — INSULIN LISPRO 100 [IU]/ML
INJECTION, SOLUTION INTRAVENOUS; SUBCUTANEOUS
Qty: 75 ML | Refills: 3 | Status: SHIPPED | OUTPATIENT
Start: 2022-04-14 | End: 2022-06-08

## 2022-04-22 ENCOUNTER — PATIENT MESSAGE (OUTPATIENT)
Dept: ENDOCRINOLOGY | Facility: CLINIC | Age: 72
End: 2022-04-22
Payer: MEDICARE

## 2022-05-09 ENCOUNTER — PATIENT MESSAGE (OUTPATIENT)
Dept: SMOKING CESSATION | Facility: CLINIC | Age: 72
End: 2022-05-09
Payer: MEDICARE

## 2022-05-16 ENCOUNTER — HOSPITAL ENCOUNTER (OUTPATIENT)
Dept: RADIOLOGY | Facility: HOSPITAL | Age: 72
Discharge: HOME | End: 2022-05-16
Attending: PHYSICIAN ASSISTANT
Payer: MEDICARE

## 2022-05-16 ENCOUNTER — PATIENT MESSAGE (OUTPATIENT)
Dept: FAMILY MEDICINE | Facility: CLINIC | Age: 72
End: 2022-05-16
Payer: MEDICARE

## 2022-05-16 DIAGNOSIS — R91.8 PULMONARY NODULES: ICD-10-CM

## 2022-05-16 PROCEDURE — 71250 CT CHEST WITHOUT CONTRAST: ICD-10-PCS | Mod: 26,,, | Performed by: RADIOLOGY

## 2022-05-16 PROCEDURE — 71250 CT THORAX DX C-: CPT | Mod: 26,,, | Performed by: RADIOLOGY

## 2022-05-16 PROCEDURE — 71250 CT THORAX DX C-: CPT | Mod: TC,PO

## 2022-05-18 ENCOUNTER — OFFICE VISIT (OUTPATIENT)
Dept: FAMILY MEDICINE | Facility: CLINIC | Age: 72
End: 2022-05-18
Payer: MEDICARE

## 2022-05-18 VITALS
DIASTOLIC BLOOD PRESSURE: 60 MMHG | WEIGHT: 215.5 LBS | HEART RATE: 68 BPM | BODY MASS INDEX: 39.66 KG/M2 | HEIGHT: 62 IN | SYSTOLIC BLOOD PRESSURE: 122 MMHG

## 2022-05-18 DIAGNOSIS — R93.89 ABNORMAL CHEST CT: ICD-10-CM

## 2022-05-18 DIAGNOSIS — I50.9 CHF (NYHA CLASS III, ACC/AHA STAGE C): ICD-10-CM

## 2022-05-18 DIAGNOSIS — D83.9 CVID (COMMON VARIABLE IMMUNODEFICIENCY): ICD-10-CM

## 2022-05-18 DIAGNOSIS — I10 PRIMARY HYPERTENSION: ICD-10-CM

## 2022-05-18 DIAGNOSIS — Z78.0 POSTMENOPAUSAL: ICD-10-CM

## 2022-05-18 DIAGNOSIS — E89.0 POSTOPERATIVE HYPOTHYROIDISM: ICD-10-CM

## 2022-05-18 DIAGNOSIS — Z17.0 MALIGNANT NEOPLASM OF CENTRAL PORTION OF LEFT BREAST IN FEMALE, ESTROGEN RECEPTOR POSITIVE: ICD-10-CM

## 2022-05-18 DIAGNOSIS — I25.10 CORONARY ARTERY DISEASE INVOLVING NATIVE CORONARY ARTERY WITHOUT ANGINA PECTORIS, UNSPECIFIED WHETHER NATIVE OR TRANSPLANTED HEART: ICD-10-CM

## 2022-05-18 DIAGNOSIS — E66.9 OBESITY (BMI 30-39.9): ICD-10-CM

## 2022-05-18 DIAGNOSIS — C44.519 BASAL CELL CARCINOMA (BCC) OF SKIN OF OTHER PART OF TORSO: ICD-10-CM

## 2022-05-18 DIAGNOSIS — Z86.010 HISTORY OF COLON POLYPS: ICD-10-CM

## 2022-05-18 DIAGNOSIS — Z79.4 TYPE 2 DIABETES MELLITUS WITH DIABETIC NEUROPATHY, WITH LONG-TERM CURRENT USE OF INSULIN: ICD-10-CM

## 2022-05-18 DIAGNOSIS — I50.9 CONGESTIVE HEART FAILURE, UNSPECIFIED HF CHRONICITY, UNSPECIFIED HEART FAILURE TYPE: Primary | ICD-10-CM

## 2022-05-18 DIAGNOSIS — Z79.899 DRUG THERAPY: ICD-10-CM

## 2022-05-18 DIAGNOSIS — E78.2 MIXED HYPERLIPIDEMIA: ICD-10-CM

## 2022-05-18 DIAGNOSIS — Z85.850 HISTORY OF THYROID CANCER: ICD-10-CM

## 2022-05-18 DIAGNOSIS — E11.40 TYPE 2 DIABETES MELLITUS WITH DIABETIC NEUROPATHY, WITH LONG-TERM CURRENT USE OF INSULIN: ICD-10-CM

## 2022-05-18 DIAGNOSIS — Z95.810 ICD (IMPLANTABLE CARDIOVERTER-DEFIBRILLATOR) IN PLACE: ICD-10-CM

## 2022-05-18 DIAGNOSIS — Z87.19 HISTORY OF PANCREATITIS: ICD-10-CM

## 2022-05-18 DIAGNOSIS — C50.112 MALIGNANT NEOPLASM OF CENTRAL PORTION OF LEFT BREAST IN FEMALE, ESTROGEN RECEPTOR POSITIVE: ICD-10-CM

## 2022-05-18 DIAGNOSIS — Z23 IMMUNIZATION DUE: ICD-10-CM

## 2022-05-18 PROBLEM — C44.91 BASAL CELL CARCINOMA: Status: ACTIVE | Noted: 2022-05-18

## 2022-05-18 PROCEDURE — 99214 PR OFFICE/OUTPT VISIT, EST, LEVL IV, 30-39 MIN: ICD-10-PCS | Mod: S$PBB,,, | Performed by: FAMILY MEDICINE

## 2022-05-18 PROCEDURE — 90677 PCV20 VACCINE IM: CPT | Mod: PBBFAC,PN

## 2022-05-18 PROCEDURE — 99214 OFFICE O/P EST MOD 30 MIN: CPT | Mod: S$PBB,,, | Performed by: FAMILY MEDICINE

## 2022-05-18 PROCEDURE — 99999 PR PBB SHADOW E&M-EST. PATIENT-LVL V: ICD-10-PCS | Mod: PBBFAC,,, | Performed by: FAMILY MEDICINE

## 2022-05-18 PROCEDURE — 99999 PR PBB SHADOW E&M-EST. PATIENT-LVL V: CPT | Mod: PBBFAC,,, | Performed by: FAMILY MEDICINE

## 2022-05-18 PROCEDURE — 99215 OFFICE O/P EST HI 40 MIN: CPT | Mod: PBBFAC,PN,25 | Performed by: FAMILY MEDICINE

## 2022-05-18 RX ORDER — ASPIRIN 81 MG/1
81 TABLET ORAL DAILY
COMMUNITY

## 2022-05-18 NOTE — PROGRESS NOTES
THIS DOCUMENT WAS MADE IN PART WITH VOICE RECOGNITION SOFTWARE.  OCCASIONALLY THIS SOFTWARE WILL MISINTERPRET WORDS OR PHRASES.    Assessment and Plan:    1. Congestive heart failure, unspecified HF chronicity, unspecified heart failure type     2. Malignant neoplasm of central portion of left breast in female, estrogen receptor positive     3. CVID (common variable immunodeficiency)     4. Obesity (BMI 30-39.9)     5. History of pancreatitis     6. History of colon polyps     7. Type 2 diabetes mellitus with diabetic neuropathy, with long-term current use of insulin     8. Coronary artery disease involving native coronary artery without angina pectoris, unspecified whether native or transplanted heart     9. ICD (implantable cardioverter-defibrillator) in place     10. History of thyroid cancer     11. Postoperative hypothyroidism     12. Primary hypertension     13. CHF (NYHA class III, ACC/AHA stage C)     14. Mixed hyperlipidemia     15. Basal cell carcinoma (BCC) of skin of other part of torso  Ambulatory referral/consult to Dermatology   16. Immunization due  (In Office Administered) Pneumococcal Conjugate Vaccine (20 Valent) (IM)   17. Postmenopausal  CANCELED: DXA Bone Density Spine And Hip   18. Drug therapy  CBC Auto Differential    Comprehensive Metabolic Panel    Lipid Panel    Hemoglobin A1C    TSH    T4, Free    Urinalysis, Reflex to Urine Culture Urine, Clean Catch    Microalbumin/Creatinine Ratio, Urine   19. Abnormal chest CT =REPEAT 11/2022         PLAN    Repeat CT scan on 11/2022, patient will let me know if she develops any symptoms    Routine wellness labs ordered    Chronic conditions appear stable    Prevnar 20 vaccine today  Follow-up with specialist as planned    Help schedule with Dermatology for monitoring of skin cancer      ______________________________________________________________________  Subjective:    Chief Complaint:  Chief Complaint   Patient presents with    Results     F/u  "CT results        HPI:  Mckenzie is a 71 y.o. year old     PULMONARY NODULES  HAD REPEAT CT SCAN 2 DAYS AGO SHOWING GRANULOMAS, STABLE NODULES  ALSO NOTED A HE IN PROBABLE AIRSPACE DISEASE CONCERNING FOR PNEUMONIA.  PATIENT DENIES ANY CURRENT PRODUCTIVE COUGH, FEVER, WHEEZING, SHORTNESS OF BREATH WORSE THAN BASELINE      Congestive heart failure, implanted defibrillator status  Rx-beta-blocker, spironolactone, Arb, Lasix 40 mg as needed "if swollen" with potassium replacement  Has occasional palp, rare   Denies presyncope  Taking lasix about 1 x per week      Coronary artery disease, dyslipidemia  Rx-beta-blocker, Crestor 20 mg, aspirin 81 mg  Cardiology-Smith  Reports chronic SOB   No changes at previous cards appt.     Essential hypertension  Rx-carvedilol 25 mg, telmisartan 20 mg  Blood pressure well controlled today  Not checking at home  Med compliant     History of left breast cancer, history of thyroid cancer  Has oncology following  :  Dr Long      Hypothyroidism, postoperative, history of thyroid cancer  Rx-Synthroid 137 mcg  Recent TSH unremarkable     Diabetes mellitus  Basal-Tresiba 48 units every morning, Jardiance  Rapid insulin-Humalog 12 units a.c. plus sliding scale  A1c - 7.2%  Manage by Endocrinology  Weight going down     Fibromyaglia   Dx in 40's  Symptoms intermit / occur with stress   Reports recent exacerbations       GERD  Rx-Nexium  Denies any heartburn     Diagnosed with basal cell carcinoma of back  Diagnosed October 2021, treated by Dermatology      Past Medical History:  Past Medical History:   Diagnosis Date    Allergy     Anticoagulant long-term use     Arthritis     Asthma     as a child    Breast cancer 2005    s/p lumpectomy, chemo and now cancer free over 5 years    Bundle branch block     Cardiomyopathy     EF 35 - 40%    CHF (congestive heart failure)     FC II    Chronic sinusitis     CVID (common variable immunodeficiency)     Diabetes mellitus     GERD " (gastroesophageal reflux disease)     Headache(784.0)     HTN (hypertension)     Hyperlipidemia     Hypothyroid 7/25/2012    Malignant hyperthermia     daughter and supposedly mother have had this    Otitis media     Presence of combination internal cardiac defibrillator (ICD) and pacemaker     Thyroid cancer     Thyroid cancer 7/25/2012    Thyroid disease     hypothyroid after papillary thyroid cancer with thyroid resection       Past Surgical History:  Past Surgical History:   Procedure Laterality Date    BREAST LUMPECTOMY      left    CARDIAC PACEMAKER PLACEMENT      CATARACT EXTRACTION W/  INTRAOCULAR LENS IMPLANT Bilateral     CHOLECYSTECTOMY      COLONOSCOPY N/A 5/11/2016    Procedure: COLONOSCOPY;  Surgeon: Alfonso Serrano Jr., MD;  Location: [facility] ENDO;  Service: Endoscopy;  Laterality: N/A;    COLONOSCOPY W/ POLYPECTOMY  10/05/2009    [facility]   One 2 mm polyp in the cecum.  TUBULAR ADENOMA.  Tortuous colon.    ESOPHAGOGASTRODUODENOSCOPY  09/12/2006    [facility]   Dysphagia.  NERD.  Patulous LES.  Atrophic mucosa.  Benign fundal polyps.  Dilated, 42 Fr.    HYSTERECTOMY      JOINT REPLACEMENT Bilateral     knee    LEFT HEART CATHETERIZATION N/A 6/25/2021    Procedure: Left heart cath;  Surgeon: Joseph Hansen MD;  Location: [facility] CATH;  Service: Cardiology;  Laterality: N/A;    pacemaker defibrillator      THYROID SURGERY  x2    TONSILLECTOMY         Family History:  Family History   Problem Relation Age of Onset    Allergic rhinitis Mother     Cancer Mother         bladder    Heart disease Mother     Allergic rhinitis Father     Heart disease Father     Allergic rhinitis Brother     Heart disease Brother     Cancer Brother         lung    Cancer Maternal Aunt         breast     Cancer Paternal Aunt          breast    Heart disease Brother     Diabetes Paternal Aunt     Cancer Paternal Aunt         lung    Allergic rhinitis Sister     Angioedema Neg Hx     Asthma Neg Hx      Atopy Neg Hx     Eczema Neg Hx     Immunodeficiency Neg Hx     Urticaria Neg Hx        Social History:  Social History     Socioeconomic History    Marital status:    Tobacco Use    Smoking status: Never Smoker    Smokeless tobacco: Never Used   Substance and Sexual Activity    Alcohol use: No    Drug use: No     Social Determinants of Health     Financial Resource Strain: Low Risk     Difficulty of Paying Living Expenses: Not hard at all   Food Insecurity: No Food Insecurity    Worried About Running Out of Food in the Last Year: Never true    Ran Out of Food in the Last Year: Never true   Transportation Needs: No Transportation Needs    Lack of Transportation (Medical): No    Lack of Transportation (Non-Medical): No   Physical Activity: Unknown    Days of Exercise per Week: Patient refused    Minutes of Exercise per Session: 20 min   Stress: Unknown    Feeling of Stress : Patient refused   Social Connections: Unknown    Frequency of Communication with Friends and Family: Patient refused    Frequency of Social Gatherings with Friends and Family: Patient refused    Active Member of Clubs or Organizations: No    Attends Club or Organization Meetings: Patient refused    Marital Status:    Housing Stability: Low Risk     Unable to Pay for Housing in the Last Year: No    Number of Places Lived in the Last Year: 1    Unstable Housing in the Last Year: No       Medications:  Current Outpatient Medications on File Prior to Visit   Medication Sig Dispense Refill    aspirin (ECOTRIN) 81 MG EC tablet Take 81 mg by mouth once daily.      CALCIUM CARBONATE/VITAMIN D3 (VITAMIN D-3 ORAL) Take 1 tablet by mouth once daily.       carvediloL (COREG) 25 MG tablet TAKE 1 TABLET BY MOUTH TWICE DAILY WITH FOOD 180 tablet 1    empagliflozin (JARDIANCE) 10 mg tablet Take 1 tablet (10 mg total) by mouth once daily. 30 tablet 11    esomeprazole (NEXIUM) 40 MG capsule TAKE 1 CAPSULE BY MOUTH  "EVERY DAY 90 capsule 1    estradioL (ESTRACE) 0.01 % (0.1 mg/gram) vaginal cream       furosemide (LASIX) 40 MG tablet TAKE 1 TABLET BY MOUTH EVERY MORNING AS NEEDED 45 tablet 3    insulin degludec (TRESIBA FLEXTOUCH U-200) 200 unit/mL (3 mL) insulin pen Inject 50 Units into the skin once daily. (Patient taking differently: Inject 48 Units into the skin once daily.) 9 pen 3    insulin lispro 100 unit/mL pen INJECT 14 UNITS UNDER THE SKIN THREE TIMES DAILY BEFORE MEALS PLUS CORRECTION SCALE. MAXIMUM TOTAL DAILY DOSE OF 84 UNITS (Patient taking differently: INJECT 12 UNITS UNDER THE SKIN THREE TIMES DAILY BEFORE MEALS PLUS CORRECTION SCALE. MAXIMUM TOTAL DAILY DOSE OF 84 UNITS) 75 mL 3    multivit-min/ferrous fumarate (MULTI VITAMIN ORAL) Take 1 Dose by mouth once daily.      potassium chloride SA (K-DUR,KLOR-CON) 20 MEQ tablet TAKE 1 TABLET BY MOUTH EVERY DAY 90 tablet 0    rosuvastatin (CRESTOR) 20 MG tablet TAKE 1 TABLET(20 MG) BY MOUTH EVERY EVENING 90 tablet 3    spironolactone (ALDACTONE) 25 MG tablet TAKE 1 TABLET(25 MG) BY MOUTH EVERY DAY 90 tablet 1    SYNTHROID 137 mcg Tab tablet TAKE 1 TABLET BY MOUTH EVERY MORNING BEFORE BREAKFAST 90 tablet 1    telmisartan (MICARDIS) 20 MG Tab TAKE 1 TABLET(20 MG) BY MOUTH EVERY DAY 90 tablet 3    UNABLE TO FIND once. medication name: Kyolic (garlic extract veg caps)      albuterol (PROVENTIL HFA) 90 mcg/actuation inhaler Inhale 2 puffs into the lungs every 6 (six) hours as needed for Shortness of Breath. Rescue (Patient not taking: Reported on 5/18/2022) 18 g 11    BD SHANTELLE 2ND GEN PEN NEEDLE 32 gauge x 5/32" Ndle USE FOUR TIMES DAILY WITH INSULIN INJECTIONS 400 each 3    [DISCONTINUED] aspirin 81 MG Chew Take 81 mg by mouth once daily.      [DISCONTINUED] blood sugar diagnostic Strp 1 strip by Misc.(Non-Drug; Combo Route) route 3 (three) times daily. True Metrix meter. Dx code E11.40 300 each 3    [DISCONTINUED] chlorhexidine (PERIDEX) 0.12 % solution Use " as directed 15 mLs in the mouth or throat 2 (two) times daily.      [DISCONTINUED] fluticasone propionate (FLONASE) 50 mcg/actuation nasal spray 2 sprays (100 mcg total) by Each Nostril route once daily. (Patient not taking: Reported on 5/18/2022) 16 g 3    [DISCONTINUED] lactobacillus rhamnosus GG (CULTURELLE) 10 billion cell capsule Take 1 capsule by mouth once daily.      [DISCONTINUED] lancets 30 gauge Misc Use to check blood glucose 4 times daily. 400 each 3    [DISCONTINUED] mupirocin (BACTROBAN) 2 % ointment Apply topically 2 (two) times daily.      [DISCONTINUED] TRUE METRIX GLUCOSE METER Misc USE TO CHECK BLOOD GLUCOSE FOUR TIMES DAILY       No current facility-administered medications on file prior to visit.       Allergies:  Cayenne pepper; Covid-19 vacc,mrna(pfizer)(pf); Lantus [insulin glargine]; Adhesive; Iodine and iodide containing products; Other; Adhesive tape-silicones; Amoxil [amoxicillin]; Aspirin; Avelox [moxifloxacin]; Betadine [povidone-iodine]; Cephalexin; Doxycycline; Erythromycin; Lactose intolerance [lactase]; Levaquin [levofloxacin]; Tramadol; Hydrocodone; Latex; Morphine; Penicillins; and Sulfa (sulfonamide antibiotics)    Immunizations:  Immunization History   Administered Date(s) Administered    COVID-19, MRNA, LN-S, PF (Pfizer) (Purple Cap) 03/01/2021, 03/22/2021    Hepatitis A 10/02/2001    Hepatitis A, Pediatric/Adolescent, 2 Dose 10/02/2001    Hepatitis B 10/02/2001    Hepatitis B, Pediatric/Adolescent 10/02/2001    Influenza 10/06/2007, 10/18/2008, 09/10/2009, 09/30/2010, 09/16/2011, 09/19/2012    Influenza (FLUAD) - Quadrivalent - Adjuvanted - PF *Preferred* (65+) 10/02/2020    Influenza - High Dose - PF (65 years and older) 09/25/2018, 12/28/2018    Influenza - Quadrivalent 10/10/2014, 10/08/2015    Influenza - Trivalent (ADULT) 10/06/2007, 10/18/2008, 09/10/2009, 09/30/2010, 09/16/2011, 09/19/2012    Influenza A (H1N1) 2009 Monovalent - IM 11/10/2009     "Influenza Split 09/15/2011, 09/19/2012    Pneumococcal Conjugate - 13 Valent 03/29/2016, 12/28/2018    Pneumococcal Polysaccharide - 23 Valent 09/12/2011       Review of Systems:  Review of Systems   All other systems reviewed and are negative.      Objective:    Vitals:  Vitals:    05/18/22 0754   BP: 122/60   Pulse: 68   Weight: 97.7 kg (215 lb 8 oz)   Height: 5' 2" (1.575 m)   PainSc: 0-No pain       Physical Exam  Vitals reviewed.   Constitutional:       General: She is not in acute distress.  HENT:      Head: Normocephalic and atraumatic.   Eyes:      Pupils: Pupils are equal, round, and reactive to light.   Cardiovascular:      Rate and Rhythm: Normal rate and regular rhythm.      Heart sounds: No murmur heard.    No friction rub.   Pulmonary:      Effort: Pulmonary effort is normal.      Breath sounds: Normal breath sounds.   Abdominal:      General: Bowel sounds are normal. There is no distension.      Palpations: Abdomen is soft.      Tenderness: There is no abdominal tenderness.   Musculoskeletal:      Cervical back: Neck supple.   Skin:     General: Skin is warm and dry.      Findings: No rash.   Psychiatric:         Behavior: Behavior normal.             Robbie Koo MD  Family Medicine      "

## 2022-05-19 ENCOUNTER — PATIENT MESSAGE (OUTPATIENT)
Dept: FAMILY MEDICINE | Facility: CLINIC | Age: 72
End: 2022-05-19
Payer: MEDICARE

## 2022-05-20 RX ORDER — HYDROXYZINE HYDROCHLORIDE 25 MG/1
25 TABLET, FILM COATED ORAL 4 TIMES DAILY PRN
Qty: 30 TABLET | Refills: 1 | Status: SHIPPED | OUTPATIENT
Start: 2022-05-20 | End: 2022-08-30

## 2022-05-21 ENCOUNTER — PATIENT MESSAGE (OUTPATIENT)
Dept: FAMILY MEDICINE | Facility: CLINIC | Age: 72
End: 2022-05-21
Payer: MEDICARE

## 2022-05-31 ENCOUNTER — PATIENT MESSAGE (OUTPATIENT)
Dept: ADMINISTRATIVE | Facility: HOSPITAL | Age: 72
End: 2022-05-31
Payer: MEDICARE

## 2022-06-01 ENCOUNTER — LAB VISIT (OUTPATIENT)
Dept: LAB | Facility: HOSPITAL | Age: 72
End: 2022-06-01
Attending: NURSE PRACTITIONER
Payer: MEDICARE

## 2022-06-01 DIAGNOSIS — Z79.4 TYPE 2 DIABETES MELLITUS WITH DIABETIC NEUROPATHY, WITH LONG-TERM CURRENT USE OF INSULIN: ICD-10-CM

## 2022-06-01 DIAGNOSIS — E11.40 TYPE 2 DIABETES MELLITUS WITH DIABETIC NEUROPATHY, WITH LONG-TERM CURRENT USE OF INSULIN: ICD-10-CM

## 2022-06-01 DIAGNOSIS — Z79.899 DRUG THERAPY: ICD-10-CM

## 2022-06-01 LAB
ALBUMIN SERPL BCP-MCNC: 3.7 G/DL (ref 3.5–5.2)
ALBUMIN SERPL BCP-MCNC: 3.7 G/DL (ref 3.5–5.2)
ALP SERPL-CCNC: 80 U/L (ref 55–135)
ALP SERPL-CCNC: 80 U/L (ref 55–135)
ALT SERPL W/O P-5'-P-CCNC: 21 U/L (ref 10–44)
ALT SERPL W/O P-5'-P-CCNC: 21 U/L (ref 10–44)
ANION GAP SERPL CALC-SCNC: 10 MMOL/L (ref 8–16)
ANION GAP SERPL CALC-SCNC: 10 MMOL/L (ref 8–16)
AST SERPL-CCNC: 16 U/L (ref 10–40)
AST SERPL-CCNC: 16 U/L (ref 10–40)
BASOPHILS # BLD AUTO: 0.05 K/UL (ref 0–0.2)
BASOPHILS NFR BLD: 0.7 % (ref 0–1.9)
BILIRUB SERPL-MCNC: 1.1 MG/DL (ref 0.1–1)
BILIRUB SERPL-MCNC: 1.1 MG/DL (ref 0.1–1)
BUN SERPL-MCNC: 14 MG/DL (ref 8–23)
BUN SERPL-MCNC: 14 MG/DL (ref 8–23)
CALCIUM SERPL-MCNC: 9.4 MG/DL (ref 8.7–10.5)
CALCIUM SERPL-MCNC: 9.4 MG/DL (ref 8.7–10.5)
CHLORIDE SERPL-SCNC: 105 MMOL/L (ref 95–110)
CHLORIDE SERPL-SCNC: 105 MMOL/L (ref 95–110)
CHOLEST SERPL-MCNC: 124 MG/DL (ref 120–199)
CHOLEST/HDLC SERPL: 2.6 {RATIO} (ref 2–5)
CO2 SERPL-SCNC: 25 MMOL/L (ref 23–29)
CO2 SERPL-SCNC: 25 MMOL/L (ref 23–29)
CREAT SERPL-MCNC: 0.8 MG/DL (ref 0.5–1.4)
CREAT SERPL-MCNC: 0.8 MG/DL (ref 0.5–1.4)
DIFFERENTIAL METHOD: ABNORMAL
EOSINOPHIL # BLD AUTO: 0.3 K/UL (ref 0–0.5)
EOSINOPHIL NFR BLD: 4.5 % (ref 0–8)
ERYTHROCYTE [DISTWIDTH] IN BLOOD BY AUTOMATED COUNT: 13.8 % (ref 11.5–14.5)
EST. GFR  (AFRICAN AMERICAN): >60 ML/MIN/1.73 M^2
EST. GFR  (AFRICAN AMERICAN): >60 ML/MIN/1.73 M^2
EST. GFR  (NON AFRICAN AMERICAN): >60 ML/MIN/1.73 M^2
EST. GFR  (NON AFRICAN AMERICAN): >60 ML/MIN/1.73 M^2
ESTIMATED AVG GLUCOSE: 157 MG/DL (ref 68–131)
ESTIMATED AVG GLUCOSE: 157 MG/DL (ref 68–131)
GLUCOSE SERPL-MCNC: 148 MG/DL (ref 70–110)
GLUCOSE SERPL-MCNC: 148 MG/DL (ref 70–110)
HBA1C MFR BLD: 7.1 % (ref 4–5.6)
HBA1C MFR BLD: 7.1 % (ref 4–5.6)
HCT VFR BLD AUTO: 43.7 % (ref 37–48.5)
HDLC SERPL-MCNC: 48 MG/DL (ref 40–75)
HDLC SERPL: 38.7 % (ref 20–50)
HGB BLD-MCNC: 14.1 G/DL (ref 12–16)
IMM GRANULOCYTES # BLD AUTO: 0.04 K/UL (ref 0–0.04)
IMM GRANULOCYTES NFR BLD AUTO: 0.6 % (ref 0–0.5)
LDLC SERPL CALC-MCNC: 50.2 MG/DL (ref 63–159)
LYMPHOCYTES # BLD AUTO: 2 K/UL (ref 1–4.8)
LYMPHOCYTES NFR BLD: 28.4 % (ref 18–48)
MCH RBC QN AUTO: 30.3 PG (ref 27–31)
MCHC RBC AUTO-ENTMCNC: 32.3 G/DL (ref 32–36)
MCV RBC AUTO: 94 FL (ref 82–98)
MONOCYTES # BLD AUTO: 0.6 K/UL (ref 0.3–1)
MONOCYTES NFR BLD: 9.1 % (ref 4–15)
NEUTROPHILS # BLD AUTO: 4 K/UL (ref 1.8–7.7)
NEUTROPHILS NFR BLD: 56.7 % (ref 38–73)
NONHDLC SERPL-MCNC: 76 MG/DL
NRBC BLD-RTO: 0 /100 WBC
PLATELET # BLD AUTO: 175 K/UL (ref 150–450)
PMV BLD AUTO: 11.7 FL (ref 9.2–12.9)
POTASSIUM SERPL-SCNC: 4.2 MMOL/L (ref 3.5–5.1)
POTASSIUM SERPL-SCNC: 4.2 MMOL/L (ref 3.5–5.1)
PROT SERPL-MCNC: 6.7 G/DL (ref 6–8.4)
PROT SERPL-MCNC: 6.7 G/DL (ref 6–8.4)
RBC # BLD AUTO: 4.66 M/UL (ref 4–5.4)
SODIUM SERPL-SCNC: 140 MMOL/L (ref 136–145)
SODIUM SERPL-SCNC: 140 MMOL/L (ref 136–145)
T4 FREE SERPL-MCNC: 1.17 NG/DL (ref 0.71–1.51)
TRIGL SERPL-MCNC: 129 MG/DL (ref 30–150)
TSH SERPL DL<=0.005 MIU/L-ACNC: 0.21 UIU/ML (ref 0.4–4)
WBC # BLD AUTO: 6.96 K/UL (ref 3.9–12.7)

## 2022-06-01 PROCEDURE — 80053 COMPREHEN METABOLIC PANEL: CPT | Performed by: NURSE PRACTITIONER

## 2022-06-01 PROCEDURE — 36415 COLL VENOUS BLD VENIPUNCTURE: CPT | Mod: PN | Performed by: NURSE PRACTITIONER

## 2022-06-01 PROCEDURE — 84443 ASSAY THYROID STIM HORMONE: CPT | Performed by: FAMILY MEDICINE

## 2022-06-01 PROCEDURE — 83036 HEMOGLOBIN GLYCOSYLATED A1C: CPT | Performed by: NURSE PRACTITIONER

## 2022-06-01 PROCEDURE — 85025 COMPLETE CBC W/AUTO DIFF WBC: CPT | Performed by: FAMILY MEDICINE

## 2022-06-01 PROCEDURE — 80061 LIPID PANEL: CPT | Performed by: FAMILY MEDICINE

## 2022-06-01 PROCEDURE — 84439 ASSAY OF FREE THYROXINE: CPT | Performed by: FAMILY MEDICINE

## 2022-06-02 DIAGNOSIS — R05.9 COUGH: ICD-10-CM

## 2022-06-02 NOTE — TELEPHONE ENCOUNTER
No new care gaps identified.  [facility] Embedded Care Gaps. Reference number: [id number]. 6/02/2022   10:31:47 AM CLARISAT

## 2022-06-02 NOTE — TELEPHONE ENCOUNTER
Refill Routing Note   Medication(s) are not appropriate for processing by [facility] for the following reason(s):      - Per 5/18/22 chart note, patient reported not taking. Please assess and order pended for review  - Medication not previously prescribed by PCP    [facility] action(s):  Defer       Medication Therapy Plan: Per 5/18/22 chart note, patient reported not taking. Please assess and order pended for review; Asthma documented in chart; Matches previous order false failure: Verified sig  Medication reconciliation completed: No     Appointments  past 12m or future 3m with PCP    Date Provider   Last Visit   5/18/2022 Robbie Koo MD   Next Visit   11/1/2022 Robbie Koo MD   ED visits in past 90 days: 0        Note composed:6:34 PM 06/02/2022

## 2022-06-03 DIAGNOSIS — E89.0 POSTOPERATIVE HYPOTHYROIDISM: Primary | ICD-10-CM

## 2022-06-03 RX ORDER — ALBUTEROL SULFATE 90 UG/1
AEROSOL, METERED RESPIRATORY (INHALATION)
Qty: 25.5 G | Refills: 3 | Status: SHIPPED | OUTPATIENT
Start: 2022-06-03 | End: 2022-08-30

## 2022-06-03 RX ORDER — LEVOTHYROXINE SODIUM 125 UG/1
125 TABLET ORAL
Qty: 90 TABLET | Refills: 3 | Status: SHIPPED | OUTPATIENT
Start: 2022-06-03 | End: 2023-02-10

## 2022-06-08 ENCOUNTER — OFFICE VISIT (OUTPATIENT)
Dept: ENDOCRINOLOGY | Facility: CLINIC | Age: 72
End: 2022-06-08
Payer: MEDICARE

## 2022-06-08 ENCOUNTER — PATIENT MESSAGE (OUTPATIENT)
Dept: FAMILY MEDICINE | Facility: CLINIC | Age: 72
End: 2022-06-08
Payer: MEDICARE

## 2022-06-08 VITALS
BODY MASS INDEX: 39.01 KG/M2 | RESPIRATION RATE: 18 BRPM | HEIGHT: 62 IN | DIASTOLIC BLOOD PRESSURE: 72 MMHG | HEART RATE: 88 BPM | WEIGHT: 212 LBS | SYSTOLIC BLOOD PRESSURE: 116 MMHG

## 2022-06-08 DIAGNOSIS — I50.9 CHF (NYHA CLASS III, ACC/AHA STAGE C): ICD-10-CM

## 2022-06-08 DIAGNOSIS — Z87.19 HISTORY OF PANCREATITIS: ICD-10-CM

## 2022-06-08 DIAGNOSIS — I25.10 CORONARY ARTERY DISEASE INVOLVING NATIVE CORONARY ARTERY WITHOUT ANGINA PECTORIS, UNSPECIFIED WHETHER NATIVE OR TRANSPLANTED HEART: ICD-10-CM

## 2022-06-08 DIAGNOSIS — Z79.4 TYPE 2 DIABETES MELLITUS WITH DIABETIC NEUROPATHY, WITH LONG-TERM CURRENT USE OF INSULIN: Primary | ICD-10-CM

## 2022-06-08 DIAGNOSIS — E89.0 POSTOPERATIVE HYPOTHYROIDISM: ICD-10-CM

## 2022-06-08 DIAGNOSIS — E78.5 HYPERLIPIDEMIA, UNSPECIFIED HYPERLIPIDEMIA TYPE: ICD-10-CM

## 2022-06-08 DIAGNOSIS — E66.9 OBESITY (BMI 30-39.9): ICD-10-CM

## 2022-06-08 DIAGNOSIS — E11.40 TYPE 2 DIABETES MELLITUS WITH DIABETIC NEUROPATHY, WITH LONG-TERM CURRENT USE OF INSULIN: Primary | ICD-10-CM

## 2022-06-08 DIAGNOSIS — I10 ESSENTIAL HYPERTENSION: ICD-10-CM

## 2022-06-08 PROCEDURE — 99999 PR PBB SHADOW E&M-EST. PATIENT-LVL IV: ICD-10-PCS | Mod: PBBFAC,,, | Performed by: NURSE PRACTITIONER

## 2022-06-08 PROCEDURE — 95251 PR GLUCOSE MONITOR, 72 HOUR, PHYS INTERP: ICD-10-PCS | Mod: ,,, | Performed by: NURSE PRACTITIONER

## 2022-06-08 PROCEDURE — 99999 PR PBB SHADOW E&M-EST. PATIENT-LVL IV: CPT | Mod: PBBFAC,,, | Performed by: NURSE PRACTITIONER

## 2022-06-08 PROCEDURE — 99214 OFFICE O/P EST MOD 30 MIN: CPT | Mod: PBBFAC,PN | Performed by: NURSE PRACTITIONER

## 2022-06-08 PROCEDURE — 99214 PR OFFICE/OUTPT VISIT, EST, LEVL IV, 30-39 MIN: ICD-10-PCS | Mod: S$PBB,,, | Performed by: NURSE PRACTITIONER

## 2022-06-08 PROCEDURE — 95251 CONT GLUC MNTR ANALYSIS I&R: CPT | Mod: ,,, | Performed by: NURSE PRACTITIONER

## 2022-06-08 PROCEDURE — 99214 OFFICE O/P EST MOD 30 MIN: CPT | Mod: S$PBB,,, | Performed by: NURSE PRACTITIONER

## 2022-06-08 RX ORDER — INSULIN DEGLUDEC 200 U/ML
48 INJECTION, SOLUTION SUBCUTANEOUS DAILY
Start: 2022-06-08 | End: 2022-10-11

## 2022-06-08 RX ORDER — INSULIN LISPRO 100 [IU]/ML
INJECTION, SOLUTION INTRAVENOUS; SUBCUTANEOUS
Qty: 75 ML | Refills: 3
Start: 2022-06-08 | End: 2022-10-11

## 2022-06-08 NOTE — PROGRESS NOTES
CC: Ms. Mckenzie Mari arrives today for management of Type 2 DM and review of chronic medical conditions.     HPI: Ms. Mckenzie Mari was diagnosed with Type 2 DM in 2004. She was diagnosed based on lab work. Initial treatment consisted of metformin and glimepiride. Insulin added in 8/2016 - began Toujeo. She states that she felt that this caused nausea, abd pain, chest pain. Lantus caused throat swelling, left arm pain. She was then changed to Novolog 70/30 but this was only used for a short period because her insurance didn't cover.  Then changed to Humalog 50-50 in 12/2016. In 2017, she began MDI with Tresiba and Humalog. Jardiance added in 8/2021. Began use of Dexcom G6 in 2021.  + FH of DM in maternal aunt and cousin. Denies hospitalizations due to DM. Previously seen in endocrine by Richard Gupta, and MAGALI Eng, ARIELLE. She has a history of thyroid cancer/post-surgical hypothyroidism.   Of note, she has a h/o pancreatitis.   She follows annually with cardiology for CHF, CAD.    Historically, insulin management has been challenging because patient believes insulin causes hyperglycemia.     Last seen by me in February. Insulin doses were decreased at this time.     Patient states that she has further decreased Humalog to 12 units about 4 weeks ago and glucoses have remained stable.     BG monitoring per Dexcom G6.     Hypoglycemia: Rare      Missing Insulin/PO medication doses: No  Timing prandial insulin 5-15 minutes before meals: yes    Exercise: no    Dietary Habits: Eats 3 meals/day. Snacking occasionally. Avoids sugary drinks.    Last DM education: 1/2019    Her PCP recently decreased her levothyroxine from 137 to 125 mcg. TSH to be repeated in 6 weeks.        CURRENT DIABETIC MEDS: Jardiance 10 mg daily, Tresiba 48 units QAM, Humalog 12 units AC + correction scale, target 180, ISF 25  Vial or pen: pen  Glucometer type: [facility] True Metrix    Previous DM treatments:   Invokana -  "nausea  Glimepiride - stopped when prandial insulin added  Touejo -  Nausea, abd pain, chest pain  Lantus - throat swelling, left arm pain  Novolog 70/30 - not covered by insurance  Metformin - headaches    Last Eye Exam: 6/7/2022 - No DR. Dr. Mcgregor. Pt states that report has been faxed to PCP (yesterday).  Last Podiatry Exam: no    REVIEW OF SYSTEMS  Constitutional: no c/o fatigue, weakness. + 8# weight loss since last visit  Eyes: no c/o visual disturbances.   Cardiac: no palpitations. + intermittent episodes of chest pain. Not occurring now. Follows with cardiology.   Respiratory: no cough. C/o dyspnea that is chronic. This recently woke her up from sleep. Cardiology aware and she has upcoming appt.    GI: no c/o abdominal pain, nausea. + H/o pancreatitis.   Skin: no rashes, lesions  Neuro: + intermittent numbness, tingling in feet.   Endocrine: denies polyphagia, polydipsia, polyuria      Personally reviewed Past Medical, Surgical, Social History.    Vital Signs  /72   Pulse 88   Resp 18   Ht 5' 2" (1.575 m)   Wt 96.2 kg (211 lb 15.6 oz)   BMI 38.77 kg/m²     Personally reviewed the below labs:    Hemoglobin A1C   Date Value Ref Range Status   06/01/2022 7.1 (H) 4.0 - 5.6 % Final     Comment:     ADA Screening Guidelines:  5.7-6.4%  Consistent with prediabetes  >or=6.5%  Consistent with diabetes    High levels of fetal hemoglobin interfere with the HbA1C  assay. Heterozygous hemoglobin variants (HbS, HgC, etc)do  not significantly interfere with this assay.   However, presence of multiple variants may affect accuracy.     06/01/2022 7.1 (H) 4.0 - 5.6 % Final     Comment:     ADA Screening Guidelines:  5.7-6.4%  Consistent with prediabetes  >or=6.5%  Consistent with diabetes    High levels of fetal hemoglobin interfere with the HbA1C  assay. Heterozygous hemoglobin variants (HbS, HgC, etc)do  not significantly interfere with this assay.   However, presence of multiple variants may affect " accuracy.     02/03/2022 7.2 (H) 4.0 - 5.6 % Final     Comment:     ADA Screening Guidelines:  5.7-6.4%  Consistent with prediabetes  >or=6.5%  Consistent with diabetes    High levels of fetal hemoglobin interfere with the HbA1C  assay. Heterozygous hemoglobin variants (HbS, HgC, etc)do  not significantly interfere with this assay.   However, presence of multiple variants may affect accuracy.         Chemistry        Component Value Date/Time     06/01/2022 0716     06/01/2022 0716    K 4.2 06/01/2022 0716    K 4.2 06/01/2022 0716     06/01/2022 0716     06/01/2022 0716    CO2 25 06/01/2022 0716    CO2 25 06/01/2022 0716    BUN 14 06/01/2022 0716    BUN 14 06/01/2022 0716    CREATININE 0.8 06/01/2022 0716    CREATININE 0.8 06/01/2022 0716     (H) 06/01/2022 0716     (H) 06/01/2022 0716        Component Value Date/Time    CALCIUM 9.4 06/01/2022 0716    CALCIUM 9.4 06/01/2022 0716    ALKPHOS 80 06/01/2022 0716    ALKPHOS 80 06/01/2022 0716    AST 16 06/01/2022 0716    AST 16 06/01/2022 0716    ALT 21 06/01/2022 0716    ALT 21 06/01/2022 0716    BILITOT 1.1 (H) 06/01/2022 0716    BILITOT 1.1 (H) 06/01/2022 0716          Lab Results   Component Value Date    CHOL 124 06/01/2022    CHOL 143 08/10/2021    CHOL 125 09/25/2020     Lab Results   Component Value Date    HDL 48 06/01/2022    HDL 48 08/10/2021    HDL 45 09/25/2020     Lab Results   Component Value Date    LDLCALC 50.2 (L) 06/01/2022    LDLCALC 68.4 08/10/2021    LDLCALC 53.8 (L) 09/25/2020     Lab Results   Component Value Date    TRIG 129 06/01/2022    TRIG 133 08/10/2021    TRIG 131 09/25/2020     Lab Results   Component Value Date    CHOLHDL 38.7 06/01/2022    CHOLHDL 33.6 08/10/2021    CHOLHDL 36.0 09/25/2020       Lab Results   Component Value Date    MICALBCREAT Unable to calculate 06/01/2022     Lab Results   Component Value Date    TSH 0.212 (L) 06/01/2022       CrCl cannot be calculated (Patient's most recent lab  result is older than the maximum 7 days allowed.).    Vit D, 25-Hydroxy   Date Value Ref Range Status   11/08/2014 51 30 - 96 ng/mL Final     Comment:     Vitamin D deficiency.........<10 ng/mL                              Vitamin D insufficiency......10-29 ng/mL       Vitamin D sufficiency........> or equal to 30 ng/mL  Vitamin D toxicity............>100 ng/mL          Ref. Range 6/25/2020 07:27   FRUCTOSAMINE Latest Ref Range: SeeBelow umol /L 291       March 2019 CGMS results: Fasting glucose is acceptable for what patient will allow (feels symptomatic with glucose < 150). Two fasting glucoses didn't correlate with SMBG glucose on her log. Minimal prandial excursions are noted. No true hypoglycemia but rebound noted after borderline nocturnal episode. Eats 3 meals/day, some of which are lower carb, and snacks on either SF pudding or small piece of chocolate.   Average glucose: 138  % above target: 2%  % in target: 98%  % below target: 0%      PHYSICAL EXAMINATION  Constitutional: Appears well, no distress.  Neck: Supple, trachea midline.  Respiratory: CTA, even and unlabored.  Cardiovascular: RRR, no murmurs, no carotid bruits.   GI: active bowel sounds, no hernia  Skin: warm and dry  Neuro: oriented to person, place, time.   Feet: appropriate footwear.        DEXCOM DOWNLOAD: See media file for details. Fasting glucoses are within goal. Sporadic prandial excursions. No hypoglycemia with very rare borderline episodes noted.   Average glucose: 149 mg/dL  Above 250 mg/dL: 0 %  181-250 mg/dL: 13 %   mg/dL: 87 %  54-69 mg/dL: 0 %  Below 54 mg/dL: 0 %         Goals      HEMOGLOBIN A1C < 7.5        due to CAD, CHF, patient's desire for glucose to remain >150.       Assessment/Plan  1. Type 2 diabetes mellitus with diabetic neuropathy, with long-term current use of insulin  -- Controlled. A1c remains within individualized goal.  -- continue current insulin doses.   -- continue Jardiance   -- BG monitoring per  Dexcom G6  -- Would not use Actos, due to CHF. GLP-1RA and DPP4-I contraindicated due to h/o pancreatitis. Cannot tolerate metformin.     -- Discussed diagnosis of DM, A1c goals, progression of disease, long term complications and tx options.  Advised patient to check BG before activities, such as driving or exercise.  -- Reviewed hypoglycemia management: treat with 1/2 glass of juice, 1/2 can regular coke, or 4 glucose tablets. Monitor and repeat treatment every 15 minutes until BG is >70 Then have a snack, which includes a complex carbohydrate and protein.    -- takes statin and ARB     2. Coronary artery disease involving native coronary artery without angina pectoris, unspecified whether native or transplanted heart  -- has upcoming appt with cardiology  -- Jardiance will offer cardiac benefit.     3. CHF (NYHA class III, ACC/AHA stage C)  -- follows with cardiology   4. Postoperative hypothyroidism  -- suppressed  -- will assist pt with scheduling TSH in 6 weeks, per PCP's order.   -- Advised her to confirm that she is taking the correct levothyroxine dose.    5. Essential hypertension  -- controlled   -- continue ARB   6. Hyperlipidemia, unspecified hyperlipidemia type  -- controlled  -- continue Crestor every other day   7. Obesity (BMI 30-39.9)  -- improving   Body mass index is 38.77 kg/m².   8. History of pancreatitis  -- avoid GLP-1RA and DPP4-i       FOLLOW UP  Follow up in about 4 months (around 10/8/2022).   Patient instructed to bring BG logs to each follow up.   Patient encouraged to call for any BG/medication issues, concerns, or questions.      Orders Placed This Encounter   Procedures    Hemoglobin A1C    Basic Metabolic Panel

## 2022-06-09 NOTE — TELEPHONE ENCOUNTER
Looks like next colon cancer screening is not due until May 11, 2023. We will discuss this at future visits

## 2022-06-13 ENCOUNTER — IMMUNIZATION (OUTPATIENT)
Dept: FAMILY MEDICINE | Facility: CLINIC | Age: 72
End: 2022-06-13
Payer: MEDICARE

## 2022-06-13 DIAGNOSIS — Z23 NEED FOR VACCINATION: Primary | ICD-10-CM

## 2022-06-13 PROCEDURE — 91305 COVID-19, MRNA, LNP-S, PF, 30 MCG/0.3 ML DOSE VACCINE (PFIZER): CPT | Mod: PBBFAC,PO

## 2022-07-11 ENCOUNTER — PATIENT MESSAGE (OUTPATIENT)
Dept: CARDIOLOGY | Facility: CLINIC | Age: 72
End: 2022-07-11

## 2022-07-11 ENCOUNTER — CLINICAL SUPPORT (OUTPATIENT)
Dept: CARDIOLOGY | Facility: HOSPITAL | Age: 72
End: 2022-07-11
Payer: MEDICARE

## 2022-07-11 DIAGNOSIS — Z95.810 PRESENCE OF AUTOMATIC (IMPLANTABLE) CARDIAC DEFIBRILLATOR: ICD-10-CM

## 2022-07-11 PROCEDURE — 93295 CARDIAC DEVICE CHECK - REMOTE: ICD-10-PCS | Mod: ,,, | Performed by: INTERNAL MEDICINE

## 2022-07-11 PROCEDURE — 93295 DEV INTERROG REMOTE 1/2/MLT: CPT | Mod: ,,, | Performed by: INTERNAL MEDICINE

## 2022-07-13 ENCOUNTER — LAB VISIT (OUTPATIENT)
Dept: LAB | Facility: HOSPITAL | Age: 72
End: 2022-07-13
Attending: FAMILY MEDICINE
Payer: MEDICARE

## 2022-07-13 DIAGNOSIS — E89.0 POSTOPERATIVE HYPOTHYROIDISM: ICD-10-CM

## 2022-07-13 LAB
T4 FREE SERPL-MCNC: 1.15 NG/DL (ref 0.71–1.51)
TSH SERPL DL<=0.005 MIU/L-ACNC: 0.24 UIU/ML (ref 0.4–4)

## 2022-07-13 PROCEDURE — 36415 COLL VENOUS BLD VENIPUNCTURE: CPT | Mod: PN | Performed by: FAMILY MEDICINE

## 2022-07-13 PROCEDURE — 84443 ASSAY THYROID STIM HORMONE: CPT | Performed by: FAMILY MEDICINE

## 2022-07-13 PROCEDURE — 84439 ASSAY OF FREE THYROXINE: CPT | Performed by: FAMILY MEDICINE

## 2022-07-14 ENCOUNTER — OFFICE VISIT (OUTPATIENT)
Dept: CARDIOLOGY | Facility: CLINIC | Age: 72
End: 2022-07-14
Payer: MEDICARE

## 2022-07-14 ENCOUNTER — PATIENT MESSAGE (OUTPATIENT)
Dept: ENDOCRINOLOGY | Facility: CLINIC | Age: 72
End: 2022-07-14
Payer: COMMERCIAL

## 2022-07-14 VITALS
HEART RATE: 62 BPM | BODY MASS INDEX: 39.11 KG/M2 | DIASTOLIC BLOOD PRESSURE: 67 MMHG | HEIGHT: 62 IN | WEIGHT: 212.5 LBS | SYSTOLIC BLOOD PRESSURE: 111 MMHG

## 2022-07-14 DIAGNOSIS — I50.9 CHF (NYHA CLASS III, ACC/AHA STAGE C): ICD-10-CM

## 2022-07-14 DIAGNOSIS — I44.7 LBBB (LEFT BUNDLE BRANCH BLOCK): Primary | ICD-10-CM

## 2022-07-14 DIAGNOSIS — I10 PRIMARY HYPERTENSION: ICD-10-CM

## 2022-07-14 PROCEDURE — 99214 PR OFFICE/OUTPT VISIT, EST, LEVL IV, 30-39 MIN: ICD-10-PCS | Mod: S$PBB,,, | Performed by: INTERNAL MEDICINE

## 2022-07-14 PROCEDURE — 99213 OFFICE O/P EST LOW 20 MIN: CPT | Mod: PBBFAC,PO | Performed by: INTERNAL MEDICINE

## 2022-07-14 PROCEDURE — 99999 PR PBB SHADOW E&M-EST. PATIENT-LVL III: CPT | Mod: PBBFAC,,, | Performed by: INTERNAL MEDICINE

## 2022-07-14 PROCEDURE — 99999 PR PBB SHADOW E&M-EST. PATIENT-LVL III: ICD-10-PCS | Mod: PBBFAC,,, | Performed by: INTERNAL MEDICINE

## 2022-07-14 PROCEDURE — 99214 OFFICE O/P EST MOD 30 MIN: CPT | Mod: S$PBB,,, | Performed by: INTERNAL MEDICINE

## 2022-07-14 RX ORDER — TIZANIDINE 4 MG/1
4 TABLET ORAL
Qty: 20 TABLET | Refills: 0 | Status: SHIPPED | OUTPATIENT
Start: 2022-07-14 | End: 2023-03-09 | Stop reason: SDUPTHER

## 2022-07-14 NOTE — PROGRESS NOTES
Subjective:    Patient ID:  Mckenzie Mari is a 71 y.o. female who presents for follow-up of chf    HPI  She comes with no complaints, no chest pain, no shortness of breath  FC II      Review of Systems   Constitutional: Negative for decreased appetite, malaise/fatigue, weight gain and weight loss.   Cardiovascular: Negative for chest pain, dyspnea on exertion, leg swelling, palpitations and syncope.   Respiratory: Negative for cough and shortness of breath.    Gastrointestinal: Negative.    Neurological: Negative for weakness.   All other systems reviewed and are negative.       Objective:      Physical Exam  Vitals and nursing note reviewed.   Constitutional:       Appearance: Normal appearance. She is well-developed.   HENT:      Head: Normocephalic.   Eyes:      Pupils: Pupils are equal, round, and reactive to light.   Neck:      Thyroid: No thyromegaly.      Vascular: No carotid bruit or JVD.   Cardiovascular:      Rate and Rhythm: Normal rate and regular rhythm.      Chest Wall: PMI is not displaced.      Pulses: Normal pulses and intact distal pulses.      Heart sounds: Normal heart sounds. No murmur heard.    No gallop.   Pulmonary:      Effort: Pulmonary effort is normal.      Breath sounds: Normal breath sounds.   Abdominal:      Palpations: Abdomen is soft. There is no mass.      Tenderness: There is no abdominal tenderness.   Musculoskeletal:         General: Normal range of motion.      Cervical back: Normal range of motion and neck supple.   Skin:     General: Skin is warm.   Neurological:      Mental Status: She is alert and oriented to person, place, and time.      Sensory: No sensory deficit.      Deep Tendon Reflexes: Reflexes are normal and symmetric.           Assessment:       1. LBBB (left bundle branch block)    2. CHF (NYHA class III, ACC/AHA stage C)    3. Primary hypertension         Plan:     Continue all cardiac medications  Regular exercise program  Weight loss  9 m f/u with k  daniela

## 2022-07-23 ENCOUNTER — OFFICE VISIT (OUTPATIENT)
Dept: URGENT CARE | Facility: CLINIC | Age: 72
End: 2022-07-23
Payer: MEDICARE

## 2022-07-23 VITALS
BODY MASS INDEX: 39.01 KG/M2 | OXYGEN SATURATION: 98 % | DIASTOLIC BLOOD PRESSURE: 75 MMHG | HEIGHT: 62 IN | HEART RATE: 67 BPM | RESPIRATION RATE: 16 BRPM | SYSTOLIC BLOOD PRESSURE: 135 MMHG | TEMPERATURE: 99 F | WEIGHT: 212 LBS

## 2022-07-23 DIAGNOSIS — H60.501 ACUTE OTITIS EXTERNA OF RIGHT EAR, UNSPECIFIED TYPE: Primary | ICD-10-CM

## 2022-07-23 PROCEDURE — 99213 OFFICE O/P EST LOW 20 MIN: CPT | Mod: S$GLB,,, | Performed by: EMERGENCY MEDICINE

## 2022-07-23 PROCEDURE — 99213 PR OFFICE/OUTPT VISIT, EST, LEVL III, 20-29 MIN: ICD-10-PCS | Mod: S$GLB,,, | Performed by: EMERGENCY MEDICINE

## 2022-07-23 RX ORDER — NEOMYCIN SULFATE, POLYMYXIN B SULFATE AND HYDROCORTISONE 10; 3.5; 1 MG/ML; MG/ML; [USP'U]/ML
3 SUSPENSION/ DROPS AURICULAR (OTIC) 4 TIMES DAILY
Qty: 10 ML | Refills: 0 | Status: SHIPPED | OUTPATIENT
Start: 2022-07-23 | End: 2022-08-04 | Stop reason: SDUPTHER

## 2022-07-23 NOTE — PROGRESS NOTES
"Subjective:       Patient ID: Mckenzie Mari is a 71 y.o. female.    Vitals:  height is 5' 2" (1.575 m) and weight is 96.2 kg (212 lb). Her oral temperature is 99.2 °F (37.3 °C). Her blood pressure is 135/75 and her pulse is 67. Her respiration is 16 and oxygen saturation is 98%.     Chief Complaint: Otalgia    Patient presents today with right ear pain that first began about 2 days ago.  Patient states her neck/glands are tender on the right side below the ear.  Patient has taken Aleve to relieve pain.     Otalgia   There is pain in the right ear. This is a new problem. The current episode started in the past 7 days. The problem occurs constantly. The pain is moderate.       HENT: Positive for ear pain.        Objective:      Physical Exam   Constitutional: She is oriented to person, place, and time. She appears well-developed. She is cooperative.  Non-toxic appearance. She does not appear ill. No distress.   HENT:   Head: Normocephalic and atraumatic.   Ears:   Right Ear: Hearing, tympanic membrane and external ear normal.   Left Ear: Hearing, tympanic membrane, external ear and ear canal normal.      Comments: Right external canal erythema with scant swelling.  Nose: Nose normal. No mucosal edema, rhinorrhea or nasal deformity. No epistaxis. Right sinus exhibits no maxillary sinus tenderness and no frontal sinus tenderness. Left sinus exhibits no maxillary sinus tenderness and no frontal sinus tenderness.   Mouth/Throat: Uvula is midline, oropharynx is clear and moist and mucous membranes are normal. Mucous membranes are moist. No trismus in the jaw. Normal dentition. No uvula swelling. No oropharyngeal exudate, posterior oropharyngeal edema or posterior oropharyngeal erythema.   Eyes: Conjunctivae and lids are normal. No scleral icterus.   Neck: Trachea normal and phonation normal. Neck supple.      Comments: Minimal right anterior cervical node tenderness No edema present. No erythema present. No neck " rigidity present.   Cardiovascular: Normal rate, regular rhythm, normal heart sounds and normal pulses.   Pulmonary/Chest: Effort normal and breath sounds normal. No respiratory distress. She has no decreased breath sounds. She has no wheezes. She has no rhonchi.   Abdominal: Normal appearance.   Musculoskeletal: Normal range of motion.         General: No deformity. Normal range of motion.   Neurological: She is alert and oriented to person, place, and time. She exhibits normal muscle tone. Coordination normal.   Skin: Skin is warm, dry, intact, not diaphoretic and not pale.   Psychiatric: Her speech is normal and behavior is normal. Judgment and thought content normal.   Nursing note and vitals reviewed.  71-year-old with right earache and evidence of otitis externa.  Will treat with topical medications.    Note:  Patient has 22 allergies listed.  She says she is allergic to mycin drugs.  When pressed, she says azithromycin and erythromycin.  So, apparently she is allergic to macrolides but not aminoglycosides.  Will prescribe Cortisporin      Assessment:       1. Acute otitis externa of right ear, unspecified type          Plan:         Acute otitis externa of right ear, unspecified type  -     neomycin-polymyxin-hydrocortisone (CORTISPORIN) 3.5-10,000-1 mg/mL-unit/mL-% otic suspension; Place 3 drops into the right ear 4 (four) times daily.  Dispense: 10 mL; Refill: 0

## 2022-07-28 RX ORDER — CARVEDILOL 25 MG/1
TABLET ORAL
Qty: 180 TABLET | Refills: 3 | Status: SHIPPED | OUTPATIENT
Start: 2022-07-28 | End: 2023-08-14

## 2022-07-28 NOTE — TELEPHONE ENCOUNTER
No new care gaps identified.  [facility] Embedded Care Gaps. Reference number: [id number]. 7/28/2022   7:47:12 AM CDT

## 2022-07-28 NOTE — TELEPHONE ENCOUNTER
Refill Decision Note   Mckenzie Kamaljit  is requesting a refill authorization.  Brief Assessment and Rationale for Refill:  Approve     Medication Therapy Plan:       Medication Reconciliation Completed: No   Comments:     No Care Gaps recommended.     Note composed:6:45 PM 07/28/2022

## 2022-08-04 ENCOUNTER — OFFICE VISIT (OUTPATIENT)
Dept: URGENT CARE | Facility: CLINIC | Age: 72
End: 2022-08-04
Payer: MEDICARE

## 2022-08-04 VITALS
WEIGHT: 207 LBS | DIASTOLIC BLOOD PRESSURE: 79 MMHG | HEART RATE: 60 BPM | OXYGEN SATURATION: 97 % | RESPIRATION RATE: 16 BRPM | BODY MASS INDEX: 38.09 KG/M2 | TEMPERATURE: 98 F | SYSTOLIC BLOOD PRESSURE: 130 MMHG | HEIGHT: 62 IN

## 2022-08-04 DIAGNOSIS — H60.501 ACUTE OTITIS EXTERNA OF RIGHT EAR, UNSPECIFIED TYPE: ICD-10-CM

## 2022-08-04 DIAGNOSIS — J01.90 ACUTE NON-RECURRENT SINUSITIS, UNSPECIFIED LOCATION: ICD-10-CM

## 2022-08-04 DIAGNOSIS — R09.82 POSTNASAL DRIP: Primary | ICD-10-CM

## 2022-08-04 LAB
CTP QC/QA: YES
SARS-COV-2 RDRP RESP QL NAA+PROBE: NEGATIVE

## 2022-08-04 PROCEDURE — 99213 OFFICE O/P EST LOW 20 MIN: CPT | Mod: S$GLB,,, | Performed by: EMERGENCY MEDICINE

## 2022-08-04 PROCEDURE — U0002: ICD-10-PCS | Mod: QW,CR,S$GLB, | Performed by: EMERGENCY MEDICINE

## 2022-08-04 PROCEDURE — U0002 COVID-19 LAB TEST NON-CDC: HCPCS | Mod: QW,CR,S$GLB, | Performed by: EMERGENCY MEDICINE

## 2022-08-04 PROCEDURE — 99213 PR OFFICE/OUTPT VISIT, EST, LEVL III, 20-29 MIN: ICD-10-PCS | Mod: S$GLB,,, | Performed by: EMERGENCY MEDICINE

## 2022-08-04 RX ORDER — NEOMYCIN SULFATE, POLYMYXIN B SULFATE AND HYDROCORTISONE 10; 3.5; 1 MG/ML; MG/ML; [USP'U]/ML
3 SUSPENSION/ DROPS AURICULAR (OTIC) 4 TIMES DAILY
Qty: 10 ML | Refills: 0 | Status: SHIPPED | OUTPATIENT
Start: 2022-08-04 | End: 2022-08-30

## 2022-08-04 NOTE — PROGRESS NOTES
"Subjective:       Patient ID: Mckenzie Mari is a 72 y.o. female.    Vitals:  height is 5' 2" (1.575 m) and weight is 93.9 kg (207 lb). Her temperature is 97.9 °F (36.6 °C). Her blood pressure is 130/79 and her pulse is 60. Her respiration is 16 and oxygen saturation is 97%.     Chief Complaint: Sinus Problem    Patient presents to clinic with a sinus flare-up, symptoms started 2 days ago. Symptoms: postnasal drip, cough(thick, yellowish mucus), sinus pressure and right ear pain.  Patient is currently taking Flonase for symptoms with no relief. Patient is Covid vaccinated.     Sinus Problem  This is a new problem. The current episode started in the past 7 days. The problem is unchanged. There has been no fever. Her pain is at a severity of 2/10. Associated symptoms include headaches, sinus pressure and sneezing.       HENT: Positive for postnasal drip, sinus pain and sinus pressure.    Eyes: Positive for eye itching.   Allergic/Immunologic: Positive for seasonal allergies and sneezing.   Neurological: Positive for headaches.       Objective:      Physical Exam   Constitutional: She is oriented to person, place, and time. She appears well-developed. She is cooperative.  Non-toxic appearance. She does not appear ill. No distress.   HENT:   Head: Normocephalic and atraumatic.   Ears:   Right Ear: Hearing, tympanic membrane and external ear normal.   Left Ear: Hearing, tympanic membrane, external ear and ear canal normal.      Comments: Right external canal is slightly swollen and minimally erythematous.  Nose: No mucosal edema, rhinorrhea or nasal deformity. No epistaxis. Right sinus exhibits no maxillary sinus tenderness and no frontal sinus tenderness. Left sinus exhibits no maxillary sinus tenderness and no frontal sinus tenderness.      Comments: Inflamed nasal turbinates.  Mouth/Throat: Uvula is midline, oropharynx is clear and moist and mucous membranes are normal. Mucous membranes are moist. No trismus in the " jaw. Normal dentition. No uvula swelling. No posterior oropharyngeal edema or posterior oropharyngeal erythema. Oropharynx is clear.      Comments: Scant postnasal drip.  Eyes: Conjunctivae and lids are normal. No scleral icterus.   Neck: Trachea normal and phonation normal. Neck supple. No edema present. No erythema present. No neck rigidity present.   Cardiovascular: Normal rate, regular rhythm, normal heart sounds and normal pulses.   Pulmonary/Chest: Effort normal and breath sounds normal. No respiratory distress. She has no decreased breath sounds. She has no wheezes. She has no rhonchi. She has no rales.   Abdominal: Normal appearance.   Musculoskeletal: Normal range of motion.         General: No deformity. Normal range of motion.   Neurological: She is alert and oriented to person, place, and time. She exhibits normal muscle tone. Coordination normal.   Skin: Skin is warm, dry, intact, not diaphoretic and not pale.   Psychiatric: Her speech is normal and behavior is normal. Judgment and thought content normal.   Nursing note and vitals reviewed.      Patient has persistent mild otitis externa.  She has mild sinusitis.  COVID test is negative.  Will write prescription for topical antibiotic steroid for external otitis.  Recommended that she continue Flonase.  Hesitant to use antibiotics since patient has allergy to penicillins, cephalosporins, erythromycin, quinolones, sulfa, and doxycycline.    Results for orders placed or performed in visit on 08/04/22   POCT COVID-19 Rapid Screening   Result Value Ref Range    POC Rapid COVID Negative Negative     Acceptable Yes      *Note: Due to a large number of results and/or encounters for the requested time period, some results have not been displayed. A complete set of results can be found in Results Review.           Assessment:       1. Postnasal drip    2. Acute otitis externa of right ear, unspecified type    3. Acute non-recurrent sinusitis,  unspecified location          Plan:         Postnasal drip  -     POCT COVID-19 Rapid Screening    Acute otitis externa of right ear, unspecified type  -     neomycin-polymyxin-hydrocortisone (CORTISPORIN) 3.5-10,000-1 mg/mL-unit/mL-% otic suspension; Place 3 drops into the right ear 4 (four) times daily.  Dispense: 10 mL; Refill: 0    Acute non-recurrent sinusitis, unspecified location

## 2022-08-08 ENCOUNTER — OFFICE VISIT (OUTPATIENT)
Dept: DERMATOLOGY | Facility: CLINIC | Age: 72
End: 2022-08-08
Payer: MEDICARE

## 2022-08-08 VITALS — WEIGHT: 207 LBS | BODY MASS INDEX: 38.09 KG/M2 | HEIGHT: 62 IN | RESPIRATION RATE: 18 BRPM

## 2022-08-08 DIAGNOSIS — D18.01 CHERRY ANGIOMA: ICD-10-CM

## 2022-08-08 DIAGNOSIS — L81.4 LENTIGINES: ICD-10-CM

## 2022-08-08 DIAGNOSIS — Z12.83 SCREENING FOR SKIN CANCER: ICD-10-CM

## 2022-08-08 DIAGNOSIS — L82.1 SEBORRHEIC KERATOSES: ICD-10-CM

## 2022-08-08 DIAGNOSIS — Z85.828 HX OF NONMELANOMA SKIN CANCER: ICD-10-CM

## 2022-08-08 DIAGNOSIS — D23.30 BLUE NEVUS OF FACE: ICD-10-CM

## 2022-08-08 DIAGNOSIS — L73.8 SEBACEOUS HYPERPLASIA: ICD-10-CM

## 2022-08-08 DIAGNOSIS — D23.9 DERMATOFIBROMA: ICD-10-CM

## 2022-08-08 DIAGNOSIS — D22.9 MULTIPLE BENIGN NEVI: Primary | ICD-10-CM

## 2022-08-08 PROCEDURE — 99999 PR PBB SHADOW E&M-EST. PATIENT-LVL IV: ICD-10-PCS | Mod: PBBFAC,,, | Performed by: DERMATOLOGY

## 2022-08-08 PROCEDURE — 99213 PR OFFICE/OUTPT VISIT, EST, LEVL III, 20-29 MIN: ICD-10-PCS | Mod: S$PBB,,, | Performed by: DERMATOLOGY

## 2022-08-08 PROCEDURE — 99214 OFFICE O/P EST MOD 30 MIN: CPT | Mod: PBBFAC,PO | Performed by: DERMATOLOGY

## 2022-08-08 PROCEDURE — 99999 PR PBB SHADOW E&M-EST. PATIENT-LVL IV: CPT | Mod: PBBFAC,,, | Performed by: DERMATOLOGY

## 2022-08-08 PROCEDURE — 99213 OFFICE O/P EST LOW 20 MIN: CPT | Mod: S$PBB,,, | Performed by: DERMATOLOGY

## 2022-08-08 NOTE — PATIENT INSTRUCTIONS
What Are the Symptoms of Skin Cancer?  A change in your skin is the most common sign of skin cancer. This could be a new growth, a sore that doesnt heal, or a change in a mole. Not all skin cancers look the same.    For melanoma specifically, a simple way to remember the warning signs is to remember the A-B-C-D-Es of melanoma--    A stands for asymmetrical. Does the mole or spot have an irregular shape with two parts that look very different?  B stands for border. Is the border irregular or jagged?  C is for color. Is the color uneven?  D is for diameter. Is the mole or spot larger than the size of a pea?  E is for evolving. Has the mole or spot changed during the past few weeks or months?    Talk to your doctor if you notice changes in your skin such as a new growth, a sore that doesnt heal, a change in an old growth, or any of the A-B-C-D-Es of melanoma    What Can I Do to Reduce My Risk of Skin Cancer?  Protection from ultraviolet (UV) radiation is important all year, not just during the summer or at the beach. UV rays from the sun can reach you on cloudy and hazy days, not just on bright and ronaldo days. UV rays also reflect off of surfaces like water, cement, sand, and snow. Indoor tanning (using a tanning bed, townsend, or sunlamp to get tan) exposes users to UV radiation.    The hours between 10 a.m. and 4 p.m. Daylight Saving Time (9 a.m. to 3 p.m. standard time) are the most hazardous for UV exposure outdoors in the continental [place]. UV rays from sunlight are the greatest during the late spring and early summer in [place].    CDC recommends easy options for protection from UV radiation--    Stay in the shade or indoors, especially during midday hours.  Wear clothing that covers your arms and legs.  Wear a hat with a wide brim to shade your face, head, ears, and neck.  Wear sunglasses that wrap around and block both UVA and UVB rays.  Use sunscreen with a sun protection factor (SPF) of  30 or higher, and both UVA and UVB (broad spectrum) protection.  Avoid indoor tanning.    Adapted from https://www.cdc.gov/cancer/skin/basic_info/

## 2022-08-20 ENCOUNTER — PATIENT MESSAGE (OUTPATIENT)
Dept: FAMILY MEDICINE | Facility: CLINIC | Age: 72
End: 2022-08-20
Payer: MEDICARE

## 2022-08-25 ENCOUNTER — TELEPHONE (OUTPATIENT)
Dept: FAMILY MEDICINE | Facility: CLINIC | Age: 72
End: 2022-08-25
Payer: MEDICARE

## 2022-08-25 NOTE — TELEPHONE ENCOUNTER
Pt has appt w/ Mr Spain today. Provider out of office unexpectedly. Unable to reach pt. Unable to leave msg. Will offer to resched or UC clinic when pt calls back.

## 2022-08-28 ENCOUNTER — PATIENT MESSAGE (OUTPATIENT)
Dept: FAMILY MEDICINE | Facility: CLINIC | Age: 72
End: 2022-08-28
Payer: MEDICARE

## 2022-08-30 ENCOUNTER — PATIENT MESSAGE (OUTPATIENT)
Dept: FAMILY MEDICINE | Facility: CLINIC | Age: 72
End: 2022-08-30
Payer: MEDICARE

## 2022-08-30 ENCOUNTER — OFFICE VISIT (OUTPATIENT)
Dept: FAMILY MEDICINE | Facility: CLINIC | Age: 72
End: 2022-08-30
Payer: MEDICARE

## 2022-08-30 VITALS
TEMPERATURE: 98 F | SYSTOLIC BLOOD PRESSURE: 118 MMHG | WEIGHT: 211.06 LBS | BODY MASS INDEX: 38.84 KG/M2 | HEIGHT: 62 IN | HEART RATE: 78 BPM | DIASTOLIC BLOOD PRESSURE: 72 MMHG

## 2022-08-30 DIAGNOSIS — J32.0 CHRONIC MAXILLARY SINUSITIS: ICD-10-CM

## 2022-08-30 DIAGNOSIS — B37.9 YEAST INFECTION: ICD-10-CM

## 2022-08-30 DIAGNOSIS — R35.0 FREQUENCY OF URINATION: ICD-10-CM

## 2022-08-30 DIAGNOSIS — Z79.4 TYPE 2 DIABETES MELLITUS WITH DIABETIC NEUROPATHY, WITH LONG-TERM CURRENT USE OF INSULIN: ICD-10-CM

## 2022-08-30 DIAGNOSIS — E11.40 TYPE 2 DIABETES MELLITUS WITH DIABETIC NEUROPATHY, WITH LONG-TERM CURRENT USE OF INSULIN: ICD-10-CM

## 2022-08-30 DIAGNOSIS — R30.0 DYSURIA: Primary | ICD-10-CM

## 2022-08-30 LAB
BILIRUB SERPL-MCNC: ABNORMAL MG/DL
BLOOD URINE, POC: ABNORMAL
COLOR, POC UA: YELLOW
GLUCOSE UR QL STRIP: 1000
KETONES UR QL STRIP: ABNORMAL
LEUKOCYTE ESTERASE URINE, POC: ABNORMAL
NITRITE, POC UA: ABNORMAL
PH, POC UA: 5
PROTEIN, POC: NORMAL
SPECIFIC GRAVITY, POC UA: 1.01
UROBILINOGEN, POC UA: NORMAL

## 2022-08-30 PROCEDURE — 99999 PR PBB SHADOW E&M-EST. PATIENT-LVL V: ICD-10-PCS | Mod: PBBFAC,,, | Performed by: PHYSICIAN ASSISTANT

## 2022-08-30 PROCEDURE — 99215 OFFICE O/P EST HI 40 MIN: CPT | Mod: PBBFAC,PN | Performed by: PHYSICIAN ASSISTANT

## 2022-08-30 PROCEDURE — 99214 PR OFFICE/OUTPT VISIT, EST, LEVL IV, 30-39 MIN: ICD-10-PCS | Mod: S$PBB,,, | Performed by: PHYSICIAN ASSISTANT

## 2022-08-30 PROCEDURE — 81001 URINALYSIS AUTO W/SCOPE: CPT | Mod: PBBFAC,PN | Performed by: PHYSICIAN ASSISTANT

## 2022-08-30 PROCEDURE — 99999 PR PBB SHADOW E&M-EST. PATIENT-LVL V: CPT | Mod: PBBFAC,,, | Performed by: PHYSICIAN ASSISTANT

## 2022-08-30 PROCEDURE — 99214 OFFICE O/P EST MOD 30 MIN: CPT | Mod: S$PBB,,, | Performed by: PHYSICIAN ASSISTANT

## 2022-08-30 RX ORDER — FLUCONAZOLE 150 MG/1
TABLET ORAL
Qty: 2 TABLET | Refills: 0 | Status: SHIPPED | OUTPATIENT
Start: 2022-08-30 | End: 2022-10-31

## 2022-08-30 RX ORDER — AMOXICILLIN 500 MG
1 CAPSULE ORAL DAILY
COMMUNITY

## 2022-08-30 RX ORDER — CHOLECALCIFEROL (VITAMIN D3) 50 MCG
5000 TABLET ORAL DAILY
COMMUNITY

## 2022-08-30 RX ORDER — POLYETHYLENE GLYCOL 3350 17 G/17G
17 POWDER, FOR SOLUTION ORAL DAILY
COMMUNITY

## 2022-08-30 RX ORDER — CHOLECALCIFEROL (VITAMIN D3) 125 MCG
CAPSULE ORAL
COMMUNITY

## 2022-08-30 NOTE — PROGRESS NOTES
Subjective:       Patient ID: Mckenzie Mari is a 72 y.o. female.    Chief Complaint: Sinus Problem (Sinus, PND, yellow mucus. S/S x 3wk. Went to .) and Dysuria (Burn w/ urination. Some pelvic discomfort w/ urination)    HPI  R facial pain x 3 wks   Hx sinus infections  Multi antibiotic allergies  Glucose and A1c readings high  Review of Systems   Constitutional: Negative.  Negative for activity change, appetite change, chills, diaphoresis, fatigue, fever and unexpected weight change.   HENT:  Positive for nasal congestion and sinus pressure/congestion. Negative for postnasal drip and sore throat.    Eyes: Negative.    Respiratory: Negative.  Negative for cough and shortness of breath.    Cardiovascular: Negative.  Negative for chest pain and leg swelling.   Gastrointestinal: Negative.    Endocrine: Negative.    Genitourinary:  Positive for dysuria and frequency. Negative for flank pain, hematuria, nocturia and urgency.   Musculoskeletal: Negative.    Integumentary:  Negative for rash. Negative.   Neurological: Negative.        Objective:      Physical Exam  Vitals reviewed.   Constitutional:       General: She is not in acute distress.     Appearance: Normal appearance. She is obese. She is not ill-appearing, toxic-appearing or diaphoretic.   HENT:      Head: Normocephalic and atraumatic.      Comments: Sinuses mild tenderness     Right Ear: Tympanic membrane, ear canal and external ear normal. There is no impacted cerumen.      Left Ear: Tympanic membrane, ear canal and external ear normal. There is no impacted cerumen.      Nose: Congestion present.      Comments: Clear mucus     Mouth/Throat:      Mouth: Mucous membranes are moist.      Pharynx: Oropharynx is clear. No oropharyngeal exudate or posterior oropharyngeal erythema.   Eyes:      General: No scleral icterus.     Conjunctiva/sclera: Conjunctivae normal.   Neck:      Vascular: No carotid bruit.   Cardiovascular:      Rate and Rhythm: Normal rate and  regular rhythm.      Pulses: Normal pulses.      Heart sounds: Normal heart sounds. No murmur heard.    No friction rub. No gallop.   Pulmonary:      Effort: Pulmonary effort is normal. No respiratory distress.      Breath sounds: Normal breath sounds. No stridor. No wheezing, rhonchi or rales.   Abdominal:      General: Abdomen is flat. Bowel sounds are normal. There is no distension.      Palpations: Abdomen is soft. There is no mass.      Tenderness: There is no abdominal tenderness. There is no right CVA tenderness, left CVA tenderness, guarding or rebound.      Hernia: No hernia is present.   Musculoskeletal:         General: No swelling.      Cervical back: Normal range of motion and neck supple. No rigidity or tenderness.      Right lower leg: No edema.      Left lower leg: No edema.   Lymphadenopathy:      Cervical: No cervical adenopathy.   Skin:     General: Skin is warm and dry.      Findings: No rash.   Neurological:      General: No focal deficit present.      Mental Status: She is alert and oriented to person, place, and time.       Assessment:       Problem List Items Addressed This Visit       Chronic maxillary sinusitis    Relevant Orders    CT Sinuses without Contrast    Type 2 diabetes mellitus with diabetic neuropathy, with long-term current use of insulin     Other Visit Diagnoses       Dysuria    -  Primary    Relevant Orders    POCT urinalysis, dipstick or tablet reag (Completed)    Frequency of urination        Relevant Orders    POCT urinalysis, dipstick or tablet reag (Completed)    Yeast infection        Relevant Medications    fluconazole (DIFLUCAN) 150 MG Tab              Plan:       Mckenzie was seen today for sinus problem and dysuria.    Diagnoses and all orders for this visit:    Dysuria  -     POCT urinalysis, dipstick or tablet reag    Frequency of urination  -     POCT urinalysis, dipstick or tablet reag    Type 2 diabetes mellitus with diabetic neuropathy, with long-term current  use of insulin    Yeast infection  -     fluconazole (DIFLUCAN) 150 MG Tab; One tab PO initially then repeat with one tab in 7 days    Chronic maxillary sinusitis  -     CT Sinuses without Contrast; Future       Discussed otc's  Hydration  F/u with endo  Discussed diet    Dip urine today stable

## 2022-08-31 ENCOUNTER — PATIENT MESSAGE (OUTPATIENT)
Dept: FAMILY MEDICINE | Facility: CLINIC | Age: 72
End: 2022-08-31
Payer: MEDICARE

## 2022-08-31 ENCOUNTER — TELEPHONE (OUTPATIENT)
Dept: ENDOCRINOLOGY | Facility: CLINIC | Age: 72
End: 2022-08-31
Payer: MEDICARE

## 2022-08-31 DIAGNOSIS — Z78.0 MENOPAUSE: ICD-10-CM

## 2022-09-06 ENCOUNTER — HOSPITAL ENCOUNTER (OUTPATIENT)
Dept: RADIOLOGY | Facility: HOSPITAL | Age: 72
Discharge: HOME | End: 2022-09-06
Attending: FAMILY MEDICINE
Payer: MEDICARE

## 2022-09-06 DIAGNOSIS — Z78.0 MENOPAUSE: ICD-10-CM

## 2022-09-06 PROCEDURE — 77080 DEXA BONE DENSITY SPINE HIP: ICD-10-PCS | Mod: 26,,, | Performed by: RADIOLOGY

## 2022-09-06 PROCEDURE — 77080 DXA BONE DENSITY AXIAL: CPT | Mod: TC,PO

## 2022-09-06 PROCEDURE — 77080 DXA BONE DENSITY AXIAL: CPT | Mod: 26,,, | Performed by: RADIOLOGY

## 2022-09-14 ENCOUNTER — HOSPITAL ENCOUNTER (OUTPATIENT)
Dept: RADIOLOGY | Facility: CLINIC | Age: 72
Discharge: HOME | End: 2022-09-14
Attending: PHYSICIAN ASSISTANT
Payer: MEDICARE

## 2022-09-14 DIAGNOSIS — J32.0 CHRONIC MAXILLARY SINUSITIS: ICD-10-CM

## 2022-09-14 PROCEDURE — 70486 CT MAXILLOFACIAL W/O DYE: CPT | Mod: 26,,, | Performed by: RADIOLOGY

## 2022-09-14 PROCEDURE — 70486 CT MAXILLOFACIAL W/O DYE: CPT | Mod: TC,PN

## 2022-09-14 PROCEDURE — 70486 CT SINUSES WITHOUT CONTRAST: ICD-10-PCS | Mod: 26,,, | Performed by: RADIOLOGY

## 2022-09-21 ENCOUNTER — PATIENT MESSAGE (OUTPATIENT)
Dept: ENDOCRINOLOGY | Facility: CLINIC | Age: 72
End: 2022-09-21
Payer: MEDICARE

## 2022-09-21 DIAGNOSIS — E78.5 HYPERLIPIDEMIA, UNSPECIFIED HYPERLIPIDEMIA TYPE: Chronic | ICD-10-CM

## 2022-09-22 RX ORDER — ROSUVASTATIN CALCIUM 20 MG/1
20 TABLET, COATED ORAL NIGHTLY
Qty: 90 TABLET | Refills: 3 | Status: SHIPPED | OUTPATIENT
Start: 2022-09-22 | End: 2023-09-05

## 2022-10-04 ENCOUNTER — LAB VISIT (OUTPATIENT)
Dept: LAB | Facility: HOSPITAL | Age: 72
End: 2022-10-04
Attending: NURSE PRACTITIONER
Payer: MEDICARE

## 2022-10-04 DIAGNOSIS — E11.40 TYPE 2 DIABETES MELLITUS WITH DIABETIC NEUROPATHY, WITH LONG-TERM CURRENT USE OF INSULIN: ICD-10-CM

## 2022-10-04 DIAGNOSIS — Z79.4 TYPE 2 DIABETES MELLITUS WITH DIABETIC NEUROPATHY, WITH LONG-TERM CURRENT USE OF INSULIN: ICD-10-CM

## 2022-10-04 LAB
ANION GAP SERPL CALC-SCNC: 9 MMOL/L (ref 8–16)
BUN SERPL-MCNC: 20 MG/DL (ref 8–23)
CALCIUM SERPL-MCNC: 9.4 MG/DL (ref 8.7–10.5)
CHLORIDE SERPL-SCNC: 105 MMOL/L (ref 95–110)
CO2 SERPL-SCNC: 24 MMOL/L (ref 23–29)
CREAT SERPL-MCNC: 0.8 MG/DL (ref 0.5–1.4)
EST. GFR  (NO RACE VARIABLE): >60 ML/MIN/1.73 M^2
ESTIMATED AVG GLUCOSE: 169 MG/DL (ref 68–131)
GLUCOSE SERPL-MCNC: 102 MG/DL (ref 70–110)
HBA1C MFR BLD: 7.5 % (ref 4–5.6)
POTASSIUM SERPL-SCNC: 4.3 MMOL/L (ref 3.5–5.1)
SODIUM SERPL-SCNC: 138 MMOL/L (ref 136–145)

## 2022-10-04 PROCEDURE — 36415 COLL VENOUS BLD VENIPUNCTURE: CPT | Mod: PN | Performed by: NURSE PRACTITIONER

## 2022-10-04 PROCEDURE — 80048 BASIC METABOLIC PNL TOTAL CA: CPT | Performed by: NURSE PRACTITIONER

## 2022-10-04 PROCEDURE — 83036 HEMOGLOBIN GLYCOSYLATED A1C: CPT | Performed by: NURSE PRACTITIONER

## 2022-10-07 ENCOUNTER — PATIENT MESSAGE (OUTPATIENT)
Dept: FAMILY MEDICINE | Facility: CLINIC | Age: 72
End: 2022-10-07
Payer: MEDICARE

## 2022-10-09 ENCOUNTER — CLINICAL SUPPORT (OUTPATIENT)
Dept: CARDIOLOGY | Facility: HOSPITAL | Age: 72
End: 2022-10-09
Payer: MEDICARE

## 2022-10-09 DIAGNOSIS — Z95.810 PRESENCE OF AUTOMATIC (IMPLANTABLE) CARDIAC DEFIBRILLATOR: ICD-10-CM

## 2022-10-11 ENCOUNTER — OFFICE VISIT (OUTPATIENT)
Dept: ENDOCRINOLOGY | Facility: CLINIC | Age: 72
End: 2022-10-11
Payer: MEDICARE

## 2022-10-11 ENCOUNTER — PATIENT MESSAGE (OUTPATIENT)
Dept: ENDOCRINOLOGY | Facility: CLINIC | Age: 72
End: 2022-10-11

## 2022-10-11 VITALS
WEIGHT: 214.94 LBS | BODY MASS INDEX: 39.56 KG/M2 | SYSTOLIC BLOOD PRESSURE: 110 MMHG | HEART RATE: 63 BPM | HEIGHT: 62 IN | DIASTOLIC BLOOD PRESSURE: 60 MMHG

## 2022-10-11 DIAGNOSIS — I25.10 CORONARY ARTERY DISEASE INVOLVING NATIVE CORONARY ARTERY WITHOUT ANGINA PECTORIS, UNSPECIFIED WHETHER NATIVE OR TRANSPLANTED HEART: ICD-10-CM

## 2022-10-11 DIAGNOSIS — I50.9 CHF (NYHA CLASS III, ACC/AHA STAGE C): ICD-10-CM

## 2022-10-11 DIAGNOSIS — Z87.19 HISTORY OF PANCREATITIS: ICD-10-CM

## 2022-10-11 DIAGNOSIS — I10 ESSENTIAL HYPERTENSION: ICD-10-CM

## 2022-10-11 DIAGNOSIS — E89.0 POSTOPERATIVE HYPOTHYROIDISM: ICD-10-CM

## 2022-10-11 DIAGNOSIS — E78.5 HYPERLIPIDEMIA, UNSPECIFIED HYPERLIPIDEMIA TYPE: ICD-10-CM

## 2022-10-11 DIAGNOSIS — Z79.4 TYPE 2 DIABETES MELLITUS WITH DIABETIC NEUROPATHY, WITH LONG-TERM CURRENT USE OF INSULIN: Primary | ICD-10-CM

## 2022-10-11 DIAGNOSIS — E11.40 TYPE 2 DIABETES MELLITUS WITH DIABETIC NEUROPATHY, WITH LONG-TERM CURRENT USE OF INSULIN: Primary | ICD-10-CM

## 2022-10-11 DIAGNOSIS — E66.9 OBESITY (BMI 30-39.9): ICD-10-CM

## 2022-10-11 PROCEDURE — 95251 CONT GLUC MNTR ANALYSIS I&R: CPT | Mod: ,,, | Performed by: NURSE PRACTITIONER

## 2022-10-11 PROCEDURE — 99499 RISK ADDL DX/OHS AUDIT: ICD-10-PCS | Mod: S$PBB,,, | Performed by: NURSE PRACTITIONER

## 2022-10-11 PROCEDURE — 99214 PR OFFICE/OUTPT VISIT, EST, LEVL IV, 30-39 MIN: ICD-10-PCS | Mod: S$PBB,,, | Performed by: NURSE PRACTITIONER

## 2022-10-11 PROCEDURE — 99214 OFFICE O/P EST MOD 30 MIN: CPT | Mod: S$PBB,,, | Performed by: NURSE PRACTITIONER

## 2022-10-11 PROCEDURE — 95251 PR GLUCOSE MONITOR, 72 HOUR, PHYS INTERP: ICD-10-PCS | Mod: ,,, | Performed by: NURSE PRACTITIONER

## 2022-10-11 PROCEDURE — 99999 PR PBB SHADOW E&M-EST. PATIENT-LVL V: CPT | Mod: PBBFAC,,, | Performed by: NURSE PRACTITIONER

## 2022-10-11 PROCEDURE — 99499 UNLISTED E&M SERVICE: CPT | Mod: S$PBB,,, | Performed by: NURSE PRACTITIONER

## 2022-10-11 PROCEDURE — 99999 PR PBB SHADOW E&M-EST. PATIENT-LVL V: ICD-10-PCS | Mod: PBBFAC,,, | Performed by: NURSE PRACTITIONER

## 2022-10-11 PROCEDURE — 99215 OFFICE O/P EST HI 40 MIN: CPT | Mod: PBBFAC,PN | Performed by: NURSE PRACTITIONER

## 2022-10-11 RX ORDER — INSULIN DEGLUDEC 200 U/ML
44 INJECTION, SOLUTION SUBCUTANEOUS DAILY
Start: 2022-10-11 | End: 2022-11-21

## 2022-10-11 RX ORDER — INSULIN LISPRO 100 [IU]/ML
INJECTION, SOLUTION INTRAVENOUS; SUBCUTANEOUS
Qty: 75 ML | Refills: 3
Start: 2022-10-11 | End: 2023-12-04 | Stop reason: SDUPTHER

## 2022-10-11 NOTE — PROGRESS NOTES
CC: Ms. Mckenzie Mari arrives today for management of Type 2 DM and review of chronic medical conditions.     HPI: Ms. Mckenzie Mari was diagnosed with Type 2 DM in 2004. She was diagnosed based on lab work. Initial treatment consisted of metformin and glimepiride. Insulin added in 8/2016 - began Toujeo. She states that she felt that this caused nausea, abd pain, chest pain. Lantus caused throat swelling, left arm pain. She was then changed to Novolog 70/30 but this was only used for a short period because her insurance didn't cover.  Then changed to Humalog 50-50 in 12/2016. In 2017, she began MDI with Tresiba and Humalog. Jardiance added in 8/2021. Began use of Dexcom G6 in 2021.  + FH of DM in maternal aunt and cousin. Denies hospitalizations due to DM. Previously seen in endocrine by Richard Gupta, and MAGALI Eng, ARIELLE. She has a history of thyroid cancer/post-surgical hypothyroidism.   Of note, she has a h/o pancreatitis.   She follows annually with cardiology for CHF, CAD.    Historically, insulin management has been challenging because patient believes insulin causes hyperglycemia.     Last seen by me in June.     BG monitoring per Dexcom G6.     Hypoglycemia: Rare      Missing Insulin/PO medication doses: No  Timing prandial insulin 5-15 minutes before meals: yes    Exercise: no    Dietary Habits: Eats 3 meals/day. Has reduced carb portions. Snacking occasionally. Avoids sugary drinks.    Last DM education: 1/2019    Her PCP has advised her to continue Synthroid 125 mcg. Plans to repeat TSH in the future.        CURRENT DIABETIC MEDS: Jardiance 10 mg daily, Tresiba 48 units QAM, Humalog 12 units AC + correction scale, target 180, ISF 25  Vial or pen: pen  Glucometer type: [facility] True Metrix    Previous DM treatments:   Invokana - nausea  Glimepiride - stopped when prandial insulin added  Touejo -  Nausea, abd pain, chest pain  Lantus - throat swelling, left arm pain  Novolog 70/30 - not covered  "by insurance  Metformin - headaches    Last Eye Exam: 6/7/2022 - No DR. Dr. Mcgregor.  Last Podiatry Exam: no    REVIEW OF SYSTEMS  Constitutional: no c/o fatigue, weakness. + mild weight loss. Now fitting in old clothes that she couldn't fit in previously.   Cardiac: no palpitations, chest pain. Uses furosemide as needed for LLE edema.   Respiratory: no cough. C/o dyspnea that is chronic. Rarely uses albuterol.  GI: no c/o abdominal pain, nausea. + H/o pancreatitis.   Skin: no rashes, lesions  Neuro: + intermittent numbness, tingling in feet.   Endocrine: denies polyphagia, polydipsia, polyuria      Personally reviewed Past Medical, Surgical, Social History.    Vital Signs  /60   Pulse 63   Ht 5' 2" (1.575 m)   Wt 97.5 kg (214 lb 15.2 oz)   BMI 39.31 kg/m²     Personally reviewed the below labs:    Hemoglobin A1C   Date Value Ref Range Status   10/04/2022 7.5 (H) 4.0 - 5.6 % Final     Comment:     ADA Screening Guidelines:  5.7-6.4%  Consistent with prediabetes  >or=6.5%  Consistent with diabetes    High levels of fetal hemoglobin interfere with the HbA1C  assay. Heterozygous hemoglobin variants (HbS, HgC, etc)do  not significantly interfere with this assay.   However, presence of multiple variants may affect accuracy.     06/01/2022 7.1 (H) 4.0 - 5.6 % Final     Comment:     ADA Screening Guidelines:  5.7-6.4%  Consistent with prediabetes  >or=6.5%  Consistent with diabetes    High levels of fetal hemoglobin interfere with the HbA1C  assay. Heterozygous hemoglobin variants (HbS, HgC, etc)do  not significantly interfere with this assay.   However, presence of multiple variants may affect accuracy.     06/01/2022 7.1 (H) 4.0 - 5.6 % Final     Comment:     ADA Screening Guidelines:  5.7-6.4%  Consistent with prediabetes  >or=6.5%  Consistent with diabetes    High levels of fetal hemoglobin interfere with the HbA1C  assay. Heterozygous hemoglobin variants (HbS, HgC, etc)do  not significantly interfere with " this assay.   However, presence of multiple variants may affect accuracy.         Chemistry        Component Value Date/Time     10/04/2022 0709    K 4.3 10/04/2022 0709     10/04/2022 0709    CO2 24 10/04/2022 0709    BUN 20 10/04/2022 0709    CREATININE 0.8 10/04/2022 0709     10/04/2022 0709        Component Value Date/Time    CALCIUM 9.4 10/04/2022 0709    ALKPHOS 80 06/01/2022 0716    ALKPHOS 80 06/01/2022 0716    AST 16 06/01/2022 0716    AST 16 06/01/2022 0716    ALT 21 06/01/2022 0716    ALT 21 06/01/2022 0716    BILITOT 1.1 (H) 06/01/2022 0716    BILITOT 1.1 (H) 06/01/2022 0716          Lab Results   Component Value Date    CHOL 124 06/01/2022    CHOL 143 08/10/2021    CHOL 125 09/25/2020     Lab Results   Component Value Date    HDL 48 06/01/2022    HDL 48 08/10/2021    HDL 45 09/25/2020     Lab Results   Component Value Date    LDLCALC 50.2 (L) 06/01/2022    LDLCALC 68.4 08/10/2021    LDLCALC 53.8 (L) 09/25/2020     Lab Results   Component Value Date    TRIG 129 06/01/2022    TRIG 133 08/10/2021    TRIG 131 09/25/2020     Lab Results   Component Value Date    CHOLHDL 38.7 06/01/2022    CHOLHDL 33.6 08/10/2021    CHOLHDL 36.0 09/25/2020       Lab Results   Component Value Date    MICALBCREAT Unable to calculate 06/01/2022     Lab Results   Component Value Date    TSH 0.241 (L) 07/13/2022       CrCl cannot be calculated (Patient's most recent lab result is older than the maximum 7 days allowed.).    Vit D, 25-Hydroxy   Date Value Ref Range Status   11/08/2014 51 30 - 96 ng/mL Final     Comment:     Vitamin D deficiency.........<10 ng/mL                              Vitamin D insufficiency......10-29 ng/mL       Vitamin D sufficiency........> or equal to 30 ng/mL  Vitamin D toxicity............>100 ng/mL          Ref. Range 6/25/2020 07:27   FRUCTOSAMINE Latest Ref Range: SeeBelow umol /L 291       March 2019 CGMS results: Fasting glucose is acceptable for what patient will allow (feels  symptomatic with glucose < 150). Two fasting glucoses didn't correlate with SMBG glucose on her log. Minimal prandial excursions are noted. No true hypoglycemia but rebound noted after borderline nocturnal episode. Eats 3 meals/day, some of which are lower carb, and snacks on either SF pudding or small piece of chocolate.   Average glucose: 138  % above target: 2%  % in target: 98%  % below target: 0%      PHYSICAL EXAMINATION  Constitutional: Appears well, no distress.  Neck: Supple, trachea midline.  Respiratory: CTA, even and unlabored.  Cardiovascular: RRR, no murmurs, no carotid bruits.   GI: active bowel sounds, no hernia  Skin: warm and dry  Neuro: oriented to person, place, time.   Feet: appropriate footwear.        DEXCOM DOWNLOAD: See media file for details. Glucoses are very well controlled. Fasting glucoses are within goal. Rare prandial excursions. Some borderline hypoglycemic episodes noted either fasting or in between meals.   Average glucose: 140 mg/dL  Above 250 mg/dL: 0 %  181-250 mg/dL: 6 %   mg/dL: 94 %  54-69 mg/dL: 0 %  Below 54 mg/dL: 0 %         Goals        HEMOGLOBIN A1C < 7.5          due to CAD, CHF, patient's desire for glucose to remain >150.       Assessment/Plan  1. Type 2 diabetes mellitus with diabetic neuropathy, with long-term current use of insulin  -- Controlled. Recent CGM average glucose is 140 mg/dL, which suggests better control recently. Suggested increasing Jardiance as a means to continue weaning insulin but she'd like to continue with 10 mg for now.   -- decrease Tresiba to 44 units  -- decrease Humalog to 10 units with meals. May be able to wean further to 8 units if PP readings remain < 180.    -- continue Jardiance   -- BG monitoring per Dexcom G6  -- Would not use Actos, due to CHF. GLP-1RA and DPP4-I contraindicated due to h/o pancreatitis. Cannot tolerate metformin.     -- Discussed diagnosis of DM, A1c goals, progression of disease, long term complications  and tx options.  Advised patient to check BG before activities, such as driving or exercise.  -- Reviewed hypoglycemia management: treat with 1/2 glass of juice, 1/2 can regular coke, or 4 glucose tablets. Monitor and repeat treatment every 15 minutes until BG is >70 Then have a snack, which includes a complex carbohydrate and protein.    -- takes statin and ARB     2. Coronary artery disease involving native coronary artery without angina pectoris, unspecified whether native or transplanted heart  -- stable  -- Jardiance will offer cardiac benefit.     3. CHF (NYHA class III, ACC/AHA stage C)  -- follows with cardiology   4. Postoperative hypothyroidism  -- somewhat suppressed  -- managed by PCP   5. Essential hypertension  -- controlled   -- continue ARB   6. Hyperlipidemia, unspecified hyperlipidemia type  -- controlled  -- continue Crestor every other day   7. Obesity (BMI 30-39.9)  -- stable   Body mass index is 39.31 kg/m².   8. History of pancreatitis  -- avoid GLP-1RA and DPP4-i       FOLLOW UP  Follow up in about 4 months (around 2/11/2023).   Patient instructed to bring BG logs to each follow up.   Patient encouraged to call for any BG/medication issues, concerns, or questions.      Orders Placed This Encounter   Procedures    Hemoglobin A1C    Comprehensive Metabolic Panel

## 2022-10-11 NOTE — PATIENT INSTRUCTIONS
Decrease Tresiba to 44 units.     Decrease Humalog to 10 units. After a few weeks, if blood sugars remain less than 180 at the two hour ezkeiel after meals, may decrease to 8 units going forward.    Continue Jardiance 10 mg daily.

## 2022-10-13 ENCOUNTER — TELEPHONE (OUTPATIENT)
Dept: CARDIOLOGY | Facility: HOSPITAL | Age: 72
End: 2022-10-13
Payer: MEDICARE

## 2022-10-19 ENCOUNTER — CLINICAL SUPPORT (OUTPATIENT)
Dept: CARDIOLOGY | Facility: HOSPITAL | Age: 72
End: 2022-10-19
Attending: INTERNAL MEDICINE
Payer: MEDICARE

## 2022-10-19 DIAGNOSIS — Z95.810 PRESENCE OF AUTOMATIC (IMPLANTABLE) CARDIAC DEFIBRILLATOR: ICD-10-CM

## 2022-10-19 DIAGNOSIS — I44.7 LBBB (LEFT BUNDLE BRANCH BLOCK): ICD-10-CM

## 2022-10-19 PROCEDURE — 93284 PRGRMG EVAL IMPLANTABLE DFB: CPT | Mod: 26,,, | Performed by: INTERNAL MEDICINE

## 2022-10-19 PROCEDURE — 93284 CARDIAC DEVICE CHECK - IN CLINIC & HOSPITAL: ICD-10-PCS | Mod: 26,,, | Performed by: INTERNAL MEDICINE

## 2022-10-21 ENCOUNTER — OFFICE VISIT (OUTPATIENT)
Dept: PRIMARY CARE CLINIC | Facility: CLINIC | Age: 72
End: 2022-10-21
Payer: MEDICARE

## 2022-10-21 VITALS
WEIGHT: 213.88 LBS | BODY MASS INDEX: 39.36 KG/M2 | HEART RATE: 72 BPM | SYSTOLIC BLOOD PRESSURE: 110 MMHG | HEIGHT: 62 IN | DIASTOLIC BLOOD PRESSURE: 68 MMHG

## 2022-10-21 DIAGNOSIS — E66.01 CLASS 2 SEVERE OBESITY WITH BODY MASS INDEX (BMI) OF 35 TO 39.9 WITH SERIOUS COMORBIDITY: ICD-10-CM

## 2022-10-21 DIAGNOSIS — E89.0 POSTOPERATIVE HYPOTHYROIDISM: Chronic | ICD-10-CM

## 2022-10-21 DIAGNOSIS — J84.10 CALCIFIED GRANULOMA OF LUNG: ICD-10-CM

## 2022-10-21 DIAGNOSIS — I50.22 CHRONIC SYSTOLIC CONGESTIVE HEART FAILURE: Primary | Chronic | ICD-10-CM

## 2022-10-21 DIAGNOSIS — E78.2 MIXED HYPERLIPIDEMIA: Chronic | ICD-10-CM

## 2022-10-21 DIAGNOSIS — Z95.810 ICD (IMPLANTABLE CARDIOVERTER-DEFIBRILLATOR) IN PLACE: Chronic | ICD-10-CM

## 2022-10-21 DIAGNOSIS — J30.9 ALLERGIC RHINITIS, UNSPECIFIED SEASONALITY, UNSPECIFIED TRIGGER: ICD-10-CM

## 2022-10-21 DIAGNOSIS — E11.40 TYPE 2 DIABETES MELLITUS WITH DIABETIC NEUROPATHY, WITH LONG-TERM CURRENT USE OF INSULIN: ICD-10-CM

## 2022-10-21 DIAGNOSIS — Z79.4 TYPE 2 DIABETES MELLITUS WITH DIABETIC NEUROPATHY, WITH LONG-TERM CURRENT USE OF INSULIN: ICD-10-CM

## 2022-10-21 DIAGNOSIS — I70.0 AORTIC CALCIFICATION: ICD-10-CM

## 2022-10-21 PROCEDURE — 99215 OFFICE O/P EST HI 40 MIN: CPT | Mod: PBBFAC,PN | Performed by: FAMILY MEDICINE

## 2022-10-21 PROCEDURE — 99999 PR PBB SHADOW E&M-EST. PATIENT-LVL V: ICD-10-PCS | Mod: PBBFAC,,, | Performed by: FAMILY MEDICINE

## 2022-10-21 PROCEDURE — 99214 OFFICE O/P EST MOD 30 MIN: CPT | Mod: S$PBB,,, | Performed by: FAMILY MEDICINE

## 2022-10-21 PROCEDURE — 99499 UNLISTED E&M SERVICE: CPT | Mod: S$PBB,,, | Performed by: FAMILY MEDICINE

## 2022-10-21 PROCEDURE — 99999 PR PBB SHADOW E&M-EST. PATIENT-LVL V: CPT | Mod: PBBFAC,,, | Performed by: FAMILY MEDICINE

## 2022-10-21 PROCEDURE — 99499 RISK ADDL DX/OHS AUDIT: ICD-10-PCS | Mod: S$PBB,,, | Performed by: FAMILY MEDICINE

## 2022-10-21 PROCEDURE — 99214 PR OFFICE/OUTPT VISIT, EST, LEVL IV, 30-39 MIN: ICD-10-PCS | Mod: S$PBB,,, | Performed by: FAMILY MEDICINE

## 2022-10-21 NOTE — PROGRESS NOTES
THIS DOCUMENT WAS MADE IN PART WITH VOICE RECOGNITION SOFTWARE.  OCCASIONALLY THIS SOFTWARE WILL MISINTERPRET WORDS OR PHRASES.      Primary Care Provider Appointment   [facility]  [address], [place] [address]   Ph: [phone]  Fax: [phone]      Patient ID: Mckenzie Mari is a 72 y.o. female.    ASSESSMENT/PLAN by Problem List:  Problem List Items Addressed This Visit       CHF (congestive heart failure) - Primary (Chronic)     This chronic condition has been very stable for many years without recent exacerbation.  She will continue current medications and follow with Cardiology regularly.         ICD (implantable cardioverter-defibrillator) in place (Chronic)     Stable, no discharge of the device.  She will continue to follow with Cardiology regularly         Type 2 diabetes mellitus with diabetic neuropathy, with long-term current use of insulin (Chronic)     Diabetes has been stable on very well controlled.  She does follow with Endocrinology.         Hyperlipidemia (Chronic)    Postoperative hypothyroidism (Chronic)    Relevant Orders    TSH    Allergic rhinitis     She has chronic allergy and sinus problems.  Although current symptoms seem to be manageable.  My recommendation would be to continue Flonase on a regular basis although she indicates she is not taking that currently.  If symptoms flare up then I would consider resuming along with an antihistamine.         Class 2 severe obesity with body mass index (BMI) of 35 to 39.9 with serious comorbidity     By definition of BMI 35-39.9 with serious comorbidity including type 2 diabetes and congestive heart failure.  Continue to work on adequate diabetes control and healthy lifestyle.         Calcified granuloma of lung     Noted upon chart review when patient reestablished with recently.  Multiple calcified granulomas noted of the lung.  These likely represent old granulomatous disease or  scarring.  Will monitor for any change in condition or symptoms.         Aortic calcification     Noted upon chart review and previous CT scan.  She has mild aortic calcifications.  I recommend continuing current medication and risk factor management including diabetes control, blood pressure control and regular follow-up.            Follow Up:  Three months        Subjective:     Chief Complaint   Patient presents with    Establish Care    Sinus Problem     Constant sinus problems     I have reviewed the information entered by the ancillary staff regarding the chief complaint as well as the related history.    Sinus Problem  Pertinent negatives include no headaches or neck pain.     Patient is a/an 72 y.o.  female   RISK of ADMIT/ED: 2    Reestablish care and follow-up multiple chronic problems.  Recently has had recurring sinus difficulty.  She has had recent CT scan not showing acute sinusitis.  Seems that symptoms at this time or improved from last few weeks.    See above for all other details addressed today.    For complete problem list, past medical history, surgical history, social history, etc., see appropriate section in the electronic medical record    Review of Systems   Constitutional:  Negative for activity change and unexpected weight change.   HENT:  Negative for hearing loss, rhinorrhea and trouble swallowing.    Eyes:  Negative for discharge and visual disturbance.   Respiratory:  Negative for chest tightness and wheezing.    Cardiovascular:  Negative for chest pain and palpitations.   Gastrointestinal:  Negative for blood in stool, constipation, diarrhea and vomiting.   Endocrine: Negative for polydipsia and polyuria.   Genitourinary:  Negative for difficulty urinating, dysuria, hematuria and menstrual problem.   Musculoskeletal:  Positive for arthralgias, back pain (upper back) and joint swelling. Negative for neck pain.   Neurological:  Negative for weakness and headaches.  "  Psychiatric/Behavioral:  Negative for confusion and dysphoric mood.      Objective     Physical Exam  Vitals reviewed.   Constitutional:       General: She is not in acute distress.     Appearance: She is well-developed. She is not ill-appearing.   HENT:      Head: Normocephalic and atraumatic.   Eyes:      General: No scleral icterus.     Conjunctiva/sclera: Conjunctivae normal.   Cardiovascular:      Rate and Rhythm: Normal rate and regular rhythm.      Heart sounds: Normal heart sounds. No murmur heard.     Comments: No significant peripheral edema  Pulmonary:      Effort: Pulmonary effort is normal. No respiratory distress.      Breath sounds: Normal breath sounds. No wheezing or rales.   Skin:     General: Skin is dry.      Findings: No rash.   Neurological:      Mental Status: She is alert and oriented to person, place, and time.   Psychiatric:         Behavior: Behavior normal.     Vitals:    10/21/22 1251   BP: 110/68   BP Location: Right arm   Patient Position: Sitting   BP Method: Large (Manual)   Pulse: 72   Weight: 97 kg (213 lb 13.5 oz)   Height: 5' 2" (1.575 m)       RECENT LABS:    Lab Results   Component Value Date    WBC 6.96 06/01/2022    HGB 14.1 06/01/2022    HCT 43.7 06/01/2022     06/01/2022    CHOL 124 06/01/2022    TRIG 129 06/01/2022    HDL 48 06/01/2022    ALT 21 06/01/2022    ALT 21 06/01/2022    AST 16 06/01/2022    AST 16 06/01/2022     10/04/2022    K 4.3 10/04/2022     10/04/2022    CREATININE 0.8 10/04/2022    BUN 20 10/04/2022    CO2 24 10/04/2022    TSH 0.241 (L) 07/13/2022    INR 0.9 04/14/2014    GLUF 170 (H) 11/28/2006    HGBA1C 7.5 (H) 10/04/2022       Results for orders placed or performed in visit on 10/04/22   Hemoglobin A1C   Result Value Ref Range    Hemoglobin A1C 7.5 (H) 4.0 - 5.6 %    Estimated Avg Glucose 169 (H) 68 - 131 mg/dL   Basic Metabolic Panel   Result Value Ref Range    Sodium 138 136 - 145 mmol/L    Potassium 4.3 3.5 - 5.1 mmol/L    " Chloride 105 95 - 110 mmol/L    CO2 24 23 - 29 mmol/L    Glucose 102 70 - 110 mg/dL    BUN 20 8 - 23 mg/dL    Creatinine 0.8 0.5 - 1.4 mg/dL    Calcium 9.4 8.7 - 10.5 mg/dL    Anion Gap 9 8 - 16 mmol/L    eGFR >60.0 >60 mL/min/1.73 m^2     *Note: Due to a large number of results and/or encounters for the requested time period, some results have not been displayed. A complete set of results can be found in Results Review.

## 2022-10-31 PROBLEM — J98.4 CALCIFIED GRANULOMA OF LUNG: Status: ACTIVE | Noted: 2022-10-31

## 2022-10-31 PROBLEM — J84.10 CALCIFIED GRANULOMA OF LUNG: Status: ACTIVE | Noted: 2022-10-31

## 2022-10-31 PROBLEM — E66.812 CLASS 2 SEVERE OBESITY WITH BODY MASS INDEX (BMI) OF 35 TO 39.9 WITH SERIOUS COMORBIDITY: Status: ACTIVE | Noted: 2022-10-31

## 2022-10-31 PROBLEM — I70.0 AORTIC CALCIFICATION: Status: ACTIVE | Noted: 2022-10-31

## 2022-10-31 PROBLEM — E66.01 SEVERE OBESITY: Status: ACTIVE | Noted: 2022-10-31

## 2022-10-31 NOTE — ASSESSMENT & PLAN NOTE
By definition of BMI 35-39.9 with serious comorbidity including type 2 diabetes and congestive heart failure.  Continue to work on adequate diabetes control and healthy lifestyle.

## 2022-10-31 NOTE — ASSESSMENT & PLAN NOTE
She has chronic allergy and sinus problems.  Although current symptoms seem to be manageable.  My recommendation would be to continue Flonase on a regular basis although she indicates she is not taking that currently.  If symptoms flare up then I would consider resuming along with an antihistamine.

## 2022-10-31 NOTE — ASSESSMENT & PLAN NOTE
Noted upon chart review and previous CT scan.  She has mild aortic calcifications.  I recommend continuing current medication and risk factor management including diabetes control, blood pressure control and regular follow-up.

## 2022-10-31 NOTE — ASSESSMENT & PLAN NOTE
Noted upon chart review when patient reestablished with recently.  Multiple calcified granulomas noted of the lung.  These likely represent old granulomatous disease or scarring.  Will monitor for any change in condition or symptoms.

## 2022-10-31 NOTE — ASSESSMENT & PLAN NOTE
This chronic condition has been very stable for many years without recent exacerbation.  She will continue current medications and follow with Cardiology regularly.

## 2022-11-02 ENCOUNTER — TELEPHONE (OUTPATIENT)
Dept: ENDOCRINOLOGY | Facility: CLINIC | Age: 72
End: 2022-11-02
Payer: MEDICARE

## 2022-12-20 ENCOUNTER — OFFICE VISIT (OUTPATIENT)
Dept: FAMILY MEDICINE | Facility: CLINIC | Age: 72
End: 2022-12-20
Payer: MEDICARE

## 2022-12-20 VITALS
DIASTOLIC BLOOD PRESSURE: 74 MMHG | WEIGHT: 208 LBS | SYSTOLIC BLOOD PRESSURE: 112 MMHG | RESPIRATION RATE: 18 BRPM | HEIGHT: 62 IN | BODY MASS INDEX: 38.28 KG/M2

## 2022-12-20 DIAGNOSIS — J02.9 VIRAL PHARYNGITIS: Primary | ICD-10-CM

## 2022-12-20 LAB
CTP QC/QA: YES
CTP QC/QA: YES
S PYO RRNA THROAT QL PROBE: NEGATIVE
SARS-COV-2 RDRP RESP QL NAA+PROBE: NEGATIVE

## 2022-12-20 PROCEDURE — 99213 PR OFFICE/OUTPT VISIT, EST, LEVL III, 20-29 MIN: ICD-10-PCS | Mod: S$PBB,,, | Performed by: STUDENT IN AN ORGANIZED HEALTH CARE EDUCATION/TRAINING PROGRAM

## 2022-12-20 PROCEDURE — 99214 OFFICE O/P EST MOD 30 MIN: CPT | Mod: PBBFAC,PN | Performed by: STUDENT IN AN ORGANIZED HEALTH CARE EDUCATION/TRAINING PROGRAM

## 2022-12-20 PROCEDURE — 87880 STREP A ASSAY W/OPTIC: CPT | Mod: PBBFAC,PN | Performed by: STUDENT IN AN ORGANIZED HEALTH CARE EDUCATION/TRAINING PROGRAM

## 2022-12-20 PROCEDURE — 99999 PR PBB SHADOW E&M-EST. PATIENT-LVL IV: ICD-10-PCS | Mod: PBBFAC,,, | Performed by: STUDENT IN AN ORGANIZED HEALTH CARE EDUCATION/TRAINING PROGRAM

## 2022-12-20 PROCEDURE — 87635 SARS-COV-2 COVID-19 AMP PRB: CPT | Mod: PBBFAC,PN | Performed by: STUDENT IN AN ORGANIZED HEALTH CARE EDUCATION/TRAINING PROGRAM

## 2022-12-20 PROCEDURE — 99999 PR PBB SHADOW E&M-EST. PATIENT-LVL IV: CPT | Mod: PBBFAC,,, | Performed by: STUDENT IN AN ORGANIZED HEALTH CARE EDUCATION/TRAINING PROGRAM

## 2022-12-20 PROCEDURE — 99213 OFFICE O/P EST LOW 20 MIN: CPT | Mod: S$PBB,,, | Performed by: STUDENT IN AN ORGANIZED HEALTH CARE EDUCATION/TRAINING PROGRAM

## 2022-12-20 PROCEDURE — 87081 CULTURE SCREEN ONLY: CPT | Performed by: STUDENT IN AN ORGANIZED HEALTH CARE EDUCATION/TRAINING PROGRAM

## 2022-12-20 NOTE — PROGRESS NOTES
"Subjective:      Patient ID: Mckenzie Mari is a 72 y.o. female    Chief Complaint:  Upper respiratory symptoms    HPI  72 y.o. female with a PMHx as documented below presents to clinic today for URI symptoms.     She reports that symptoms first started 5 days ago.    She has been having significant sore throat, runny nose, congestion, cough, chills, and sinus pain. She denies having any shortness of breath, wheezing, and fever.    Pt reports taking Flonase and oral anit-histamines with minimal relief. No known sick contacts.     Past Medical History:   Diagnosis Date    Allergy     Anticoagulant long-term use     Arthritis     Asthma     as a child    Breast cancer 2005    s/p lumpectomy, chemo and now cancer free over 5 years    Bundle branch block     Cardiomyopathy     EF 35 - 40%    CHF (congestive heart failure)     FC II    Chronic sinusitis     CVID (common variable immunodeficiency)     Diabetes mellitus     GERD (gastroesophageal reflux disease)     Headache(784.0)     HTN (hypertension)     Hyperlipidemia     Hypothyroid 07/25/2012    Malignant hyperthermia     daughter and supposedly mother have had this    Miscarriage 1971    Otitis media     Presence of combination internal cardiac defibrillator (ICD) and pacemaker     Thyroid cancer     Thyroid cancer 07/25/2012    Thyroid disease     hypothyroid after papillary thyroid cancer with thyroid resection      ROS completed and negative unless otherwise mentioned above in HPI.    Objective:      Vitals:    12/20/22 1318   BP: 112/74   BP Location: Right arm   Patient Position: Sitting   Resp: 18   Weight: 94.4 kg (208 lb 0.4 oz)   Height: 5' 2" (1.575 m)     Physical Exam  Vitals reviewed.   Constitutional:       General: She is not in acute distress.  HENT:      Head: Normocephalic and atraumatic.      Right Ear: A middle ear effusion is present. Tympanic membrane is not erythematous or bulging.      Left Ear: A middle ear effusion is present. Tympanic " membrane is not erythematous or bulging.      Nose: Congestion (minimal) present. No rhinorrhea.      Right Sinus: No maxillary sinus tenderness or frontal sinus tenderness.      Left Sinus: No maxillary sinus tenderness or frontal sinus tenderness.      Mouth/Throat:      Pharynx: Posterior oropharyngeal erythema present. No oropharyngeal exudate.      Tonsils: No tonsillar exudate.   Cardiovascular:      Rate and Rhythm: Normal rate.      Heart sounds: Normal heart sounds.   Pulmonary:      Effort: Pulmonary effort is normal. No respiratory distress.      Breath sounds: Normal breath sounds. No wheezing.   Neurological:      General: No focal deficit present.      Mental Status: She is alert and oriented to person, place, and time. Mental status is at baseline.      Assessment:       1. Viral pharyngitis      Plan:       Diagnoses and all orders for this visit:    Viral pharyngitis  -     POCT Rapid Strep A  -     CULTURE, STREP A,  THROAT  -     (Magic mouthwash) 1:1:1 diphenhydrAMINE(Benadryl) 12.5mg/5ml liq, aluminum & magnesium hydroxide-simethicone (Maalox), LIDOcaine viscous 2%; Swish and spit 15 mLs every 4 (four) hours as needed (Sore throat).  -     POCT COVID-19 Rapid Screening      Patient presenting with symptoms of typical viral URI. No signs or symptoms of bacterial superinfection to suggest need for antibiotics. Discussed the reason for not prescribing antibiotics at this time. Discussed symptomatic management with OTC meds and saline rinses (prescription medications as indicated). Patient advised to let us know if symptoms persist or if she develops any new or worsening symptoms.      Shira Mohan MD  [facility]  Office: [phone]   Fax: [phone]  12/20/2022      Disclaimer: This note was partly generated using dictation software which may occasionally result in transcription errors.

## 2022-12-23 LAB — BACTERIA THROAT CULT: NORMAL

## 2023-01-07 ENCOUNTER — CLINICAL SUPPORT (OUTPATIENT)
Dept: CARDIOLOGY | Facility: HOSPITAL | Age: 73
End: 2023-01-07
Payer: MEDICARE

## 2023-01-07 DIAGNOSIS — Z95.810 PRESENCE OF AUTOMATIC (IMPLANTABLE) CARDIAC DEFIBRILLATOR: ICD-10-CM

## 2023-01-07 PROCEDURE — 93295 CARDIAC DEVICE CHECK - REMOTE: ICD-10-PCS | Mod: ,,, | Performed by: INTERNAL MEDICINE

## 2023-01-07 PROCEDURE — 93295 DEV INTERROG REMOTE 1/2/MLT: CPT | Mod: ,,, | Performed by: INTERNAL MEDICINE

## 2023-02-06 ENCOUNTER — TELEPHONE (OUTPATIENT)
Dept: ENDOCRINOLOGY | Facility: CLINIC | Age: 73
End: 2023-02-06
Payer: MEDICARE

## 2023-02-06 NOTE — TELEPHONE ENCOUNTER
----- Message from Ronda Swift sent at 2/6/2023  2:32 PM CST -----  Contact: pt  Type: Needs Medical Advice  Who Called:  pt         Best Call Back Number: [phone]    Additional Information: pt states she needs a refill on her dexcom g6 and she called the company to get a refill she states they said they didn't take insurances. Please advise.

## 2023-02-06 NOTE — TELEPHONE ENCOUNTER
Pt starting with new insurance  Will resubmit dexcom application packet under new insurance with signed CMN by provider

## 2023-02-07 ENCOUNTER — LAB VISIT (OUTPATIENT)
Dept: LAB | Facility: HOSPITAL | Age: 73
End: 2023-02-07
Attending: NURSE PRACTITIONER
Payer: MEDICARE

## 2023-02-07 DIAGNOSIS — E89.0 POSTOPERATIVE HYPOTHYROIDISM: Chronic | ICD-10-CM

## 2023-02-07 DIAGNOSIS — Z00.00 ENCOUNTER FOR MEDICARE ANNUAL WELLNESS EXAM: ICD-10-CM

## 2023-02-07 DIAGNOSIS — Z79.4 TYPE 2 DIABETES MELLITUS WITH DIABETIC NEUROPATHY, WITH LONG-TERM CURRENT USE OF INSULIN: ICD-10-CM

## 2023-02-07 DIAGNOSIS — E11.40 TYPE 2 DIABETES MELLITUS WITH DIABETIC NEUROPATHY, WITH LONG-TERM CURRENT USE OF INSULIN: ICD-10-CM

## 2023-02-07 LAB
ALBUMIN SERPL BCP-MCNC: 3.9 G/DL (ref 3.5–5.2)
ALP SERPL-CCNC: 80 U/L (ref 55–135)
ALT SERPL W/O P-5'-P-CCNC: 18 U/L (ref 10–44)
ANION GAP SERPL CALC-SCNC: 13 MMOL/L (ref 8–16)
AST SERPL-CCNC: 15 U/L (ref 10–40)
BILIRUB SERPL-MCNC: 1.4 MG/DL (ref 0.1–1)
BUN SERPL-MCNC: 20 MG/DL (ref 8–23)
CALCIUM SERPL-MCNC: 10 MG/DL (ref 8.7–10.5)
CHLORIDE SERPL-SCNC: 104 MMOL/L (ref 95–110)
CO2 SERPL-SCNC: 22 MMOL/L (ref 23–29)
CREAT SERPL-MCNC: 1.1 MG/DL (ref 0.5–1.4)
EST. GFR  (NO RACE VARIABLE): 53.7 ML/MIN/1.73 M^2
ESTIMATED AVG GLUCOSE: 171 MG/DL (ref 68–131)
GLUCOSE SERPL-MCNC: 138 MG/DL (ref 70–110)
HBA1C MFR BLD: 7.6 % (ref 4–5.6)
POTASSIUM SERPL-SCNC: 4.4 MMOL/L (ref 3.5–5.1)
PROT SERPL-MCNC: 6.8 G/DL (ref 6–8.4)
SODIUM SERPL-SCNC: 139 MMOL/L (ref 136–145)
T4 FREE SERPL-MCNC: 1.21 NG/DL (ref 0.71–1.51)
TSH SERPL DL<=0.005 MIU/L-ACNC: 0.27 UIU/ML (ref 0.4–4)

## 2023-02-07 PROCEDURE — 83036 HEMOGLOBIN GLYCOSYLATED A1C: CPT | Mod: HCNC | Performed by: NURSE PRACTITIONER

## 2023-02-07 PROCEDURE — 36415 COLL VENOUS BLD VENIPUNCTURE: CPT | Mod: HCNC,PN | Performed by: NURSE PRACTITIONER

## 2023-02-07 PROCEDURE — 80053 COMPREHEN METABOLIC PANEL: CPT | Mod: HCNC | Performed by: NURSE PRACTITIONER

## 2023-02-07 PROCEDURE — 84439 ASSAY OF FREE THYROXINE: CPT | Mod: HCNC | Performed by: FAMILY MEDICINE

## 2023-02-07 PROCEDURE — 84443 ASSAY THYROID STIM HORMONE: CPT | Mod: HCNC | Performed by: FAMILY MEDICINE

## 2023-02-09 ENCOUNTER — PATIENT MESSAGE (OUTPATIENT)
Dept: PRIMARY CARE CLINIC | Facility: CLINIC | Age: 73
End: 2023-02-09
Payer: MEDICARE

## 2023-02-09 DIAGNOSIS — E89.0 POSTOPERATIVE HYPOTHYROIDISM: ICD-10-CM

## 2023-02-09 DIAGNOSIS — Z00.00 ENCOUNTER FOR MEDICARE ANNUAL WELLNESS EXAM: ICD-10-CM

## 2023-02-10 ENCOUNTER — PATIENT MESSAGE (OUTPATIENT)
Dept: PRIMARY CARE CLINIC | Facility: CLINIC | Age: 73
End: 2023-02-10
Payer: MEDICARE

## 2023-02-10 RX ORDER — LEVOTHYROXINE SODIUM 112 UG/1
112 TABLET ORAL
Qty: 30 TABLET | Refills: 4 | Status: SHIPPED | OUTPATIENT
Start: 2023-02-10 | End: 2023-06-08 | Stop reason: SDUPTHER

## 2023-02-14 ENCOUNTER — OFFICE VISIT (OUTPATIENT)
Dept: ENDOCRINOLOGY | Facility: CLINIC | Age: 73
End: 2023-02-14
Payer: MEDICARE

## 2023-02-14 VITALS
BODY MASS INDEX: 39.15 KG/M2 | WEIGHT: 212.75 LBS | SYSTOLIC BLOOD PRESSURE: 110 MMHG | HEIGHT: 62 IN | DIASTOLIC BLOOD PRESSURE: 70 MMHG | HEART RATE: 68 BPM

## 2023-02-14 DIAGNOSIS — E66.9 OBESITY (BMI 30-39.9): ICD-10-CM

## 2023-02-14 DIAGNOSIS — E11.40 TYPE 2 DIABETES MELLITUS WITH DIABETIC NEUROPATHY, WITH LONG-TERM CURRENT USE OF INSULIN: Primary | ICD-10-CM

## 2023-02-14 DIAGNOSIS — E78.5 HYPERLIPIDEMIA, UNSPECIFIED HYPERLIPIDEMIA TYPE: ICD-10-CM

## 2023-02-14 DIAGNOSIS — I25.10 CORONARY ARTERY DISEASE INVOLVING NATIVE CORONARY ARTERY WITHOUT ANGINA PECTORIS, UNSPECIFIED WHETHER NATIVE OR TRANSPLANTED HEART: ICD-10-CM

## 2023-02-14 DIAGNOSIS — Z87.19 HISTORY OF PANCREATITIS: ICD-10-CM

## 2023-02-14 DIAGNOSIS — I50.9 CHF (NYHA CLASS III, ACC/AHA STAGE C): ICD-10-CM

## 2023-02-14 DIAGNOSIS — I10 ESSENTIAL HYPERTENSION: ICD-10-CM

## 2023-02-14 DIAGNOSIS — E89.0 POSTOPERATIVE HYPOTHYROIDISM: ICD-10-CM

## 2023-02-14 DIAGNOSIS — Z79.4 TYPE 2 DIABETES MELLITUS WITH DIABETIC NEUROPATHY, WITH LONG-TERM CURRENT USE OF INSULIN: Primary | ICD-10-CM

## 2023-02-14 PROCEDURE — 3008F PR BODY MASS INDEX (BMI) DOCUMENTED: ICD-10-PCS | Mod: HCNC,CPTII,S$GLB, | Performed by: NURSE PRACTITIONER

## 2023-02-14 PROCEDURE — 1160F PR REVIEW ALL MEDS BY PRESCRIBER/CLIN PHARMACIST DOCUMENTED: ICD-10-PCS | Mod: HCNC,CPTII,S$GLB, | Performed by: NURSE PRACTITIONER

## 2023-02-14 PROCEDURE — 1126F AMNT PAIN NOTED NONE PRSNT: CPT | Mod: HCNC,CPTII,S$GLB, | Performed by: NURSE PRACTITIONER

## 2023-02-14 PROCEDURE — 3008F BODY MASS INDEX DOCD: CPT | Mod: HCNC,CPTII,S$GLB, | Performed by: NURSE PRACTITIONER

## 2023-02-14 PROCEDURE — 3078F DIAST BP <80 MM HG: CPT | Mod: HCNC,CPTII,S$GLB, | Performed by: NURSE PRACTITIONER

## 2023-02-14 PROCEDURE — 1101F PT FALLS ASSESS-DOCD LE1/YR: CPT | Mod: HCNC,CPTII,S$GLB, | Performed by: NURSE PRACTITIONER

## 2023-02-14 PROCEDURE — 99214 OFFICE O/P EST MOD 30 MIN: CPT | Mod: HCNC,S$GLB,, | Performed by: NURSE PRACTITIONER

## 2023-02-14 PROCEDURE — 1159F MED LIST DOCD IN RCRD: CPT | Mod: HCNC,CPTII,S$GLB, | Performed by: NURSE PRACTITIONER

## 2023-02-14 PROCEDURE — 1159F PR MEDICATION LIST DOCUMENTED IN MEDICAL RECORD: ICD-10-PCS | Mod: HCNC,CPTII,S$GLB, | Performed by: NURSE PRACTITIONER

## 2023-02-14 PROCEDURE — 3288F PR FALLS RISK ASSESSMENT DOCUMENTED: ICD-10-PCS | Mod: HCNC,CPTII,S$GLB, | Performed by: NURSE PRACTITIONER

## 2023-02-14 PROCEDURE — 3051F HG A1C>EQUAL 7.0%<8.0%: CPT | Mod: HCNC,CPTII,S$GLB, | Performed by: NURSE PRACTITIONER

## 2023-02-14 PROCEDURE — 99999 PR PBB SHADOW E&M-EST. PATIENT-LVL IV: ICD-10-PCS | Mod: PBBFAC,HCNC,, | Performed by: NURSE PRACTITIONER

## 2023-02-14 PROCEDURE — 3074F SYST BP LT 130 MM HG: CPT | Mod: HCNC,CPTII,S$GLB, | Performed by: NURSE PRACTITIONER

## 2023-02-14 PROCEDURE — 3078F PR MOST RECENT DIASTOLIC BLOOD PRESSURE < 80 MM HG: ICD-10-PCS | Mod: HCNC,CPTII,S$GLB, | Performed by: NURSE PRACTITIONER

## 2023-02-14 PROCEDURE — 95251 CONT GLUC MNTR ANALYSIS I&R: CPT | Mod: HCNC,S$GLB,, | Performed by: NURSE PRACTITIONER

## 2023-02-14 PROCEDURE — 3074F PR MOST RECENT SYSTOLIC BLOOD PRESSURE < 130 MM HG: ICD-10-PCS | Mod: HCNC,CPTII,S$GLB, | Performed by: NURSE PRACTITIONER

## 2023-02-14 PROCEDURE — 99999 PR PBB SHADOW E&M-EST. PATIENT-LVL IV: CPT | Mod: PBBFAC,HCNC,, | Performed by: NURSE PRACTITIONER

## 2023-02-14 PROCEDURE — 3288F FALL RISK ASSESSMENT DOCD: CPT | Mod: HCNC,CPTII,S$GLB, | Performed by: NURSE PRACTITIONER

## 2023-02-14 PROCEDURE — 99214 PR OFFICE/OUTPT VISIT, EST, LEVL IV, 30-39 MIN: ICD-10-PCS | Mod: HCNC,S$GLB,, | Performed by: NURSE PRACTITIONER

## 2023-02-14 PROCEDURE — 95251 PR GLUCOSE MONITOR, 72 HOUR, PHYS INTERP: ICD-10-PCS | Mod: HCNC,S$GLB,, | Performed by: NURSE PRACTITIONER

## 2023-02-14 PROCEDURE — 1126F PR PAIN SEVERITY QUANTIFIED, NO PAIN PRESENT: ICD-10-PCS | Mod: HCNC,CPTII,S$GLB, | Performed by: NURSE PRACTITIONER

## 2023-02-14 PROCEDURE — 1160F RVW MEDS BY RX/DR IN RCRD: CPT | Mod: HCNC,CPTII,S$GLB, | Performed by: NURSE PRACTITIONER

## 2023-02-14 PROCEDURE — 1101F PR PT FALLS ASSESS DOC 0-1 FALLS W/OUT INJ PAST YR: ICD-10-PCS | Mod: HCNC,CPTII,S$GLB, | Performed by: NURSE PRACTITIONER

## 2023-02-14 PROCEDURE — 3051F PR MOST RECENT HEMOGLOBIN A1C LEVEL 7.0 - < 8.0%: ICD-10-PCS | Mod: HCNC,CPTII,S$GLB, | Performed by: NURSE PRACTITIONER

## 2023-02-14 RX ORDER — INSULIN DEGLUDEC 200 U/ML
40 INJECTION, SOLUTION SUBCUTANEOUS DAILY
Qty: 9 PEN | Refills: 3
Start: 2023-02-14 | End: 2023-06-14

## 2023-02-14 NOTE — PATIENT INSTRUCTIONS
Decrease Tresiba to 40 units. If fasting blood sugars are frequently >150, resume 44 units going forward.

## 2023-02-14 NOTE — PROGRESS NOTES
Patient, Mckenzie Mari (MRN #[id number]), presented with a recorded BMI of 38.91 kg/m^2 and a documented comorbidity(s):  - Diabetes Mellitus Type 2  - Hypertension  - Hyperlipidemia  - Atrial Fibrillation  to which the severe obesity is a contributing factor. This is consistent with the definition of severe obesity (BMI 35.0-39.9) with comorbidity (ICD-10 E66.01, Z68.35). The patient's severe obesity was monitored, evaluated, addressed and/or treated. This addendum to the medical record is made on 02/14/2023.

## 2023-02-14 NOTE — PROGRESS NOTES
CC: Ms. Mckenzie Mari arrives today for management of Type 2 DM and review of chronic medical conditions.     HPI: Ms. Mckenzie Mari was diagnosed with Type 2 DM in 2004. She was diagnosed based on lab work. Initial treatment consisted of metformin and glimepiride. Insulin added in 8/2016 - began Toujeo. She states that she felt that this caused nausea, abd pain, chest pain. Lantus caused throat swelling, left arm pain. She was then changed to Novolog 70/30 but this was only used for a short period because her insurance didn't cover.  Then changed to Humalog 50-50 in 12/2016. In 2017, she began MDI with Tresiba and Humalog. Jardiance added in 8/2021. Began use of Dexcom G6 in 2021.  + FH of DM in maternal aunt and cousin. Denies hospitalizations due to DM. Previously seen in endocrine by Richard Gupta, and MAGALI Egn, ARIELLE. She has a history of thyroid cancer/post-surgical hypothyroidism.   Of note, she has a h/o pancreatitis.   She follows annually with cardiology for CHF, CAD.    Historically, insulin management has been challenging because patient believes insulin causes hyperglycemia.     Last seen by me in October. Insulin doses were decreased at that time.     BG monitoring per Dexcom G6. Had been using [facility]. She was initially told that after the 1st of the year, they were not in network with her insurance. Then they told her that they were working on approval. She's unsure where this stands.     Hypoglycemia: Rare      Missing Insulin/PO medication doses: No  Timing prandial insulin 5-15 minutes before meals: yes    Exercise: no    Dietary Habits: Eats 3 meals/day.  Snacking occasionally. Avoids sugary drinks.    Last DM education: 1/2019    Her PCP recently checked TSH and adjusted Synthroid dose.        CURRENT DIABETIC MEDS: Jardiance 10 mg daily, Tresiba 44 units QAM, Humalog 10 units AC + correction scale, target 180, ISF 25  Vial or pen: pen  Glucometer type: [facility] True  "Metrix    Previous DM treatments:   Invokana - nausea  Glimepiride - stopped when prandial insulin added  Touejo -  Nausea, abd pain, chest pain  Lantus - throat swelling, left arm pain  Novolog 70/30 - not covered by insurance  Metformin - headaches    Last Eye Exam: 6/7/2022 - No DR. Dr. Mcgregor.  Last Podiatry Exam: no    REVIEW OF SYSTEMS  Constitutional: no c/o fatigue, weakness. + mild weight loss.   Cardiac: no palpitations, chest pain. Uses furosemide as needed for LLE edema.   Respiratory: no cough. C/o dyspnea that is chronic. Rarely uses albuterol.  GI: no c/o abdominal pain, nausea. + H/o pancreatitis.   Skin: no rashes, lesions  Neuro: + intermittent numbness, tingling in feet.   Endocrine: denies polyphagia, polydipsia, polyuria      Personally reviewed Past Medical, Surgical, Social History.    Vital Signs  /70   Pulse 68   Ht 5' 2" (1.575 m)   Wt 96.5 kg (212 lb 11.9 oz)   BMI 38.91 kg/m²     Personally reviewed the below labs:    Hemoglobin A1C   Date Value Ref Range Status   02/07/2023 7.6 (H) 4.0 - 5.6 % Final     Comment:     ADA Screening Guidelines:  5.7-6.4%  Consistent with prediabetes  >or=6.5%  Consistent with diabetes    High levels of fetal hemoglobin interfere with the HbA1C  assay. Heterozygous hemoglobin variants (HbS, HgC, etc)do  not significantly interfere with this assay.   However, presence of multiple variants may affect accuracy.     10/04/2022 7.5 (H) 4.0 - 5.6 % Final     Comment:     ADA Screening Guidelines:  5.7-6.4%  Consistent with prediabetes  >or=6.5%  Consistent with diabetes    High levels of fetal hemoglobin interfere with the HbA1C  assay. Heterozygous hemoglobin variants (HbS, HgC, etc)do  not significantly interfere with this assay.   However, presence of multiple variants may affect accuracy.     06/01/2022 7.1 (H) 4.0 - 5.6 % Final     Comment:     ADA Screening Guidelines:  5.7-6.4%  Consistent with prediabetes  >or=6.5%  Consistent with " diabetes    High levels of fetal hemoglobin interfere with the HbA1C  assay. Heterozygous hemoglobin variants (HbS, HgC, etc)do  not significantly interfere with this assay.   However, presence of multiple variants may affect accuracy.     06/01/2022 7.1 (H) 4.0 - 5.6 % Final     Comment:     ADA Screening Guidelines:  5.7-6.4%  Consistent with prediabetes  >or=6.5%  Consistent with diabetes    High levels of fetal hemoglobin interfere with the HbA1C  assay. Heterozygous hemoglobin variants (HbS, HgC, etc)do  not significantly interfere with this assay.   However, presence of multiple variants may affect accuracy.         Chemistry        Component Value Date/Time     02/07/2023 0725    K 4.4 02/07/2023 0725     02/07/2023 0725    CO2 22 (L) 02/07/2023 0725    BUN 20 02/07/2023 0725    CREATININE 1.1 02/07/2023 0725     (H) 02/07/2023 0725        Component Value Date/Time    CALCIUM 10.0 02/07/2023 0725    ALKPHOS 80 02/07/2023 0725    AST 15 02/07/2023 0725    ALT 18 02/07/2023 0725    BILITOT 1.4 (H) 02/07/2023 0725          Lab Results   Component Value Date    CHOL 124 06/01/2022    CHOL 143 08/10/2021    CHOL 125 09/25/2020     Lab Results   Component Value Date    HDL 48 06/01/2022    HDL 48 08/10/2021    HDL 45 09/25/2020     Lab Results   Component Value Date    LDLCALC 50.2 (L) 06/01/2022    LDLCALC 68.4 08/10/2021    LDLCALC 53.8 (L) 09/25/2020     Lab Results   Component Value Date    TRIG 129 06/01/2022    TRIG 133 08/10/2021    TRIG 131 09/25/2020     Lab Results   Component Value Date    CHOLHDL 38.7 06/01/2022    CHOLHDL 33.6 08/10/2021    CHOLHDL 36.0 09/25/2020       Lab Results   Component Value Date    MICALBCREAT Unable to calculate 06/01/2022     Lab Results   Component Value Date    TSH 0.265 (L) 02/07/2023       CrCl cannot be calculated (Patient's most recent lab result is older than the maximum 7 days allowed.).    Vit D, 25-Hydroxy   Date Value Ref Range Status    11/08/2014 51 30 - 96 ng/mL Final     Comment:     Vitamin D deficiency.........<10 ng/mL                              Vitamin D insufficiency......10-29 ng/mL       Vitamin D sufficiency........> or equal to 30 ng/mL  Vitamin D toxicity............>100 ng/mL          Ref. Range 6/25/2020 07:27   FRUCTOSAMINE Latest Ref Range: SeeBelow umol /L 291       March 2019 CGMS results: Fasting glucose is acceptable for what patient will allow (feels symptomatic with glucose < 150). Two fasting glucoses didn't correlate with SMBG glucose on her log. Minimal prandial excursions are noted. No true hypoglycemia but rebound noted after borderline nocturnal episode. Eats 3 meals/day, some of which are lower carb, and snacks on either SF pudding or small piece of chocolate.   Average glucose: 138  % above target: 2%  % in target: 98%  % below target: 0%      PHYSICAL EXAMINATION  Constitutional: Appears well, no distress.  Neck: Supple, trachea midline.  Respiratory: CTA, even and unlabored.  Cardiovascular: RRR, no murmurs, no carotid bruits.   GI: active bowel sounds, no hernia  Skin: warm and dry  Neuro: oriented to person, place, time.   Feet: appropriate footwear.  Protective Sensation (w/ 10 gram monofilament):  Right: Intact  Left: Intact    Visual Inspection:  Normal -  Bilateral    Pedal Pulses:   Right: Present  Left: Present    Posterior tibialis:   Right:Present  Left: Present          DEXCOM DOWNLOAD: See media file for details. Most glucoses are reasonably controlled. One episode of fasting hypoglycemia. Occasional prandial excursions.   Average glucose: 160 mg/dL  Above 250 mg/dL: <1 %  181-250 mg/dL: 27 %   mg/dL: 72 %  54-69 mg/dL: <1 %  Below 54 mg/dL: 0 %         Goals        HEMOGLOBIN A1C < 7.5          due to CAD, CHF, patient's desire for glucose to remain >150.       Assessment/Plan  1. Type 2 diabetes mellitus with diabetic neuropathy, with long-term current use of insulin  -- Reasonable control.  Recent CGM data reveals better controlled BG than A1c suggests.   -- decrease Tresiba to 40 units. If fasting blood sugars are frequently >150, resume 44 units going forward.   -- continue Humalog 10 units with meals.   -- continue Jardiance   -- BG monitoring per Dexcom G6. Will loop in Dexcom rep to assist with coverage issues. May need to change DME.  -- Would not use Actos, due to CHF. GLP-1RA and DPP4-I contraindicated due to h/o pancreatitis. Cannot tolerate metformin.     -- Discussed diagnosis of DM, A1c goals, progression of disease, long term complications and tx options.  Advised patient to check BG before activities, such as driving or exercise.  -- Reviewed hypoglycemia management: treat with 1/2 glass of juice, 1/2 can regular coke, or 4 glucose tablets. Monitor and repeat treatment every 15 minutes until BG is >70 Then have a snack, which includes a complex carbohydrate and protein.    -- takes statin and ARB     2. Coronary artery disease involving native coronary artery without angina pectoris, unspecified whether native or transplanted heart  -- stable  -- Jardiance may offer cardio-protective benefit.     3. CHF (NYHA class III, ACC/AHA stage C)  -- follows with cardiology   4. Postoperative hypothyroidism  -- suppressed, dose recently adjusted  -- managed by PCP   5. Essential hypertension  -- controlled   -- continue ARB   6. Hyperlipidemia, unspecified hyperlipidemia type  -- controlled  -- continue Crestor every other day   7. Obesity (BMI 30-39.9)  -- stable   Body mass index is 38.91 kg/m².   8. History of pancreatitis  -- avoid GLP-1RA and DPP4-i       FOLLOW UP  Follow up in about 4 months (around 6/14/2023).   Patient instructed to bring BG logs to each follow up.   Patient encouraged to call for any BG/medication issues, concerns, or questions.      No orders of the defined types were placed in this encounter.

## 2023-02-15 ENCOUNTER — TELEPHONE (OUTPATIENT)
Dept: ENDOCRINOLOGY | Facility: CLINIC | Age: 73
End: 2023-02-15
Payer: MEDICARE

## 2023-03-08 PROBLEM — Z85.3 HISTORY OF BREAST CANCER: Status: ACTIVE | Noted: 2017-10-06

## 2023-03-09 ENCOUNTER — OFFICE VISIT (OUTPATIENT)
Dept: PRIMARY CARE CLINIC | Facility: CLINIC | Age: 73
End: 2023-03-09
Payer: MEDICARE

## 2023-03-09 VITALS
OXYGEN SATURATION: 98 % | HEART RATE: 67 BPM | TEMPERATURE: 98 F | HEIGHT: 62 IN | SYSTOLIC BLOOD PRESSURE: 128 MMHG | RESPIRATION RATE: 17 BRPM | DIASTOLIC BLOOD PRESSURE: 62 MMHG | BODY MASS INDEX: 39.07 KG/M2 | WEIGHT: 212.31 LBS

## 2023-03-09 DIAGNOSIS — I70.0 AORTIC CALCIFICATION: ICD-10-CM

## 2023-03-09 DIAGNOSIS — Z79.4 TYPE 2 DIABETES MELLITUS WITH DIABETIC NEUROPATHY, WITH LONG-TERM CURRENT USE OF INSULIN: Chronic | ICD-10-CM

## 2023-03-09 DIAGNOSIS — I50.22 CHRONIC SYSTOLIC CONGESTIVE HEART FAILURE: Primary | Chronic | ICD-10-CM

## 2023-03-09 DIAGNOSIS — H10.13 ALLERGIC CONJUNCTIVITIS OF BOTH EYES: ICD-10-CM

## 2023-03-09 DIAGNOSIS — D17.1 LIPOMA OF BACK: ICD-10-CM

## 2023-03-09 DIAGNOSIS — Z95.810 ICD (IMPLANTABLE CARDIOVERTER-DEFIBRILLATOR) IN PLACE: Chronic | ICD-10-CM

## 2023-03-09 DIAGNOSIS — M54.9 BACK PAIN, UNSPECIFIED BACK LOCATION, UNSPECIFIED BACK PAIN LATERALITY, UNSPECIFIED CHRONICITY: ICD-10-CM

## 2023-03-09 DIAGNOSIS — E11.40 TYPE 2 DIABETES MELLITUS WITH DIABETIC NEUROPATHY, WITH LONG-TERM CURRENT USE OF INSULIN: Chronic | ICD-10-CM

## 2023-03-09 DIAGNOSIS — E78.2 MIXED HYPERLIPIDEMIA: Chronic | ICD-10-CM

## 2023-03-09 DIAGNOSIS — D83.9 CVID (COMMON VARIABLE IMMUNODEFICIENCY): ICD-10-CM

## 2023-03-09 DIAGNOSIS — E66.01 SEVERE OBESITY: Chronic | ICD-10-CM

## 2023-03-09 DIAGNOSIS — J84.10 CALCIFIED GRANULOMA OF LUNG: ICD-10-CM

## 2023-03-09 DIAGNOSIS — I10 PRIMARY HYPERTENSION: Chronic | ICD-10-CM

## 2023-03-09 DIAGNOSIS — E89.0 POSTOPERATIVE HYPOTHYROIDISM: Chronic | ICD-10-CM

## 2023-03-09 PROCEDURE — 3078F PR MOST RECENT DIASTOLIC BLOOD PRESSURE < 80 MM HG: ICD-10-PCS | Mod: HCNC,CPTII,S$GLB, | Performed by: FAMILY MEDICINE

## 2023-03-09 PROCEDURE — 3008F PR BODY MASS INDEX (BMI) DOCUMENTED: ICD-10-PCS | Mod: HCNC,CPTII,S$GLB, | Performed by: FAMILY MEDICINE

## 2023-03-09 PROCEDURE — 3078F DIAST BP <80 MM HG: CPT | Mod: HCNC,CPTII,S$GLB, | Performed by: FAMILY MEDICINE

## 2023-03-09 PROCEDURE — 3051F HG A1C>EQUAL 7.0%<8.0%: CPT | Mod: HCNC,CPTII,S$GLB, | Performed by: FAMILY MEDICINE

## 2023-03-09 PROCEDURE — 1159F PR MEDICATION LIST DOCUMENTED IN MEDICAL RECORD: ICD-10-PCS | Mod: HCNC,CPTII,S$GLB, | Performed by: FAMILY MEDICINE

## 2023-03-09 PROCEDURE — 1160F PR REVIEW ALL MEDS BY PRESCRIBER/CLIN PHARMACIST DOCUMENTED: ICD-10-PCS | Mod: HCNC,CPTII,S$GLB, | Performed by: FAMILY MEDICINE

## 2023-03-09 PROCEDURE — 3008F BODY MASS INDEX DOCD: CPT | Mod: HCNC,CPTII,S$GLB, | Performed by: FAMILY MEDICINE

## 2023-03-09 PROCEDURE — 1101F PT FALLS ASSESS-DOCD LE1/YR: CPT | Mod: HCNC,CPTII,S$GLB, | Performed by: FAMILY MEDICINE

## 2023-03-09 PROCEDURE — 99214 PR OFFICE/OUTPT VISIT, EST, LEVL IV, 30-39 MIN: ICD-10-PCS | Mod: HCNC,S$GLB,, | Performed by: FAMILY MEDICINE

## 2023-03-09 PROCEDURE — 1159F MED LIST DOCD IN RCRD: CPT | Mod: HCNC,CPTII,S$GLB, | Performed by: FAMILY MEDICINE

## 2023-03-09 PROCEDURE — 3074F SYST BP LT 130 MM HG: CPT | Mod: HCNC,CPTII,S$GLB, | Performed by: FAMILY MEDICINE

## 2023-03-09 PROCEDURE — 1125F PR PAIN SEVERITY QUANTIFIED, PAIN PRESENT: ICD-10-PCS | Mod: HCNC,CPTII,S$GLB, | Performed by: FAMILY MEDICINE

## 2023-03-09 PROCEDURE — 99999 PR PBB SHADOW E&M-EST. PATIENT-LVL V: CPT | Mod: PBBFAC,HCNC,, | Performed by: FAMILY MEDICINE

## 2023-03-09 PROCEDURE — 3051F PR MOST RECENT HEMOGLOBIN A1C LEVEL 7.0 - < 8.0%: ICD-10-PCS | Mod: HCNC,CPTII,S$GLB, | Performed by: FAMILY MEDICINE

## 2023-03-09 PROCEDURE — 3288F PR FALLS RISK ASSESSMENT DOCUMENTED: ICD-10-PCS | Mod: HCNC,CPTII,S$GLB, | Performed by: FAMILY MEDICINE

## 2023-03-09 PROCEDURE — 3288F FALL RISK ASSESSMENT DOCD: CPT | Mod: HCNC,CPTII,S$GLB, | Performed by: FAMILY MEDICINE

## 2023-03-09 PROCEDURE — 1160F RVW MEDS BY RX/DR IN RCRD: CPT | Mod: HCNC,CPTII,S$GLB, | Performed by: FAMILY MEDICINE

## 2023-03-09 PROCEDURE — 99214 OFFICE O/P EST MOD 30 MIN: CPT | Mod: HCNC,S$GLB,, | Performed by: FAMILY MEDICINE

## 2023-03-09 PROCEDURE — 99999 PR PBB SHADOW E&M-EST. PATIENT-LVL V: ICD-10-PCS | Mod: PBBFAC,HCNC,, | Performed by: FAMILY MEDICINE

## 2023-03-09 PROCEDURE — 3074F PR MOST RECENT SYSTOLIC BLOOD PRESSURE < 130 MM HG: ICD-10-PCS | Mod: HCNC,CPTII,S$GLB, | Performed by: FAMILY MEDICINE

## 2023-03-09 PROCEDURE — 1125F AMNT PAIN NOTED PAIN PRSNT: CPT | Mod: HCNC,CPTII,S$GLB, | Performed by: FAMILY MEDICINE

## 2023-03-09 PROCEDURE — 1101F PR PT FALLS ASSESS DOC 0-1 FALLS W/OUT INJ PAST YR: ICD-10-PCS | Mod: HCNC,CPTII,S$GLB, | Performed by: FAMILY MEDICINE

## 2023-03-09 RX ORDER — TIZANIDINE 4 MG/1
4 TABLET ORAL
Qty: 30 TABLET | Refills: 2 | Status: SHIPPED | OUTPATIENT
Start: 2023-03-09 | End: 2023-03-19

## 2023-03-09 RX ORDER — AZELASTINE HYDROCHLORIDE 0.5 MG/ML
1 SOLUTION/ DROPS OPHTHALMIC 2 TIMES DAILY
Qty: 4 ML | Refills: 11 | Status: SHIPPED | OUTPATIENT
Start: 2023-03-09 | End: 2023-09-18

## 2023-03-09 NOTE — ASSESSMENT & PLAN NOTE
This is a chronic condition.  This condition has been reviewed and evaluated and it is currently stable. Continue current meds and cardiology consult

## 2023-03-09 NOTE — PROGRESS NOTES
THIS DOCUMENT WAS MADE IN PART WITH VOICE RECOGNITION SOFTWARE.  OCCASIONALLY THIS SOFTWARE WILL MISINTERPRET WORDS OR PHRASES.      [facility] Care Provider Appointment   [facility]       Patient ID: Mckenzie Mari is a 72 y.o. female.    ASSESSMENT/PLAN by Problem List:  Problem List Items Addressed This Visit       CHF (congestive heart failure) - Primary (Chronic)     This is a chronic condition.  This condition has been reviewed and evaluated and it is currently stable. Continue current meds and cardiology consult           ICD (implantable cardioverter-defibrillator) in place (Chronic)     This is a chronic condition.  This condition has been reviewed and evaluated and it is currently stable. Continue current meds and cardiology consult         Type 2 diabetes mellitus with diabetic neuropathy, with long-term current use of insulin (Chronic)     Stable, satisfactory, followed by cardiology         Severe obesity (Chronic)     Working on improving diabetes and lowering insulin          HTN (hypertension) (Chronic)     This is a chronic condition.  This condition has been reviewed and evaluated and it is currently stable.           Hyperlipidemia (Chronic)    Postoperative hypothyroidism (Chronic)     H/o thyroid cancer, TSH mildly suppressed, conitue same for now         CVID (common variable immunodeficiency)     Reviewed, stable,          Calcified granuloma of lung     Noted upon chart review when patient reestablished with recently.  Multiple calcified granulomas noted of the lung.  These likely represent old granulomatous disease or scarring.  Will monitor for any change in condition or symptoms.         Aortic calcification     Stable, continue risk factor control, continue crestor         Lipoma of back     Benign appearance, mildly painful when pressure applied, offered referral to surgery, she will monitor         Back pain     Mid and lower back, sometimes upper,  will refer to PT, refill zanaflex         Relevant Orders    Ambulatory referral/consult to Physical/Occupational Therapy     Other Visit Diagnoses       Allergic conjunctivitis of both eyes        Relevant Medications    azelastine (OPTIVAR) 0.05 % ophthalmic solution            Follow Up:  3-4 months    Advance Care Planning     Date: 03/09/2023  Discussed.  She chooses not to complete LW, wants full treatment.           Subjective:     Chief Complaint   Patient presents with    Establish Care    Back Pain    Lump     In the middle of back     I have reviewed the information entered by the ancillary staff regarding the chief complaint as well as the related history.    Back Pain  Pertinent negatives include no chest pain, dysuria, headaches or weakness.     Patient is a/an 72 y.o.  female     Follow up multiple  C/o back pain and lump on back  Also watery eyes    For complete problem list, past medical history, surgical history, social history, etc., see appropriate section in the electronic medical record    Review of Systems   Constitutional:  Negative for activity change and unexpected weight change.   HENT:  Negative for hearing loss, rhinorrhea and trouble swallowing.    Eyes:  Negative for discharge and visual disturbance.        Itchy watery eyes   Respiratory:  Negative for chest tightness and wheezing.    Cardiovascular:  Negative for chest pain and palpitations.   Gastrointestinal:  Negative for blood in stool, constipation, diarrhea and vomiting.   Endocrine: Negative for polydipsia and polyuria.   Genitourinary:  Negative for difficulty urinating, dysuria, hematuria and menstrual problem.   Musculoskeletal:  Positive for arthralgias, back pain and joint swelling. Negative for neck pain.   Neurological:  Negative for weakness and headaches.   Psychiatric/Behavioral:  Negative for confusion and dysphoric mood.      Objective     Physical Exam  Vitals reviewed.   Constitutional:       General: She is not  "in acute distress.     Appearance: She is well-developed. She is not ill-appearing.   HENT:      Head: Normocephalic and atraumatic.   Eyes:      General: No scleral icterus.     Conjunctiva/sclera: Conjunctivae normal.   Cardiovascular:      Rate and Rhythm: Normal rate and regular rhythm.      Heart sounds: Normal heart sounds. No murmur heard.     Comments: No significant peripheral edema  Pulmonary:      Effort: Pulmonary effort is normal. No respiratory distress.      Breath sounds: Normal breath sounds. No wheezing or rales.   Musculoskeletal:        Arms:    Skin:     General: Skin is dry.      Findings: No rash.   Neurological:      Mental Status: She is alert and oriented to person, place, and time.   Psychiatric:         Behavior: Behavior normal.     Vitals:    03/09/23 1002   BP: 128/62   BP Location: Right arm   Patient Position: Sitting   BP Method: Large (Manual)   Pulse: 67   Resp: 17   Temp: 97.9 °F (36.6 °C)   TempSrc: Oral   SpO2: 98%   Weight: 96.3 kg (212 lb 4.9 oz)   Height: 5' 2" (1.575 m)       RECENT LABS:    Lab Results   Component Value Date    WBC 6.96 06/01/2022    HGB 14.1 06/01/2022    HCT 43.7 06/01/2022     06/01/2022    CHOL 124 06/01/2022    TRIG 129 06/01/2022    HDL 48 06/01/2022    ALT 18 02/07/2023    AST 15 02/07/2023     02/07/2023    K 4.4 02/07/2023     02/07/2023    CREATININE 1.1 02/07/2023    BUN 20 02/07/2023    CO2 22 (L) 02/07/2023    TSH 0.265 (L) 02/07/2023    INR 0.9 04/14/2014    GLUF 170 (H) 11/28/2006    HGBA1C 7.6 (H) 02/07/2023       Results for orders placed or performed in visit on 02/07/23   Hemoglobin A1C   Result Value Ref Range    Hemoglobin A1C 7.6 (H) 4.0 - 5.6 %    Estimated Avg Glucose 171 (H) 68 - 131 mg/dL   Comprehensive Metabolic Panel   Result Value Ref Range    Sodium 139 136 - 145 mmol/L    Potassium 4.4 3.5 - 5.1 mmol/L    Chloride 104 95 - 110 mmol/L    CO2 22 (L) 23 - 29 mmol/L    Glucose 138 (H) 70 - 110 mg/dL    BUN 20 " 8 - 23 mg/dL    Creatinine 1.1 0.5 - 1.4 mg/dL    Calcium 10.0 8.7 - 10.5 mg/dL    Total Protein 6.8 6.0 - 8.4 g/dL    Albumin 3.9 3.5 - 5.2 g/dL    Total Bilirubin 1.4 (H) 0.1 - 1.0 mg/dL    Alkaline Phosphatase 80 55 - 135 U/L    AST 15 10 - 40 U/L    ALT 18 10 - 44 U/L    Anion Gap 13 8 - 16 mmol/L    eGFR 53.7 (A) >60 mL/min/1.73 m^2   TSH   Result Value Ref Range    TSH 0.265 (L) 0.400 - 4.000 uIU/mL   T4, Free   Result Value Ref Range    Free T4 1.21 0.71 - 1.51 ng/dL     *Note: Due to a large number of results and/or encounters for the requested time period, some results have not been displayed. A complete set of results can be found in Results Review.

## 2023-03-09 NOTE — ASSESSMENT & PLAN NOTE
Benign appearance, mildly painful when pressure applied, offered referral to surgery, she will monitor

## 2023-03-09 NOTE — ASSESSMENT & PLAN NOTE
This is a chronic condition.  This condition has been reviewed and evaluated and it is currently stable.

## 2023-03-21 ENCOUNTER — CLINICAL SUPPORT (OUTPATIENT)
Dept: REHABILITATION | Facility: HOSPITAL | Age: 73
End: 2023-03-21
Attending: FAMILY MEDICINE
Payer: MEDICARE

## 2023-03-21 DIAGNOSIS — M54.6 CHRONIC LEFT-SIDED THORACIC BACK PAIN: ICD-10-CM

## 2023-03-21 DIAGNOSIS — G89.29 CHRONIC LEFT-SIDED THORACIC BACK PAIN: ICD-10-CM

## 2023-03-21 DIAGNOSIS — M54.50 CHRONIC LEFT-SIDED LOW BACK PAIN WITHOUT SCIATICA: Primary | ICD-10-CM

## 2023-03-21 DIAGNOSIS — R29.898 WEAKNESS OF BOTH HIPS: ICD-10-CM

## 2023-03-21 DIAGNOSIS — G89.29 CHRONIC LEFT-SIDED LOW BACK PAIN WITHOUT SCIATICA: Primary | ICD-10-CM

## 2023-03-21 DIAGNOSIS — M25.69 BACK STIFFNESS: ICD-10-CM

## 2023-03-21 DIAGNOSIS — M54.9 BACK PAIN, UNSPECIFIED BACK LOCATION, UNSPECIFIED BACK PAIN LATERALITY, UNSPECIFIED CHRONICITY: ICD-10-CM

## 2023-03-21 DIAGNOSIS — R26.9 ALTERED GAIT: ICD-10-CM

## 2023-03-21 DIAGNOSIS — R29.898 IMPAIRED FLEXIBILITY OF LOWER EXTREMITY: ICD-10-CM

## 2023-03-21 DIAGNOSIS — M53.3 SACROILIAC JOINT PAIN: ICD-10-CM

## 2023-03-21 PROCEDURE — 97162 PT EVAL MOD COMPLEX 30 MIN: CPT | Mod: HCNC,PN

## 2023-03-21 PROCEDURE — 97140 MANUAL THERAPY 1/> REGIONS: CPT | Mod: HCNC,PN

## 2023-03-21 NOTE — PATIENT INSTRUCTIONS
Education provided:   PT provided education and demonstration of HEP to include:   Use of Left heel lift, walking program, resume stretching home exercise program.    Pt was instructed to perform these daily as often as tolerated.  Pt was given instructions to stop use of HEP if there are any adverse reactions/responses and f/u with PT next treatment to discuss.    Home Exercises Provided:  Exercises were reviewed and Mckenzie was able to demonstrate them prior to the end of the session.  Mckenzie demonstrated fair  understanding of the education provided.   See EMR under Patient Instructions for exercises provided during therapy sessions.

## 2023-03-21 NOTE — PLAN OF CARE
[facility]   Physical Therapy Initial Evaluation     Date: 3/21/2023   Name: Mckenzie Mari  Clinic Number: [id number]    Therapy Diagnosis:   Encounter Diagnoses   Name Primary?    Back pain, unspecified back location, unspecified back pain laterality, unspecified chronicity     Chronic left-sided low back pain without sciatica Yes    Chronic left-sided thoracic back pain     Back stiffness     Weakness of both hips     Impaired flexibility of lower extremity     Sacroiliac joint pain     Altered gait      Physician: Bacilio Gold, *    Physician Orders: PT Eval and Treat   Medical Diagnosis from Referral: Back pain, unspecified back location, unspecified back pain laterality, unspecified chronicity [M54.9]  Evaluation Date: 3/21/2023  Authorization Period Expiration: 3/8/2024  Plan of Care Expiration: 6/16/2023  Progress Note Due: 4/21/2023  Visit # / Visits authorized: 1 / 1   FOTO: Issued Visit #: 1/3, (capture on visit 1, 5, 10) first on 3/21/2023    Precautions: Standard, HTN, A fib, CHF, cardiac pacemaker defibrillator, history of breast and thyroid CA, DM, Bilateral TKA's, CVID (common variable immunodeficiency)--NO dry needling     Time In: 7:45  Time Out: 8:30  Total Appointment Time (timed & untimed codes): 45 minutes      SUBJECTIVE   Date of onset: chronic intermittent c/o low back pain, past year has been worse    History of current condition - Mckenzie reports: the past year the middle of her back has been painful. She reports lower middle to lower back pain.  Some history of scoliosis.  The pain seems be worse the past year with intense pain.  She has been limited with the meds she can take due to reactions.  She is able to take muscle relaxers and feels like it will help for a day.  She has tried therapy in the past which does help but over time she is less compliant with her home exercise program.    Lately she reports more c/o in her mid back that is mostly on  her Left side.  If she stands a long time she will also get increased low back pain.      Falls: none    Imaging, none:     Prior Therapy: yes, for back and Bilateral TKA's  Social History: Mckenzie reports she lives with her  in 1-story level entry home   Occupation: retired  Prior Level of Function: was doing well but would take breaks as needed  Current Level of Function: not doing her daily walking program, problems with prolonged standing, less ability to lift/carry, decreased ability to do chores and ADL's    Pain:  Current 4/10, worst 10/10, best 4/10   Location: mid back, lower back, Left side of back   Description: Aching, Throbbing, Grabbing, Tight, Sharp, and Shooting  Aggravating Factors: Standing, Bending, Walking, Lifting, Getting out of bed/chair, and chores/ADL's, prolonged sitting  Easing Factors: heating pad, hot bath, rest, and muscle relaxers, cream rubbed on back    Patients goals: to get relief, do more again     Medical History:   Past Medical History:   Diagnosis Date    Allergy     Anticoagulant long-term use     Arthritis     Asthma     as a child    Atrial fibrillation     Breast cancer 2005    s/p lumpectomy, chemo and now cancer free over 5 years    Bundle branch block     Cardiomyopathy     EF 35 - 40%    CHF (congestive heart failure)     FC II    Chronic sinusitis     Coronary artery disease     CVID (common variable immunodeficiency)     Diabetes mellitus     Diabetes mellitus, type 2     GERD (gastroesophageal reflux disease)     Headache(784.0)     HTN (hypertension)     Hyperlipidemia     Hypothyroid 07/25/2012    Malignant hyperthermia     daughter and supposedly mother have had this    Miscarriage 1971    Otitis media     Presence of combination internal cardiac defibrillator (ICD) and pacemaker     Thyroid cancer     Thyroid cancer 07/25/2012    Thyroid disease     hypothyroid after papillary thyroid cancer with thyroid resection       Surgical History:   Mckenzie WALTON  "Kamaljit  has a past surgical history that includes Thyroid surgery (x2); Breast lumpectomy; Hysterectomy; Tonsillectomy; Cholecystectomy; Cataract extraction w/  intraocular lens implant (Bilateral); Cardiac pacemaker placement; Colonoscopy w/ polypectomy (10/05/2009); Esophagogastroduodenoscopy (09/12/2006); Colonoscopy (N/A, 05/11/2016); pacemaker defibrillator; Joint replacement (Bilateral); Left heart catheterization (N/A, 06/25/2021); and Mole removal.    Medications:   Mckenzie has a current medication list which includes the following prescription(s): aspirin, azelastine, bd april 2nd gen pen needle, biotin, calcium carbonate/vitamin d3, carvedilol, cholecalciferol (vitamin d3), esomeprazole, estradiol, furosemide, insulin degludec, insulin lispro, jardiance, multivit-min/ferrous fumarate, fish oil-omega-3 fatty acids, polyethylene glycol, potassium chloride sa, rosuvastatin, spironolactone, synthroid, and telmisartan.    Allergies:   Review of patient's allergies indicates:   Allergen Reactions    Cayenne pepper Swelling    Covid-19 vacc,mrna(pfizer)(pf) Rash    Lantus [insulin glargine] Shortness Of Breath and Swelling     Toujeo also    Adhesive      rash    Iodine and iodide containing products      Topical iodine reaction-rash/itching    Other Nausea Only     "Mycin" drugs, such as erythromycin and azithromycin.  No specific allergy mentioned to Aminoglycosides    Adhesive tape-silicones Itching    Amoxil [amoxicillin]      Once diagnosed with penicillin allergy.  Underwent skin testing that was apparently negative in approximately 2011.  However developed a rash and swelling after taking amoxicillin in 2012.    Aspirin Swelling     Other reaction(s): Stomach upset    Avelox [moxifloxacin] Itching    Betadine [povidone-iodine] Itching    Cephalexin Other (See Comments)     Blurred vision    Doxycycline Itching    Erythromycin      Other reaction(s): Stomach upset  Other reaction(s): Flatulence    Lactose " intolerance [lactase] Diarrhea    Levaquin [levofloxacin] Hives     Other reaction(s): Achilles tendinitis/bursitis    Tramadol Other (See Comments)      Twitching/jumping of muscles      Hydrocodone Hives and Rash    Latex Rash    Morphine Itching and Nausea Only    Penicillins Itching, Nausea Only, Rash and Edema    Sulfa (sulfonamide antibiotics) Rash     Other reaction(s): Unknown          OBJECTIVE     Posture/alignment:    Standing with Left iliac crest and scapula resting lower vs Right side.  Downward gaze with increased thoracic kyphosis with rounded shoulders and forward head posture.    Supine Left pelvic downslip with AI.  ~6mm Left leg length discrepancy.    Thoracic Spine:  increased kyphosis, stiff with decreased extension mobility.  Increased rounding of shoulders with forward head posture.    Gait Analysis:  walks with slow, shortened stride lengths with downward gaze.  Shows Left step down sign into Left stance phase with Left hip hiking into Left swing phase.    Lumbar ROM:    AROM    Flexion  68*  had to use Upper Extremities to help return upright   Extension  9*   Left Sidebending  11*   Right Sidebending  15*   Left Rotation  severe limitations   Right Rotation  severe limitations     Dermatomes:  L2-S2 light touch intact Bilateral Lower Extremities    Myotomes:     Left  Right   L2  5-/5  5/5   L3  5/5  5/5   L4  5/5  5/5   L5  5/5  5/5   S1  5/5  5/5   S2  5/5  5/5     Strength:    Left  Right   Hip External Rotation   5-/5  5-/5   Hip Abduction   3+/5  3+/5   Hip Extension  3-/5  3-/5     Hip range of motion/mobility:  Pt positioned in sidelying with hip extension mobility/flexibility R= -17 from neutral, L= -12* from neutral.      Flexibility:  Decreased flexibility noted:  Gluts:                           Right=severe, Left=severe  Piriformis:                    Right=severe, Left=severe  Hamstrings:                 Right=mild, Left=mild  Calves:                        Right=moderate,  Left=moderate  Quads:    Right=severe, Left=severe  Iliotibial Band:              Right=severe, Left=severe  tensor fascia latae:      Right=severe, Left=severe    Palpation:  Increased tone and tenderness to palpation noted Bilateral rectus femoris, vastus lateralis, tensor fascia latae, Iliotibial Band, Left psoas insertion > Right side.    Increased tenderness to palpation iliac crest, ASIS, Bilateral sacroiliac joint, mild generalized c/o with pressure along lower thoracic and lumbar spine.  Generalized soreness Bilateral glut med/min and piriformis.    Limitation/Restriction for FOTO Oswestry Survey    Therapist reviewed FOTO scores for Mckenzie Mari on 3/21/2023.   FOTO documents entered into EP[employer] - see Media section.    Limitation Score: 49%.  Predicted 44%.         TREATMENT     Total Treatment time (time-based codes) separate from Evaluation: 15 minutes      Mckenzie received the treatments listed below:      manual therapy techniques: for 15 minutes, including:  Supine muscle energy technique correction of Left sided pelvis downslip with AI, long axis distraction of Right Le.  Repeated until good pelvic alignment observed f/b shotgun technique.  Pt positioned supine with medium bolster under knees for comfort.  Myofascial release and trigger point release of Bilateral rectus femoris, vastus lateralis, tensor fascia latae, Iliotibial Band.  Left trigger point release of Left psoas insertion.  Manual stretching of Bilateral Lower Extremity's in supine.    Added 6mm lift to Left shoe 3/21/2023    therapeutic exercises for 00 minutes including:  Supine LTR  Supine piriformis LTR    neuromuscular re-education activities for 00 minutes. The following activities were included:  Supine PPT + marching  Supine PPT + hip add squeeze with ball  Supine PPT + alternating heel taps  Supine PPT + straight leg raise       Plan for Next Visit:  Assess patients response to the first visit following IE, manual therapy  intervention, therapeutic exercises, pt education, heel lift Left shoe, increasing frequency of home walking program to help loosen up and increase tolerance/activity and HEP.  Assess alignment of as well as Increased tone and tenderness to palpation noted Bilateral rectus femoris, vastus lateralis, tensor fascia latae, Iliotibial Band, Left psoas insertion > Right side.    Increased tenderness to palpation iliac crest, ASIS, Bilateral sacroiliac joint, mild generalized c/o with pressure along lower thoracic and lumbar spine.  Generalized soreness Bilateral glut med/min and piriformis.  Manual therapy to address alignment, mobility, flexibility, tenderness, joint restrictions, soft tissue restrictions and/or presence of trigger points.  Add stretching, stabilization, functional mobility and strengthening exercises as appropriate.  Modalities, Dry Needling if indicated.        PATIENT EDUCATION AND HOME EXERCISES     Education provided:   PT provided education and demonstration of HEP to include:   Use of Left heel lift, walking program, resume stretching home exercise program.    Pt was instructed to perform these daily as often as tolerated.  Pt was given instructions to stop use of HEP if there are any adverse reactions/responses and f/u with PT next treatment to discuss.    Home Exercises Provided:  Exercises were reviewed and Mckenzie was able to demonstrate them prior to the end of the session.  Mckenzie demonstrated fair  understanding of the education provided.   See EMR under Patient Instructions for exercises provided during therapy sessions.    ASSESSMENT     Mckenzie is a 72 y.o. female referred to outpatient Physical Therapy with a medical diagnosis of Back pain, unspecified back location, unspecified back pain laterality, unspecified chronicity [M54.9].   Patient presents with chronic intermittent c/o mid and low back pain that has been worse the past year or so.  She presents with reports of decreased  tolerance to daily mobility and participation in ADL's.  She has decreased flexibility/mobility of her trunk, hip and pelvic muscles, Lower Extremity muscles.  Pelvic malalignment with Left leg length discrepancy of ~6 mm.  Decreased hip and trunk strength.    Patient prognosis is fair to good.   Patientt will benefit from skilled outpatient Physical Therapy to address the deficits stated above and in the chart below, provide patient /family education, and to maximize patientt's level of independence.     Plan of care discussed with patient: Yes  Patient's spiritual, cultural and educational needs considered and patient is agreeable to the plan of care and goals as stated below:     Anticipated Barriers for therapy: Schedule conflicts, transportation, pain, guarding, tolerance, endurance, joint and soft tissue restrictions, osteoarthritis/DJD    Medical Necessity is demonstrated by the following  History  Co-morbidities and personal factors that may impact the plan of care Co-morbidities:    Allergy     Anticoagulant long-term use     Arthritis     Asthma     as a child    Atrial fibrillation     Breast cancer 2005    s/p lumpectomy, chemo and now cancer free over 5 years    Bundle branch block     Cardiomyopathy     EF 35 - 40%    CHF (congestive heart failure)     FC II    Chronic sinusitis     Coronary artery disease     CVID (common variable immunodeficiency)     Diabetes mellitus     Diabetes mellitus, type 2     GERD (gastroesophageal reflux disease)     Headache(784.0)     HTN (hypertension)     Hyperlipidemia     Hypothyroid 07/25/2012    Malignant hyperthermia     daughter and supposedly mother have had this    Miscarriage 1971    Otitis media     Presence of combination internal cardiac defibrillator (ICD) and pacemaker     Thyroid cancer     Thyroid cancer 07/25/2012    Thyroid disease     hypothyroid after papillary thyroid cancer with thyroid resection   Cardiac pacemaker defibrillator, joint  replacement    Personal Factors:   age  Reports of declining mobility and participation in ADL's since COVID     high   Examination  Body Structures and Functions, activity limitations and participation restrictions that may impact the plan of care Body Regions:   back  lower extremities  trunk    Body Systems:    ROM  strength  balance  gait  transfers  transitions  motor control    Participation Restrictions:   Schedule conflicts, transportation, pain, guarding, tolerance, endurance    Activity limitations:   Learning and applying knowledge  no deficits    General Tasks and Commands  no deficits    Communication  no deficits    Mobility  Self limited due to pain, stiffness, tightness/guarding, deconditioning, tolerance    Self care  Self limited due to pain, stiffness, tightness/guarding, deconditioning, tolerance    Domestic Life  Self limited due to pain, stiffness, tightness/guarding, deconditioning, tolerance    Interactions/Relationships  Self limited due to pain, stiffness, tightness/guarding, deconditioning, tolerance    Life Areas  Self limited due to pain, stiffness, tightness/guarding, deconditioning, tolerance    Community and Social Life  Self limited due to pain, stiffness, tightness/guarding, deconditioning, tolerance         high   Clinical Presentation evolving clinical presentation with changing clinical characteristics moderate   Decision Making/ Complexity Score: moderate     Goals:    Short-Term: 4 weeks (4/21/2023)  Pt will improve Active lumbar flexion to > or = 70* to promote return to prior functional status.  Pt will improve Active lumbar extension to > or = 11* to promote return to prior functional status.  Pt will improve bilateral Active lumbar rotation to < or = moderate limitations to promote return to prior functional status.  Pt will improve bilateral Active lumbar side bending to > or = 15* to promote return to prior functional status.  Pt will improve flexibility of trunk, hips,  pelvic and Lower Extremity muscles to help promoted better mobility, posture, alignment and help return to prior functional status.  Pt will improve hip extension ROM/flexibility > or = -10* from neutral to reduce strain on low back plus help promoted better mobility, posture, alignment and help return to prior functional status.  Pt will verbalize improved tolerance to walking program and ADL's  Pt will decrease pain levels < or = to 5/10 to help promote appropriate progression of PT, HEP, and ADL's    Pt will be compliant with new home exercise program to assist with PT intervention and help allow appropriate progression through plan of care.    Long-Term: 12 weeks (6/16/2023)  Pt will improve bilateral hip ROM/flexibility WFL to help reduce stress/strain on their back and to allow return to normal activities of daily living.  Pt will improve bilateral hip strength to > or = 4+/5 to allow performance of activities of daily living.  Pt will improve bilateral knee strength to > or = 5/5 to allow performance of activities of daily living.  Pt will display good gait pattern with no deviations to improve QOL and allow return to prior functional status.  Pt will report < or = 3-4/10 pain during activities of daily living to improve QOL and allow return to prior functional status.  Pt will improve bilateral upper/lower abdominal strength to > or = 4/5 to allow sufficient core stability for activities of daily living.  Pt will be compliant and independent with HEP to allow and maintain better participation in normal activities  Pt will be able to resume normals ADL's, IADL's, previous activities    PLAN   Plan of care Certification: 3/21/2023 to 6/16/2023.      Outpatient Physical Therapy 1-2 times weekly for 12 weeks to include the following interventions: Electrical Stimulation attended/unattended, Gait Training, Manual Therapy, Moist Heat/ Ice, Neuromuscular Re-ed, Orthotic Management and Training, Patient Education,  Therapeutic Activities, Therapeutic Exercise, and Ultrasound.  .     Michael Dominguez, PT      I CERTIFY THE NEED FOR THESE SERVICES FURNISHED UNDER THIS PLAN OF TREATMENT AND WHILE UNDER MY CARE   Physician's comments:     Physician's Signature: ___________________________________________________

## 2023-03-23 ENCOUNTER — CLINICAL SUPPORT (OUTPATIENT)
Dept: REHABILITATION | Facility: HOSPITAL | Age: 73
End: 2023-03-23
Payer: MEDICARE

## 2023-03-23 DIAGNOSIS — R26.9 ALTERED GAIT: ICD-10-CM

## 2023-03-23 DIAGNOSIS — M54.6 CHRONIC LEFT-SIDED THORACIC BACK PAIN: ICD-10-CM

## 2023-03-23 DIAGNOSIS — M54.50 CHRONIC LEFT-SIDED LOW BACK PAIN WITHOUT SCIATICA: Primary | ICD-10-CM

## 2023-03-23 DIAGNOSIS — M25.69 BACK STIFFNESS: ICD-10-CM

## 2023-03-23 DIAGNOSIS — G89.29 CHRONIC LEFT-SIDED THORACIC BACK PAIN: ICD-10-CM

## 2023-03-23 DIAGNOSIS — M53.3 SACROILIAC JOINT PAIN: ICD-10-CM

## 2023-03-23 DIAGNOSIS — G89.29 CHRONIC LEFT-SIDED LOW BACK PAIN WITHOUT SCIATICA: Primary | ICD-10-CM

## 2023-03-23 DIAGNOSIS — R29.898 WEAKNESS OF BOTH HIPS: ICD-10-CM

## 2023-03-23 DIAGNOSIS — R29.898 IMPAIRED FLEXIBILITY OF LOWER EXTREMITY: ICD-10-CM

## 2023-03-23 PROCEDURE — 97110 THERAPEUTIC EXERCISES: CPT | Mod: HCNC,PN,CQ

## 2023-03-23 PROCEDURE — 97140 MANUAL THERAPY 1/> REGIONS: CPT | Mod: HCNC,PN,CQ

## 2023-03-23 PROCEDURE — 97112 NEUROMUSCULAR REEDUCATION: CPT | Mod: HCNC,PN,CQ

## 2023-03-23 NOTE — PROGRESS NOTES
[facility]   Physical Therapy Treatment Note     Name: Mckenzie Mari  Clinic Number: [id number]    Therapy Diagnosis:   Encounter Diagnoses   Name Primary?    Chronic left-sided low back pain without sciatica Yes    Chronic left-sided thoracic back pain     Back stiffness     Weakness of both hips     Impaired flexibility of lower extremity     Sacroiliac joint pain     Altered gait      Physician: Bacilio Gold, *    Visit Date: 3/23/2023    Physician Orders: PT Eval and Treat   Medical Diagnosis from Referral: Back pain, unspecified back location, unspecified back pain laterality, unspecified chronicity [M54.9]  Evaluation Date: 3/21/2023  Authorization Period Expiration: 12/29/2023  Plan of Care Expiration: 6/16/2023  Progress Note Due: 4/21/2023  Visit # / Visits authorized: 1/20 +eval  FOTO: Issued Visit #: 1/3, (capture on visit 1, 5, 10) first on 3/21/2023     Precautions: Standard, HTN, A fib, CHF, cardiac pacemaker defibrillator, history of breast and thyroid CA, DM, Bilateral TKA's, CVID (common variable immunodeficiency)--NO dry needling        PTA Visit #: 1/5     Time In: 0753  Time Out: 0832  Total Billable Time: 39 minutes    SUBJECTIVE     Pt reports: all over body stiffness because it is morning. 4-5/10 between her shoulder blades and just underneath. Had a busy day yesterday grocery shopping, packages up some meats, cooked.     She  n/a  compliant with home exercise program.  Response to previous treatment: a little soreness  Functional change: none stated    Pain: 4-5/10  Location: bilateral midback and periscapular areas    OBJECTIVE     Objective Measures updated at progress report unless specified.     Treatment     Mckenzie received the treatments listed below:      manual therapy techniques: for 15 minutes, including:  Supine muscle energy technique correction of Left sided pelvis downslip with AI, long axis distraction of Right Le.  Repeated until good  pelvic alignment observed f/b shotgun technique.  Pt positioned supine with medium bolster under knees for comfort.  Myofascial release and trigger point release of Bilateral rectus femoris, vastus lateralis, tensor fascia latae, Iliotibial Band.  Left trigger point release of Left psoas insertion.  Manual stretching of Bilateral Lower Extremity's in supine.     Added 6mm lift to Left shoe 3/21/2023     therapeutic exercises for 12 minutes including:  Supine LTR x 2 minutes  Supine piriformis LTR x 1 minute each     neuromuscular re-education activities for 12 minutes. The following activities were included:  Supine PPT + marching-NP  Supine Posterior pelvic tilt x 2 minutes  Supine PPT + hip add squeeze with ball x 2 minutes  Supine PPT + alternating heel taps-NP  Supine PPT + straight leg raise -NP        Plan for Next Visit:  Assess patients response to the first visit following IE, manual therapy intervention, therapeutic exercises, pt education, heel lift Left shoe, increasing frequency of home walking program to help loosen up and increase tolerance/activity and HEP.  Assess alignment of as well as Increased tone and tenderness to palpation noted Bilateral rectus femoris, vastus lateralis, tensor fascia latae, Iliotibial Band, Left psoas insertion > Right side.    Increased tenderness to palpation iliac crest, ASIS, Bilateral sacroiliac joint, mild generalized c/o with pressure along lower thoracic and lumbar spine.  Generalized soreness Bilateral glut med/min and piriformis.  Manual therapy to address alignment, mobility, flexibility, tenderness, joint restrictions, soft tissue restrictions and/or presence of trigger points.  Add stretching, stabilization, functional mobility and strengthening exercises as appropriate.  Modalities, Dry Needling if indicated.           Patient Education and Home Exercises     Home Exercises Provided and Patient Education Provided     Education provided:   - Educated pt that  he/she may feel soreness after session.      Written Home Exercises Provided: yes.     Exercises were reviewed and Mckenzie was able to demonstrate them prior to the end of the session.  Mckenzie demonstrated fair  understanding of the education provided. See EMR under Patient Instructions for exercises provided during therapy sessions    ASSESSMENT     Continued pain midback with pelvic dysfunction which improved with manual therapy. Progressed with strengthening and flexibility exercises with heavy cues for proper form and execution and redirection 2* distraction form perseveration on co-morbidities. Discomfort with attempts at Posterior pelvic tilt with march, so stopped. Minimal decrease in symptoms after session.  Will benefit from continued physical therapy intervention to progress toward goals set forth in plan of care to improve functional mobility and quality of life.     Mckenzie Is progressing well towards her goals.     Pt will continue to benefit from skilled outpatient physical therapy to address the deficits listed in the problem list box on initial evaluation, provide pt/family education and to maximize pt's level of independence in the home and community environment.     Patient prognosis is fair to good.   Patientt will benefit from skilled outpatient Physical Therapy to address the deficits stated above and in the chart below, provide patient /family education, and to maximize patientt's level of independence.      Plan of care discussed with patient: Yes  Patient's spiritual, cultural and educational needs considered and patient is agreeable to the plan of care and goals as stated below:      Anticipated Barriers for therapy: Schedule conflicts, transportation, pain, guarding, tolerance, endurance, joint and soft tissue restrictions, osteoarthritis/DJD    Goals:   Short-Term: 4 weeks (4/21/2023)  ongoing  Pt will improve Active lumbar flexion to > or = 70* to promote return to prior functional  status.  Pt will improve Active lumbar extension to > or = 11* to promote return to prior functional status.  Pt will improve bilateral Active lumbar rotation to < or = moderate limitations to promote return to prior functional status.  Pt will improve bilateral Active lumbar side bending to > or = 15* to promote return to prior functional status.  Pt will improve flexibility of trunk, hips, pelvic and Lower Extremity muscles to help promoted better mobility, posture, alignment and help return to prior functional status.  Pt will improve hip extension ROM/flexibility > or = -10* from neutral to reduce strain on low back plus help promoted better mobility, posture, alignment and help return to prior functional status.  Pt will verbalize improved tolerance to walking program and ADL's  Pt will decrease pain levels < or = to 5/10 to help promote appropriate progression of PT, HEP, and ADL's    Pt will be compliant with new home exercise program to assist with PT intervention and help allow appropriate progression through plan of care.     Long-Term: 12 weeks (6/16/2023)   ongoing  Pt will improve bilateral hip ROM/flexibility WFL to help reduce stress/strain on their back and to allow return to normal activities of daily living.  Pt will improve bilateral hip strength to > or = 4+/5 to allow performance of activities of daily living.  Pt will improve bilateral knee strength to > or = 5/5 to allow performance of activities of daily living.  Pt will display good gait pattern with no deviations to improve QOL and allow return to prior functional status.  Pt will report < or = 3-4/10 pain during activities of daily living to improve QOL and allow return to prior functional status.  Pt will improve bilateral upper/lower abdominal strength to > or = 4/5 to allow sufficient core stability for activities of daily living.  Pt will be compliant and independent with HEP to allow and maintain better participation in normal  activities  Pt will be able to resume normals ADL's, IADL's, previous activities       PLAN     Continue per POC, progressing as appropriate to achieve stated goals.    Continue with: Plan of care Certification: 3/21/2023 to 6/16/2023.        Outpatient Physical Therapy 1-2 times weekly for 12 weeks to include the following interventions: Electrical Stimulation attended/unattended, Gait Training, Manual Therapy, Moist Heat/ Ice, Neuromuscular Re-ed, Orthotic Management and Training, Patient Education, Therapeutic Activities, Therapeutic Exercise, and Ultrasound.      Carrie Solares, PTA

## 2023-03-28 ENCOUNTER — CLINICAL SUPPORT (OUTPATIENT)
Dept: REHABILITATION | Facility: HOSPITAL | Age: 73
End: 2023-03-28
Payer: MEDICARE

## 2023-03-28 DIAGNOSIS — R29.898 IMPAIRED FLEXIBILITY OF LOWER EXTREMITY: ICD-10-CM

## 2023-03-28 DIAGNOSIS — R26.9 ALTERED GAIT: ICD-10-CM

## 2023-03-28 DIAGNOSIS — G89.29 CHRONIC LEFT-SIDED THORACIC BACK PAIN: ICD-10-CM

## 2023-03-28 DIAGNOSIS — M54.6 CHRONIC LEFT-SIDED THORACIC BACK PAIN: ICD-10-CM

## 2023-03-28 DIAGNOSIS — M25.69 BACK STIFFNESS: ICD-10-CM

## 2023-03-28 DIAGNOSIS — G89.29 CHRONIC LEFT-SIDED LOW BACK PAIN WITHOUT SCIATICA: Primary | ICD-10-CM

## 2023-03-28 DIAGNOSIS — M54.50 CHRONIC LEFT-SIDED LOW BACK PAIN WITHOUT SCIATICA: Primary | ICD-10-CM

## 2023-03-28 DIAGNOSIS — M53.3 SACROILIAC JOINT PAIN: ICD-10-CM

## 2023-03-28 DIAGNOSIS — R29.898 WEAKNESS OF BOTH HIPS: ICD-10-CM

## 2023-03-28 PROCEDURE — 97140 MANUAL THERAPY 1/> REGIONS: CPT | Mod: HCNC,PN,CQ

## 2023-03-28 PROCEDURE — 97530 THERAPEUTIC ACTIVITIES: CPT | Mod: HCNC,PN,CQ

## 2023-03-28 PROCEDURE — 97110 THERAPEUTIC EXERCISES: CPT | Mod: HCNC,PN,CQ

## 2023-03-28 NOTE — PROGRESS NOTES
[facility]   Physical Therapy Treatment Note     Name: Mckenzie Mari  [facility] Number: [id number]    Therapy Diagnosis:   Encounter Diagnoses   Name Primary?    Chronic left-sided low back pain without sciatica Yes    Chronic left-sided thoracic back pain     Back stiffness     Weakness of both hips     Impaired flexibility of lower extremity     Sacroiliac joint pain     Altered gait      Physician: Bacilio Gold, *    Visit Date: 3/28/2023    Physician Orders: PT Eval and Treat   Medical Diagnosis from Referral: Back pain, unspecified back location, unspecified back pain laterality, unspecified chronicity [M54.9]  Evaluation Date: 3/21/2023  Authorization Period Expiration: 12/29/2023  Plan of Care Expiration: 6/16/2023  Progress Note Due: 4/21/2023  Visit # / Visits authorized: 2/20 +eval  FOTO: Issued Visit #: 1/3, (capture on visit 1, 5, 10) first on 3/21/2023     Precautions: Standard, HTN, A fib, CHF, cardiac pacemaker defibrillator, history of breast and thyroid CA, DM, Bilateral TKA's, CVID (common variable immunodeficiency)--NO dry needling        PTA Visit #: 2/5     Time In: 0830  Time Out: 0915  Total Billable Time: 45 minutes    SUBJECTIVE     Pt reports: couldn't bend her leg with attempts at LTR with home exercise program. Back is hurting and left leg is sore at 8/10. Felt really sore after last session posterior left knee and lateral left calf. Did home exercise program twice per day.  Woke at 1am with pain and couldn't sleep afterward. Took alleve but it wore off. Didn't take muscle relaxer because it leaves feeling of fog all day. Pretty active around the house this past weekend. Forward bending with laundry is painful.     She  was  compliant with home exercise program.  Response to previous treatment: a little soreness  Functional change: none stated    Pain: 8/10  Location: bilateral midback and periscapular areas    OBJECTIVE     Objective Measures updated at  progress report unless specified.     Treatment     Mckenzie received the treatments listed below:      manual therapy techniques: for 25 minutes, including:  Supine muscle energy technique correction of Left sided pelvis downslip with AI, long axis distraction of Right Le.  Repeated until good pelvic alignment observed f/b shotgun technique.  Pt positioned supine with medium bolster under knees for comfort.  Myofascial release and trigger point release of Bilateral rectus femoris, vastus lateralis, tensor fascia latae, Iliotibial Band.  Left trigger point release of Left psoas insertion.  Manual stretching of Bilateral Lower Extremity's in supine.     Added 6mm lift to Left shoe 3/21/2023     therapeutic exercises for 8 minutes including:  Supine LTR x 2 minutes-attempted but stopped 2* pain  Supine piriformis LTR x 1 minute each-manual today     neuromuscular re-education activities for 12 minutes. The following activities were included:  Supine PPT + marching-NP  Supine Posterior pelvic tilt x 3 minutes  Supine hooklying glute squeeze x 2 minutes  Supine PPT + hip add squeeze with ball x 2 minutes-left knee discomfort  Supine bridge with large bolster under knees x 2 minutes  Supine PPT + alternating heel taps-NP  Supine PPT + straight leg raise -NP        Plan for Next Visit:  Assess patients response to the first visit following IE, manual therapy intervention, therapeutic exercises, pt education, heel lift Left shoe, increasing frequency of home walking program to help loosen up and increase tolerance/activity and HEP.  Assess alignment of as well as Increased tone and tenderness to palpation noted Bilateral rectus femoris, vastus lateralis, tensor fascia latae, Iliotibial Band, Left psoas insertion > Right side.    Increased tenderness to palpation iliac crest, ASIS, Bilateral sacroiliac joint, mild generalized c/o with pressure along lower thoracic and lumbar spine.  Generalized soreness Bilateral glut  med/min and piriformis.  Manual therapy to address alignment, mobility, flexibility, tenderness, joint restrictions, soft tissue restrictions and/or presence of trigger points.  Add stretching, stabilization, functional mobility and strengthening exercises as appropriate.  Modalities, Dry Needling if indicated.           Patient Education and Home Exercises     Home Exercises Provided and Patient Education Provided     Education provided:   - Educated pt that he/she may feel soreness after session.      Written Home Exercises Provided: Yes, added to current home exercise program.     Exercises were reviewed and Mckenzie was able to demonstrate them prior to the end of the session.  Mckenzie demonstrated fair  understanding of the education provided. See EMR under Patient Instructions for exercises provided during therapy sessions    ASSESSMENT   Increased pain at arrival and since last session. Discomfort with LTR so stopped. Increased emphasis on remaining in pain-free ranges of exercises, but pt continues to push into pain ranges, stating she doesn't know it's painful until it's very painful. Significant tenderness bilateral anterior pelvis which improved after manual therapy. Progressed with strengthening and flexibility exercises with heavy cues for proper form and execution and redirection 2* distraction form perseveration on co-morbidities. Discomfort with attempts at posterior pelvic tilt with march, so stopped. Minimal decrease in symptoms after session. Pain 4-5/10 after session. Will benefit from continued physical therapy intervention to progress toward goals set forth in plan of care to improve functional mobility and quality of life.     Mckenzie Is progressing well towards her goals.     Pt will continue to benefit from skilled outpatient physical therapy to address the deficits listed in the problem list box on initial evaluation, provide pt/family education and to maximize pt's level of independence  in the home and community environment.     Patient prognosis is fair to good.   Patientt will benefit from skilled outpatient Physical Therapy to address the deficits stated above and in the chart below, provide patient /family education, and to maximize patientt's level of independence.      Plan of care discussed with patient: Yes  Patient's spiritual, cultural and educational needs considered and patient is agreeable to the plan of care and goals as stated below:      Anticipated Barriers for therapy: Schedule conflicts, transportation, pain, guarding, tolerance, endurance, joint and soft tissue restrictions, osteoarthritis/DJD    Goals:   Short-Term: 4 weeks (4/21/2023)  ongoing  Pt will improve Active lumbar flexion to > or = 70* to promote return to prior functional status.  Pt will improve Active lumbar extension to > or = 11* to promote return to prior functional status.  Pt will improve bilateral Active lumbar rotation to < or = moderate limitations to promote return to prior functional status.  Pt will improve bilateral Active lumbar side bending to > or = 15* to promote return to prior functional status.  Pt will improve flexibility of trunk, hips, pelvic and Lower Extremity muscles to help promoted better mobility, posture, alignment and help return to prior functional status.  Pt will improve hip extension ROM/flexibility > or = -10* from neutral to reduce strain on low back plus help promoted better mobility, posture, alignment and help return to prior functional status.  Pt will verbalize improved tolerance to walking program and ADL's  Pt will decrease pain levels < or = to 5/10 to help promote appropriate progression of PT, HEP, and ADL's    Pt will be compliant with new home exercise program to assist with PT intervention and help allow appropriate progression through plan of care.     Long-Term: 12 weeks (6/16/2023)   ongoing  Pt will improve bilateral hip ROM/flexibility WFL to help reduce  stress/strain on their back and to allow return to normal activities of daily living.  Pt will improve bilateral hip strength to > or = 4+/5 to allow performance of activities of daily living.  Pt will improve bilateral knee strength to > or = 5/5 to allow performance of activities of daily living.  Pt will display good gait pattern with no deviations to improve QOL and allow return to prior functional status.  Pt will report < or = 3-4/10 pain during activities of daily living to improve QOL and allow return to prior functional status.  Pt will improve bilateral upper/lower abdominal strength to > or = 4/5 to allow sufficient core stability for activities of daily living.  Pt will be compliant and independent with HEP to allow and maintain better participation in normal activities  Pt will be able to resume normals ADL's, IADL's, previous activities       PLAN     Continue per POC, progressing as appropriate to achieve stated goals.    Continue with: Plan of care Certification: 3/21/2023 to 6/16/2023.        Outpatient Physical Therapy 1-2 times weekly for 12 weeks to include the following interventions: Electrical Stimulation attended/unattended, Gait Training, Manual Therapy, Moist Heat/ Ice, Neuromuscular Re-ed, Orthotic Management and Training, Patient Education, Therapeutic Activities, Therapeutic Exercise, and Ultrasound.      Carrie Solares, PTA

## 2023-03-29 NOTE — PROGRESS NOTES
[facility]   Physical Therapy Treatment Note     Name: Mckenzie Mari  [facility] Number: [id number]    Therapy Diagnosis:   Encounter Diagnoses   Name Primary?    Chronic left-sided low back pain without sciatica Yes    Chronic left-sided thoracic back pain     Back stiffness     Weakness of both hips     Impaired flexibility of lower extremity     Sacroiliac joint pain     Altered gait      Physician: Bacilio Gold, *    Visit Date: 3/30/2023    Physician Orders: PT Eval and Treat   Medical Diagnosis from Referral: Back pain, unspecified back location, unspecified back pain laterality, unspecified chronicity [M54.9]  Evaluation Date: 3/21/2023  Authorization Period Expiration: 12/29/2023  Plan of Care Expiration: 6/16/2023  Progress Note Due: 4/21/2023  Visit # / Visits authorized: 2/20 +eval  FOTO: Issued Visit #: 1/3, (capture on visit 1, 5, 10) first on 3/21/2023     Precautions: Standard, HTN, A fib, CHF, cardiac pacemaker defibrillator, history of breast and thyroid CA, DM, Bilateral TKA's, CVID (common variable immunodeficiency)--NO dry needling      PTA Visit #: 0/5     Time In: 1:00  Time Out: 1:45  Total Billable Time: 44 minutes    SUBJECTIVE     Pt reports: Back is hurting and left leg is sore at 8/10 the other day, better today.  Not waking her at night as much lately.  2 nights ago was a bad night with disrupted sleep.  Felt better after manual therapy today such that she could tolerate her exercises better.    She  was  compliant with home exercise program.  Response to previous treatment: a little soreness  Functional change: none stated    Pain: 5/10  Location: bilateral midback and periscapular areas    OBJECTIVE     Objective Measures updated at progress report unless specified.     Treatment     Mckenzie received the treatments listed below:      manual therapy techniques: for 20 minutes, including:  Supine muscle energy technique correction of Left sided pelvis  downslip with AI, long axis distraction of Right Le.  Repeated until good pelvic alignment observed f/b clam with manual resistance x 3 reps, shotgun technique x 3 reps (x 2 trials total).  Pt positioned supine with medium bolster under knees for comfort.  Myofascial release and trigger point release of Bilateral rectus femoris, vastus lateralis, tensor fascia latae, Iliotibial Band.  Left trigger point release of Left psoas insertion.  Manual stretching of Bilateral Lower Extremity's in supine.     Added 6mm lift to Left shoe 3/21/2023     therapeutic exercises for 12 minutes including:  Supine LTR (ROM as tolerated) x 2 minutes  Supine double knee to chest with orange ball x 2'  Supine lower trunk rotation with orange ball x 2'  Supine piriformis LTR x 1 minute each-manual today       neuromuscular re-education activities for 12 minutes. The following activities were included:  Supine PPT + marching x 2 minutes  Supine PPT + hip add squeeze with ball x 2 minutes  Supine PPT + alternating clam with larger green loop x 2 minutes  Supine PPT + clam with larger green loop x 2 minutes  Supine hooklying glute squeeze x 2 minutes--np  Supine bridge with large bolster under knees x 2 minutes--np  Supine PPT + alternating heel taps-NP  Supine PPT + straight leg raise -NP        Plan for Next Visit:  Assess patients response to the first visit following IE, manual therapy intervention, therapeutic exercises, pt education, heel lift Left shoe, increasing frequency of home walking program to help loosen up and increase tolerance/activity and HEP.  Assess alignment of as well as Increased tone and tenderness to palpation noted Bilateral rectus femoris, vastus lateralis, tensor fascia latae, Iliotibial Band, Left psoas insertion > Right side.    Increased tenderness to palpation iliac crest, ASIS, Bilateral sacroiliac joint, mild generalized c/o with pressure along lower thoracic and lumbar spine.  Generalized soreness  Bilateral glut med/min and piriformis.  Manual therapy to address alignment, mobility, flexibility, tenderness, joint restrictions, soft tissue restrictions and/or presence of trigger points.  Add stretching, stabilization, functional mobility and strengthening exercises as appropriate.  Modalities, Dry Needling if indicated.           Patient Education and Home Exercises     Home Exercises Provided and Patient Education Provided     Education provided:   - Educated pt that he/she may feel soreness after session.      Written Home Exercises Provided: Yes, added to current home exercise program.     Exercises were reviewed and Mckenzie was able to demonstrate them prior to the end of the session.  Mckenzie demonstrated fair  understanding of the education provided. See EMR under Patient Instructions for exercises provided during therapy sessions    ASSESSMENT     Pt presenting with pelvic malalignment again that was corrected with muscle energy technique.  Increased time spent with performing manual therapy to correct alignment f/b soft tissue release mainly to Left rectus femoris, vastus lateralis, tensor fascia latae with trigger point release to Left psoas insertion.  Good enough pain relief reported by pt to have better tolerance and participation in PT today.    Mckenzie Is progressing well towards her goals.     Pt will continue to benefit from skilled outpatient physical therapy to address the deficits listed in the problem list box on initial evaluation, provide pt/family education and to maximize pt's level of independence in the home and community environment.     Patient prognosis is fair to good.   Patientt will benefit from skilled outpatient Physical Therapy to address the deficits stated above and in the chart below, provide patient /family education, and to maximize patientt's level of independence.      Plan of care discussed with patient: Yes  Patient's spiritual, cultural and educational needs  considered and patient is agreeable to the plan of care and goals as stated below:      Anticipated Barriers for therapy: Schedule conflicts, transportation, pain, guarding, tolerance, endurance, joint and soft tissue restrictions, osteoarthritis/DJD    Goals:   Short-Term: 4 weeks (4/21/2023)  ongoing  Pt will improve Active lumbar flexion to > or = 70* to promote return to prior functional status.  Pt will improve Active lumbar extension to > or = 11* to promote return to prior functional status.  Pt will improve bilateral Active lumbar rotation to < or = moderate limitations to promote return to prior functional status.  Pt will improve bilateral Active lumbar side bending to > or = 15* to promote return to prior functional status.  Pt will improve flexibility of trunk, hips, pelvic and Lower Extremity muscles to help promoted better mobility, posture, alignment and help return to prior functional status.  Pt will improve hip extension ROM/flexibility > or = -10* from neutral to reduce strain on low back plus help promoted better mobility, posture, alignment and help return to prior functional status.  Pt will verbalize improved tolerance to walking program and ADL's  Pt will decrease pain levels < or = to 5/10 to help promote appropriate progression of PT, HEP, and ADL's    Pt will be compliant with new home exercise program to assist with PT intervention and help allow appropriate progression through plan of care.     Long-Term: 12 weeks (6/16/2023)   ongoing  Pt will improve bilateral hip ROM/flexibility WFL to help reduce stress/strain on their back and to allow return to normal activities of daily living.  Pt will improve bilateral hip strength to > or = 4+/5 to allow performance of activities of daily living.  Pt will improve bilateral knee strength to > or = 5/5 to allow performance of activities of daily living.  Pt will display good gait pattern with no deviations to improve QOL and allow return to prior  functional status.  Pt will report < or = 3-4/10 pain during activities of daily living to improve QOL and allow return to prior functional status.  Pt will improve bilateral upper/lower abdominal strength to > or = 4/5 to allow sufficient core stability for activities of daily living.  Pt will be compliant and independent with HEP to allow and maintain better participation in normal activities  Pt will be able to resume normals ADL's, IADL's, previous activities       PLAN     Continue per POC, progressing as appropriate to achieve stated goals.    Continue with: Plan of care Certification: 3/21/2023 to 6/16/2023.        Outpatient Physical Therapy 1-2 times weekly for 12 weeks to include the following interventions: Electrical Stimulation attended/unattended, Gait Training, Manual Therapy, Moist Heat/ Ice, Neuromuscular Re-ed, Orthotic Management and Training, Patient Education, Therapeutic Activities, Therapeutic Exercise, and Ultrasound.      Michael Dominguez, PT

## 2023-03-30 ENCOUNTER — CLINICAL SUPPORT (OUTPATIENT)
Dept: REHABILITATION | Facility: HOSPITAL | Age: 73
End: 2023-03-30
Payer: MEDICARE

## 2023-03-30 DIAGNOSIS — R26.9 ALTERED GAIT: ICD-10-CM

## 2023-03-30 DIAGNOSIS — M54.50 CHRONIC LEFT-SIDED LOW BACK PAIN WITHOUT SCIATICA: Primary | ICD-10-CM

## 2023-03-30 DIAGNOSIS — G89.29 CHRONIC LEFT-SIDED THORACIC BACK PAIN: ICD-10-CM

## 2023-03-30 DIAGNOSIS — R29.898 IMPAIRED FLEXIBILITY OF LOWER EXTREMITY: ICD-10-CM

## 2023-03-30 DIAGNOSIS — M25.69 BACK STIFFNESS: ICD-10-CM

## 2023-03-30 DIAGNOSIS — G89.29 CHRONIC LEFT-SIDED LOW BACK PAIN WITHOUT SCIATICA: Primary | ICD-10-CM

## 2023-03-30 DIAGNOSIS — R29.898 WEAKNESS OF BOTH HIPS: ICD-10-CM

## 2023-03-30 DIAGNOSIS — M54.6 CHRONIC LEFT-SIDED THORACIC BACK PAIN: ICD-10-CM

## 2023-03-30 DIAGNOSIS — M53.3 SACROILIAC JOINT PAIN: ICD-10-CM

## 2023-03-30 PROCEDURE — 97140 MANUAL THERAPY 1/> REGIONS: CPT | Mod: HCNC,PN

## 2023-03-30 PROCEDURE — 97110 THERAPEUTIC EXERCISES: CPT | Mod: HCNC,PN

## 2023-03-30 PROCEDURE — 97112 NEUROMUSCULAR REEDUCATION: CPT | Mod: HCNC,PN

## 2023-03-31 NOTE — PROGRESS NOTES
[facility]   Physical Therapy Treatment Note     Name: Mckenzie Mari  Clinic Number: [id number]    Therapy Diagnosis:   Encounter Diagnoses   Name Primary?    Chronic left-sided low back pain without sciatica Yes    Chronic left-sided thoracic back pain     Back stiffness     Weakness of both hips     Impaired flexibility of lower extremity     Sacroiliac joint pain     Altered gait      Physician: Bacilio Gold, *    Visit Date: 4/3/2023    Physician Orders: PT Eval and Treat   Medical Diagnosis from Referral: Back pain, unspecified back location, unspecified back pain laterality, unspecified chronicity [M54.9]  Evaluation Date: 3/21/2023  Authorization Period Expiration: 12/29/2023  Plan of Care Expiration: 6/16/2023  Progress Note Due: 4/21/2023  Visit # / Visits authorized: 4 /20 +eval  FOTO: Issued Visit #: 1/3, (capture on visit 1, 5, 10) first on 3/21/2023     Precautions: Standard, HTN, A fib, CHF, cardiac pacemaker defibrillator, history of breast and thyroid CA, DM, Bilateral TKA's, CVID (common variable immunodeficiency)--NO dry needling      PTA Visit #: 0/5     Time In: 11:30  Time Out: 12:15  Total Billable Time: 44 minutes    SUBJECTIVE     Pt reports: Back is hurting and left leg is sore at 8/10 this morning but has loosened up some as she got up and has been moving around.  Not waking her at night as much lately.  2 nights ago was a bad night with disrupted sleep.  Felt better after manual therapy today such that she could tolerate her exercises better.    She  was  compliant with home exercise program.  Response to previous treatment: a little soreness  Functional change: a little less soreness at times    Pain: 8/10 this morning but after moving around down to 5/10  Location: bilateral midback and periscapular areas    OBJECTIVE     Objective Measures updated at progress report unless specified.     Treatment     Mckenzie received the treatments listed below:       manual therapy techniques: for 20 minutes, including:  Supine muscle energy technique correction of Left sided pelvis downslip with AI, long axis distraction of Right Le.  Repeated until good pelvic alignment observed f/b clam with manual resistance x 3 reps, shotgun technique x 3 reps (x 2 trials total).  Pt positioned supine with medium bolster under knees for comfort.  Myofascial release and trigger point release of Bilateral rectus femoris, vastus lateralis, tensor fascia latae, Iliotibial Band.  Left trigger point release of Left psoas insertion.  Manual stretching of Bilateral Lower Extremity's in supine.     Added 6mm lift to Left shoe 3/21/2023     therapeutic exercises for 12 minutes including:  Supine LTR (ROM as tolerated) x 2 minutes  Supine double knee to chest with orange ball x 2'  Supine lower trunk rotation with orange ball x 2'  Supine piriformis LTR x 1 minute each-manual today       neuromuscular re-education activities for 12 minutes. The following activities were included:  Supine PPT + marching x 2 minutes  Supine PPT + hip add squeeze with ball x 2 minutes  Supine PPT + alternating clam with larger green loop x 2 minutes  Supine PPT + clam with larger green loop x 2 minutes  Supine hooklying glute squeeze x 2 minutes--np  Supine bridge with large bolster under knees x 2 minutes--np  Supine PPT + alternating heel taps-NP  Supine PPT + straight leg raise -NP        Plan for Next Visit:  Assess patients response to the last visit following manual therapy intervention, therapeutic exercises, pt education, heel lift Left shoe, increasing frequency of home walking program to help loosen up and increase tolerance/activity and HEP.  Assess alignment of as well as Increased tone and tenderness to palpation noted Bilateral rectus femoris, vastus lateralis, tensor fascia latae, Iliotibial Band, Left psoas insertion > Right side.    Increased tenderness to palpation iliac crest, ASIS, Bilateral  sacroiliac joint, mild generalized c/o with pressure along lower thoracic and lumbar spine.  Generalized soreness Bilateral glut med/min and piriformis.  Manual therapy to address alignment, mobility, flexibility, tenderness, joint restrictions, soft tissue restrictions and/or presence of trigger points.  Add stretching, stabilization, functional mobility and strengthening exercises as appropriate.  Modalities, Dry Needling if indicated.           Patient Education and Home Exercises     Home Exercises Provided and Patient Education Provided     Education provided:   - Educated pt that he/she may feel soreness after session.      Written Home Exercises Provided: Yes, added to current home exercise program.     Exercises were reviewed and Mckenzie was able to demonstrate them prior to the end of the session.  Mckenzie demonstrated fair  understanding of the education provided. See EMR under Patient Instructions for exercises provided during therapy sessions    ASSESSMENT     Pt presenting with pelvic malalignment again that was corrected with muscle energy technique.  Increased time spent with performing manual therapy to correct alignment combined with soft tissue release mainly to Left rectus femoris, vastus lateralis, tensor fascia latae with trigger point release to Left psoas insertion.  Good enough pain relief reported by pt to have better tolerance and participation in PT today.    Mckenzie Is progressing well towards her goals.     Pt will continue to benefit from skilled outpatient physical therapy to address the deficits listed in the problem list box on initial evaluation, provide pt/family education and to maximize pt's level of independence in the home and community environment.     Patient prognosis is fair to good.   Patientt will benefit from skilled outpatient Physical Therapy to address the deficits stated above and in the chart below, provide patient /family education, and to maximize patientt's  level of independence.      Plan of care discussed with patient: Yes  Patient's spiritual, cultural and educational needs considered and patient is agreeable to the plan of care and goals as stated below:      Anticipated Barriers for therapy: Schedule conflicts, transportation, pain, guarding, tolerance, endurance, joint and soft tissue restrictions, osteoarthritis/DJD    Goals:   Short-Term: 4 weeks (4/21/2023)  ongoing  Pt will improve Active lumbar flexion to > or = 70* to promote return to prior functional status.  Pt will improve Active lumbar extension to > or = 11* to promote return to prior functional status.  Pt will improve bilateral Active lumbar rotation to < or = moderate limitations to promote return to prior functional status.  Pt will improve bilateral Active lumbar side bending to > or = 15* to promote return to prior functional status.  Pt will improve flexibility of trunk, hips, pelvic and Lower Extremity muscles to help promoted better mobility, posture, alignment and help return to prior functional status.  Pt will improve hip extension ROM/flexibility > or = -10* from neutral to reduce strain on low back plus help promoted better mobility, posture, alignment and help return to prior functional status.  Pt will verbalize improved tolerance to walking program and ADL's  Pt will decrease pain levels < or = to 5/10 to help promote appropriate progression of PT, HEP, and ADL's    Pt will be compliant with new home exercise program to assist with PT intervention and help allow appropriate progression through plan of care.     Long-Term: 12 weeks (6/16/2023)   ongoing  Pt will improve bilateral hip ROM/flexibility WFL to help reduce stress/strain on their back and to allow return to normal activities of daily living.  Pt will improve bilateral hip strength to > or = 4+/5 to allow performance of activities of daily living.  Pt will improve bilateral knee strength to > or = 5/5 to allow performance of  activities of daily living.  Pt will display good gait pattern with no deviations to improve QOL and allow return to prior functional status.  Pt will report < or = 3-4/10 pain during activities of daily living to improve QOL and allow return to prior functional status.  Pt will improve bilateral upper/lower abdominal strength to > or = 4/5 to allow sufficient core stability for activities of daily living.  Pt will be compliant and independent with HEP to allow and maintain better participation in normal activities  Pt will be able to resume normals ADL's, IADL's, previous activities       PLAN     Continue per POC, progressing as appropriate to achieve stated goals.    Continue with: Plan of care Certification: 3/21/2023 to 6/16/2023.        Outpatient Physical Therapy 1-2 times weekly for 12 weeks to include the following interventions: Electrical Stimulation attended/unattended, Gait Training, Manual Therapy, Moist Heat/ Ice, Neuromuscular Re-ed, Orthotic Management and Training, Patient Education, Therapeutic Activities, Therapeutic Exercise, and Ultrasound.      Michael Dominguez, PT

## 2023-04-03 ENCOUNTER — CLINICAL SUPPORT (OUTPATIENT)
Dept: REHABILITATION | Facility: HOSPITAL | Age: 73
End: 2023-04-03
Payer: MEDICARE

## 2023-04-03 DIAGNOSIS — M53.3 SACROILIAC JOINT PAIN: ICD-10-CM

## 2023-04-03 DIAGNOSIS — M54.6 CHRONIC LEFT-SIDED THORACIC BACK PAIN: ICD-10-CM

## 2023-04-03 DIAGNOSIS — R29.898 WEAKNESS OF BOTH HIPS: ICD-10-CM

## 2023-04-03 DIAGNOSIS — R26.9 ALTERED GAIT: ICD-10-CM

## 2023-04-03 DIAGNOSIS — R29.898 IMPAIRED FLEXIBILITY OF LOWER EXTREMITY: ICD-10-CM

## 2023-04-03 DIAGNOSIS — M54.50 CHRONIC LEFT-SIDED LOW BACK PAIN WITHOUT SCIATICA: Primary | ICD-10-CM

## 2023-04-03 DIAGNOSIS — M25.69 BACK STIFFNESS: ICD-10-CM

## 2023-04-03 DIAGNOSIS — G89.29 CHRONIC LEFT-SIDED LOW BACK PAIN WITHOUT SCIATICA: Primary | ICD-10-CM

## 2023-04-03 DIAGNOSIS — G89.29 CHRONIC LEFT-SIDED THORACIC BACK PAIN: ICD-10-CM

## 2023-04-03 PROCEDURE — 97112 NEUROMUSCULAR REEDUCATION: CPT | Mod: HCNC,PN

## 2023-04-03 PROCEDURE — 97140 MANUAL THERAPY 1/> REGIONS: CPT | Mod: HCNC,PN

## 2023-04-03 PROCEDURE — 97110 THERAPEUTIC EXERCISES: CPT | Mod: HCNC,PN

## 2023-04-06 ENCOUNTER — CLINICAL SUPPORT (OUTPATIENT)
Dept: REHABILITATION | Facility: HOSPITAL | Age: 73
End: 2023-04-06
Payer: MEDICARE

## 2023-04-06 DIAGNOSIS — M25.69 BACK STIFFNESS: ICD-10-CM

## 2023-04-06 DIAGNOSIS — R29.898 WEAKNESS OF BOTH HIPS: ICD-10-CM

## 2023-04-06 DIAGNOSIS — M54.50 CHRONIC LEFT-SIDED LOW BACK PAIN WITHOUT SCIATICA: Primary | ICD-10-CM

## 2023-04-06 DIAGNOSIS — G89.29 CHRONIC LEFT-SIDED LOW BACK PAIN WITHOUT SCIATICA: Primary | ICD-10-CM

## 2023-04-06 DIAGNOSIS — M54.6 CHRONIC LEFT-SIDED THORACIC BACK PAIN: ICD-10-CM

## 2023-04-06 DIAGNOSIS — R26.9 ALTERED GAIT: ICD-10-CM

## 2023-04-06 DIAGNOSIS — M53.3 SACROILIAC JOINT PAIN: ICD-10-CM

## 2023-04-06 DIAGNOSIS — R29.898 IMPAIRED FLEXIBILITY OF LOWER EXTREMITY: ICD-10-CM

## 2023-04-06 DIAGNOSIS — G89.29 CHRONIC LEFT-SIDED THORACIC BACK PAIN: ICD-10-CM

## 2023-04-06 PROCEDURE — 97140 MANUAL THERAPY 1/> REGIONS: CPT | Mod: HCNC,PN,CQ

## 2023-04-06 PROCEDURE — 97110 THERAPEUTIC EXERCISES: CPT | Mod: HCNC,PN,CQ

## 2023-04-06 PROCEDURE — 97112 NEUROMUSCULAR REEDUCATION: CPT | Mod: HCNC,PN,CQ

## 2023-04-06 NOTE — PROGRESS NOTES
"[facility]   Physical Therapy Treatment Note     Name: Mckenzie Mari  Clinic Number: [id number]    Therapy Diagnosis:   Encounter Diagnoses   Name Primary?    Chronic left-sided low back pain without sciatica Yes    Chronic left-sided thoracic back pain     Back stiffness     Weakness of both hips     Impaired flexibility of lower extremity     Sacroiliac joint pain     Altered gait        Physician: Bacilio Gold, *    Visit Date: 4/6/2023    Physician Orders: PT Eval and Treat   Medical Diagnosis from Referral: Back pain, unspecified back location, unspecified back pain laterality, unspecified chronicity [M54.9]  Evaluation Date: 3/21/2023  Authorization Period Expiration: 12/29/2023  Plan of Care Expiration: 6/16/2023  Progress Note Due: 4/21/2023  Visit # / Visits authorized: 5 /20 +eval  FOTO: Issued Visit #: 1/3, (capture on visit 1, 5, 10) first on 3/21/2023     Precautions: Standard, HTN, A fib, CHF, cardiac pacemaker defibrillator, history of breast and thyroid CA, DM, Bilateral TKA's, CVID (common variable immunodeficiency)--NO dry needling      PTA Visit #: 1/5     Time In: 0833  Time Out: 0925  Total Billable Time: 45 minutes    SUBJECTIVE     Pt reports: 6-7/10 bilateral thoracic paraspinal area pain. "Better in the mornings" and yesterday pain was 7-8/10 and couldn't breathe 2* respiratory issues and improved with inhaler. Using a body hugger pillow to sleep since Monday which doesn't seem to be helping much yet but will continue.     She  was  compliant with home exercise program.  Response to previous treatment: a little soreness  Functional change: a little less soreness at times    Pain: 6-7/10 this morning     Location: bilateral midback and periscapular areas    OBJECTIVE     Objective Measures updated at progress report unless specified.     Treatment     Mckenzie received the treatments listed below:      manual therapy techniques: for 25 minutes, " including:  Supine muscle energy technique correction of Left sided pelvis downslip with AI, long axis distraction of Right Le.  Repeated until good pelvic alignment observed f/b clam with manual resistance x 3 reps, shotgun technique x 3 reps (x 2 trials total).  Pt positioned supine with medium bolster under knees for comfort.  Myofascial release and trigger point release of Bilateral rectus femoris, vastus lateralis, tensor fascia latae, Iliotibial Band.  Left trigger point release of Left psoas insertion.  Manual stretching of Bilateral Lower Extremity's in supine.     Added 6mm lift to Left shoe 3/21/2023     therapeutic exercises for 10 minutes including:  Supine LTR (ROM as tolerated) x 2 minutes-left knee pain  Supine double knee to chest with orange ball x 2'  Supine lower trunk rotation with orange ball x 2'  Supine piriformis LTR x 1 minute each-manual today       neuromuscular re-education activities for 10 minutes. The following activities were included:  Supine PPT + marching x 2 minutes-left knee pain  Supine PPT + hip add squeeze with ball x 2 minutes-NP  Supine PPT + alternating clam with larger green loop x 2 minutes-stopped 2* right groin discomfort  Supine PPT + clam with larger green loop x 2 minutes-stopped 2* right groin discomfort  Supine hooklying glute squeeze x 2 minutes  Supine bridge with large bolster under knees x 2 minutes--np  Supine PPT + alternating heel taps-NP  Supine PPT + straight leg raise -NP        Plan for Next Visit:  Assess patients response to the last visit following manual therapy intervention, therapeutic exercises, pt education, heel lift Left shoe, increasing frequency of home walking program to help loosen up and increase tolerance/activity and HEP.  Assess alignment of as well as Increased tone and tenderness to palpation noted Bilateral rectus femoris, vastus lateralis, tensor fascia latae, Iliotibial Band, Left psoas insertion > Right side.    Increased  tenderness to palpation iliac crest, ASIS, Bilateral sacroiliac joint, mild generalized c/o with pressure along lower thoracic and lumbar spine.  Generalized soreness Bilateral glut med/min and piriformis.  Manual therapy to address alignment, mobility, flexibility, tenderness, joint restrictions, soft tissue restrictions and/or presence of trigger points.  Add stretching, stabilization, functional mobility and strengthening exercises as appropriate.  Modalities, Dry Needling if indicated.           Patient Education and Home Exercises     Home Exercises Provided and Patient Education Provided     Education provided:   - Educated pt that he/she may feel soreness after session.      Written Home Exercises Provided: Instructed patient to continue current home exercise program.    Exercises were reviewed and Mckenzie was able to demonstrate them prior to the end of the session.  Mckenzie demonstrated fair  understanding of the education provided. See EMR under Patient Instructions for exercises provided during therapy sessions    ASSESSMENT     Improving pain level reported, although pt perseverates on her pain and comorbidities throughout session which limits progression. Extra time spent on manual therapy to reduce pain and correct pelvic dysfunction. Requires frequent reminders to not push through pain with exercises, as pt performs to completion without notice of increased symptoms which is mos often reported as lateral knee and calf pain. Right groin throbbing spasms with LTR on swiss ball which did not improve with bracing thighs with strap. Pain improved with assisted sciatic nerve glides left and able to decrease pain and resolve radiating symptoms. Pain reported at midback at 5/10 after session.     Mckenzie Is progressing well towards her goals.     Pt will continue to benefit from skilled outpatient physical therapy to address the deficits listed in the problem list box on initial evaluation, provide  pt/family education and to maximize pt's level of independence in the home and community environment.     Patient prognosis is fair to good.   Patientt will benefit from skilled outpatient Physical Therapy to address the deficits stated above and in the chart below, provide patient /family education, and to maximize patientt's level of independence.      Plan of care discussed with patient: Yes  Patient's spiritual, cultural and educational needs considered and patient is agreeable to the plan of care and goals as stated below:      Anticipated Barriers for therapy: Schedule conflicts, transportation, pain, guarding, tolerance, endurance, joint and soft tissue restrictions, osteoarthritis/DJD    Goals:   Short-Term: 4 weeks (4/21/2023)  ongoing  Pt will improve Active lumbar flexion to > or = 70* to promote return to prior functional status.  Pt will improve Active lumbar extension to > or = 11* to promote return to prior functional status.  Pt will improve bilateral Active lumbar rotation to < or = moderate limitations to promote return to prior functional status.  Pt will improve bilateral Active lumbar side bending to > or = 15* to promote return to prior functional status.  Pt will improve flexibility of trunk, hips, pelvic and Lower Extremity muscles to help promoted better mobility, posture, alignment and help return to prior functional status.  Pt will improve hip extension ROM/flexibility > or = -10* from neutral to reduce strain on low back plus help promoted better mobility, posture, alignment and help return to prior functional status.  Pt will verbalize improved tolerance to walking program and ADL's  Pt will decrease pain levels < or = to 5/10 to help promote appropriate progression of PT, HEP, and ADL's    Pt will be compliant with new home exercise program to assist with PT intervention and help allow appropriate progression through plan of care.     Long-Term: 12 weeks (6/16/2023)   ongoing  Pt will  improve bilateral hip ROM/flexibility WFL to help reduce stress/strain on their back and to allow return to normal activities of daily living.  Pt will improve bilateral hip strength to > or = 4+/5 to allow performance of activities of daily living.  Pt will improve bilateral knee strength to > or = 5/5 to allow performance of activities of daily living.  Pt will display good gait pattern with no deviations to improve QOL and allow return to prior functional status.  Pt will report < or = 3-4/10 pain during activities of daily living to improve QOL and allow return to prior functional status.  Pt will improve bilateral upper/lower abdominal strength to > or = 4/5 to allow sufficient core stability for activities of daily living.  Pt will be compliant and independent with HEP to allow and maintain better participation in normal activities  Pt will be able to resume normals ADL's, IADL's, previous activities       PLAN     Continue per POC, progressing as appropriate to achieve stated goals.    Continue with: Plan of care Certification: 3/21/2023 to 6/16/2023.        Outpatient Physical Therapy 1-2 times weekly for 12 weeks to include the following interventions: Electrical Stimulation attended/unattended, Gait Training, Manual Therapy, Moist Heat/ Ice, Neuromuscular Re-ed, Orthotic Management and Training, Patient Education, Therapeutic Activities, Therapeutic Exercise, and Ultrasound.      Carrie Solares, PTA

## 2023-04-06 NOTE — PROGRESS NOTES
[facility]   Physical Therapy Treatment Note     Name: Mckenzie Mari  [facility] Number: [id number]    Therapy Diagnosis:   Encounter Diagnoses   Name Primary?    Chronic left-sided low back pain without sciatica Yes    Chronic left-sided thoracic back pain     Back stiffness     Weakness of both hips     Impaired flexibility of lower extremity     Sacroiliac joint pain     Altered gait      Physician: Bacilio Gold, *    Visit Date: 4/10/2023    Physician Orders: PT Eval and Treat   Medical Diagnosis from Referral: Back pain, unspecified back location, unspecified back pain laterality, unspecified chronicity [M54.9]  Evaluation Date: 3/21/2023  Authorization Period Expiration: 12/29/2023  Plan of Care Expiration: 6/16/2023  Progress Note Due: 4/21/2023  Visit # / Visits authorized: 6 /20 +eval  FOTO: Issued Visit #: 1/3, (capture on visit 1, 5, 10) first on 3/21/2023     Precautions: Standard, HTN, A fib, CHF, cardiac pacemaker defibrillator, history of breast and thyroid CA, DM, Bilateral TKA's, CVID (common variable immunodeficiency)--NO dry needling      PTA Visit #: 0/5     Time In: 9:50  Time Out: 10:35  Total Billable Time: 45 minutes    SUBJECTIVE     Pt reports: had a busy weekend with [facility].  She made a cake on Saturday and was sore from decorating and baking it.  She took a muscle relaxer that night and felt better afterwards.  Doing ok so far this morning.    She  was  compliant with home exercise program.  Response to previous treatment: a little soreness  Functional change: a little less soreness at times    Pain: 4/10 this morning     Location: bilateral midback and periscapular areas    OBJECTIVE     Objective Measures updated at progress report unless specified.     Treatment     Mckenzie received the treatments listed below:      manual therapy techniques: for 18 minutes, including:  Supine muscle energy technique correction of Left sided pelvis downslip with AI,  long axis distraction of Right Le.  Repeated until good pelvic alignment observed f/b clam with manual resistance x 3 reps, shotgun technique x 3 reps (x 2 trials total).  Pt positioned supine with medium bolster under knees for comfort.  Myofascial release and trigger point release of Bilateral rectus femoris, vastus lateralis, tensor fascia latae, Iliotibial Band.  Left trigger point release of Left psoas insertion.  Manual stretching of Bilateral Lower Extremity's in supine.     Added 6mm lift to Left shoe 3/21/2023     therapeutic exercises for 12 minutes including:  Supine LTR (ROM as tolerated) x 2 minutes-left knee pain  Supine double knee to chest with orange ball x 2'  Supine lower trunk rotation with orange ball x 2'  Supine piriformis LTR x 2 minute each       neuromuscular re-education activities for 15 minutes. The following activities were included:  Supine PPT + marching x 2 minutes  Supine PPT + alternating heel taps x 2 minutes  Supine PPT + hip add squeeze with ball x 2 minutes  Supine PPT + ball squeeze + bridge x 2 minutes  Supine PPT + alternating clam with larger green loop x 2 minutes  Supine PPT + clam with larger green loop + bridge x 2 minutes  Supine bridge with large bolster under knees x 2 minutes--np  Supine PPT + straight leg raise -NP        Plan for Next Visit:  Assess patients response to the last visit following manual therapy intervention, therapeutic exercises, pt education, heel lift Left shoe, increasing frequency of home walking program to help loosen up and increase tolerance/activity and HEP.  Assess alignment of as well as Increased tone and tenderness to palpation noted Bilateral rectus femoris, vastus lateralis, tensor fascia latae, Iliotibial Band, Left psoas insertion > Right side.    Increased tenderness to palpation iliac crest, ASIS, Bilateral sacroiliac joint, mild generalized c/o with pressure along lower thoracic and lumbar spine.  Generalized soreness Bilateral  glut med/min and piriformis.  Manual therapy to address alignment, mobility, flexibility, tenderness, joint restrictions, soft tissue restrictions and/or presence of trigger points.  Add stretching, stabilization, functional mobility and strengthening exercises as appropriate.  Modalities, Dry Needling if indicated.           Patient Education and Home Exercises     Home Exercises Provided and Patient Education Provided     Education provided:   - Educated pt that he/she may feel soreness after session.      Written Home Exercises Provided: Instructed patient to continue current home exercise program.    Exercises were reviewed and Mckenzie was able to demonstrate them prior to the end of the session.  Mckenzie demonstrated fair  understanding of the education provided. See EMR under Patient Instructions for exercises provided during therapy sessions    ASSESSMENT     Pt able to report she had a decent weekend.  Had to take some muscle relaxers to help manage c/o as well as do some of her home exercise program.  Today she increased tightness and tenderness to palpation of Left psoas insertion with increased rectus femoris tightness as well.  Better pelvic alignment after soft tissue release and then finished correction with muscle energy technique.    Mckenzie Is progressing well towards her goals.     Pt will continue to benefit from skilled outpatient physical therapy to address the deficits listed in the problem list box on initial evaluation, provide pt/family education and to maximize pt's level of independence in the home and community environment.     Patient prognosis is fair to good.   Patientt will benefit from skilled outpatient Physical Therapy to address the deficits stated above and in the chart below, provide patient /family education, and to maximize patientt's level of independence.      Plan of care discussed with patient: Yes  Patient's spiritual, cultural and educational needs considered and  patient is agreeable to the plan of care and goals as stated below:      Anticipated Barriers for therapy: Schedule conflicts, transportation, pain, guarding, tolerance, endurance, joint and soft tissue restrictions, osteoarthritis/DJD    Goals:   Short-Term: 4 weeks (4/21/2023)  ongoing  Pt will improve Active lumbar flexion to > or = 70* to promote return to prior functional status.  Pt will improve Active lumbar extension to > or = 11* to promote return to prior functional status.  Pt will improve bilateral Active lumbar rotation to < or = moderate limitations to promote return to prior functional status.  Pt will improve bilateral Active lumbar side bending to > or = 15* to promote return to prior functional status.  Pt will improve flexibility of trunk, hips, pelvic and Lower Extremity muscles to help promoted better mobility, posture, alignment and help return to prior functional status.  Pt will improve hip extension ROM/flexibility > or = -10* from neutral to reduce strain on low back plus help promoted better mobility, posture, alignment and help return to prior functional status.  Pt will verbalize improved tolerance to walking program and ADL's  Pt will decrease pain levels < or = to 5/10 to help promote appropriate progression of PT, HEP, and ADL's    Pt will be compliant with new home exercise program to assist with PT intervention and help allow appropriate progression through plan of care.     Long-Term: 12 weeks (6/16/2023)   ongoing  Pt will improve bilateral hip ROM/flexibility WFL to help reduce stress/strain on their back and to allow return to normal activities of daily living.  Pt will improve bilateral hip strength to > or = 4+/5 to allow performance of activities of daily living.  Pt will improve bilateral knee strength to > or = 5/5 to allow performance of activities of daily living.  Pt will display good gait pattern with no deviations to improve QOL and allow return to prior functional  status.  Pt will report < or = 3-4/10 pain during activities of daily living to improve QOL and allow return to prior functional status.  Pt will improve bilateral upper/lower abdominal strength to > or = 4/5 to allow sufficient core stability for activities of daily living.  Pt will be compliant and independent with HEP to allow and maintain better participation in normal activities  Pt will be able to resume normals ADL's, IADL's, previous activities       PLAN     Continue per POC, progressing as appropriate to achieve stated goals.    Continue with: Plan of care Certification: 3/21/2023 to 6/16/2023.        Outpatient Physical Therapy 1-2 times weekly for 12 weeks to include the following interventions: Electrical Stimulation attended/unattended, Gait Training, Manual Therapy, Moist Heat/ Ice, Neuromuscular Re-ed, Orthotic Management and Training, Patient Education, Therapeutic Activities, Therapeutic Exercise, and Ultrasound.      Michael Dominguez, PT

## 2023-04-07 ENCOUNTER — CLINICAL SUPPORT (OUTPATIENT)
Dept: CARDIOLOGY | Facility: HOSPITAL | Age: 73
End: 2023-04-07
Payer: MEDICARE

## 2023-04-07 DIAGNOSIS — Z95.810 PRESENCE OF AUTOMATIC (IMPLANTABLE) CARDIAC DEFIBRILLATOR: ICD-10-CM

## 2023-04-07 PROCEDURE — 93296 REM INTERROG EVL PM/IDS: CPT | Mod: HCNC,PO | Performed by: INTERNAL MEDICINE

## 2023-04-10 ENCOUNTER — CLINICAL SUPPORT (OUTPATIENT)
Dept: REHABILITATION | Facility: HOSPITAL | Age: 73
End: 2023-04-10
Payer: MEDICARE

## 2023-04-10 DIAGNOSIS — G89.29 CHRONIC LEFT-SIDED LOW BACK PAIN WITHOUT SCIATICA: Primary | ICD-10-CM

## 2023-04-10 DIAGNOSIS — G89.29 CHRONIC LEFT-SIDED THORACIC BACK PAIN: ICD-10-CM

## 2023-04-10 DIAGNOSIS — M25.69 BACK STIFFNESS: ICD-10-CM

## 2023-04-10 DIAGNOSIS — R26.9 ALTERED GAIT: ICD-10-CM

## 2023-04-10 DIAGNOSIS — M54.6 CHRONIC LEFT-SIDED THORACIC BACK PAIN: ICD-10-CM

## 2023-04-10 DIAGNOSIS — R29.898 WEAKNESS OF BOTH HIPS: ICD-10-CM

## 2023-04-10 DIAGNOSIS — M53.3 SACROILIAC JOINT PAIN: ICD-10-CM

## 2023-04-10 DIAGNOSIS — M54.50 CHRONIC LEFT-SIDED LOW BACK PAIN WITHOUT SCIATICA: Primary | ICD-10-CM

## 2023-04-10 DIAGNOSIS — R29.898 IMPAIRED FLEXIBILITY OF LOWER EXTREMITY: ICD-10-CM

## 2023-04-10 PROCEDURE — 97140 MANUAL THERAPY 1/> REGIONS: CPT | Mod: HCNC,PN

## 2023-04-10 PROCEDURE — 97110 THERAPEUTIC EXERCISES: CPT | Mod: HCNC,PN

## 2023-04-10 PROCEDURE — 97112 NEUROMUSCULAR REEDUCATION: CPT | Mod: HCNC,PN

## 2023-04-13 ENCOUNTER — CLINICAL SUPPORT (OUTPATIENT)
Dept: REHABILITATION | Facility: HOSPITAL | Age: 73
End: 2023-04-13
Payer: MEDICARE

## 2023-04-13 ENCOUNTER — OFFICE VISIT (OUTPATIENT)
Dept: CARDIOLOGY | Facility: CLINIC | Age: 73
End: 2023-04-13
Payer: MEDICARE

## 2023-04-13 VITALS
BODY MASS INDEX: 39.31 KG/M2 | DIASTOLIC BLOOD PRESSURE: 68 MMHG | HEART RATE: 63 BPM | WEIGHT: 213.63 LBS | HEIGHT: 62 IN | SYSTOLIC BLOOD PRESSURE: 123 MMHG

## 2023-04-13 DIAGNOSIS — I10 PRIMARY HYPERTENSION: Chronic | ICD-10-CM

## 2023-04-13 DIAGNOSIS — M54.6 CHRONIC LEFT-SIDED THORACIC BACK PAIN: ICD-10-CM

## 2023-04-13 DIAGNOSIS — I50.22 CHRONIC SYSTOLIC CONGESTIVE HEART FAILURE: Chronic | ICD-10-CM

## 2023-04-13 DIAGNOSIS — G89.29 CHRONIC LEFT-SIDED LOW BACK PAIN WITHOUT SCIATICA: Primary | ICD-10-CM

## 2023-04-13 DIAGNOSIS — I70.0 AORTIC CALCIFICATION: ICD-10-CM

## 2023-04-13 DIAGNOSIS — G89.29 CHRONIC LEFT-SIDED THORACIC BACK PAIN: ICD-10-CM

## 2023-04-13 DIAGNOSIS — R06.09 DYSPNEA ON EFFORT: ICD-10-CM

## 2023-04-13 DIAGNOSIS — K21.9 GASTROESOPHAGEAL REFLUX DISEASE WITHOUT ESOPHAGITIS: ICD-10-CM

## 2023-04-13 DIAGNOSIS — M25.69 BACK STIFFNESS: ICD-10-CM

## 2023-04-13 DIAGNOSIS — R29.898 WEAKNESS OF BOTH HIPS: ICD-10-CM

## 2023-04-13 DIAGNOSIS — R26.9 ALTERED GAIT: ICD-10-CM

## 2023-04-13 DIAGNOSIS — M53.3 SACROILIAC JOINT PAIN: ICD-10-CM

## 2023-04-13 DIAGNOSIS — Z95.810 ICD (IMPLANTABLE CARDIOVERTER-DEFIBRILLATOR) IN PLACE: Chronic | ICD-10-CM

## 2023-04-13 DIAGNOSIS — R29.898 IMPAIRED FLEXIBILITY OF LOWER EXTREMITY: ICD-10-CM

## 2023-04-13 DIAGNOSIS — R06.09 DOE (DYSPNEA ON EXERTION): Primary | ICD-10-CM

## 2023-04-13 DIAGNOSIS — E78.2 MIXED HYPERLIPIDEMIA: Chronic | ICD-10-CM

## 2023-04-13 DIAGNOSIS — M54.50 CHRONIC LEFT-SIDED LOW BACK PAIN WITHOUT SCIATICA: Primary | ICD-10-CM

## 2023-04-13 PROCEDURE — 4010F ACE/ARB THERAPY RXD/TAKEN: CPT | Mod: HCNC,CPTII,S$GLB,

## 2023-04-13 PROCEDURE — 99999 PR PBB SHADOW E&M-EST. PATIENT-LVL IV: CPT | Mod: PBBFAC,HCNC,,

## 2023-04-13 PROCEDURE — 3078F PR MOST RECENT DIASTOLIC BLOOD PRESSURE < 80 MM HG: ICD-10-PCS | Mod: HCNC,CPTII,S$GLB,

## 2023-04-13 PROCEDURE — 3008F BODY MASS INDEX DOCD: CPT | Mod: HCNC,CPTII,S$GLB,

## 2023-04-13 PROCEDURE — 1126F PR PAIN SEVERITY QUANTIFIED, NO PAIN PRESENT: ICD-10-PCS | Mod: HCNC,CPTII,S$GLB,

## 2023-04-13 PROCEDURE — 3288F PR FALLS RISK ASSESSMENT DOCUMENTED: ICD-10-PCS | Mod: HCNC,CPTII,S$GLB,

## 2023-04-13 PROCEDURE — 99214 OFFICE O/P EST MOD 30 MIN: CPT | Mod: HCNC,S$GLB,,

## 2023-04-13 PROCEDURE — 3288F FALL RISK ASSESSMENT DOCD: CPT | Mod: HCNC,CPTII,S$GLB,

## 2023-04-13 PROCEDURE — 4010F PR ACE/ARB THEARPY RXD/TAKEN: ICD-10-PCS | Mod: HCNC,CPTII,S$GLB,

## 2023-04-13 PROCEDURE — 3074F SYST BP LT 130 MM HG: CPT | Mod: HCNC,CPTII,S$GLB,

## 2023-04-13 PROCEDURE — 3008F PR BODY MASS INDEX (BMI) DOCUMENTED: ICD-10-PCS | Mod: HCNC,CPTII,S$GLB,

## 2023-04-13 PROCEDURE — 3051F PR MOST RECENT HEMOGLOBIN A1C LEVEL 7.0 - < 8.0%: ICD-10-PCS | Mod: HCNC,CPTII,S$GLB,

## 2023-04-13 PROCEDURE — 97110 THERAPEUTIC EXERCISES: CPT | Mod: HCNC,PN,CQ

## 2023-04-13 PROCEDURE — 1159F PR MEDICATION LIST DOCUMENTED IN MEDICAL RECORD: ICD-10-PCS | Mod: HCNC,CPTII,S$GLB,

## 2023-04-13 PROCEDURE — 3051F HG A1C>EQUAL 7.0%<8.0%: CPT | Mod: HCNC,CPTII,S$GLB,

## 2023-04-13 PROCEDURE — 97112 NEUROMUSCULAR REEDUCATION: CPT | Mod: HCNC,PN,CQ

## 2023-04-13 PROCEDURE — 1126F AMNT PAIN NOTED NONE PRSNT: CPT | Mod: HCNC,CPTII,S$GLB,

## 2023-04-13 PROCEDURE — 97140 MANUAL THERAPY 1/> REGIONS: CPT | Mod: HCNC,PN,CQ

## 2023-04-13 PROCEDURE — 99999 PR PBB SHADOW E&M-EST. PATIENT-LVL IV: ICD-10-PCS | Mod: PBBFAC,HCNC,,

## 2023-04-13 PROCEDURE — 1159F MED LIST DOCD IN RCRD: CPT | Mod: HCNC,CPTII,S$GLB,

## 2023-04-13 PROCEDURE — 1101F PR PT FALLS ASSESS DOC 0-1 FALLS W/OUT INJ PAST YR: ICD-10-PCS | Mod: HCNC,CPTII,S$GLB,

## 2023-04-13 PROCEDURE — 1101F PT FALLS ASSESS-DOCD LE1/YR: CPT | Mod: HCNC,CPTII,S$GLB,

## 2023-04-13 PROCEDURE — 99214 PR OFFICE/OUTPT VISIT, EST, LEVL IV, 30-39 MIN: ICD-10-PCS | Mod: HCNC,S$GLB,,

## 2023-04-13 PROCEDURE — 3074F PR MOST RECENT SYSTOLIC BLOOD PRESSURE < 130 MM HG: ICD-10-PCS | Mod: HCNC,CPTII,S$GLB,

## 2023-04-13 PROCEDURE — 3078F DIAST BP <80 MM HG: CPT | Mod: HCNC,CPTII,S$GLB,

## 2023-04-13 RX ORDER — PANTOPRAZOLE SODIUM 40 MG/1
40 TABLET, DELAYED RELEASE ORAL DAILY
Qty: 30 TABLET | Refills: 11 | Status: SHIPPED | OUTPATIENT
Start: 2023-04-13 | End: 2024-03-21

## 2023-04-13 NOTE — PROGRESS NOTES
"[facility]   Physical Therapy Treatment Note     Name: Mckenzie Mari  Clinic Number: [id number]    Therapy Diagnosis:   Encounter Diagnoses   Name Primary?    Chronic left-sided low back pain without sciatica Yes    Chronic left-sided thoracic back pain     Back stiffness     Weakness of both hips     Impaired flexibility of lower extremity     Sacroiliac joint pain     Altered gait        Physician: Bacilio Gold, *    Visit Date: 4/13/2023    Physician Orders: PT Eval and Treat   Medical Diagnosis from Referral: Back pain, unspecified back location, unspecified back pain laterality, unspecified chronicity [M54.9]  Evaluation Date: 3/21/2023  Authorization Period Expiration: 12/29/2023  Plan of Care Expiration: 6/16/2023  Progress Note Due: 4/21/2023  Visit # / Visits authorized: 7/20 +eval  FOTO: Issued Visit #: 1/3, (capture on visit 1, 5, 10) first on 3/21/2023     Precautions: Standard, HTN, A fib, CHF, cardiac pacemaker defibrillator, history of breast and thyroid CA, DM, Bilateral TKA's, CVID (common variable immunodeficiency)--NO dry needling      PTA Visit #: 1/5     Time In: 0835  Time Out: 0920  Total Billable Time: 45 minutes    SUBJECTIVE     Pt reports: today's not bad. Yesterday wasn't bad either. States she didn't do a lot of walking, bending, or stooping, but did her "regular stuff" and her home exercise program. Didn't feel anything this morning. Started feeling discomfort with certain movement. "Wasn't severe." Periscapular pain is a little better than before. Hasn't taken her muscle relaxers since Monday.     She  was  compliant with home exercise program.  Response to previous treatment: a little soreness  Functional change: a little less soreness at times    Pain: 4/10 this morning     Location: bilateral midback and periscapular areas    OBJECTIVE     Objective Measures updated at progress report unless specified.     Treatment     Mckenzie received the " treatments listed below:      manual therapy techniques: for 15 minutes, including:  Supine muscle energy technique correction of Left sided pelvis downslip with AI, long axis distraction of Right Le.  Repeated until good pelvic alignment observed f/b clam with manual resistance x 3 reps, shotgun technique x 3 reps (x 2 trials total).  Pt positioned supine with medium bolster under knees for comfort.  Myofascial release and trigger point release of Bilateral rectus femoris, vastus lateralis, tensor fascia latae, Iliotibial Band.  Left trigger point release of Left psoas insertion.  Manual stretching of Bilateral Lower Extremity's in supine.     Added 6mm lift to Left shoe 3/21/2023     therapeutic exercises for 12 minutes including:  Supine LTR (ROM as tolerated) x 2 minutes-left knee pain  Supine double knee to chest with orange ball x 2'  Supine lower trunk rotation with orange ball x 2'  Supine piriformis LTR x 2 minute each       neuromuscular re-education activities for 18 minutes. The following activities were included:  Supine PPT + marching x 2 minutes  Supine PPT + alternating heel taps x 2 minutes  Supine PPT + hip add squeeze with ball x 2 minutes  Supine PPT + ball squeeze + bridge x 2 minutes  Supine PPT + alternating clam with larger green loop x 2 minutes  Supine PPT + clam with larger green loop + bridge x 2 minutes  Supine bridge with large bolster under knees x 2 minutes--np  Supine PPT + straight leg raise -NP        Plan for Next Visit:  Assess patients response to the last visit following manual therapy intervention, therapeutic exercises, pt education, heel lift Left shoe, increasing frequency of home walking program to help loosen up and increase tolerance/activity and HEP.  Assess alignment of as well as Increased tone and tenderness to palpation noted Bilateral rectus femoris, vastus lateralis, tensor fascia latae, Iliotibial Band, Left psoas insertion > Right side.    Increased tenderness  to palpation iliac crest, ASIS, Bilateral sacroiliac joint, mild generalized c/o with pressure along lower thoracic and lumbar spine.  Generalized soreness Bilateral glut med/min and piriformis.  Manual therapy to address alignment, mobility, flexibility, tenderness, joint restrictions, soft tissue restrictions and/or presence of trigger points.  Add stretching, stabilization, functional mobility and strengthening exercises as appropriate.  Modalities, Dry Needling if indicated.           Patient Education and Home Exercises     Home Exercises Provided and Patient Education Provided     Education provided:   - Educated pt that he/she may feel soreness after session.      Written Home Exercises Provided: Instructed patient to continue current home exercise program.    Exercises were reviewed and Mckenzie was able to demonstrate them prior to the end of the session.  Mckenzie demonstrated fair  understanding of the education provided. See EMR under Patient Instructions for exercises provided during therapy sessions    ASSESSMENT     Improving pain per pt report. Did well since last session with her usual daily activities. Mild tenderness left psoas. Hasn't taken her muscle relaxers since Monday. Tightness and most tender at left psoas. Heavy cues for maintaining posterior pelvic tilt throughout excursions. .  Better pelvic alignment after soft tissue release and then finished correction with muscle energy technique.    Mckenzie Is progressing well towards her goals.     Pt will continue to benefit from skilled outpatient physical therapy to address the deficits listed in the problem list box on initial evaluation, provide pt/family education and to maximize pt's level of independence in the home and community environment.     Patient prognosis is fair to good.   Patientt will benefit from skilled outpatient Physical Therapy to address the deficits stated above and in the chart below, provide patient /family education,  and to maximize patientt's level of independence.      Plan of care discussed with patient: Yes  Patient's spiritual, cultural and educational needs considered and patient is agreeable to the plan of care and goals as stated below:      Anticipated Barriers for therapy: Schedule conflicts, transportation, pain, guarding, tolerance, endurance, joint and soft tissue restrictions, osteoarthritis/DJD    Goals:   Short-Term: 4 weeks (4/21/2023)  ongoing  Pt will improve Active lumbar flexion to > or = 70* to promote return to prior functional status.  Pt will improve Active lumbar extension to > or = 11* to promote return to prior functional status.  Pt will improve bilateral Active lumbar rotation to < or = moderate limitations to promote return to prior functional status.  Pt will improve bilateral Active lumbar side bending to > or = 15* to promote return to prior functional status.  Pt will improve flexibility of trunk, hips, pelvic and Lower Extremity muscles to help promoted better mobility, posture, alignment and help return to prior functional status.  Pt will improve hip extension ROM/flexibility > or = -10* from neutral to reduce strain on low back plus help promoted better mobility, posture, alignment and help return to prior functional status.  Pt will verbalize improved tolerance to walking program and ADL's  Pt will decrease pain levels < or = to 5/10 to help promote appropriate progression of PT, HEP, and ADL's    Pt will be compliant with new home exercise program to assist with PT intervention and help allow appropriate progression through plan of care.     Long-Term: 12 weeks (6/16/2023)   ongoing  Pt will improve bilateral hip ROM/flexibility WFL to help reduce stress/strain on their back and to allow return to normal activities of daily living.  Pt will improve bilateral hip strength to > or = 4+/5 to allow performance of activities of daily living.  Pt will improve bilateral knee strength to > or =  5/5 to allow performance of activities of daily living.  Pt will display good gait pattern with no deviations to improve QOL and allow return to prior functional status.  Pt will report < or = 3-4/10 pain during activities of daily living to improve QOL and allow return to prior functional status.  Pt will improve bilateral upper/lower abdominal strength to > or = 4/5 to allow sufficient core stability for activities of daily living.  Pt will be compliant and independent with HEP to allow and maintain better participation in normal activities  Pt will be able to resume normals ADL's, IADL's, previous activities       PLAN     Continue per POC, progressing as appropriate to achieve stated goals.    Continue with: Plan of care Certification: 3/21/2023 to 6/16/2023.        Outpatient Physical Therapy 1-2 times weekly for 12 weeks to include the following interventions: Electrical Stimulation attended/unattended, Gait Training, Manual Therapy, Moist Heat/ Ice, Neuromuscular Re-ed, Orthotic Management and Training, Patient Education, Therapeutic Activities, Therapeutic Exercise, and Ultrasound.      Carrie Solares, PTA

## 2023-04-13 NOTE — ASSESSMENT & PLAN NOTE
Continue statin medication     Low level/low impact aerobic exercise 5x's/wk recommended. Heart healthy diet and risk factor modification discussed with patient.

## 2023-04-13 NOTE — ASSESSMENT & PLAN NOTE
Worsening the past month   Will dos tress echo although normal coronaries 2021 [facility]

## 2023-04-13 NOTE — PROGRESS NOTES
Subjective:    Patient ID:  Mckenzie Mari is a 72 y.o. female patient here for evaluation Follow-up    History of Present Illness:     Mrs. Mari is a pleasant 72 year old F who follows with Dr. Hansen here today for a follow up. The past month she has been having RUFFIN with 20 steps, which is worse than her baseline shortness of breath.This is a new problem, denies recent illness. She had a normal angiogram in 2021. Last echo as below. She also describes a sensation of her chest being squeeze from back to front relieved with a lot of belching.     Weight is stable. Dry weight 213.   Only using lasix 1-2x week.     LAST ECHOCARDIOGRAM  Results for orders placed in visit on 06/07/21    Echo    Interpretation Summary  · The left ventricle is normal in size with low normal systolic function.  · Normal right ventricular size with normal right ventricular systolic function.  · The estimated ejection fraction is 50%.  · Indeterminate left ventricular diastolic function.  · Mild mitral regurgitation.  · Normal central venous pressure (3 mmHg).  · The estimated PA systolic pressure is 31 mmHg.    LAST ISCHEMIC WORKUP   Results for orders placed during the hospital encounter of 06/09/21    Nuclear Stress - Cardiology Interpreted    Interpretation Summary    Abnormal myocardial perfusion scan.    There is a moderate intensity, small sized, reversible defect that is consistent with ischemia in the inferior wall(s).    The gated perfusion images showed an ejection fraction of 60% at rest.    There is normal wall motion at rest.    The EKG portion of this study is uninterpretable for ischemia secondary to ventricular pacemaker.    When compared to the previous study from 8/11/2020, There are significant changes.    No valid procedures specified.  No valid procedures specified.    REVIEW OF SYSTEMS: As noted in HPI   CARDIOVASCULAR: No recent chest pain, palpitations, arm, neck, or jaw pain.  RESPIRATORY: No recent fever,  "cough chills, SOB.  : No blood in the urine  GI: No nausea, vomiting, or blood in stool.   MUSCULOSKELETAL: No myalgias or falls.   NEURO: No syncope, lightheadedness, or dizziness.  EYES: No sudden changes in vision.     Review of patient's allergies indicates:   Allergen Reactions    Cayenne pepper Swelling    Covid-19 vacc,mrna(pfizer)(pf) Rash    Lantus [insulin glargine] Shortness Of Breath and Swelling     Toujeo also    Adhesive      rash    Iodine and iodide containing products      Topical iodine reaction-rash/itching    Other Nausea Only     "Mycin" drugs, such as erythromycin and azithromycin.  No specific allergy mentioned to Aminoglycosides    Adhesive tape-silicones Itching    Amoxil [amoxicillin]      Once diagnosed with penicillin allergy.  Underwent skin testing that was apparently negative in approximately 2011.  However developed a rash and swelling after taking amoxicillin in 2012.    Aspirin Swelling     Other reaction(s): Stomach upset    Avelox [moxifloxacin] Itching    Betadine [povidone-iodine] Itching    Cephalexin Other (See Comments)     Blurred vision    Doxycycline Itching    Erythromycin      Other reaction(s): Stomach upset  Other reaction(s): Flatulence    Lactose intolerance [lactase] Diarrhea    Levaquin [levofloxacin] Hives     Other reaction(s): Achilles tendinitis/bursitis    Tramadol Other (See Comments)      Twitching/jumping of muscles      Hydrocodone Hives and Rash    Latex Rash    Morphine Itching and Nausea Only    Penicillins Itching, Nausea Only, Rash and Edema    Sulfa (sulfonamide antibiotics) Rash     Other reaction(s): Unknown       Past Medical History:   Diagnosis Date    Allergy     Anticoagulant long-term use     Arthritis     Asthma     as a child    Atrial fibrillation     Breast cancer 2005    s/p lumpectomy, chemo and now cancer free over 5 years    Bundle branch block     Cardiomyopathy     EF 35 - 40%    CHF (congestive heart failure)     FC II    " Chronic sinusitis     Coronary artery disease     CVID (common variable immunodeficiency)     Diabetes mellitus     Diabetes mellitus, type 2     GERD (gastroesophageal reflux disease)     Headache(784.0)     HTN (hypertension)     Hyperlipidemia     Hypothyroid 07/25/2012    Malignant hyperthermia     daughter and supposedly mother have had this    Miscarriage 1971    Otitis media     Presence of combination internal cardiac defibrillator (ICD) and pacemaker     Thyroid cancer     Thyroid cancer 07/25/2012    Thyroid disease     hypothyroid after papillary thyroid cancer with thyroid resection     Past Surgical History:   Procedure Laterality Date    BREAST LUMPECTOMY      left    CARDIAC PACEMAKER PLACEMENT      CATARACT EXTRACTION W/  INTRAOCULAR LENS IMPLANT Bilateral     CHOLECYSTECTOMY      COLONOSCOPY N/A 05/11/2016    Procedure: COLONOSCOPY;  Surgeon: Alfonso Serrano Jr., MD;  Location: [facility] ENDO;  Service: Endoscopy;  Laterality: N/A;    COLONOSCOPY W/ POLYPECTOMY  10/05/2009    [facility]   One 2 mm polyp in the cecum.  TUBULAR ADENOMA.  Tortuous colon.    ESOPHAGOGASTRODUODENOSCOPY  09/12/2006    [facility]   Dysphagia.  NERD.  Patulous LES.  Atrophic mucosa.  Benign fundal polyps.  Dilated, 42 Fr.    HYSTERECTOMY      JOINT REPLACEMENT Bilateral     knee    LEFT HEART CATHETERIZATION N/A 06/25/2021    Procedure: Left heart cath;  Surgeon: Joseph Hansen MD;  Location: [facility] CATH;  Service: Cardiology;  Laterality: N/A;    MOLE REMOVAL      back    pacemaker defibrillator      THYROID SURGERY  x2    TONSILLECTOMY       Social History     Tobacco Use    Smoking status: Never    Smokeless tobacco: Never   Substance Use Topics    Alcohol use: No    Drug use: No         Objective      Vitals:    04/13/23 1131   BP: 123/68   Pulse: 63       LAST EKG  Results for orders placed or performed during the hospital encounter of 10/04/21   EKG 12-lead    Collection Time: 10/04/21 10:56 AM    Narrative    Test Reason :  I49.9,    Vent. Rate : 068 BPM     Atrial Rate : 068 BPM     P-R Int : 144 ms          QRS Dur : 136 ms      QT Int : 426 ms       P-R-T Axes : 060 148 070 degrees     QTc Int : 452 ms    Atrial-sensed ventricular-paced rhythm  Biventricular pacemaker detected  Abnormal ECG  When compared with ECG of 04-OCT-2021 08:06,  No significant change was found  Confirmed by Carlos Velázquez MD ([id number]) on 10/12/2021 5:52:42 PM    Referred By:             Confirmed By:Carlos Velázquez MD     LIPIDS - LAST 2   Lab Results   Component Value Date    CHOL 124 06/01/2022    CHOL 143 08/10/2021    HDL 48 06/01/2022    HDL 48 08/10/2021    LDLCALC 50.2 (L) 06/01/2022    LDLCALC 68.4 08/10/2021    TRIG 129 06/01/2022    TRIG 133 08/10/2021    CHOLHDL 38.7 06/01/2022    CHOLHDL 33.6 08/10/2021     CARDIAC PROFILE - LAST 2  Lab Results   Component Value Date    BNP <10 02/24/2015    BNP 36 09/08/2010    TROPONINI <0.012 03/17/2017      CBC - LAST 2  Lab Results   Component Value Date    WBC 6.96 06/01/2022    WBC 7.07 02/14/2022    RBC 4.66 06/01/2022    RBC 5.14 02/14/2022    HGB 14.1 06/01/2022    HGB 15.7 02/14/2022    HCT 43.7 06/01/2022    HCT 46.8 02/14/2022     06/01/2022     02/14/2022     Lab Results   Component Value Date    INR 0.9 04/14/2014    INR 0.9 10/19/2012    APTT 24.0 04/14/2014    APTT 26.0 10/19/2012     CHEMISTRY - LAST 2  Lab Results   Component Value Date     02/07/2023     10/04/2022    K 4.4 02/07/2023    K 4.3 10/04/2022     02/07/2023     10/04/2022    CO2 22 (L) 02/07/2023    CO2 24 10/04/2022    ANIONGAP 13 02/07/2023    ANIONGAP 9 10/04/2022    BUN 20 02/07/2023    BUN 20 10/04/2022    CREATININE 1.1 02/07/2023    CREATININE 0.8 10/04/2022     (H) 02/07/2023     10/04/2022    CALCIUM 10.0 02/07/2023    CALCIUM 9.4 10/04/2022    MG 1.7 07/06/2018    MG 2.6 (H) 07/05/2006    ALBUMIN 3.9 02/07/2023    ALBUMIN 3.7 06/01/2022    ALBUMIN 3.7 06/01/2022    PROT 6.8  "02/07/2023    PROT 6.7 06/01/2022    PROT 6.7 06/01/2022    ALKPHOS 80 02/07/2023    ALKPHOS 80 06/01/2022    ALKPHOS 80 06/01/2022    ALT 18 02/07/2023    ALT 21 06/01/2022    ALT 21 06/01/2022    AST 15 02/07/2023    AST 16 06/01/2022    AST 16 06/01/2022    BILITOT 1.4 (H) 02/07/2023    BILITOT 1.1 (H) 06/01/2022    BILITOT 1.1 (H) 06/01/2022      ENDOCRINE - LAST 2  Lab Results   Component Value Date    HGBA1C 7.6 (H) 02/07/2023    HGBA1C 7.5 (H) 10/04/2022    TSH 0.265 (L) 02/07/2023    TSH 0.241 (L) 07/13/2022        PHYSICAL EXAM  CONSTITUTIONAL: Well built, well nourished in no apparent distress  NECK: no carotid bruit, no JVD  LUNGS: CTA  CHEST WALL: no tenderness  HEART: regular rate and rhythm, S1, S2 normal, no murmur, click, rub or gallop   ABDOMEN: soft, non-tender; bowel sounds normal; no masses,  no organomegaly  EXTREMITIES: Extremities normal, no edema, no calf tenderness noted  NEURO: AAO X 3    I HAVE REVIEWED :    The vital signs, most recent cardiac testing, and most recent pertinent non-cardiology provider notes.    Current Outpatient Medications   Medication Instructions    aspirin (ECOTRIN) 81 mg, Oral, Daily    azelastine (OPTIVAR) 0.05 % ophthalmic solution 1 drop, Both Eyes, 2 times daily    BD SHANTELLE 2ND GEN PEN NEEDLE 32 gauge x 5/32" Ndle USE FOUR TIMES DAILY    biotin 5,000 mcg TbDL Oral    CALCIUM CARBONATE/VITAMIN D3 (VITAMIN D-3 ORAL) 1 tablet, Oral, Daily    carvediloL (COREG) 25 MG tablet TAKE 1 TABLET BY MOUTH TWICE DAILY WITH FOOD    cholecalciferol, vitamin D3, (VITAMIN D3) 50 mcg (2,000 unit) Tab Oral, Daily    estradioL (ESTRACE) 0.01 % (0.1 mg/gram) vaginal cream No dose, route, or frequency recorded.    furosemide (LASIX) 40 MG tablet TAKE 1 TABLET BY MOUTH EVERY MORNING AS NEEDED    insulin degludec (TRESIBA FLEXTOUCH U-200) 40 Units, Subcutaneous, Daily    insulin lispro 100 unit/mL pen INJECT 10 UNITS UNDER THE SKIN THREE TIMES DAILY BEFORE MEALS PLUS CORRECTION SCALE. " MAXIMUM TOTAL DAILY DOSE OF 84 UNITS    JARDIANCE 10 mg tablet TAKE 1 TABLET(10 MG) BY MOUTH EVERY DAY    multivit-min/ferrous fumarate (MULTI VITAMIN ORAL) 1 Dose, Oral, Daily    omega-3 fatty acids/fish oil (FISH OIL-OMEGA-3 FATTY ACIDS) 300-1,000 mg capsule 1 capsule, Oral, Daily    pantoprazole (PROTONIX) 40 mg, Oral, Daily    polyethylene glycol (GLYCOLAX) 17 g, Oral, Daily    potassium chloride SA (K-DUR,KLOR-CON) 20 MEQ tablet 20 mEq, Oral, Daily    rosuvastatin (CRESTOR) 20 mg, Oral, Nightly    spironolactone (ALDACTONE) 25 MG tablet TAKE 1 TABLET(25 MG) BY MOUTH EVERY DAY    SYNTHROID 112 mcg, Oral, Before breakfast    telmisartan (MICARDIS) 20 mg, Oral, Daily      Assessment & Plan     Gastroesophageal reflux disease without esophagitis  Sounds like esophageal spasms   D/c nexium and try protonix   If not better few weeks see PCP for     HTN (hypertension)  BP well controlled on current regime   Continue coreg, aldactone, telmisartan   Low Na diet     Hyperlipidemia  Continue statin medication     Low level/low impact aerobic exercise 5x's/wk recommended. Heart healthy diet and risk factor modification discussed with patient.       CHF (congestive heart failure)  Euvolemic on exam today   Weight stable   Continue lasix   Stress echo         ICD (implantable cardioverter-defibrillator) in place  Normal device function or recent interrogation     Dyspnea on effort  Worsening the past month   Will dos tress echo although normal coronaries 2021 [facility]    Aortic calcification  Continue statin medication           Follow up in about 3 months (around 7/13/2023).

## 2023-04-13 NOTE — ASSESSMENT & PLAN NOTE
Sounds like esophageal spasms   D/c nexium and try protonix   If not better few weeks see PCP for

## 2023-04-14 NOTE — PROGRESS NOTES
[facility]   Physical Therapy Treatment Note     Name: Mckenzie Mari  [facility] Number: [id number]    Therapy Diagnosis:   Encounter Diagnoses   Name Primary?    Chronic left-sided low back pain without sciatica Yes    Chronic left-sided thoracic back pain     Back stiffness     Weakness of both hips     Impaired flexibility of lower extremity     Sacroiliac joint pain     Altered gait      Physician: Bacilio Gold, *    Visit Date: 4/17/2023    Physician Orders: PT Eval and Treat   Medical Diagnosis from Referral: Back pain, unspecified back location, unspecified back pain laterality, unspecified chronicity [M54.9]  Evaluation Date: 3/21/2023  Authorization Period Expiration: 12/29/2023  Plan of Care Expiration: 6/16/2023  Progress Note Due: 5/12/2023 (Last Progress Note 4/17/2023)  Visit # / Visits authorized: 8 /20 +eval  FOTO: Issued Visit #: 1/3, (capture on visit 1, 5, 10) first on 3/21/2023     Precautions: Standard, HTN, A fib, CHF, cardiac pacemaker defibrillator, history of breast and thyroid CA, DM, Bilateral TKA's, CVID (common variable immunodeficiency)--NO dry needling      PTA Visit #: 0/5     Time In: 10:00  Time Out: 10:45  Total Billable Time: 45 minutes    SUBJECTIVE     Pt reports: today's not bad. States that she was really good Friday and Saturday and ok yesterday.  Pretty good so far this morning.     She  was  compliant with home exercise program.  Response to previous treatment: felt really good  Functional change: a little less soreness at times, hurting less, doing more    Pain: 1/10 this morning     Location: bilateral midback and periscapular areas    OBJECTIVE     Posture/alignment:    Supine Left pelvic downslip with AI.  ~6mm Left leg length discrepancy.     Gait Analysis:  walks with slow, shortened stride lengths with downward gaze.  Shows Left step down sign into Left stance phase with Left hip hiking into Left swing phase.     Lumbar ROM:     AROM     Flexion  70 was 68*  had to use Upper Extremities to help return upright   Extension  11 was 9*   Left Sidebending  14 was 11*   Right Sidebending  16 was 15*   Left Rotation  moderate was severe limitations   Right Rotation  moderate was severe limitations      Dermatomes:  L2-S2 light touch intact Bilateral Lower Extremities     Myotomes:      Left  Right   L2  5/5 was 5-/5  5/5   L3  5/5  5/5   L4  5/5  5/5   L5  5/5  5/5   S1  5/5  5/5   S2  5/5  5/5      Strength:     Left  Right   Hip External Rotation   5-/5  5-/5   Hip Abduction   3+/5  3+/5   Hip Extension  3/5 was 3-/5  3/5 was 3-/5      Hip range of motion/mobility:  Pt positioned in sidelying with hip extension mobility/flexibility R= -11 was -17 from neutral, L= -6 was -12* from neutral.       Flexibility:  Decreased flexibility noted:  Gluts:                           Right=moderate was severe, Left=moderate was severe  Piriformis:                    Right=moderate was severe, Left=moderate was severe  Hamstrings:                 Right=slight was mild, Left=slight was mild  Calves:                        Right=moderate, Left=moderate  Quads:                         Right=moderate/mild was severe, Left=moderate was severe  Iliotibial Band:              Right=moderate was severe, Left=moderate was severe  tensor fascia latae:      Right=moderate was severe, Left=moderate was severe     Palpation:  Increased tone and tenderness to palpation noted Bilateral rectus femoris, vastus lateralis, tensor fascia latae, Iliotibial Band, Left psoas insertion > Right side(much improved).    Increased tenderness to palpation iliac crest, ASIS, Bilateral sacroiliac joint, mild generalized c/o with pressure along lower thoracic and lumbar spine.  Generalized soreness Bilateral glut med/min and piriformis(much improved).    Treatment     Mckenzie received the treatments listed below:      manual therapy techniques: for 15 minutes, including:  Supine muscle energy  technique correction of Left sided pelvis downslip with AI, long axis distraction of Right Le.  Repeated until good pelvic alignment observed f/b clam with manual resistance x 3 reps, shotgun technique x 3 reps (x 2 trials total).  Pt positioned supine with medium bolster under knees for comfort.  Myofascial release and trigger point release of Bilateral rectus femoris, vastus lateralis, tensor fascia latae, Iliotibial Band.  Left trigger point release of Left psoas insertion.  Manual stretching of Bilateral Lower Extremity's in supine.     Added 6mm lift to Left shoe 3/21/2023     therapeutic exercises for 15 minutes including:  Supine LTR (ROM as tolerated) x 2 minutes-left knee pain  Supine double knee to chest with orange ball x 2'  Supine lower trunk rotation with orange ball x 2'  Supine piriformis LTR x 2 minute each  Shuttle leg press 37.5 lbs x 4'     neuromuscular re-education activities for 15 minutes. The following activities were included:  Supine PPT + marching x 2 minutes, 1 lb  Supine PPT + alternating heel taps x 2 minutes, 1 lb  Supine PPT + hip add squeeze with ball x 2 minutes  Supine PPT + ball squeeze + bridge x 2 minutes  Supine PPT + alternating clam with larger green loop x 2 minutes  Supine PPT + clam with larger green loop + bridge x 2 minutes  Supine PPT + straight leg raise 2 x 10 each, 1 lb        Plan for Next Visit:  Assess patients response to the last visit following manual therapy intervention, therapeutic exercises, pt education, heel lift Left shoe, increasing frequency of home walking program to help loosen up and increase tolerance/activity and HEP.  Assess alignment of as well as Increased tone and tenderness to palpation noted Bilateral rectus femoris, vastus lateralis, tensor fascia latae, Iliotibial Band, Left psoas insertion > Right side.    Increased tenderness to palpation iliac crest, ASIS, Bilateral sacroiliac joint, mild generalized c/o with pressure along lower  thoracic and lumbar spine.  Generalized soreness Bilateral glut med/min and piriformis.  Manual therapy to address alignment, mobility, flexibility, tenderness, joint restrictions, soft tissue restrictions and/or presence of trigger points.  Add stretching, stabilization, functional mobility and strengthening exercises as appropriate.  Modalities, Dry Needling if indicated.           Patient Education and Home Exercises     Home Exercises Provided and Patient Education Provided     Education provided:   - Educated pt that he/she may feel soreness after session.      Written Home Exercises Provided: Instructed patient to continue current home exercise program.    Exercises were reviewed and Mckenzie was able to demonstrate them prior to the end of the session.  Mceknzie demonstrated fair  understanding of the education provided. See EMR under Patient Instructions for exercises provided during therapy sessions    ASSESSMENT     Improving pain per pt report. Did well since last session and most of last week.  Mild tenderness left psoas. Hasn't taken her muscle relaxers since last Monday.  Still needing increased cues for maintaining posterior pelvic tilt throughout excursions.   Better pelvic alignment after soft tissue release and then finished correction with muscle energy technique.  Making fairly good progress towards goals.    Mckenzie Is progressing well towards her goals.     Pt will continue to benefit from skilled outpatient physical therapy to address the deficits listed in the problem list box on initial evaluation, provide pt/family education and to maximize pt's level of independence in the home and community environment.     Patient prognosis is fair to good.   Patientt will benefit from skilled outpatient Physical Therapy to address the deficits stated above and in the chart below, provide patient /family education, and to maximize patientt's level of independence.      Plan of care discussed with  patient: Yes  Patient's spiritual, cultural and educational needs considered and patient is agreeable to the plan of care and goals as stated below:      Anticipated Barriers for therapy: Schedule conflicts, transportation, pain, guarding, tolerance, endurance, joint and soft tissue restrictions, osteoarthritis/DJD    Goals:   Short-Term: 4 weeks (4/21/2023)  nearly met  Pt will improve Active lumbar flexion to > or = 70* to promote return to prior functional status.  Pt will improve Active lumbar extension to > or = 11* to promote return to prior functional status.  Pt will improve bilateral Active lumbar rotation to < or = moderate limitations to promote return to prior functional status.  Pt will improve bilateral Active lumbar side bending to > or = 15* to promote return to prior functional status.  Pt will improve flexibility of trunk, hips, pelvic and Lower Extremity muscles to help promoted better mobility, posture, alignment and help return to prior functional status.  Pt will improve hip extension ROM/flexibility > or = -10* from neutral to reduce strain on low back plus help promoted better mobility, posture, alignment and help return to prior functional status.  Pt will verbalize improved tolerance to walking program and ADL's  Pt will decrease pain levels < or = to 5/10 to help promote appropriate progression of PT, HEP, and ADL's    Pt will be compliant with new home exercise program to assist with PT intervention and help allow appropriate progression through plan of care.     Long-Term: 12 weeks (6/16/2023)   ongoing  Pt will improve bilateral hip ROM/flexibility WFL to help reduce stress/strain on their back and to allow return to normal activities of daily living.  Pt will improve bilateral hip strength to > or = 4+/5 to allow performance of activities of daily living.  Pt will improve bilateral knee strength to > or = 5/5 to allow performance of activities of daily living.  Pt will display good  gait pattern with no deviations to improve QOL and allow return to prior functional status.  Pt will report < or = 3-4/10 pain during activities of daily living to improve QOL and allow return to prior functional status.  Pt will improve bilateral upper/lower abdominal strength to > or = 4/5 to allow sufficient core stability for activities of daily living.  Pt will be compliant and independent with HEP to allow and maintain better participation in normal activities  Pt will be able to resume normals ADL's, IADL's, previous activities       PLAN     Continue per POC, progressing as appropriate to achieve stated goals.    Continue with: Plan of care Certification: 3/21/2023 to 6/16/2023.        Outpatient Physical Therapy 1-2 times weekly for 12 weeks to include the following interventions: Electrical Stimulation attended/unattended, Gait Training, Manual Therapy, Moist Heat/ Ice, Neuromuscular Re-ed, Orthotic Management and Training, Patient Education, Therapeutic Activities, Therapeutic Exercise, and Ultrasound.      Michael Dominguez, PT

## 2023-04-17 ENCOUNTER — CLINICAL SUPPORT (OUTPATIENT)
Dept: REHABILITATION | Facility: HOSPITAL | Age: 73
End: 2023-04-17
Payer: MEDICARE

## 2023-04-17 DIAGNOSIS — G89.29 CHRONIC LEFT-SIDED THORACIC BACK PAIN: ICD-10-CM

## 2023-04-17 DIAGNOSIS — M54.50 CHRONIC LEFT-SIDED LOW BACK PAIN WITHOUT SCIATICA: Primary | ICD-10-CM

## 2023-04-17 DIAGNOSIS — M25.69 BACK STIFFNESS: ICD-10-CM

## 2023-04-17 DIAGNOSIS — M53.3 SACROILIAC JOINT PAIN: ICD-10-CM

## 2023-04-17 DIAGNOSIS — R29.898 IMPAIRED FLEXIBILITY OF LOWER EXTREMITY: ICD-10-CM

## 2023-04-17 DIAGNOSIS — G89.29 CHRONIC LEFT-SIDED LOW BACK PAIN WITHOUT SCIATICA: Primary | ICD-10-CM

## 2023-04-17 DIAGNOSIS — R29.898 WEAKNESS OF BOTH HIPS: ICD-10-CM

## 2023-04-17 DIAGNOSIS — R26.9 ALTERED GAIT: ICD-10-CM

## 2023-04-17 DIAGNOSIS — M54.6 CHRONIC LEFT-SIDED THORACIC BACK PAIN: ICD-10-CM

## 2023-04-17 PROCEDURE — 97110 THERAPEUTIC EXERCISES: CPT | Mod: HCNC,PN

## 2023-04-17 PROCEDURE — 97140 MANUAL THERAPY 1/> REGIONS: CPT | Mod: HCNC,PN

## 2023-04-17 PROCEDURE — 97112 NEUROMUSCULAR REEDUCATION: CPT | Mod: HCNC,PN

## 2023-04-20 ENCOUNTER — CLINICAL SUPPORT (OUTPATIENT)
Dept: REHABILITATION | Facility: HOSPITAL | Age: 73
End: 2023-04-20
Payer: MEDICARE

## 2023-04-20 DIAGNOSIS — G89.29 CHRONIC LEFT-SIDED THORACIC BACK PAIN: ICD-10-CM

## 2023-04-20 DIAGNOSIS — M54.6 CHRONIC LEFT-SIDED THORACIC BACK PAIN: ICD-10-CM

## 2023-04-20 DIAGNOSIS — M54.50 CHRONIC LEFT-SIDED LOW BACK PAIN WITHOUT SCIATICA: Primary | ICD-10-CM

## 2023-04-20 DIAGNOSIS — M53.3 SACROILIAC JOINT PAIN: ICD-10-CM

## 2023-04-20 DIAGNOSIS — M25.69 BACK STIFFNESS: ICD-10-CM

## 2023-04-20 DIAGNOSIS — R29.898 IMPAIRED FLEXIBILITY OF LOWER EXTREMITY: ICD-10-CM

## 2023-04-20 DIAGNOSIS — G89.29 CHRONIC LEFT-SIDED LOW BACK PAIN WITHOUT SCIATICA: Primary | ICD-10-CM

## 2023-04-20 DIAGNOSIS — R26.9 ALTERED GAIT: ICD-10-CM

## 2023-04-20 DIAGNOSIS — R29.898 WEAKNESS OF BOTH HIPS: ICD-10-CM

## 2023-04-20 PROCEDURE — 97110 THERAPEUTIC EXERCISES: CPT | Mod: HCNC,PN,CQ

## 2023-04-20 PROCEDURE — 97140 MANUAL THERAPY 1/> REGIONS: CPT | Mod: HCNC,PN,CQ

## 2023-04-20 PROCEDURE — 97112 NEUROMUSCULAR REEDUCATION: CPT | Mod: HCNC,PN,CQ

## 2023-04-20 NOTE — PROGRESS NOTES
[facility]   Physical Therapy Treatment Note     Name: Mckenzie Mari  [facility] Number: [id number]    Therapy Diagnosis:   Encounter Diagnoses   Name Primary?    Chronic left-sided low back pain without sciatica Yes    Chronic left-sided thoracic back pain     Back stiffness     Weakness of both hips     Impaired flexibility of lower extremity     Sacroiliac joint pain     Altered gait        Physician: Bacilio Gold, *    Visit Date: 4/20/2023    Physician Orders: PT Eval and Treat   Medical Diagnosis from Referral: Back pain, unspecified back location, unspecified back pain laterality, unspecified chronicity [M54.9]  Evaluation Date: 3/21/2023  Authorization Period Expiration: 12/29/2023  Plan of Care Expiration: 6/16/2023  Progress Note Due: 5/12/2023 (Last Progress Note 4/17/2023)  Visit # / Visits authorized: 9 /20 +eval  FOTO: Issued Visit #: 1/3, (capture on visit 1, 5, 10) first on 3/21/2023, 4/20/2023     Precautions: Standard, HTN, A fib, CHF, cardiac pacemaker defibrillator, history of breast and thyroid CA, DM, Bilateral TKA's, CVID (common variable immunodeficiency)--NO dry needling      PTA Visit #: 1/5     Time In: 0837  Time Out: 0925   Total Time: 48 minutes  Total Time: 48 minutes     SUBJECTIVE     Pt reports: All over pain yesterday, which she relates to the weather changes. Yesterday she had pain during her home exercise program. Pain was down left leg during LTR. Mornings are pretty good.       She  was  compliant with home exercise program.  Response to previous treatment: felt really good  Functional change: a little less soreness at times, hurting less, doing more    Pain: 0/10 this morning     Location: bilateral midback and periscapular areas    OBJECTIVE     Posture/alignment:    Supine Left pelvic downslip with AI.  ~6mm Left leg length discrepancy.     Treatment     Mckenzie received the treatments listed below:      manual therapy techniques: for 15  minutes, including:  Supine muscle energy technique correction of Left sided pelvis downslip with AI, long axis distraction of Right Le.  Repeated until good pelvic alignment observed f/b clam with manual resistance x 3 reps, shotgun technique x 3 reps (x 2 trials total).  Pt positioned supine with medium bolster under knees for comfort.  Myofascial release and trigger point release of Bilateral rectus femoris, vastus lateralis, tensor fascia latae, Iliotibial Band.  Left trigger point release of Left psoas insertion.  Manual stretching of Bilateral Lower Extremity's in supine.     Added 6mm lift to Left shoe 3/21/2023     therapeutic exercises for 15 minutes including:  Supine LTR (ROM as tolerated) x 2 minutes-left knee pain  Supine double knee to chest with orange ball x 2'  Supine lower trunk rotation with orange ball x 2'  Supine piriformis LTR x 2 minute each  Shuttle leg press 37.5 lbs x 4'     neuromuscular re-education activities for 18 minutes. The following activities were included:  Supine PPT + marching x 2 minutes, 1 lb  Supine PPT + alternating heel taps x 2 minutes, 1 lb  Supine PPT + hip add squeeze with ball x 2 minutes  Supine PPT + ball squeeze + bridge x 2 minutes  Supine PPT + alternating clam with larger green loop x 2 minutes  Supine PPT + clam with larger green loop + bridge x 2 minutes  Supine PPT + straight leg raise 2 x 10 each, 1 lb        Plan for Next Visit:  Assess patients response to the last visit following manual therapy intervention, therapeutic exercises, pt education, heel lift Left shoe, increasing frequency of home walking program to help loosen up and increase tolerance/activity and HEP.  Assess alignment of as well as Increased tone and tenderness to palpation noted Bilateral rectus femoris, vastus lateralis, tensor fascia latae, Iliotibial Band, Left psoas insertion > Right side.    Increased tenderness to palpation iliac crest, ASIS, Bilateral sacroiliac joint, mild  generalized c/o with pressure along lower thoracic and lumbar spine.  Generalized soreness Bilateral glut med/min and piriformis.  Manual therapy to address alignment, mobility, flexibility, tenderness, joint restrictions, soft tissue restrictions and/or presence of trigger points.  Add stretching, stabilization, functional mobility and strengthening exercises as appropriate.  Modalities, Dry Needling if indicated.           Patient Education and Home Exercises     Home Exercises Provided and Patient Education Provided     Education provided:   - Educated pt that he/she may feel soreness after session.      Written Home Exercises Provided: Instructed patient to continue current home exercise program.    Exercises were reviewed and Mckenzie was able to demonstrate them prior to the end of the session.  Mckenzie demonstrated fair  understanding of the education provided. See EMR under Patient Instructions for exercises provided during therapy sessions    ASSESSMENT     Improved back pain at present but did have discomfort with LTR with home exercise program, so she stopped. Overall improvement in home exercise program compliance and outlook with her physical therapy.  Mild tenderness left psoas.   Still needing increased cues for maintaining posterior pelvic tilt throughout excursions. With minimal improvement.  Better pelvic alignment after soft tissue release and then finished correction with muscle energy technique.  Making fairly good progress towards goals.    Mckenzie Is progressing well towards her goals.     Pt will continue to benefit from skilled outpatient physical therapy to address the deficits listed in the problem list box on initial evaluation, provide pt/family education and to maximize pt's level of independence in the home and community environment.     Patient prognosis is fair to good.   Patientt will benefit from skilled outpatient Physical Therapy to address the deficits stated above and in the  chart below, provide patient /family education, and to maximize patientt's level of independence.      Plan of care discussed with patient: Yes  Patient's spiritual, cultural and educational needs considered and patient is agreeable to the plan of care and goals as stated below:      Anticipated Barriers for therapy: Schedule conflicts, transportation, pain, guarding, tolerance, endurance, joint and soft tissue restrictions, osteoarthritis/DJD    Goals:   Short-Term: 4 weeks (4/21/2023)  nearly met  Pt will improve Active lumbar flexion to > or = 70* to promote return to prior functional status.  Pt will improve Active lumbar extension to > or = 11* to promote return to prior functional status.  Pt will improve bilateral Active lumbar rotation to < or = moderate limitations to promote return to prior functional status.  Pt will improve bilateral Active lumbar side bending to > or = 15* to promote return to prior functional status.  Pt will improve flexibility of trunk, hips, pelvic and Lower Extremity muscles to help promoted better mobility, posture, alignment and help return to prior functional status.  Pt will improve hip extension ROM/flexibility > or = -10* from neutral to reduce strain on low back plus help promoted better mobility, posture, alignment and help return to prior functional status.  Pt will verbalize improved tolerance to walking program and ADL's  Pt will decrease pain levels < or = to 5/10 to help promote appropriate progression of PT, HEP, and ADL's    Pt will be compliant with new home exercise program to assist with PT intervention and help allow appropriate progression through plan of care.     Long-Term: 12 weeks (6/16/2023)   ongoing  Pt will improve bilateral hip ROM/flexibility WFL to help reduce stress/strain on their back and to allow return to normal activities of daily living.  Pt will improve bilateral hip strength to > or = 4+/5 to allow performance of activities of daily  living.  Pt will improve bilateral knee strength to > or = 5/5 to allow performance of activities of daily living.  Pt will display good gait pattern with no deviations to improve QOL and allow return to prior functional status.  Pt will report < or = 3-4/10 pain during activities of daily living to improve QOL and allow return to prior functional status.  Pt will improve bilateral upper/lower abdominal strength to > or = 4/5 to allow sufficient core stability for activities of daily living.  Pt will be compliant and independent with HEP to allow and maintain better participation in normal activities  Pt will be able to resume normals ADL's, IADL's, previous activities       PLAN     Continue per POC, progressing as appropriate to achieve stated goals.    Continue with: Plan of care Certification: 3/21/2023 to 6/16/2023.        Outpatient Physical Therapy 1-2 times weekly for 12 weeks to include the following interventions: Electrical Stimulation attended/unattended, Gait Training, Manual Therapy, Moist Heat/ Ice, Neuromuscular Re-ed, Orthotic Management and Training, Patient Education, Therapeutic Activities, Therapeutic Exercise, and Ultrasound.      Carrie Solares, PTA

## 2023-04-21 NOTE — PROGRESS NOTES
[facility]   Physical Therapy Treatment Note     Name: Mckenzie Mari  [facility] Number: [id number]    Therapy Diagnosis:   No diagnosis found.    Physician: Bacilio Gold, *    Visit Date: 4/24/2023    Physician Orders: PT Eval and Treat   Medical Diagnosis from Referral: Back pain, unspecified back location, unspecified back pain laterality, unspecified chronicity [M54.9]  Evaluation Date: 3/21/2023  Authorization Period Expiration: 12/29/2023  Plan of Care Expiration: 6/16/2023  Progress Note Due: 5/12/2023 (Last Progress Note 4/17/2023)  Visit # / Visits authorized: 9 /20 +eval  FOTO: Issued Visit #: 1/3, (capture on visit 1, 5, 10) first on 3/21/2023, 4/20/2023     Precautions: Standard, HTN, A fib, CHF, cardiac pacemaker defibrillator, history of breast and thyroid CA, DM, Bilateral TKA's, CVID (common variable immunodeficiency)--NO dry needling      PTA Visit #: 1/5     Time In: 9:50  Time Out: 10:45  Total Time: 45 minutes    SUBJECTIVE     Pt reports: she did pretty good through the weekend except for Sunday with [employer].  The sitting down and standing up frequently seemed to bother her.    She  was  compliant with home exercise program.  Response to previous treatment: felt really good  Functional change: a little less soreness at times, hurting less, doing more    Pain: 0-1/10 this morning     Location: bilateral midback and periscapular areas    OBJECTIVE     Posture/alignment:    Supine Left pelvic downslip with AI.  ~6mm Left leg length discrepancy.     Treatment     Mckenzie received the treatments listed below:      manual therapy techniques: for 15 minutes, including:  Supine muscle energy technique correction of Left sided pelvis downslip with AI, long axis distraction of Right Le.  Repeated until good pelvic alignment observed f/b clam with manual resistance x 3 reps, shotgun technique x 3 reps (x 2 trials total).  Pt positioned supine with medium bolster under  knees for comfort.  Myofascial release and trigger point release of Bilateral rectus femoris, vastus lateralis, tensor fascia latae, Iliotibial Band.  Left trigger point release of Left psoas insertion.  Manual stretching of Bilateral Lower Extremity's in supine.     Added 6mm lift to Left shoe 3/21/2023     therapeutic exercises for 15 minutes including:  Supine LTR (ROM as tolerated) x 2'  Supine double knee to chest with orange ball x 2'  Supine lower trunk rotation with orange ball x 2'  Supine piriformis LTR x 2 minute each  Shuttle leg press 50 lbs x 5'     neuromuscular re-education activities for 15 minutes. The following activities were included:  Supine PPT + marching x 2 minutes, 1 lb  Supine PPT + alternating heel taps x 2 minutes, 1 lb  Supine PPT + hip add squeeze with ball x 2 minutes  Supine PPT + ball squeeze + bridge x 2 minutes  Supine PPT + alternating clam with larger green loop x 2 minutes  Supine PPT + clam with larger green loop + bridge x 2 minutes  Supine PPT + straight leg raise 2 x 10 each, 1 lb        Plan for Next Visit:  Assess patients response to the last visit following manual therapy intervention, therapeutic exercises, pt education, heel lift Left shoe, increasing frequency of home walking program to help loosen up and increase tolerance/activity and HEP.  Assess alignment of as well as Increased tone and tenderness to palpation noted Bilateral rectus femoris, vastus lateralis, tensor fascia latae, Iliotibial Band, Left psoas insertion > Right side.    Increased tenderness to palpation iliac crest, ASIS, Bilateral sacroiliac joint, mild generalized c/o with pressure along lower thoracic and lumbar spine.  Generalized soreness Bilateral glut med/min and piriformis.  Manual therapy to address alignment, mobility, flexibility, tenderness, joint restrictions, soft tissue restrictions and/or presence of trigger points.  Add stretching, stabilization, functional mobility and  strengthening exercises as appropriate.  Modalities, Dry Needling if indicated.           Patient Education and Home Exercises     Home Exercises Provided and Patient Education Provided     Education provided:   - Educated pt that he/she may feel soreness after session.      Written Home Exercises Provided: Instructed patient to continue current home exercise program.    Exercises were reviewed and Mckenzie was able to demonstrate them prior to the end of the session.  Mckenzie demonstrated fair  understanding of the education provided. See EMR under Patient Instructions for exercises provided during therapy sessions    ASSESSMENT     Pt returning to PT today able to report she is making slow, steady progress but has some issues with frequent change in positions such as when attending [employer].  Stiff when she first gets up in the mornings.  Home exercise program does help.    Mckenzie Is progressing well towards her goals.     Pt will continue to benefit from skilled outpatient physical therapy to address the deficits listed in the problem list box on initial evaluation, provide pt/family education and to maximize pt's level of independence in the home and community environment.     Patient prognosis is fair to good.   Patientt will benefit from skilled outpatient Physical Therapy to address the deficits stated above and in the chart below, provide patient /family education, and to maximize patientt's level of independence.      Plan of care discussed with patient: Yes  Patient's spiritual, cultural and educational needs considered and patient is agreeable to the plan of care and goals as stated below:      Anticipated Barriers for therapy: Schedule conflicts, transportation, pain, guarding, tolerance, endurance, joint and soft tissue restrictions, osteoarthritis/DJD    Goals:   Short-Term: 4 weeks (4/21/2023)  nearly met  Pt will improve Active lumbar flexion to > or = 70* to promote return to prior functional  status.  Pt will improve Active lumbar extension to > or = 11* to promote return to prior functional status.  Pt will improve bilateral Active lumbar rotation to < or = moderate limitations to promote return to prior functional status.  Pt will improve bilateral Active lumbar side bending to > or = 15* to promote return to prior functional status.  Pt will improve flexibility of trunk, hips, pelvic and Lower Extremity muscles to help promoted better mobility, posture, alignment and help return to prior functional status.  Pt will improve hip extension ROM/flexibility > or = -10* from neutral to reduce strain on low back plus help promoted better mobility, posture, alignment and help return to prior functional status.  Pt will verbalize improved tolerance to walking program and ADL's  Pt will decrease pain levels < or = to 5/10 to help promote appropriate progression of PT, HEP, and ADL's    Pt will be compliant with new home exercise program to assist with PT intervention and help allow appropriate progression through plan of care.     Long-Term: 12 weeks (6/16/2023)   ongoing  Pt will improve bilateral hip ROM/flexibility WFL to help reduce stress/strain on their back and to allow return to normal activities of daily living.  Pt will improve bilateral hip strength to > or = 4+/5 to allow performance of activities of daily living.  Pt will improve bilateral knee strength to > or = 5/5 to allow performance of activities of daily living.  Pt will display good gait pattern with no deviations to improve QOL and allow return to prior functional status.  Pt will report < or = 3-4/10 pain during activities of daily living to improve QOL and allow return to prior functional status.  Pt will improve bilateral upper/lower abdominal strength to > or = 4/5 to allow sufficient core stability for activities of daily living.  Pt will be compliant and independent with HEP to allow and maintain better participation in normal  activities  Pt will be able to resume normals ADL's, IADL's, previous activities       PLAN     Continue per POC, progressing as appropriate to achieve stated goals.    Continue with: Plan of care Certification: 3/21/2023 to 6/16/2023.        Outpatient Physical Therapy 1-2 times weekly for 12 weeks to include the following interventions: Electrical Stimulation attended/unattended, Gait Training, Manual Therapy, Moist Heat/ Ice, Neuromuscular Re-ed, Orthotic Management and Training, Patient Education, Therapeutic Activities, Therapeutic Exercise, and Ultrasound.      Michael Dominguez, PT

## 2023-04-24 ENCOUNTER — CLINICAL SUPPORT (OUTPATIENT)
Dept: REHABILITATION | Facility: HOSPITAL | Age: 73
End: 2023-04-24
Payer: MEDICARE

## 2023-04-24 DIAGNOSIS — R26.9 ALTERED GAIT: ICD-10-CM

## 2023-04-24 DIAGNOSIS — M25.69 BACK STIFFNESS: ICD-10-CM

## 2023-04-24 DIAGNOSIS — R29.898 WEAKNESS OF BOTH HIPS: ICD-10-CM

## 2023-04-24 DIAGNOSIS — G89.29 CHRONIC LEFT-SIDED LOW BACK PAIN WITHOUT SCIATICA: Primary | ICD-10-CM

## 2023-04-24 DIAGNOSIS — G89.29 CHRONIC LEFT-SIDED THORACIC BACK PAIN: ICD-10-CM

## 2023-04-24 DIAGNOSIS — R29.898 IMPAIRED FLEXIBILITY OF LOWER EXTREMITY: ICD-10-CM

## 2023-04-24 DIAGNOSIS — M54.6 CHRONIC LEFT-SIDED THORACIC BACK PAIN: ICD-10-CM

## 2023-04-24 DIAGNOSIS — M53.3 SACROILIAC JOINT PAIN: ICD-10-CM

## 2023-04-24 DIAGNOSIS — M54.50 CHRONIC LEFT-SIDED LOW BACK PAIN WITHOUT SCIATICA: Primary | ICD-10-CM

## 2023-04-24 PROCEDURE — 97110 THERAPEUTIC EXERCISES: CPT | Mod: HCNC,PN

## 2023-04-24 PROCEDURE — 97140 MANUAL THERAPY 1/> REGIONS: CPT | Mod: HCNC,PN

## 2023-04-24 PROCEDURE — 97112 NEUROMUSCULAR REEDUCATION: CPT | Mod: HCNC,PN

## 2023-04-26 NOTE — PROGRESS NOTES
[facility]   Physical Therapy Treatment Note     Name: Mckenzie Mari  [facility] Number: [id number]    Therapy Diagnosis:   Encounter Diagnoses   Name Primary?    Chronic left-sided low back pain without sciatica Yes    Chronic left-sided thoracic back pain     Back stiffness     Weakness of both hips     Impaired flexibility of lower extremity     Sacroiliac joint pain     Altered gait      Physician: Bacilio Gold, *    Visit Date: 4/27/2023    Physician Orders: PT Eval and Treat   Medical Diagnosis from Referral: Back pain, unspecified back location, unspecified back pain laterality, unspecified chronicity [M54.9]  Evaluation Date: 3/21/2023  Authorization Period Expiration: 12/29/2023  Plan of Care Expiration: 6/16/2023  Progress Note Due: 5/12/2023 (Last Progress Note 4/17/2023)  Visit # / Visits authorized: 11 /20 +eval  FOTO: Issued Visit #: 1/3, (capture on visit 1, 5, 10) first on 3/21/2023, 4/20/2023     Precautions: Standard, HTN, A fib, CHF, cardiac pacemaker defibrillator, history of breast and thyroid CA, DM, Bilateral TKA's, CVID (common variable immunodeficiency)--NO dry needling      PTA Visit #: 0/5     Time In: 8:30  Time Out: 9:15  Total Time: 45 minutes    SUBJECTIVE     Pt reports: she is feeling more stiff and achy than usual today and since last night.  Moving slower than normal.  She has a stress test later today.    She  was  compliant with home exercise program.  Response to previous treatment: felt really good  Functional change: a little less soreness at times, hurting less, doing more    Pain: 4-5/10 this morning, feels like it has more to do with the bad weather    Location: bilateral midback and periscapular areas    OBJECTIVE     Posture/alignment:    Supine Left pelvic downslip with AI.  ~6mm Left leg length discrepancy.     Treatment     Mckenzie received the treatments listed below:      manual therapy techniques: for 20 minutes, including:  Supine  muscle energy technique correction of Left sided pelvis downslip with AI, long axis distraction of Right Le.  Repeated until good pelvic alignment observed f/b clam with manual resistance x 3 reps, shotgun technique x 3 reps (x 2 trials total).  Pt positioned supine with medium bolster under knees for comfort.  Myofascial release and trigger point release of Bilateral rectus femoris, vastus lateralis, tensor fascia latae, Iliotibial Band.  Left trigger point release of Left psoas insertion.  Manual stretching of Bilateral Lower Extremity's in supine.     Added 6mm lift to Left shoe 3/21/2023     therapeutic exercises for 15 minutes including:  Supine LTR (ROM as tolerated) x 2'  Supine double knee to chest with orange ball x 2'  Supine lower trunk rotation with orange ball x 2'  Supine piriformis LTR x 2 minute each  Shuttle leg press 50 lbs x 5'     neuromuscular re-education activities for 15 minutes. The following activities were included:  Supine PPT + marching x 2 minutes, 1 lb  Supine PPT + alternating heel taps x 2 minutes, 1 lb  Supine PPT + hip add squeeze with ball x 2 minutes  Supine PPT + ball squeeze + bridge x 2 minutes  Supine PPT + alternating clam with larger green loop x 2 minutes  Supine PPT + clam with larger green loop + bridge x 2 minutes  Supine PPT + straight leg raise 2 x 10 each, 1 lb        Plan for Next Visit:  Assess patients response to the last visit following manual therapy intervention, therapeutic exercises, pt education, heel lift Left shoe, increasing frequency of home walking program to help loosen up and increase tolerance/activity and HEP.  Assess alignment of as well as Increased tone and tenderness to palpation noted Bilateral rectus femoris, vastus lateralis, tensor fascia latae, Iliotibial Band, Left psoas insertion > Right side.    Increased tenderness to palpation iliac crest, ASIS, Bilateral sacroiliac joint, mild generalized c/o with pressure along lower thoracic and  lumbar spine.  Generalized soreness Bilateral glut med/min and piriformis.  Manual therapy to address alignment, mobility, flexibility, tenderness, joint restrictions, soft tissue restrictions and/or presence of trigger points.  Add stretching, stabilization, functional mobility and strengthening exercises as appropriate.  Modalities, Dry Needling if indicated.       Patient Education and Home Exercises     Home Exercises Provided and Patient Education Provided     Education provided:   - Educated pt that he/she may feel soreness after session.      Written Home Exercises Provided: Instructed patient to continue current home exercise program.    Exercises were reviewed and Mckenzie was able to demonstrate them prior to the end of the session.  Mckenzie demonstrated fair  understanding of the education provided. See EMR under Patient Instructions for exercises provided during therapy sessions    ASSESSMENT     Pt returning to PT today able to report she is making slow, steady progress but has some issues with frequent change in positions such as when attending [employer].  Stiff when she first gets up in the mornings.  Home exercise program does help.  Today and last night have been worse than usual but she states she typically feels this way with bad weather.    Mckenzie Is progressing well towards her goals.     Pt will continue to benefit from skilled outpatient physical therapy to address the deficits listed in the problem list box on initial evaluation, provide pt/family education and to maximize pt's level of independence in the home and community environment.     Patient prognosis is fair to good.   Patientt will benefit from skilled outpatient Physical Therapy to address the deficits stated above and in the chart below, provide patient /family education, and to maximize patientt's level of independence.      Plan of care discussed with patient: Yes  Patient's spiritual, cultural and educational needs considered  and patient is agreeable to the plan of care and goals as stated below:      Anticipated Barriers for therapy: Schedule conflicts, transportation, pain, guarding, tolerance, endurance, joint and soft tissue restrictions, osteoarthritis/DJD    Goals:   Short-Term: 4 weeks (4/21/2023)  nearly met  Pt will improve Active lumbar flexion to > or = 70* to promote return to prior functional status.  Pt will improve Active lumbar extension to > or = 11* to promote return to prior functional status.  Pt will improve bilateral Active lumbar rotation to < or = moderate limitations to promote return to prior functional status.  Pt will improve bilateral Active lumbar side bending to > or = 15* to promote return to prior functional status.  Pt will improve flexibility of trunk, hips, pelvic and Lower Extremity muscles to help promoted better mobility, posture, alignment and help return to prior functional status.  Pt will improve hip extension ROM/flexibility > or = -10* from neutral to reduce strain on low back plus help promoted better mobility, posture, alignment and help return to prior functional status.  Pt will verbalize improved tolerance to walking program and ADL's  Pt will decrease pain levels < or = to 5/10 to help promote appropriate progression of PT, HEP, and ADL's    Pt will be compliant with new home exercise program to assist with PT intervention and help allow appropriate progression through plan of care.     Long-Term: 12 weeks (6/16/2023)   ongoing  Pt will improve bilateral hip ROM/flexibility WFL to help reduce stress/strain on their back and to allow return to normal activities of daily living.  Pt will improve bilateral hip strength to > or = 4+/5 to allow performance of activities of daily living.  Pt will improve bilateral knee strength to > or = 5/5 to allow performance of activities of daily living.  Pt will display good gait pattern with no deviations to improve QOL and allow return to prior  functional status.  Pt will report < or = 3-4/10 pain during activities of daily living to improve QOL and allow return to prior functional status.  Pt will improve bilateral upper/lower abdominal strength to > or = 4/5 to allow sufficient core stability for activities of daily living.  Pt will be compliant and independent with HEP to allow and maintain better participation in normal activities  Pt will be able to resume normals ADL's, IADL's, previous activities       PLAN     Continue per POC, progressing as appropriate to achieve stated goals.    Continue with: Plan of care Certification: 3/21/2023 to 6/16/2023.        Outpatient Physical Therapy 1-2 times weekly for 12 weeks to include the following interventions: Electrical Stimulation attended/unattended, Gait Training, Manual Therapy, Moist Heat/ Ice, Neuromuscular Re-ed, Orthotic Management and Training, Patient Education, Therapeutic Activities, Therapeutic Exercise, and Ultrasound.      Michael Dominguez, PT

## 2023-04-27 ENCOUNTER — CLINICAL SUPPORT (OUTPATIENT)
Dept: REHABILITATION | Facility: HOSPITAL | Age: 73
End: 2023-04-27
Payer: MEDICARE

## 2023-04-27 ENCOUNTER — CLINICAL SUPPORT (OUTPATIENT)
Dept: CARDIOLOGY | Facility: HOSPITAL | Age: 73
End: 2023-04-27
Payer: MEDICARE

## 2023-04-27 ENCOUNTER — PATIENT MESSAGE (OUTPATIENT)
Dept: CARDIOLOGY | Facility: HOSPITAL | Age: 73
End: 2023-04-27

## 2023-04-27 DIAGNOSIS — M54.6 CHRONIC LEFT-SIDED THORACIC BACK PAIN: ICD-10-CM

## 2023-04-27 DIAGNOSIS — R29.898 IMPAIRED FLEXIBILITY OF LOWER EXTREMITY: ICD-10-CM

## 2023-04-27 DIAGNOSIS — G89.29 CHRONIC LEFT-SIDED THORACIC BACK PAIN: ICD-10-CM

## 2023-04-27 DIAGNOSIS — M25.69 BACK STIFFNESS: ICD-10-CM

## 2023-04-27 DIAGNOSIS — R26.9 ALTERED GAIT: ICD-10-CM

## 2023-04-27 DIAGNOSIS — R06.09 DOE (DYSPNEA ON EXERTION): ICD-10-CM

## 2023-04-27 DIAGNOSIS — M53.3 SACROILIAC JOINT PAIN: ICD-10-CM

## 2023-04-27 DIAGNOSIS — G89.29 CHRONIC LEFT-SIDED LOW BACK PAIN WITHOUT SCIATICA: Primary | ICD-10-CM

## 2023-04-27 DIAGNOSIS — R29.898 WEAKNESS OF BOTH HIPS: ICD-10-CM

## 2023-04-27 DIAGNOSIS — M54.50 CHRONIC LEFT-SIDED LOW BACK PAIN WITHOUT SCIATICA: Primary | ICD-10-CM

## 2023-04-27 PROCEDURE — 97140 MANUAL THERAPY 1/> REGIONS: CPT | Mod: HCNC,PN

## 2023-04-27 PROCEDURE — 97112 NEUROMUSCULAR REEDUCATION: CPT | Mod: HCNC,PN

## 2023-04-27 PROCEDURE — 97110 THERAPEUTIC EXERCISES: CPT | Mod: HCNC,PN

## 2023-04-28 NOTE — PROGRESS NOTES
[facility]   Physical Therapy Treatment Note     Name: Mckenzie Mari  Clinic Number: [id number]    Therapy Diagnosis:   Encounter Diagnoses   Name Primary?    Chronic left-sided low back pain without sciatica Yes    Chronic left-sided thoracic back pain     Back stiffness     Weakness of both hips     Impaired flexibility of lower extremity     Sacroiliac joint pain     Altered gait        Physician: Bacilio Gold, *    Visit Date: 5/1/2023    Physician Orders: PT Eval and Treat   Medical Diagnosis from Referral: Back pain, unspecified back location, unspecified back pain laterality, unspecified chronicity [M54.9]  Evaluation Date: 3/21/2023  Authorization Period Expiration: 12/29/2023  Plan of Care Expiration: 6/16/2023  Progress Note Due: 5/12/2023 (Last Progress Note 4/17/2023)  Visit # / Visits authorized: 12 /20 +eval  FOTO: Issued Visit #: 1/3, (capture on visit 1, 5, 10) first on 3/21/2023, 4/20/2023     Precautions: Standard, HTN, A fib, CHF, cardiac pacemaker defibrillator, history of breast and thyroid CA, DM, Bilateral TKA's, CVID (common variable immunodeficiency)--NO dry needling      PTA Visit #: 0/5     Time In: 9:50  Time Out:  10:45  Total Time: 45 minutes    SUBJECTIVE     Pt reports: she is doing better overall today.  A little stiff but not too bad.  Saturday was a rougher day.  She tried to do some home exercise program to help work the soreness out.    She  was  compliant with home exercise program.  Response to previous treatment: felt really good  Functional change: a little less soreness at times, hurting less, doing more    Pain: 4/10 this morning, feels like it has more to do with the bad weather    Location: bilateral midback and periscapular areas    OBJECTIVE     Posture/alignment:    Supine Left pelvic downslip with AI.  ~6mm Left leg length discrepancy.     Treatment     Mckenzie received the treatments listed below:      manual therapy techniques:  for 13 minutes, including:  Supine muscle energy technique correction of Left sided pelvis downslip with AI, long axis distraction of Right Le.  Repeated until good pelvic alignment observed f/b clam with manual resistance x 3 reps, shotgun technique x 3 reps (x 2 trials total).  Pt positioned supine with medium bolster under knees for comfort.  Myofascial release and trigger point release of Bilateral rectus femoris, vastus lateralis, tensor fascia latae, Iliotibial Band.  Left trigger point release of Left psoas insertion.  Manual stretching of Bilateral Lower Extremity's in supine.     Added 6mm lift to Left shoe 3/21/2023     therapeutic exercises for 17 minutes including:  Supine LTR (ROM as tolerated) x 2'  Supine double knee to chest with orange ball x 2'  Supine lower trunk rotation with orange ball x 2'  Supine straight leg bridge with orange ball x 2'  Supine piriformis LTR x 2 minute each  Shuttle leg press 50 lbs x 5'     neuromuscular re-education activities for 15 minutes. The following activities were included:  Supine PPT + marching x 2 minutes,2 lb  Supine PPT + alternating heel taps x 2 minutes, 2 lb  Supine PPT + ball squeeze + bridge x 2 minutes  Supine PPT + alternating clam with larger green loop x 2 minutes  Supine PPT + clam with larger green loop + bridge x 2 minutes  Supine PPT + straight leg raise 3 x 10 each, 2 lb        Plan for Next Visit:  Assess patients response to the last visit following manual therapy intervention, therapeutic exercises, pt education, heel lift Left shoe, increasing frequency of home walking program to help loosen up and increase tolerance/activity and HEP.  Assess alignment of as well as Increased tone and tenderness to palpation noted Bilateral rectus femoris, vastus lateralis, tensor fascia latae, Iliotibial Band, Left psoas insertion > Right side.    Increased tenderness to palpation iliac crest, ASIS, Bilateral sacroiliac joint, mild generalized c/o with  pressure along lower thoracic and lumbar spine.  Generalized soreness Bilateral glut med/min and piriformis.  Manual therapy to address alignment, mobility, flexibility, tenderness, joint restrictions, soft tissue restrictions and/or presence of trigger points.  Add stretching, stabilization, functional mobility and strengthening exercises as appropriate.  Modalities, Dry Needling if indicated.       Patient Education and Home Exercises     Home Exercises Provided and Patient Education Provided     Education provided:   - Educated pt that he/she may feel soreness after session.      Written Home Exercises Provided: Instructed patient to continue current home exercise program.    Exercises were reviewed and Mckenzie was able to demonstrate them prior to the end of the session.  Mckenzie demonstrated fair  understanding of the education provided. See EMR under Patient Instructions for exercises provided during therapy sessions    ASSESSMENT     Pt returning to PT today able to report she is making slow, steady progress.  Stiff when she first gets up in the mornings.  Home exercise program does help.      Mckenzie Is progressing well towards her goals.     Pt will continue to benefit from skilled outpatient physical therapy to address the deficits listed in the problem list box on initial evaluation, provide pt/family education and to maximize pt's level of independence in the home and community environment.     Patient prognosis is fair to good.   Patientt will benefit from skilled outpatient Physical Therapy to address the deficits stated above and in the chart below, provide patient /family education, and to maximize patientt's level of independence.      Plan of care discussed with patient: Yes  Patient's spiritual, cultural and educational needs considered and patient is agreeable to the plan of care and goals as stated below:      Anticipated Barriers for therapy: Schedule conflicts, transportation, pain,  guarding, tolerance, endurance, joint and soft tissue restrictions, osteoarthritis/DJD    Goals:   Short-Term: 4 weeks (4/21/2023)  nearly met  Pt will improve Active lumbar flexion to > or = 70* to promote return to prior functional status.  Pt will improve Active lumbar extension to > or = 11* to promote return to prior functional status.  Pt will improve bilateral Active lumbar rotation to < or = moderate limitations to promote return to prior functional status.  Pt will improve bilateral Active lumbar side bending to > or = 15* to promote return to prior functional status.  Pt will improve flexibility of trunk, hips, pelvic and Lower Extremity muscles to help promoted better mobility, posture, alignment and help return to prior functional status.  Pt will improve hip extension ROM/flexibility > or = -10* from neutral to reduce strain on low back plus help promoted better mobility, posture, alignment and help return to prior functional status.  Pt will verbalize improved tolerance to walking program and ADL's  Pt will decrease pain levels < or = to 5/10 to help promote appropriate progression of PT, HEP, and ADL's    Pt will be compliant with new home exercise program to assist with PT intervention and help allow appropriate progression through plan of care.     Long-Term: 12 weeks (6/16/2023)   ongoing  Pt will improve bilateral hip ROM/flexibility WFL to help reduce stress/strain on their back and to allow return to normal activities of daily living.  Pt will improve bilateral hip strength to > or = 4+/5 to allow performance of activities of daily living.  Pt will improve bilateral knee strength to > or = 5/5 to allow performance of activities of daily living.  Pt will display good gait pattern with no deviations to improve QOL and allow return to prior functional status.  Pt will report < or = 3-4/10 pain during activities of daily living to improve QOL and allow return to prior functional status.  Pt will  improve bilateral upper/lower abdominal strength to > or = 4/5 to allow sufficient core stability for activities of daily living.  Pt will be compliant and independent with HEP to allow and maintain better participation in normal activities  Pt will be able to resume normals ADL's, IADL's, previous activities       PLAN     Continue per POC, progressing as appropriate to achieve stated goals.    Continue with: Plan of care Certification: 3/21/2023 to 6/16/2023.        Outpatient Physical Therapy 1-2 times weekly for 12 weeks to include the following interventions: Electrical Stimulation attended/unattended, Gait Training, Manual Therapy, Moist Heat/ Ice, Neuromuscular Re-ed, Orthotic Management and Training, Patient Education, Therapeutic Activities, Therapeutic Exercise, and Ultrasound.      Michael Dominguez, PT

## 2023-04-29 ENCOUNTER — PATIENT MESSAGE (OUTPATIENT)
Dept: ENDOCRINOLOGY | Facility: CLINIC | Age: 73
End: 2023-04-29
Payer: MEDICARE

## 2023-05-01 ENCOUNTER — CLINICAL SUPPORT (OUTPATIENT)
Dept: REHABILITATION | Facility: HOSPITAL | Age: 73
End: 2023-05-01
Payer: MEDICARE

## 2023-05-01 DIAGNOSIS — M54.50 CHRONIC LEFT-SIDED LOW BACK PAIN WITHOUT SCIATICA: Primary | ICD-10-CM

## 2023-05-01 DIAGNOSIS — G89.29 CHRONIC LEFT-SIDED THORACIC BACK PAIN: ICD-10-CM

## 2023-05-01 DIAGNOSIS — M53.3 SACROILIAC JOINT PAIN: ICD-10-CM

## 2023-05-01 DIAGNOSIS — G89.29 CHRONIC LEFT-SIDED LOW BACK PAIN WITHOUT SCIATICA: Primary | ICD-10-CM

## 2023-05-01 DIAGNOSIS — R26.9 ALTERED GAIT: ICD-10-CM

## 2023-05-01 DIAGNOSIS — M25.69 BACK STIFFNESS: ICD-10-CM

## 2023-05-01 DIAGNOSIS — M54.6 CHRONIC LEFT-SIDED THORACIC BACK PAIN: ICD-10-CM

## 2023-05-01 DIAGNOSIS — R29.898 WEAKNESS OF BOTH HIPS: ICD-10-CM

## 2023-05-01 DIAGNOSIS — R29.898 IMPAIRED FLEXIBILITY OF LOWER EXTREMITY: ICD-10-CM

## 2023-05-01 PROCEDURE — 97140 MANUAL THERAPY 1/> REGIONS: CPT | Mod: HCNC,PN

## 2023-05-01 PROCEDURE — 97110 THERAPEUTIC EXERCISES: CPT | Mod: HCNC,PN

## 2023-05-01 PROCEDURE — 97112 NEUROMUSCULAR REEDUCATION: CPT | Mod: HCNC,PN

## 2023-05-03 ENCOUNTER — PATIENT MESSAGE (OUTPATIENT)
Dept: CARDIOLOGY | Facility: HOSPITAL | Age: 73
End: 2023-05-03
Payer: MEDICARE

## 2023-05-03 NOTE — PROGRESS NOTES
[facility]   Physical Therapy Treatment Note     Name: Mckenzie Mari  [facility] Number: [id number]    Therapy Diagnosis:   Encounter Diagnoses   Name Primary?    Chronic left-sided low back pain without sciatica Yes    Chronic left-sided thoracic back pain     Back stiffness     Weakness of both hips     Impaired flexibility of lower extremity     Sacroiliac joint pain     Altered gait      Physician: Bacilio Gold, *    Visit Date: 5/4/2023    Physician Orders: PT Eval and Treat   Medical Diagnosis from Referral: Back pain, unspecified back location, unspecified back pain laterality, unspecified chronicity [M54.9]  Evaluation Date: 3/21/2023  Authorization Period Expiration: 12/29/2023  Plan of Care Expiration: 6/16/2023  Progress Note Due: 5/12/2023 (Last Progress Note 4/17/2023)  Visit # / Visits authorized: 13 /20 +eval  FOTO: Issued Visit #: 1/3, (capture on visit 1, 5, 10) first on 3/21/2023, 4/20/2023     Precautions: Standard, HTN, A fib, CHF, cardiac pacemaker defibrillator, history of breast and thyroid CA, DM, Bilateral TKA's, CVID (common variable immunodeficiency)--NO dry needling      PTA Visit #: 0/5     Time In: 8:30  Time Out:  9:15  Total Time: 45 minutes    SUBJECTIVE     Pt reports: she is doing ok today with her low back pain.  Some c/o Left Lower Extremity with being active around her house.  Was going up and down step ladder a lot to work in her kitchen.    She  was  compliant with home exercise program.  Response to previous treatment: felt really good  Functional change: a little less soreness at times, hurting less, doing more    Pain: 1-2/10 this morning, feels like it has more to do with the bad weather    Location: bilateral midback and periscapular areas    OBJECTIVE     Posture/alignment:    Supine Left pelvic downslip with AI.  ~6mm Left leg length discrepancy.     Treatment     Mckenzie received the treatments listed below:      manual therapy  techniques: for 13 minutes, including:  Supine muscle energy technique correction of Left sided pelvis downslip with AI, long axis distraction of Right Le.  Repeated until good pelvic alignment observed f/b clam with manual resistance x 3 reps, shotgun technique x 3 reps (x 2 trials total).  Pt positioned supine with medium bolster under knees for comfort.  Myofascial release and trigger point release of Bilateral rectus femoris, vastus lateralis, tensor fascia latae, Iliotibial Band.  Left trigger point release of Left psoas insertion.  Manual stretching of Bilateral Lower Extremity's in supine.     Added 6mm lift to Left shoe 3/21/2023     therapeutic exercises for 17 minutes including:  Supine LTR (ROM as tolerated) x 2'  Supine double knee to chest with orange ball x 2'  Supine lower trunk rotation with orange ball x 2'  Supine straight leg bridge with orange ball x 2'  Supine piriformis LTR x 2 minute each  Shuttle leg press 50 lbs x 5'     neuromuscular re-education activities for 15 minutes. The following activities were included:  Supine PPT + marching x 2 minutes,2 lb  Supine PPT + alternating heel taps x 2 minutes, 2 lb  Supine PPT + ball squeeze + bridge x 2 minutes  Supine PPT + alternating clam with larger green loop x 2 minutes  Supine PPT + clam with larger green loop + bridge x 2 minutes  Supine PPT + straight leg raise 3 x 10 each, 2 lb        Plan for Next Visit:  Assess patients response to the last visit following manual therapy intervention, therapeutic exercises, pt education, heel lift Left shoe, increasing frequency of home walking program to help loosen up and increase tolerance/activity and HEP.  Assess alignment of as well as Increased tone and tenderness to palpation noted Bilateral rectus femoris, vastus lateralis, tensor fascia latae, Iliotibial Band, Left psoas insertion > Right side.    Increased tenderness to palpation iliac crest, ASIS, Bilateral sacroiliac joint, mild generalized  c/o with pressure along lower thoracic and lumbar spine.  Generalized soreness Bilateral glut med/min and piriformis.  Manual therapy to address alignment, mobility, flexibility, tenderness, joint restrictions, soft tissue restrictions and/or presence of trigger points.  Add stretching, stabilization, functional mobility and strengthening exercises as appropriate.  Modalities, Dry Needling if indicated.       Patient Education and Home Exercises     Home Exercises Provided and Patient Education Provided     Education provided:   - Educated pt that he/she may feel soreness after session.      Written Home Exercises Provided: Instructed patient to continue current home exercise program.    Exercises were reviewed and Mckenzie was able to demonstrate them prior to the end of the session.  Mckenzie demonstrated fair  understanding of the education provided. See EMR under Patient Instructions for exercises provided during therapy sessions    ASSESSMENT     Pt returning to PT today able to report she is making slow, steady progress.  Stiff when she first gets up in the mornings but her home exercise program does help to loosen her up.  More sore lately because she has been busy around the house.      Mckenzie Is progressing well towards her goals.     Pt will continue to benefit from skilled outpatient physical therapy to address the deficits listed in the problem list box on initial evaluation, provide pt/family education and to maximize pt's level of independence in the home and community environment.     Patient prognosis is fair to good.   Patientt will benefit from skilled outpatient Physical Therapy to address the deficits stated above and in the chart below, provide patient /family education, and to maximize patientt's level of independence.      Plan of care discussed with patient: Yes  Patient's spiritual, cultural and educational needs considered and patient is agreeable to the plan of care and goals as stated  below:      Anticipated Barriers for therapy: Schedule conflicts, transportation, pain, guarding, tolerance, endurance, joint and soft tissue restrictions, osteoarthritis/DJD    Goals:   Short-Term: 4 weeks (4/21/2023)  nearly met  Pt will improve Active lumbar flexion to > or = 70* to promote return to prior functional status.  Pt will improve Active lumbar extension to > or = 11* to promote return to prior functional status.  Pt will improve bilateral Active lumbar rotation to < or = moderate limitations to promote return to prior functional status.  Pt will improve bilateral Active lumbar side bending to > or = 15* to promote return to prior functional status.  Pt will improve flexibility of trunk, hips, pelvic and Lower Extremity muscles to help promoted better mobility, posture, alignment and help return to prior functional status.  Pt will improve hip extension ROM/flexibility > or = -10* from neutral to reduce strain on low back plus help promoted better mobility, posture, alignment and help return to prior functional status.  Pt will verbalize improved tolerance to walking program and ADL's  Pt will decrease pain levels < or = to 5/10 to help promote appropriate progression of PT, HEP, and ADL's    Pt will be compliant with new home exercise program to assist with PT intervention and help allow appropriate progression through plan of care.     Long-Term: 12 weeks (6/16/2023)   ongoing  Pt will improve bilateral hip ROM/flexibility WFL to help reduce stress/strain on their back and to allow return to normal activities of daily living.  Pt will improve bilateral hip strength to > or = 4+/5 to allow performance of activities of daily living.  Pt will improve bilateral knee strength to > or = 5/5 to allow performance of activities of daily living.  Pt will display good gait pattern with no deviations to improve QOL and allow return to prior functional status.  Pt will report < or = 3-4/10 pain during activities  of daily living to improve QOL and allow return to prior functional status.  Pt will improve bilateral upper/lower abdominal strength to > or = 4/5 to allow sufficient core stability for activities of daily living.  Pt will be compliant and independent with HEP to allow and maintain better participation in normal activities  Pt will be able to resume normals ADL's, IADL's, previous activities       PLAN     Continue per POC, progressing as appropriate to achieve stated goals.    Continue with: Plan of care Certification: 3/21/2023 to 6/16/2023.        Outpatient Physical Therapy 1-2 times weekly for 12 weeks to include the following interventions: Electrical Stimulation attended/unattended, Gait Training, Manual Therapy, Moist Heat/ Ice, Neuromuscular Re-ed, Orthotic Management and Training, Patient Education, Therapeutic Activities, Therapeutic Exercise, and Ultrasound.      Michael Dominguez, PT

## 2023-05-04 ENCOUNTER — HOSPITAL ENCOUNTER (OUTPATIENT)
Dept: RADIOLOGY | Facility: HOSPITAL | Age: 73
Discharge: HOME | End: 2023-05-04
Payer: MEDICARE

## 2023-05-04 ENCOUNTER — CLINICAL SUPPORT (OUTPATIENT)
Dept: CARDIOLOGY | Facility: HOSPITAL | Age: 73
End: 2023-05-04
Payer: MEDICARE

## 2023-05-04 ENCOUNTER — CLINICAL SUPPORT (OUTPATIENT)
Dept: REHABILITATION | Facility: HOSPITAL | Age: 73
End: 2023-05-04
Payer: MEDICARE

## 2023-05-04 VITALS — BODY MASS INDEX: 39.2 KG/M2 | WEIGHT: 213 LBS | HEIGHT: 62 IN

## 2023-05-04 DIAGNOSIS — G89.29 CHRONIC LEFT-SIDED THORACIC BACK PAIN: ICD-10-CM

## 2023-05-04 DIAGNOSIS — R29.898 IMPAIRED FLEXIBILITY OF LOWER EXTREMITY: ICD-10-CM

## 2023-05-04 DIAGNOSIS — G89.29 CHRONIC LEFT-SIDED LOW BACK PAIN WITHOUT SCIATICA: Primary | ICD-10-CM

## 2023-05-04 DIAGNOSIS — R26.9 ALTERED GAIT: ICD-10-CM

## 2023-05-04 DIAGNOSIS — M54.50 CHRONIC LEFT-SIDED LOW BACK PAIN WITHOUT SCIATICA: Primary | ICD-10-CM

## 2023-05-04 DIAGNOSIS — M53.3 SACROILIAC JOINT PAIN: ICD-10-CM

## 2023-05-04 DIAGNOSIS — M54.6 CHRONIC LEFT-SIDED THORACIC BACK PAIN: ICD-10-CM

## 2023-05-04 DIAGNOSIS — M25.69 BACK STIFFNESS: ICD-10-CM

## 2023-05-04 DIAGNOSIS — R29.898 WEAKNESS OF BOTH HIPS: ICD-10-CM

## 2023-05-04 DIAGNOSIS — R06.09 DOE (DYSPNEA ON EXERTION): ICD-10-CM

## 2023-05-04 LAB
CV PHARM DOSE: 0.4 MG
CV STRESS BASE HR: 61 BPM
DIASTOLIC BLOOD PRESSURE: 88 MMHG
NUC REST EJECTION FRACTION: 74
OHS CV CPX 1 MINUTE RECOVERY HEART RATE: 84 BPM
OHS CV CPX 85 PERCENT MAX PREDICTED HEART RATE MALE: 122
OHS CV CPX MAX PREDICTED HEART RATE: 144
OHS CV CPX PATIENT IS FEMALE: 1
OHS CV CPX PATIENT IS MALE: 0
OHS CV CPX PEAK DIASTOLIC BLOOD PRESSURE: 88 MMHG
OHS CV CPX PEAK HEAR RATE: 88 BPM
OHS CV CPX PEAK RATE PRESSURE PRODUCT: NORMAL
OHS CV CPX PEAK SYSTOLIC BLOOD PRESSURE: 147 MMHG
OHS CV CPX PERCENT MAX PREDICTED HEART RATE ACHIEVED: 61
OHS CV CPX RATE PRESSURE PRODUCT PRESENTING: 8967
OHS CV PHARM TIME: 1351 MIN
SYSTOLIC BLOOD PRESSURE: 147 MMHG

## 2023-05-04 PROCEDURE — 78452 HT MUSCLE IMAGE SPECT MULT: CPT | Mod: HCNC,PO

## 2023-05-04 PROCEDURE — 93016 NUCLEAR STRESS - CARDIOLOGY INTERPRETED (CUPID ONLY): ICD-10-PCS | Mod: HCNC,,, | Performed by: INTERNAL MEDICINE

## 2023-05-04 PROCEDURE — 97140 MANUAL THERAPY 1/> REGIONS: CPT | Mod: HCNC,PN

## 2023-05-04 PROCEDURE — 63600175 PHARM REV CODE 636 W HCPCS: Mod: HCNC,PO

## 2023-05-04 PROCEDURE — 93016 CV STRESS TEST SUPVJ ONLY: CPT | Mod: HCNC,,, | Performed by: INTERNAL MEDICINE

## 2023-05-04 PROCEDURE — 97112 NEUROMUSCULAR REEDUCATION: CPT | Mod: HCNC,PN

## 2023-05-04 PROCEDURE — 97110 THERAPEUTIC EXERCISES: CPT | Mod: HCNC,PN

## 2023-05-04 PROCEDURE — 93018 PR CARDIAC STRESS TST,INTERP/REPT ONLY: ICD-10-PCS | Mod: HCNC,,, | Performed by: INTERNAL MEDICINE

## 2023-05-04 PROCEDURE — A9502 TC99M TETROFOSMIN: HCPCS | Mod: HCNC,PO

## 2023-05-04 PROCEDURE — 78452 NUCLEAR STRESS - CARDIOLOGY INTERPRETED (CUPID ONLY): ICD-10-PCS | Mod: 26,HCNC,, | Performed by: INTERNAL MEDICINE

## 2023-05-04 PROCEDURE — 93018 CV STRESS TEST I&R ONLY: CPT | Mod: HCNC,,, | Performed by: INTERNAL MEDICINE

## 2023-05-04 PROCEDURE — 93017 CV STRESS TEST TRACING ONLY: CPT | Mod: HCNC,PO

## 2023-05-04 PROCEDURE — 78452 HT MUSCLE IMAGE SPECT MULT: CPT | Mod: 26,HCNC,, | Performed by: INTERNAL MEDICINE

## 2023-05-04 RX ORDER — REGADENOSON 0.08 MG/ML
0.4 INJECTION, SOLUTION INTRAVENOUS
Status: COMPLETED | OUTPATIENT
Start: 2023-05-04 | End: 2023-05-04

## 2023-05-04 RX ADMIN — REGADENOSON 0.4 MG: 0.08 INJECTION, SOLUTION INTRAVENOUS at 01:05

## 2023-05-08 ENCOUNTER — CLINICAL SUPPORT (OUTPATIENT)
Dept: REHABILITATION | Facility: HOSPITAL | Age: 73
End: 2023-05-08
Payer: MEDICARE

## 2023-05-08 DIAGNOSIS — G89.29 CHRONIC LEFT-SIDED LOW BACK PAIN WITHOUT SCIATICA: Primary | ICD-10-CM

## 2023-05-08 DIAGNOSIS — M54.6 CHRONIC LEFT-SIDED THORACIC BACK PAIN: ICD-10-CM

## 2023-05-08 DIAGNOSIS — R29.898 WEAKNESS OF BOTH HIPS: ICD-10-CM

## 2023-05-08 DIAGNOSIS — M25.69 BACK STIFFNESS: ICD-10-CM

## 2023-05-08 DIAGNOSIS — M53.3 SACROILIAC JOINT PAIN: ICD-10-CM

## 2023-05-08 DIAGNOSIS — R29.898 IMPAIRED FLEXIBILITY OF LOWER EXTREMITY: ICD-10-CM

## 2023-05-08 DIAGNOSIS — M54.50 CHRONIC LEFT-SIDED LOW BACK PAIN WITHOUT SCIATICA: Primary | ICD-10-CM

## 2023-05-08 DIAGNOSIS — G89.29 CHRONIC LEFT-SIDED THORACIC BACK PAIN: ICD-10-CM

## 2023-05-08 DIAGNOSIS — R26.9 ALTERED GAIT: ICD-10-CM

## 2023-05-08 PROCEDURE — 97112 NEUROMUSCULAR REEDUCATION: CPT | Mod: HCNC,PN

## 2023-05-08 PROCEDURE — 97140 MANUAL THERAPY 1/> REGIONS: CPT | Mod: HCNC,PN

## 2023-05-08 PROCEDURE — 97110 THERAPEUTIC EXERCISES: CPT | Mod: HCNC,PN

## 2023-05-08 NOTE — PROGRESS NOTES
[facility]   Physical Therapy Treatment Note     Name: Mckenzie Mari  [facility] Number: [id number]    Therapy Diagnosis:   Encounter Diagnoses   Name Primary?    Chronic left-sided low back pain without sciatica Yes    Chronic left-sided thoracic back pain     Back stiffness     Weakness of both hips     Impaired flexibility of lower extremity     Sacroiliac joint pain     Altered gait      Physician: Bacilio Gold, *    Visit Date: 5/8/2023    Physician Orders: PT Eval and Treat   Medical Diagnosis from Referral: Back pain, unspecified back location, unspecified back pain laterality, unspecified chronicity [M54.9]  Evaluation Date: 3/21/2023  Authorization Period Expiration: 12/29/2023  Plan of Care Expiration: 6/16/2023  Progress Note Due: 5/12/2023 (Last Progress Note 4/17/2023)  Visit # / Visits authorized: 14 /20 +eval  FOTO: Issued Visit #: 1/3, (capture on visit 1, 5, 10) first on 3/21/2023, 4/20/2023     Precautions: Standard, HTN, A fib, CHF, cardiac pacemaker defibrillator, history of breast and thyroid CA, DM, Bilateral TKA's, CVID (common variable immunodeficiency)--NO dry needling      PTA Visit #: 0/5     Time In: 9:55  Time Out:  10:45  Total Time: 45 minutes    SUBJECTIVE     Pt reports: she is having a pretty good morning.  Had some back pain through the weekend but was able to manage it at times.  She was more sore Sunday and stayed in bed and missed [employer].     She  was  compliant with home exercise program.  Response to previous treatment: felt really good  Functional change: a little less soreness at times, hurting less, doing more    Pain: 1-2/10 this morning, feels like it has more to do with the bad weather    Location: bilateral midback and periscapular areas    OBJECTIVE     Posture/alignment:    Supine Left pelvic downslip with AI.  ~6mm Left leg length discrepancy.     Treatment     Mckenzie received the treatments listed below:      manual therapy  techniques: for 13 minutes, including:  Supine muscle energy technique correction of Left sided pelvis downslip with AI, long axis distraction of Right Le.  Repeated until good pelvic alignment observed f/b clam with manual resistance x 3 reps, shotgun technique x 3 reps (x 2 trials total).  Pt positioned supine with medium bolster under knees for comfort.  Myofascial release and trigger point release of Bilateral rectus femoris, vastus lateralis, tensor fascia latae, Iliotibial Band.  Left trigger point release of Left psoas insertion.  Manual stretching of Bilateral Lower Extremity's in supine.     Added 6mm lift to Left shoe 3/21/2023     therapeutic exercises for 17 minutes including:  Supine LTR (ROM as tolerated) x 2'  Supine double knee to chest with orange ball x 2'  Supine lower trunk rotation with orange ball x 2'  Supine straight leg bridge with orange ball x 2'  Supine piriformis LTR x 2 minute each  Shuttle leg press 50 lbs x 5'     neuromuscular re-education activities for 15 minutes. The following activities were included:  Supine PPT + marching x 2 minutes,2 lb  Supine PPT + alternating heel taps x 2 minutes, 2 lb  Supine PPT + ball squeeze + bridge x 2 minutes  Supine PPT + alternating clam with larger green loop x 2 minutes  Supine PPT + clam with larger green loop + bridge x 2 minutes  Supine PPT + straight leg raise 3 x 10 each, 2 lb        Plan for Next Visit:  Assess patients response to the last visit following manual therapy intervention, therapeutic exercises, pt education, heel lift Left shoe, increasing frequency of home walking program to help loosen up and increase tolerance/activity and HEP.  Assess alignment of as well as Increased tone and tenderness to palpation noted Bilateral rectus femoris, vastus lateralis, tensor fascia latae, Iliotibial Band, Left psoas insertion > Right side.    Increased tenderness to palpation iliac crest, ASIS, Bilateral sacroiliac joint, mild generalized  c/o with pressure along lower thoracic and lumbar spine.  Generalized soreness Bilateral glut med/min and piriformis.  Manual therapy to address alignment, mobility, flexibility, tenderness, joint restrictions, soft tissue restrictions and/or presence of trigger points.  Add stretching, stabilization, functional mobility and strengthening exercises as appropriate.  Modalities, Dry Needling if indicated.       Patient Education and Home Exercises     Home Exercises Provided and Patient Education Provided     Education provided:   - Educated pt that he/she may feel soreness after session.      Written Home Exercises Provided: Instructed patient to continue current home exercise program.    Exercises were reviewed and Mckenzie was able to demonstrate them prior to the end of the session.  Mckenzie demonstrated fair  understanding of the education provided. See EMR under Patient Instructions for exercises provided during therapy sessions    ASSESSMENT     Pt returning to PT today able to report she is making slow, steady progress.  Stiff when she first gets up in the mornings but her home exercise program does help to loosen her up.  More sore lately because she has been busy around the house lately.      Mckenzie Is progressing well towards her goals.     Pt will continue to benefit from skilled outpatient physical therapy to address the deficits listed in the problem list box on initial evaluation, provide pt/family education and to maximize pt's level of independence in the home and community environment.     Patient prognosis is fair to good.   Patientt will benefit from skilled outpatient Physical Therapy to address the deficits stated above and in the chart below, provide patient /family education, and to maximize patientt's level of independence.      Plan of care discussed with patient: Yes  Patient's spiritual, cultural and educational needs considered and patient is agreeable to the plan of care and goals as  stated below:      Anticipated Barriers for therapy: Schedule conflicts, transportation, pain, guarding, tolerance, endurance, joint and soft tissue restrictions, osteoarthritis/DJD    Goals:   Short-Term: 4 weeks (4/21/2023)  nearly met  Pt will improve Active lumbar flexion to > or = 70* to promote return to prior functional status.  Pt will improve Active lumbar extension to > or = 11* to promote return to prior functional status.  Pt will improve bilateral Active lumbar rotation to < or = moderate limitations to promote return to prior functional status.  Pt will improve bilateral Active lumbar side bending to > or = 15* to promote return to prior functional status.  Pt will improve flexibility of trunk, hips, pelvic and Lower Extremity muscles to help promoted better mobility, posture, alignment and help return to prior functional status.  Pt will improve hip extension ROM/flexibility > or = -10* from neutral to reduce strain on low back plus help promoted better mobility, posture, alignment and help return to prior functional status.  Pt will verbalize improved tolerance to walking program and ADL's  Pt will decrease pain levels < or = to 5/10 to help promote appropriate progression of PT, HEP, and ADL's    Pt will be compliant with new home exercise program to assist with PT intervention and help allow appropriate progression through plan of care.     Long-Term: 12 weeks (6/16/2023)   ongoing  Pt will improve bilateral hip ROM/flexibility WFL to help reduce stress/strain on their back and to allow return to normal activities of daily living.  Pt will improve bilateral hip strength to > or = 4+/5 to allow performance of activities of daily living.  Pt will improve bilateral knee strength to > or = 5/5 to allow performance of activities of daily living.  Pt will display good gait pattern with no deviations to improve QOL and allow return to prior functional status.  Pt will report < or = 3-4/10 pain during  activities of daily living to improve QOL and allow return to prior functional status.  Pt will improve bilateral upper/lower abdominal strength to > or = 4/5 to allow sufficient core stability for activities of daily living.  Pt will be compliant and independent with HEP to allow and maintain better participation in normal activities  Pt will be able to resume normals ADL's, IADL's, previous activities       PLAN     Continue per POC, progressing as appropriate to achieve stated goals.    Continue with: Plan of care Certification: 3/21/2023 to 6/16/2023.        Outpatient Physical Therapy 1-2 times weekly for 12 weeks to include the following interventions: Electrical Stimulation attended/unattended, Gait Training, Manual Therapy, Moist Heat/ Ice, Neuromuscular Re-ed, Orthotic Management and Training, Patient Education, Therapeutic Activities, Therapeutic Exercise, and Ultrasound.      Michael Dominguez, PT

## 2023-05-10 ENCOUNTER — PATIENT MESSAGE (OUTPATIENT)
Dept: PRIMARY CARE CLINIC | Facility: CLINIC | Age: 73
End: 2023-05-10
Payer: MEDICARE

## 2023-05-10 RX ORDER — FLUCONAZOLE 150 MG/1
150 TABLET ORAL DAILY
Qty: 1 TABLET | Refills: 1 | Status: SHIPPED | OUTPATIENT
Start: 2023-05-10 | End: 2023-08-09 | Stop reason: SDUPTHER

## 2023-05-10 NOTE — PROGRESS NOTES
[facility]   Physical Therapy Treatment Note     Name: Mckenzie Mari  [facility] Number: [id number]    Therapy Diagnosis:   No diagnosis found.    Physician: Bacilio Gold, *    Visit Date: 5/11/2023    Physician Orders: PT Eval and Treat   Medical Diagnosis from Referral: Back pain, unspecified back location, unspecified back pain laterality, unspecified chronicity [M54.9]  Evaluation Date: 3/21/2023  Authorization Period Expiration: 12/29/2023  Plan of Care Expiration: 6/16/2023  Progress Note Due: 6/16/2023 (Last Progress Note 5/11/2023, 4/17/2023)  Visit # / Visits authorized: 15 /20 +eval  FOTO: Issued Visit #: 1/3, (capture on visit 1, 5, 10) first on 3/21/2023, 4/20/2023     Precautions: Standard, HTN, A fib, CHF, cardiac pacemaker defibrillator, history of breast and thyroid CA, DM, Bilateral TKA's, CVID (common variable immunodeficiency)--NO dry needling      PTA Visit #: 0/5     Time In: 8:30  Time Out:  9:15  Total Time: ***45 minutes    SUBJECTIVE     Pt reports: she is having a pretty good morning.  Had some back pain through the weekend but was able to manage it at times.  She was more sore Sunday and stayed in bed and missed [employer].     She  was  compliant with home exercise program.  Response to previous treatment: ***felt really good  Functional change: ****a little less soreness at times, hurting less, doing more    Pain: ***1-2/10 this morning, feels like it has more to do with the bad weather    Location: bilateral midback and periscapular areas    OBJECTIVE   ***  Posture/alignment:    Supine Left pelvic downslip with AI.  ~6mm Left leg length discrepancy.     Gait Analysis:  walks with slow, shortened stride lengths with downward gaze.  Shows Left step down sign into Left stance phase with Left hip hiking into Left swing phase.     Lumbar ROM:     AROM    Flexion  70 was 68*  had to use Upper Extremities to help return upright   Extension  11 was 9*   Left  Sidebending  14 was 11*   Right Sidebending  16 was 15*   Left Rotation  moderate was severe limitations   Right Rotation  moderate was severe limitations      Dermatomes:  L2-S2 light touch intact Bilateral Lower Extremities     Myotomes:      Left  Right   L2  5/5 was 5-/5  5/5   L3  5/5  5/5   L4  5/5  5/5   L5  5/5  5/5   S1  5/5  5/5   S2  5/5  5/5      Strength:     Left  Right   Hip External Rotation   5-/5  5-/5   Hip Abduction   3+/5  3+/5   Hip Extension  3/5 was 3-/5  3/5 was 3-/5      Hip range of motion/mobility:  Pt positioned in sidelying with hip extension mobility/flexibility R= -11 was -17 from neutral, L= -6 was -12* from neutral.       Flexibility:  Decreased flexibility noted:  Gluts:                           Right=moderate was severe, Left=moderate was severe  Piriformis:                    Right=moderate was severe, Left=moderate was severe  Hamstrings:                 Right=slight was mild, Left=slight was mild  Calves:                        Right=moderate, Left=moderate  Quads:                         Right=moderate/mild was severe, Left=moderate was severe  Iliotibial Band:              Right=moderate was severe, Left=moderate was severe  tensor fascia latae:      Right=moderate was severe, Left=moderate was severe     Palpation:  Increased tone and tenderness to palpation noted Bilateral rectus femoris, vastus lateralis, tensor fascia latae, Iliotibial Band, Left psoas insertion > Right side(much improved).    Increased tenderness to palpation iliac crest, ASIS, Bilateral sacroiliac joint, mild generalized c/o with pressure along lower thoracic and lumbar spine.  Generalized soreness Bilateral glut med/min and piriformis(much improved).     Treatment     Mckenzie received the treatments listed below:      manual therapy techniques: for ***13 minutes, including:  Supine muscle energy technique correction of Left sided pelvis downslip with AI, long axis distraction of Right Le.  Repeated  until good pelvic alignment observed f/b clam with manual resistance x 3 reps, shotgun technique x 3 reps (x 2 trials total).  Pt positioned supine with medium bolster under knees for comfort.  Myofascial release and trigger point release of Bilateral rectus femoris, vastus lateralis, tensor fascia latae, Iliotibial Band.  Left trigger point release of Left psoas insertion.  Manual stretching of Bilateral Lower Extremity's in supine.     Added 6mm lift to Left shoe 3/21/2023     therapeutic exercises for ***17 minutes including:  Supine LTR (ROM as tolerated) x 2'  Supine double knee to chest with orange ball x 2'  Supine lower trunk rotation with orange ball x 2'  Supine straight leg bridge with orange ball x 2'  Supine piriformis LTR x 2 minute each  Shuttle leg press 50 lbs x 5'     neuromuscular re-education activities for ***15 minutes. The following activities were included:  Supine PPT + marching x 2 minutes,2 lb  Supine PPT + alternating heel taps x 2 minutes, 2 lb  Supine PPT + ball squeeze + bridge x 2 minutes  Supine PPT + alternating clam with larger green loop x 2 minutes  Supine PPT + clam with larger green loop + bridge x 2 minutes  Supine PPT + straight leg raise 3 x 10 each, 2 lb        Plan for Next Visit:  Assess patients response to the last visit following manual therapy intervention, therapeutic exercises, pt education, heel lift Left shoe, increasing frequency of home walking program to help loosen up and increase tolerance/activity and HEP.  Assess alignment of as well as Increased tone and tenderness to palpation noted Bilateral rectus femoris, vastus lateralis, tensor fascia latae, Iliotibial Band, Left psoas insertion > Right side.    Increased tenderness to palpation iliac crest, ASIS, Bilateral sacroiliac joint, mild generalized c/o with pressure along lower thoracic and lumbar spine.  Generalized soreness Bilateral glut med/min and piriformis.  Manual therapy to address alignment,  mobility, flexibility, tenderness, joint restrictions, soft tissue restrictions and/or presence of trigger points.  Add stretching, stabilization, functional mobility and strengthening exercises as appropriate.  Modalities, Dry Needling if indicated.       Patient Education and Home Exercises     Home Exercises Provided and Patient Education Provided     Education provided:   - Educated pt that he/she may feel soreness after session.      Written Home Exercises Provided: Instructed patient to continue current home exercise program.    Exercises were reviewed and Mckenzie was able to demonstrate them prior to the end of the session.  Mckenzie demonstrated fair  understanding of the education provided. See EMR under Patient Instructions for exercises provided during therapy sessions    ASSESSMENT     Pt returning to PT today ***able to report she is making slow, steady progress.  Stiff when she first gets up in the mornings but her home exercise program does help to loosen her up.  More sore lately because she has been busy around the house lately.      Mckenzie Is progressing well towards her goals.     Pt will continue to benefit from skilled outpatient physical therapy to address the deficits listed in the problem list box on initial evaluation, provide pt/family education and to maximize pt's level of independence in the home and community environment.     Patient prognosis is fair to good.   Patientt will benefit from skilled outpatient Physical Therapy to address the deficits stated above and in the chart below, provide patient /family education, and to maximize patientt's level of independence.      Plan of care discussed with patient: Yes  Patient's spiritual, cultural and educational needs considered and patient is agreeable to the plan of care and goals as stated below:      Anticipated Barriers for therapy: Schedule conflicts, transportation, pain, guarding, tolerance, endurance, joint and soft tissue  restrictions, osteoarthritis/DJD    Goals:   Short-Term: 4 weeks (4/21/2023)  ***nearly met  Pt will improve Active lumbar flexion to > or = 70* to promote return to prior functional status.  Pt will improve Active lumbar extension to > or = 11* to promote return to prior functional status.  Pt will improve bilateral Active lumbar rotation to < or = moderate limitations to promote return to prior functional status.  Pt will improve bilateral Active lumbar side bending to > or = 15* to promote return to prior functional status.  Pt will improve flexibility of trunk, hips, pelvic and Lower Extremity muscles to help promoted better mobility, posture, alignment and help return to prior functional status.  Pt will improve hip extension ROM/flexibility > or = -10* from neutral to reduce strain on low back plus help promoted better mobility, posture, alignment and help return to prior functional status.  Pt will verbalize improved tolerance to walking program and ADL's  Pt will decrease pain levels < or = to 5/10 to help promote appropriate progression of PT, HEP, and ADL's    Pt will be compliant with new home exercise program to assist with PT intervention and help allow appropriate progression through plan of care.     Long-Term: 12 weeks (6/16/2023)   ***ongoing  Pt will improve bilateral hip ROM/flexibility WFL to help reduce stress/strain on their back and to allow return to normal activities of daily living.  Pt will improve bilateral hip strength to > or = 4+/5 to allow performance of activities of daily living.  Pt will improve bilateral knee strength to > or = 5/5 to allow performance of activities of daily living.  Pt will display good gait pattern with no deviations to improve QOL and allow return to prior functional status.  Pt will report < or = 3-4/10 pain during activities of daily living to improve QOL and allow return to prior functional status.  Pt will improve bilateral upper/lower abdominal strength to  > or = 4/5 to allow sufficient core stability for activities of daily living.  Pt will be compliant and independent with HEP to allow and maintain better participation in normal activities  Pt will be able to resume normals ADL's, IADL's, previous activities       PLAN     Continue per POC, progressing as appropriate to achieve stated goals.    Continue with: Plan of care Certification: 3/21/2023 to 6/16/2023.        Outpatient Physical Therapy 1-2 times weekly for 12 weeks to include the following interventions: Electrical Stimulation attended/unattended, Gait Training, Manual Therapy, Moist Heat/ Ice, Neuromuscular Re-ed, Orthotic Management and Training, Patient Education, Therapeutic Activities, Therapeutic Exercise, and Ultrasound.      Michael Dominguez, PT

## 2023-05-10 NOTE — TELEPHONE ENCOUNTER
Spoke with pt and she reports that she has a vaginal yeast infection and has a history of them. Pt is having itching and a foul smelling odor. Requested an rx for Diflucan as this has helped her in the past. Currently eating yogurt and taking a probiotic.     Rx pended    Pref[facility]: [facility]

## 2023-05-11 ENCOUNTER — CLINICAL SUPPORT (OUTPATIENT)
Dept: REHABILITATION | Facility: HOSPITAL | Age: 73
End: 2023-05-11
Payer: MEDICARE

## 2023-05-11 DIAGNOSIS — G89.29 CHRONIC LEFT-SIDED THORACIC BACK PAIN: ICD-10-CM

## 2023-05-11 DIAGNOSIS — M54.6 CHRONIC LEFT-SIDED THORACIC BACK PAIN: ICD-10-CM

## 2023-05-11 DIAGNOSIS — M25.69 BACK STIFFNESS: ICD-10-CM

## 2023-05-11 DIAGNOSIS — R29.898 IMPAIRED FLEXIBILITY OF LOWER EXTREMITY: ICD-10-CM

## 2023-05-11 DIAGNOSIS — G89.29 CHRONIC LEFT-SIDED LOW BACK PAIN WITHOUT SCIATICA: Primary | ICD-10-CM

## 2023-05-11 DIAGNOSIS — R29.898 WEAKNESS OF BOTH HIPS: ICD-10-CM

## 2023-05-11 DIAGNOSIS — R26.9 ALTERED GAIT: ICD-10-CM

## 2023-05-11 DIAGNOSIS — M53.3 SACROILIAC JOINT PAIN: ICD-10-CM

## 2023-05-11 DIAGNOSIS — M54.50 CHRONIC LEFT-SIDED LOW BACK PAIN WITHOUT SCIATICA: Primary | ICD-10-CM

## 2023-05-11 PROCEDURE — 97110 THERAPEUTIC EXERCISES: CPT | Mod: HCNC,PN,CQ

## 2023-05-11 PROCEDURE — 97140 MANUAL THERAPY 1/> REGIONS: CPT | Mod: HCNC,PN,CQ

## 2023-05-11 PROCEDURE — 97530 THERAPEUTIC ACTIVITIES: CPT | Mod: HCNC,PN,CQ

## 2023-05-11 NOTE — PROGRESS NOTES
"[facility]   Physical Therapy Treatment Note     Name: Mckenzie Mari  Clinic Number: [id number]    Therapy Diagnosis:   Encounter Diagnoses   Name Primary?    Chronic left-sided low back pain without sciatica Yes    Chronic left-sided thoracic back pain     Back stiffness     Weakness of both hips     Impaired flexibility of lower extremity     Sacroiliac joint pain     Altered gait        Physician: Bacilio Gold, *    Visit Date: 5/11/2023    Physician Orders: PT Eval and Treat   Medical Diagnosis from Referral: Back pain, unspecified back location, unspecified back pain laterality, unspecified chronicity [M54.9]  Evaluation Date: 3/21/2023  Authorization Period Expiration: 12/29/2023  Plan of Care Expiration: 6/16/2023  Progress Note Due: 6/16/2023 (Last Progress Note 5/11/2023, 4/17/2023)  Visit # / Visits authorized: 15 /20 +eval  FOTO: Issued Visit #: 1/3, (capture on visit 1, 5, 10) first on 3/21/2023, 4/20/2023     Precautions: Standard, HTN, A fib, CHF, cardiac pacemaker defibrillator, history of breast and thyroid CA, DM, Bilateral TKA's, CVID (common variable immunodeficiency)--NO dry needling      PTA Visit #: 1/5     Time In: 0833   Time Out: 0915   Total Time: 42 minutes  Total Billable Time: 42 minutes    SUBJECTIVE     Pt reports: 3-4/10 and slight increase with getting out of bed. Has more stress lately and thinks maybe it is contributing to her increase in symptoms. Has been concerned about her SOB and has been cleared per MDs but persists. Daughter is coming in from [place]. Has increased discomfort with getting up from low chairs and toilets and with bending over with house cleaning. "I always feel better after coming here (therapy)."    She  was  compliant with home exercise program.  Response to previous treatment: no complaints  Functional change: *a little less soreness at times, hurting less, doing more    Pain: 3-4/10 this morning    Location: " "bilateral midback and periscapular areas    OBJECTIVE     Objective Measures updated at progress report unless specified.    Treatment     Mckenzie received the treatments listed below:      manual therapy techniques: for 15 minutes, including:  Supine muscle energy technique correction of Left sided pelvis downslip with AI, long axis distraction of Right Le.  Repeated until good pelvic alignment observed f/b clam with manual resistance x 3 reps, shotgun technique x 3 reps (x 2 trials total).  Pt positioned supine with medium bolster under knees for comfort.  Myofascial release and trigger point release of Bilateral rectus femoris, vastus lateralis, tensor fascia latae, Iliotibial Band.  Left trigger point release of Left psoas insertion.  Manual stretching of Bilateral Lower Extremity's in supine.     Added 6mm lift to Left shoe 3/21/2023     therapeutic exercises for 09 minutes including:  Supine LTR (ROM as tolerated) x 2'  Supine double knee to chest with orange ball x 2'  Supine lower trunk rotation with orange ball x 2'-NP  Supine straight leg bridge with orange ball x 2'  Supine piriformis LTR x 2 minute each  Shuttle leg press 50 lbs x 5'-NP     neuromuscular re-education activities for 00 minutes. The following activities were included:  Supine PPT + marching x 2 minutes,2 lb  Supine PPT + alternating heel taps x 2 minutes, 2 lb  Supine PPT + ball squeeze + bridge x 2 minutes  Supine PPT + alternating clam with larger green loop x 2 minutes  Supine PPT + clam with larger green loop + bridge x 2 minutes  Supine PPT + straight leg raise 3 x 10 each, 2 lb     Mckenzie participated in dynamic functional therapeutic activities to improve functional performance for 19 minutes, including:    Hip hinging (minimal range) with A for holding dowel against her back for biofeedback  Sit<>stand from 18" box x 10, hands on knees       Plan for Next Visit:  Assess patients response to the last visit following manual " therapy intervention, therapeutic exercises, pt education, heel lift Left shoe, increasing frequency of home walking program to help loosen up and increase tolerance/activity and HEP.  Assess alignment of as well as Increased tone and tenderness to palpation noted Bilateral rectus femoris, vastus lateralis, tensor fascia latae, Iliotibial Band, Left psoas insertion > Right side.    Increased tenderness to palpation iliac crest, ASIS, Bilateral sacroiliac joint, mild generalized c/o with pressure along lower thoracic and lumbar spine.  Generalized soreness Bilateral glut med/min and piriformis.  Manual therapy to address alignment, mobility, flexibility, tenderness, joint restrictions, soft tissue restrictions and/or presence of trigger points.  Add stretching, stabilization, functional mobility and strengthening exercises as appropriate.  Modalities, Dry Needling if indicated.       Patient Education and Home Exercises     Home Exercises Provided and Patient Education Provided     Education provided:   - Educated pt that he/she may feel soreness after session.      Written Home Exercises Provided: Instructed patient to continue current home exercise program.    Exercises were reviewed and Mckenzie was able to demonstrate them prior to the end of the session.  Mckenzie demonstrated fair  understanding of the education provided. See EMR under Patient Instructions for exercises provided during therapy sessions    ASSESSMENT     Pain in the mornings which improves with mobility during the day, and increased functional mobility as pt is able to bake and crotchet. Continues to have discomfort and difficulty with sit<>stand from standard chairs and bending/squatting, partially 2* knee pains. Initial difficulty with proper form for hip hinging 2* rounded thoracic spine, but improved with repetition and cues. A for holding dowel for biofeedback with hip hinging 2* limited bilateral shoulder ROM.  Will benefit from continued  physical therapy intervention to progress toward goals set forth in plan of care to improve functional mobility and quality of life.     Mckenzie Is progressing well towards her goals.     Pt will continue to benefit from skilled outpatient physical therapy to address the deficits listed in the problem list box on initial evaluation, provide pt/family education and to maximize pt's level of independence in the home and community environment.     Patient prognosis is fair to good.   Patientt will benefit from skilled outpatient Physical Therapy to address the deficits stated above and in the chart below, provide patient /family education, and to maximize patientt's level of independence.      Plan of care discussed with patient: Yes  Patient's spiritual, cultural and educational needs considered and patient is agreeable to the plan of care and goals as stated below:      Anticipated Barriers for therapy: Schedule conflicts, transportation, pain, guarding, tolerance, endurance, joint and soft tissue restrictions, osteoarthritis/DJD    Goals:   Short-Term: 4 weeks (4/21/2023)  nearly met  Pt will improve Active lumbar flexion to > or = 70* to promote return to prior functional status.  Pt will improve Active lumbar extension to > or = 11* to promote return to prior functional status.  Pt will improve bilateral Active lumbar rotation to < or = moderate limitations to promote return to prior functional status.  Pt will improve bilateral Active lumbar side bending to > or = 15* to promote return to prior functional status.  Pt will improve flexibility of trunk, hips, pelvic and Lower Extremity muscles to help promoted better mobility, posture, alignment and help return to prior functional status.  Pt will improve hip extension ROM/flexibility > or = -10* from neutral to reduce strain on low back plus help promoted better mobility, posture, alignment and help return to prior functional status.  Pt will verbalize improved  tolerance to walking program and ADL's  Pt will decrease pain levels < or = to 5/10 to help promote appropriate progression of PT, HEP, and ADL's    Pt will be compliant with new home exercise program to assist with PT intervention and help allow appropriate progression through plan of care.     Long-Term: 12 weeks (6/16/2023)   ongoing  Pt will improve bilateral hip ROM/flexibility WFL to help reduce stress/strain on their back and to allow return to normal activities of daily living.  Pt will improve bilateral hip strength to > or = 4+/5 to allow performance of activities of daily living.  Pt will improve bilateral knee strength to > or = 5/5 to allow performance of activities of daily living.  Pt will display good gait pattern with no deviations to improve QOL and allow return to prior functional status.  Pt will report < or = 3-4/10 pain during activities of daily living to improve QOL and allow return to prior functional status.  Pt will improve bilateral upper/lower abdominal strength to > or = 4/5 to allow sufficient core stability for activities of daily living.  Pt will be compliant and independent with HEP to allow and maintain better participation in normal activities  Pt will be able to resume normals ADL's, IADL's, previous activities       PLAN     Continue per POC, progressing as appropriate to achieve stated goals.    Continue with: Plan of care Certification: 3/21/2023 to 6/16/2023.        Outpatient Physical Therapy 1-2 times weekly for 12 weeks to include the following interventions: Electrical Stimulation attended/unattended, Gait Training, Manual Therapy, Moist Heat/ Ice, Neuromuscular Re-ed, Orthotic Management and Training, Patient Education, Therapeutic Activities, Therapeutic Exercise, and Ultrasound.      Carrie Solares, PTA

## 2023-05-15 ENCOUNTER — CLINICAL SUPPORT (OUTPATIENT)
Dept: REHABILITATION | Facility: HOSPITAL | Age: 73
End: 2023-05-15
Payer: MEDICARE

## 2023-05-15 DIAGNOSIS — M25.69 BACK STIFFNESS: ICD-10-CM

## 2023-05-15 DIAGNOSIS — R26.9 ALTERED GAIT: ICD-10-CM

## 2023-05-15 DIAGNOSIS — R29.898 WEAKNESS OF BOTH HIPS: ICD-10-CM

## 2023-05-15 DIAGNOSIS — G89.29 CHRONIC LEFT-SIDED LOW BACK PAIN WITHOUT SCIATICA: Primary | ICD-10-CM

## 2023-05-15 DIAGNOSIS — R29.898 IMPAIRED FLEXIBILITY OF LOWER EXTREMITY: ICD-10-CM

## 2023-05-15 DIAGNOSIS — M54.50 CHRONIC LEFT-SIDED LOW BACK PAIN WITHOUT SCIATICA: Primary | ICD-10-CM

## 2023-05-15 DIAGNOSIS — M54.6 CHRONIC LEFT-SIDED THORACIC BACK PAIN: ICD-10-CM

## 2023-05-15 DIAGNOSIS — M53.3 SACROILIAC JOINT PAIN: ICD-10-CM

## 2023-05-15 DIAGNOSIS — G89.29 CHRONIC LEFT-SIDED THORACIC BACK PAIN: ICD-10-CM

## 2023-05-15 PROCEDURE — 97140 MANUAL THERAPY 1/> REGIONS: CPT | Mod: PN

## 2023-05-15 PROCEDURE — 97110 THERAPEUTIC EXERCISES: CPT | Mod: PN

## 2023-05-15 PROCEDURE — 97112 NEUROMUSCULAR REEDUCATION: CPT | Mod: PN

## 2023-05-23 NOTE — PROGRESS NOTES
[facility]   Physical Therapy Treatment Note     Name: Mckenzie Mari  [facility] Number: [id number]    Therapy Diagnosis:   Encounter Diagnoses   Name Primary?    Chronic left-sided low back pain without sciatica Yes    Chronic left-sided thoracic back pain     Back stiffness     Weakness of both hips     Impaired flexibility of lower extremity     Sacroiliac joint pain     Altered gait      Physician: Bacilio Gold, *    Visit Date: 5/24/2023    Physician Orders: PT Eval and Treat   Medical Diagnosis from Referral: Back pain, unspecified back location, unspecified back pain laterality, unspecified chronicity [M54.9]  Evaluation Date: 3/21/2023  Authorization Period Expiration: 12/29/2023  Plan of Care Expiration: 6/16/2023  Progress Note Due: 6/16/2023 (Last Progress Note 5/11/2023, 4/17/2023)  Visit # / Visits authorized: 17 /20 +eval  FOTO: Issued Visit #: 1/3, (capture on visit 1, 5, 10) first on 3/21/2023, 4/20/2023     Precautions: Standard, HTN, A fib, CHF, cardiac pacemaker defibrillator, history of breast and thyroid CA, DM, Bilateral TKA's, CVID (common variable immunodeficiency)--NO dry needling      PTA Visit #: 0/5     Time In: 8:30  Time Out: 9:15   Total Billable Time: 45 minutes    SUBJECTIVE     Pt reports: she has been feeling and doing better overall.  Today she is doing well other than a little stiffness in the mornings.  She did not do her home exercise program over the weekend because she was too busy.    She  was  compliant with home exercise program.  Response to previous treatment: no complaints  Functional change: a little less soreness at times, hurting less, doing more    Pain: 3/10 this morning    Location: bilateral midback and periscapular areas    OBJECTIVE     Objective Measures updated at progress report unless specified.    Treatment     Mckenzie received the treatments listed below:      manual therapy techniques: for 15 minutes, including:  Supine  "muscle energy technique correction of Left sided pelvis downslip with AI, long axis distraction of Right Le.  Repeated until good pelvic alignment observed f/b clam with manual resistance x 3 reps, shotgun technique x 3 reps (x 2 trials total).  Pt positioned supine with medium bolster under knees for comfort.  Myofascial release and trigger point release of Bilateral rectus femoris, vastus lateralis, tensor fascia latae, Iliotibial Band.  Left trigger point release of Left psoas insertion.  Manual stretching of Bilateral Lower Extremity's in supine.     Added 6mm lift to Left shoe 3/21/2023     therapeutic exercises for 18 minutes including:  Supine LTR (ROM as tolerated) x 2'  Supine double knee to chest with orange ball x 2'  Supine lower trunk rotation with orange ball x 2'  Supine straight leg bridge with orange ball x 2'  Supine piriformis LTR x 2 minute each  Shuttle leg press 50 lbs x 5'     neuromuscular re-education activities for 12 minutes. The following activities were included:  Supine PPT + marching x 2 minutes, 2 lb  Supine PPT + alternating heel taps x 2 minutes, 2 lb  Supine PPT + ball squeeze + bridge x 2 minutes  Supine PPT + alternating clam with larger green loop x 2 minutes  Supine PPT + clam with larger green loop + bridge x 2 minutes  Supine PPT + straight leg raise 3 x 10 each, 2 lb     Mckenzie participated in dynamic functional therapeutic activities to improve functional performance for 00 minutes, including:    Hip hinging (minimal range) with A for holding dowel against her back for biofeedback  Sit<>stand from 18" box x 10, hands on knees       Plan for Next Visit:  Assess patients response to the last visit following manual therapy intervention, therapeutic exercises, pt education, heel lift Left shoe, increasing frequency of home walking program to help loosen up and increase tolerance/activity and HEP.  Assess alignment of as well as Increased tone and tenderness to palpation noted " Bilateral rectus femoris, vastus lateralis, tensor fascia latae, Iliotibial Band, Left psoas insertion > Right side.    Increased tenderness to palpation iliac crest, ASIS, Bilateral sacroiliac joint, mild generalized c/o with pressure along lower thoracic and lumbar spine.  Generalized soreness Bilateral glut med/min and piriformis.  Manual therapy to address alignment, mobility, flexibility, tenderness, joint restrictions, soft tissue restrictions and/or presence of trigger points.  Add stretching, stabilization, functional mobility and strengthening exercises as appropriate.  Modalities, Dry Needling if indicated.       Patient Education and Home Exercises     Home Exercises Provided and Patient Education Provided     Education provided:   - Educated pt that he/she may feel soreness after session.      Written Home Exercises Provided: Instructed patient to continue current home exercise program.    Exercises were reviewed and Mckenzie was able to demonstrate them prior to the end of the session.  Mckenzie demonstrated fair  understanding of the education provided. See EMR under Patient Instructions for exercises provided during therapy sessions    ASSESSMENT     Pt doing well lately with less pain in the mornings once she loosens up.  Lately reporting more c/o by the end of the day with trying to do more but doing a better job of trying to break up her activities.     Mckenzie Is progressing well towards her goals.     Pt will continue to benefit from skilled outpatient physical therapy to address the deficits listed in the problem list box on initial evaluation, provide pt/family education and to maximize pt's level of independence in the home and community environment.     Patient prognosis is fair to good.   Patientt will benefit from skilled outpatient Physical Therapy to address the deficits stated above and in the chart below, provide patient /family education, and to maximize patientt's level of  independence.      Plan of care discussed with patient: Yes  Patient's spiritual, cultural and educational needs considered and patient is agreeable to the plan of care and goals as stated below:      Anticipated Barriers for therapy: Schedule conflicts, transportation, pain, guarding, tolerance, endurance, joint and soft tissue restrictions, osteoarthritis/DJD    Goals:   Short-Term: 4 weeks (4/21/2023)  nearly met  Pt will improve Active lumbar flexion to > or = 70* to promote return to prior functional status.  Pt will improve Active lumbar extension to > or = 11* to promote return to prior functional status.  Pt will improve bilateral Active lumbar rotation to < or = moderate limitations to promote return to prior functional status.  Pt will improve bilateral Active lumbar side bending to > or = 15* to promote return to prior functional status.  Pt will improve flexibility of trunk, hips, pelvic and Lower Extremity muscles to help promoted better mobility, posture, alignment and help return to prior functional status.  Pt will improve hip extension ROM/flexibility > or = -10* from neutral to reduce strain on low back plus help promoted better mobility, posture, alignment and help return to prior functional status.  Pt will verbalize improved tolerance to walking program and ADL's  Pt will decrease pain levels < or = to 5/10 to help promote appropriate progression of PT, HEP, and ADL's    Pt will be compliant with new home exercise program to assist with PT intervention and help allow appropriate progression through plan of care.     Long-Term: 12 weeks (6/16/2023)   ongoing  Pt will improve bilateral hip ROM/flexibility WFL to help reduce stress/strain on their back and to allow return to normal activities of daily living.  Pt will improve bilateral hip strength to > or = 4+/5 to allow performance of activities of daily living.  Pt will improve bilateral knee strength to > or = 5/5 to allow performance of  activities of daily living.  Pt will display good gait pattern with no deviations to improve QOL and allow return to prior functional status.  Pt will report < or = 3-4/10 pain during activities of daily living to improve QOL and allow return to prior functional status.  Pt will improve bilateral upper/lower abdominal strength to > or = 4/5 to allow sufficient core stability for activities of daily living.  Pt will be compliant and independent with HEP to allow and maintain better participation in normal activities  Pt will be able to resume normals ADL's, IADL's, previous activities       PLAN     Continue per POC, progressing as appropriate to achieve stated goals.    Continue with: Plan of care Certification: 3/21/2023 to 6/16/2023.        Outpatient Physical Therapy 1-2 times weekly for 12 weeks to include the following interventions: Electrical Stimulation attended/unattended, Gait Training, Manual Therapy, Moist Heat/ Ice, Neuromuscular Re-ed, Orthotic Management and Training, Patient Education, Therapeutic Activities, Therapeutic Exercise, and Ultrasound.      Michael Dominguez, PT

## 2023-05-24 ENCOUNTER — CLINICAL SUPPORT (OUTPATIENT)
Dept: REHABILITATION | Facility: HOSPITAL | Age: 73
End: 2023-05-24
Payer: MEDICARE

## 2023-05-24 DIAGNOSIS — R26.9 ALTERED GAIT: ICD-10-CM

## 2023-05-24 DIAGNOSIS — R29.898 WEAKNESS OF BOTH HIPS: ICD-10-CM

## 2023-05-24 DIAGNOSIS — G89.29 CHRONIC LEFT-SIDED LOW BACK PAIN WITHOUT SCIATICA: Primary | ICD-10-CM

## 2023-05-24 DIAGNOSIS — M54.50 CHRONIC LEFT-SIDED LOW BACK PAIN WITHOUT SCIATICA: Primary | ICD-10-CM

## 2023-05-24 DIAGNOSIS — R29.898 IMPAIRED FLEXIBILITY OF LOWER EXTREMITY: ICD-10-CM

## 2023-05-24 DIAGNOSIS — M54.6 CHRONIC LEFT-SIDED THORACIC BACK PAIN: ICD-10-CM

## 2023-05-24 DIAGNOSIS — M53.3 SACROILIAC JOINT PAIN: ICD-10-CM

## 2023-05-24 DIAGNOSIS — G89.29 CHRONIC LEFT-SIDED THORACIC BACK PAIN: ICD-10-CM

## 2023-05-24 DIAGNOSIS — M25.69 BACK STIFFNESS: ICD-10-CM

## 2023-05-24 PROCEDURE — 97140 MANUAL THERAPY 1/> REGIONS: CPT | Mod: PN

## 2023-05-24 PROCEDURE — 97112 NEUROMUSCULAR REEDUCATION: CPT | Mod: PN

## 2023-05-24 PROCEDURE — 97110 THERAPEUTIC EXERCISES: CPT | Mod: PN

## 2023-05-30 NOTE — PROGRESS NOTES
[facility]   Physical Therapy Treatment and discharge Note     Name: Mckenzie Mari  Clinic Number: [id number]    Therapy Diagnosis:   Encounter Diagnoses   Name Primary?    Chronic left-sided low back pain without sciatica Yes    Chronic left-sided thoracic back pain     Back stiffness     Weakness of both hips     Impaired flexibility of lower extremity     Sacroiliac joint pain     Altered gait      Physician: Bacilio Gold, *    Visit Date: 5/31/2023    Physician Orders: PT Eval and Treat   Medical Diagnosis from Referral: Back pain, unspecified back location, unspecified back pain laterality, unspecified chronicity [M54.9]  Evaluation Date: 3/21/2023  Authorization Period Expiration: 12/29/2023  Plan of Care Expiration: 6/16/2023  Progress Note Due: 6/16/2023 (Last Progress Note 5/11/2023, 4/17/2023)  Visit # / Visits authorized: 18 /20 +eval  FOTO: Issued Visit #: 1/3, (capture on visit 1, 5, 10) first on 3/21/2023, 4/20/2023     Precautions: Standard, HTN, A fib, CHF, cardiac pacemaker defibrillator, history of breast and thyroid CA, DM, Bilateral TKA's, CVID (common variable immunodeficiency)--NO dry needling      PTA Visit #: 0/5     Time In: 9:15  Time Out: 10:00   Total Billable Time: 45 minutes    SUBJECTIVE     Pt reports: she has been feeling and doing better overall.  Today she is doing well other than a little stiffness in the mornings.      She  was  compliant with home exercise program.  Response to previous treatment: no complaints  Functional change: a little less soreness at times, hurting less, doing more    Pain: 2-3/10 this morning    Location: bilateral midback and periscapular areas    OBJECTIVE     Objective Measures updated at progress report unless specified.    Treatment     Mckenzie received the treatments listed below:      manual therapy techniques: for 15 minutes, including:  Supine muscle energy technique correction of Left sided pelvis downslip  "with AI, long axis distraction of Right Le.  Repeated until good pelvic alignment observed f/b clam with manual resistance x 3 reps, shotgun technique x 3 reps (x 2 trials total).  Pt positioned supine with medium bolster under knees for comfort.  Myofascial release and trigger point release of Bilateral rectus femoris, vastus lateralis, tensor fascia latae, Iliotibial Band.  Left trigger point release of Left psoas insertion.  Manual stretching of Bilateral Lower Extremity's in supine.     Added 6mm lift to Left shoe 3/21/2023     therapeutic exercises for 18 minutes including:  Supine LTR (ROM as tolerated) x 2'  Supine double knee to chest with orange ball x 2'  Supine lower trunk rotation with orange ball x 2'  Supine straight leg bridge with orange ball x 2'  Supine piriformis LTR x 2 minute each  Shuttle leg press 50 lbs x 5'     neuromuscular re-education activities for 12 minutes. The following activities were included:  Supine PPT + marching x 2 minutes, 2 lb  Supine PPT + alternating heel taps x 2 minutes, 2 lb  Supine PPT + ball squeeze + bridge x 2 minutes  Supine PPT + alternating clam with larger green loop x 2 minutes  Supine PPT + clam with larger green loop + bridge x 2 minutes  Supine PPT + straight leg raise 3 x 10 each, 2 lb     Mckenzie participated in dynamic functional therapeutic activities to improve functional performance for 00 minutes, including:    Hip hinging (minimal range) with A for holding dowel against her back for biofeedback  Sit<>stand from 18" box x 10, hands on knees       Plan for Next Visit:  Assess patients response to the last visit following manual therapy intervention, therapeutic exercises, pt education, heel lift Left shoe, increasing frequency of home walking program to help loosen up and increase tolerance/activity and HEP.  Assess alignment of as well as Increased tone and tenderness to palpation noted Bilateral rectus femoris, vastus lateralis, tensor fascia latae, " Iliotibial Band, Left psoas insertion > Right side.    Increased tenderness to palpation iliac crest, ASIS, Bilateral sacroiliac joint, mild generalized c/o with pressure along lower thoracic and lumbar spine.  Generalized soreness Bilateral glut med/min and piriformis.  Manual therapy to address alignment, mobility, flexibility, tenderness, joint restrictions, soft tissue restrictions and/or presence of trigger points.  Add stretching, stabilization, functional mobility and strengthening exercises as appropriate.  Modalities, Dry Needling if indicated.       Patient Education and Home Exercises     Home Exercises Provided and Patient Education Provided     Education provided:   - Educated pt that he/she may feel soreness after session.      Written Home Exercises Provided: Instructed patient to continue current home exercise program.    Exercises were reviewed and Mckenzie was able to demonstrate them prior to the end of the session.  Mckenzie demonstrated fair  understanding of the education provided. See EMR under Patient Instructions for exercises provided during therapy sessions    ASSESSMENT     Pt doing well lately with less pain in the mornings once she loosens up.  She has been compliant with home exercise program and feels she can manage with home exercise program moving forward.  She states she just has to break up her activities and keep from over doing it.    Mckenzie Is progressing well towards her goals.     Patient prognosis is good.      Plan of care discussed with patient: Yes  Patient's spiritual, cultural and educational needs considered and patient is agreeable to the plan of care and goals as stated below:      Anticipated Barriers for therapy: Schedule conflicts, transportation, pain, guarding, tolerance, endurance, joint and soft tissue restrictions, osteoarthritis/DJD    Goals:   Short-Term: 4 weeks (4/21/2023)  met  Pt will improve Active lumbar flexion to > or = 70* to promote return to  prior functional status.  Pt will improve Active lumbar extension to > or = 11* to promote return to prior functional status.  Pt will improve bilateral Active lumbar rotation to < or = moderate limitations to promote return to prior functional status.  Pt will improve bilateral Active lumbar side bending to > or = 15* to promote return to prior functional status.  Pt will improve flexibility of trunk, hips, pelvic and Lower Extremity muscles to help promoted better mobility, posture, alignment and help return to prior functional status.  Pt will improve hip extension ROM/flexibility > or = -10* from neutral to reduce strain on low back plus help promoted better mobility, posture, alignment and help return to prior functional status.  Pt will verbalize improved tolerance to walking program and ADL's  Pt will decrease pain levels < or = to 5/10 to help promote appropriate progression of PT, HEP, and ADL's    Pt will be compliant with new home exercise program to assist with PT intervention and help allow appropriate progression through plan of care.     Long-Term: 12 weeks (6/16/2023)   mostly met  Pt will improve bilateral hip ROM/flexibility WFL to help reduce stress/strain on their back and to allow return to normal activities of daily living.  Pt will improve bilateral hip strength to > or = 4+/5 to allow performance of activities of daily living.  Pt will improve bilateral knee strength to > or = 5/5 to allow performance of activities of daily living.  Pt will display good gait pattern with no deviations to improve QOL and allow return to prior functional status.  Pt will report < or = 3-4/10 pain during activities of daily living to improve QOL and allow return to prior functional status.  Pt will improve bilateral upper/lower abdominal strength to > or = 4/5 to allow sufficient core stability for activities of daily living.  Pt will be compliant and independent with HEP to allow and maintain better  participation in normal activities  Pt will be able to resume normals ADL's, IADL's, previous activities       PLAN     Discharge to home exercise program.   goals mostly met and per pt request.    Michael Dominguez, PT

## 2023-05-31 ENCOUNTER — CLINICAL SUPPORT (OUTPATIENT)
Dept: REHABILITATION | Facility: HOSPITAL | Age: 73
End: 2023-05-31
Payer: MEDICARE

## 2023-05-31 DIAGNOSIS — M25.69 BACK STIFFNESS: ICD-10-CM

## 2023-05-31 DIAGNOSIS — R29.898 WEAKNESS OF BOTH HIPS: ICD-10-CM

## 2023-05-31 DIAGNOSIS — R29.898 IMPAIRED FLEXIBILITY OF LOWER EXTREMITY: ICD-10-CM

## 2023-05-31 DIAGNOSIS — M54.6 CHRONIC LEFT-SIDED THORACIC BACK PAIN: ICD-10-CM

## 2023-05-31 DIAGNOSIS — G89.29 CHRONIC LEFT-SIDED THORACIC BACK PAIN: ICD-10-CM

## 2023-05-31 DIAGNOSIS — G89.29 CHRONIC LEFT-SIDED LOW BACK PAIN WITHOUT SCIATICA: Primary | ICD-10-CM

## 2023-05-31 DIAGNOSIS — M54.50 CHRONIC LEFT-SIDED LOW BACK PAIN WITHOUT SCIATICA: Primary | ICD-10-CM

## 2023-05-31 DIAGNOSIS — M53.3 SACROILIAC JOINT PAIN: ICD-10-CM

## 2023-05-31 DIAGNOSIS — R26.9 ALTERED GAIT: ICD-10-CM

## 2023-05-31 PROCEDURE — 97140 MANUAL THERAPY 1/> REGIONS: CPT | Mod: PN

## 2023-05-31 PROCEDURE — 97112 NEUROMUSCULAR REEDUCATION: CPT | Mod: PN

## 2023-05-31 PROCEDURE — 97110 THERAPEUTIC EXERCISES: CPT | Mod: PN

## 2023-06-01 ENCOUNTER — TELEPHONE (OUTPATIENT)
Dept: ENDOCRINOLOGY | Facility: CLINIC | Age: 73
End: 2023-06-01
Payer: MEDICARE

## 2023-06-07 ENCOUNTER — LAB VISIT (OUTPATIENT)
Dept: LAB | Facility: HOSPITAL | Age: 73
End: 2023-06-07
Attending: NURSE PRACTITIONER
Payer: MEDICARE

## 2023-06-07 DIAGNOSIS — E11.40 TYPE 2 DIABETES MELLITUS WITH DIABETIC NEUROPATHY, WITH LONG-TERM CURRENT USE OF INSULIN: ICD-10-CM

## 2023-06-07 DIAGNOSIS — Z79.4 TYPE 2 DIABETES MELLITUS WITH DIABETIC NEUROPATHY, WITH LONG-TERM CURRENT USE OF INSULIN: ICD-10-CM

## 2023-06-07 LAB
ANION GAP SERPL CALC-SCNC: 8 MMOL/L (ref 8–16)
BUN SERPL-MCNC: 19 MG/DL (ref 8–23)
CALCIUM SERPL-MCNC: 9.8 MG/DL (ref 8.7–10.5)
CHLORIDE SERPL-SCNC: 103 MMOL/L (ref 95–110)
CHOLEST SERPL-MCNC: 152 MG/DL (ref 120–199)
CHOLEST/HDLC SERPL: 3.2 {RATIO} (ref 2–5)
CO2 SERPL-SCNC: 28 MMOL/L (ref 23–29)
CREAT SERPL-MCNC: 0.9 MG/DL (ref 0.5–1.4)
EST. GFR  (NO RACE VARIABLE): >60 ML/MIN/1.73 M^2
ESTIMATED AVG GLUCOSE: 183 MG/DL (ref 68–131)
GLUCOSE SERPL-MCNC: 157 MG/DL (ref 70–110)
HBA1C MFR BLD: 8 % (ref 4–5.6)
HDLC SERPL-MCNC: 47 MG/DL (ref 40–75)
HDLC SERPL: 30.9 % (ref 20–50)
LDLC SERPL CALC-MCNC: 66.4 MG/DL (ref 63–159)
NONHDLC SERPL-MCNC: 105 MG/DL
POTASSIUM SERPL-SCNC: 4.6 MMOL/L (ref 3.5–5.1)
SODIUM SERPL-SCNC: 139 MMOL/L (ref 136–145)
TRIGL SERPL-MCNC: 193 MG/DL (ref 30–150)

## 2023-06-07 PROCEDURE — 80061 LIPID PANEL: CPT | Performed by: NURSE PRACTITIONER

## 2023-06-07 PROCEDURE — 36415 COLL VENOUS BLD VENIPUNCTURE: CPT | Mod: PN | Performed by: NURSE PRACTITIONER

## 2023-06-07 PROCEDURE — 83036 HEMOGLOBIN GLYCOSYLATED A1C: CPT | Performed by: NURSE PRACTITIONER

## 2023-06-07 PROCEDURE — 80048 BASIC METABOLIC PNL TOTAL CA: CPT | Performed by: NURSE PRACTITIONER

## 2023-06-08 ENCOUNTER — PATIENT MESSAGE (OUTPATIENT)
Dept: PRIMARY CARE CLINIC | Facility: CLINIC | Age: 73
End: 2023-06-08
Payer: MEDICARE

## 2023-06-08 RX ORDER — LEVOTHYROXINE SODIUM 112 UG/1
112 TABLET ORAL
Qty: 90 TABLET | Refills: 1 | Status: SHIPPED | OUTPATIENT
Start: 2023-06-08 | End: 2023-12-27

## 2023-06-14 ENCOUNTER — OFFICE VISIT (OUTPATIENT)
Dept: ENDOCRINOLOGY | Facility: CLINIC | Age: 73
End: 2023-06-14
Payer: MEDICARE

## 2023-06-14 ENCOUNTER — PATIENT MESSAGE (OUTPATIENT)
Dept: ENDOCRINOLOGY | Facility: CLINIC | Age: 73
End: 2023-06-14

## 2023-06-14 VITALS
DIASTOLIC BLOOD PRESSURE: 70 MMHG | HEIGHT: 62 IN | SYSTOLIC BLOOD PRESSURE: 122 MMHG | BODY MASS INDEX: 39.07 KG/M2 | WEIGHT: 212.31 LBS | HEART RATE: 71 BPM

## 2023-06-14 DIAGNOSIS — E78.5 HYPERLIPIDEMIA, UNSPECIFIED HYPERLIPIDEMIA TYPE: ICD-10-CM

## 2023-06-14 DIAGNOSIS — Z87.19 HISTORY OF PANCREATITIS: ICD-10-CM

## 2023-06-14 DIAGNOSIS — E66.9 OBESITY (BMI 30-39.9): ICD-10-CM

## 2023-06-14 DIAGNOSIS — Z79.4 TYPE 2 DIABETES MELLITUS WITH DIABETIC NEUROPATHY, WITH LONG-TERM CURRENT USE OF INSULIN: Primary | ICD-10-CM

## 2023-06-14 DIAGNOSIS — E11.40 TYPE 2 DIABETES MELLITUS WITH DIABETIC NEUROPATHY, WITH LONG-TERM CURRENT USE OF INSULIN: Primary | ICD-10-CM

## 2023-06-14 DIAGNOSIS — E89.0 POSTOPERATIVE HYPOTHYROIDISM: ICD-10-CM

## 2023-06-14 DIAGNOSIS — I25.10 CORONARY ARTERY DISEASE INVOLVING NATIVE CORONARY ARTERY WITHOUT ANGINA PECTORIS, UNSPECIFIED WHETHER NATIVE OR TRANSPLANTED HEART: ICD-10-CM

## 2023-06-14 DIAGNOSIS — I10 ESSENTIAL HYPERTENSION: ICD-10-CM

## 2023-06-14 DIAGNOSIS — I50.9 CHF (NYHA CLASS III, ACC/AHA STAGE C): ICD-10-CM

## 2023-06-14 PROCEDURE — 3008F PR BODY MASS INDEX (BMI) DOCUMENTED: ICD-10-PCS | Mod: CPTII,S$GLB,, | Performed by: NURSE PRACTITIONER

## 2023-06-14 PROCEDURE — 3074F PR MOST RECENT SYSTOLIC BLOOD PRESSURE < 130 MM HG: ICD-10-PCS | Mod: CPTII,S$GLB,, | Performed by: NURSE PRACTITIONER

## 2023-06-14 PROCEDURE — 3052F HG A1C>EQUAL 8.0%<EQUAL 9.0%: CPT | Mod: CPTII,S$GLB,, | Performed by: NURSE PRACTITIONER

## 2023-06-14 PROCEDURE — 3078F DIAST BP <80 MM HG: CPT | Mod: CPTII,S$GLB,, | Performed by: NURSE PRACTITIONER

## 2023-06-14 PROCEDURE — 3052F PR MOST RECENT HEMOGLOBIN A1C LEVEL 8.0 - < 9.0%: ICD-10-PCS | Mod: CPTII,S$GLB,, | Performed by: NURSE PRACTITIONER

## 2023-06-14 PROCEDURE — 3288F FALL RISK ASSESSMENT DOCD: CPT | Mod: CPTII,S$GLB,, | Performed by: NURSE PRACTITIONER

## 2023-06-14 PROCEDURE — 4010F ACE/ARB THERAPY RXD/TAKEN: CPT | Mod: CPTII,S$GLB,, | Performed by: NURSE PRACTITIONER

## 2023-06-14 PROCEDURE — 1126F AMNT PAIN NOTED NONE PRSNT: CPT | Mod: CPTII,S$GLB,, | Performed by: NURSE PRACTITIONER

## 2023-06-14 PROCEDURE — 99214 OFFICE O/P EST MOD 30 MIN: CPT | Mod: S$GLB,,, | Performed by: NURSE PRACTITIONER

## 2023-06-14 PROCEDURE — 3288F PR FALLS RISK ASSESSMENT DOCUMENTED: ICD-10-PCS | Mod: CPTII,S$GLB,, | Performed by: NURSE PRACTITIONER

## 2023-06-14 PROCEDURE — 1160F RVW MEDS BY RX/DR IN RCRD: CPT | Mod: CPTII,S$GLB,, | Performed by: NURSE PRACTITIONER

## 2023-06-14 PROCEDURE — 1160F PR REVIEW ALL MEDS BY PRESCRIBER/CLIN PHARMACIST DOCUMENTED: ICD-10-PCS | Mod: CPTII,S$GLB,, | Performed by: NURSE PRACTITIONER

## 2023-06-14 PROCEDURE — 95251 CONT GLUC MNTR ANALYSIS I&R: CPT | Mod: S$GLB,,, | Performed by: NURSE PRACTITIONER

## 2023-06-14 PROCEDURE — 1159F MED LIST DOCD IN RCRD: CPT | Mod: CPTII,S$GLB,, | Performed by: NURSE PRACTITIONER

## 2023-06-14 PROCEDURE — 1101F PR PT FALLS ASSESS DOC 0-1 FALLS W/OUT INJ PAST YR: ICD-10-PCS | Mod: CPTII,S$GLB,, | Performed by: NURSE PRACTITIONER

## 2023-06-14 PROCEDURE — 3078F PR MOST RECENT DIASTOLIC BLOOD PRESSURE < 80 MM HG: ICD-10-PCS | Mod: CPTII,S$GLB,, | Performed by: NURSE PRACTITIONER

## 2023-06-14 PROCEDURE — 99214 PR OFFICE/OUTPT VISIT, EST, LEVL IV, 30-39 MIN: ICD-10-PCS | Mod: S$GLB,,, | Performed by: NURSE PRACTITIONER

## 2023-06-14 PROCEDURE — 99999 PR PBB SHADOW E&M-EST. PATIENT-LVL V: CPT | Mod: PBBFAC,,, | Performed by: NURSE PRACTITIONER

## 2023-06-14 PROCEDURE — 3008F BODY MASS INDEX DOCD: CPT | Mod: CPTII,S$GLB,, | Performed by: NURSE PRACTITIONER

## 2023-06-14 PROCEDURE — 4010F PR ACE/ARB THEARPY RXD/TAKEN: ICD-10-PCS | Mod: CPTII,S$GLB,, | Performed by: NURSE PRACTITIONER

## 2023-06-14 PROCEDURE — 95251 PR GLUCOSE MONITOR, 72 HOUR, PHYS INTERP: ICD-10-PCS | Mod: S$GLB,,, | Performed by: NURSE PRACTITIONER

## 2023-06-14 PROCEDURE — 1101F PT FALLS ASSESS-DOCD LE1/YR: CPT | Mod: CPTII,S$GLB,, | Performed by: NURSE PRACTITIONER

## 2023-06-14 PROCEDURE — 3074F SYST BP LT 130 MM HG: CPT | Mod: CPTII,S$GLB,, | Performed by: NURSE PRACTITIONER

## 2023-06-14 PROCEDURE — 1159F PR MEDICATION LIST DOCUMENTED IN MEDICAL RECORD: ICD-10-PCS | Mod: CPTII,S$GLB,, | Performed by: NURSE PRACTITIONER

## 2023-06-14 PROCEDURE — 99999 PR PBB SHADOW E&M-EST. PATIENT-LVL V: ICD-10-PCS | Mod: PBBFAC,,, | Performed by: NURSE PRACTITIONER

## 2023-06-14 PROCEDURE — 1126F PR PAIN SEVERITY QUANTIFIED, NO PAIN PRESENT: ICD-10-PCS | Mod: CPTII,S$GLB,, | Performed by: NURSE PRACTITIONER

## 2023-06-14 RX ORDER — INSULIN DEGLUDEC 200 U/ML
34 INJECTION, SOLUTION SUBCUTANEOUS DAILY
Start: 2023-06-14 | End: 2023-12-04 | Stop reason: SDUPTHER

## 2023-06-14 RX ORDER — TIZANIDINE 4 MG/1
TABLET ORAL
COMMUNITY
Start: 2023-04-06

## 2023-06-14 NOTE — PROGRESS NOTES
CC: Ms. Mckenzie Mari arrives today for management of Type 2 DM and review of chronic medical conditions.     HPI: Ms. Mckenzie Mari was diagnosed with Type 2 DM in 2004. She was diagnosed based on lab work. Initial treatment consisted of metformin and glimepiride. Insulin added in 8/2016 - began Toujeo. She states that she felt that this caused nausea, abd pain, chest pain. Lantus caused throat swelling, left arm pain. She was then changed to Novolog 70/30 but this was only used for a short period because her insurance didn't cover.  Then changed to Humalog 50-50 in 12/2016. In 2017, she began MDI with Tresiba and Humalog. Jardiance added in 8/2021. Began use of Dexcom G6 in 2021.  + FH of DM in maternal aunt and cousin. Denies hospitalizations due to DM. Previously seen in endocrine by Richard Gupta, and MAGALI Eng, ARIELLE. She has a history of thyroid cancer/post-surgical hypothyroidism.   Of note, she has a h/o pancreatitis.   She follows annually with cardiology for CHF, CAD.      Last seen by me in February. Tresiba dose was decreased at that time.     She states that she had a stressful past few months and feels this contributed to higher blood sugars.     Had recent stress test, which she reports was normal. Also had back pain. Did physical therapy, which helped, but does still have some pain.     Also recently had to care for grandson's 8 week old puppy, which was eventful.     BG monitoring per Dexcom G6.     Hypoglycemia: Rare      Missing Insulin/PO medication doses: No  Timing prandial insulin 5-15 minutes before meals: yes    Exercise: no    Dietary Habits: Eats 3 meals/day.  Snacking occasionally. Avoids sugary drinks.    Last DM education: 1/2019    Regarding hypothyroidism, her PCP adjusted Synthroid dose a few months ago. Now taking 112 mcg daily.        CURRENT DIABETIC MEDS: Jardiance 10 mg daily, Tresiba 32 units QAM, Humalog 10 units AC + correction scale, target 180, ISF 25  Vial or  "pen: pen  Glucometer type: [facility] True Metrix    Previous DM treatments:   Invokana - nausea  Glimepiride - stopped when prandial insulin added  Touejo -  Nausea, abd pain, chest pain  Lantus - throat swelling, left arm pain  Novolog 70/30 - not covered by insurance  Metformin - headaches    Last Eye Exam: 6/13/2023 - No DR. Dr. Mcgregor. She reports report was faxed to PCP yesterday.  Last Podiatry Exam: no    REVIEW OF SYSTEMS  Constitutional: no c/o fatigue, weakness, weight loss.   Cardiac: no palpitations. + intermittent chest pain. Cardiology aware. Uses furosemide as needed for LLE edema.   Respiratory: no cough. C/o dyspnea that is chronic. Rarely uses albuterol.  GI: no c/o abdominal pain, nausea. + H/o pancreatitis.   Skin: no rashes, lesions  Musculoskeletal: reports back pain  Neuro: + intermittent numbness, tingling in feet.   Endocrine: denies polyphagia, polydipsia, polyuria      Personally reviewed Past Medical, Surgical, Social History.    Vital Signs  /70   Pulse 71   Ht 5' 2" (1.575 m)   Wt 96.3 kg (212 lb 4.9 oz)   BMI 38.83 kg/m²     Personally reviewed the below labs:    Hemoglobin A1C   Date Value Ref Range Status   06/07/2023 8.0 (H) 4.0 - 5.6 % Final     Comment:     ADA Screening Guidelines:  5.7-6.4%  Consistent with prediabetes  >or=6.5%  Consistent with diabetes    High levels of fetal hemoglobin interfere with the HbA1C  assay. Heterozygous hemoglobin variants (HbS, HgC, etc)do  not significantly interfere with this assay.   However, presence of multiple variants may affect accuracy.     02/07/2023 7.6 (H) 4.0 - 5.6 % Final     Comment:     ADA Screening Guidelines:  5.7-6.4%  Consistent with prediabetes  >or=6.5%  Consistent with diabetes    High levels of fetal hemoglobin interfere with the HbA1C  assay. Heterozygous hemoglobin variants (HbS, HgC, etc)do  not significantly interfere with this assay.   However, presence of multiple variants may affect accuracy.   "   10/04/2022 7.5 (H) 4.0 - 5.6 % Final     Comment:     ADA Screening Guidelines:  5.7-6.4%  Consistent with prediabetes  >or=6.5%  Consistent with diabetes    High levels of fetal hemoglobin interfere with the HbA1C  assay. Heterozygous hemoglobin variants (HbS, HgC, etc)do  not significantly interfere with this assay.   However, presence of multiple variants may affect accuracy.         Chemistry        Component Value Date/Time     06/07/2023 0723    K 4.6 06/07/2023 0723     06/07/2023 0723    CO2 28 06/07/2023 0723    BUN 19 06/07/2023 0723    CREATININE 0.9 06/07/2023 0723     (H) 06/07/2023 0723        Component Value Date/Time    CALCIUM 9.8 06/07/2023 0723    ALKPHOS 80 02/07/2023 0725    AST 15 02/07/2023 0725    ALT 18 02/07/2023 0725    BILITOT 1.4 (H) 02/07/2023 0725          Lab Results   Component Value Date    CHOL 152 06/07/2023    CHOL 124 06/01/2022    CHOL 143 08/10/2021     Lab Results   Component Value Date    HDL 47 06/07/2023    HDL 48 06/01/2022    HDL 48 08/10/2021     Lab Results   Component Value Date    LDLCALC 66.4 06/07/2023    LDLCALC 50.2 (L) 06/01/2022    LDLCALC 68.4 08/10/2021     Lab Results   Component Value Date    TRIG 193 (H) 06/07/2023    TRIG 129 06/01/2022    TRIG 133 08/10/2021     Lab Results   Component Value Date    CHOLHDL 30.9 06/07/2023    CHOLHDL 38.7 06/01/2022    CHOLHDL 33.6 08/10/2021       Lab Results   Component Value Date    MICALBCREAT Unable to calculate 06/01/2022     Lab Results   Component Value Date    TSH 0.265 (L) 02/07/2023       CrCl cannot be calculated (Patient's most recent lab result is older than the maximum 7 days allowed.).    Vit D, 25-Hydroxy   Date Value Ref Range Status   11/08/2014 51 30 - 96 ng/mL Final     Comment:     Vitamin D deficiency.........<10 ng/mL                              Vitamin D insufficiency......10-29 ng/mL       Vitamin D sufficiency........> or equal to 30 ng/mL  Vitamin D  toxicity............>100 ng/mL          Ref. Range 6/25/2020 07:27   FRUCTOSAMINE Latest Ref Range: SeeBelow umol /L 291         PHYSICAL EXAMINATION  Constitutional: Appears well, no distress.  Respiratory: CTA, even and unlabored.  Cardiovascular: RRR, no murmurs, no carotid bruits.   GI: active bowel sounds, no hernia  Skin: warm and dry  Neuro: oriented to person, place, time.   Feet: appropriate footwear.        DEXCOM DOWNLOAD: See media file for details. Fasting glucoses are mostly >150. Occasional prandial excursions but less in the past week. No hypoglycemia.  Average glucose: 171 mg/dL  Above 250 mg/dL: <1 %  181-250 mg/dL: 35 %   mg/dL: 64 %  54-69 mg/dL: 0 %  Below 54 mg/dL: 0 %         Goals        HEMOGLOBIN A1C < 7.5          due to CAD, CHF, patient's desire for glucose to remain >150.       Assessment/Plan  1. Type 2 diabetes mellitus with diabetic neuropathy, with long-term current use of insulin  -- A1c has increased. Fasting baseline is elevated so will proceed with Tresiba increase.   -- increase Tresiba to 34 units.   -- continue Humalog 10 units with meals.   -- continue Jardiance   -- BG monitoring per Dexcom G6.   -- Would not use Actos, due to CHF. GLP-1RA and DPP4-I contraindicated due to h/o pancreatitis. Cannot tolerate metformin.     -- Discussed diagnosis of DM, A1c goals, progression of disease, long term complications and tx options.  Advised patient to check BG before activities, such as driving or exercise.  -- Reviewed hypoglycemia management: treat with 1/2 glass of juice, 1/2 can regular coke, or 4 glucose tablets. Monitor and repeat treatment every 15 minutes until BG is >70 Then have a snack, which includes a complex carbohydrate and protein.    -- takes statin and ARB     2. Coronary artery disease involving native coronary artery without angina pectoris, unspecified whether native or transplanted heart  -- stable  -- Jardiance may offer cardio-protective benefit.      3. CHF (NYHA class III, ACC/AHA stage C)  -- follows with cardiology   4. Postoperative hypothyroidism  -- suppressed, h/o thyroid cancer  -- managed by PCP   5. Essential hypertension  -- controlled   -- continue ARB   6. Hyperlipidemia, unspecified hyperlipidemia type  -- controlled  -- continue Crestor every other day   7. Obesity (BMI 30-39.9)  -- stable   Body mass index is 38.83 kg/m².   8. History of pancreatitis  -- avoid GLP-1RA and DPP4-i       FOLLOW UP  Follow up in about 4 months (around 10/14/2023).   Patient instructed to bring BG logs to each follow up.   Patient encouraged to call for any BG/medication issues, concerns, or questions.      Orders Placed This Encounter   Procedures    TSH    Hemoglobin A1C    Microalbumin/Creatinine Ratio, Urine    Comprehensive Metabolic Panel

## 2023-07-06 ENCOUNTER — CLINICAL SUPPORT (OUTPATIENT)
Dept: CARDIOLOGY | Facility: HOSPITAL | Age: 73
End: 2023-07-06
Payer: MEDICARE

## 2023-07-06 DIAGNOSIS — Z95.810 PRESENCE OF AUTOMATIC (IMPLANTABLE) CARDIAC DEFIBRILLATOR: ICD-10-CM

## 2023-07-06 PROCEDURE — 93295 CARDIAC DEVICE CHECK - REMOTE: ICD-10-PCS | Mod: HCNC,,, | Performed by: INTERNAL MEDICINE

## 2023-07-06 PROCEDURE — 93296 REM INTERROG EVL PM/IDS: CPT | Mod: HCNC,PO | Performed by: INTERNAL MEDICINE

## 2023-07-06 PROCEDURE — 93295 DEV INTERROG REMOTE 1/2/MLT: CPT | Mod: HCNC,,, | Performed by: INTERNAL MEDICINE

## 2023-07-13 ENCOUNTER — OFFICE VISIT (OUTPATIENT)
Dept: PRIMARY CARE CLINIC | Facility: CLINIC | Age: 73
End: 2023-07-13
Payer: MEDICARE

## 2023-07-13 VITALS
BODY MASS INDEX: 36.17 KG/M2 | DIASTOLIC BLOOD PRESSURE: 68 MMHG | OXYGEN SATURATION: 95 % | WEIGHT: 211.88 LBS | HEIGHT: 64 IN | SYSTOLIC BLOOD PRESSURE: 120 MMHG | HEART RATE: 65 BPM | RESPIRATION RATE: 18 BRPM | TEMPERATURE: 98 F

## 2023-07-13 DIAGNOSIS — I10 PRIMARY HYPERTENSION: Primary | Chronic | ICD-10-CM

## 2023-07-13 DIAGNOSIS — E11.40 TYPE 2 DIABETES MELLITUS WITH DIABETIC NEUROPATHY, WITH LONG-TERM CURRENT USE OF INSULIN: Chronic | ICD-10-CM

## 2023-07-13 DIAGNOSIS — Z79.4 TYPE 2 DIABETES MELLITUS WITH DIABETIC NEUROPATHY, WITH LONG-TERM CURRENT USE OF INSULIN: Chronic | ICD-10-CM

## 2023-07-13 DIAGNOSIS — E89.0 POSTOPERATIVE HYPOTHYROIDISM: Chronic | ICD-10-CM

## 2023-07-13 PROCEDURE — 3078F DIAST BP <80 MM HG: CPT | Mod: HCNC,CPTII,S$GLB, | Performed by: FAMILY MEDICINE

## 2023-07-13 PROCEDURE — 99215 PR OFFICE/OUTPT VISIT, EST, LEVL V, 40-54 MIN: ICD-10-PCS | Mod: HCNC,S$GLB,, | Performed by: FAMILY MEDICINE

## 2023-07-13 PROCEDURE — 3288F FALL RISK ASSESSMENT DOCD: CPT | Mod: HCNC,CPTII,S$GLB, | Performed by: FAMILY MEDICINE

## 2023-07-13 PROCEDURE — 1159F PR MEDICATION LIST DOCUMENTED IN MEDICAL RECORD: ICD-10-PCS | Mod: HCNC,CPTII,S$GLB, | Performed by: FAMILY MEDICINE

## 2023-07-13 PROCEDURE — 1159F MED LIST DOCD IN RCRD: CPT | Mod: HCNC,CPTII,S$GLB, | Performed by: FAMILY MEDICINE

## 2023-07-13 PROCEDURE — 1101F PR PT FALLS ASSESS DOC 0-1 FALLS W/OUT INJ PAST YR: ICD-10-PCS | Mod: HCNC,CPTII,S$GLB, | Performed by: FAMILY MEDICINE

## 2023-07-13 PROCEDURE — 3078F PR MOST RECENT DIASTOLIC BLOOD PRESSURE < 80 MM HG: ICD-10-PCS | Mod: HCNC,CPTII,S$GLB, | Performed by: FAMILY MEDICINE

## 2023-07-13 PROCEDURE — 3008F BODY MASS INDEX DOCD: CPT | Mod: HCNC,CPTII,S$GLB, | Performed by: FAMILY MEDICINE

## 2023-07-13 PROCEDURE — 99215 OFFICE O/P EST HI 40 MIN: CPT | Mod: HCNC,S$GLB,, | Performed by: FAMILY MEDICINE

## 2023-07-13 PROCEDURE — 4010F PR ACE/ARB THEARPY RXD/TAKEN: ICD-10-PCS | Mod: HCNC,CPTII,S$GLB, | Performed by: FAMILY MEDICINE

## 2023-07-13 PROCEDURE — 1101F PT FALLS ASSESS-DOCD LE1/YR: CPT | Mod: HCNC,CPTII,S$GLB, | Performed by: FAMILY MEDICINE

## 2023-07-13 PROCEDURE — 1160F RVW MEDS BY RX/DR IN RCRD: CPT | Mod: HCNC,CPTII,S$GLB, | Performed by: FAMILY MEDICINE

## 2023-07-13 PROCEDURE — 1158F ADVNC CARE PLAN TLK DOCD: CPT | Mod: HCNC,CPTII,S$GLB, | Performed by: FAMILY MEDICINE

## 2023-07-13 PROCEDURE — 1125F PR PAIN SEVERITY QUANTIFIED, PAIN PRESENT: ICD-10-PCS | Mod: HCNC,CPTII,S$GLB, | Performed by: FAMILY MEDICINE

## 2023-07-13 PROCEDURE — 4010F ACE/ARB THERAPY RXD/TAKEN: CPT | Mod: HCNC,CPTII,S$GLB, | Performed by: FAMILY MEDICINE

## 2023-07-13 PROCEDURE — 3052F HG A1C>EQUAL 8.0%<EQUAL 9.0%: CPT | Mod: HCNC,CPTII,S$GLB, | Performed by: FAMILY MEDICINE

## 2023-07-13 PROCEDURE — 3008F PR BODY MASS INDEX (BMI) DOCUMENTED: ICD-10-PCS | Mod: HCNC,CPTII,S$GLB, | Performed by: FAMILY MEDICINE

## 2023-07-13 PROCEDURE — 3074F PR MOST RECENT SYSTOLIC BLOOD PRESSURE < 130 MM HG: ICD-10-PCS | Mod: HCNC,CPTII,S$GLB, | Performed by: FAMILY MEDICINE

## 2023-07-13 PROCEDURE — 99999 PR PBB SHADOW E&M-EST. PATIENT-LVL V: CPT | Mod: PBBFAC,HCNC,, | Performed by: FAMILY MEDICINE

## 2023-07-13 PROCEDURE — 3052F PR MOST RECENT HEMOGLOBIN A1C LEVEL 8.0 - < 9.0%: ICD-10-PCS | Mod: HCNC,CPTII,S$GLB, | Performed by: FAMILY MEDICINE

## 2023-07-13 PROCEDURE — 1125F AMNT PAIN NOTED PAIN PRSNT: CPT | Mod: HCNC,CPTII,S$GLB, | Performed by: FAMILY MEDICINE

## 2023-07-13 PROCEDURE — 3074F SYST BP LT 130 MM HG: CPT | Mod: HCNC,CPTII,S$GLB, | Performed by: FAMILY MEDICINE

## 2023-07-13 PROCEDURE — 1160F PR REVIEW ALL MEDS BY PRESCRIBER/CLIN PHARMACIST DOCUMENTED: ICD-10-PCS | Mod: HCNC,CPTII,S$GLB, | Performed by: FAMILY MEDICINE

## 2023-07-13 PROCEDURE — 3288F PR FALLS RISK ASSESSMENT DOCUMENTED: ICD-10-PCS | Mod: HCNC,CPTII,S$GLB, | Performed by: FAMILY MEDICINE

## 2023-07-13 PROCEDURE — 1158F PR ADVANCE CARE PLANNING DISCUSS DOCUMENTED IN MEDICAL RECORD: ICD-10-PCS | Mod: HCNC,CPTII,S$GLB, | Performed by: FAMILY MEDICINE

## 2023-07-13 PROCEDURE — 99999 PR PBB SHADOW E&M-EST. PATIENT-LVL V: ICD-10-PCS | Mod: PBBFAC,HCNC,, | Performed by: FAMILY MEDICINE

## 2023-07-13 NOTE — PROGRESS NOTES
THIS DOCUMENT WAS MADE IN PART WITH VOICE RECOGNITION SOFTWARE.  OCCASIONALLY THIS SOFTWARE WILL MISINTERPRET WORDS OR PHRASES.      [facility] Care Provider Appointment   [facility]       Patient ID: Mckenzie Mari is a 72 y.o. female.    ASSESSMENT/PLAN by Problem List:    1. Primary hypertension  Assessment & Plan:  Stable and satisfactory.  Continue current medication      2. Type 2 diabetes mellitus with diabetic neuropathy, with long-term current use of insulin  Assessment & Plan:  Mild increase in A1c to 8%, working with endocrine, she is noticing improvement      3. Postoperative hypothyroidism  Assessment & Plan:  TSH remains mildly depressed even after reduction in dosage.  She does not report any symptoms of hyperthyroidism.  Discussed reducing the dose further but she is hesitant as she just filled her medicine so I think it is reasonable to continue the same and check again in a few months.                 Follow Up:  4-5 months    Forty-three minutes of total time spent on the encounter, time includes face to face time, and some or all of the following: review of chart, lab, imaging, consultant notes, ER, hospital, documentation, care coordination, etc.    Health Maintenance         Date Due Completion Date    TETANUS VACCINE Never done ---    Shingles Vaccine (1 of 2) Never done ---    COVID-19 Vaccine (4 - Pfizer series) 08/08/2022 6/13/2022    Diabetes Urine Screening 06/01/2023 6/1/2022    Mammogram 08/31/2023 (Originally 8/18/2023) 8/18/2022    Override on 9/17/2015: Done    Override on 7/16/2010: Done    Influenza Vaccine (1) 09/01/2023 10/2/2020    Hemoglobin A1c 09/07/2023 6/7/2023    Foot Exam 02/14/2024 2/14/2023    Lipid Panel 06/07/2024 6/7/2023    Eye Exam 06/13/2024 6/13/2023 (Done)    Override on 6/13/2023: Done    Override on 6/7/2022: Done (Dr. Mcgregor)    Override on 2/26/2020: Done (no DR per pt. cannot recall provider's name)    Override on  6/24/2016: Done (Dr Tong)    Override on 5/13/2015: Done    Override on 12/20/2013: Done    High Dose Statin 07/13/2024 7/13/2023    Aspirin/Antiplatelet Therapy 07/13/2024 7/13/2023    DEXA Scan 09/06/2025 9/6/2022    Colorectal Cancer Screening 05/11/2026 5/11/2016    Override on 10/5/2009: Done              Advance Care Planning     Date: 07/13/2023    Living Will  Power of   I initiated the process of voluntary advance care planning today and explained the importance of this process to the patient.  I introduced the concept of advance directives to the patient, as well. Then the patient received detailed information about the importance of designating a Health Care Power of  (HCPOA). She was also instructed to communicate with this person about their wishes for future healthcare, should she become sick and lose decision-making capacity. The patient has not previously appointed a HCPOA. After our discussion, the patient has not decided to complete a HCPOA     She refuses to discuss and complete, stating she has seen multiple people die 'because they signed that paper'.  Attempted to educate but she is not ready to engage in the conversation       Subjective:     Chief Complaint   Patient presents with    Follow-up     I have reviewed the information entered by the ancillary staff regarding the chief complaint as well as the related history.    Follow-up      Patient is a/an 72 y.o.  female      follow-up.  Diabetes mild worsening with A1c at 8.0 but she feels this is related to stress and she is already making improvements.  She does follow with Endocrinology.      Other conditions have been fairly stable.  See above for all details.      Her depression screen was positive.  We did discuss this.  She denies feeling depressed.  She does admit to some stress family, situational stress, etc..  She does not feel that she needs any assistance but will let me know if anything changes or  "worsens.    For complete problem list, past medical history, surgical history, social history, etc., see appropriate section in the electronic medical record    Review of Systems   Respiratory: Negative.     Cardiovascular: Negative.    Gastrointestinal: Negative.    Genitourinary: Negative.    Musculoskeletal:  Positive for back pain.   Psychiatric/Behavioral:  Positive for dysphoric mood. Negative for sleep disturbance.      Objective     Physical Exam  Vitals reviewed.   Constitutional:       General: She is not in acute distress.     Appearance: She is well-developed. She is not ill-appearing.   HENT:      Head: Normocephalic and atraumatic.   Eyes:      General: No scleral icterus.     Conjunctiva/sclera: Conjunctivae normal.   Cardiovascular:      Rate and Rhythm: Normal rate and regular rhythm.      Heart sounds: Normal heart sounds. No murmur heard.  Pulmonary:      Effort: Pulmonary effort is normal. No respiratory distress.      Breath sounds: Normal breath sounds. No wheezing or rales.   Skin:     General: Skin is dry.      Findings: No rash.   Neurological:      Mental Status: She is alert and oriented to person, place, and time.   Psychiatric:         Behavior: Behavior normal.     Vitals:    07/13/23 1018   BP: 120/68   BP Location: Right arm   Patient Position: Sitting   BP Method: Large (Manual)   Pulse: 65   Resp: 18   Temp: 97.9 °F (36.6 °C)   TempSrc: Oral   SpO2: 95%   Weight: 96.1 kg (211 lb 13.8 oz)   Height: 5' 3.5" (1.613 m)       "

## 2023-07-13 NOTE — ASSESSMENT & PLAN NOTE
TSH remains mildly depressed even after reduction in dosage.  She does not report any symptoms of hyperthyroidism.  Discussed reducing the dose further but she is hesitant as she just filled her medicine so I think it is reasonable to continue the same and check again in a few months.

## 2023-07-17 ENCOUNTER — OFFICE VISIT (OUTPATIENT)
Dept: CARDIOLOGY | Facility: CLINIC | Age: 73
End: 2023-07-17
Payer: MEDICARE

## 2023-07-17 ENCOUNTER — HOSPITAL ENCOUNTER (OUTPATIENT)
Dept: RADIOLOGY | Facility: HOSPITAL | Age: 73
Discharge: HOME | End: 2023-07-17
Payer: MEDICARE

## 2023-07-17 VITALS
HEART RATE: 74 BPM | SYSTOLIC BLOOD PRESSURE: 128 MMHG | DIASTOLIC BLOOD PRESSURE: 70 MMHG | WEIGHT: 209.44 LBS | HEIGHT: 64 IN | OXYGEN SATURATION: 95 % | BODY MASS INDEX: 35.76 KG/M2

## 2023-07-17 DIAGNOSIS — E78.2 MIXED HYPERLIPIDEMIA: Chronic | ICD-10-CM

## 2023-07-17 DIAGNOSIS — I25.10 CORONARY ARTERY DISEASE INVOLVING NATIVE CORONARY ARTERY WITHOUT ANGINA PECTORIS, UNSPECIFIED WHETHER NATIVE OR TRANSPLANTED HEART: ICD-10-CM

## 2023-07-17 DIAGNOSIS — Z95.810 ICD (IMPLANTABLE CARDIOVERTER-DEFIBRILLATOR) IN PLACE: ICD-10-CM

## 2023-07-17 DIAGNOSIS — I50.22 CHRONIC SYSTOLIC CONGESTIVE HEART FAILURE: Chronic | ICD-10-CM

## 2023-07-17 DIAGNOSIS — Z95.810 ICD (IMPLANTABLE CARDIOVERTER-DEFIBRILLATOR) IN PLACE: Chronic | ICD-10-CM

## 2023-07-17 DIAGNOSIS — I70.0 AORTIC CALCIFICATION: ICD-10-CM

## 2023-07-17 DIAGNOSIS — I10 PRIMARY HYPERTENSION: Chronic | ICD-10-CM

## 2023-07-17 PROCEDURE — 99999 PR PBB SHADOW E&M-EST. PATIENT-LVL IV: CPT | Mod: PBBFAC,HCNC,,

## 2023-07-17 PROCEDURE — 3008F PR BODY MASS INDEX (BMI) DOCUMENTED: ICD-10-PCS | Mod: HCNC,CPTII,S$GLB,

## 2023-07-17 PROCEDURE — 3288F PR FALLS RISK ASSESSMENT DOCUMENTED: ICD-10-PCS | Mod: HCNC,CPTII,S$GLB,

## 2023-07-17 PROCEDURE — 4010F ACE/ARB THERAPY RXD/TAKEN: CPT | Mod: HCNC,CPTII,S$GLB,

## 2023-07-17 PROCEDURE — 4010F PR ACE/ARB THEARPY RXD/TAKEN: ICD-10-PCS | Mod: HCNC,CPTII,S$GLB,

## 2023-07-17 PROCEDURE — 1159F MED LIST DOCD IN RCRD: CPT | Mod: HCNC,CPTII,S$GLB,

## 2023-07-17 PROCEDURE — 3078F DIAST BP <80 MM HG: CPT | Mod: HCNC,CPTII,S$GLB,

## 2023-07-17 PROCEDURE — 1126F PR PAIN SEVERITY QUANTIFIED, NO PAIN PRESENT: ICD-10-PCS | Mod: HCNC,CPTII,S$GLB,

## 2023-07-17 PROCEDURE — 3008F BODY MASS INDEX DOCD: CPT | Mod: HCNC,CPTII,S$GLB,

## 2023-07-17 PROCEDURE — 99999 PR PBB SHADOW E&M-EST. PATIENT-LVL IV: ICD-10-PCS | Mod: PBBFAC,HCNC,,

## 2023-07-17 PROCEDURE — 3078F PR MOST RECENT DIASTOLIC BLOOD PRESSURE < 80 MM HG: ICD-10-PCS | Mod: HCNC,CPTII,S$GLB,

## 2023-07-17 PROCEDURE — 71046 XR CHEST PA AND LATERAL: ICD-10-PCS | Mod: 26,HCNC,, | Performed by: RADIOLOGY

## 2023-07-17 PROCEDURE — 71046 X-RAY EXAM CHEST 2 VIEWS: CPT | Mod: 26,HCNC,, | Performed by: RADIOLOGY

## 2023-07-17 PROCEDURE — 3052F PR MOST RECENT HEMOGLOBIN A1C LEVEL 8.0 - < 9.0%: ICD-10-PCS | Mod: HCNC,CPTII,S$GLB,

## 2023-07-17 PROCEDURE — 3074F PR MOST RECENT SYSTOLIC BLOOD PRESSURE < 130 MM HG: ICD-10-PCS | Mod: HCNC,CPTII,S$GLB,

## 2023-07-17 PROCEDURE — 1159F PR MEDICATION LIST DOCUMENTED IN MEDICAL RECORD: ICD-10-PCS | Mod: HCNC,CPTII,S$GLB,

## 2023-07-17 PROCEDURE — 1101F PR PT FALLS ASSESS DOC 0-1 FALLS W/OUT INJ PAST YR: ICD-10-PCS | Mod: HCNC,CPTII,S$GLB,

## 2023-07-17 PROCEDURE — 1101F PT FALLS ASSESS-DOCD LE1/YR: CPT | Mod: HCNC,CPTII,S$GLB,

## 2023-07-17 PROCEDURE — 71046 X-RAY EXAM CHEST 2 VIEWS: CPT | Mod: TC,HCNC,FY,PO

## 2023-07-17 PROCEDURE — 1126F AMNT PAIN NOTED NONE PRSNT: CPT | Mod: HCNC,CPTII,S$GLB,

## 2023-07-17 PROCEDURE — 99214 PR OFFICE/OUTPT VISIT, EST, LEVL IV, 30-39 MIN: ICD-10-PCS | Mod: HCNC,S$GLB,,

## 2023-07-17 PROCEDURE — 3052F HG A1C>EQUAL 8.0%<EQUAL 9.0%: CPT | Mod: HCNC,CPTII,S$GLB,

## 2023-07-17 PROCEDURE — 3288F FALL RISK ASSESSMENT DOCD: CPT | Mod: HCNC,CPTII,S$GLB,

## 2023-07-17 PROCEDURE — 99214 OFFICE O/P EST MOD 30 MIN: CPT | Mod: HCNC,S$GLB,,

## 2023-07-17 PROCEDURE — 3074F SYST BP LT 130 MM HG: CPT | Mod: HCNC,CPTII,S$GLB,

## 2023-07-17 NOTE — ASSESSMENT & PLAN NOTE
Normal device function   No shocks  CXR today to make sure device in correct position- new sureness on that side

## 2023-07-17 NOTE — ASSESSMENT & PLAN NOTE
BP well controlled   Continue telmisartan 20 mg daily   Continue coreg 25 mg BID   Low Na diet    No

## 2023-07-17 NOTE — PROGRESS NOTES
Subjective:    Patient ID:  Mckenzie Mari is a 72 y.o. female patient here for evaluation Hyperlipidemia    History of Present Illness:     Here for follow up of her chest discomfort and RUFFIN. RUFFIN is stable, relieved with albuterol. Has been told it may be due to her thoracic scoliosis.   Chest pain and belching improved with switch to Protonix. Nuclear stress test with no RI and normal EF at rest and stress.   Some soreness on R arm across from device site.     4/13/2023   Mrs. Mari is a pleasant 72 year old F who follows with Dr. Hansen here today for a follow up. The past month she has been having RUFFIN with 20 steps, which is worse than her baseline shortness of breath.This is a new problem, denies recent illness. She had a normal angiogram in 2021. Last echo as below. She also describes a sensation of her chest being squeeze from back to front relieved with a lot of belching.     Weight is stable. Dry weight 213.   Only using lasix 1-2x week.     LAST ECHOCARDIOGRAM  Results for orders placed in visit on 06/07/21    Echo    Interpretation Summary  · The left ventricle is normal in size with low normal systolic function.  · Normal right ventricular size with normal right ventricular systolic function.  · The estimated ejection fraction is 50%.  · Indeterminate left ventricular diastolic function.  · Mild mitral regurgitation.  · Normal central venous pressure (3 mmHg).  · The estimated PA systolic pressure is 31 mmHg.    LAST ISCHEMIC WORKUP   Results for orders placed during the hospital encounter of 06/09/21    Nuclear Stress - Cardiology Interpreted    Interpretation Summary    Abnormal myocardial perfusion scan.    There is a moderate intensity, small sized, reversible defect that is consistent with ischemia in the inferior wall(s).    The gated perfusion images showed an ejection fraction of 60% at rest.    There is normal wall motion at rest.    The EKG portion of this study is uninterpretable for  "ischemia secondary to ventricular pacemaker.    When compared to the previous study from 8/11/2020, There are significant changes.    No valid procedures specified.  No valid procedures specified.    REVIEW OF SYSTEMS: As noted in HPI   CARDIOVASCULAR: No recent chest pain, palpitations, arm, neck, or jaw pain.  RESPIRATORY: +chronic RUFFIN. No recent fever, cough chill.  : No blood in the urine  GI: No nausea, vomiting, or blood in stool.   MUSCULOSKELETAL: No myalgias or falls.   NEURO: No syncope, lightheadedness, or dizziness.  EYES: No sudden changes in vision.     Review of patient's allergies indicates:   Allergen Reactions    Cayenne pepper Swelling    Covid-19 vacc,mrna(pfizer)(pf) Rash    Lantus [insulin glargine] Shortness Of Breath and Swelling     Toujeo also    Adhesive      rash    Iodine and iodide containing products      Topical iodine reaction-rash/itching    Other Nausea Only     "Mycin" drugs, such as erythromycin and azithromycin.  No specific allergy mentioned to Aminoglycosides    Adhesive tape-silicones Itching    Amoxil [amoxicillin]      Once diagnosed with penicillin allergy.  Underwent skin testing that was apparently negative in approximately 2011.  However developed a rash and swelling after taking amoxicillin in 2012.    Aspirin Swelling     Other reaction(s): Stomach upset    Avelox [moxifloxacin] Itching    Betadine [povidone-iodine] Itching    Cephalexin Other (See Comments)     Blurred vision    Doxycycline Itching    Erythromycin      Other reaction(s): Stomach upset  Other reaction(s): Flatulence    Lactose intolerance [lactase] Diarrhea    Levaquin [levofloxacin] Hives     Other reaction(s): Achilles tendinitis/bursitis    Tramadol Other (See Comments)      Twitching/jumping of muscles      Hydrocodone Hives and Rash    Latex Rash    Morphine Itching and Nausea Only    Penicillins Itching, Nausea Only, Rash and Edema    Sulfa (sulfonamide antibiotics) Rash     Other reaction(s): " Unknown       Past Medical History:   Diagnosis Date    Allergy     Anticoagulant long-term use     Arthritis     Asthma     as a child    Atrial fibrillation     Breast cancer 2005    s/p lumpectomy, chemo and now cancer free over 5 years    Bundle branch block     Cardiomyopathy     EF 35 - 40%    CHF (congestive heart failure)     FC II    Chronic sinusitis     Coronary artery disease     CVID (common variable immunodeficiency)     Diabetes mellitus     Diabetes mellitus, type 2     GERD (gastroesophageal reflux disease)     Headache(784.0)     HTN (hypertension)     Hyperlipidemia     Hypothyroid 07/25/2012    Malignant hyperthermia     daughter and supposedly mother have had this    Miscarriage 1971    Otitis media     Presence of combination internal cardiac defibrillator (ICD) and pacemaker     Thyroid cancer     Thyroid cancer 07/25/2012    Thyroid disease     hypothyroid after papillary thyroid cancer with thyroid resection     Past Surgical History:   Procedure Laterality Date    BREAST LUMPECTOMY      left    CARDIAC PACEMAKER PLACEMENT      CATARACT EXTRACTION W/  INTRAOCULAR LENS IMPLANT Bilateral     CHOLECYSTECTOMY      COLONOSCOPY N/A 05/11/2016    Procedure: COLONOSCOPY;  Surgeon: Alfonso Serrano Jr., MD;  Location: [facility] ENDO;  Service: Endoscopy;  Laterality: N/A;    COLONOSCOPY W/ POLYPECTOMY  10/05/2009    MON[facility]   One 2 mm polyp in the cecum.  TUBULAR ADENOMA.  Tortuous colon.    ESOPHAGOGASTRODUODENOSCOPY  09/12/2006    MON[facility]   Dysphagia.  NERD.  Patulous LES.  Atrophic mucosa.  Benign fundal polyps.  Dilated, 42 Fr.    HYSTERECTOMY      JOINT REPLACEMENT Bilateral     knee    LEFT HEART CATHETERIZATION N/A 06/25/2021    Procedure: Left heart cath;  Surgeon: Joseph Hansen MD;  Location: [facility] CATH;  Service: Cardiology;  Laterality: N/A;    MOLE REMOVAL      back    pacemaker defibrillator      THYROID SURGERY  x2    TONSILLECTOMY       Social History     Tobacco Use    Smoking status:  Never    Smokeless tobacco: Never   Substance Use Topics    Alcohol use: No    Drug use: No         Objective      Vitals:    07/17/23 1307   BP: 128/70   Pulse: 74       LAST EKG  Results for orders placed or performed during the hospital encounter of 10/04/21   EKG 12-lead    Collection Time: 10/04/21 10:56 AM    Narrative    Test Reason : I49.9,    Vent. Rate : 068 BPM     Atrial Rate : 068 BPM     P-R Int : 144 ms          QRS Dur : 136 ms      QT Int : 426 ms       P-R-T Axes : 060 148 070 degrees     QTc Int : 452 ms    Atrial-sensed ventricular-paced rhythm  Biventricular pacemaker detected  Abnormal ECG  When compared with ECG of 04-OCT-2021 08:06,  No significant change was found  Confirmed by Carlos Velázquez MD ([id number]) on 10/12/2021 5:52:42 PM    Referred By:             Confirmed By:Carlos Velázquez MD     LIPIDS - LAST 2   Lab Results   Component Value Date    CHOL 152 06/07/2023    CHOL 124 06/01/2022    HDL 47 06/07/2023    HDL 48 06/01/2022    LDLCALC 66.4 06/07/2023    LDLCALC 50.2 (L) 06/01/2022    TRIG 193 (H) 06/07/2023    TRIG 129 06/01/2022    CHOLHDL 30.9 06/07/2023    CHOLHDL 38.7 06/01/2022     CARDIAC PROFILE - LAST 2  Lab Results   Component Value Date    BNP <10 02/24/2015    BNP 36 09/08/2010    TROPONINI <0.012 03/17/2017      CBC - LAST 2  Lab Results   Component Value Date    WBC 6.96 06/01/2022    WBC 7.07 02/14/2022    RBC 4.66 06/01/2022    RBC 5.14 02/14/2022    HGB 14.1 06/01/2022    HGB 15.7 02/14/2022    HCT 43.7 06/01/2022    HCT 46.8 02/14/2022     06/01/2022     02/14/2022     Lab Results   Component Value Date    INR 0.9 04/14/2014    INR 0.9 10/19/2012    APTT 24.0 04/14/2014    APTT 26.0 10/19/2012     CHEMISTRY - LAST 2  Lab Results   Component Value Date     06/07/2023     02/07/2023    K 4.6 06/07/2023    K 4.4 02/07/2023     06/07/2023     02/07/2023    CO2 28 06/07/2023    CO2 22 (L) 02/07/2023    ANIONGAP 8 06/07/2023    ANIONGAP 13  "02/07/2023    BUN 19 06/07/2023    BUN 20 02/07/2023    CREATININE 0.9 06/07/2023    CREATININE 1.1 02/07/2023     (H) 06/07/2023     (H) 02/07/2023    CALCIUM 9.8 06/07/2023    CALCIUM 10.0 02/07/2023    MG 1.7 07/06/2018    MG 2.6 (H) 07/05/2006    ALBUMIN 3.9 02/07/2023    ALBUMIN 3.7 06/01/2022    ALBUMIN 3.7 06/01/2022    PROT 6.8 02/07/2023    PROT 6.7 06/01/2022    PROT 6.7 06/01/2022    ALKPHOS 80 02/07/2023    ALKPHOS 80 06/01/2022    ALKPHOS 80 06/01/2022    ALT 18 02/07/2023    ALT 21 06/01/2022    ALT 21 06/01/2022    AST 15 02/07/2023    AST 16 06/01/2022    AST 16 06/01/2022    BILITOT 1.4 (H) 02/07/2023    BILITOT 1.1 (H) 06/01/2022    BILITOT 1.1 (H) 06/01/2022      ENDOCRINE - LAST 2  Lab Results   Component Value Date    HGBA1C 8.0 (H) 06/07/2023    HGBA1C 7.6 (H) 02/07/2023    TSH 0.265 (L) 02/07/2023    TSH 0.241 (L) 07/13/2022        PHYSICAL EXAM  CONSTITUTIONAL: Well built, well nourished in no apparent distress  NECK: no carotid bruit, no JVD  LUNGS: CTA  CHEST WALL: no tenderness  HEART: regular rate and rhythm, S1, S2 normal, no murmur, click, rub or gallop   ABDOMEN: soft, non-tender; bowel sounds normal; no masses,  no organomegaly  EXTREMITIES: Extremities normal, no edema, no calf tenderness noted  NEURO: AAO X 3    I HAVE REVIEWED :    The vital signs, most recent cardiac testing, and most recent pertinent non-cardiology provider notes.    Current Outpatient Medications   Medication Instructions    aspirin (ECOTRIN) 81 mg, Oral, Daily    azelastine (OPTIVAR) 0.05 % ophthalmic solution 1 drop, Both Eyes, 2 times daily    BD SHANTELLE 2ND GEN PEN NEEDLE 32 gauge x 5/32" Ndle USE FOUR TIMES DAILY    biotin 5,000 mcg TbDL Oral    CALCIUM CARBONATE/VITAMIN D3 (VITAMIN D-3 ORAL) 1 tablet, Oral, Daily    carvediloL (COREG) 25 MG tablet TAKE 1 TABLET BY MOUTH TWICE DAILY WITH FOOD    cholecalciferol, vitamin D3, (VITAMIN D3) 50 mcg (2,000 unit) Tab Oral, Daily    estradioL (ESTRACE) " 0.01 % (0.1 mg/gram) vaginal cream No dose, route, or frequency recorded.    furosemide (LASIX) 40 MG tablet TAKE 1 TABLET BY MOUTH EVERY MORNING AS NEEDED    insulin degludec (TRESIBA FLEXTOUCH U-200) 34 Units, Subcutaneous, Daily    insulin lispro 100 unit/mL pen INJECT 10 UNITS UNDER THE SKIN THREE TIMES DAILY BEFORE MEALS PLUS CORRECTION SCALE. MAXIMUM TOTAL DAILY DOSE OF 84 UNITS    JARDIANCE 10 mg tablet TAKE 1 TABLET(10 MG) BY MOUTH EVERY DAY    multivit-min/ferrous fumarate (MULTI VITAMIN ORAL) 1 Dose, Oral, Daily    omega-3 fatty acids/fish oil (FISH OIL-OMEGA-3 FATTY ACIDS) 300-1,000 mg capsule 1 capsule, Oral, Daily    pantoprazole (PROTONIX) 40 mg, Oral, Daily    polyethylene glycol (GLYCOLAX) 17 g, Oral, Daily    potassium chloride SA (K-DUR,KLOR-CON) 20 MEQ tablet 20 mEq, Oral, Daily    rosuvastatin (CRESTOR) 20 mg, Oral, Nightly    spironolactone (ALDACTONE) 25 MG tablet TAKE 1 TABLET(25 MG) BY MOUTH EVERY DAY    SYNTHROID 112 mcg, Oral, Before breakfast    telmisartan (MICARDIS) 20 mg, Oral, Daily    tiZANidine (ZANAFLEX) 4 MG tablet TAKE ONE TABLET BY MOUTH AS NEEDED FOR MUSCLE SPASMS      Assessment & Plan     ICD (implantable cardioverter-defibrillator) in place  Normal device function   No shocks    HTN (hypertension)  BP well controlled   Continue telmisartan 20 mg daily   Continue coreg 25 mg BID   Low Na diet     Hyperlipidemia  Continue statin, LDL well controlled     CHF (congestive heart failure)  Well compensated on exam   Continue lasix PRN (twice weekly)    CAD (coronary artery disease)  Recent nuclear stress test negative for any RI   Improved with Protonix    Aortic calcification  Continue statin medication             Follow up in 6 months.

## 2023-08-09 RX ORDER — FLUCONAZOLE 150 MG/1
TABLET ORAL
Qty: 1 TABLET | Refills: 1 | Status: SHIPPED | OUTPATIENT
Start: 2023-08-09 | End: 2023-11-28 | Stop reason: ALTCHOICE

## 2023-08-13 NOTE — TELEPHONE ENCOUNTER
No care due was identified.  Health [facility] Embedded Care Due Messages. Reference number: [id number].   8/13/2023 5:40:49 AM CDT

## 2023-08-13 NOTE — TELEPHONE ENCOUNTER
Refill Routing Note   Medication(s) are not appropriate for processing by [facility] for the following reason(s):      No active prescription written by provider  Responsible provider unclear    [facility] action(s):  Defer Care Due:  None identified            Appointments  past 12m or future 3m with PCP    Date Provider   Last Visit   7/13/2023 Bacilio Gold MD   Next Visit   11/6/2023 Bacilio Gold MD   ED visits in past 90 days: 0        Note composed:1:41 PM 08/13/2023

## 2023-08-14 RX ORDER — CARVEDILOL 25 MG/1
TABLET ORAL
Qty: 180 TABLET | Refills: 3 | Status: SHIPPED | OUTPATIENT
Start: 2023-08-14

## 2023-08-17 ENCOUNTER — PATIENT MESSAGE (OUTPATIENT)
Dept: ENDOCRINOLOGY | Facility: CLINIC | Age: 73
End: 2023-08-17
Payer: MEDICARE

## 2023-08-21 LAB — BCS RECOMMENDATION EXT: NORMAL

## 2023-08-29 DIAGNOSIS — Z79.4 TYPE 2 DIABETES MELLITUS WITH DIABETIC NEUROPATHY, WITH LONG-TERM CURRENT USE OF INSULIN: ICD-10-CM

## 2023-08-29 DIAGNOSIS — E11.40 TYPE 2 DIABETES MELLITUS WITH DIABETIC NEUROPATHY, WITH LONG-TERM CURRENT USE OF INSULIN: ICD-10-CM

## 2023-08-29 RX ORDER — EMPAGLIFLOZIN 10 MG/1
TABLET, FILM COATED ORAL
Qty: 30 TABLET | Refills: 11 | Status: SHIPPED | OUTPATIENT
Start: 2023-08-29 | End: 2023-10-18

## 2023-09-03 DIAGNOSIS — E78.5 HYPERLIPIDEMIA, UNSPECIFIED HYPERLIPIDEMIA TYPE: Chronic | ICD-10-CM

## 2023-09-05 RX ORDER — ROSUVASTATIN CALCIUM 20 MG/1
20 TABLET, COATED ORAL NIGHTLY
Qty: 90 TABLET | Refills: 3 | Status: SHIPPED | OUTPATIENT
Start: 2023-09-05 | End: 2023-09-23 | Stop reason: SDUPTHER

## 2023-09-11 ENCOUNTER — PATIENT MESSAGE (OUTPATIENT)
Dept: PRIMARY CARE CLINIC | Facility: CLINIC | Age: 73
End: 2023-09-11
Payer: MEDICARE

## 2023-09-14 ENCOUNTER — PATIENT OUTREACH (OUTPATIENT)
Dept: PRIMARY CARE CLINIC | Facility: CLINIC | Age: 73
End: 2023-09-14
Payer: MEDICARE

## 2023-09-18 ENCOUNTER — OFFICE VISIT (OUTPATIENT)
Dept: FAMILY MEDICINE | Facility: CLINIC | Age: 73
End: 2023-09-18
Payer: MEDICARE

## 2023-09-18 ENCOUNTER — PATIENT MESSAGE (OUTPATIENT)
Dept: ENDOCRINOLOGY | Facility: CLINIC | Age: 73
End: 2023-09-18
Payer: MEDICARE

## 2023-09-18 VITALS
OXYGEN SATURATION: 96 % | WEIGHT: 209.69 LBS | BODY MASS INDEX: 35.8 KG/M2 | HEIGHT: 64 IN | SYSTOLIC BLOOD PRESSURE: 124 MMHG | DIASTOLIC BLOOD PRESSURE: 68 MMHG | HEART RATE: 71 BPM

## 2023-09-18 DIAGNOSIS — I10 PRIMARY HYPERTENSION: Chronic | ICD-10-CM

## 2023-09-18 DIAGNOSIS — Z79.4 TYPE 2 DIABETES MELLITUS WITH DIABETIC NEUROPATHY, WITH LONG-TERM CURRENT USE OF INSULIN: Chronic | ICD-10-CM

## 2023-09-18 DIAGNOSIS — I70.0 AORTIC CALCIFICATION: ICD-10-CM

## 2023-09-18 DIAGNOSIS — J84.10 CALCIFIED GRANULOMA OF LUNG: ICD-10-CM

## 2023-09-18 DIAGNOSIS — Z00.00 ENCOUNTER FOR MEDICARE ANNUAL WELLNESS EXAM: ICD-10-CM

## 2023-09-18 DIAGNOSIS — E78.2 MIXED HYPERLIPIDEMIA: Chronic | ICD-10-CM

## 2023-09-18 DIAGNOSIS — Z00.00 ENCOUNTER FOR PREVENTIVE HEALTH EXAMINATION: Primary | ICD-10-CM

## 2023-09-18 DIAGNOSIS — E11.40 TYPE 2 DIABETES MELLITUS WITH DIABETIC NEUROPATHY, WITH LONG-TERM CURRENT USE OF INSULIN: Chronic | ICD-10-CM

## 2023-09-18 DIAGNOSIS — D83.9 CVID (COMMON VARIABLE IMMUNODEFICIENCY): ICD-10-CM

## 2023-09-18 DIAGNOSIS — E66.01 CLASS 2 SEVERE OBESITY WITH BODY MASS INDEX (BMI) OF 35 TO 39.9 WITH SERIOUS COMORBIDITY: ICD-10-CM

## 2023-09-18 PROCEDURE — G0008 ADMIN INFLUENZA VIRUS VAC: HCPCS | Mod: HCNC,S$GLB,, | Performed by: NURSE PRACTITIONER

## 2023-09-18 PROCEDURE — 1125F PR PAIN SEVERITY QUANTIFIED, PAIN PRESENT: ICD-10-PCS | Mod: HCNC,CPTII,S$GLB, | Performed by: NURSE PRACTITIONER

## 2023-09-18 PROCEDURE — 1160F RVW MEDS BY RX/DR IN RCRD: CPT | Mod: HCNC,CPTII,S$GLB, | Performed by: NURSE PRACTITIONER

## 2023-09-18 PROCEDURE — G0439 PR MEDICARE ANNUAL WELLNESS SUBSEQUENT VISIT: ICD-10-PCS | Mod: HCNC,S$GLB,, | Performed by: NURSE PRACTITIONER

## 2023-09-18 PROCEDURE — 3052F HG A1C>EQUAL 8.0%<EQUAL 9.0%: CPT | Mod: HCNC,CPTII,S$GLB, | Performed by: NURSE PRACTITIONER

## 2023-09-18 PROCEDURE — 3074F PR MOST RECENT SYSTOLIC BLOOD PRESSURE < 130 MM HG: ICD-10-PCS | Mod: HCNC,CPTII,S$GLB, | Performed by: NURSE PRACTITIONER

## 2023-09-18 PROCEDURE — 1159F PR MEDICATION LIST DOCUMENTED IN MEDICAL RECORD: ICD-10-PCS | Mod: HCNC,CPTII,S$GLB, | Performed by: NURSE PRACTITIONER

## 2023-09-18 PROCEDURE — 3078F PR MOST RECENT DIASTOLIC BLOOD PRESSURE < 80 MM HG: ICD-10-PCS | Mod: HCNC,CPTII,S$GLB, | Performed by: NURSE PRACTITIONER

## 2023-09-18 PROCEDURE — 90694 VACC AIIV4 NO PRSRV 0.5ML IM: CPT | Mod: HCNC,S$GLB,, | Performed by: NURSE PRACTITIONER

## 2023-09-18 PROCEDURE — 3288F FALL RISK ASSESSMENT DOCD: CPT | Mod: HCNC,CPTII,S$GLB, | Performed by: NURSE PRACTITIONER

## 2023-09-18 PROCEDURE — 4010F PR ACE/ARB THEARPY RXD/TAKEN: ICD-10-PCS | Mod: HCNC,CPTII,S$GLB, | Performed by: NURSE PRACTITIONER

## 2023-09-18 PROCEDURE — 3078F DIAST BP <80 MM HG: CPT | Mod: HCNC,CPTII,S$GLB, | Performed by: NURSE PRACTITIONER

## 2023-09-18 PROCEDURE — 1125F AMNT PAIN NOTED PAIN PRSNT: CPT | Mod: HCNC,CPTII,S$GLB, | Performed by: NURSE PRACTITIONER

## 2023-09-18 PROCEDURE — 99999 PR PBB SHADOW E&M-EST. PATIENT-LVL V: CPT | Mod: PBBFAC,HCNC,, | Performed by: NURSE PRACTITIONER

## 2023-09-18 PROCEDURE — 3052F PR MOST RECENT HEMOGLOBIN A1C LEVEL 8.0 - < 9.0%: ICD-10-PCS | Mod: HCNC,CPTII,S$GLB, | Performed by: NURSE PRACTITIONER

## 2023-09-18 PROCEDURE — G0008 FLU VACCINE - QUADRIVALENT - ADJUVANTED: ICD-10-PCS | Mod: HCNC,S$GLB,, | Performed by: NURSE PRACTITIONER

## 2023-09-18 PROCEDURE — 1101F PR PT FALLS ASSESS DOC 0-1 FALLS W/OUT INJ PAST YR: ICD-10-PCS | Mod: HCNC,CPTII,S$GLB, | Performed by: NURSE PRACTITIONER

## 2023-09-18 PROCEDURE — 1101F PT FALLS ASSESS-DOCD LE1/YR: CPT | Mod: HCNC,CPTII,S$GLB, | Performed by: NURSE PRACTITIONER

## 2023-09-18 PROCEDURE — 99999 PR PBB SHADOW E&M-EST. PATIENT-LVL V: ICD-10-PCS | Mod: PBBFAC,HCNC,, | Performed by: NURSE PRACTITIONER

## 2023-09-18 PROCEDURE — 90694 FLU VACCINE - QUADRIVALENT - ADJUVANTED: ICD-10-PCS | Mod: HCNC,S$GLB,, | Performed by: NURSE PRACTITIONER

## 2023-09-18 PROCEDURE — 4010F ACE/ARB THERAPY RXD/TAKEN: CPT | Mod: HCNC,CPTII,S$GLB, | Performed by: NURSE PRACTITIONER

## 2023-09-18 PROCEDURE — G0439 PPPS, SUBSEQ VISIT: HCPCS | Mod: HCNC,S$GLB,, | Performed by: NURSE PRACTITIONER

## 2023-09-18 PROCEDURE — 1160F PR REVIEW ALL MEDS BY PRESCRIBER/CLIN PHARMACIST DOCUMENTED: ICD-10-PCS | Mod: HCNC,CPTII,S$GLB, | Performed by: NURSE PRACTITIONER

## 2023-09-18 PROCEDURE — 3288F PR FALLS RISK ASSESSMENT DOCUMENTED: ICD-10-PCS | Mod: HCNC,CPTII,S$GLB, | Performed by: NURSE PRACTITIONER

## 2023-09-18 PROCEDURE — 3074F SYST BP LT 130 MM HG: CPT | Mod: HCNC,CPTII,S$GLB, | Performed by: NURSE PRACTITIONER

## 2023-09-18 PROCEDURE — 1159F MED LIST DOCD IN RCRD: CPT | Mod: HCNC,CPTII,S$GLB, | Performed by: NURSE PRACTITIONER

## 2023-09-18 RX ORDER — ALBUTEROL SULFATE 90 UG/1
2 AEROSOL, METERED RESPIRATORY (INHALATION) EVERY 6 HOURS PRN
COMMUNITY
End: 2023-10-25 | Stop reason: SDUPTHER

## 2023-09-21 ENCOUNTER — PATIENT MESSAGE (OUTPATIENT)
Dept: PRIMARY CARE CLINIC | Facility: CLINIC | Age: 73
End: 2023-09-21
Payer: MEDICARE

## 2023-09-23 DIAGNOSIS — E78.5 HYPERLIPIDEMIA, UNSPECIFIED HYPERLIPIDEMIA TYPE: Chronic | ICD-10-CM

## 2023-09-25 RX ORDER — ROSUVASTATIN CALCIUM 20 MG/1
20 TABLET, COATED ORAL NIGHTLY
Qty: 90 TABLET | Refills: 3 | Status: SHIPPED | OUTPATIENT
Start: 2023-09-25

## 2023-10-03 ENCOUNTER — PATIENT MESSAGE (OUTPATIENT)
Dept: ENDOCRINOLOGY | Facility: CLINIC | Age: 73
End: 2023-10-03
Payer: MEDICARE

## 2023-10-04 ENCOUNTER — PATIENT MESSAGE (OUTPATIENT)
Dept: PRIMARY CARE CLINIC | Facility: CLINIC | Age: 73
End: 2023-10-04
Payer: MEDICARE

## 2023-10-04 ENCOUNTER — CLINICAL SUPPORT (OUTPATIENT)
Dept: CARDIOLOGY | Facility: HOSPITAL | Age: 73
End: 2023-10-04
Payer: MEDICARE

## 2023-10-04 DIAGNOSIS — Z95.810 PRESENCE OF AUTOMATIC (IMPLANTABLE) CARDIAC DEFIBRILLATOR: ICD-10-CM

## 2023-10-04 PROCEDURE — 93296 REM INTERROG EVL PM/IDS: CPT | Mod: HCNC,PO | Performed by: INTERNAL MEDICINE

## 2023-10-04 NOTE — PROGRESS NOTES
"  Mckenzie Mari presented for a  Medicare AWV and comprehensive Health Risk Assessment today. The following components were reviewed and updated:    Medical history  Family History  Social history  Allergies and Current Medications  Health Risk Assessment  Health Maintenance  Care Team         ** See Completed Assessments for Annual Wellness Visit within the encounter summary.**         The following assessments were completed:  Living Situation  CAGE  Depression Screening  Timed Get Up and Go  Whisper Test  Cognitive Function Screening      Nutrition Screening  ADL Screening  PAQ Screening    Review for Opioid Screening: Patient does not have rx for Opioids.    Review for Substance Use Disorders: Patient does not use substance.     Vitals:    09/18/23 0826   BP: 124/68   BP Location: Right arm   Patient Position: Sitting   BP Method: Large (Manual)   Pulse: 71   SpO2: 96%   Weight: 95.1 kg (209 lb 10.5 oz)   Height: 5' 3.5" (1.613 m)     Body mass index is 36.56 kg/m².  Physical Exam  Vitals reviewed.   Constitutional:       General: She is not in acute distress.  HENT:      Head: Normocephalic.   Cardiovascular:      Rate and Rhythm: Normal rate.   Pulmonary:      Effort: Pulmonary effort is normal. No respiratory distress.   Skin:     General: Skin is warm.   Neurological:      General: No focal deficit present.      Mental Status: She is alert.   Psychiatric:         Mood and Affect: Mood normal.               Diagnoses and health risks identified today and associated recommendations/orders:    1. Encounter for preventive health examination  Reviewed health maintenance and provided recommendations   Flu vaccine today  Recommend c-scope and shingrix vaccine     2. Type 2 diabetes mellitus with diabetic neuropathy, with long-term current use of insulin  Continue to monitor  Followed by Michelle Lucio  Last a1c 8.0  Has dexcom  Taking jardiance, tresiba and humalog.      3. Class 2 severe obesity with body mass " index (BMI) of 35 to 39.9 with serious comorbidity  Continue to monitor  Followed by Bacilio Gold MD   Encourage healthy food chcoies .      4. CVID (common variable immunodeficiency)  Continue to monitor  Followed by Bacilio Gold MD .      5. Aortic calcification  Continue to monitor  Followed by Dr Hansen.      6. Primary hypertension  Continue to monitor  Followed by Bacilio Gold MD .      7. Mixed hyperlipidemia  Continue to monitor  Followed by Bacilio Gold MD .      8. Calcified granuloma of lung  Continue to monitor  Followed by Bacilio Gold MD .      9. Encounter for Medicare annual wellness exam    - Ambulatory Referral/Consult to Enhanced Annual Wellness Visit (eAWV)      Provided Mckenzie with a 5-10 year written screening schedule and personal prevention plan. Recommendations were developed using the USPSTF age appropriate recommendations. Education, counseling, and referrals were provided as needed. After Visit Summary printed and given to patient which includes a list of additional screenings\tests needed.    Follow up in one year    Jocelin Riddle NP  I offered to discuss advanced care planning, including how to pick a person who would make decisions for you if you were unable to make them for yourself, called a health care power of , and what kind of decisions you might make such as use of life sustaining treatments such as ventilators and tube feeding when faced with a life limiting illness recorded on a living will that they will need to know. (How you want to be cared for as you near the end of your natural life)     X Patient is interested in learning more about how to make advanced directives.  I provided them paperwork and offered to discuss this with them.

## 2023-10-04 NOTE — PATIENT INSTRUCTIONS
Counseling and Referral of Other Preventative  (Italic type indicates deductible and co-insurance are waived)    Patient Name: Mckenzie Mari  Today's Date: 10/4/2023    Health Maintenance       Date Due Completion Date    Shingles Vaccine (1 of 2) Never done ---    COVID-19 Vaccine (4 - Pfizer series) 08/08/2022 6/13/2022    Colorectal Cancer Screening 05/11/2023 5/11/2016    Override on 10/5/2009: Done    Diabetes Urine Screening 06/01/2023 6/1/2022    Hemoglobin A1c 09/07/2023 6/7/2023    Foot Exam 02/14/2024 2/14/2023    Lipid Panel 06/07/2024 6/7/2023    Eye Exam 06/13/2024 6/13/2023 (Done)    Override on 6/13/2023: Done    Override on 6/7/2022: Done (Dr. Mcgregor)    Override on 2/26/2020: Done (no DR per pt. cannot recall provider's name)    Override on 6/24/2016: Done (Dr Tong)    Override on 5/13/2015: Done    Override on 12/20/2013: Done    Mammogram 08/21/2024 8/21/2023    Override on 9/17/2015: Done    Override on 7/16/2010: Done    Aspirin/Antiplatelet Therapy 09/18/2024 9/18/2023    High Dose Statin 09/25/2024 9/25/2023    DEXA Scan 09/06/2025 9/6/2022    TETANUS VACCINE 07/29/2033 7/29/2023        Orders Placed This Encounter   Procedures    Influenza - Quadrivalent (Adjuvanted)     The following information is provided to all patients.  This information is to help you find resources for any of the problems found today that may be affecting your health:                Living healthy guide: www.[facility].[place].gov      Understanding Diabetes: www.diabetes.org      Eating healthy: www.cdc.gov/healthyweight      CDC home safety checklist: www.cdc.gov/steadi/patient.html      Agency on Aging: www.goea.[place].[facility]      Alcoholics anonymous (AA): www.aa.org      Physical Activity: www.lauri.nih.gov/jh0eiag      Tobacco use: www.quitwithusla.org

## 2023-10-05 NOTE — TELEPHONE ENCOUNTER
Patient reports symptoms after having her 1st shingles shot.  I agree with  Fluids rest and Tylenol.  If she has any shortness of breath or symptoms suggestive cardiac then I would want to go to the emergency room as you suggested.

## 2023-10-05 NOTE — TELEPHONE ENCOUNTER
"Spoke with pt, she reports that she is having "crushing chest pain" but not cardiac chest pain, having chills, headache 6/10, took advil 400 mg at 1030 this morning.  Pt reports that she has thoracic scoliosis and fibromyalgia.      Denies pain in the jaw, in the arm that radiates to her back, denies nausea, denies vomiting, reports that she is "short-winded to begin with" and that the achiness is so severe that it is squeezing her all over.  Informed pt that she could take the advil every 6-8 hours, pt verbalized understanding.     Informed pt that if she develops any pain in the jaw, in her arm, pain that radiates to her back, nausea, vomiting, that she should proceed to the closest ER, pt verbalized understanding.     "

## 2023-10-11 ENCOUNTER — LAB VISIT (OUTPATIENT)
Dept: LAB | Facility: HOSPITAL | Age: 73
End: 2023-10-11
Attending: NURSE PRACTITIONER
Payer: MEDICARE

## 2023-10-11 DIAGNOSIS — E11.40 TYPE 2 DIABETES MELLITUS WITH DIABETIC NEUROPATHY, WITH LONG-TERM CURRENT USE OF INSULIN: ICD-10-CM

## 2023-10-11 DIAGNOSIS — E89.0 POSTOPERATIVE HYPOTHYROIDISM: ICD-10-CM

## 2023-10-11 DIAGNOSIS — Z79.4 TYPE 2 DIABETES MELLITUS WITH DIABETIC NEUROPATHY, WITH LONG-TERM CURRENT USE OF INSULIN: ICD-10-CM

## 2023-10-11 LAB
ALBUMIN SERPL BCP-MCNC: 3.8 G/DL (ref 3.5–5.2)
ALP SERPL-CCNC: 77 U/L (ref 55–135)
ALT SERPL W/O P-5'-P-CCNC: 20 U/L (ref 10–44)
ANION GAP SERPL CALC-SCNC: 11 MMOL/L (ref 8–16)
AST SERPL-CCNC: 15 U/L (ref 10–40)
BILIRUB SERPL-MCNC: 1.6 MG/DL (ref 0.1–1)
BUN SERPL-MCNC: 14 MG/DL (ref 8–23)
CALCIUM SERPL-MCNC: 9.6 MG/DL (ref 8.7–10.5)
CHLORIDE SERPL-SCNC: 105 MMOL/L (ref 95–110)
CO2 SERPL-SCNC: 23 MMOL/L (ref 23–29)
CREAT SERPL-MCNC: 0.8 MG/DL (ref 0.5–1.4)
EST. GFR  (NO RACE VARIABLE): >60 ML/MIN/1.73 M^2
ESTIMATED AVG GLUCOSE: 194 MG/DL (ref 68–131)
GLUCOSE SERPL-MCNC: 169 MG/DL (ref 70–110)
HBA1C MFR BLD: 8.4 % (ref 4–5.6)
POTASSIUM SERPL-SCNC: 4.2 MMOL/L (ref 3.5–5.1)
PROT SERPL-MCNC: 6.9 G/DL (ref 6–8.4)
SODIUM SERPL-SCNC: 139 MMOL/L (ref 136–145)
TSH SERPL DL<=0.005 MIU/L-ACNC: 0.55 UIU/ML (ref 0.4–4)

## 2023-10-11 PROCEDURE — 83036 HEMOGLOBIN GLYCOSYLATED A1C: CPT | Mod: HCNC | Performed by: NURSE PRACTITIONER

## 2023-10-11 PROCEDURE — 84443 ASSAY THYROID STIM HORMONE: CPT | Mod: HCNC | Performed by: NURSE PRACTITIONER

## 2023-10-11 PROCEDURE — 36415 COLL VENOUS BLD VENIPUNCTURE: CPT | Mod: HCNC,PN | Performed by: NURSE PRACTITIONER

## 2023-10-11 PROCEDURE — 80053 COMPREHEN METABOLIC PANEL: CPT | Mod: HCNC | Performed by: NURSE PRACTITIONER

## 2023-10-16 ENCOUNTER — HOSPITAL ENCOUNTER (OUTPATIENT)
Dept: RADIOLOGY | Facility: HOSPITAL | Age: 73
Discharge: HOME | End: 2023-10-16
Attending: FAMILY MEDICINE
Payer: MEDICARE

## 2023-10-16 ENCOUNTER — PATIENT MESSAGE (OUTPATIENT)
Dept: PRIMARY CARE CLINIC | Facility: CLINIC | Age: 73
End: 2023-10-16
Payer: MEDICARE

## 2023-10-16 DIAGNOSIS — M54.9 BACK PAIN, UNSPECIFIED BACK LOCATION, UNSPECIFIED BACK PAIN LATERALITY, UNSPECIFIED CHRONICITY: Primary | ICD-10-CM

## 2023-10-16 DIAGNOSIS — M54.9 BACK PAIN, UNSPECIFIED BACK LOCATION, UNSPECIFIED BACK PAIN LATERALITY, UNSPECIFIED CHRONICITY: ICD-10-CM

## 2023-10-16 PROCEDURE — 71046 XR CHEST PA AND LATERAL: ICD-10-PCS | Mod: 26,HCNC,, | Performed by: RADIOLOGY

## 2023-10-16 PROCEDURE — 71046 X-RAY EXAM CHEST 2 VIEWS: CPT | Mod: TC,HCNC,FY,PO

## 2023-10-16 PROCEDURE — 71046 X-RAY EXAM CHEST 2 VIEWS: CPT | Mod: 26,HCNC,, | Performed by: RADIOLOGY

## 2023-10-16 NOTE — TELEPHONE ENCOUNTER
Spoke with pt, discussed your message in its entirety, pt verbalized understanding.     Pt reports that she is drinking about 80 oz of water per day.     Pt scheduled for ER FU visit on 10/25 at 1400.

## 2023-10-16 NOTE — TELEPHONE ENCOUNTER
Not in clinic so I can not see her.  Reviewed ER.  They did an extensive workup including blood work urinalysis renal stone CT.      Only significant finding was an increase in creatinine so she needs to hydrate    Not certain why she is worried about a thyroid she just had that done on October 11th then it was normal.    If worsening symptoms, trouble breathing, chest pain, then safest to go back to the ER    If she is feeling better but just overly worried I entered orders for chest x-ray and to repeat CBC and CMP.    My suggestion is to hydrate.  Heating pad stretching, obviously if worsening then back to ER

## 2023-10-18 ENCOUNTER — OFFICE VISIT (OUTPATIENT)
Dept: ENDOCRINOLOGY | Facility: CLINIC | Age: 73
End: 2023-10-18
Payer: MEDICARE

## 2023-10-18 VITALS
SYSTOLIC BLOOD PRESSURE: 130 MMHG | DIASTOLIC BLOOD PRESSURE: 70 MMHG | HEART RATE: 68 BPM | WEIGHT: 209.56 LBS | HEIGHT: 62 IN | BODY MASS INDEX: 38.56 KG/M2

## 2023-10-18 DIAGNOSIS — Z87.19 HISTORY OF PANCREATITIS: ICD-10-CM

## 2023-10-18 DIAGNOSIS — E78.5 HYPERLIPIDEMIA, UNSPECIFIED HYPERLIPIDEMIA TYPE: ICD-10-CM

## 2023-10-18 DIAGNOSIS — E11.40 TYPE 2 DIABETES MELLITUS WITH DIABETIC NEUROPATHY, WITH LONG-TERM CURRENT USE OF INSULIN: Primary | ICD-10-CM

## 2023-10-18 DIAGNOSIS — Z79.4 TYPE 2 DIABETES MELLITUS WITH DIABETIC NEUROPATHY, WITH LONG-TERM CURRENT USE OF INSULIN: Primary | ICD-10-CM

## 2023-10-18 DIAGNOSIS — I50.9 CHF (NYHA CLASS III, ACC/AHA STAGE C): ICD-10-CM

## 2023-10-18 DIAGNOSIS — I25.10 CORONARY ARTERY DISEASE INVOLVING NATIVE CORONARY ARTERY WITHOUT ANGINA PECTORIS, UNSPECIFIED WHETHER NATIVE OR TRANSPLANTED HEART: ICD-10-CM

## 2023-10-18 DIAGNOSIS — E66.9 OBESITY (BMI 30-39.9): ICD-10-CM

## 2023-10-18 DIAGNOSIS — I10 ESSENTIAL HYPERTENSION: ICD-10-CM

## 2023-10-18 DIAGNOSIS — E89.0 POSTOPERATIVE HYPOTHYROIDISM: ICD-10-CM

## 2023-10-18 PROCEDURE — 95251 CONT GLUC MNTR ANALYSIS I&R: CPT | Mod: HCNC,S$GLB,, | Performed by: NURSE PRACTITIONER

## 2023-10-18 PROCEDURE — 3052F HG A1C>EQUAL 8.0%<EQUAL 9.0%: CPT | Mod: HCNC,CPTII,S$GLB, | Performed by: NURSE PRACTITIONER

## 2023-10-18 PROCEDURE — 3075F SYST BP GE 130 - 139MM HG: CPT | Mod: HCNC,CPTII,S$GLB, | Performed by: NURSE PRACTITIONER

## 2023-10-18 PROCEDURE — 3288F FALL RISK ASSESSMENT DOCD: CPT | Mod: HCNC,CPTII,S$GLB, | Performed by: NURSE PRACTITIONER

## 2023-10-18 PROCEDURE — 3066F NEPHROPATHY DOC TX: CPT | Mod: HCNC,CPTII,S$GLB, | Performed by: NURSE PRACTITIONER

## 2023-10-18 PROCEDURE — 1160F PR REVIEW ALL MEDS BY PRESCRIBER/CLIN PHARMACIST DOCUMENTED: ICD-10-PCS | Mod: HCNC,CPTII,S$GLB, | Performed by: NURSE PRACTITIONER

## 2023-10-18 PROCEDURE — 99999 PR PBB SHADOW E&M-EST. PATIENT-LVL V: CPT | Mod: PBBFAC,HCNC,, | Performed by: NURSE PRACTITIONER

## 2023-10-18 PROCEDURE — 3066F PR DOCUMENTATION OF TREATMENT FOR NEPHROPATHY: ICD-10-PCS | Mod: HCNC,CPTII,S$GLB, | Performed by: NURSE PRACTITIONER

## 2023-10-18 PROCEDURE — 3075F PR MOST RECENT SYSTOLIC BLOOD PRESS GE 130-139MM HG: ICD-10-PCS | Mod: HCNC,CPTII,S$GLB, | Performed by: NURSE PRACTITIONER

## 2023-10-18 PROCEDURE — 3052F PR MOST RECENT HEMOGLOBIN A1C LEVEL 8.0 - < 9.0%: ICD-10-PCS | Mod: HCNC,CPTII,S$GLB, | Performed by: NURSE PRACTITIONER

## 2023-10-18 PROCEDURE — 99999 PR PBB SHADOW E&M-EST. PATIENT-LVL V: ICD-10-PCS | Mod: PBBFAC,HCNC,, | Performed by: NURSE PRACTITIONER

## 2023-10-18 PROCEDURE — 3078F PR MOST RECENT DIASTOLIC BLOOD PRESSURE < 80 MM HG: ICD-10-PCS | Mod: HCNC,CPTII,S$GLB, | Performed by: NURSE PRACTITIONER

## 2023-10-18 PROCEDURE — 4010F PR ACE/ARB THEARPY RXD/TAKEN: ICD-10-PCS | Mod: HCNC,CPTII,S$GLB, | Performed by: NURSE PRACTITIONER

## 2023-10-18 PROCEDURE — 1101F PT FALLS ASSESS-DOCD LE1/YR: CPT | Mod: HCNC,CPTII,S$GLB, | Performed by: NURSE PRACTITIONER

## 2023-10-18 PROCEDURE — 95251 PR GLUCOSE MONITOR, 72 HOUR, PHYS INTERP: ICD-10-PCS | Mod: HCNC,S$GLB,, | Performed by: NURSE PRACTITIONER

## 2023-10-18 PROCEDURE — 1159F PR MEDICATION LIST DOCUMENTED IN MEDICAL RECORD: ICD-10-PCS | Mod: HCNC,CPTII,S$GLB, | Performed by: NURSE PRACTITIONER

## 2023-10-18 PROCEDURE — 99214 OFFICE O/P EST MOD 30 MIN: CPT | Mod: HCNC,S$GLB,, | Performed by: NURSE PRACTITIONER

## 2023-10-18 PROCEDURE — 1125F PR PAIN SEVERITY QUANTIFIED, PAIN PRESENT: ICD-10-PCS | Mod: HCNC,CPTII,S$GLB, | Performed by: NURSE PRACTITIONER

## 2023-10-18 PROCEDURE — 1125F AMNT PAIN NOTED PAIN PRSNT: CPT | Mod: HCNC,CPTII,S$GLB, | Performed by: NURSE PRACTITIONER

## 2023-10-18 PROCEDURE — 1101F PR PT FALLS ASSESS DOC 0-1 FALLS W/OUT INJ PAST YR: ICD-10-PCS | Mod: HCNC,CPTII,S$GLB, | Performed by: NURSE PRACTITIONER

## 2023-10-18 PROCEDURE — 99214 PR OFFICE/OUTPT VISIT, EST, LEVL IV, 30-39 MIN: ICD-10-PCS | Mod: HCNC,S$GLB,, | Performed by: NURSE PRACTITIONER

## 2023-10-18 PROCEDURE — 3061F PR NEG MICROALBUMINURIA RESULT DOCUMENTED/REVIEW: ICD-10-PCS | Mod: HCNC,CPTII,S$GLB, | Performed by: NURSE PRACTITIONER

## 2023-10-18 PROCEDURE — 1160F RVW MEDS BY RX/DR IN RCRD: CPT | Mod: HCNC,CPTII,S$GLB, | Performed by: NURSE PRACTITIONER

## 2023-10-18 PROCEDURE — 1159F MED LIST DOCD IN RCRD: CPT | Mod: HCNC,CPTII,S$GLB, | Performed by: NURSE PRACTITIONER

## 2023-10-18 PROCEDURE — 3008F PR BODY MASS INDEX (BMI) DOCUMENTED: ICD-10-PCS | Mod: HCNC,CPTII,S$GLB, | Performed by: NURSE PRACTITIONER

## 2023-10-18 PROCEDURE — 3078F DIAST BP <80 MM HG: CPT | Mod: HCNC,CPTII,S$GLB, | Performed by: NURSE PRACTITIONER

## 2023-10-18 PROCEDURE — 3288F PR FALLS RISK ASSESSMENT DOCUMENTED: ICD-10-PCS | Mod: HCNC,CPTII,S$GLB, | Performed by: NURSE PRACTITIONER

## 2023-10-18 PROCEDURE — 4010F ACE/ARB THERAPY RXD/TAKEN: CPT | Mod: HCNC,CPTII,S$GLB, | Performed by: NURSE PRACTITIONER

## 2023-10-18 PROCEDURE — 3008F BODY MASS INDEX DOCD: CPT | Mod: HCNC,CPTII,S$GLB, | Performed by: NURSE PRACTITIONER

## 2023-10-18 PROCEDURE — 3061F NEG MICROALBUMINURIA REV: CPT | Mod: HCNC,CPTII,S$GLB, | Performed by: NURSE PRACTITIONER

## 2023-10-18 RX ORDER — TETANUS TOXOID, REDUCED DIPHTHERIA TOXOID AND ACELLULAR PERTUSSIS VACCINE, ADSORBED 5; 2.5; 8; 8; 2.5 [IU]/.5ML; [IU]/.5ML; UG/.5ML; UG/.5ML; UG/.5ML
SUSPENSION INTRAMUSCULAR
COMMUNITY
Start: 2023-07-29 | End: 2024-02-28

## 2023-10-18 NOTE — PROGRESS NOTES
"CC: Ms. Mckenzie Mari arrives today for management of Type 2 DM and review of chronic medical conditions.     HPI: Ms. Mckenzie Mari was diagnosed with Type 2 DM in 2004. She was diagnosed based on lab work. Initial treatment consisted of metformin and glimepiride. Insulin added in 8/2016 - began Toujeo. She states that she felt that this caused nausea, abd pain, chest pain. Lantus caused throat swelling, left arm pain. She was then changed to Novolog 70/30 but this was only used for a short period because her insurance didn't cover.  Then changed to Humalog 50-50 in 12/2016. In 2017, she began MDI with Tresiba and Humalog. Jardiance added in 8/2021. Began use of Dexcom G6 in 2021.  + FH of DM in maternal aunt and cousin. Denies hospitalizations due to DM. Previously seen in endocrine by Richard Gupta, and MAGALI Eng NP. She has a history of thyroid cancer/post-surgical hypothyroidism.   Of note, she has a h/o pancreatitis.   She follows annually with cardiology for CHF, CAD.      Last seen by me in June. Tresiba dose was increased at that time.     She states that she has felt "run down" since her recent shingles vaccine. Has been having pain in mid back and R flank area and went to ER over the weekend. Her PCP is aware and she has upcoming appt. Does report dx of thoracic scoliosis.     BG monitoring per Dexcom G6. Has noticed fluctuations in her blood sugars when she moves her monitor so is questioning its accuracy.     Hypoglycemia: Rare      Missing Insulin/PO medication doses: No  Timing prandial insulin 5-15 minutes before meals: yes    Exercise: no    Dietary Habits: Eats 3 meals/day. Snacking occasionally. Avoids sugary drinks.    Last DM education: 1/2019       CURRENT DIABETIC MEDS: Jardiance 10 mg daily, Tresiba 34 units QAM, Humalog 10 units AC + correction scale, target 180, ISF 25  Vial or pen: pen  Glucometer type: [facility] True Metrix    Previous DM treatments:   Invokana - " "nausea  Glimepiride - stopped when prandial insulin added  Touejo -  Nausea, abd pain, chest pain  Lantus - throat swelling, left arm pain  Novolog 70/30 - not covered by insurance  Metformin - headaches    Last Eye Exam: 6/13/2023 - No DR. Dr. Mcgregor.   Last Podiatry Exam: no    REVIEW OF SYSTEMS  Constitutional: no c/o fatigue, weakness. + 3# weight loss.   Cardiac: no palpitations. + intermittent chest pain that she feels is related to her back pain.   Respiratory: no cough. C/o dyspnea that is chronic. Uses albuterol as needed.  GI: no c/o abdominal pain. + H/o pancreatitis. Reports intermittent nausea  Skin: no rashes, lesions  Musculoskeletal: reports back pain in thoracic region, R flank pain (had workup at ER)  Neuro: + intermittent numbness, tingling in feet.   Endocrine: denies polyphagia, polydipsia, polyuria      Personally reviewed Past Medical, Surgical, Social History.    Vital Signs  /70   Pulse 68   Ht 5' 2" (1.575 m)   Wt 95.1 kg (209 lb 8.8 oz)   BMI 38.33 kg/m²     Personally reviewed the below labs:    Hemoglobin A1C   Date Value Ref Range Status   10/11/2023 8.4 (H) 4.0 - 5.6 % Final     Comment:     ADA Screening Guidelines:  5.7-6.4%  Consistent with prediabetes  >or=6.5%  Consistent with diabetes    High levels of fetal hemoglobin interfere with the HbA1C  assay. Heterozygous hemoglobin variants (HbS, HgC, etc)do  not significantly interfere with this assay.   However, presence of multiple variants may affect accuracy.     06/07/2023 8.0 (H) 4.0 - 5.6 % Final     Comment:     ADA Screening Guidelines:  5.7-6.4%  Consistent with prediabetes  >or=6.5%  Consistent with diabetes    High levels of fetal hemoglobin interfere with the HbA1C  assay. Heterozygous hemoglobin variants (HbS, HgC, etc)do  not significantly interfere with this assay.   However, presence of multiple variants may affect accuracy.     02/07/2023 7.6 (H) 4.0 - 5.6 % Final     Comment:     ADA Screening " Guidelines:  5.7-6.4%  Consistent with prediabetes  >or=6.5%  Consistent with diabetes    High levels of fetal hemoglobin interfere with the HbA1C  assay. Heterozygous hemoglobin variants (HbS, HgC, etc)do  not significantly interfere with this assay.   However, presence of multiple variants may affect accuracy.         Chemistry        Component Value Date/Time     10/11/2023 0709    K 4.2 10/11/2023 0709     10/11/2023 0709    CO2 23 10/11/2023 0709    BUN 14 10/11/2023 0709    CREATININE 0.8 10/11/2023 0709     (H) 10/11/2023 0709        Component Value Date/Time    CALCIUM 9.6 10/11/2023 0709    ALKPHOS 77 10/11/2023 0709    AST 15 10/11/2023 0709    ALT 20 10/11/2023 0709    BILITOT 1.6 (H) 10/11/2023 0709          Lab Results   Component Value Date    CHOL 152 06/07/2023    CHOL 124 06/01/2022    CHOL 143 08/10/2021     Lab Results   Component Value Date    HDL 47 06/07/2023    HDL 48 06/01/2022    HDL 48 08/10/2021     Lab Results   Component Value Date    LDLCALC 66.4 06/07/2023    LDLCALC 50.2 (L) 06/01/2022    LDLCALC 68.4 08/10/2021     Lab Results   Component Value Date    TRIG 193 (H) 06/07/2023    TRIG 129 06/01/2022    TRIG 133 08/10/2021     Lab Results   Component Value Date    CHOLHDL 30.9 06/07/2023    CHOLHDL 38.7 06/01/2022    CHOLHDL 33.6 08/10/2021       Lab Results   Component Value Date    MICALBCREAT Unable to calculate 10/11/2023     Lab Results   Component Value Date    TSH 0.545 10/11/2023       CrCl cannot be calculated (Patient's most recent lab result is older than the maximum 7 days allowed.).    Vit D, 25-Hydroxy   Date Value Ref Range Status   11/08/2014 51 30 - 96 ng/mL Final     Comment:     Vitamin D deficiency.........<10 ng/mL                              Vitamin D insufficiency......10-29 ng/mL       Vitamin D sufficiency........> or equal to 30 ng/mL  Vitamin D toxicity............>100 ng/mL          Ref. Range 6/25/2020 07:27   FRUCTOSAMINE Latest Ref  Range: SeeBelow umol /L 291         PHYSICAL EXAMINATION  Constitutional: Appears well, no distress.  Respiratory: CTA, even and unlabored.  Cardiovascular: RRR, no murmurs, no carotid bruits.   GI: active bowel sounds, no hernia  Skin: warm and dry  Neuro: oriented to person, place, time.   Feet: appropriate footwear.        DEXCOM DOWNLOAD: See media file for details. Fasting glucoses vary. Prandial excursions noted. In the past few days, most glucoses have been in normal range. No hypoglycemia.  Average glucose: 177 mg/dL  Above 250 mg/dL: 2 %  181-250 mg/dL: 42 %   mg/dL: 56 %  54-69 mg/dL: 0 %  Below 54 mg/dL: 0 %         Goals        HEMOGLOBIN A1C < 7.5          due to CAD, CHF, patient's desire for glucose to remain >150.       Assessment/Plan  1. Type 2 diabetes mellitus with diabetic neuropathy, with long-term current use of insulin  -- Uncontrolled. However, CGM data reveals better controlled glucoses recently than A1c does. Will increase Jardiance. I suggested increasing Humalog dose but she declined today. She'd like to make one change at a time.   -- increase Jardiance to 25 mg daily  -- continue insulin at current doses.   -- BG monitoring per Dexcom G6.   -- Would not use Actos, due to CHF. GLP-1RA and DPP4-I contraindicated due to h/o pancreatitis. Cannot tolerate metformin.     -- Discussed diagnosis of DM, A1c goals, progression of disease, long term complications and tx options.  Advised patient to check BG before activities, such as driving or exercise.  -- Reviewed hypoglycemia management: treat with 1/2 glass of juice, 1/2 can regular coke, or 4 glucose tablets. Monitor and repeat treatment every 15 minutes until BG is >70 Then have a snack, which includes a complex carbohydrate and protein.    -- takes statin and ARB     2. Coronary artery disease involving native coronary artery without angina pectoris, unspecified whether native or transplanted heart  -- stable  -- Jardiance may offer  cardio-protective benefit.     3. CHF (NYHA class III, ACC/AHA stage C)  -- follows with cardiology   4. Postoperative hypothyroidism  -- controlled  -- h/o thyroid cancer  -- managed by PCP   5. Essential hypertension  -- controlled   -- continue ARB   6. Hyperlipidemia, unspecified hyperlipidemia type  -- controlled  -- continue Crestor every other day   7. Obesity (BMI 30-39.9)  -- stable   Body mass index is 38.33 kg/m².   8. History of pancreatitis  -- avoid GLP-1RA and DPP4-i       FOLLOW UP  Follow up in about 4 months (around 2/18/2024).   Patient instructed to bring BG logs to each follow up.   Patient encouraged to call for any BG/medication issues, concerns, or questions.      Orders Placed This Encounter   Procedures    Hemoglobin A1C    Comprehensive Metabolic Panel

## 2023-10-20 ENCOUNTER — PATIENT MESSAGE (OUTPATIENT)
Dept: ENDOCRINOLOGY | Facility: CLINIC | Age: 73
End: 2023-10-20
Payer: MEDICARE

## 2023-10-25 ENCOUNTER — HOSPITAL ENCOUNTER (OUTPATIENT)
Dept: RADIOLOGY | Facility: HOSPITAL | Age: 73
Discharge: HOME | End: 2023-10-25
Attending: FAMILY MEDICINE
Payer: MEDICARE

## 2023-10-25 ENCOUNTER — OFFICE VISIT (OUTPATIENT)
Dept: PRIMARY CARE CLINIC | Facility: CLINIC | Age: 73
End: 2023-10-25
Payer: MEDICARE

## 2023-10-25 VITALS
RESPIRATION RATE: 18 BRPM | SYSTOLIC BLOOD PRESSURE: 128 MMHG | DIASTOLIC BLOOD PRESSURE: 60 MMHG | HEART RATE: 70 BPM | BODY MASS INDEX: 38.07 KG/M2 | OXYGEN SATURATION: 97 % | WEIGHT: 206.88 LBS | HEIGHT: 62 IN

## 2023-10-25 DIAGNOSIS — Z79.4 TYPE 2 DIABETES MELLITUS WITH DIABETIC NEUROPATHY, WITH LONG-TERM CURRENT USE OF INSULIN: Chronic | ICD-10-CM

## 2023-10-25 DIAGNOSIS — E89.0 POSTOPERATIVE HYPOTHYROIDISM: Chronic | ICD-10-CM

## 2023-10-25 DIAGNOSIS — M54.6 THORACIC BACK PAIN, UNSPECIFIED BACK PAIN LATERALITY, UNSPECIFIED CHRONICITY: Primary | ICD-10-CM

## 2023-10-25 DIAGNOSIS — E11.40 TYPE 2 DIABETES MELLITUS WITH DIABETIC NEUROPATHY, WITH LONG-TERM CURRENT USE OF INSULIN: Chronic | ICD-10-CM

## 2023-10-25 DIAGNOSIS — M54.6 THORACIC BACK PAIN, UNSPECIFIED BACK PAIN LATERALITY, UNSPECIFIED CHRONICITY: ICD-10-CM

## 2023-10-25 DIAGNOSIS — M54.6 PAIN IN THORACIC SPINE: ICD-10-CM

## 2023-10-25 DIAGNOSIS — E78.2 MIXED HYPERLIPIDEMIA: Chronic | ICD-10-CM

## 2023-10-25 PROCEDURE — 71046 XR CHEST PA AND LATERAL: ICD-10-PCS | Mod: 26,HCNC,, | Performed by: RADIOLOGY

## 2023-10-25 PROCEDURE — 71046 X-RAY EXAM CHEST 2 VIEWS: CPT | Mod: TC,HCNC,FY,PO

## 2023-10-25 PROCEDURE — 3074F PR MOST RECENT SYSTOLIC BLOOD PRESSURE < 130 MM HG: ICD-10-PCS | Mod: HCNC,CPTII,S$GLB, | Performed by: FAMILY MEDICINE

## 2023-10-25 PROCEDURE — 99214 OFFICE O/P EST MOD 30 MIN: CPT | Mod: HCNC,S$GLB,, | Performed by: FAMILY MEDICINE

## 2023-10-25 PROCEDURE — 3288F PR FALLS RISK ASSESSMENT DOCUMENTED: ICD-10-PCS | Mod: HCNC,CPTII,S$GLB, | Performed by: FAMILY MEDICINE

## 2023-10-25 PROCEDURE — 3074F SYST BP LT 130 MM HG: CPT | Mod: HCNC,CPTII,S$GLB, | Performed by: FAMILY MEDICINE

## 2023-10-25 PROCEDURE — 3078F DIAST BP <80 MM HG: CPT | Mod: HCNC,CPTII,S$GLB, | Performed by: FAMILY MEDICINE

## 2023-10-25 PROCEDURE — 4010F PR ACE/ARB THEARPY RXD/TAKEN: ICD-10-PCS | Mod: HCNC,CPTII,S$GLB, | Performed by: FAMILY MEDICINE

## 2023-10-25 PROCEDURE — 1160F RVW MEDS BY RX/DR IN RCRD: CPT | Mod: HCNC,CPTII,S$GLB, | Performed by: FAMILY MEDICINE

## 2023-10-25 PROCEDURE — 1125F AMNT PAIN NOTED PAIN PRSNT: CPT | Mod: HCNC,CPTII,S$GLB, | Performed by: FAMILY MEDICINE

## 2023-10-25 PROCEDURE — 3288F FALL RISK ASSESSMENT DOCD: CPT | Mod: HCNC,CPTII,S$GLB, | Performed by: FAMILY MEDICINE

## 2023-10-25 PROCEDURE — 3052F HG A1C>EQUAL 8.0%<EQUAL 9.0%: CPT | Mod: HCNC,CPTII,S$GLB, | Performed by: FAMILY MEDICINE

## 2023-10-25 PROCEDURE — 3078F PR MOST RECENT DIASTOLIC BLOOD PRESSURE < 80 MM HG: ICD-10-PCS | Mod: HCNC,CPTII,S$GLB, | Performed by: FAMILY MEDICINE

## 2023-10-25 PROCEDURE — 3052F PR MOST RECENT HEMOGLOBIN A1C LEVEL 8.0 - < 9.0%: ICD-10-PCS | Mod: HCNC,CPTII,S$GLB, | Performed by: FAMILY MEDICINE

## 2023-10-25 PROCEDURE — 3008F BODY MASS INDEX DOCD: CPT | Mod: HCNC,CPTII,S$GLB, | Performed by: FAMILY MEDICINE

## 2023-10-25 PROCEDURE — 3061F NEG MICROALBUMINURIA REV: CPT | Mod: HCNC,CPTII,S$GLB, | Performed by: FAMILY MEDICINE

## 2023-10-25 PROCEDURE — 3066F PR DOCUMENTATION OF TREATMENT FOR NEPHROPATHY: ICD-10-PCS | Mod: HCNC,CPTII,S$GLB, | Performed by: FAMILY MEDICINE

## 2023-10-25 PROCEDURE — 71046 X-RAY EXAM CHEST 2 VIEWS: CPT | Mod: 26,HCNC,, | Performed by: RADIOLOGY

## 2023-10-25 PROCEDURE — 99999 PR PBB SHADOW E&M-EST. PATIENT-LVL V: ICD-10-PCS | Mod: PBBFAC,HCNC,, | Performed by: FAMILY MEDICINE

## 2023-10-25 PROCEDURE — 1159F PR MEDICATION LIST DOCUMENTED IN MEDICAL RECORD: ICD-10-PCS | Mod: HCNC,CPTII,S$GLB, | Performed by: FAMILY MEDICINE

## 2023-10-25 PROCEDURE — 3066F NEPHROPATHY DOC TX: CPT | Mod: HCNC,CPTII,S$GLB, | Performed by: FAMILY MEDICINE

## 2023-10-25 PROCEDURE — 3008F PR BODY MASS INDEX (BMI) DOCUMENTED: ICD-10-PCS | Mod: HCNC,CPTII,S$GLB, | Performed by: FAMILY MEDICINE

## 2023-10-25 PROCEDURE — 4010F ACE/ARB THERAPY RXD/TAKEN: CPT | Mod: HCNC,CPTII,S$GLB, | Performed by: FAMILY MEDICINE

## 2023-10-25 PROCEDURE — 3061F PR NEG MICROALBUMINURIA RESULT DOCUMENTED/REVIEW: ICD-10-PCS | Mod: HCNC,CPTII,S$GLB, | Performed by: FAMILY MEDICINE

## 2023-10-25 PROCEDURE — 99999 PR PBB SHADOW E&M-EST. PATIENT-LVL V: CPT | Mod: PBBFAC,HCNC,, | Performed by: FAMILY MEDICINE

## 2023-10-25 PROCEDURE — 1160F PR REVIEW ALL MEDS BY PRESCRIBER/CLIN PHARMACIST DOCUMENTED: ICD-10-PCS | Mod: HCNC,CPTII,S$GLB, | Performed by: FAMILY MEDICINE

## 2023-10-25 PROCEDURE — 1101F PT FALLS ASSESS-DOCD LE1/YR: CPT | Mod: HCNC,CPTII,S$GLB, | Performed by: FAMILY MEDICINE

## 2023-10-25 PROCEDURE — 1101F PR PT FALLS ASSESS DOC 0-1 FALLS W/OUT INJ PAST YR: ICD-10-PCS | Mod: HCNC,CPTII,S$GLB, | Performed by: FAMILY MEDICINE

## 2023-10-25 PROCEDURE — 99214 PR OFFICE/OUTPT VISIT, EST, LEVL IV, 30-39 MIN: ICD-10-PCS | Mod: HCNC,S$GLB,, | Performed by: FAMILY MEDICINE

## 2023-10-25 PROCEDURE — 1159F MED LIST DOCD IN RCRD: CPT | Mod: HCNC,CPTII,S$GLB, | Performed by: FAMILY MEDICINE

## 2023-10-25 PROCEDURE — 1125F PR PAIN SEVERITY QUANTIFIED, PAIN PRESENT: ICD-10-PCS | Mod: HCNC,CPTII,S$GLB, | Performed by: FAMILY MEDICINE

## 2023-10-25 RX ORDER — ALBUTEROL SULFATE 90 UG/1
2 AEROSOL, METERED RESPIRATORY (INHALATION) EVERY 6 HOURS PRN
Qty: 18 G | Refills: 1 | Status: SHIPPED | OUTPATIENT
Start: 2023-10-25

## 2023-10-25 NOTE — PROGRESS NOTES
Primary Care Provider Appointment   [facility]       Patient ID: Mckenzie Mari is a 73 y.o. female.    ASSESSMENT/PLAN by Problem List:    1. Thoracic back pain, unspecified back pain laterality, unspecified chronicity  Assessment & Plan:  She has musculoskeletal back pain.  Acute onset without obvious injury.  No obvious compression fracture was seen on the CT from the emergency room.  Although this was a renal stone CT.  We can not do an MRI because of her defibrillator so will proceed with a CT scan of the thoracic spine.  I offered PT tried to explain how this may help but she refuses.  I offered pain management consultation but she declines worried about what medicines they may want to give her or what procedures they may want to do.  I advised her to take the tizanidine if it helped.  We consider PT.  And let me know if she reconsider seeing pain management.    Orders:  -     X-Ray Chest PA And Lateral; Future; Expected date: 10/25/2023    2. Pain in thoracic spine  -     CT Thoracic Spine Without Contrast; Future; Expected date: 10/25/2023    3. Mixed hyperlipidemia  Assessment & Plan:  Last LDL 66.  Continue 20 mg rosuvastatin.  There is room to improve nutrition and exercise.      4. Postoperative hypothyroidism  Assessment & Plan:  Last TSH was normal.  Continue levothyroxine 112 mcg daily      5. Type 2 diabetes mellitus with diabetic neuropathy, with long-term current use of insulin  Assessment & Plan:  Last A1c 8.4%.  She does follow with Endocrinology.  Discussed nutrition and exercise.  They will adjust any medications as needed.      Other orders  -     albuterol (PROVENTIL/VENTOLIN HFA) 90 mcg/actuation inhaler; Inhale 2 puffs into the lungs every 6 (six) hours as needed for Wheezing. Rescue  Dispense: 18 g; Refill: 1          Follow Up:  Next month      Subjective:     Chief Complaint   Patient presents with    Follow-up     I have reviewed the  information entered by the ancillary staff regarding the chief complaint as well as the related history.    HPI    Patient is a/an 73 y.o.  female      recent emergency room visit with flank pain.  No evidence of renal stone or UTI.  Chest x-ray did not reveal any acute concern or cause for her pain.  She feels this is a side effect of the shingles vaccine and states she will not get the 2nd shot.      She now complains pain more in the midthoracic region bilaterally.  It does migrate.  The tizanidine may have helped some but she states she does not like to take medications so she is not been taking this regularly.  She was going to physical therapy before this acute exacerbation but states she hurts too much now go to therapy.  Tried to educate explain that therapy will change depending on situation but she does not really want to hear any of this.      She denies any recent injury.  No rashes.  Again she is convinced this is somehow related to the Shingrix vaccine    She does state that when she uses the albuterol inhaler it seems to help but she denies any wheezing, no coughing, no fever.      For complete problem list, past medical history, surgical history, social history, etc., see appropriate section in the electronic medical record    Review of Systems   Musculoskeletal:  Positive for arthralgias and back pain.       Objective     Physical Exam  Cardiovascular:      Rate and Rhythm: Normal rate and regular rhythm.   Pulmonary:      Comments: Breath sounds are mildly diminished although effort is poor as she states there is some back pain.  I do not hear any crackles or wheezes.  Musculoskeletal:        Arms:    Psychiatric:         Speech: Speech is rapid and pressured and tangential.      Comments: Anxious, speech is pressured.  Frequently interrupts, hard to get in a sentence.       Vitals:    10/25/23 1434   BP: 128/60   BP Location: Right arm   Patient Position: Sitting   BP Method: Large (Manual)  "  Pulse: 70   Resp: 18   SpO2: 97%   Weight: 93.9 kg (206 lb 14.4 oz)   Height: 5' 2" (1.575 m)           THIS DOCUMENT WAS MADE IN PART WITH VOICE RECOGNITION SOFTWARE.  OCCASIONALLY THIS SOFTWARE WILL MISINTERPRET WORDS OR PHRASES.    "

## 2023-10-25 NOTE — ASSESSMENT & PLAN NOTE
She has musculoskeletal back pain.  Acute onset without obvious injury.  No obvious compression fracture was seen on the CT from the emergency room.  Although this was a renal stone CT.  We can not do an MRI because of her defibrillator so will proceed with a CT scan of the thoracic spine.  I offered PT tried to explain how this may help but she refuses.  I offered pain management consultation but she declines worried about what medicines they may want to give her or what procedures they may want to do.  I advised her to take the tizanidine if it helped.  We consider PT.  And let me know if she reconsider seeing pain management.

## 2023-10-25 NOTE — ASSESSMENT & PLAN NOTE
Last A1c 8.4%.  She does follow with Endocrinology.  Discussed nutrition and exercise.  They will adjust any medications as needed.

## 2023-10-27 DIAGNOSIS — Z79.4 TYPE 2 DIABETES MELLITUS WITH COMPLICATION, WITH LONG-TERM CURRENT USE OF INSULIN: ICD-10-CM

## 2023-10-27 DIAGNOSIS — E11.8 TYPE 2 DIABETES MELLITUS WITH COMPLICATION, WITH LONG-TERM CURRENT USE OF INSULIN: ICD-10-CM

## 2023-10-27 RX ORDER — PEN NEEDLE, DIABETIC 32GX 5/32"
NEEDLE, DISPOSABLE MISCELLANEOUS
Qty: 400 EACH | Refills: 3 | Status: SHIPPED | OUTPATIENT
Start: 2023-10-27

## 2023-11-01 ENCOUNTER — HOSPITAL ENCOUNTER (OUTPATIENT)
Dept: RADIOLOGY | Facility: HOSPITAL | Age: 73
Discharge: HOME | End: 2023-11-01
Attending: FAMILY MEDICINE
Payer: MEDICARE

## 2023-11-01 DIAGNOSIS — M54.6 PAIN IN THORACIC SPINE: ICD-10-CM

## 2023-11-01 PROCEDURE — 72128 CT CHEST SPINE W/O DYE: CPT | Mod: TC,HCNC,PO

## 2023-11-01 PROCEDURE — 72128 CT THORACIC SPINE WITHOUT CONTRAST: ICD-10-PCS | Mod: 26,HCNC,, | Performed by: RADIOLOGY

## 2023-11-01 PROCEDURE — 72128 CT CHEST SPINE W/O DYE: CPT | Mod: 26,HCNC,, | Performed by: RADIOLOGY

## 2023-11-06 ENCOUNTER — CLINICAL SUPPORT (OUTPATIENT)
Dept: CARDIOLOGY | Facility: HOSPITAL | Age: 73
End: 2023-11-06

## 2023-11-06 DIAGNOSIS — Z95.810 PRESENCE OF AUTOMATIC (IMPLANTABLE) CARDIAC DEFIBRILLATOR: ICD-10-CM

## 2023-11-07 ENCOUNTER — PATIENT MESSAGE (OUTPATIENT)
Dept: ENDOCRINOLOGY | Facility: CLINIC | Age: 73
End: 2023-11-07
Payer: MEDICARE

## 2023-11-13 DIAGNOSIS — Z95.810 ICD (IMPLANTABLE CARDIOVERTER-DEFIBRILLATOR) IN PLACE: Primary | ICD-10-CM

## 2023-11-13 DIAGNOSIS — I50.9 CHF (NYHA CLASS III, ACC/AHA STAGE C): ICD-10-CM

## 2023-11-15 ENCOUNTER — CLINICAL SUPPORT (OUTPATIENT)
Dept: CARDIOLOGY | Facility: HOSPITAL | Age: 73
End: 2023-11-15
Attending: INTERNAL MEDICINE
Payer: MEDICARE

## 2023-11-15 DIAGNOSIS — Z95.810 ICD (IMPLANTABLE CARDIOVERTER-DEFIBRILLATOR) IN PLACE: ICD-10-CM

## 2023-11-15 DIAGNOSIS — I50.9 CHF (NYHA CLASS III, ACC/AHA STAGE C): ICD-10-CM

## 2023-11-15 PROCEDURE — 93284 PRGRMG EVAL IMPLANTABLE DFB: CPT | Mod: HCNC,PO

## 2023-11-15 PROCEDURE — 93284 CARDIAC DEVICE CHECK - IN CLINIC & HOSPITAL: ICD-10-PCS | Mod: 26,HCNC,, | Performed by: INTERNAL MEDICINE

## 2023-11-15 PROCEDURE — 93284 PRGRMG EVAL IMPLANTABLE DFB: CPT | Mod: 26,HCNC,, | Performed by: INTERNAL MEDICINE

## 2023-11-28 ENCOUNTER — OFFICE VISIT (OUTPATIENT)
Dept: PRIMARY CARE CLINIC | Facility: CLINIC | Age: 73
End: 2023-11-28
Payer: MEDICARE

## 2023-11-28 VITALS
BODY MASS INDEX: 38.64 KG/M2 | WEIGHT: 210 LBS | HEART RATE: 72 BPM | RESPIRATION RATE: 17 BRPM | TEMPERATURE: 98 F | DIASTOLIC BLOOD PRESSURE: 68 MMHG | OXYGEN SATURATION: 97 % | HEIGHT: 62 IN | SYSTOLIC BLOOD PRESSURE: 124 MMHG

## 2023-11-28 DIAGNOSIS — M54.6 THORACIC BACK PAIN, UNSPECIFIED BACK PAIN LATERALITY, UNSPECIFIED CHRONICITY: Primary | ICD-10-CM

## 2023-11-28 DIAGNOSIS — M25.50 MULTIPLE JOINT PAIN: ICD-10-CM

## 2023-11-28 DIAGNOSIS — H93.13 TINNITUS OF BOTH EARS: ICD-10-CM

## 2023-11-28 PROCEDURE — 3052F HG A1C>EQUAL 8.0%<EQUAL 9.0%: CPT | Mod: HCNC,CPTII,S$GLB, | Performed by: FAMILY MEDICINE

## 2023-11-28 PROCEDURE — 1159F PR MEDICATION LIST DOCUMENTED IN MEDICAL RECORD: ICD-10-PCS | Mod: HCNC,CPTII,S$GLB, | Performed by: FAMILY MEDICINE

## 2023-11-28 PROCEDURE — 4010F ACE/ARB THERAPY RXD/TAKEN: CPT | Mod: HCNC,CPTII,S$GLB, | Performed by: FAMILY MEDICINE

## 2023-11-28 PROCEDURE — 4010F PR ACE/ARB THEARPY RXD/TAKEN: ICD-10-PCS | Mod: HCNC,CPTII,S$GLB, | Performed by: FAMILY MEDICINE

## 2023-11-28 PROCEDURE — 1125F PR PAIN SEVERITY QUANTIFIED, PAIN PRESENT: ICD-10-PCS | Mod: HCNC,CPTII,S$GLB, | Performed by: FAMILY MEDICINE

## 2023-11-28 PROCEDURE — 1125F AMNT PAIN NOTED PAIN PRSNT: CPT | Mod: HCNC,CPTII,S$GLB, | Performed by: FAMILY MEDICINE

## 2023-11-28 PROCEDURE — 3066F PR DOCUMENTATION OF TREATMENT FOR NEPHROPATHY: ICD-10-PCS | Mod: HCNC,CPTII,S$GLB, | Performed by: FAMILY MEDICINE

## 2023-11-28 PROCEDURE — 3078F PR MOST RECENT DIASTOLIC BLOOD PRESSURE < 80 MM HG: ICD-10-PCS | Mod: HCNC,CPTII,S$GLB, | Performed by: FAMILY MEDICINE

## 2023-11-28 PROCEDURE — 1101F PR PT FALLS ASSESS DOC 0-1 FALLS W/OUT INJ PAST YR: ICD-10-PCS | Mod: HCNC,CPTII,S$GLB, | Performed by: FAMILY MEDICINE

## 2023-11-28 PROCEDURE — 1101F PT FALLS ASSESS-DOCD LE1/YR: CPT | Mod: HCNC,CPTII,S$GLB, | Performed by: FAMILY MEDICINE

## 2023-11-28 PROCEDURE — 99999 PR PBB SHADOW E&M-EST. PATIENT-LVL V: CPT | Mod: PBBFAC,HCNC,, | Performed by: FAMILY MEDICINE

## 2023-11-28 PROCEDURE — 3074F SYST BP LT 130 MM HG: CPT | Mod: HCNC,CPTII,S$GLB, | Performed by: FAMILY MEDICINE

## 2023-11-28 PROCEDURE — 99214 PR OFFICE/OUTPT VISIT, EST, LEVL IV, 30-39 MIN: ICD-10-PCS | Mod: HCNC,S$GLB,, | Performed by: FAMILY MEDICINE

## 2023-11-28 PROCEDURE — 3074F PR MOST RECENT SYSTOLIC BLOOD PRESSURE < 130 MM HG: ICD-10-PCS | Mod: HCNC,CPTII,S$GLB, | Performed by: FAMILY MEDICINE

## 2023-11-28 PROCEDURE — 99214 OFFICE O/P EST MOD 30 MIN: CPT | Mod: HCNC,S$GLB,, | Performed by: FAMILY MEDICINE

## 2023-11-28 PROCEDURE — 99999 PR PBB SHADOW E&M-EST. PATIENT-LVL V: ICD-10-PCS | Mod: PBBFAC,HCNC,, | Performed by: FAMILY MEDICINE

## 2023-11-28 PROCEDURE — 1160F RVW MEDS BY RX/DR IN RCRD: CPT | Mod: HCNC,CPTII,S$GLB, | Performed by: FAMILY MEDICINE

## 2023-11-28 PROCEDURE — 1159F MED LIST DOCD IN RCRD: CPT | Mod: HCNC,CPTII,S$GLB, | Performed by: FAMILY MEDICINE

## 2023-11-28 PROCEDURE — 3008F BODY MASS INDEX DOCD: CPT | Mod: HCNC,CPTII,S$GLB, | Performed by: FAMILY MEDICINE

## 2023-11-28 PROCEDURE — 3052F PR MOST RECENT HEMOGLOBIN A1C LEVEL 8.0 - < 9.0%: ICD-10-PCS | Mod: HCNC,CPTII,S$GLB, | Performed by: FAMILY MEDICINE

## 2023-11-28 PROCEDURE — 3061F NEG MICROALBUMINURIA REV: CPT | Mod: HCNC,CPTII,S$GLB, | Performed by: FAMILY MEDICINE

## 2023-11-28 PROCEDURE — 3008F PR BODY MASS INDEX (BMI) DOCUMENTED: ICD-10-PCS | Mod: HCNC,CPTII,S$GLB, | Performed by: FAMILY MEDICINE

## 2023-11-28 PROCEDURE — 3288F PR FALLS RISK ASSESSMENT DOCUMENTED: ICD-10-PCS | Mod: HCNC,CPTII,S$GLB, | Performed by: FAMILY MEDICINE

## 2023-11-28 PROCEDURE — 3288F FALL RISK ASSESSMENT DOCD: CPT | Mod: HCNC,CPTII,S$GLB, | Performed by: FAMILY MEDICINE

## 2023-11-28 PROCEDURE — 1160F PR REVIEW ALL MEDS BY PRESCRIBER/CLIN PHARMACIST DOCUMENTED: ICD-10-PCS | Mod: HCNC,CPTII,S$GLB, | Performed by: FAMILY MEDICINE

## 2023-11-28 PROCEDURE — 3061F PR NEG MICROALBUMINURIA RESULT DOCUMENTED/REVIEW: ICD-10-PCS | Mod: HCNC,CPTII,S$GLB, | Performed by: FAMILY MEDICINE

## 2023-11-28 PROCEDURE — 3078F DIAST BP <80 MM HG: CPT | Mod: HCNC,CPTII,S$GLB, | Performed by: FAMILY MEDICINE

## 2023-11-28 PROCEDURE — 3066F NEPHROPATHY DOC TX: CPT | Mod: HCNC,CPTII,S$GLB, | Performed by: FAMILY MEDICINE

## 2023-11-28 NOTE — PROGRESS NOTES
Primary Care Provider Appointment   [facility]       Patient ID: Mckenzie Mari is a 73 y.o. female.    ASSESSMENT/PLAN by Problem List:    1. Thoracic back pain, unspecified back pain laterality, unspecified chronicity  Assessment & Plan:  She still has upper and midthoracic back pain.  Seems to worsen as the day goes on.  Once again I offered PT or referral to pain management and she declines states she is doing some exercise at home.  We did discuss continuing exercises, proper posture, but if not improving re-evaluate and consider referral as suggested.  But she does have some upper chest pains that are intermittent, not exertional, I believe this is musculoskeletal as well she did have a normal stress test earlier this year.  But she will monitor and report any change and follow with me or Cardiology if so.      2. Tinnitus of both ears  -     Ambulatory referral/consult to Audiology; Future; Expected date: 12/05/2023  -     Ambulatory referral/consult to ENT; Future; Expected date: 12/05/2023    3. Multiple joint pain  Assessment & Plan:  Intermittent and sometimes migrating arthralgias.  Recent exacerbation after she received Shingrix.  Unclear if coincidental or related.  Arthralgias tend to worsen as the day goes on she tends to feel better in the morning although she does report her father had a history of rheumatoid arthritis.  At this time her symptoms sound more suspicious for osteoarthritis however if symptoms worsen again or if desired consider rheumatology consult           Follow Up:  Three months      Subjective:     Chief Complaint   Patient presents with    Follow-up     I have reviewed the information entered by the ancillary staff regarding the chief complaint as well as the related history.    Follow-up  Associated symptoms include chest pain. Pertinent negatives include no headaches or vomiting.       Patient is a/an 73 y.o.  female     Patient is  here following up with thoracic back pain.  Reviewed recent CT scan of the thoracic spine which does show diffuse degenerative changes.  She was still reports pain although perhaps somewhat improved.  She continues to decline referral to pain management and physical therapy but states she is doing her own home PT.    She also reports significant increase in musculoskeletal pain predominantly arthralgias after she received the Shingrix vaccine.  Symptoms have returned to baseline now.  She is still planning on proceeding with the 2nd Shingrix.    She also reports tinnitus this is a chronic problem that does fluctuate.  She is not aware of any significant hearing loss.  She is not had a recent audiology consultation and is agreeable to do so.    For complete problem list, past medical history, surgical history, social history, etc., see appropriate section in the electronic medical record    Review of Systems   Constitutional:  Negative for activity change.   HENT:  Positive for tinnitus. Negative for hearing loss and trouble swallowing.    Eyes:  Negative for discharge.   Respiratory:  Negative for chest tightness and wheezing.    Cardiovascular:  Positive for chest pain. Negative for palpitations.   Gastrointestinal:  Negative for constipation, diarrhea and vomiting.   Genitourinary:  Negative for difficulty urinating and hematuria.   Neurological:  Negative for headaches.   Psychiatric/Behavioral:  Negative for dysphoric mood.        Objective     Physical Exam  Vitals reviewed.   Constitutional:       General: She is not in acute distress.     Appearance: She is well-developed. She is not ill-appearing.   HENT:      Head: Normocephalic and atraumatic.      Right Ear: Tympanic membrane, ear canal and external ear normal. There is no impacted cerumen.      Left Ear: Tympanic membrane, ear canal and external ear normal. There is no impacted cerumen.      Ears:      Comments: Minimal cerumen right side, no  "obstruction.  Moderate cerumen left side most of which was cleared with a cerumen spoon.  Both tympanic membranes and canals are otherwise unremarkable  Eyes:      General: No scleral icterus.     Conjunctiva/sclera: Conjunctivae normal.   Cardiovascular:      Rate and Rhythm: Normal rate and regular rhythm.      Heart sounds: Normal heart sounds. No murmur heard.  Pulmonary:      Effort: Pulmonary effort is normal. No respiratory distress.      Breath sounds: Normal breath sounds. No wheezing or rales.   Skin:     General: Skin is dry.      Findings: No rash.   Neurological:      Mental Status: She is alert and oriented to person, place, and time.   Psychiatric:         Behavior: Behavior normal.       Vitals:    11/28/23 1505   BP: 124/68   BP Location: Right arm   Patient Position: Sitting   BP Method: Large (Manual)   Pulse: 72   Resp: 17   Temp: 97.5 °F (36.4 °C)   TempSrc: Oral   SpO2: 97%   Weight: 95.2 kg (209 lb 15.8 oz)   Height: 5' 2" (1.575 m)           THIS DOCUMENT WAS MADE IN PART WITH VOICE RECOGNITION SOFTWARE.  OCCASIONALLY THIS SOFTWARE WILL MISINTERPRET WORDS OR PHRASES.    "

## 2023-11-29 PROBLEM — M25.50 MULTIPLE JOINT PAIN: Status: ACTIVE | Noted: 2023-11-29

## 2023-11-29 NOTE — ASSESSMENT & PLAN NOTE
Intermittent and sometimes migrating arthralgias.  Recent exacerbation after she received Shingrix.  Unclear if coincidental or related.  Arthralgias tend to worsen as the day goes on she tends to feel better in the morning although she does report her father had a history of rheumatoid arthritis.  At this time her symptoms sound more suspicious for osteoarthritis however if symptoms worsen again or if desired consider rheumatology consult

## 2023-11-29 NOTE — ASSESSMENT & PLAN NOTE
She still has upper and midthoracic back pain.  Seems to worsen as the day goes on.  Once again I offered PT or referral to pain management and she declines states she is doing some exercise at home.  We did discuss continuing exercises, proper posture, but if not improving re-evaluate and consider referral as suggested.  But she does have some upper chest pains that are intermittent, not exertional, I believe this is musculoskeletal as well she did have a normal stress test earlier this year.  But she will monitor and report any change and follow with me or Cardiology if so.

## 2023-11-30 LAB
OHS CV AF BURDEN PERCENT: < 1
OHS CV RV PACING PERCENT: 0 %

## 2023-12-02 ENCOUNTER — PATIENT MESSAGE (OUTPATIENT)
Dept: ENDOCRINOLOGY | Facility: CLINIC | Age: 73
End: 2023-12-02
Payer: MEDICARE

## 2023-12-02 DIAGNOSIS — E11.40 TYPE 2 DIABETES MELLITUS WITH DIABETIC NEUROPATHY, WITH LONG-TERM CURRENT USE OF INSULIN: ICD-10-CM

## 2023-12-02 DIAGNOSIS — Z79.4 TYPE 2 DIABETES MELLITUS WITH DIABETIC NEUROPATHY, WITH LONG-TERM CURRENT USE OF INSULIN: ICD-10-CM

## 2023-12-04 RX ORDER — INSULIN DEGLUDEC 200 U/ML
INJECTION, SOLUTION SUBCUTANEOUS
Qty: 10 PEN | Refills: 3 | Status: SHIPPED | OUTPATIENT
Start: 2023-12-04 | End: 2023-12-12 | Stop reason: SDUPTHER

## 2023-12-04 RX ORDER — INSULIN LISPRO 100 [IU]/ML
INJECTION, SOLUTION INTRAVENOUS; SUBCUTANEOUS
Qty: 45 ML | Refills: 3 | Status: SHIPPED | OUTPATIENT
Start: 2023-12-04 | End: 2023-12-18

## 2023-12-06 ENCOUNTER — PATIENT MESSAGE (OUTPATIENT)
Dept: ENDOCRINOLOGY | Facility: CLINIC | Age: 73
End: 2023-12-06
Payer: MEDICARE

## 2023-12-09 ENCOUNTER — PATIENT MESSAGE (OUTPATIENT)
Dept: ENDOCRINOLOGY | Facility: CLINIC | Age: 73
End: 2023-12-09
Payer: MEDICARE

## 2023-12-12 ENCOUNTER — TELEPHONE (OUTPATIENT)
Dept: ENDOCRINOLOGY | Facility: CLINIC | Age: 73
End: 2023-12-12
Payer: MEDICARE

## 2023-12-12 DIAGNOSIS — E11.40 TYPE 2 DIABETES MELLITUS WITH DIABETIC NEUROPATHY, WITH LONG-TERM CURRENT USE OF INSULIN: ICD-10-CM

## 2023-12-12 DIAGNOSIS — Z79.4 TYPE 2 DIABETES MELLITUS WITH DIABETIC NEUROPATHY, WITH LONG-TERM CURRENT USE OF INSULIN: ICD-10-CM

## 2023-12-12 RX ORDER — INSULIN DEGLUDEC 200 U/ML
INJECTION, SOLUTION SUBCUTANEOUS
Qty: 10 PEN | Refills: 3 | Status: SHIPPED | OUTPATIENT
Start: 2023-12-12

## 2023-12-12 NOTE — TELEPHONE ENCOUNTER
----- Message from Janel Ashu sent at 12/12/2023  9:21 AM CST -----  Regarding: Refill request  Contact: pt  Type:  RX Refill Request    Who Called: pt    Refill or New Rx:refill    RX Name and Strength:insulin degludec (TRESIBA FLEXTOUCH U-200) 200 unit/mL (3 mL) insulin pen  Humalog    How is the patient currently taking it? (ex. 1XDay):as directed  Is this a 30 day or 90 day RX:90 day    Preferred Pharmacy with phone number:  [id number] [place] [address] & [address] [address]  Phone: [phone] Fax: [phone]    Local or Mail Order:local  Ordering Provider:Michelle    Would the patient rather a call back or a response via [facility]? Call back    Best Call Back Number:[phone]    Additional Information: Pharmacy sts that the doctor needs to call the pharmacy to straighten out the rx.  Insurance says they need to hear from the doctor to give approval.  Please advise.  Thank you.

## 2023-12-12 NOTE — TELEPHONE ENCOUNTER
"Checked "dispense as written" box on tresiba prescription. This should hopefully clear up the issue.  "

## 2023-12-15 ENCOUNTER — TELEPHONE (OUTPATIENT)
Dept: ENDOCRINOLOGY | Facility: CLINIC | Age: 73
End: 2023-12-15
Payer: MEDICARE

## 2023-12-15 ENCOUNTER — PATIENT MESSAGE (OUTPATIENT)
Dept: ENDOCRINOLOGY | Facility: CLINIC | Age: 73
End: 2023-12-15
Payer: MEDICARE

## 2023-12-15 DIAGNOSIS — E11.40 TYPE 2 DIABETES MELLITUS WITH DIABETIC NEUROPATHY, WITH LONG-TERM CURRENT USE OF INSULIN: Primary | ICD-10-CM

## 2023-12-15 DIAGNOSIS — Z79.4 TYPE 2 DIABETES MELLITUS WITH DIABETIC NEUROPATHY, WITH LONG-TERM CURRENT USE OF INSULIN: Primary | ICD-10-CM

## 2023-12-15 NOTE — TELEPHONE ENCOUNTER
S/w pt. States Humalog is denied and insurance is covering Novolog or fiasp. Pt does not want to change drugs and states they are not as effective as the Humalog. Wants an appeal. Will get appeal info and send to Michelle Lucio NP for review.

## 2023-12-15 NOTE — TELEPHONE ENCOUNTER
----- Message from Tina Leija sent at 12/15/2023 10:41 AM CST -----  Contact: pt  Type: Needs Medical Advice  Who Called:  pt  Best Call Back Number:[phone]      Additional Information: Pt is calling the office Giving pt a problem at pharmacy for insulin need a reason why pt is being prescribed that specific script.Please call back and advise.

## 2023-12-18 RX ORDER — INSULIN ASPART 100 [IU]/ML
12 INJECTION, SOLUTION INTRAVENOUS; SUBCUTANEOUS
Qty: 45 ML | Refills: 3 | Status: SHIPPED | OUTPATIENT
Start: 2023-12-18

## 2023-12-18 NOTE — TELEPHONE ENCOUNTER
Humalog PA was denied. Pt is requesting an appeal and states Novolog is not as effective. Please advise.

## 2023-12-27 ENCOUNTER — TELEPHONE (OUTPATIENT)
Dept: PRIMARY CARE CLINIC | Facility: CLINIC | Age: 73
End: 2023-12-27
Payer: MEDICARE

## 2023-12-27 ENCOUNTER — LAB VISIT (OUTPATIENT)
Dept: LAB | Facility: HOSPITAL | Age: 73
End: 2023-12-27
Payer: MEDICARE

## 2023-12-27 DIAGNOSIS — J02.9 ACUTE SORE THROAT: ICD-10-CM

## 2023-12-27 DIAGNOSIS — J02.9 ACUTE SORE THROAT: Primary | ICD-10-CM

## 2023-12-27 LAB
CTP QC/QA: YES
MOLECULAR STREP A: NEGATIVE

## 2023-12-27 PROCEDURE — 87651 STREP A DNA AMP PROBE: CPT | Mod: QW,S$GLB,, | Performed by: FAMILY MEDICINE

## 2023-12-27 PROCEDURE — 87651 POCT STREP A MOLECULAR: ICD-10-PCS | Mod: QW,S$GLB,, | Performed by: FAMILY MEDICINE

## 2023-12-27 PROCEDURE — 87070 CULTURE OTHR SPECIMN AEROBIC: CPT | Mod: HCNC | Performed by: FAMILY MEDICINE

## 2023-12-27 NOTE — TELEPHONE ENCOUNTER
"----- Message from Sandy Buchanan sent at 12/27/2023  8:05 AM CST -----  Contact: Pt  [phone]  [email or web address]     Patient is calling for Medical Advice regarding: Sore Throat     How long has patient had these symptoms:1 day     Pharmacy name and phone#:  [id number] [place] [address] & [address] [address]  Phone: [phone] Fax: [phone]       Would like response via [facility]:  call back     Comments:      "Based on the symptom(s) you mentioned and information you have provided, you need to speak with a Registered Nurse to help determine how to best address your need. Would you please hold while I connect you?"  Patient Navigator: Warm transfer to OOC as "Yellow Priority: 15 min call".  OOC Front End: Put on board as high priority with "Yellow: 15 min call back" State to patient before ending call "A nurse will be returning your call shortly. If your symptoms worsen prior to receiving a call from a nurse, please go to the nearest ER or call 911."      "

## 2023-12-27 NOTE — TELEPHONE ENCOUNTER
Let Mrs. Mari know that I am sorry to hear she is feeling badly.  We have many viruses going around right now including RSV, COVID, influenza and countless others.  Viruses do not respond to antibiotics.  If she is concerned about strep throat or has had exposure to strep throat let us bring her in for a rapid strep and potential culture if the rapid is negative.  Otherwise I recommend symptomatic treatment.

## 2023-12-27 NOTE — TELEPHONE ENCOUNTER
Spoke with pt, discussed your message. Pt verbalized understanding. She would like to be swabbed for strep.  Pt will be here shortly.

## 2023-12-27 NOTE — TELEPHONE ENCOUNTER
Spoke with pt, discussed hydrating, taking tylenol 1000mg every 8 hrs, gargling with salt water and if she would like lidocaine mouthwash to gargle with, Dr. Gold would call in for pt,     Pt stated that she would like the lidocaine to gargle with.

## 2023-12-27 NOTE — TELEPHONE ENCOUNTER
Spoke with pt, she reports that she has a sore throat (hurts to swallow), ear congestion -pain in ears near jaw line and chest congestion.  Denies fever, body aches, chills and any white patches in the back of her throat.  Pt reports that she gargled with salt water and it did not help with the sore throat pain.     Please advise.

## 2023-12-29 ENCOUNTER — TELEPHONE (OUTPATIENT)
Dept: PRIMARY CARE CLINIC | Facility: CLINIC | Age: 73
End: 2023-12-29
Payer: MEDICARE

## 2023-12-29 DIAGNOSIS — J32.9 SINUSITIS, UNSPECIFIED CHRONICITY, UNSPECIFIED LOCATION: Primary | ICD-10-CM

## 2023-12-29 RX ORDER — AZITHROMYCIN 250 MG/1
TABLET, FILM COATED ORAL
Qty: 6 TABLET | Refills: 0 | Status: SHIPPED | OUTPATIENT
Start: 2023-12-29 | End: 2024-01-03

## 2023-12-29 NOTE — TELEPHONE ENCOUNTER
Spoke with pt, discussed her current symptoms.  Pt reports that she is having yellow/green sputum, and is requesting a Z-rubi to be called in as she is concerned about worsening over the extended holiday weekend.  Pt reports that she continues to have a sore throat and that she is having a hard time swallowing.  Encouraged pt to hydrate and use the magic mouthwash that Dr. Gardner previously prescribed.  Pt verbalized understanding.  Pt confirms that she tolerates Azithromycin well and has not had any reactions to it.

## 2023-12-29 NOTE — TELEPHONE ENCOUNTER
I sent in a prescription for Zithromax.  I do recommend that she use nasal saline spray a few times a day.  If she has significant congestion or thick mucus she may use Mucinex.  She may also use Vicks vapor rub.

## 2023-12-29 NOTE — TELEPHONE ENCOUNTER
----- Message from Luz Fabian sent at 12/29/2023  8:44 AM CST -----  Regarding: PT ADVICE - getting worse  [phone] - call back ;  said that she is not getting nay better form her last visit ,and her throat still hurts. Need advice what to do.      Luz

## 2023-12-30 LAB — BACTERIA THROAT CULT: NORMAL

## 2024-01-03 ENCOUNTER — HOSPITAL ENCOUNTER (OUTPATIENT)
Dept: CARDIOLOGY | Facility: HOSPITAL | Age: 74
Discharge: HOME | End: 2024-01-03
Attending: INTERNAL MEDICINE
Payer: MEDICARE

## 2024-01-03 PROCEDURE — 93296 REM INTERROG EVL PM/IDS: CPT | Mod: HCNC,PO | Performed by: INTERNAL MEDICINE

## 2024-01-03 PROCEDURE — 93295 DEV INTERROG REMOTE 1/2/MLT: CPT | Mod: HCNC,,, | Performed by: INTERNAL MEDICINE

## 2024-01-04 ENCOUNTER — PATIENT MESSAGE (OUTPATIENT)
Dept: ENDOCRINOLOGY | Facility: CLINIC | Age: 74
End: 2024-01-04
Payer: MEDICARE

## 2024-01-04 DIAGNOSIS — E11.40 TYPE 2 DIABETES MELLITUS WITH DIABETIC NEUROPATHY, WITH LONG-TERM CURRENT USE OF INSULIN: Primary | ICD-10-CM

## 2024-01-04 DIAGNOSIS — Z79.4 TYPE 2 DIABETES MELLITUS WITH DIABETIC NEUROPATHY, WITH LONG-TERM CURRENT USE OF INSULIN: Primary | ICD-10-CM

## 2024-01-22 ENCOUNTER — OFFICE VISIT (OUTPATIENT)
Dept: CARDIOLOGY | Facility: CLINIC | Age: 74
End: 2024-01-22
Payer: MEDICARE

## 2024-01-22 VITALS
SYSTOLIC BLOOD PRESSURE: 95 MMHG | BODY MASS INDEX: 38.86 KG/M2 | HEIGHT: 62 IN | WEIGHT: 211.19 LBS | DIASTOLIC BLOOD PRESSURE: 52 MMHG | HEART RATE: 64 BPM

## 2024-01-22 DIAGNOSIS — I44.7 LBBB (LEFT BUNDLE BRANCH BLOCK): Chronic | ICD-10-CM

## 2024-01-22 DIAGNOSIS — I25.10 CORONARY ARTERY DISEASE INVOLVING NATIVE CORONARY ARTERY WITHOUT ANGINA PECTORIS, UNSPECIFIED WHETHER NATIVE OR TRANSPLANTED HEART: ICD-10-CM

## 2024-01-22 DIAGNOSIS — Z95.810 ICD (IMPLANTABLE CARDIOVERTER-DEFIBRILLATOR) IN PLACE: Chronic | ICD-10-CM

## 2024-01-22 DIAGNOSIS — I50.22 CHRONIC SYSTOLIC CONGESTIVE HEART FAILURE: Primary | Chronic | ICD-10-CM

## 2024-01-22 DIAGNOSIS — I10 PRIMARY HYPERTENSION: Chronic | ICD-10-CM

## 2024-01-22 DIAGNOSIS — E78.2 MIXED HYPERLIPIDEMIA: Chronic | ICD-10-CM

## 2024-01-22 PROCEDURE — 1160F RVW MEDS BY RX/DR IN RCRD: CPT | Mod: HCNC,CPTII,S$GLB, | Performed by: INTERNAL MEDICINE

## 2024-01-22 PROCEDURE — 1125F AMNT PAIN NOTED PAIN PRSNT: CPT | Mod: HCNC,CPTII,S$GLB, | Performed by: INTERNAL MEDICINE

## 2024-01-22 PROCEDURE — 99999 PR PBB SHADOW E&M-EST. PATIENT-LVL IV: CPT | Mod: PBBFAC,HCNC,, | Performed by: INTERNAL MEDICINE

## 2024-01-22 PROCEDURE — 1159F MED LIST DOCD IN RCRD: CPT | Mod: HCNC,CPTII,S$GLB, | Performed by: INTERNAL MEDICINE

## 2024-01-22 PROCEDURE — 3288F FALL RISK ASSESSMENT DOCD: CPT | Mod: HCNC,CPTII,S$GLB, | Performed by: INTERNAL MEDICINE

## 2024-01-22 PROCEDURE — 1101F PT FALLS ASSESS-DOCD LE1/YR: CPT | Mod: HCNC,CPTII,S$GLB, | Performed by: INTERNAL MEDICINE

## 2024-01-22 PROCEDURE — 3074F SYST BP LT 130 MM HG: CPT | Mod: HCNC,CPTII,S$GLB, | Performed by: INTERNAL MEDICINE

## 2024-01-22 PROCEDURE — 99214 OFFICE O/P EST MOD 30 MIN: CPT | Mod: HCNC,S$GLB,, | Performed by: INTERNAL MEDICINE

## 2024-01-22 PROCEDURE — 3078F DIAST BP <80 MM HG: CPT | Mod: HCNC,CPTII,S$GLB, | Performed by: INTERNAL MEDICINE

## 2024-01-22 PROCEDURE — 3008F BODY MASS INDEX DOCD: CPT | Mod: HCNC,CPTII,S$GLB, | Performed by: INTERNAL MEDICINE

## 2024-01-22 NOTE — PROGRESS NOTES
Subjective:    Patient ID:  Mckenzie Mari is a 73 y.o. female who presents for follow-up of CAD, cardiomyopathy    HPI  She comes with no complaints, no chest pain, no shortness of breath  FC II  BP ok, FC II    Review of Systems   Constitutional: Negative for decreased appetite, malaise/fatigue, weight gain and weight loss.   Cardiovascular:  Negative for chest pain, dyspnea on exertion, leg swelling, palpitations and syncope.   Respiratory:  Negative for cough and shortness of breath.    Gastrointestinal: Negative.    Neurological:  Negative for weakness.   All other systems reviewed and are negative.     Objective:      Physical Exam  Vitals and nursing note reviewed.   Constitutional:       Appearance: Normal appearance. She is well-developed.   HENT:      Head: Normocephalic.   Eyes:      Pupils: Pupils are equal, round, and reactive to light.   Neck:      Thyroid: No thyromegaly.      Vascular: No carotid bruit or JVD.   Cardiovascular:      Rate and Rhythm: Normal rate and regular rhythm.      Chest Wall: PMI is not displaced.      Pulses: Normal pulses and intact distal pulses.      Heart sounds: Normal heart sounds. No murmur heard.     No gallop.   Pulmonary:      Effort: Pulmonary effort is normal.      Breath sounds: Normal breath sounds.   Abdominal:      Palpations: Abdomen is soft. There is no mass.      Tenderness: There is no abdominal tenderness.   Musculoskeletal:         General: Normal range of motion.      Cervical back: Normal range of motion and neck supple.   Skin:     General: Skin is warm.   Neurological:      Mental Status: She is alert and oriented to person, place, and time.      Sensory: No sensory deficit.      Deep Tendon Reflexes: Reflexes are normal and symmetric.         Most Recent EKG Results  Results for orders placed or performed during the hospital encounter of 10/04/21   EKG 12-lead    Collection Time: 10/04/21 10:56 AM    Narrative    Test Reason : I49.9,    Vent. Rate  : 068 BPM     Atrial Rate : 068 BPM     P-R Int : 144 ms          QRS Dur : 136 ms      QT Int : 426 ms       P-R-T Axes : 060 148 070 degrees     QTc Int : 452 ms    Atrial-sensed ventricular-paced rhythm  Biventricular pacemaker detected  Abnormal ECG  When compared with ECG of 04-OCT-2021 08:06,  No significant change was found  Confirmed by Carlos Velázquez MD ([id number]) on 10/12/2021 5:52:42 PM    Referred By:             Confirmed By:Carlos Velázquez MD       Most Recent Echocardiogram Results  Results for orders placed in visit on 06/07/21    Echo    Interpretation Summary  · The left ventricle is normal in size with low normal systolic function.  · Normal right ventricular size with normal right ventricular systolic function.  · The estimated ejection fraction is 50%.  · Indeterminate left ventricular diastolic function.  · Mild mitral regurgitation.  · Normal central venous pressure (3 mmHg).  · The estimated PA systolic pressure is 31 mmHg.      Most Recent Nuclear Stress Test Results  Results for orders placed during the hospital encounter of 05/04/23    Nuclear Stress - Cardiology Interpreted    Interpretation Summary    There is a small to moderate sized, fixed perfusion abnormality  in the anteroapical wall(s), probably secondary to paced rhythm    There are no other significant perfusion abnormalities.  No evidence of reversible ischemia    The gated perfusion images showed an ejection fraction of 74% at rest.    The ECG portion of the study is uninterpretable, due to AV pacing.    The patient reported no chest pain during the stress test.    There were no arrhythmias during stress.      Most Recent Cardiac PET Stress Test Results  No results found for this or any previous visit.      Most Recent Cardiovascular Angiogram results  Results for orders placed during the hospital encounter of 06/25/21    Cardiac catheterization    Conclusion  · The coronary arteries were normal..  · The left ventricular systolic  function was normal.  · The left ventricular end diastolic pressure was elevated.    The procedure log was documented by Documenter: RT Porsche and verified by Joseph Hansen MD.    Date: 6/25/2021  Time: 9:31 AM      Other Most Recent Cardiology Results  Results for orders placed in visit on 11/15/23    Cardiac device check - [facility]      Labs reviewed    Assessment:       1. Chronic systolic congestive heart failure    2. LBBB (left bundle branch block)    3. ICD (implantable cardioverter-defibrillator) in place    4. Coronary artery disease involving native coronary artery without angina pectoris, unspecified whether native or transplanted heart    5. Mixed hyperlipidemia    6. Primary hypertension         Plan:     Continue:  ARB, ASA, Beta blocker, Diuretic, and Statin, Jardiance  Regular exercise program  Weight loss  Low cholesterol diet    6 m f/u with [facility]

## 2024-02-08 LAB
OHS CV AF BURDEN PERCENT: < 1
OHS CV BIV PACING PERCENT: 100 %
OHS CV DC REMOTE DEVICE TYPE: NORMAL
OHS CV RV PACING PERCENT: 99 %

## 2024-02-09 DIAGNOSIS — I50.9 CHF (NYHA CLASS III, ACC/AHA STAGE C): Primary | ICD-10-CM

## 2024-02-09 RX ORDER — POTASSIUM CHLORIDE 20 MEQ/1
20 TABLET, EXTENDED RELEASE ORAL
Qty: 90 TABLET | Refills: 3 | Status: SHIPPED | OUTPATIENT
Start: 2024-02-09

## 2024-02-26 ENCOUNTER — PATIENT MESSAGE (OUTPATIENT)
Dept: PRIMARY CARE CLINIC | Facility: CLINIC | Age: 74
End: 2024-02-26
Payer: MEDICARE

## 2024-02-27 ENCOUNTER — LAB VISIT (OUTPATIENT)
Dept: LAB | Facility: HOSPITAL | Age: 74
End: 2024-02-27
Attending: NURSE PRACTITIONER
Payer: MEDICARE

## 2024-02-27 DIAGNOSIS — Z79.4 TYPE 2 DIABETES MELLITUS WITH DIABETIC NEUROPATHY, WITH LONG-TERM CURRENT USE OF INSULIN: ICD-10-CM

## 2024-02-27 DIAGNOSIS — E11.40 TYPE 2 DIABETES MELLITUS WITH DIABETIC NEUROPATHY, WITH LONG-TERM CURRENT USE OF INSULIN: ICD-10-CM

## 2024-02-27 LAB
ALBUMIN SERPL BCP-MCNC: 3.8 G/DL (ref 3.5–5.2)
ALP SERPL-CCNC: 74 U/L (ref 55–135)
ALT SERPL W/O P-5'-P-CCNC: 21 U/L (ref 10–44)
ANION GAP SERPL CALC-SCNC: 9 MMOL/L (ref 8–16)
AST SERPL-CCNC: 21 U/L (ref 10–40)
BILIRUB SERPL-MCNC: 1.6 MG/DL (ref 0.1–1)
BUN SERPL-MCNC: 22 MG/DL (ref 8–23)
CALCIUM SERPL-MCNC: 9.8 MG/DL (ref 8.7–10.5)
CHLORIDE SERPL-SCNC: 107 MMOL/L (ref 95–110)
CO2 SERPL-SCNC: 23 MMOL/L (ref 23–29)
CREAT SERPL-MCNC: 0.9 MG/DL (ref 0.5–1.4)
EST. GFR  (NO RACE VARIABLE): >60 ML/MIN/1.73 M^2
ESTIMATED AVG GLUCOSE: 186 MG/DL (ref 68–131)
GLUCOSE SERPL-MCNC: 231 MG/DL (ref 70–110)
HBA1C MFR BLD: 8.1 % (ref 4–5.6)
POTASSIUM SERPL-SCNC: 4.5 MMOL/L (ref 3.5–5.1)
PROT SERPL-MCNC: 6.8 G/DL (ref 6–8.4)
SODIUM SERPL-SCNC: 139 MMOL/L (ref 136–145)

## 2024-02-27 PROCEDURE — 83036 HEMOGLOBIN GLYCOSYLATED A1C: CPT | Mod: HCNC | Performed by: NURSE PRACTITIONER

## 2024-02-27 PROCEDURE — 36415 COLL VENOUS BLD VENIPUNCTURE: CPT | Mod: HCNC,PN | Performed by: NURSE PRACTITIONER

## 2024-02-27 PROCEDURE — 80053 COMPREHEN METABOLIC PANEL: CPT | Mod: HCNC | Performed by: NURSE PRACTITIONER

## 2024-02-28 ENCOUNTER — OFFICE VISIT (OUTPATIENT)
Dept: PRIMARY CARE CLINIC | Facility: CLINIC | Age: 74
End: 2024-02-28
Payer: MEDICARE

## 2024-02-28 VITALS
TEMPERATURE: 98 F | OXYGEN SATURATION: 96 % | RESPIRATION RATE: 18 BRPM | SYSTOLIC BLOOD PRESSURE: 126 MMHG | BODY MASS INDEX: 39.15 KG/M2 | HEIGHT: 62 IN | DIASTOLIC BLOOD PRESSURE: 70 MMHG | WEIGHT: 212.75 LBS | HEART RATE: 75 BPM

## 2024-02-28 DIAGNOSIS — I50.22 CHRONIC SYSTOLIC CONGESTIVE HEART FAILURE: Primary | Chronic | ICD-10-CM

## 2024-02-28 DIAGNOSIS — J84.10 CALCIFIED GRANULOMA OF LUNG: ICD-10-CM

## 2024-02-28 DIAGNOSIS — Z12.11 COLON CANCER SCREENING: ICD-10-CM

## 2024-02-28 DIAGNOSIS — I70.0 AORTIC CALCIFICATION: ICD-10-CM

## 2024-02-28 DIAGNOSIS — E78.2 MIXED HYPERLIPIDEMIA: Chronic | ICD-10-CM

## 2024-02-28 DIAGNOSIS — Z79.4 TYPE 2 DIABETES MELLITUS WITH DIABETIC NEUROPATHY, WITH LONG-TERM CURRENT USE OF INSULIN: Chronic | ICD-10-CM

## 2024-02-28 DIAGNOSIS — I10 PRIMARY HYPERTENSION: Chronic | ICD-10-CM

## 2024-02-28 DIAGNOSIS — E11.40 TYPE 2 DIABETES MELLITUS WITH DIABETIC NEUROPATHY, WITH LONG-TERM CURRENT USE OF INSULIN: Chronic | ICD-10-CM

## 2024-02-28 DIAGNOSIS — E66.01 SEVERE OBESITY: Chronic | ICD-10-CM

## 2024-02-28 DIAGNOSIS — Z95.810 ICD (IMPLANTABLE CARDIOVERTER-DEFIBRILLATOR) IN PLACE: Chronic | ICD-10-CM

## 2024-02-28 PROCEDURE — 99999 PR PBB SHADOW E&M-EST. PATIENT-LVL V: CPT | Mod: PBBFAC,HCNC,, | Performed by: FAMILY MEDICINE

## 2024-02-28 PROCEDURE — 3288F FALL RISK ASSESSMENT DOCD: CPT | Mod: HCNC,CPTII,S$GLB, | Performed by: FAMILY MEDICINE

## 2024-02-28 PROCEDURE — 3052F HG A1C>EQUAL 8.0%<EQUAL 9.0%: CPT | Mod: HCNC,CPTII,S$GLB, | Performed by: FAMILY MEDICINE

## 2024-02-28 PROCEDURE — 3074F SYST BP LT 130 MM HG: CPT | Mod: HCNC,CPTII,S$GLB, | Performed by: FAMILY MEDICINE

## 2024-02-28 PROCEDURE — 3078F DIAST BP <80 MM HG: CPT | Mod: HCNC,CPTII,S$GLB, | Performed by: FAMILY MEDICINE

## 2024-02-28 PROCEDURE — 1101F PT FALLS ASSESS-DOCD LE1/YR: CPT | Mod: HCNC,CPTII,S$GLB, | Performed by: FAMILY MEDICINE

## 2024-02-28 PROCEDURE — 99214 OFFICE O/P EST MOD 30 MIN: CPT | Mod: HCNC,S$GLB,, | Performed by: FAMILY MEDICINE

## 2024-02-28 PROCEDURE — 3008F BODY MASS INDEX DOCD: CPT | Mod: HCNC,CPTII,S$GLB, | Performed by: FAMILY MEDICINE

## 2024-02-28 PROCEDURE — 1159F MED LIST DOCD IN RCRD: CPT | Mod: HCNC,CPTII,S$GLB, | Performed by: FAMILY MEDICINE

## 2024-02-28 PROCEDURE — 1125F AMNT PAIN NOTED PAIN PRSNT: CPT | Mod: HCNC,CPTII,S$GLB, | Performed by: FAMILY MEDICINE

## 2024-02-28 PROCEDURE — 1160F RVW MEDS BY RX/DR IN RCRD: CPT | Mod: HCNC,CPTII,S$GLB, | Performed by: FAMILY MEDICINE

## 2024-02-29 ENCOUNTER — TELEPHONE (OUTPATIENT)
Dept: PRIMARY CARE CLINIC | Facility: CLINIC | Age: 74
End: 2024-02-29
Payer: MEDICARE

## 2024-02-29 ENCOUNTER — PATIENT MESSAGE (OUTPATIENT)
Dept: PRIMARY CARE CLINIC | Facility: CLINIC | Age: 74
End: 2024-02-29
Payer: MEDICARE

## 2024-02-29 NOTE — TELEPHONE ENCOUNTER
Regarding her reaction to the shot.  I recommend hydrating, she can take the Tylenol and Benadryl as needed.  Recommend making sure she does not stay in bed today and get up and move around.  Not really anything else I can instruct her to do.  If she is having trouble breathing, severe symptoms then to the emergency room

## 2024-02-29 NOTE — TELEPHONE ENCOUNTER
Regarding pt's Gabrielhart concern:    Spoke with pt's , and advised him to have pt hydrate, take Tylenol and Benadryl as needed and to make sure that she is not laying in bed all day and that she is moving around.  He reports that she has been moving around and that he will encourage her to hydrate.  Advised him to have pt call us if she has any additional concerns, he verbalized understanding.

## 2024-03-02 ENCOUNTER — PATIENT MESSAGE (OUTPATIENT)
Dept: PRIMARY CARE CLINIC | Facility: CLINIC | Age: 74
End: 2024-03-02
Payer: MEDICARE

## 2024-03-02 NOTE — ASSESSMENT & PLAN NOTE
This condition has been reviewed and evaluated and it is currently stable. Exam stable without sign of decompensation.  Continue current meds and cardiology consult.

## 2024-03-02 NOTE — ASSESSMENT & PLAN NOTE
This is a chronic condition.  This condition has been reviewed and evaluated and it is currently stable.  Continue current BP meds

## 2024-03-02 NOTE — ASSESSMENT & PLAN NOTE
Noted upon chart review of previous imagin.  Multiple calcified granulomas noted of the lung.  These likely represent old granulomatous disease or scarring.  Will monitor clinically for any change in condition or symptoms.  Currently stable

## 2024-03-02 NOTE — ASSESSMENT & PLAN NOTE
Last A1c 8.1%.  mild improvement.  She does follow with Endocrinology.  Discussed nutrition and exercise. She will continue to work with endocrine.

## 2024-03-04 ENCOUNTER — CLINICAL SUPPORT (OUTPATIENT)
Dept: AUDIOLOGY | Facility: CLINIC | Age: 74
End: 2024-03-04
Payer: MEDICARE

## 2024-03-04 ENCOUNTER — OFFICE VISIT (OUTPATIENT)
Dept: OTOLARYNGOLOGY | Facility: CLINIC | Age: 74
End: 2024-03-04
Payer: MEDICARE

## 2024-03-04 VITALS — HEIGHT: 62 IN | WEIGHT: 212.75 LBS | BODY MASS INDEX: 39.15 KG/M2

## 2024-03-04 DIAGNOSIS — H90.3 BILATERAL HIGH FREQUENCY SENSORINEURAL HEARING LOSS: Primary | ICD-10-CM

## 2024-03-04 DIAGNOSIS — H93.13 TINNITUS OF BOTH EARS: ICD-10-CM

## 2024-03-04 DIAGNOSIS — H93.13 TINNITUS, BILATERAL: Primary | ICD-10-CM

## 2024-03-04 DIAGNOSIS — Z95.810 PRESENCE OF AUTOMATIC (IMPLANTABLE) CARDIAC DEFIBRILLATOR: ICD-10-CM

## 2024-03-04 DIAGNOSIS — H90.3 BILATERAL HIGH FREQUENCY SENSORINEURAL HEARING LOSS: ICD-10-CM

## 2024-03-04 PROCEDURE — 3008F BODY MASS INDEX DOCD: CPT | Mod: HCNC,CPTII,S$GLB, | Performed by: NURSE PRACTITIONER

## 2024-03-04 PROCEDURE — 1159F MED LIST DOCD IN RCRD: CPT | Mod: HCNC,CPTII,S$GLB, | Performed by: NURSE PRACTITIONER

## 2024-03-04 PROCEDURE — 1101F PT FALLS ASSESS-DOCD LE1/YR: CPT | Mod: HCNC,CPTII,S$GLB, | Performed by: NURSE PRACTITIONER

## 2024-03-04 PROCEDURE — 92567 TYMPANOMETRY: CPT | Mod: HCNC,S$GLB,, | Performed by: AUDIOLOGIST-HEARING AID FITTER

## 2024-03-04 PROCEDURE — 99213 OFFICE O/P EST LOW 20 MIN: CPT | Mod: HCNC,S$GLB,, | Performed by: NURSE PRACTITIONER

## 2024-03-04 PROCEDURE — 3288F FALL RISK ASSESSMENT DOCD: CPT | Mod: HCNC,CPTII,S$GLB, | Performed by: NURSE PRACTITIONER

## 2024-03-04 PROCEDURE — 3052F HG A1C>EQUAL 8.0%<EQUAL 9.0%: CPT | Mod: HCNC,CPTII,S$GLB, | Performed by: NURSE PRACTITIONER

## 2024-03-04 PROCEDURE — 99999 PR PBB SHADOW E&M-EST. PATIENT-LVL II: CPT | Mod: PBBFAC,HCNC,, | Performed by: AUDIOLOGIST-HEARING AID FITTER

## 2024-03-04 PROCEDURE — 1126F AMNT PAIN NOTED NONE PRSNT: CPT | Mod: HCNC,CPTII,S$GLB, | Performed by: NURSE PRACTITIONER

## 2024-03-04 PROCEDURE — 99999 PR PBB SHADOW E&M-EST. PATIENT-LVL V: CPT | Mod: PBBFAC,HCNC,, | Performed by: NURSE PRACTITIONER

## 2024-03-04 PROCEDURE — 92557 COMPREHENSIVE HEARING TEST: CPT | Mod: HCNC,S$GLB,, | Performed by: AUDIOLOGIST-HEARING AID FITTER

## 2024-03-04 NOTE — PROGRESS NOTES
Subjective     Patient ID: Mckenzie Mari is a 73 y.o. female.    Chief Complaint: Tinnitus    Ringing in Ears:    Associated symptoms: Tinnitus.      Patient is new to ENT, referred by Dr. Gold for consultation for tinnitus. Patient reports tinnitus AU, AD>AS, X 3-4 years.  She denies noise exposure.  She denies family history of hearing loss.  She denies otologic history of surgery or trauma.  She denies otalgia or otorrhea.  She denies other ENT symptoms or concerns at this time.    Review of Systems   Constitutional: Negative.    HENT:  Positive for tinnitus.    Eyes: Negative.    Respiratory: Negative.     Cardiovascular: Negative.    Gastrointestinal: Negative.    Musculoskeletal: Negative.    Integumentary:  Negative.   Neurological: Negative.    Hematological: Negative.    Psychiatric/Behavioral: Negative.            Objective     Physical Exam  Vitals and nursing note reviewed.   Constitutional:       General: She is not in acute distress.     Appearance: She is well-developed. She is not ill-appearing.   HENT:      Head: Normocephalic and atraumatic.      Right Ear: Hearing, tympanic membrane, ear canal and external ear normal. No middle ear effusion. Tympanic membrane is not erythematous.      Left Ear: Hearing, tympanic membrane, ear canal and external ear normal.  No middle ear effusion. Tympanic membrane is not erythematous.      Nose: Nose normal.   Eyes:      General: Lids are normal. No scleral icterus.        Right eye: No discharge.         Left eye: No discharge.   Neck:      Trachea: Trachea normal. No tracheal deviation.   Cardiovascular:      Rate and Rhythm: Normal rate.   Pulmonary:      Effort: Pulmonary effort is normal. No respiratory distress.      Breath sounds: No stridor. No wheezing.   Musculoskeletal:         General: Normal range of motion.      Cervical back: Normal range of motion and neck supple.   Skin:     General: Skin is warm and dry.   Neurological:      Mental  Status: She is alert and oriented to person, place, and time.      Coordination: Coordination normal.      Gait: Gait normal.   Psychiatric:         Attention and Perception: Attention normal.         Mood and Affect: Mood normal.         Speech: Speech normal.         Behavior: Behavior normal. Behavior is cooperative.            Assessment and Plan     1. Tinnitus, bilateral    2. Bilateral high frequency sensorineural hearing loss        Tinnitus handout included: elimination of exacerbating factors such as elevated blood pressure, high sodium intake, caffeine/stimulants, sleep deprivation/insomnia, certain medications, exposure to loud sounds, etc. White noise, hearing aids w/masking technology, and tinnitus retraining therapy (TRT) recommended. PATIENT IS MEDICALLY CLEARED FOR HEARING AIDS. The patient's hearing loss is not due to a temporary, correctable physical condition. There are no contraindications to hearing aid candidacy. Patient's audiogram reveals the patient is a candidate for amplification. Audiogram is reviewed in detail with the patient. The audiologist's recommendation that the patient have amplification/hearing aids is discussed and questions answered. Patient has been given information by the audiologist on how to schedule a hearing aid consultation. Patient is encouraged to wear ear protection in loud noise and return annually for hearing test. Return to clinic as needed for further ENT concerns.          No follow-ups on file.

## 2024-03-04 NOTE — PROGRESS NOTES
Mckenzie Mari was seen 03/04/2024 for an audiological evaluation. Pt was alone during today's visit. Pertinent complaints today include hearing loss and tinnitus AD>AS. Pt denies history of loud noise exposure and denies early onset of genetic family history of hearing loss. Otoscopy revealed no cerumen in both ears. The tympanic membrane was visualized AU prior to proceeding with the hearing testing.     Results reveal a bilateral normal sloping to moderate HF mild sensorineural hearing loss.    Speech Reception Thresholds were  15 dBHL for the right ear and 10 dBHL for the left ear.    Word recognition scores were excellent for the right ear and excellent for the left ear.   Tympanograms were Type A for the right ear and Type A for the left ear.    Audiogram results were reviewed in detail with patient and all questions were answered. Results will be reviewed by the referring provider at the completion of this note. All complaints were addressed during this visit to the patient's satisfaction. Recommend binaural amplification pending medical clearance, repeat hearing testing in one year due to tinnitus and bilateral hearing protection with either muffs or in-ear protection in loud noises. Plan of care was discussed in detail with the patient, who agreed with the plan as above.

## 2024-03-04 NOTE — PATIENT INSTRUCTIONS
Tinnitus (Ringing in the Ears)  Tinnitus is the term for a noise in your ear not caused by an outside sound. The noise might be a ringing, buzzing, hissing, roaring. It can vary in pitch and may be soft or quite loud. For some people, tinnitus is a minor nuisance. But for others, the noise can make it hard to hear, work, and even sleep. When tinnitus can't be cured, a number of treatments may offer relief.  What causes tinnitus?  Loud noises, hearing loss, and ear wax can cause tinnitus. So can certain medicines. Common medications that can cause tinnitus include antidepressants, pain medications, antianxiety meds, blood pressure meds, accutane, antibiotics, antivirals, chemotherapy, seizure meds, and bipolar meds. Large amounts of aspirin or caffeine are sometimes to blame. In many cases, the exact cause of tinnitus is unknown.   How is tinnitus treated?  Identifying and removing the cause is the best way to treat tinnitus. For that reason, your healthcare provider may refer you to an otolaryngologist (ear, nose, and throat doctor). Your hearing may also be checked by an audiologist (hearing specialist). If you have hearing loss, wearing a hearing aid may help your tinnitus. When the cause can't be found, the tinnitus itself may be treated. Some of the treatments are listed below, and your healthcare provider can tell you more about them:  Avoid exposure to loud sounds, which will exacerbate tinnitus.  Wear ear protection around loud noises.  Get your blood pressure check regularly.  If it is high, see your doctor.  Check with your PCP whether labs can be done to check for anemia and hyperthyroid, as these can worsen tinnitus.   Decrease salt intake.  Avoid stimulants such as caffeine and tobacco.  Exercise daily to improve circulation. Poor circulation worsens tinnitus.   Avoid sleep deprivation. Sleep deprivation and insomnia can worsen tinnitus. Get adequate rest.  Reduce aspirin use, if possible. Aspirin as  well as NSAIDs and narcotic pain relievers can exacerbate tinnitus.   Stop worrying about the noise.  Stress worsens tinnitus. Recognize the noise as an annoyance and learn to ignore it as much as possible. Patients with higher rates of anxiety, depression, concentration, or problems sleeping may need to discuss taking something for anxiety or depression with their primary care provider.     Consider a trial of lipoflavonoids, slow-release niacin, or Arches' Tinnitus Formula (over-the-counter).  Purchase a white noise machine to mask tinnitus.  The [employer] Tinnitus Relief joycelyn on your smart phone uses a combination of sounds and relaxing exercises that aim to distract your brain from focusing on tinnitus. Over time the brain learns to focus less on the tinnitus.   When no underlying pathology can be identified, an audiogram is needed to screen for hearing loss. If hearing loss is noted, hearing aids with masking technology are recommended to help relieve tinnitus.   Maskers are small devices that look like hearing aids. They emit a pleasant sound that helps cover up the ringing in your ears, similar to the technology used in noise-cancelling headphones. Hearing aids and maskers are sometimes used together.  Cognitive Behavioral Therapy (CBT), otherwise known as Tinnitus Retraining Therapy (TRT), can be done by either an audiologist or a cognitive behavioral therapist who is certified in TRT. Tinnitus retraining therapy combines biofeedback, counseling and maskers.   Medicines that treat anxiety and depression may ease tinnitus in some people.  Hypnosis or relaxation therapy may help noise seem less severe.    First-line treatment for tinnitus:  Elimination of exacerbating factors such as elevated blood pressure, high sodium intake, caffeine/stimulants, sleep deprivation/insomnia, certain medications, aspirin, exposure to loud sounds, etc. White noise is recommended as first-line treatment.    Second-line treatment  for tinnitus:  Maskers (with or without the use of a hearing aid depending on the outcome of your hearing test) and/or Cognitive Behavior Therapy (Tinnitus Retraining Therapy).  To find an audiologist or Cognitive Behavioral Therapist in your area who is certified in Tinnitus Retraining Therapy, you must contact the American Tinnitus Association at 1-447.134.1748 or www.adryan.org. We have had some success with the [facility].  If needed, we can refer you to Psych Department at [facility] for CBT.  The American Tinnitus Association may have additional resources.  Antoinette Kathleen at [facility] would be willing to discuss further if needed, [phone].     For more information  American Speech-Hearing-Language Association [phone] www.raul.org  American Tinnitus Association [phone] www.adryan.org  National [place] on Deafness and other Communication Disorders [phone] www.nidcd.nih.gov

## 2024-03-05 ENCOUNTER — OFFICE VISIT (OUTPATIENT)
Dept: ENDOCRINOLOGY | Facility: CLINIC | Age: 74
End: 2024-03-05
Payer: MEDICARE

## 2024-03-05 VITALS
DIASTOLIC BLOOD PRESSURE: 60 MMHG | BODY MASS INDEX: 38.6 KG/M2 | SYSTOLIC BLOOD PRESSURE: 110 MMHG | WEIGHT: 209.75 LBS | HEART RATE: 60 BPM | HEIGHT: 62 IN

## 2024-03-05 DIAGNOSIS — I10 ESSENTIAL HYPERTENSION: ICD-10-CM

## 2024-03-05 DIAGNOSIS — E66.9 OBESITY (BMI 30-39.9): ICD-10-CM

## 2024-03-05 DIAGNOSIS — Z87.19 HISTORY OF PANCREATITIS: ICD-10-CM

## 2024-03-05 DIAGNOSIS — E11.40 TYPE 2 DIABETES MELLITUS WITH DIABETIC NEUROPATHY, WITH LONG-TERM CURRENT USE OF INSULIN: Primary | ICD-10-CM

## 2024-03-05 DIAGNOSIS — I25.10 CORONARY ARTERY DISEASE INVOLVING NATIVE CORONARY ARTERY WITHOUT ANGINA PECTORIS, UNSPECIFIED WHETHER NATIVE OR TRANSPLANTED HEART: ICD-10-CM

## 2024-03-05 DIAGNOSIS — E78.5 HYPERLIPIDEMIA, UNSPECIFIED HYPERLIPIDEMIA TYPE: ICD-10-CM

## 2024-03-05 DIAGNOSIS — E89.0 POSTOPERATIVE HYPOTHYROIDISM: ICD-10-CM

## 2024-03-05 DIAGNOSIS — Z79.4 TYPE 2 DIABETES MELLITUS WITH DIABETIC NEUROPATHY, WITH LONG-TERM CURRENT USE OF INSULIN: Primary | ICD-10-CM

## 2024-03-05 DIAGNOSIS — I50.9 CHF (NYHA CLASS III, ACC/AHA STAGE C): ICD-10-CM

## 2024-03-05 PROCEDURE — 3074F SYST BP LT 130 MM HG: CPT | Mod: HCNC,CPTII,S$GLB, | Performed by: NURSE PRACTITIONER

## 2024-03-05 PROCEDURE — 1126F AMNT PAIN NOTED NONE PRSNT: CPT | Mod: HCNC,CPTII,S$GLB, | Performed by: NURSE PRACTITIONER

## 2024-03-05 PROCEDURE — 1159F MED LIST DOCD IN RCRD: CPT | Mod: HCNC,CPTII,S$GLB, | Performed by: NURSE PRACTITIONER

## 2024-03-05 PROCEDURE — 1160F RVW MEDS BY RX/DR IN RCRD: CPT | Mod: HCNC,CPTII,S$GLB, | Performed by: NURSE PRACTITIONER

## 2024-03-05 PROCEDURE — 1101F PT FALLS ASSESS-DOCD LE1/YR: CPT | Mod: HCNC,CPTII,S$GLB, | Performed by: NURSE PRACTITIONER

## 2024-03-05 PROCEDURE — 99214 OFFICE O/P EST MOD 30 MIN: CPT | Mod: HCNC,S$GLB,, | Performed by: NURSE PRACTITIONER

## 2024-03-05 PROCEDURE — 95251 CONT GLUC MNTR ANALYSIS I&R: CPT | Mod: HCNC,S$GLB,, | Performed by: NURSE PRACTITIONER

## 2024-03-05 PROCEDURE — 3052F HG A1C>EQUAL 8.0%<EQUAL 9.0%: CPT | Mod: HCNC,CPTII,S$GLB, | Performed by: NURSE PRACTITIONER

## 2024-03-05 PROCEDURE — 3008F BODY MASS INDEX DOCD: CPT | Mod: HCNC,CPTII,S$GLB, | Performed by: NURSE PRACTITIONER

## 2024-03-05 PROCEDURE — 99999 PR PBB SHADOW E&M-EST. PATIENT-LVL V: CPT | Mod: PBBFAC,HCNC,, | Performed by: NURSE PRACTITIONER

## 2024-03-05 PROCEDURE — 3288F FALL RISK ASSESSMENT DOCD: CPT | Mod: HCNC,CPTII,S$GLB, | Performed by: NURSE PRACTITIONER

## 2024-03-05 PROCEDURE — 3078F DIAST BP <80 MM HG: CPT | Mod: HCNC,CPTII,S$GLB, | Performed by: NURSE PRACTITIONER

## 2024-03-05 RX ORDER — INFLUENZA A VIRUS A/VICTORIA/4897/2022 IVR-238 (H1N1) ANTIGEN (FORMALDEHYDE INACTIVATED), INFLUENZA A VIRUS A/DARWIN/6/2021 IVR-227 (H3N2) ANTIGEN (FORMALDEHYDE INACTIVATED), INFLUENZA B VIRUS B/AUSTRIA/1359417/2021 BVR-26 ANTIGEN (FORMALDEHYDE INACTIVATED), INFLUENZA B VIRUS B/PHUKET/3073/2013 BVR-1B ANTIGEN (FORMALDEHYDE INACTIVATED) 15; 15; 15; 15 UG/.5ML; UG/.5ML; UG/.5ML; UG/.5ML
INJECTION, SUSPENSION INTRAMUSCULAR
COMMUNITY
Start: 2023-09-18

## 2024-03-05 RX ORDER — EMPAGLIFLOZIN 25 MG/1
25 TABLET, FILM COATED ORAL
COMMUNITY
Start: 2024-02-20

## 2024-03-05 RX ORDER — ZOSTER VACCINE RECOMBINANT, ADJUVANTED 50 MCG/0.5
KIT INTRAMUSCULAR
COMMUNITY
Start: 2023-10-04

## 2024-03-05 NOTE — PROGRESS NOTES
"CC: Ms. Mckenzie Mari arrives today for management of Type 2 DM and review of chronic medical conditions.     HPI: Ms. Mckenzie Mari was diagnosed with Type 2 DM in 2004. She was diagnosed based on lab work. Initial treatment consisted of metformin and glimepiride. Insulin added in 8/2016 - began Toujeo. She states that she felt that this caused nausea, abd pain, chest pain. Lantus caused throat swelling, left arm pain. She was then changed to Novolog 70/30 but this was only used for a short period because her insurance didn't cover.  Then changed to Humalog 50-50 in 12/2016. In 2017, she began MDI with Tresiba and Humalog. Jardiance added in 8/2021. Began use of Dexcom G6 in 2021.  + FH of DM in maternal aunt and cousin. Denies hospitalizations due to DM. Previously seen in endocrine by Richard Gupta, and MAGALI Eng NP. She has a history of thyroid cancer/post-surgical hypothyroidism.   Of note, she has a h/o pancreatitis.   She follows annually with cardiology (Dr. Mtz) for CHF, CAD.      Last seen by me in October. Jardiance dose was increased at that time. However, she felt this adjustment caused her glucoses to increase further so she reduced back to the 10 mg. Novolog dose has since been increased. She states that she didn't feel "right" with the last dose increase so she decreased her doses again. Yet is frustrated that glucoses are elevated.     Patient prefers to only make small changes to insulin doses when adjustments are recommended. Using up Humalog supply before starting Novolog.     She decided to increase Jardiance back to 25 mg.     States that sweets cause blood sugars to decrease so she has something sweet with her meals. States dark chocolate drops BG 40 points.    Started cinnamon and B12 supplements a few days ago.     BG monitoring per Dexcom G6.     Hypoglycemia: Rare      Missing Insulin/PO medication doses: No  Timing prandial insulin 5-15 minutes before meals: yes    Exercise: " "no    Dietary Habits: Eats 3 meals/day. Snacking occasionally. Avoids sugary drinks.    Last DM education: 1/2019       CURRENT DIABETIC MEDS: Jardiance 25 mg daily, Tresiba 32 units QAM, Humalog (will be Novolog) 10 units AC + correction scale, target 180, ISF 25  Vial or pen: pen  Glucometer type: [place] True Metrix    Previous DM treatments:   Invokana - nausea  Glimepiride - stopped when prandial insulin added  Touejo -  Nausea, abd pain, chest pain  Lantus - throat swelling, left arm pain  Novolog 70/30 - not covered by insurance  Metformin - headaches    Last Eye Exam: 6/13/2023 - No DR. Dr. Mcgregor.   Last Podiatry Exam: no    REVIEW OF SYSTEMS  Constitutional: no c/o fatigue, weakness, weight loss.   Cardiac: no chest pain, palpitations.   Respiratory: no cough. C/o dyspnea that is chronic. Uses albuterol as needed.  GI: no c/o nausea, abdominal pain. + H/o pancreatitis.   Skin: no rashes, lesions.  Neuro: + intermittent numbness, tingling in feet.   Endocrine: denies polyphagia, polydipsia, polyuria      Personally reviewed Past Medical, Surgical, Social History.    Vital Signs  /60   Pulse 60   Ht 5' 2" (1.575 m)   Wt 95.1 kg (209 lb 12.3 oz)   BMI 38.37 kg/m²     Personally reviewed the below labs:    Hemoglobin A1C   Date Value Ref Range Status   02/27/2024 8.1 (H) 4.0 - 5.6 % Final     Comment:     ADA Screening Guidelines:  5.7-6.4%  Consistent with prediabetes  >or=6.5%  Consistent with diabetes    High levels of fetal hemoglobin interfere with the HbA1C  assay. Heterozygous hemoglobin variants (HbS, HgC, etc)do  not significantly interfere with this assay.   However, presence of multiple variants may affect accuracy.     10/11/2023 8.4 (H) 4.0 - 5.6 % Final     Comment:     ADA Screening Guidelines:  5.7-6.4%  Consistent with prediabetes  >or=6.5%  Consistent with diabetes    High levels of fetal hemoglobin interfere with the HbA1C  assay. Heterozygous hemoglobin variants (HbS, HgC, " etc)do  not significantly interfere with this assay.   However, presence of multiple variants may affect accuracy.     06/07/2023 8.0 (H) 4.0 - 5.6 % Final     Comment:     ADA Screening Guidelines:  5.7-6.4%  Consistent with prediabetes  >or=6.5%  Consistent with diabetes    High levels of fetal hemoglobin interfere with the HbA1C  assay. Heterozygous hemoglobin variants (HbS, HgC, etc)do  not significantly interfere with this assay.   However, presence of multiple variants may affect accuracy.         Chemistry        Component Value Date/Time     02/27/2024 0726    K 4.5 02/27/2024 0726     02/27/2024 0726    CO2 23 02/27/2024 0726    BUN 22 02/27/2024 0726    CREATININE 0.9 02/27/2024 0726     (H) 02/27/2024 0726        Component Value Date/Time    CALCIUM 9.8 02/27/2024 0726    ALKPHOS 74 02/27/2024 0726    AST 21 02/27/2024 0726    ALT 21 02/27/2024 0726    BILITOT 1.6 (H) 02/27/2024 0726          Lab Results   Component Value Date    CHOL 152 06/07/2023    CHOL 124 06/01/2022    CHOL 143 08/10/2021     Lab Results   Component Value Date    HDL 47 06/07/2023    HDL 48 06/01/2022    HDL 48 08/10/2021     Lab Results   Component Value Date    LDLCALC 66.4 06/07/2023    LDLCALC 50.2 (L) 06/01/2022    LDLCALC 68.4 08/10/2021     Lab Results   Component Value Date    TRIG 193 (H) 06/07/2023    TRIG 129 06/01/2022    TRIG 133 08/10/2021     Lab Results   Component Value Date    CHOLHDL 30.9 06/07/2023    CHOLHDL 38.7 06/01/2022    CHOLHDL 33.6 08/10/2021       Lab Results   Component Value Date    MICALBCREAT Unable to calculate 10/11/2023     Lab Results   Component Value Date    TSH 0.545 10/11/2023       CrCl cannot be calculated (Patient's most recent lab result is older than the maximum 7 days allowed.).    Vit D, 25-Hydroxy   Date Value Ref Range Status   11/08/2014 51 30 - 96 ng/mL Final     Comment:     Vitamin D deficiency.........<10 ng/mL                              Vitamin D  insufficiency......10-29 ng/mL       Vitamin D sufficiency........> or equal to 30 ng/mL  Vitamin D toxicity............>100 ng/mL          Ref. Range 6/25/2020 07:27   FRUCTOSAMINE Latest Ref Range: SeeBelow umol /L 291         PHYSICAL EXAMINATION  Constitutional: Appears well, no distress.  Respiratory: CTA, even and unlabored.  Cardiovascular: RRR, no murmurs, no carotid bruits.   GI: active bowel sounds, no hernia  Skin: warm and dry  Neuro: oriented to person, place, time.   Feet: appropriate footwear.        DEXCOM DOWNLOAD: Fasting glucoses are often acceptable. Some fasting hyperglycemia is related to prolonged excursions after night meal.  Prandial excursions noted.  Rare hypoglycemia.             Goals        HEMOGLOBIN A1C < 7.5          due to CAD, CHF, patient's desire for glucose to remain >150.       Assessment/Plan  1. Type 2 diabetes mellitus with diabetic neuropathy, with long-term current use of insulin  -- Uncontrolled. I would typically recommend more aggressive dose changes but patient would rather very small changes. I did remind her that the small changes she requests may not have a positive effect on her levels so she should notify me if this is the case so we can increase dose further.  -- increase Humalog to 11 units with meals + sliding scale  -- continue Tresiba 32 units   -- continue Jardiance 25 mg daily  -- BG monitoring per Dexcom G6.   -- Would not use Actos, due to CHF. GLP-1RA and DPP4-I contraindicated due to h/o pancreatitis. Cannot tolerate metformin.     -- Discussed diagnosis of DM, A1c goals, progression of disease, long term complications and tx options.  Advised patient to check BG before activities, such as driving or exercise.  -- Reviewed hypoglycemia management: treat with 1/2 glass of juice, 1/2 can regular coke, or 4 glucose tablets. Monitor and repeat treatment every 15 minutes until BG is >70 Then have a snack, which includes a complex carbohydrate and  protein.    -- takes statin and ARB     2. Coronary artery disease involving native coronary artery without angina pectoris, unspecified whether native or transplanted heart  -- stable  -- Jardiance may offer cardio-protective benefit.     3. CHF (NYHA class III, ACC/AHA stage C)  -- follows with cardiology   4. Postoperative hypothyroidism  -- controlled  -- h/o thyroid cancer  -- managed by PCP   5. Essential hypertension  -- controlled   -- continue ARB   6. Hyperlipidemia, unspecified hyperlipidemia type  -- controlled  -- continue Crestor every other day   7. Obesity (BMI 30-39.9)  -- stable   Body mass index is 38.37 kg/m².   8. History of pancreatitis  -- avoid GLP-1RA and DPP4-i       FOLLOW UP  Follow up in about 3 months (around 6/5/2024).   Patient instructed to bring BG logs to each follow up.   Patient encouraged to call for any BG/medication issues, concerns, or questions.      Orders Placed This Encounter   Procedures    Hemoglobin A1C    Comprehensive Metabolic Panel

## 2024-03-21 DIAGNOSIS — K21.9 GASTROESOPHAGEAL REFLUX DISEASE WITHOUT ESOPHAGITIS: Primary | ICD-10-CM

## 2024-03-21 RX ORDER — PANTOPRAZOLE SODIUM 40 MG/1
40 TABLET, DELAYED RELEASE ORAL
Qty: 30 TABLET | Refills: 11 | Status: SHIPPED | OUTPATIENT
Start: 2024-03-21

## 2024-03-31 DIAGNOSIS — I50.22 CHRONIC SYSTOLIC CONGESTIVE HEART FAILURE: Primary | ICD-10-CM

## 2024-04-01 RX ORDER — TELMISARTAN 20 MG/1
20 TABLET ORAL
Qty: 90 TABLET | Refills: 3 | Status: SHIPPED | OUTPATIENT
Start: 2024-04-01

## 2024-04-03 ENCOUNTER — HOSPITAL ENCOUNTER (OUTPATIENT)
Dept: CARDIOLOGY | Facility: HOSPITAL | Age: 74
Discharge: HOME | End: 2024-04-03
Attending: INTERNAL MEDICINE
Payer: MEDICARE

## 2024-04-03 DIAGNOSIS — Z95.810 PRESENCE OF AUTOMATIC (IMPLANTABLE) CARDIAC DEFIBRILLATOR: ICD-10-CM

## 2024-04-03 PROCEDURE — 93295 DEV INTERROG REMOTE 1/2/MLT: CPT | Mod: ,,, | Performed by: INTERNAL MEDICINE

## 2024-04-03 PROCEDURE — 93296 REM INTERROG EVL PM/IDS: CPT | Mod: PO | Performed by: INTERNAL MEDICINE

## 2024-04-19 ENCOUNTER — TELEPHONE (OUTPATIENT)
Dept: ADMINISTRATIVE | Facility: CLINIC | Age: 74
End: 2024-04-19
Payer: MEDICARE

## 2024-05-13 RX ORDER — ALBUTEROL SULFATE 90 UG/1
AEROSOL, METERED RESPIRATORY (INHALATION)
Qty: 18 G | Refills: 1 | Status: SHIPPED | OUTPATIENT
Start: 2024-05-13

## 2024-05-23 ENCOUNTER — LAB VISIT (OUTPATIENT)
Dept: LAB | Facility: HOSPITAL | Age: 74
End: 2024-05-23
Attending: FAMILY MEDICINE
Payer: MEDICARE

## 2024-05-23 DIAGNOSIS — E11.40 TYPE 2 DIABETES MELLITUS WITH DIABETIC NEUROPATHY, WITH LONG-TERM CURRENT USE OF INSULIN: ICD-10-CM

## 2024-05-23 DIAGNOSIS — Z79.4 TYPE 2 DIABETES MELLITUS WITH DIABETIC NEUROPATHY, WITH LONG-TERM CURRENT USE OF INSULIN: ICD-10-CM

## 2024-05-23 DIAGNOSIS — E78.2 MIXED HYPERLIPIDEMIA: Chronic | ICD-10-CM

## 2024-05-23 DIAGNOSIS — M54.9 BACK PAIN, UNSPECIFIED BACK LOCATION, UNSPECIFIED BACK PAIN LATERALITY, UNSPECIFIED CHRONICITY: ICD-10-CM

## 2024-05-23 LAB
ALBUMIN SERPL BCP-MCNC: 3.8 G/DL (ref 3.5–5.2)
ALP SERPL-CCNC: 81 U/L (ref 55–135)
ALT SERPL W/O P-5'-P-CCNC: 22 U/L (ref 10–44)
ANION GAP SERPL CALC-SCNC: 8 MMOL/L (ref 8–16)
AST SERPL-CCNC: 18 U/L (ref 10–40)
BASOPHILS # BLD AUTO: 0.06 K/UL (ref 0–0.2)
BASOPHILS NFR BLD: 0.8 % (ref 0–1.9)
BILIRUB SERPL-MCNC: 1.4 MG/DL (ref 0.1–1)
BUN SERPL-MCNC: 19 MG/DL (ref 8–23)
CALCIUM SERPL-MCNC: 10.1 MG/DL (ref 8.7–10.5)
CHLORIDE SERPL-SCNC: 104 MMOL/L (ref 95–110)
CHOLEST SERPL-MCNC: 151 MG/DL (ref 120–199)
CHOLEST/HDLC SERPL: 3 {RATIO} (ref 2–5)
CO2 SERPL-SCNC: 28 MMOL/L (ref 23–29)
CREAT SERPL-MCNC: 1 MG/DL (ref 0.5–1.4)
DIFFERENTIAL METHOD BLD: ABNORMAL
EOSINOPHIL # BLD AUTO: 0.3 K/UL (ref 0–0.5)
EOSINOPHIL NFR BLD: 3.9 % (ref 0–8)
ERYTHROCYTE [DISTWIDTH] IN BLOOD BY AUTOMATED COUNT: 13.5 % (ref 11.5–14.5)
EST. GFR  (NO RACE VARIABLE): 59.9 ML/MIN/1.73 M^2
ESTIMATED AVG GLUCOSE: 183 MG/DL (ref 68–131)
GLUCOSE SERPL-MCNC: 145 MG/DL (ref 70–110)
HBA1C MFR BLD: 8 % (ref 4–5.6)
HCT VFR BLD AUTO: 46.7 % (ref 37–48.5)
HDLC SERPL-MCNC: 51 MG/DL (ref 40–75)
HDLC SERPL: 33.8 % (ref 20–50)
HGB BLD-MCNC: 15.2 G/DL (ref 12–16)
IMM GRANULOCYTES # BLD AUTO: 0.02 K/UL (ref 0–0.04)
IMM GRANULOCYTES NFR BLD AUTO: 0.3 % (ref 0–0.5)
LDLC SERPL CALC-MCNC: 70.2 MG/DL (ref 63–159)
LYMPHOCYTES # BLD AUTO: 2.2 K/UL (ref 1–4.8)
LYMPHOCYTES NFR BLD: 30.1 % (ref 18–48)
MCH RBC QN AUTO: 31.7 PG (ref 27–31)
MCHC RBC AUTO-ENTMCNC: 32.5 G/DL (ref 32–36)
MCV RBC AUTO: 98 FL (ref 82–98)
MONOCYTES # BLD AUTO: 0.7 K/UL (ref 0.3–1)
MONOCYTES NFR BLD: 9.5 % (ref 4–15)
NEUTROPHILS # BLD AUTO: 4.1 K/UL (ref 1.8–7.7)
NEUTROPHILS NFR BLD: 55.4 % (ref 38–73)
NONHDLC SERPL-MCNC: 100 MG/DL
NRBC BLD-RTO: 0 /100 WBC
PLATELET # BLD AUTO: 174 K/UL (ref 150–450)
PMV BLD AUTO: 12 FL (ref 9.2–12.9)
POTASSIUM SERPL-SCNC: 4.4 MMOL/L (ref 3.5–5.1)
PROT SERPL-MCNC: 6.8 G/DL (ref 6–8.4)
RBC # BLD AUTO: 4.79 M/UL (ref 4–5.4)
SODIUM SERPL-SCNC: 140 MMOL/L (ref 136–145)
TRIGL SERPL-MCNC: 149 MG/DL (ref 30–150)
WBC # BLD AUTO: 7.44 K/UL (ref 3.9–12.7)

## 2024-05-23 PROCEDURE — 36415 COLL VENOUS BLD VENIPUNCTURE: CPT | Mod: HCNC,PN | Performed by: FAMILY MEDICINE

## 2024-05-23 PROCEDURE — 83036 HEMOGLOBIN GLYCOSYLATED A1C: CPT | Mod: HCNC | Performed by: NURSE PRACTITIONER

## 2024-05-23 PROCEDURE — 85025 COMPLETE CBC W/AUTO DIFF WBC: CPT | Mod: HCNC | Performed by: FAMILY MEDICINE

## 2024-05-23 PROCEDURE — 80053 COMPREHEN METABOLIC PANEL: CPT | Mod: HCNC | Performed by: NURSE PRACTITIONER

## 2024-05-23 PROCEDURE — 80061 LIPID PANEL: CPT | Mod: HCNC | Performed by: FAMILY MEDICINE

## 2024-05-29 RX ORDER — CARVEDILOL 25 MG/1
TABLET ORAL
Qty: 180 TABLET | Refills: 3 | Status: SHIPPED | OUTPATIENT
Start: 2024-05-29

## 2024-05-30 ENCOUNTER — OFFICE VISIT (OUTPATIENT)
Dept: PRIMARY CARE CLINIC | Facility: CLINIC | Age: 74
End: 2024-05-30
Payer: MEDICARE

## 2024-05-30 VITALS
BODY MASS INDEX: 37.96 KG/M2 | RESPIRATION RATE: 18 BRPM | WEIGHT: 207.56 LBS | OXYGEN SATURATION: 98 % | SYSTOLIC BLOOD PRESSURE: 108 MMHG | HEART RATE: 63 BPM | DIASTOLIC BLOOD PRESSURE: 70 MMHG | TEMPERATURE: 98 F

## 2024-05-30 DIAGNOSIS — G89.29 CHRONIC THORACIC BACK PAIN, UNSPECIFIED BACK PAIN LATERALITY: ICD-10-CM

## 2024-05-30 DIAGNOSIS — I10 PRIMARY HYPERTENSION: Primary | Chronic | ICD-10-CM

## 2024-05-30 DIAGNOSIS — G89.29 CHRONIC LEFT-SIDED LOW BACK PAIN WITHOUT SCIATICA: ICD-10-CM

## 2024-05-30 DIAGNOSIS — E89.0 POSTOPERATIVE HYPOTHYROIDISM: Chronic | ICD-10-CM

## 2024-05-30 DIAGNOSIS — E78.2 MIXED HYPERLIPIDEMIA: Chronic | ICD-10-CM

## 2024-05-30 DIAGNOSIS — M54.50 CHRONIC LEFT-SIDED LOW BACK PAIN WITHOUT SCIATICA: ICD-10-CM

## 2024-05-30 DIAGNOSIS — M54.6 CHRONIC THORACIC BACK PAIN, UNSPECIFIED BACK PAIN LATERALITY: ICD-10-CM

## 2024-05-30 PROCEDURE — 4010F ACE/ARB THERAPY RXD/TAKEN: CPT | Mod: HCNC,CPTII,S$GLB, | Performed by: FAMILY MEDICINE

## 2024-05-30 PROCEDURE — 99214 OFFICE O/P EST MOD 30 MIN: CPT | Mod: HCNC,S$GLB,, | Performed by: FAMILY MEDICINE

## 2024-05-30 PROCEDURE — 1101F PT FALLS ASSESS-DOCD LE1/YR: CPT | Mod: HCNC,CPTII,S$GLB, | Performed by: FAMILY MEDICINE

## 2024-05-30 PROCEDURE — 3288F FALL RISK ASSESSMENT DOCD: CPT | Mod: HCNC,CPTII,S$GLB, | Performed by: FAMILY MEDICINE

## 2024-05-30 PROCEDURE — 3074F SYST BP LT 130 MM HG: CPT | Mod: HCNC,CPTII,S$GLB, | Performed by: FAMILY MEDICINE

## 2024-05-30 PROCEDURE — 1159F MED LIST DOCD IN RCRD: CPT | Mod: HCNC,CPTII,S$GLB, | Performed by: FAMILY MEDICINE

## 2024-05-30 PROCEDURE — 3078F DIAST BP <80 MM HG: CPT | Mod: HCNC,CPTII,S$GLB, | Performed by: FAMILY MEDICINE

## 2024-05-30 PROCEDURE — 1160F RVW MEDS BY RX/DR IN RCRD: CPT | Mod: HCNC,CPTII,S$GLB, | Performed by: FAMILY MEDICINE

## 2024-05-30 PROCEDURE — 99999 PR PBB SHADOW E&M-EST. PATIENT-LVL V: CPT | Mod: PBBFAC,HCNC,, | Performed by: FAMILY MEDICINE

## 2024-05-30 PROCEDURE — 3052F HG A1C>EQUAL 8.0%<EQUAL 9.0%: CPT | Mod: HCNC,CPTII,S$GLB, | Performed by: FAMILY MEDICINE

## 2024-05-30 PROCEDURE — 3008F BODY MASS INDEX DOCD: CPT | Mod: HCNC,CPTII,S$GLB, | Performed by: FAMILY MEDICINE

## 2024-05-30 PROCEDURE — 1125F AMNT PAIN NOTED PAIN PRSNT: CPT | Mod: HCNC,CPTII,S$GLB, | Performed by: FAMILY MEDICINE

## 2024-05-30 NOTE — PROGRESS NOTES
Primary Care Provider Appointment   [facility] [place]       Patient ID: Mckenzie Mari is a 73 y.o. female.    ASSESSMENT/PLAN by Problem List:    1. Primary hypertension  Assessment & Plan:  Stable satisfactory continue current medications.      2. Mixed hyperlipidemia  Assessment & Plan:  LDL just over 70.  Discussed dietary changes to improve this, continue current statin.      3. Postoperative hypothyroidism  Assessment & Plan:  Last TSH was satisfactory.  Continue current medication, check TSH with next blood test      4. Chronic thoracic back pain, unspecified back pain laterality    5. Chronic left-sided low back pain without sciatica  Assessment & Plan:  Back pain, ongoing, not worse since fall, worse with activity  Mid thoracic, did therapy, still doing at home  CT scan in Oct  Pain upper to mid thoracic, declines pain management, worried about reaction to injections  Offered back and spine clinic, declined, discussed mild strengthening exercises in addition to stretching  She will let me know if changing her mind           Follow Up:  Three months    Subjective:     Chief Complaint   Patient presents with    Follow-up     3 month follow up     Back Pain     I have reviewed the information entered by the ancillary staff regarding the chief complaint as well as the related history.    Follow-up  Associated symptoms include arthralgias, joint swelling and neck pain. Pertinent negatives include no chest pain, headaches, vomiting or weakness.   Back Pain  Pertinent negatives include no chest pain, dysuria, headaches or weakness.       Patient is a/an 73 y.o.  female       Fell 2 weeks ago, trip b/c 'shoes', sore for a few days, improved now  No LOC, threw out the shoes    Back pain, ongoing, not worse since fall, worse with activity  Mid thoracic, did therapy, still doing at home  CT scan in Oct  Pain upper to mid thoracic, declines pain management, worried about reaction to  injections  Offered back and spine clinic, declined, discussed mild strengthening exercises in addition to stretching  She will let me know if changing her mind    'fibromyalgia kicking in', but increasing stress, discussed regular light activity    For complete problem list, past medical history, surgical history, social history, etc., see appropriate section in the electronic medical record    Review of Systems   Constitutional:  Positive for activity change. Negative for unexpected weight change.   HENT:  Negative for hearing loss, rhinorrhea and trouble swallowing.    Eyes:  Negative for discharge and visual disturbance.   Respiratory:  Negative for chest tightness and wheezing.    Cardiovascular:  Negative for chest pain and palpitations.   Gastrointestinal:  Negative for blood in stool, constipation, diarrhea and vomiting.   Endocrine: Negative for polydipsia and polyuria.   Genitourinary:  Negative for difficulty urinating, dysuria, hematuria and menstrual problem.   Musculoskeletal:  Positive for arthralgias, back pain, joint swelling and neck pain.   Neurological:  Negative for weakness and headaches.   Psychiatric/Behavioral:  Negative for confusion and dysphoric mood.        Objective     Physical Exam  Vitals reviewed.   Constitutional:       General: She is not in acute distress.     Appearance: She is well-developed. She is not ill-appearing.   HENT:      Head: Normocephalic and atraumatic.   Eyes:      General: No scleral icterus.     Conjunctiva/sclera: Conjunctivae normal.   Cardiovascular:      Rate and Rhythm: Normal rate and regular rhythm.      Heart sounds: Normal heart sounds. No murmur heard.  Pulmonary:      Effort: Pulmonary effort is normal. No respiratory distress.      Breath sounds: Normal breath sounds. No wheezing or rales.   Skin:     General: Skin is dry.      Findings: No rash.   Neurological:      Mental Status: She is alert and oriented to person, place, and time.   Psychiatric:          Behavior: Behavior normal.       Vitals:    05/30/24 1315   BP: 108/70   BP Location: Left arm   Patient Position: Sitting   BP Method: Large (Manual)   Pulse: 63   Resp: 18   Temp: 98 °F (36.7 °C)   TempSrc: Oral   SpO2: 98%   Weight: 94.2 kg (207 lb 9 oz)           THIS DOCUMENT WAS MADE IN PART WITH VOICE RECOGNITION SOFTWARE.  OCCASIONALLY THIS SOFTWARE WILL MISINTERPRET WORDS OR PHRASES.

## 2024-05-31 ENCOUNTER — PATIENT MESSAGE (OUTPATIENT)
Dept: PRIMARY CARE CLINIC | Facility: CLINIC | Age: 74
End: 2024-05-31
Payer: MEDICARE

## 2024-05-31 RX ORDER — TIZANIDINE 4 MG/1
4 TABLET ORAL
Qty: 30 TABLET | Refills: 0 | Status: SHIPPED | OUTPATIENT
Start: 2024-05-31 | End: 2024-06-10

## 2024-06-04 ENCOUNTER — OFFICE VISIT (OUTPATIENT)
Dept: ENDOCRINOLOGY | Facility: CLINIC | Age: 74
End: 2024-06-04
Payer: MEDICARE

## 2024-06-04 VITALS
OXYGEN SATURATION: 96 % | SYSTOLIC BLOOD PRESSURE: 118 MMHG | BODY MASS INDEX: 38.15 KG/M2 | HEART RATE: 67 BPM | WEIGHT: 207.31 LBS | DIASTOLIC BLOOD PRESSURE: 68 MMHG | HEIGHT: 62 IN

## 2024-06-04 DIAGNOSIS — E11.40 TYPE 2 DIABETES MELLITUS WITH DIABETIC NEUROPATHY, WITH LONG-TERM CURRENT USE OF INSULIN: Primary | ICD-10-CM

## 2024-06-04 DIAGNOSIS — E89.0 POSTOPERATIVE HYPOTHYROIDISM: ICD-10-CM

## 2024-06-04 DIAGNOSIS — E66.9 OBESITY (BMI 30-39.9): ICD-10-CM

## 2024-06-04 DIAGNOSIS — Z79.4 TYPE 2 DIABETES MELLITUS WITH DIABETIC NEUROPATHY, WITH LONG-TERM CURRENT USE OF INSULIN: Primary | ICD-10-CM

## 2024-06-04 DIAGNOSIS — I10 ESSENTIAL HYPERTENSION: ICD-10-CM

## 2024-06-04 DIAGNOSIS — E78.5 HYPERLIPIDEMIA, UNSPECIFIED HYPERLIPIDEMIA TYPE: ICD-10-CM

## 2024-06-04 DIAGNOSIS — Z87.19 HISTORY OF PANCREATITIS: ICD-10-CM

## 2024-06-04 DIAGNOSIS — I25.10 CORONARY ARTERY DISEASE INVOLVING NATIVE CORONARY ARTERY WITHOUT ANGINA PECTORIS, UNSPECIFIED WHETHER NATIVE OR TRANSPLANTED HEART: ICD-10-CM

## 2024-06-04 DIAGNOSIS — I50.9 CHF (NYHA CLASS III, ACC/AHA STAGE C): ICD-10-CM

## 2024-06-04 PROCEDURE — 3074F SYST BP LT 130 MM HG: CPT | Mod: HCNC,CPTII,S$GLB, | Performed by: NURSE PRACTITIONER

## 2024-06-04 PROCEDURE — 1101F PT FALLS ASSESS-DOCD LE1/YR: CPT | Mod: HCNC,CPTII,S$GLB, | Performed by: NURSE PRACTITIONER

## 2024-06-04 PROCEDURE — 3052F HG A1C>EQUAL 8.0%<EQUAL 9.0%: CPT | Mod: HCNC,CPTII,S$GLB, | Performed by: NURSE PRACTITIONER

## 2024-06-04 PROCEDURE — 3008F BODY MASS INDEX DOCD: CPT | Mod: HCNC,CPTII,S$GLB, | Performed by: NURSE PRACTITIONER

## 2024-06-04 PROCEDURE — 99214 OFFICE O/P EST MOD 30 MIN: CPT | Mod: HCNC,S$GLB,, | Performed by: NURSE PRACTITIONER

## 2024-06-04 PROCEDURE — 3078F DIAST BP <80 MM HG: CPT | Mod: HCNC,CPTII,S$GLB, | Performed by: NURSE PRACTITIONER

## 2024-06-04 PROCEDURE — 95251 CONT GLUC MNTR ANALYSIS I&R: CPT | Mod: HCNC,S$GLB,, | Performed by: NURSE PRACTITIONER

## 2024-06-04 PROCEDURE — 1126F AMNT PAIN NOTED NONE PRSNT: CPT | Mod: HCNC,CPTII,S$GLB, | Performed by: NURSE PRACTITIONER

## 2024-06-04 PROCEDURE — 99999 PR PBB SHADOW E&M-EST. PATIENT-LVL V: CPT | Mod: PBBFAC,HCNC,, | Performed by: NURSE PRACTITIONER

## 2024-06-04 PROCEDURE — 1160F RVW MEDS BY RX/DR IN RCRD: CPT | Mod: HCNC,CPTII,S$GLB, | Performed by: NURSE PRACTITIONER

## 2024-06-04 PROCEDURE — 3288F FALL RISK ASSESSMENT DOCD: CPT | Mod: HCNC,CPTII,S$GLB, | Performed by: NURSE PRACTITIONER

## 2024-06-04 PROCEDURE — 1159F MED LIST DOCD IN RCRD: CPT | Mod: HCNC,CPTII,S$GLB, | Performed by: NURSE PRACTITIONER

## 2024-06-04 PROCEDURE — 4010F ACE/ARB THERAPY RXD/TAKEN: CPT | Mod: HCNC,CPTII,S$GLB, | Performed by: NURSE PRACTITIONER

## 2024-06-04 NOTE — Clinical Note
Please add whichever labs you may want before her August appt with you. I have my labs scheduled before her appt with you. Thanks!

## 2024-06-09 NOTE — ASSESSMENT & PLAN NOTE
Back pain, ongoing, not worse since fall, worse with activity  Mid thoracic, did therapy, still doing at home  CT scan in Oct  Pain upper to mid thoracic, declines pain management, worried about reaction to injections  Offered back and spine clinic, declined, discussed mild strengthening exercises in addition to stretching  She will let me know if changing her mind

## 2024-06-18 LAB
LEFT EYE DM RETINOPATHY: NEGATIVE
RIGHT EYE DM RETINOPATHY: NEGATIVE

## 2024-06-20 DIAGNOSIS — I50.9 CHF (NYHA CLASS III, ACC/AHA STAGE C): Primary | ICD-10-CM

## 2024-06-20 RX ORDER — FUROSEMIDE 40 MG/1
TABLET ORAL
Qty: 45 TABLET | Refills: 3 | Status: SHIPPED | OUTPATIENT
Start: 2024-06-20

## 2024-06-23 ENCOUNTER — PATIENT MESSAGE (OUTPATIENT)
Dept: PRIMARY CARE CLINIC | Facility: CLINIC | Age: 74
End: 2024-06-23
Payer: MEDICARE

## 2024-06-23 ENCOUNTER — PATIENT MESSAGE (OUTPATIENT)
Dept: ENDOCRINOLOGY | Facility: CLINIC | Age: 74
End: 2024-06-23
Payer: MEDICARE

## 2024-06-28 ENCOUNTER — PATIENT OUTREACH (OUTPATIENT)
Dept: PRIMARY CARE CLINIC | Facility: CLINIC | Age: 74
End: 2024-06-28
Payer: MEDICARE

## 2024-06-30 ENCOUNTER — CLINICAL SUPPORT (OUTPATIENT)
Dept: CARDIOLOGY | Facility: HOSPITAL | Age: 74
End: 2024-06-30
Payer: MEDICARE

## 2024-06-30 DIAGNOSIS — Z95.810 PRESENCE OF AUTOMATIC (IMPLANTABLE) CARDIAC DEFIBRILLATOR: ICD-10-CM

## 2024-06-30 DIAGNOSIS — E89.0 POSTOPERATIVE HYPOTHYROIDISM: Primary | Chronic | ICD-10-CM

## 2024-07-01 RX ORDER — LEVOTHYROXINE SODIUM 112 UG/1
112 TABLET ORAL
Qty: 90 TABLET | Refills: 1 | Status: SHIPPED | OUTPATIENT
Start: 2024-07-01

## 2024-07-03 ENCOUNTER — HOSPITAL ENCOUNTER (OUTPATIENT)
Dept: CARDIOLOGY | Facility: HOSPITAL | Age: 74
Discharge: HOME | End: 2024-07-03
Attending: INTERNAL MEDICINE

## 2024-07-03 PROCEDURE — 93295 DEV INTERROG REMOTE 1/2/MLT: CPT | Mod: ,,, | Performed by: INTERNAL MEDICINE

## 2024-07-03 PROCEDURE — 93296 REM INTERROG EVL PM/IDS: CPT | Mod: PO | Performed by: INTERNAL MEDICINE

## 2024-07-09 RX ORDER — TIZANIDINE 4 MG/1
4 TABLET ORAL DAILY PRN
Qty: 30 TABLET | Refills: 1 | Status: SHIPPED | OUTPATIENT
Start: 2024-07-09

## 2024-07-30 ENCOUNTER — OFFICE VISIT (OUTPATIENT)
Dept: CARDIOLOGY | Facility: CLINIC | Age: 74
End: 2024-07-30
Payer: MEDICARE

## 2024-07-30 VITALS
BODY MASS INDEX: 38.25 KG/M2 | HEART RATE: 64 BPM | WEIGHT: 207.88 LBS | DIASTOLIC BLOOD PRESSURE: 59 MMHG | SYSTOLIC BLOOD PRESSURE: 109 MMHG | HEIGHT: 62 IN

## 2024-07-30 DIAGNOSIS — E78.2 MIXED HYPERLIPIDEMIA: Chronic | ICD-10-CM

## 2024-07-30 DIAGNOSIS — Z95.810 ICD (IMPLANTABLE CARDIOVERTER-DEFIBRILLATOR) IN PLACE: Chronic | ICD-10-CM

## 2024-07-30 DIAGNOSIS — I44.7 LBBB (LEFT BUNDLE BRANCH BLOCK): Chronic | ICD-10-CM

## 2024-07-30 DIAGNOSIS — I10 PRIMARY HYPERTENSION: Chronic | ICD-10-CM

## 2024-07-30 DIAGNOSIS — I50.22 CHRONIC SYSTOLIC CONGESTIVE HEART FAILURE: Primary | Chronic | ICD-10-CM

## 2024-07-30 PROCEDURE — 4010F ACE/ARB THERAPY RXD/TAKEN: CPT | Mod: HCNC,CPTII,S$GLB, | Performed by: INTERNAL MEDICINE

## 2024-07-30 PROCEDURE — 3074F SYST BP LT 130 MM HG: CPT | Mod: HCNC,CPTII,S$GLB, | Performed by: INTERNAL MEDICINE

## 2024-07-30 PROCEDURE — 3052F HG A1C>EQUAL 8.0%<EQUAL 9.0%: CPT | Mod: HCNC,CPTII,S$GLB, | Performed by: INTERNAL MEDICINE

## 2024-07-30 PROCEDURE — 1160F RVW MEDS BY RX/DR IN RCRD: CPT | Mod: HCNC,CPTII,S$GLB, | Performed by: INTERNAL MEDICINE

## 2024-07-30 PROCEDURE — 99214 OFFICE O/P EST MOD 30 MIN: CPT | Mod: HCNC,S$GLB,, | Performed by: INTERNAL MEDICINE

## 2024-07-30 PROCEDURE — 3078F DIAST BP <80 MM HG: CPT | Mod: HCNC,CPTII,S$GLB, | Performed by: INTERNAL MEDICINE

## 2024-07-30 PROCEDURE — 1101F PT FALLS ASSESS-DOCD LE1/YR: CPT | Mod: HCNC,CPTII,S$GLB, | Performed by: INTERNAL MEDICINE

## 2024-07-30 PROCEDURE — 1159F MED LIST DOCD IN RCRD: CPT | Mod: HCNC,CPTII,S$GLB, | Performed by: INTERNAL MEDICINE

## 2024-07-30 PROCEDURE — 1126F AMNT PAIN NOTED NONE PRSNT: CPT | Mod: HCNC,CPTII,S$GLB, | Performed by: INTERNAL MEDICINE

## 2024-07-30 PROCEDURE — 99999 PR PBB SHADOW E&M-EST. PATIENT-LVL IV: CPT | Mod: PBBFAC,HCNC,, | Performed by: INTERNAL MEDICINE

## 2024-07-30 PROCEDURE — 3008F BODY MASS INDEX DOCD: CPT | Mod: HCNC,CPTII,S$GLB, | Performed by: INTERNAL MEDICINE

## 2024-07-30 PROCEDURE — 3288F FALL RISK ASSESSMENT DOCD: CPT | Mod: HCNC,CPTII,S$GLB, | Performed by: INTERNAL MEDICINE

## 2024-07-30 NOTE — PROGRESS NOTES
Subjective:    Patient ID:  Mckenzie Mari is a 74 y.o. female who presents for follow-up of Congestive Heart Failure and Hypertension      HPI  She comes with no complaints, no chest pain, no shortness of breath  Not exercising much  BP ok at home  FC II-III      Review of Systems   Constitutional: Negative for decreased appetite, malaise/fatigue, weight gain and weight loss.   Cardiovascular:  Negative for chest pain, dyspnea on exertion, leg swelling, palpitations and syncope.   Respiratory:  Negative for cough and shortness of breath.    Gastrointestinal: Negative.    Neurological:  Negative for weakness.   All other systems reviewed and are negative.     Objective:      Physical Exam  Vitals and nursing note reviewed.   Constitutional:       Appearance: Normal appearance. She is well-developed.   HENT:      Head: Normocephalic.   Eyes:      Pupils: Pupils are equal, round, and reactive to light.   Neck:      Thyroid: No thyromegaly.      Vascular: No carotid bruit or JVD.   Cardiovascular:      Rate and Rhythm: Normal rate and regular rhythm.      Chest Wall: PMI is not displaced.      Pulses: Normal pulses and intact distal pulses.      Heart sounds: Normal heart sounds. No murmur heard.     No gallop.   Pulmonary:      Effort: Pulmonary effort is normal.      Breath sounds: Normal breath sounds.   Abdominal:      Palpations: Abdomen is soft. There is no mass.      Tenderness: There is no abdominal tenderness.   Musculoskeletal:         General: Normal range of motion.      Cervical back: Normal range of motion and neck supple.   Skin:     General: Skin is warm.   Neurological:      Mental Status: She is alert and oriented to person, place, and time.      Sensory: No sensory deficit.      Deep Tendon Reflexes: Reflexes are normal and symmetric.         Most Recent EKG Results  Results for orders placed or performed during the hospital encounter of 10/04/21   EKG 12-lead    Collection Time: 10/04/21 10:56 AM     Narrative    Test Reason : I49.9,    Vent. Rate : 068 BPM     Atrial Rate : 068 BPM     P-R Int : 144 ms          QRS Dur : 136 ms      QT Int : 426 ms       P-R-T Axes : 060 148 070 degrees     QTc Int : 452 ms    Atrial-sensed ventricular-paced rhythm  Biventricular pacemaker detected  Abnormal ECG  When compared with ECG of 04-OCT-2021 08:06,  No significant change was found  Confirmed by Carlos Velázquez MD ([id number]) on 10/12/2021 5:52:42 PM    Referred By:             Confirmed By:Carlos Velázquez MD       Most Recent Echocardiogram Results  Results for orders placed in visit on 06/07/21    Echo    Interpretation Summary  · The left ventricle is normal in size with low normal systolic function.  · Normal right ventricular size with normal right ventricular systolic function.  · The estimated ejection fraction is 50%.  · Indeterminate left ventricular diastolic function.  · Mild mitral regurgitation.  · Normal central venous pressure (3 mmHg).  · The estimated PA systolic pressure is 31 mmHg.      Most Recent Nuclear Stress Test Results  Results for orders placed during the hospital encounter of 05/04/23    Nuclear Stress - Cardiology Interpreted    Interpretation Summary    There is a small to moderate sized, fixed perfusion abnormality  in the anteroapical wall(s), probably secondary to paced rhythm    There are no other significant perfusion abnormalities.  No evidence of reversible ischemia    The gated perfusion images showed an ejection fraction of 74% at rest.    The ECG portion of the study is uninterpretable, due to AV pacing.    The patient reported no chest pain during the stress test.    There were no arrhythmias during stress.      Most Recent Cardiac PET Stress Test Results  No results found for this or any previous visit.      Most Recent Cardiovascular Angiogram results  Results for orders placed during the hospital encounter of 06/25/21    Cardiac catheterization    Conclusion  · The coronary  arteries were normal..  · The left ventricular systolic function was normal.  · The left ventricular end diastolic pressure was elevated.    The procedure log was documented by Documenter: RT Porsche and verified by Joseph Hansen MD.    Date: 6/25/2021  Time: 9:31 AM      Other Most Recent Cardiology Results  Results for orders placed in visit on 06/30/24    Cardiac device check - Remote      Labs reviewed    Assessment:       1. Chronic systolic congestive heart failure    2. LBBB (left bundle branch block)    3. ICD (implantable cardioverter-defibrillator) in place    4. Primary hypertension    5. Mixed hyperlipidemia         Plan:     Continue:  ARB, ASA, Beta blocker, Diuretic, MRA, SGLT2 inhibitor, and Statin  Regular exercise program  Weight loss  Low cholesterol diet    6 m f/u with [facility]

## 2024-07-31 ENCOUNTER — PATIENT MESSAGE (OUTPATIENT)
Dept: PRIMARY CARE CLINIC | Facility: CLINIC | Age: 74
End: 2024-07-31
Payer: MEDICARE

## 2024-07-31 RX ORDER — AZITHROMYCIN 250 MG/1
TABLET, FILM COATED ORAL
Qty: 6 TABLET | Refills: 0 | Status: SHIPPED | OUTPATIENT
Start: 2024-07-31

## 2024-07-31 NOTE — TELEPHONE ENCOUNTER
Let Mrs. Mari know that I am sorry to hear she is having difficulty.  I know in the past she has always responded to Zithromax.  We generally do not recommend this very often for sinus infections but since she has so many drug allergies and we know she tolerates this I will go ahead and send this prescription and.  Please make sure that she is still tolerates this antibiotic.  If so, the prescription has been sent in, if not then I will need to change it.

## 2024-07-31 NOTE — TELEPHONE ENCOUNTER
Patient states she has had nasal congestion and headaches for approximately 3-4 weeks. She says the headaches have gotten worse lately. She has been taking Xyzal and Clear Life nose spray as well as Advil for her headaches. She wants something to help her feel better before her anniversary tomorrow.

## 2024-08-15 DIAGNOSIS — E78.2 MIXED HYPERLIPIDEMIA: Primary | ICD-10-CM

## 2024-08-22 ENCOUNTER — LAB VISIT (OUTPATIENT)
Dept: LAB | Facility: HOSPITAL | Age: 74
End: 2024-08-22
Attending: NURSE PRACTITIONER
Payer: MEDICARE

## 2024-08-22 DIAGNOSIS — E78.2 MIXED HYPERLIPIDEMIA: ICD-10-CM

## 2024-08-22 DIAGNOSIS — Z79.4 TYPE 2 DIABETES MELLITUS WITH DIABETIC NEUROPATHY, WITH LONG-TERM CURRENT USE OF INSULIN: ICD-10-CM

## 2024-08-22 DIAGNOSIS — E11.40 TYPE 2 DIABETES MELLITUS WITH DIABETIC NEUROPATHY, WITH LONG-TERM CURRENT USE OF INSULIN: ICD-10-CM

## 2024-08-22 LAB
ANION GAP SERPL CALC-SCNC: 9 MMOL/L (ref 8–16)
BUN SERPL-MCNC: 15 MG/DL (ref 8–23)
CALCIUM SERPL-MCNC: 9.5 MG/DL (ref 8.7–10.5)
CHLORIDE SERPL-SCNC: 107 MMOL/L (ref 95–110)
CHOLEST SERPL-MCNC: 139 MG/DL (ref 120–199)
CHOLEST/HDLC SERPL: 2.7 {RATIO} (ref 2–5)
CO2 SERPL-SCNC: 24 MMOL/L (ref 23–29)
CREAT SERPL-MCNC: 0.9 MG/DL (ref 0.5–1.4)
EST. GFR  (NO RACE VARIABLE): >60 ML/MIN/1.73 M^2
ESTIMATED AVG GLUCOSE: 183 MG/DL (ref 68–131)
GLUCOSE SERPL-MCNC: 152 MG/DL (ref 70–110)
HBA1C MFR BLD: 8 % (ref 4–5.6)
HDLC SERPL-MCNC: 51 MG/DL (ref 40–75)
HDLC SERPL: 36.7 % (ref 20–50)
LDLC SERPL CALC-MCNC: 57.2 MG/DL (ref 63–159)
NONHDLC SERPL-MCNC: 88 MG/DL
POTASSIUM SERPL-SCNC: 4.2 MMOL/L (ref 3.5–5.1)
SODIUM SERPL-SCNC: 140 MMOL/L (ref 136–145)
TRIGL SERPL-MCNC: 154 MG/DL (ref 30–150)
TSH SERPL DL<=0.005 MIU/L-ACNC: 1.21 UIU/ML (ref 0.4–4)

## 2024-08-22 PROCEDURE — 80061 LIPID PANEL: CPT | Mod: HCNC | Performed by: FAMILY MEDICINE

## 2024-08-22 PROCEDURE — 80048 BASIC METABOLIC PNL TOTAL CA: CPT | Mod: HCNC | Performed by: NURSE PRACTITIONER

## 2024-08-22 PROCEDURE — 36415 COLL VENOUS BLD VENIPUNCTURE: CPT | Mod: HCNC,PN | Performed by: NURSE PRACTITIONER

## 2024-08-22 PROCEDURE — 84443 ASSAY THYROID STIM HORMONE: CPT | Mod: HCNC | Performed by: NURSE PRACTITIONER

## 2024-08-22 PROCEDURE — 83036 HEMOGLOBIN GLYCOSYLATED A1C: CPT | Mod: HCNC | Performed by: NURSE PRACTITIONER

## 2024-08-29 ENCOUNTER — OFFICE VISIT (OUTPATIENT)
Dept: PRIMARY CARE CLINIC | Facility: CLINIC | Age: 74
End: 2024-08-29
Payer: MEDICARE

## 2024-08-29 VITALS
OXYGEN SATURATION: 98 % | DIASTOLIC BLOOD PRESSURE: 68 MMHG | WEIGHT: 204.94 LBS | HEIGHT: 62 IN | BODY MASS INDEX: 37.71 KG/M2 | SYSTOLIC BLOOD PRESSURE: 122 MMHG | HEART RATE: 75 BPM

## 2024-08-29 DIAGNOSIS — D83.9 CVID (COMMON VARIABLE IMMUNODEFICIENCY): ICD-10-CM

## 2024-08-29 DIAGNOSIS — Z79.4 TYPE 2 DIABETES MELLITUS WITH DIABETIC NEUROPATHY, WITH LONG-TERM CURRENT USE OF INSULIN: Chronic | ICD-10-CM

## 2024-08-29 DIAGNOSIS — E11.40 TYPE 2 DIABETES MELLITUS WITH DIABETIC NEUROPATHY, WITH LONG-TERM CURRENT USE OF INSULIN: Chronic | ICD-10-CM

## 2024-08-29 DIAGNOSIS — Z12.11 COLON CANCER SCREENING: ICD-10-CM

## 2024-08-29 DIAGNOSIS — R06.00 NOCTURNAL DYSPNEA: ICD-10-CM

## 2024-08-29 DIAGNOSIS — I50.22 CHRONIC SYSTOLIC CONGESTIVE HEART FAILURE: Primary | Chronic | ICD-10-CM

## 2024-08-29 PROCEDURE — 99999 PR PBB SHADOW E&M-EST. PATIENT-LVL III: CPT | Mod: PBBFAC,HCNC,, | Performed by: FAMILY MEDICINE

## 2024-08-29 PROCEDURE — 3008F BODY MASS INDEX DOCD: CPT | Mod: HCNC,CPTII,S$GLB, | Performed by: FAMILY MEDICINE

## 2024-08-29 PROCEDURE — 3078F DIAST BP <80 MM HG: CPT | Mod: HCNC,CPTII,S$GLB, | Performed by: FAMILY MEDICINE

## 2024-08-29 PROCEDURE — 1159F MED LIST DOCD IN RCRD: CPT | Mod: HCNC,CPTII,S$GLB, | Performed by: FAMILY MEDICINE

## 2024-08-29 PROCEDURE — 1125F AMNT PAIN NOTED PAIN PRSNT: CPT | Mod: HCNC,CPTII,S$GLB, | Performed by: FAMILY MEDICINE

## 2024-08-29 PROCEDURE — 1160F RVW MEDS BY RX/DR IN RCRD: CPT | Mod: HCNC,CPTII,S$GLB, | Performed by: FAMILY MEDICINE

## 2024-08-29 PROCEDURE — 99215 OFFICE O/P EST HI 40 MIN: CPT | Mod: HCNC,S$GLB,, | Performed by: FAMILY MEDICINE

## 2024-08-29 PROCEDURE — 3052F HG A1C>EQUAL 8.0%<EQUAL 9.0%: CPT | Mod: HCNC,CPTII,S$GLB, | Performed by: FAMILY MEDICINE

## 2024-08-29 PROCEDURE — 2023F DILAT RTA XM W/O RTNOPTHY: CPT | Mod: HCNC,CPTII,S$GLB, | Performed by: FAMILY MEDICINE

## 2024-08-29 PROCEDURE — 3066F NEPHROPATHY DOC TX: CPT | Mod: HCNC,CPTII,S$GLB, | Performed by: FAMILY MEDICINE

## 2024-08-29 PROCEDURE — 3074F SYST BP LT 130 MM HG: CPT | Mod: HCNC,CPTII,S$GLB, | Performed by: FAMILY MEDICINE

## 2024-08-29 PROCEDURE — 1158F ADVNC CARE PLAN TLK DOCD: CPT | Mod: HCNC,CPTII,S$GLB, | Performed by: FAMILY MEDICINE

## 2024-08-29 PROCEDURE — 3061F NEG MICROALBUMINURIA REV: CPT | Mod: HCNC,CPTII,S$GLB, | Performed by: FAMILY MEDICINE

## 2024-08-29 PROCEDURE — 4010F ACE/ARB THERAPY RXD/TAKEN: CPT | Mod: HCNC,CPTII,S$GLB, | Performed by: FAMILY MEDICINE

## 2024-08-29 NOTE — PROGRESS NOTES
Primary Care Provider Appointment   [facility]       Patient ID: Mckenzie Mari is a 74 y.o. female.    ASSESSMENT/PLAN by Problem List:    1. Chronic systolic congestive heart failure  Assessment & Plan:  Patient is identified as having Systolic (HFrEF) heart failure that is Chronic. CHF is currently controlled. Latest ECHO performed and demonstrates- Results for orders placed in visit on 06/07/21    Echo    Interpretation Summary  · The left ventricle is normal in size with low normal systolic function.  · Normal right ventricular size with normal right ventricular systolic function.  · The estimated ejection fraction is 50%.  · Indeterminate left ventricular diastolic function.  · Mild mitral regurgitation.  · Normal central venous pressure (3 mmHg).  · The estimated PA systolic pressure is 31 mmHg.  . Continue Beta Blocker, ACE/ARB, Furosemide, and Aldactone , SGLT2, and monitor clinical status closely.     Continue current medications      2. CVID (common variable immunodeficiency)  Assessment & Plan:  Patient previously given this diagnosis.  She is fairly stable but is prone to sinus infections.  We will continue to monitor.      3. Type 2 diabetes mellitus with diabetic neuropathy, with long-term current use of insulin  Assessment & Plan:  A1c 8.0%.  Patient does follow with Endocrinology.  States her glucose fluctuates without explanation.  I do feel there is room for calorie reduction and more exercise.      4. Colon cancer screening  Assessment & Plan:  No h/o polyps, last cscope 2016, repeat was recommended in seven years but without history of polyps and currently no problems technically this is not indicated but will proceed with a fit kit for now.  As she did not wish to proceed with a colonoscopy at this time.      Orders:  -     Fecal Immunochemical Test (iFOBT); Future; Expected date: 08/29/2024    5. Nocturnal dyspnea  Assessment & Plan:  She does report  occasionally waking up short of breath.  She does appear well compensated from a CHF standpoint.  Does not have daytime symptoms.  Discussed the possibility of sleep apnea however she declines sleep study at this time.  Recommend monitoring symptoms, low-sodium diet, monitor daily weights, report any additional change.           Follow Up:  Three months    Forty-three minutes of total time spent on the encounter, time includes face to face time, and some or all of the following: review of chart, lab, imaging, consultant notes, ER, hospital, documentation, care coordination, etc.    Advance Care Planning     Date: 08/29/2024    Power of   I initiated the process of voluntary advance care planning today and explained the importance of this process to the patient.  I introduced the concept of advance directives to the patient, as well. Then the patient received detailed information about the importance of designating a Health Care Power of  (HCPOA). She was also instructed to communicate with this person about their wishes for future healthcare, should she become sick and lose decision-making capacity. The patient has not previously appointed a HCPOA. After our discussion, the patient has not decided to complete a HCPOA and has appointed her significant other, health care agent:  indicates she would want her , but has not agreed to complete papera  & health care agent number:  see chart . I encouraged her to communicate with this person about their wishes for future healthcare, should she become sick and lose decision-making capacity.      Discussed again.  The patient does not wish to complete details at this time.             Subjective:     Chief Complaint   Patient presents with    Follow-up     I have reviewed the information entered by the ancillary staff regarding the chief complaint as well as the related history.    HPI    Patient is a/an 74 y.o.  female     Occasionally awakens short of  breath, some times albuterol helps   declines sleep study    For complete problem list, past medical history, surgical history, social history, etc., see appropriate section in the electronic medical record    Review of Systems   Constitutional:  Negative for activity change and unexpected weight change.   HENT:  Positive for rhinorrhea. Negative for hearing loss and trouble swallowing.    Eyes:  Positive for discharge. Negative for visual disturbance.   Respiratory:  Negative for chest tightness and wheezing.    Cardiovascular:  Negative for chest pain and palpitations.   Gastrointestinal:  Negative for blood in stool, constipation, diarrhea and vomiting.   Endocrine: Negative for polydipsia and polyuria.   Genitourinary:  Negative for difficulty urinating, hematuria and menstrual problem.   Musculoskeletal:  Positive for arthralgias, back pain and joint swelling. Negative for neck pain.   Neurological:  Positive for headaches. Negative for weakness.   Psychiatric/Behavioral:  Negative for confusion and dysphoric mood.        Objective     Physical Exam  Vitals reviewed.   Constitutional:       General: She is not in acute distress.     Appearance: She is well-developed. She is not ill-appearing.   HENT:      Head: Normocephalic and atraumatic.   Eyes:      General: No scleral icterus.     Conjunctiva/sclera: Conjunctivae normal.   Cardiovascular:      Rate and Rhythm: Normal rate and regular rhythm.      Heart sounds: Normal heart sounds. No murmur heard.  Pulmonary:      Effort: Pulmonary effort is normal. No respiratory distress.      Breath sounds: Normal breath sounds. No wheezing or rales.   Musculoskeletal:      Right lower leg: No edema.      Left lower leg: No edema.   Skin:     General: Skin is dry.      Findings: No rash.   Neurological:      Mental Status: She is alert and oriented to person, place, and time.   Psychiatric:         Behavior: Behavior normal.       Vitals:    08/29/24 1131   BP: 122/68  "  BP Location: Right arm   Patient Position: Sitting   BP Method: Large (Manual)   Pulse: 75   SpO2: 98%   Weight: 92.9 kg (204 lb 14.7 oz)   Height: 5' 2" (1.575 m)           THIS DOCUMENT WAS MADE IN PART WITH VOICE RECOGNITION SOFTWARE.  OCCASIONALLY THIS SOFTWARE WILL MISINTERPRET WORDS OR PHRASES.    "

## 2024-08-29 NOTE — ASSESSMENT & PLAN NOTE
No h/o polyps, last cscope 2016, repeat was recommended in seven years but without history of polyps and currently no problems technically this is not indicated but will proceed with a fit kit for now.  As she did not wish to proceed with a colonoscopy at this time.

## 2024-08-29 NOTE — ASSESSMENT & PLAN NOTE
A1c 8.0%.  Patient does follow with Endocrinology.  States her glucose fluctuates without explanation.  I do feel there is room for calorie reduction and more exercise.

## 2024-08-29 NOTE — ASSESSMENT & PLAN NOTE
Patient previously given this diagnosis.  She is fairly stable but is prone to sinus infections.  We will continue to monitor.

## 2024-09-03 NOTE — PROGRESS NOTES
CC: Ms. Mckenzie Mari arrives today for management of Type 2 DM and review of chronic medical conditions.     HPI: Ms. Mckenzie Mari was diagnosed with Type 2 DM in 2004. She was diagnosed based on lab work. Initial treatment consisted of metformin and glimepiride. Insulin added in 8/2016 - began Toujeo. She states that she felt that this caused nausea, abd pain, chest pain. Lantus caused throat swelling, left arm pain. She was then changed to Novolog 70/30 but this was only used for a short period because her insurance didn't cover.  Then changed to Humalog 50-50 in 12/2016. In 2017, she began MDI with Tresiba and Humalog. Jardiance added in 8/2021. Began use of Dexcom G6 in 2021.  + FH of DM in maternal aunt and cousin. Denies hospitalizations due to DM. Previously seen in endocrine by Richard Gupta, and MAGALI Eng, ARIELLE. She has a history of thyroid cancer/post-surgical hypothyroidism.   Of note, she has a h/o pancreatitis.   She follows annually with cardiology (Dr. Mtz) for CHF, CAD.    Patient prefers to only make small changes to insulin doses when adjustments are recommended. Feels that insulin makes her blood sugars higher.    Last seen by me in June.     BG monitoring per Dexcom G6.     Hypoglycemia: Rare      Missing Insulin/PO medication doses: No  Timing prandial insulin 5-15 minutes before meals: yes    Exercise: no    Dietary Habits: Eats 3 meals/day. Occasionally snack. Avoids sugary drinks.    Last DM education: 1/2019         CURRENT DIABETIC MEDS: Jardiance 25 mg daily, Tresiba 32 units QAM, Novolog 12 units AC + correction scale, target 180, ISF 25  Vial or pen: pen  Glucometer type: [facility] True Metrix    Previous DM treatments:   Invokana - nausea  Glimepiride - stopped when prandial insulin added  Touejo -  Nausea, abd pain, chest pain  Lantus - throat swelling, left arm pain  Novolog 70/30 - not covered by insurance  Metformin - headaches    Last Eye Exam: 6/18/2024 - No DR. Quezada  "[place].   Last Podiatry Exam: no    REVIEW OF SYSTEMS  Constitutional: no c/o fatigue, weakness, weight loss.   Cardiac: no chest pain, palpitations.   Respiratory: no cough. C/o dyspnea that is chronic. Uses albuterol as needed.  GI: no c/o nausea, abdominal pain. + H/o pancreatitis.   Skin: no rashes, lesions.  Neuro: + intermittent mild numbness, tingling in feet.   Endocrine: denies polyphagia, polydipsia, polyuria      Personally reviewed Past Medical, Surgical, Social History.    Vital Signs  /70   Pulse 77   Ht 5' 2" (1.575 m)   Wt 94.4 kg (208 lb 3.6 oz)   BMI 38.08 kg/m²     Personally reviewed the below labs:    Hemoglobin A1C   Date Value Ref Range Status   08/22/2024 8.0 (H) 4.0 - 5.6 % Final     Comment:     ADA Screening Guidelines:  5.7-6.4%  Consistent with prediabetes  >or=6.5%  Consistent with diabetes    High levels of fetal hemoglobin interfere with the HbA1C  assay. Heterozygous hemoglobin variants (HbS, HgC, etc)do  not significantly interfere with this assay.   However, presence of multiple variants may affect accuracy.     05/23/2024 8.0 (H) 4.0 - 5.6 % Final     Comment:     ADA Screening Guidelines:  5.7-6.4%  Consistent with prediabetes  >or=6.5%  Consistent with diabetes    High levels of fetal hemoglobin interfere with the HbA1C  assay. Heterozygous hemoglobin variants (HbS, HgC, etc)do  not significantly interfere with this assay.   However, presence of multiple variants may affect accuracy.     02/27/2024 8.1 (H) 4.0 - 5.6 % Final     Comment:     ADA Screening Guidelines:  5.7-6.4%  Consistent with prediabetes  >or=6.5%  Consistent with diabetes    High levels of fetal hemoglobin interfere with the HbA1C  assay. Heterozygous hemoglobin variants (HbS, HgC, etc)do  not significantly interfere with this assay.   However, presence of multiple variants may affect accuracy.         Chemistry        Component Value Date/Time     08/22/2024 0729    K 4.2 08/22/2024 0729    "  08/22/2024 0729    CO2 24 08/22/2024 0729    BUN 15 08/22/2024 0729    CREATININE 0.9 08/22/2024 0729     (H) 08/22/2024 0729        Component Value Date/Time    CALCIUM 9.5 08/22/2024 0729    ALKPHOS 81 05/23/2024 0738    AST 18 05/23/2024 0738    ALT 22 05/23/2024 0738    BILITOT 1.4 (H) 05/23/2024 0738          Lab Results   Component Value Date    CHOL 139 08/22/2024    CHOL 151 05/23/2024    CHOL 152 06/07/2023     Lab Results   Component Value Date    HDL 51 08/22/2024    HDL 51 05/23/2024    HDL 47 06/07/2023     Lab Results   Component Value Date    LDLCALC 57.2 (L) 08/22/2024    LDLCALC 70.2 05/23/2024    LDLCALC 66.4 06/07/2023     Lab Results   Component Value Date    TRIG 154 (H) 08/22/2024    TRIG 149 05/23/2024    TRIG 193 (H) 06/07/2023     Lab Results   Component Value Date    CHOLHDL 36.7 08/22/2024    CHOLHDL 33.8 05/23/2024    CHOLHDL 30.9 06/07/2023       Lab Results   Component Value Date    MICALBCREAT 6.9 08/22/2024     Lab Results   Component Value Date    TSH 1.210 08/22/2024       CrCl cannot be calculated (Patient's most recent lab result is older than the maximum 7 days allowed.).    Vit D, 25-Hydroxy   Date Value Ref Range Status   11/08/2014 51 30 - 96 ng/mL Final     Comment:     Vitamin D deficiency.........<10 ng/mL                              Vitamin D insufficiency......10-29 ng/mL       Vitamin D sufficiency........> or equal to 30 ng/mL  Vitamin D toxicity............>100 ng/mL          Ref. Range 6/25/2020 07:27   FRUCTOSAMINE Latest Ref Range: SeeBelow umol /L 291         PHYSICAL EXAMINATION  Constitutional: Appears well, no distress.  Respiratory: CTA, even and unlabored.  Cardiovascular: RRR, no murmurs, no carotid bruits.   GI: active bowel sounds, no hernia  Skin: warm and dry  Neuro: oriented to person, place, time.   Feet: appropriate footwear.        DEXCOM DOWNLOAD: Fasting glucoses are usually > 150, sometimes > 180. Some prandial excursions noted. One  episode of hypoglycemia in evening, after starting new sensor. Otherwise, rare hypoglycemia.             Goals        HEMOGLOBIN A1C < 7.5          due to CAD, CHF, patient's desire for glucose to remain >150.         Assessment/Plan  1. Type 2 diabetes mellitus with diabetic neuropathy, with long-term current use of insulin  -- A1c is elevated but patient is concerned with tighter glucose control.   -- increase Tresiba to 34 units for 2 weeks. If FBG remains > 150, increase to 36 units going forward.   -- continue current Novolog doses, Jardiance   -- BG monitoring per Dexcom G6.   -- Would not use Actos, due to CHF. GLP-1RA and DPP4-I contraindicated due to h/o pancreatitis. Cannot tolerate metformin.     -- Discussed diagnosis of DM, A1c goals, progression of disease, long term complications and tx options.  Advised patient to check BG before activities, such as driving or exercise.  -- Reviewed hypoglycemia management: treat with 1/2 glass of juice, 1/2 can regular coke, or 4 glucose tablets. Monitor and repeat treatment every 15 minutes until BG is >70 Then have a snack, which includes a complex carbohydrate and protein.    -- takes statin and ARB     2. Coronary artery disease involving native coronary artery without angina pectoris, unspecified whether native or transplanted heart  -- stable  -- Jardiance may offer cardio-protective benefit.     3. CHF (NYHA class III, ACC/AHA stage C)  -- follows with cardiology   4. Postoperative hypothyroidism  -- controlled  -- h/o thyroid cancer  -- managed by PCP   5. Essential hypertension  -- controlled   -- continue ARB   6. Hyperlipidemia, unspecified hyperlipidemia type  -- controlled  -- continue Crestor every other day   7. Obesity (BMI 30-39.9)  -- stable   Body mass index is 38.08 kg/m².   8. History of pancreatitis  -- avoid GLP-1RA and DPP4-i       FOLLOW UP  No follow-ups on file.   Patient instructed to bring BG logs to each follow up.   Patient encouraged  to call for any BG/medication issues, concerns, or questions.      Orders Placed This Encounter   Procedures    Hemoglobin A1C    Comprehensive Metabolic Panel

## 2024-09-04 ENCOUNTER — LAB VISIT (OUTPATIENT)
Dept: LAB | Facility: HOSPITAL | Age: 74
End: 2024-09-04
Attending: FAMILY MEDICINE
Payer: MEDICARE

## 2024-09-04 ENCOUNTER — TELEPHONE (OUTPATIENT)
Dept: ENDOCRINOLOGY | Facility: CLINIC | Age: 74
End: 2024-09-04

## 2024-09-04 ENCOUNTER — OFFICE VISIT (OUTPATIENT)
Dept: ENDOCRINOLOGY | Facility: CLINIC | Age: 74
End: 2024-09-04
Payer: MEDICARE

## 2024-09-04 VITALS
HEART RATE: 77 BPM | HEIGHT: 62 IN | BODY MASS INDEX: 38.32 KG/M2 | SYSTOLIC BLOOD PRESSURE: 120 MMHG | WEIGHT: 208.25 LBS | DIASTOLIC BLOOD PRESSURE: 70 MMHG

## 2024-09-04 DIAGNOSIS — Z79.4 TYPE 2 DIABETES MELLITUS WITH DIABETIC NEUROPATHY, WITH LONG-TERM CURRENT USE OF INSULIN: Primary | ICD-10-CM

## 2024-09-04 DIAGNOSIS — I50.9 CHF (NYHA CLASS III, ACC/AHA STAGE C): ICD-10-CM

## 2024-09-04 DIAGNOSIS — Z12.11 COLON CANCER SCREENING: ICD-10-CM

## 2024-09-04 DIAGNOSIS — Z87.19 HISTORY OF PANCREATITIS: ICD-10-CM

## 2024-09-04 DIAGNOSIS — E89.0 POSTOPERATIVE HYPOTHYROIDISM: ICD-10-CM

## 2024-09-04 DIAGNOSIS — I25.10 CORONARY ARTERY DISEASE INVOLVING NATIVE CORONARY ARTERY WITHOUT ANGINA PECTORIS, UNSPECIFIED WHETHER NATIVE OR TRANSPLANTED HEART: ICD-10-CM

## 2024-09-04 DIAGNOSIS — E11.40 TYPE 2 DIABETES MELLITUS WITH DIABETIC NEUROPATHY, WITH LONG-TERM CURRENT USE OF INSULIN: Primary | ICD-10-CM

## 2024-09-04 DIAGNOSIS — E66.9 OBESITY (BMI 30-39.9): ICD-10-CM

## 2024-09-04 DIAGNOSIS — I10 ESSENTIAL HYPERTENSION: ICD-10-CM

## 2024-09-04 DIAGNOSIS — E78.5 HYPERLIPIDEMIA, UNSPECIFIED HYPERLIPIDEMIA TYPE: ICD-10-CM

## 2024-09-04 PROCEDURE — 99999 PR PBB SHADOW E&M-EST. PATIENT-LVL V: CPT | Mod: PBBFAC,HCNC,, | Performed by: NURSE PRACTITIONER

## 2024-09-04 PROCEDURE — 82274 ASSAY TEST FOR BLOOD FECAL: CPT | Mod: HCNC | Performed by: FAMILY MEDICINE

## 2024-09-04 RX ORDER — INSULIN DEGLUDEC 200 U/ML
INJECTION, SOLUTION SUBCUTANEOUS
Qty: 6 PEN | Refills: 3 | Status: SHIPPED | OUTPATIENT
Start: 2024-09-04

## 2024-09-05 LAB — HEMOCCULT STL QL IA: NEGATIVE

## 2024-09-06 ENCOUNTER — PATIENT MESSAGE (OUTPATIENT)
Dept: CARDIOLOGY | Facility: CLINIC | Age: 74
End: 2024-09-06
Payer: MEDICARE

## 2024-09-06 PROBLEM — R06.00 NOCTURNAL DYSPNEA: Status: ACTIVE | Noted: 2024-09-06

## 2024-09-06 NOTE — ASSESSMENT & PLAN NOTE
She does report occasionally waking up short of breath.  She does appear well compensated from a CHF standpoint.  Does not have daytime symptoms.  Discussed the possibility of sleep apnea however she declines sleep study at this time.  Recommend monitoring symptoms, low-sodium diet, monitor daily weights, report any additional change.

## 2024-09-09 RX ORDER — CARVEDILOL 25 MG/1
TABLET ORAL
Qty: 180 TABLET | Refills: 3 | OUTPATIENT
Start: 2024-09-09

## 2024-09-18 ENCOUNTER — PATIENT MESSAGE (OUTPATIENT)
Dept: ADMINISTRATIVE | Facility: HOSPITAL | Age: 74
End: 2024-09-18
Payer: MEDICARE

## 2024-09-19 ENCOUNTER — OFFICE VISIT (OUTPATIENT)
Dept: FAMILY MEDICINE | Facility: CLINIC | Age: 74
End: 2024-09-19
Payer: MEDICARE

## 2024-09-19 VITALS
BODY MASS INDEX: 38.2 KG/M2 | SYSTOLIC BLOOD PRESSURE: 124 MMHG | WEIGHT: 207.56 LBS | DIASTOLIC BLOOD PRESSURE: 74 MMHG | HEIGHT: 62 IN

## 2024-09-19 DIAGNOSIS — E89.0 POSTOPERATIVE HYPOTHYROIDISM: Chronic | ICD-10-CM

## 2024-09-19 DIAGNOSIS — E11.40 TYPE 2 DIABETES MELLITUS WITH DIABETIC NEUROPATHY, WITH LONG-TERM CURRENT USE OF INSULIN: Chronic | ICD-10-CM

## 2024-09-19 DIAGNOSIS — Z79.4 TYPE 2 DIABETES MELLITUS WITH DIABETIC NEUROPATHY, WITH LONG-TERM CURRENT USE OF INSULIN: Chronic | ICD-10-CM

## 2024-09-19 DIAGNOSIS — E66.812 CLASS 2 SEVERE OBESITY WITH BODY MASS INDEX (BMI) OF 35 TO 39.9 WITH SERIOUS COMORBIDITY: ICD-10-CM

## 2024-09-19 DIAGNOSIS — E66.01 CLASS 2 SEVERE OBESITY WITH BODY MASS INDEX (BMI) OF 35 TO 39.9 WITH SERIOUS COMORBIDITY: ICD-10-CM

## 2024-09-19 DIAGNOSIS — I50.9 CHF (NYHA CLASS III, ACC/AHA STAGE C): ICD-10-CM

## 2024-09-19 DIAGNOSIS — Z85.3 HISTORY OF BREAST CANCER: ICD-10-CM

## 2024-09-19 DIAGNOSIS — C44.91 BASAL CELL CARCINOMA (BCC), UNSPECIFIED SITE: ICD-10-CM

## 2024-09-19 DIAGNOSIS — Z85.850 HISTORY OF THYROID CANCER: ICD-10-CM

## 2024-09-19 DIAGNOSIS — J98.4 CALCIFIED GRANULOMA OF LUNG: ICD-10-CM

## 2024-09-19 DIAGNOSIS — E78.2 MIXED HYPERLIPIDEMIA: Chronic | ICD-10-CM

## 2024-09-19 DIAGNOSIS — Z00.00 ENCOUNTER FOR MEDICARE ANNUAL WELLNESS EXAM: Primary | ICD-10-CM

## 2024-09-19 DIAGNOSIS — I70.0 AORTIC CALCIFICATION: ICD-10-CM

## 2024-09-19 DIAGNOSIS — K21.9 GASTROESOPHAGEAL REFLUX DISEASE WITHOUT ESOPHAGITIS: ICD-10-CM

## 2024-09-19 DIAGNOSIS — D83.9 CVID (COMMON VARIABLE IMMUNODEFICIENCY): ICD-10-CM

## 2024-09-19 DIAGNOSIS — I25.10 CORONARY ARTERY DISEASE INVOLVING NATIVE CORONARY ARTERY WITHOUT ANGINA PECTORIS, UNSPECIFIED WHETHER NATIVE OR TRANSPLANTED HEART: ICD-10-CM

## 2024-09-19 DIAGNOSIS — I10 PRIMARY HYPERTENSION: Chronic | ICD-10-CM

## 2024-09-19 PROCEDURE — 4010F ACE/ARB THERAPY RXD/TAKEN: CPT | Mod: HCNC,CPTII,S$GLB, | Performed by: NURSE PRACTITIONER

## 2024-09-19 PROCEDURE — 3078F DIAST BP <80 MM HG: CPT | Mod: HCNC,CPTII,S$GLB, | Performed by: NURSE PRACTITIONER

## 2024-09-19 PROCEDURE — G0439 PPPS, SUBSEQ VISIT: HCPCS | Mod: HCNC,S$GLB,, | Performed by: NURSE PRACTITIONER

## 2024-09-19 PROCEDURE — 3074F SYST BP LT 130 MM HG: CPT | Mod: HCNC,CPTII,S$GLB, | Performed by: NURSE PRACTITIONER

## 2024-09-19 PROCEDURE — 2023F DILAT RTA XM W/O RTNOPTHY: CPT | Mod: HCNC,CPTII,S$GLB, | Performed by: NURSE PRACTITIONER

## 2024-09-19 PROCEDURE — 1101F PT FALLS ASSESS-DOCD LE1/YR: CPT | Mod: HCNC,CPTII,S$GLB, | Performed by: NURSE PRACTITIONER

## 2024-09-19 PROCEDURE — 3066F NEPHROPATHY DOC TX: CPT | Mod: HCNC,CPTII,S$GLB, | Performed by: NURSE PRACTITIONER

## 2024-09-19 PROCEDURE — 3052F HG A1C>EQUAL 8.0%<EQUAL 9.0%: CPT | Mod: HCNC,CPTII,S$GLB, | Performed by: NURSE PRACTITIONER

## 2024-09-19 PROCEDURE — 3288F FALL RISK ASSESSMENT DOCD: CPT | Mod: HCNC,CPTII,S$GLB, | Performed by: NURSE PRACTITIONER

## 2024-09-19 PROCEDURE — 1158F ADVNC CARE PLAN TLK DOCD: CPT | Mod: HCNC,CPTII,S$GLB, | Performed by: NURSE PRACTITIONER

## 2024-09-19 PROCEDURE — 99999 PR PBB SHADOW E&M-EST. PATIENT-LVL III: CPT | Mod: PBBFAC,HCNC,, | Performed by: NURSE PRACTITIONER

## 2024-09-19 PROCEDURE — 3061F NEG MICROALBUMINURIA REV: CPT | Mod: HCNC,CPTII,S$GLB, | Performed by: NURSE PRACTITIONER

## 2024-09-19 NOTE — PATIENT INSTRUCTIONS
Counseling and Referral of Other Preventative  (Italic type indicates deductible and co-insurance are waived)    Patient Name: Mckenzie Mari  Today's Date: 9/19/2024    Health Maintenance       Date Due Completion Date    RSV Vaccine (Age 60+ and Pregnant patients) (1 - 1-dose 60+ series) Never done ---    Influenza Vaccine (1) 09/01/2024 9/18/2023    COVID-19 Vaccine (4 - 2023-24 season) 09/01/2024 6/13/2022    Colorectal Cancer Screening 09/05/2024 9/4/2024    Override on 10/5/2009: Done    Hemoglobin A1c 11/22/2024 8/22/2024    Foot Exam 02/28/2025 2/28/2024    Eye Exam 06/18/2025 6/18/2024    Override on 6/13/2023: Done    Override on 6/7/2022: Done (Dr. Mcgregor)    Override on 2/26/2020: Done (no  per pt. cannot recall provider's name)    Override on 6/24/2016: Done (Dr Tong)    Override on 5/13/2015: Done    Override on 12/20/2013: Done    Diabetes Urine Screening 08/22/2025 8/22/2024    Lipid Panel 08/22/2025 8/22/2024    Mammogram 08/26/2025 8/26/2024    Override on 9/17/2015: Done    Override on 7/16/2010: Done    Aspirin/Antiplatelet Therapy 09/06/2025 9/6/2024    DEXA Scan 09/06/2025 9/6/2022    TETANUS VACCINE 07/29/2033 7/29/2023        No orders of the defined types were placed in this encounter.    The following information is provided to all patients.  This information is to help you find resources for any of the problems found today that may be affecting your health:                  Living healthy guide: www.[facility].[place].gov      Understanding Diabetes: www.diabetes.org      Eating healthy: www.cdc.gov/healthyweight      CDC home safety checklist: www.cdc.gov/steadi/patient.html      Agency on Aging: www.goea.[place].gov      Alcoholics anonymous (AA): www.aa.org      Physical Activity: www.lauri.nih.gov/kr8ordk      Tobacco use: www.quitwithusla.org

## 2024-09-29 NOTE — PROGRESS NOTES
"  Mckenzie Mari presented for a follow-up Medicare AWV today. The following components were reviewed and updated:    Medical history  Family History  Social history  Allergies and Current Medications  Health Risk Assessment  Health Maintenance  Care Team    **See Completed Assessments for Annual Wellness visit with in the encounter summary    The following assessments were completed:  Depression Screening  Cognitive function Screening    Timed Get Up Test  Whisper Test      Opioid documentation:      Patient does not have a current opioid prescription.          Vitals:    09/19/24 1354   BP: 124/74   Weight: 94.2 kg (207 lb 9 oz)   Height: 5' 2" (1.575 m)     Body mass index is 37.96 kg/m².       Physical Exam  Vitals reviewed.   Constitutional:       General: She is not in acute distress.  HENT:      Head: Normocephalic.   Cardiovascular:      Rate and Rhythm: Normal rate.   Pulmonary:      Effort: Pulmonary effort is normal. No respiratory distress.   Skin:     General: Skin is warm.   Neurological:      General: No focal deficit present.      Mental Status: She is alert.   Psychiatric:         Mood and Affect: Mood normal.           Diagnoses and health risks identified today and associated recommendations/orders:  1. Encounter for Medicare annual wellness exam  Reviewed health maintenance and provided recommendations    Recommend flu and rsv vaccine  Recommend c-scope    2. Aortic calcification  Continue to monitor  Followed by Dr Hansen  CT renal 10/14/23.   Rosuvastatin 20 mg     3. Calcified granuloma of lung  Continue to monitor  Followed by Bacilio Gold MD   CT chest 5/16/22.      4. CVID (common variable immunodeficiency)  Continue to monitor  Followed by Bacilio Gold MD   stable.      5. Class 2 severe obesity with body mass index (BMI) of 35 to 39.9 with serious comorbidity  Continue to monitor  Followed by Bacilio Gold MD   Encourage healthy food chocies  Encourage " daily exercise.      6. Type 2 diabetes mellitus with diabetic neuropathy, with long-term current use of insulin  Continue to monitor  Followed by Bacilio Gold MD   Last A1c 8.o  Tresiba, jardiance and novolog.      7. CHF (NYHA class III, ACC/AHA stage C)  Continue to monitor  Followed by Dr Milligan  Carvedilol 25 mg.      8. Coronary artery disease involving native coronary artery without angina pectoris, unspecified whether native or transplanted heart  Continue to monitor  Followed by Dr Jade Contreras CP.      9. Primary hypertension  Continue to monitor  Followed by Bacilio Gold MD   Telmisartan 20 mg.      10. Mixed hyperlipidemia  Continue to monitor  Followed by Bacilio Gold MD   Rosuvastatin 20 mg.      11. History of breast cancer  Continue to monitor  Followed by Dr Henning  Continue yearly surveillance.      12. History of thyroid cancer  Continue to monitor  Followed by Dr Long  Continue to follow oncology recommendations.      13. Postoperative hypothyroidism  Continue to monitor  Followed by Bacilio Gold MD .      14. Gastroesophageal reflux disease without esophagitis  Continue to monitor  Followed by Bacilio Gold MD   Pantoprazole 40 mg.      15. Basal cell carcinoma (BCC), unspecified site  Continue to monitor  Followed by dermatology  Continue yearly skin checks.        Provided Mckenzie with a 5-10 year written screening schedule and personal prevention plan. Recommendations were developed using the USPSTF age appropriate recommendations. Education, counseling, and referrals were provided as needed.  After Visit Summary printed and given to patient which includes a list of additional screenings\tests needed.    No follow-ups on file.      Jocelin Riddle NP

## 2024-10-01 ENCOUNTER — CLINICAL SUPPORT (OUTPATIENT)
Dept: CARDIOLOGY | Facility: HOSPITAL | Age: 74
End: 2024-10-01
Payer: MEDICARE

## 2024-10-01 DIAGNOSIS — Z95.810 PRESENCE OF AUTOMATIC (IMPLANTABLE) CARDIAC DEFIBRILLATOR: ICD-10-CM

## 2024-10-02 ENCOUNTER — HOSPITAL ENCOUNTER (OUTPATIENT)
Dept: CARDIOLOGY | Facility: HOSPITAL | Age: 74
Discharge: HOME | End: 2024-10-02
Attending: INTERNAL MEDICINE
Payer: MEDICARE

## 2024-10-02 PROCEDURE — 93295 DEV INTERROG REMOTE 1/2/MLT: CPT | Mod: HCNC,,, | Performed by: INTERNAL MEDICINE

## 2024-10-02 PROCEDURE — 93296 REM INTERROG EVL PM/IDS: CPT | Mod: HCNC,PO | Performed by: INTERNAL MEDICINE

## 2024-10-07 ENCOUNTER — PATIENT MESSAGE (OUTPATIENT)
Dept: PRIMARY CARE CLINIC | Facility: CLINIC | Age: 74
End: 2024-10-07
Payer: MEDICARE

## 2024-11-14 RX ORDER — EMPAGLIFLOZIN 25 MG/1
25 TABLET, FILM COATED ORAL
Qty: 90 TABLET | Refills: 3 | Status: SHIPPED | OUTPATIENT
Start: 2024-11-14

## 2024-11-15 ENCOUNTER — PATIENT MESSAGE (OUTPATIENT)
Dept: CARDIOLOGY | Facility: CLINIC | Age: 74
End: 2024-11-15
Payer: MEDICARE

## 2024-11-21 ENCOUNTER — PATIENT MESSAGE (OUTPATIENT)
Dept: PRIMARY CARE CLINIC | Facility: CLINIC | Age: 74
End: 2024-11-21
Payer: MEDICARE

## 2024-11-21 ENCOUNTER — LAB VISIT (OUTPATIENT)
Dept: LAB | Facility: HOSPITAL | Age: 74
End: 2024-11-21
Attending: FAMILY MEDICINE
Payer: MEDICARE

## 2024-11-21 DIAGNOSIS — R30.0 DYSURIA: ICD-10-CM

## 2024-11-21 DIAGNOSIS — R30.0 DYSURIA: Primary | ICD-10-CM

## 2024-11-21 LAB
BACTERIA #/AREA URNS HPF: ABNORMAL /HPF
BILIRUB UR QL STRIP: NEGATIVE
CLARITY UR: CLEAR
COLOR UR: YELLOW
GLUCOSE UR QL STRIP: ABNORMAL
HGB UR QL STRIP: NEGATIVE
KETONES UR QL STRIP: NEGATIVE
LEUKOCYTE ESTERASE UR QL STRIP: NEGATIVE
MICROSCOPIC COMMENT: ABNORMAL
NITRITE UR QL STRIP: NEGATIVE
PH UR STRIP: 6 [PH] (ref 5–8)
PROT UR QL STRIP: NEGATIVE
SP GR UR STRIP: 1.02 (ref 1–1.03)
SQUAMOUS #/AREA URNS HPF: 5 /HPF
URN SPEC COLLECT METH UR: ABNORMAL
WBC #/AREA URNS HPF: 2 /HPF (ref 0–5)
YEAST URNS QL MICRO: ABNORMAL

## 2024-11-21 PROCEDURE — 81000 URINALYSIS NONAUTO W/SCOPE: CPT | Mod: HCNC,PO | Performed by: FAMILY MEDICINE

## 2024-11-21 PROCEDURE — 87086 URINE CULTURE/COLONY COUNT: CPT | Mod: HCNC | Performed by: FAMILY MEDICINE

## 2024-11-22 LAB — BACTERIA UR CULT: NO GROWTH

## 2024-11-25 ENCOUNTER — OFFICE VISIT (OUTPATIENT)
Dept: PRIMARY CARE CLINIC | Facility: CLINIC | Age: 74
End: 2024-11-25
Payer: MEDICARE

## 2024-11-25 VITALS
HEART RATE: 67 BPM | DIASTOLIC BLOOD PRESSURE: 66 MMHG | BODY MASS INDEX: 37.96 KG/M2 | SYSTOLIC BLOOD PRESSURE: 112 MMHG | OXYGEN SATURATION: 95 % | HEIGHT: 62 IN

## 2024-11-25 DIAGNOSIS — I10 PRIMARY HYPERTENSION: Primary | Chronic | ICD-10-CM

## 2024-11-25 DIAGNOSIS — Z79.4 TYPE 2 DIABETES MELLITUS WITH DIABETIC NEUROPATHY, WITH LONG-TERM CURRENT USE OF INSULIN: Chronic | ICD-10-CM

## 2024-11-25 DIAGNOSIS — K64.9 HEMORRHOIDS, UNSPECIFIED HEMORRHOID TYPE: ICD-10-CM

## 2024-11-25 DIAGNOSIS — E11.40 TYPE 2 DIABETES MELLITUS WITH DIABETIC NEUROPATHY, WITH LONG-TERM CURRENT USE OF INSULIN: Chronic | ICD-10-CM

## 2024-11-25 DIAGNOSIS — E78.2 MIXED HYPERLIPIDEMIA: Chronic | ICD-10-CM

## 2024-11-25 DIAGNOSIS — Z23 FLU VACCINE NEED: ICD-10-CM

## 2024-11-25 DIAGNOSIS — E89.0 POSTOPERATIVE HYPOTHYROIDISM: Chronic | ICD-10-CM

## 2024-11-25 PROCEDURE — 3288F FALL RISK ASSESSMENT DOCD: CPT | Mod: HCNC,CPTII,S$GLB, | Performed by: FAMILY MEDICINE

## 2024-11-25 PROCEDURE — 1159F MED LIST DOCD IN RCRD: CPT | Mod: HCNC,CPTII,S$GLB, | Performed by: FAMILY MEDICINE

## 2024-11-25 PROCEDURE — 1125F AMNT PAIN NOTED PAIN PRSNT: CPT | Mod: HCNC,CPTII,S$GLB, | Performed by: FAMILY MEDICINE

## 2024-11-25 PROCEDURE — 1160F RVW MEDS BY RX/DR IN RCRD: CPT | Mod: HCNC,CPTII,S$GLB, | Performed by: FAMILY MEDICINE

## 2024-11-25 PROCEDURE — 99999 PR PBB SHADOW E&M-EST. PATIENT-LVL V: CPT | Mod: PBBFAC,HCNC,, | Performed by: FAMILY MEDICINE

## 2024-11-25 PROCEDURE — 2023F DILAT RTA XM W/O RTNOPTHY: CPT | Mod: HCNC,CPTII,S$GLB, | Performed by: FAMILY MEDICINE

## 2024-11-25 PROCEDURE — 3078F DIAST BP <80 MM HG: CPT | Mod: HCNC,CPTII,S$GLB, | Performed by: FAMILY MEDICINE

## 2024-11-25 PROCEDURE — 3052F HG A1C>EQUAL 8.0%<EQUAL 9.0%: CPT | Mod: HCNC,CPTII,S$GLB, | Performed by: FAMILY MEDICINE

## 2024-11-25 PROCEDURE — 3074F SYST BP LT 130 MM HG: CPT | Mod: HCNC,CPTII,S$GLB, | Performed by: FAMILY MEDICINE

## 2024-11-25 PROCEDURE — 3008F BODY MASS INDEX DOCD: CPT | Mod: HCNC,CPTII,S$GLB, | Performed by: FAMILY MEDICINE

## 2024-11-25 PROCEDURE — 4010F ACE/ARB THERAPY RXD/TAKEN: CPT | Mod: HCNC,CPTII,S$GLB, | Performed by: FAMILY MEDICINE

## 2024-11-25 PROCEDURE — 3061F NEG MICROALBUMINURIA REV: CPT | Mod: HCNC,CPTII,S$GLB, | Performed by: FAMILY MEDICINE

## 2024-11-25 PROCEDURE — G0008 ADMIN INFLUENZA VIRUS VAC: HCPCS | Mod: HCNC,S$GLB,, | Performed by: FAMILY MEDICINE

## 2024-11-25 PROCEDURE — 3066F NEPHROPATHY DOC TX: CPT | Mod: HCNC,CPTII,S$GLB, | Performed by: FAMILY MEDICINE

## 2024-11-25 PROCEDURE — 90653 IIV ADJUVANT VACCINE IM: CPT | Mod: HCNC,S$GLB,, | Performed by: FAMILY MEDICINE

## 2024-11-25 PROCEDURE — 1101F PT FALLS ASSESS-DOCD LE1/YR: CPT | Mod: HCNC,CPTII,S$GLB, | Performed by: FAMILY MEDICINE

## 2024-11-25 PROCEDURE — 99214 OFFICE O/P EST MOD 30 MIN: CPT | Mod: HCNC,S$GLB,, | Performed by: FAMILY MEDICINE

## 2024-11-25 NOTE — PROGRESS NOTES
Primary Care Provider Appointment   [facility]       Patient ID: Mckenzie Mari is a 74 y.o. female.    ASSESSMENT/PLAN by Problem List:    Assessment & Plan    Hypertension stable on current medications  Diabetes remains unsatisfactory with A1C at 8.0; deferred treatment changes to endocrinology, however discussed diet and carbohydrate intake in detail.  She has room to improve but unclear whether she is willing to make changes  Mild increase in triglycerides, LDL well controlled at 57  TSH normal  Continued levothyroxine 112 mcg daily  Continued rosuvastatin 20 mg daily for dyslipidemia  Considered patient's history of sensitivity to medications and vaccines    TYPE 2 DIABETES MELLITUS:  - Explained carbohydrate content in various foods, including fruits and sugars.  - Mckenzie to reduce carbohydrate intake, in particular processed and refined sugars and carbohydrates.    HYPERTENSION AND HEART HEALTH:  - Discussed importance of maintaining exercise routine for overall health, including heart health.  - Continued rosuvastatin 20 mg daily.    LOW BACK PAIN:  - Mckenzie to continue using exercise peddler/bike for back pain management and overall health., she declines referral back to PT at this time but will let me know.  She is not interested in procedures    FLU SHOT ADMINISTRATION:  - Flu shot administered in office.  - Recommend increasing water intake, especially after flu vaccine.  - Started extra strength Tylenol, to be taken every 8-12 hours for 2 days following flu vaccine.  - Contact the office if flu vaccine causes severe symptoms.         ORDERS, CODING, ADDITIONAL DOCUMENTATION RELATED TO THIS ENCOUNTER:  1. Primary hypertension    2. Type 2 diabetes mellitus with diabetic neuropathy, with long-term current use of insulin    3. Mixed hyperlipidemia    4. Postoperative hypothyroidism    5. Hemorrhoids, unspecified hemorrhoid type    6. Flu vaccine need  -      "influenza (adjuvanted) (Fluad) 45 mcg/0.5 mL IM vaccine (> or = 64 yo) 0.5 mL             Subjective:       HPI    Patient is a/an 74 y.o.  female       For complete problem list, past medical history, surgical history, social history, etc., see appropriate section in the electronic medical record    History of Present Illness    CHIEF COMPLAINT:  Mckenzie presents today for a three-month follow-up of several chronic medical conditions.    BACK PAIN:  She reports back pain as her primary concern, experiencing discomfort with prolonged standing or sitting, describing it as "agony" after extended periods. She has difficulty with position changes and uses a cane for support, particularly during longer walks such as at [employer]. She utilizes a heating pad for relief after activities like grocery shopping. She has been using a pedal exerciser, noting some improvement in her back pain, though it still persists. She expresses hesitancy about pain management interventions due to concerns about potential allergic reactions and denies wanting to pursue pain management or injections at this time.    CHRONIC MEDICAL CONDITIONS:  Her hypertension has remained stable on current medications. Her A1C level is 8.0, unchanged for approximately a year, and she is working closely with endocrinology for diabetes management. She avoids bread and starchy foods but admits to consuming some sugar and fruit occasionally. She expresses a desire to lower her A1C and acknowledges the need to reduce carbohydrate intake. She continues management of dyslipidemia with rosuvastatin 20 mg daily under endocrinology care. She has a history of post-operative hypothyroidism and takes levothyroxine 112 mcg daily, with her most recent TSH level reported as normal.    RECENT URINARY TRACT INFECTION:  She reports a recent urinary tract infection with mostly cleared symptoms, but occasionally experiences a mild burning sensation. She is unsure if this " intermittent discomfort is related to dietary factors.    GASTROINTESTINAL ISSUES:  She reports recent constipation lasting two days, which resolved with glycerin use. She experienced a hemorrhoid flare-up, successfully treated with an OTC cream. To prevent constipation, she takes Miralax daily in the morning. She notes that consuming excessive bread can trigger GI issues, so she limits her intake. She denies any ongoing GI symptoms at present.    ALLERGIES:  She reports experiencing allergies with nasal congestion and tearing. She is not taking daily antihistamines, citing a preference to avoid additional medications unless essential. She notes thick nasal discharge in the morning, raising concerns about a possible infection.    WEIGHT MANAGEMENT:  She reports a current weight of 207 lbs, decreased from a previous high of approximately 220 lbs. She expresses frustration with a weight loss plateau, noting her weight has remained stable at this level for about a year. She is actively trying to lose weight and has incorporated exercise into her routine, specifically using a stationary bike or pedal exerciser, which she perceives as beneficial for both weight management and back pain improvement.    IMMUNIZATIONS:  She reports a history of experiencing flu-like symptoms and feeling ill for approximately two days following flu vaccinations. Despite this history, she agreed   to receive the flu vaccine during the current visit.    ROS:  General: +fever  ENT: +nasal congestion  Gastrointestinal: -constipation  Musculoskeletal: +back pain       Review of Systems   Constitutional:  Negative for activity change and unexpected weight change.   HENT:  Positive for rhinorrhea. Negative for hearing loss and trouble swallowing.    Eyes:  Positive for discharge. Negative for visual disturbance.   Respiratory:  Negative for chest tightness and wheezing.    Cardiovascular:  Negative for chest pain and palpitations.  "  Gastrointestinal:  Positive for constipation. Negative for blood in stool, diarrhea and vomiting.   Endocrine: Negative for polydipsia and polyuria.   Genitourinary:  Negative for difficulty urinating, dysuria, hematuria and menstrual problem.   Musculoskeletal:  Positive for arthralgias and joint swelling. Negative for neck pain.   Neurological:  Negative for weakness and headaches.   Psychiatric/Behavioral:  Negative for confusion and dysphoric mood.        Objective     Physical Exam  Vitals reviewed.   Constitutional:       General: She is not in acute distress.     Appearance: She is well-developed. She is not ill-appearing or toxic-appearing.   HENT:      Head: Normocephalic and atraumatic.   Eyes:      General: No scleral icterus.     Conjunctiva/sclera: Conjunctivae normal.   Cardiovascular:      Rate and Rhythm: Normal rate and regular rhythm.      Heart sounds: Normal heart sounds. No murmur heard.  Pulmonary:      Effort: Pulmonary effort is normal. No respiratory distress.      Breath sounds: Normal breath sounds. No wheezing or rales.   Skin:     General: Skin is dry.   Neurological:      Mental Status: She is alert and oriented to person, place, and time.   Psychiatric:         Behavior: Behavior normal.       Vitals:    11/25/24 0956   BP: 112/66   BP Location: Right arm   Patient Position: Sitting   Pulse: 67   SpO2: 95%   Height: 5' 2" (1.575 m)     Physical Exam                This note was generated with the assistance of ambient listening technology. Verbal consent was obtained by the patient and accompanying visitor(s) for the recording of patient appointment to facilitate this note. I attest to having reviewed and edited the generated note for accuracy, though some syntax or spelling errors may persist. Please contact the author of this note for any clarification.  Parts of the note were also generated with voice recognition software.  Occasionally this software will misinterpreted words or " phrases

## 2024-12-10 ENCOUNTER — PATIENT MESSAGE (OUTPATIENT)
Dept: ENDOCRINOLOGY | Facility: CLINIC | Age: 74
End: 2024-12-10
Payer: MEDICARE

## 2024-12-10 DIAGNOSIS — Z79.4 TYPE 2 DIABETES MELLITUS WITH DIABETIC NEUROPATHY, WITH LONG-TERM CURRENT USE OF INSULIN: Primary | ICD-10-CM

## 2024-12-10 DIAGNOSIS — E11.40 TYPE 2 DIABETES MELLITUS WITH DIABETIC NEUROPATHY, WITH LONG-TERM CURRENT USE OF INSULIN: Primary | ICD-10-CM

## 2024-12-11 DIAGNOSIS — I10 PRIMARY HYPERTENSION: ICD-10-CM

## 2024-12-12 RX ORDER — SPIRONOLACTONE 25 MG/1
25 TABLET ORAL DAILY
Qty: 90 TABLET | Refills: 3 | Status: SHIPPED | OUTPATIENT
Start: 2024-12-12

## 2024-12-24 ENCOUNTER — PATIENT MESSAGE (OUTPATIENT)
Dept: ENDOCRINOLOGY | Facility: CLINIC | Age: 74
End: 2024-12-24
Payer: MEDICARE

## 2024-12-25 ENCOUNTER — PATIENT MESSAGE (OUTPATIENT)
Dept: PRIMARY CARE CLINIC | Facility: CLINIC | Age: 74
End: 2024-12-25
Payer: MEDICARE

## 2024-12-30 ENCOUNTER — HOSPITAL ENCOUNTER (OUTPATIENT)
Dept: RADIOLOGY | Facility: HOSPITAL | Age: 74
Discharge: HOME | End: 2024-12-30
Attending: FAMILY MEDICINE
Payer: MEDICARE

## 2024-12-30 DIAGNOSIS — R05.9 COUGH, UNSPECIFIED TYPE: ICD-10-CM

## 2024-12-30 PROCEDURE — 71046 X-RAY EXAM CHEST 2 VIEWS: CPT | Mod: 26,HCNC,, | Performed by: RADIOLOGY

## 2024-12-30 PROCEDURE — 71046 X-RAY EXAM CHEST 2 VIEWS: CPT | Mod: TC,HCNC,PN

## 2024-12-31 ENCOUNTER — OFFICE VISIT (OUTPATIENT)
Dept: PRIMARY CARE CLINIC | Facility: CLINIC | Age: 74
End: 2024-12-31
Payer: MEDICARE

## 2024-12-31 VITALS
DIASTOLIC BLOOD PRESSURE: 68 MMHG | SYSTOLIC BLOOD PRESSURE: 118 MMHG | TEMPERATURE: 98 F | WEIGHT: 207.81 LBS | HEART RATE: 64 BPM | OXYGEN SATURATION: 96 % | BODY MASS INDEX: 38.24 KG/M2 | HEIGHT: 62 IN

## 2024-12-31 DIAGNOSIS — B96.89 BACTERIAL SINUSITIS: Primary | ICD-10-CM

## 2024-12-31 DIAGNOSIS — J32.9 BACTERIAL SINUSITIS: Primary | ICD-10-CM

## 2024-12-31 PROCEDURE — 99213 OFFICE O/P EST LOW 20 MIN: CPT | Mod: HCNC,S$GLB,, | Performed by: PHYSICIAN ASSISTANT

## 2024-12-31 PROCEDURE — 3008F BODY MASS INDEX DOCD: CPT | Mod: HCNC,CPTII,S$GLB, | Performed by: PHYSICIAN ASSISTANT

## 2024-12-31 PROCEDURE — 1159F MED LIST DOCD IN RCRD: CPT | Mod: HCNC,CPTII,S$GLB, | Performed by: PHYSICIAN ASSISTANT

## 2024-12-31 PROCEDURE — 2023F DILAT RTA XM W/O RTNOPTHY: CPT | Mod: HCNC,CPTII,S$GLB, | Performed by: PHYSICIAN ASSISTANT

## 2024-12-31 PROCEDURE — 3052F HG A1C>EQUAL 8.0%<EQUAL 9.0%: CPT | Mod: HCNC,CPTII,S$GLB, | Performed by: PHYSICIAN ASSISTANT

## 2024-12-31 PROCEDURE — 4010F ACE/ARB THERAPY RXD/TAKEN: CPT | Mod: HCNC,CPTII,S$GLB, | Performed by: PHYSICIAN ASSISTANT

## 2024-12-31 PROCEDURE — 3066F NEPHROPATHY DOC TX: CPT | Mod: HCNC,CPTII,S$GLB, | Performed by: PHYSICIAN ASSISTANT

## 2024-12-31 PROCEDURE — 1101F PT FALLS ASSESS-DOCD LE1/YR: CPT | Mod: HCNC,CPTII,S$GLB, | Performed by: PHYSICIAN ASSISTANT

## 2024-12-31 PROCEDURE — 3074F SYST BP LT 130 MM HG: CPT | Mod: HCNC,CPTII,S$GLB, | Performed by: PHYSICIAN ASSISTANT

## 2024-12-31 PROCEDURE — 99999 PR PBB SHADOW E&M-EST. PATIENT-LVL V: CPT | Mod: PBBFAC,HCNC,, | Performed by: PHYSICIAN ASSISTANT

## 2024-12-31 PROCEDURE — 3288F FALL RISK ASSESSMENT DOCD: CPT | Mod: HCNC,CPTII,S$GLB, | Performed by: PHYSICIAN ASSISTANT

## 2024-12-31 PROCEDURE — 3061F NEG MICROALBUMINURIA REV: CPT | Mod: HCNC,CPTII,S$GLB, | Performed by: PHYSICIAN ASSISTANT

## 2024-12-31 PROCEDURE — 1125F AMNT PAIN NOTED PAIN PRSNT: CPT | Mod: HCNC,CPTII,S$GLB, | Performed by: PHYSICIAN ASSISTANT

## 2024-12-31 PROCEDURE — 3078F DIAST BP <80 MM HG: CPT | Mod: HCNC,CPTII,S$GLB, | Performed by: PHYSICIAN ASSISTANT

## 2024-12-31 RX ORDER — AZITHROMYCIN 250 MG/1
TABLET, FILM COATED ORAL
Qty: 6 TABLET | Refills: 0 | Status: SHIPPED | OUTPATIENT
Start: 2024-12-31 | End: 2025-01-05

## 2024-12-31 NOTE — PROGRESS NOTES
"Subjective:      Patient ID: Mckenzie Mari is a 74 y.o. female.    Vitals:  height is 5' 2" (1.575 m) and weight is 94.3 kg (207 lb 12.5 oz). Her oral temperature is 98.2 °F (36.8 °C). Her blood pressure is 118/68 and her pulse is 64. Her oxygen saturation is 96%.     Chief Complaint: Sore Throat (Patient presents for eval of sore throat since 12/22/24. Patient had XR chest done yesterday and it was unremarkable. Patient is taking Mucinex and nasal spray. Patient is coughing up green phlegm, but denied fever. Patient's  has had the same symptoms x2 weeks and antibiotics did not help. )    74-year-old female presents for evaluation of upper respiratory symptoms.  Patient's symptoms began a little over a week and a half ago.  She has tried over-the-counter medications with little relief.  Yesterday she went for a chest x-ray which returned negative.  Patient denies any associated fever or chills.  She does complain of sinus pain, sore throat and a cough.  Her  is also sick.    Sore Throat   Associated symptoms include coughing. Pertinent negatives include no shortness of breath.       Constitution: Negative for chills and fever.   HENT:  Positive for sinus pain and sore throat.    Respiratory:  Positive for cough. Negative for shortness of breath.       Objective:     Physical Exam   Constitutional: She does not appear ill. No distress.   HENT:   Head: Normocephalic and atraumatic.   Ears:   Right Ear: Tympanic membrane, external ear and ear canal normal.   Left Ear: Tympanic membrane, external ear and ear canal normal.   Nose: Right sinus exhibits maxillary sinus tenderness. Right sinus exhibits no frontal sinus tenderness. Left sinus exhibits maxillary sinus tenderness. Left sinus exhibits no frontal sinus tenderness.   Mouth/Throat: Mucous membranes are moist. No oropharyngeal exudate. Oropharynx is clear.   Eyes: Conjunctivae are normal. Right eye exhibits no discharge. Left eye exhibits no " discharge. Extraocular movement intact   Cardiovascular: Normal rate and regular rhythm.   Pulmonary/Chest: Effort normal and breath sounds normal. She has no wheezes. She has no rhonchi. She has no rales.   Abdominal: Normal appearance.   Musculoskeletal: Normal range of motion.         General: Normal range of motion.   Neurological: no focal deficit. She is alert.   Skin: Skin is warm, dry and not pale. jaundice  Psychiatric: Her behavior is normal. Mood, judgment and thought content normal.   Nursing note and vitals reviewed.      Assessment:     1. Bacterial sinusitis        Plan:       Bacterial sinusitis    Other orders  -     azithromycin (Z-DIDIER) 250 MG tablet; Take 2 tablets by mouth on day 1; Take 1 tablet by mouth on days 2-5  Dispense: 6 tablet; Refill: 0    There are very few medications that patient is not allergic to.  She does have tenderness over maxillary sinuses.  Symptoms have been present for approximately 10 days.  Will treat for bacterial sinusitis.

## 2025-01-01 ENCOUNTER — HOSPITAL ENCOUNTER (OUTPATIENT)
Dept: CARDIOLOGY | Facility: HOSPITAL | Age: 75
Discharge: HOME | End: 2025-01-01
Attending: INTERNAL MEDICINE
Payer: MEDICARE

## 2025-01-01 ENCOUNTER — CLINICAL SUPPORT (OUTPATIENT)
Dept: CARDIOLOGY | Facility: HOSPITAL | Age: 75
End: 2025-01-01
Payer: MEDICARE

## 2025-01-01 DIAGNOSIS — Z95.810 PRESENCE OF AUTOMATIC (IMPLANTABLE) CARDIAC DEFIBRILLATOR: ICD-10-CM

## 2025-01-01 PROCEDURE — 93295 DEV INTERROG REMOTE 1/2/MLT: CPT | Mod: HCNC,,, | Performed by: INTERNAL MEDICINE

## 2025-01-01 PROCEDURE — 93296 REM INTERROG EVL PM/IDS: CPT | Mod: HCNC,PO | Performed by: INTERNAL MEDICINE

## 2025-01-02 DIAGNOSIS — E78.5 HYPERLIPIDEMIA, UNSPECIFIED HYPERLIPIDEMIA TYPE: Chronic | ICD-10-CM

## 2025-01-03 RX ORDER — ROSUVASTATIN CALCIUM 20 MG/1
20 TABLET, COATED ORAL NIGHTLY
Qty: 90 TABLET | Refills: 3 | Status: SHIPPED | OUTPATIENT
Start: 2025-01-03

## 2025-01-06 DIAGNOSIS — Z79.4 TYPE 2 DIABETES MELLITUS WITH COMPLICATION, WITH LONG-TERM CURRENT USE OF INSULIN: ICD-10-CM

## 2025-01-06 DIAGNOSIS — E11.8 TYPE 2 DIABETES MELLITUS WITH COMPLICATION, WITH LONG-TERM CURRENT USE OF INSULIN: ICD-10-CM

## 2025-01-07 DIAGNOSIS — E11.40 TYPE 2 DIABETES MELLITUS WITH DIABETIC NEUROPATHY, WITH LONG-TERM CURRENT USE OF INSULIN: ICD-10-CM

## 2025-01-07 DIAGNOSIS — Z79.4 TYPE 2 DIABETES MELLITUS WITH DIABETIC NEUROPATHY, WITH LONG-TERM CURRENT USE OF INSULIN: ICD-10-CM

## 2025-01-07 RX ORDER — PEN NEEDLE, DIABETIC 32GX 5/32"
NEEDLE, DISPOSABLE MISCELLANEOUS
Qty: 400 EACH | Refills: 3 | Status: SHIPPED | OUTPATIENT
Start: 2025-01-07

## 2025-01-08 ENCOUNTER — LAB VISIT (OUTPATIENT)
Dept: LAB | Facility: HOSPITAL | Age: 75
End: 2025-01-08
Attending: NURSE PRACTITIONER
Payer: MEDICARE

## 2025-01-08 DIAGNOSIS — Z79.4 TYPE 2 DIABETES MELLITUS WITH DIABETIC NEUROPATHY, WITH LONG-TERM CURRENT USE OF INSULIN: ICD-10-CM

## 2025-01-08 DIAGNOSIS — E11.40 TYPE 2 DIABETES MELLITUS WITH DIABETIC NEUROPATHY, WITH LONG-TERM CURRENT USE OF INSULIN: ICD-10-CM

## 2025-01-08 LAB
ALBUMIN SERPL BCP-MCNC: 3.8 G/DL (ref 3.5–5.2)
ALP SERPL-CCNC: 85 U/L (ref 40–150)
ALT SERPL W/O P-5'-P-CCNC: 20 U/L (ref 10–44)
ANION GAP SERPL CALC-SCNC: 9 MMOL/L (ref 8–16)
AST SERPL-CCNC: 20 U/L (ref 10–40)
BILIRUB SERPL-MCNC: 1.3 MG/DL (ref 0.1–1)
BUN SERPL-MCNC: 16 MG/DL (ref 8–23)
CALCIUM SERPL-MCNC: 9.9 MG/DL (ref 8.7–10.5)
CHLORIDE SERPL-SCNC: 104 MMOL/L (ref 95–110)
CO2 SERPL-SCNC: 27 MMOL/L (ref 23–29)
CREAT SERPL-MCNC: 1 MG/DL (ref 0.5–1.4)
EST. GFR  (NO RACE VARIABLE): 59.5 ML/MIN/1.73 M^2
ESTIMATED AVG GLUCOSE: 180 MG/DL (ref 68–131)
GLUCOSE SERPL-MCNC: 84 MG/DL (ref 70–110)
HBA1C MFR BLD: 7.9 % (ref 4–5.6)
POTASSIUM SERPL-SCNC: 4.4 MMOL/L (ref 3.5–5.1)
PROT SERPL-MCNC: 7.1 G/DL (ref 6–8.4)
SODIUM SERPL-SCNC: 140 MMOL/L (ref 136–145)

## 2025-01-08 PROCEDURE — 83036 HEMOGLOBIN GLYCOSYLATED A1C: CPT | Mod: HCNC | Performed by: NURSE PRACTITIONER

## 2025-01-08 PROCEDURE — 36415 COLL VENOUS BLD VENIPUNCTURE: CPT | Mod: HCNC,PN | Performed by: NURSE PRACTITIONER

## 2025-01-08 PROCEDURE — 80053 COMPREHEN METABOLIC PANEL: CPT | Mod: HCNC | Performed by: NURSE PRACTITIONER

## 2025-01-08 RX ORDER — INSULIN DEGLUDEC 200 U/ML
INJECTION, SOLUTION SUBCUTANEOUS
Qty: 18 ML | Refills: 3 | Status: SHIPPED | OUTPATIENT
Start: 2025-01-08

## 2025-01-13 DIAGNOSIS — E89.0 POSTOPERATIVE HYPOTHYROIDISM: Chronic | ICD-10-CM

## 2025-01-13 RX ORDER — LEVOTHYROXINE SODIUM 112 UG/1
112 TABLET ORAL
Qty: 90 TABLET | Refills: 1 | Status: SHIPPED | OUTPATIENT
Start: 2025-01-13

## 2025-01-13 NOTE — TELEPHONE ENCOUNTER
----- Message from Luz sent at 1/13/2025 11:12 AM CST -----  Regarding: refill needed  [phone] - call back ; need refill for her     SYNTHROID 112 mcg tablet    Send to :      [id number] - [place] [address] AT [address] [address]: [phone] Fax: [phone]: Not open 24 hours         Luz

## 2025-01-15 ENCOUNTER — OFFICE VISIT (OUTPATIENT)
Dept: ENDOCRINOLOGY | Facility: CLINIC | Age: 75
End: 2025-01-15
Payer: MEDICARE

## 2025-01-15 VITALS
HEART RATE: 71 BPM | BODY MASS INDEX: 38.17 KG/M2 | DIASTOLIC BLOOD PRESSURE: 60 MMHG | WEIGHT: 207.44 LBS | SYSTOLIC BLOOD PRESSURE: 120 MMHG | HEIGHT: 62 IN

## 2025-01-15 DIAGNOSIS — I25.10 CORONARY ARTERY DISEASE INVOLVING NATIVE CORONARY ARTERY WITHOUT ANGINA PECTORIS, UNSPECIFIED WHETHER NATIVE OR TRANSPLANTED HEART: ICD-10-CM

## 2025-01-15 DIAGNOSIS — I50.9 CHF (NYHA CLASS III, ACC/AHA STAGE C): ICD-10-CM

## 2025-01-15 DIAGNOSIS — E66.01 CLASS 2 SEVERE OBESITY WITH BODY MASS INDEX (BMI) OF 35 TO 39.9 WITH SERIOUS COMORBIDITY: ICD-10-CM

## 2025-01-15 DIAGNOSIS — Z79.4 TYPE 2 DIABETES MELLITUS WITH DIABETIC NEUROPATHY, WITH LONG-TERM CURRENT USE OF INSULIN: Primary | ICD-10-CM

## 2025-01-15 DIAGNOSIS — E66.812 CLASS 2 SEVERE OBESITY WITH BODY MASS INDEX (BMI) OF 35 TO 39.9 WITH SERIOUS COMORBIDITY: ICD-10-CM

## 2025-01-15 DIAGNOSIS — I10 ESSENTIAL HYPERTENSION: ICD-10-CM

## 2025-01-15 DIAGNOSIS — E89.0 POSTOPERATIVE HYPOTHYROIDISM: ICD-10-CM

## 2025-01-15 DIAGNOSIS — E11.40 TYPE 2 DIABETES MELLITUS WITH DIABETIC NEUROPATHY, WITH LONG-TERM CURRENT USE OF INSULIN: Primary | ICD-10-CM

## 2025-01-15 DIAGNOSIS — E78.5 HYPERLIPIDEMIA, UNSPECIFIED HYPERLIPIDEMIA TYPE: ICD-10-CM

## 2025-01-15 PROCEDURE — 3074F SYST BP LT 130 MM HG: CPT | Mod: HCNC,CPTII,S$GLB, | Performed by: NURSE PRACTITIONER

## 2025-01-15 PROCEDURE — 3008F BODY MASS INDEX DOCD: CPT | Mod: HCNC,CPTII,S$GLB, | Performed by: NURSE PRACTITIONER

## 2025-01-15 PROCEDURE — 95251 CONT GLUC MNTR ANALYSIS I&R: CPT | Mod: HCNC,S$GLB,, | Performed by: NURSE PRACTITIONER

## 2025-01-15 PROCEDURE — 1159F MED LIST DOCD IN RCRD: CPT | Mod: HCNC,CPTII,S$GLB, | Performed by: NURSE PRACTITIONER

## 2025-01-15 PROCEDURE — 99214 OFFICE O/P EST MOD 30 MIN: CPT | Mod: HCNC,S$GLB,, | Performed by: NURSE PRACTITIONER

## 2025-01-15 PROCEDURE — 1160F RVW MEDS BY RX/DR IN RCRD: CPT | Mod: HCNC,CPTII,S$GLB, | Performed by: NURSE PRACTITIONER

## 2025-01-15 PROCEDURE — 3051F HG A1C>EQUAL 7.0%<8.0%: CPT | Mod: HCNC,CPTII,S$GLB, | Performed by: NURSE PRACTITIONER

## 2025-01-15 PROCEDURE — 1101F PT FALLS ASSESS-DOCD LE1/YR: CPT | Mod: HCNC,CPTII,S$GLB, | Performed by: NURSE PRACTITIONER

## 2025-01-15 PROCEDURE — 3288F FALL RISK ASSESSMENT DOCD: CPT | Mod: HCNC,CPTII,S$GLB, | Performed by: NURSE PRACTITIONER

## 2025-01-15 PROCEDURE — G2211 COMPLEX E/M VISIT ADD ON: HCPCS | Mod: HCNC,S$GLB,, | Performed by: NURSE PRACTITIONER

## 2025-01-15 PROCEDURE — 3078F DIAST BP <80 MM HG: CPT | Mod: HCNC,CPTII,S$GLB, | Performed by: NURSE PRACTITIONER

## 2025-01-15 PROCEDURE — 4010F ACE/ARB THERAPY RXD/TAKEN: CPT | Mod: HCNC,CPTII,S$GLB, | Performed by: NURSE PRACTITIONER

## 2025-01-15 PROCEDURE — 1126F AMNT PAIN NOTED NONE PRSNT: CPT | Mod: HCNC,CPTII,S$GLB, | Performed by: NURSE PRACTITIONER

## 2025-01-15 PROCEDURE — 99999 PR PBB SHADOW E&M-EST. PATIENT-LVL V: CPT | Mod: PBBFAC,HCNC,, | Performed by: NURSE PRACTITIONER

## 2025-01-15 NOTE — PROGRESS NOTES
CC: Ms. Mckenzie Mari arrives today for management of Type 2 DM and review of chronic medical conditions.     HPI: Ms. Mckenzie Mari was diagnosed with Type 2 DM in 2004. She was diagnosed based on lab work. Initial treatment consisted of metformin and glimepiride. Insulin added in 8/2016 - began Toujeo. She states that she felt that this caused nausea, abd pain, chest pain. Lantus caused throat swelling, left arm pain. She was then changed to Novolog 70/30 but this was only used for a short period because her insurance didn't cover.  Then changed to Humalog 50-50 in 12/2016. In 2017, she began MDI with Tresiba and Humalog. Jardiance added in 8/2021. Began use of Dexcom G6 in 2021.  + FH of DM in maternal aunt and cousin. Denies hospitalizations due to DM. Previously seen in endocrine by Richard Gupta, and MAGALI Eng, ARIELLE. She has a history of thyroid cancer/post-surgical hypothyroidism.   Of note, she has a h/o pancreatitis.   She follows annually with cardiology (Dr. Mtz) for CHF, CAD.    Patient prefers to only make small changes to insulin doses when adjustments are recommended. Feels that insulin makes her blood sugars higher.    Last seen by me in September.    She stopped taking Jardiance for a short term due to shortness of breath, urinary irritation (reports no UTI or yeast infection was detected upon workup). Glucoses then increased so she resumed it.     She states that shortness of breath is back to baseline but continues with urinary irritation.    She states that she has felt very lethargic recently because blood sugars were in the low 100s.     BG monitoring per Dexcom G6.     Hypoglycemia: Rare      Missing Insulin/PO medication doses: No  Timing prandial insulin 5-15 minutes before meals: yes    Exercise: no    Dietary Habits: Eats 3 meals/day. Occasionally snack. Avoids sugary drinks.    Last DM education: 1/2019         CURRENT DIABETIC MEDS: Jardiance 25 mg daily, Tresiba 32 units QAM,  "Novolog 13 units AC + correction scale, target 180, ISF 25  Vial or pen: pen  Glucometer type: [facility] True Metrix    Previous DM treatments:   Invokana - nausea  Glimepiride - stopped when prandial insulin added  Touejo -  Nausea, abd pain, chest pain  Lantus - throat swelling, left arm pain  Novolog 70/30 - not covered by insurance  Metformin - headaches    Last Eye Exam: 6/18/2024 - No DR. Dr. Mcgregor.   Last Podiatry Exam: no    REVIEW OF SYSTEMS  Constitutional: no c/o fatigue, weakness, weight loss.   Cardiac: no chest pain, palpitations.   Respiratory: no cough. C/o dyspnea that is chronic. Uses albuterol as needed.  GI: no c/o nausea, abdominal pain. + H/o pancreatitis in her 20s due to gallbladder disease, now S/P cholecystectomy.   Skin: no rashes, lesions.  Neuro: + intermittent mild numbness, tingling in feet.   Endocrine: denies polyphagia, polydipsia, polyuria      Personally reviewed Past Medical, Surgical, Social History.    Vital Signs  /60   Pulse 71   Ht 5' 2" (1.575 m)   Wt 94.1 kg (207 lb 7.3 oz)   BMI 37.94 kg/m²     Personally reviewed the below labs:    Hemoglobin A1C   Date Value Ref Range Status   01/08/2025 7.9 (H) 4.0 - 5.6 % Final     Comment:     ADA Screening Guidelines:  5.7-6.4%  Consistent with prediabetes  >or=6.5%  Consistent with diabetes    High levels of fetal hemoglobin interfere with the HbA1C  assay. Heterozygous hemoglobin variants (HbS, HgC, etc)do  not significantly interfere with this assay.   However, presence of multiple variants may affect accuracy.     08/22/2024 8.0 (H) 4.0 - 5.6 % Final     Comment:     ADA Screening Guidelines:  5.7-6.4%  Consistent with prediabetes  >or=6.5%  Consistent with diabetes    High levels of fetal hemoglobin interfere with the HbA1C  assay. Heterozygous hemoglobin variants (HbS, HgC, etc)do  not significantly interfere with this assay.   However, presence of multiple variants may affect accuracy.     05/23/2024 8.0 (H) " 4.0 - 5.6 % Final     Comment:     ADA Screening Guidelines:  5.7-6.4%  Consistent with prediabetes  >or=6.5%  Consistent with diabetes    High levels of fetal hemoglobin interfere with the HbA1C  assay. Heterozygous hemoglobin variants (HbS, HgC, etc)do  not significantly interfere with this assay.   However, presence of multiple variants may affect accuracy.         Chemistry        Component Value Date/Time     01/08/2025 0711    K 4.4 01/08/2025 0711     01/08/2025 0711    CO2 27 01/08/2025 0711    BUN 16 01/08/2025 0711    CREATININE 1.0 01/08/2025 0711    GLU 84 01/08/2025 0711        Component Value Date/Time    CALCIUM 9.9 01/08/2025 0711    ALKPHOS 85 01/08/2025 0711    AST 20 01/08/2025 0711    ALT 20 01/08/2025 0711    BILITOT 1.3 (H) 01/08/2025 0711          Lab Results   Component Value Date    CHOL 139 08/22/2024    CHOL 151 05/23/2024    CHOL 152 06/07/2023     Lab Results   Component Value Date    HDL 51 08/22/2024    HDL 51 05/23/2024    HDL 47 06/07/2023     Lab Results   Component Value Date    LDLCALC 57.2 (L) 08/22/2024    LDLCALC 70.2 05/23/2024    LDLCALC 66.4 06/07/2023     Lab Results   Component Value Date    TRIG 154 (H) 08/22/2024    TRIG 149 05/23/2024    TRIG 193 (H) 06/07/2023     Lab Results   Component Value Date    CHOLHDL 36.7 08/22/2024    CHOLHDL 33.8 05/23/2024    CHOLHDL 30.9 06/07/2023       Lab Results   Component Value Date    MICALBCREAT 6.9 08/22/2024     Lab Results   Component Value Date    TSH 1.210 08/22/2024       CrCl cannot be calculated (Patient's most recent lab result is older than the maximum 7 days allowed.).    Vit D, 25-Hydroxy   Date Value Ref Range Status   11/08/2014 51 30 - 96 ng/mL Final     Comment:     Vitamin D deficiency.........<10 ng/mL                              Vitamin D insufficiency......10-29 ng/mL       Vitamin D sufficiency........> or equal to 30 ng/mL  Vitamin D toxicity............>100 ng/mL          Ref. Range 6/25/2020  07:27   FRUCTOSAMINE Latest Ref Range: SeeBelow umol /L 291         PHYSICAL EXAMINATION  Constitutional: Appears well, no distress.  Respiratory: CTA, even and unlabored.  Cardiovascular: RRR, no murmurs, no carotid bruits.   GI: active bowel sounds, no hernia  Skin: warm and dry  Neuro: oriented to person, place, time.   Feet: appropriate footwear.        DEXCOM DOWNLOAD: Fasting glucoses are usually > 150, sometimes > 180. Some prandial excursions noted. One episode of hypoglycemia in evening, after starting new sensor. Otherwise, rare hypoglycemia.             Goals        HEMOGLOBIN A1C < 7.5          due to CAD, CHF, patient's desire for glucose to remain >150.         Assessment/Plan  1. Type 2 diabetes mellitus with diabetic neuropathy, with long-term current use of insulin  -- A1c is above goal but patient is concerned with tighter glucose control.   -- advised taking half Jardiance tablet for 1-2 weeks to see if her urinary symptoms improve. If not, will stop Jardiance and adjust insulin accordingly. Discussed option of Mounjaro but cost is a concern. Pancreatitis episode was 50 years ago and it was related to gallstones (s/p nabeel).   -- continue current insulin doses  -- BG monitoring per Dexcom G6.   -- Would not use Actos, due to CHF. Cannot tolerate metformin.     -- Discussed diagnosis of DM, A1c goals, progression of disease, long term complications and tx options.  Advised patient to check BG before activities, such as driving or exercise.  -- Reviewed hypoglycemia management: treat with 1/2 glass of juice, 1/2 can regular coke, or 4 glucose tablets. Monitor and repeat treatment every 15 minutes until BG is >70 Then have a snack, which includes a complex carbohydrate and protein.    -- takes statin and ARB     2. Coronary artery disease involving native coronary artery without angina pectoris, unspecified whether native or transplanted heart  -- stable  -- Jardiance may offer cardio-protective  benefit.     3. CHF (NYHA class III, ACC/AHA stage C)  -- follows with cardiology   4. Postoperative hypothyroidism  -- controlled  -- h/o thyroid cancer  -- managed by PCP   5. Essential hypertension  -- controlled   -- continue ARB   6. Hyperlipidemia, unspecified hyperlipidemia type  -- controlled  -- continue Crestor every other day   7.  Class 2 severe obesity with body mass index (BMI) of 35 to 39.9 with serious comorbidity  -- increases insulin resistance  Body mass index is 37.94 kg/m².       FOLLOW UP  Follow up in about 3 months (around 4/15/2025).   Patient instructed to bring BG logs to each follow up.   Patient encouraged to call for any BG/medication issues, concerns, or questions.      Orders Placed This Encounter   Procedures    Hemoglobin A1C    Basic Metabolic Panel

## 2025-01-30 ENCOUNTER — HOSPITAL ENCOUNTER (OUTPATIENT)
Dept: CARDIOLOGY | Facility: HOSPITAL | Age: 75
Discharge: HOME | End: 2025-01-30
Attending: INTERNAL MEDICINE
Payer: MEDICARE

## 2025-01-30 VITALS — BODY MASS INDEX: 38.09 KG/M2 | WEIGHT: 207 LBS | HEIGHT: 62 IN

## 2025-01-30 DIAGNOSIS — I10 PRIMARY HYPERTENSION: Chronic | ICD-10-CM

## 2025-01-30 DIAGNOSIS — E78.2 MIXED HYPERLIPIDEMIA: Chronic | ICD-10-CM

## 2025-01-30 DIAGNOSIS — I50.22 CHRONIC SYSTOLIC CONGESTIVE HEART FAILURE: Chronic | ICD-10-CM

## 2025-01-30 DIAGNOSIS — Z95.810 ICD (IMPLANTABLE CARDIOVERTER-DEFIBRILLATOR) IN PLACE: Chronic | ICD-10-CM

## 2025-01-30 DIAGNOSIS — I44.7 LBBB (LEFT BUNDLE BRANCH BLOCK): Chronic | ICD-10-CM

## 2025-01-30 LAB
AV INDEX (PROSTH): 0.8
AV MEAN GRADIENT: 4 MMHG
AV PEAK GRADIENT: 8 MMHG
AV VALVE AREA BY VELOCITY RATIO: 1.8 CM²
AV VALVE AREA: 2.5 CM²
AV VELOCITY RATIO: 0.57
BSA FOR ECHO PROCEDURE: 2.03 M2
CV ECHO LV RWT: 0.37 CM
DOP CALC AO PEAK VEL: 1.4 M/S
DOP CALC AO VTI: 27.9 CM
DOP CALC LVOT AREA: 3.1 CM2
DOP CALC LVOT DIAMETER: 2 CM
DOP CALC LVOT PEAK VEL: 0.8 M/S
DOP CALC LVOT STROKE VOLUME: 70 CM3
DOP CALCLVOT PEAK VEL VTI: 22.3 CM
E WAVE DECELERATION TIME: 147 MSEC
E/A RATIO: 0.78
E/E' RATIO: 15 M/S
ECHO LV POSTERIOR WALL: 0.9 CM (ref 0.6–1.1)
EJECTION FRACTION: 50 %
FRACTIONAL SHORTENING: 26.5 % (ref 28–44)
GLOBAL LONGITUIDAL STRAIN: -15.8 %
INTERVENTRICULAR SEPTUM: 0.9 CM (ref 0.6–1.1)
LEFT ATRIUM AREA SYSTOLIC (APICAL 2 CHAMBER): 15.64 CM2
LEFT ATRIUM AREA SYSTOLIC (APICAL 4 CHAMBER): 14.75 CM2
LEFT ATRIUM SIZE: 3.7 CM
LEFT ATRIUM VOLUME INDEX MOD: 23 ML/M2
LEFT ATRIUM VOLUME MOD: 44 ML
LEFT INTERNAL DIMENSION IN SYSTOLE: 3.6 CM (ref 2.1–4)
LEFT VENTRICLE DIASTOLIC VOLUME INDEX: 56.93 ML/M2
LEFT VENTRICLE DIASTOLIC VOLUME: 110.45 ML
LEFT VENTRICLE END SYSTOLIC VOLUME APICAL 2 CHAMBER: 41.74 ML
LEFT VENTRICLE END SYSTOLIC VOLUME APICAL 4 CHAMBER: 41.29 ML
LEFT VENTRICLE MASS INDEX: 78.8 G/M2
LEFT VENTRICLE SYSTOLIC VOLUME INDEX: 27.7 ML/M2
LEFT VENTRICLE SYSTOLIC VOLUME: 53.79 ML
LEFT VENTRICULAR INTERNAL DIMENSION IN DIASTOLE: 4.9 CM (ref 3.5–6)
LEFT VENTRICULAR MASS: 153 G
LV LATERAL E/E' RATIO: 13.1 M/S
LV SEPTAL E/E' RATIO: 18.4 M/S
LVED V (TEICH): 110.45 ML
LVES V (TEICH): 53.79 ML
LVOT MG: 1.8 MMHG
LVOT MV: 0.64 CM/S
MV PEAK A VEL: 1.18 M/S
MV PEAK E VEL: 0.92 M/S
MV STENOSIS PRESSURE HALF TIME: 42.68 MS
MV VALVE AREA P 1/2 METHOD: 5.15 CM2
OHS CV RV/LV RATIO: 0.71 CM
PISA MRMAX VEL: 4.4 M/S
PISA TR MAX VEL: 2.5 M/S
PULM VEIN S/D RATIO: 1.21
PV PEAK D VEL: 0.43 M/S
PV PEAK S VEL: 0.52 M/S
RA PRESSURE ESTIMATED: 3 MMHG
RA VOL SYS: 22.4 ML
RIGHT ATRIAL AREA: 10.3 CM2
RIGHT ATRIUM VOLUME AREA LENGTH APICAL 4 CHAMBER: 19.13 ML
RIGHT VENTRICLE DIASTOLIC BASEL DIMENSION: 3.5 CM
RIGHT VENTRICLE DIASTOLIC LENGTH: 6.5 CM
RIGHT VENTRICLE DIASTOLIC MID DIMENSION: 2.4 CM
RIGHT VENTRICULAR END-DIASTOLIC DIMENSION: 3.48 CM
RIGHT VENTRICULAR LENGTH IN DIASTOLE (APICAL 4-CHAMBER VIEW): 6.48 CM
RV MID DIAMA: 2.43 CM
RV TB RVSP: 6 MMHG
RV TISSUE DOPPLER FREE WALL SYSTOLIC VELOCITY 1 (APICAL 4 CHAMBER VIEW): 13.46 CM/S
SINUS: 3.05 CM
STJ: 2.7 CM
TDI LATERAL: 0.07 M/S
TDI SEPTAL: 0.05 M/S
TDI: 0.06 M/S
TR MAX PG: 25 MMHG
TRICUSPID ANNULAR PLANE SYSTOLIC EXCURSION: 2.25 CM
TV REST PULMONARY ARTERY PRESSURE: 28 MMHG
Z-SCORE OF LEFT VENTRICULAR DIMENSION IN END DIASTOLE: -1.18
Z-SCORE OF LEFT VENTRICULAR DIMENSION IN END SYSTOLE: 0.47

## 2025-01-30 PROCEDURE — 93356 MYOCRD STRAIN IMG SPCKL TRCK: CPT | Mod: HCNC,PO

## 2025-01-30 PROCEDURE — 93306 TTE W/DOPPLER COMPLETE: CPT | Mod: HCNC,PO

## 2025-01-30 PROCEDURE — 93356 MYOCRD STRAIN IMG SPCKL TRCK: CPT | Mod: HCNC,,, | Performed by: INTERNAL MEDICINE

## 2025-01-30 PROCEDURE — 93306 TTE W/DOPPLER COMPLETE: CPT | Mod: 26,HCNC,, | Performed by: INTERNAL MEDICINE

## 2025-02-04 ENCOUNTER — PATIENT MESSAGE (OUTPATIENT)
Dept: RESEARCH | Facility: HOSPITAL | Age: 75
End: 2025-02-04
Payer: MEDICARE

## 2025-02-06 ENCOUNTER — HOSPITAL ENCOUNTER (OUTPATIENT)
Dept: CARDIOLOGY | Facility: HOSPITAL | Age: 75
Discharge: HOME | End: 2025-02-06
Attending: INTERNAL MEDICINE
Payer: MEDICARE

## 2025-02-06 ENCOUNTER — PATIENT MESSAGE (OUTPATIENT)
Dept: CARDIOLOGY | Facility: CLINIC | Age: 75
End: 2025-02-06

## 2025-02-06 ENCOUNTER — OFFICE VISIT (OUTPATIENT)
Dept: CARDIOLOGY | Facility: CLINIC | Age: 75
End: 2025-02-06
Attending: INTERNAL MEDICINE
Payer: MEDICARE

## 2025-02-06 ENCOUNTER — PATIENT MESSAGE (OUTPATIENT)
Dept: ENDOCRINOLOGY | Facility: CLINIC | Age: 75
End: 2025-02-06
Payer: MEDICARE

## 2025-02-06 VITALS
HEART RATE: 68 BPM | SYSTOLIC BLOOD PRESSURE: 124 MMHG | HEIGHT: 62 IN | BODY MASS INDEX: 38.25 KG/M2 | WEIGHT: 207.88 LBS | DIASTOLIC BLOOD PRESSURE: 69 MMHG

## 2025-02-06 DIAGNOSIS — I10 PRIMARY HYPERTENSION: Chronic | ICD-10-CM

## 2025-02-06 DIAGNOSIS — I44.7 LBBB (LEFT BUNDLE BRANCH BLOCK): Primary | Chronic | ICD-10-CM

## 2025-02-06 DIAGNOSIS — E78.2 MIXED HYPERLIPIDEMIA: Chronic | ICD-10-CM

## 2025-02-06 DIAGNOSIS — Z95.810 ICD (IMPLANTABLE CARDIOVERTER-DEFIBRILLATOR) IN PLACE: Chronic | ICD-10-CM

## 2025-02-06 DIAGNOSIS — I50.9 CHF (NYHA CLASS III, ACC/AHA STAGE C): ICD-10-CM

## 2025-02-06 DIAGNOSIS — I25.10 CORONARY ARTERY DISEASE INVOLVING NATIVE CORONARY ARTERY WITHOUT ANGINA PECTORIS, UNSPECIFIED WHETHER NATIVE OR TRANSPLANTED HEART: ICD-10-CM

## 2025-02-06 DIAGNOSIS — Z95.810 ICD (IMPLANTABLE CARDIOVERTER-DEFIBRILLATOR) IN PLACE: ICD-10-CM

## 2025-02-06 DIAGNOSIS — I10 PRIMARY HYPERTENSION: ICD-10-CM

## 2025-02-06 PROCEDURE — 3008F BODY MASS INDEX DOCD: CPT | Mod: HCNC,CPTII,S$GLB, | Performed by: INTERNAL MEDICINE

## 2025-02-06 PROCEDURE — 1160F RVW MEDS BY RX/DR IN RCRD: CPT | Mod: HCNC,CPTII,S$GLB, | Performed by: INTERNAL MEDICINE

## 2025-02-06 PROCEDURE — 99999 PR PBB SHADOW E&M-EST. PATIENT-LVL IV: CPT | Mod: PBBFAC,HCNC,, | Performed by: INTERNAL MEDICINE

## 2025-02-06 PROCEDURE — 3051F HG A1C>EQUAL 7.0%<8.0%: CPT | Mod: HCNC,CPTII,S$GLB, | Performed by: INTERNAL MEDICINE

## 2025-02-06 PROCEDURE — 99214 OFFICE O/P EST MOD 30 MIN: CPT | Mod: HCNC,S$GLB,, | Performed by: INTERNAL MEDICINE

## 2025-02-06 PROCEDURE — 4010F ACE/ARB THERAPY RXD/TAKEN: CPT | Mod: HCNC,CPTII,S$GLB, | Performed by: INTERNAL MEDICINE

## 2025-02-06 PROCEDURE — 93284 PRGRMG EVAL IMPLANTABLE DFB: CPT | Mod: HCNC,PO

## 2025-02-06 PROCEDURE — 1159F MED LIST DOCD IN RCRD: CPT | Mod: HCNC,CPTII,S$GLB, | Performed by: INTERNAL MEDICINE

## 2025-02-06 PROCEDURE — 3288F FALL RISK ASSESSMENT DOCD: CPT | Mod: HCNC,CPTII,S$GLB, | Performed by: INTERNAL MEDICINE

## 2025-02-06 PROCEDURE — 1126F AMNT PAIN NOTED NONE PRSNT: CPT | Mod: HCNC,CPTII,S$GLB, | Performed by: INTERNAL MEDICINE

## 2025-02-06 PROCEDURE — 1101F PT FALLS ASSESS-DOCD LE1/YR: CPT | Mod: HCNC,CPTII,S$GLB, | Performed by: INTERNAL MEDICINE

## 2025-02-06 PROCEDURE — 93284 PRGRMG EVAL IMPLANTABLE DFB: CPT | Mod: 26,HCNC,, | Performed by: INTERNAL MEDICINE

## 2025-02-06 PROCEDURE — 3074F SYST BP LT 130 MM HG: CPT | Mod: HCNC,CPTII,S$GLB, | Performed by: INTERNAL MEDICINE

## 2025-02-06 PROCEDURE — 3078F DIAST BP <80 MM HG: CPT | Mod: HCNC,CPTII,S$GLB, | Performed by: INTERNAL MEDICINE

## 2025-02-06 NOTE — PROGRESS NOTES
Subjective:    Patient ID:  Mckenzie Mari is a 74 y.o. female who presents for follow-up of cardiomyopathy    HPI  She comes with no complaints, no chest pain, no shortness of breath  Lots of stress in her life right now.    Some shortness of breath and some tiredness, not worse than what it was before      Review of Systems   Constitutional: Negative for decreased appetite, malaise/fatigue, weight gain and weight loss.   Cardiovascular:  Negative for chest pain, dyspnea on exertion, leg swelling, palpitations and syncope.   Respiratory:  Negative for cough and shortness of breath.    Gastrointestinal: Negative.    Neurological:  Negative for weakness.   All other systems reviewed and are negative.     Objective:      Physical Exam  Vitals and nursing note reviewed.   Constitutional:       Appearance: Normal appearance. She is well-developed.   HENT:      Head: Normocephalic.   Eyes:      Pupils: Pupils are equal, round, and reactive to light.   Neck:      Thyroid: No thyromegaly.      Vascular: No carotid bruit or JVD.   Cardiovascular:      Rate and Rhythm: Normal rate and regular rhythm.      Chest Wall: PMI is not displaced.      Pulses: Normal pulses and intact distal pulses.      Heart sounds: Normal heart sounds. No murmur heard.     No gallop.   Pulmonary:      Effort: Pulmonary effort is normal.      Breath sounds: Normal breath sounds.   Abdominal:      Palpations: Abdomen is soft. There is no mass.      Tenderness: There is no abdominal tenderness.   Musculoskeletal:         General: Normal range of motion.      Cervical back: Normal range of motion and neck supple.   Skin:     General: Skin is warm.   Neurological:      Mental Status: She is alert and oriented to person, place, and time.      Sensory: No sensory deficit.      Deep Tendon Reflexes: Reflexes are normal and symmetric.         Most Recent EKG Results  Results for orders placed or performed during the hospital encounter of 10/04/21   EKG  12-lead    Collection Time: 10/04/21 10:56 AM    Narrative    Test Reason : I49.9,    Vent. Rate : 068 BPM     Atrial Rate : 068 BPM     P-R Int : 144 ms          QRS Dur : 136 ms      QT Int : 426 ms       P-R-T Axes : 060 148 070 degrees     QTc Int : 452 ms    Atrial-sensed ventricular-paced rhythm  Biventricular pacemaker detected  Abnormal ECG  When compared with ECG of 04-OCT-2021 08:06,  No significant change was found  Confirmed by Carlos Velázquez MD ([id number]) on 10/12/2021 5:52:42 PM    Referred By:             Confirmed By:Carlos Velázquez MD       Most Recent Echocardiogram Results  Results for orders placed during the hospital encounter of 01/30/25    Echo    Interpretation Summary    Left Ventricle: The left ventricle is normal in size. There is low normal systolic function. Ejection fraction is approximately 50%. Global longitudinal strain is --15.8%.    Right Ventricle: Normal right ventricular cavity size. Systolic function is normal. Pacemaker lead present in the ventricle.    Mitral Valve: There is mild regurgitation.    Pulmonary Artery: The estimated pulmonary artery systolic pressure is 28 mmHg.    IVC/SVC: Normal venous pressure at 3 mmHg.      Most Recent Nuclear Stress Test Results  Results for orders placed during the hospital encounter of 05/04/23    Nuclear Stress - Cardiology Interpreted    Interpretation Summary    There is a small to moderate sized, fixed perfusion abnormality  in the anteroapical wall(s), probably secondary to paced rhythm    There are no other significant perfusion abnormalities.  No evidence of reversible ischemia    The gated perfusion images showed an ejection fraction of 74% at rest.    The ECG portion of the study is uninterpretable, due to AV pacing.    The patient reported no chest pain during the stress test.    There were no arrhythmias during stress.      Most Recent Cardiac PET Stress Test Results  No results found for this or any previous visit.      Most  Recent Cardiovascular Angiogram results  Results for orders placed during the hospital encounter of 06/25/21    Cardiac catheterization    Conclusion  · The coronary arteries were normal..  · The left ventricular systolic function was normal.  · The left ventricular end diastolic pressure was elevated.    The procedure log was documented by Documenter: RT Porsche and verified by Joseph Hansen MD.    Date: 6/25/2021  Time: 9:31 AM      Other Most Recent Cardiology Results  Results for orders placed in visit on 01/01/25    Cardiac device check - Remote      Labs reviewed    Assessment:       1. LBBB (left bundle branch block)    2. CHF (NYHA class III, ACC/AHA stage C)    3. Coronary artery disease involving native coronary artery without angina pectoris, unspecified whether native or transplanted heart    4. ICD (implantable cardioverter-defibrillator) in place    5. Primary hypertension    6. Mixed hyperlipidemia         Plan:     Continue:  ARB, ASA, Beta blocker, Diuretic, MRA, and Statin  Regular exercise program  Weight loss  Low cholesterol diet      Follow-up in 3 months

## 2025-02-07 RX ORDER — SPIRONOLACTONE 25 MG/1
25 TABLET ORAL DAILY
Qty: 90 TABLET | Refills: 3 | Status: SHIPPED | OUTPATIENT
Start: 2025-02-07

## 2025-02-12 LAB
OHS CV AF BURDEN PERCENT: < 1
OHS CV BIV PACING PERCENT: 99 %
OHS CV DC REMOTE DEVICE TYPE: NORMAL

## 2025-02-24 ENCOUNTER — TELEPHONE (OUTPATIENT)
Dept: RESEARCH | Facility: HOSPITAL | Age: 75
End: 2025-02-24
Payer: MEDICARE

## 2025-02-24 NOTE — TELEPHONE ENCOUNTER
Study title: LENIN HERNANDEZ  IRB #: [id number]      Patient called to discuss participation into the LENIN HERNANDEZ study.   CRC briefly explained overview of study. Patient was not interested; she will not be contacted with any further protocol related activities.     Patient verbalized understanding to reach out with any questions.    Callback #: [phone]  Lady, clinical research coordinator

## 2025-02-25 ENCOUNTER — OFFICE VISIT (OUTPATIENT)
Dept: PRIMARY CARE CLINIC | Facility: CLINIC | Age: 75
End: 2025-02-25
Payer: MEDICARE

## 2025-02-25 VITALS
HEIGHT: 62 IN | SYSTOLIC BLOOD PRESSURE: 110 MMHG | TEMPERATURE: 98 F | DIASTOLIC BLOOD PRESSURE: 74 MMHG | RESPIRATION RATE: 16 BRPM | OXYGEN SATURATION: 98 % | BODY MASS INDEX: 38.48 KG/M2 | HEART RATE: 60 BPM | WEIGHT: 209.13 LBS

## 2025-02-25 DIAGNOSIS — Z12.11 COLON CANCER SCREENING: ICD-10-CM

## 2025-02-25 DIAGNOSIS — E11.40 TYPE 2 DIABETES MELLITUS WITH DIABETIC NEUROPATHY, WITH LONG-TERM CURRENT USE OF INSULIN: Chronic | ICD-10-CM

## 2025-02-25 DIAGNOSIS — Z95.810 ICD (IMPLANTABLE CARDIOVERTER-DEFIBRILLATOR) IN PLACE: Chronic | ICD-10-CM

## 2025-02-25 DIAGNOSIS — I10 PRIMARY HYPERTENSION: Primary | Chronic | ICD-10-CM

## 2025-02-25 DIAGNOSIS — I50.9 CHF (NYHA CLASS III, ACC/AHA STAGE C): ICD-10-CM

## 2025-02-25 DIAGNOSIS — Z79.4 TYPE 2 DIABETES MELLITUS WITH DIABETIC NEUROPATHY, WITH LONG-TERM CURRENT USE OF INSULIN: Chronic | ICD-10-CM

## 2025-02-25 DIAGNOSIS — E78.2 MIXED HYPERLIPIDEMIA: Chronic | ICD-10-CM

## 2025-02-25 PROCEDURE — 3008F BODY MASS INDEX DOCD: CPT | Mod: HCNC,CPTII,S$GLB, | Performed by: FAMILY MEDICINE

## 2025-02-25 PROCEDURE — 99999 PR PBB SHADOW E&M-EST. PATIENT-LVL V: CPT | Mod: PBBFAC,HCNC,, | Performed by: FAMILY MEDICINE

## 2025-02-25 PROCEDURE — 3078F DIAST BP <80 MM HG: CPT | Mod: HCNC,CPTII,S$GLB, | Performed by: FAMILY MEDICINE

## 2025-02-25 PROCEDURE — 1101F PT FALLS ASSESS-DOCD LE1/YR: CPT | Mod: HCNC,CPTII,S$GLB, | Performed by: FAMILY MEDICINE

## 2025-02-25 PROCEDURE — 1125F AMNT PAIN NOTED PAIN PRSNT: CPT | Mod: HCNC,CPTII,S$GLB, | Performed by: FAMILY MEDICINE

## 2025-02-25 PROCEDURE — 4010F ACE/ARB THERAPY RXD/TAKEN: CPT | Mod: HCNC,CPTII,S$GLB, | Performed by: FAMILY MEDICINE

## 2025-02-25 PROCEDURE — 3288F FALL RISK ASSESSMENT DOCD: CPT | Mod: HCNC,CPTII,S$GLB, | Performed by: FAMILY MEDICINE

## 2025-02-25 PROCEDURE — 1159F MED LIST DOCD IN RCRD: CPT | Mod: HCNC,CPTII,S$GLB, | Performed by: FAMILY MEDICINE

## 2025-02-25 PROCEDURE — 99214 OFFICE O/P EST MOD 30 MIN: CPT | Mod: HCNC,S$GLB,, | Performed by: FAMILY MEDICINE

## 2025-02-25 PROCEDURE — 3051F HG A1C>EQUAL 7.0%<8.0%: CPT | Mod: HCNC,CPTII,S$GLB, | Performed by: FAMILY MEDICINE

## 2025-02-25 PROCEDURE — 1160F RVW MEDS BY RX/DR IN RCRD: CPT | Mod: HCNC,CPTII,S$GLB, | Performed by: FAMILY MEDICINE

## 2025-02-25 PROCEDURE — 1124F ACP DISCUSS-NO DSCNMKR DOCD: CPT | Mod: HCNC,CPTII,S$GLB, | Performed by: FAMILY MEDICINE

## 2025-02-25 PROCEDURE — 3074F SYST BP LT 130 MM HG: CPT | Mod: HCNC,CPTII,S$GLB, | Performed by: FAMILY MEDICINE

## 2025-02-25 RX ORDER — IBUPROFEN 200 MG
1 CAPSULE ORAL DAILY
COMMUNITY

## 2025-02-25 RX ORDER — TIZANIDINE HYDROCHLORIDE 4 MG/1
4 CAPSULE, GELATIN COATED ORAL
COMMUNITY

## 2025-02-25 NOTE — PROGRESS NOTES
Primary Care Provider Appointment   [facility]       Patient ID: Mckenzie Mari is a 74 y.o. female.    ASSESSMENT/PLAN by Problem List:    Assessment & Plan    IMPRESSION:  - Reviewed chronic conditions: hypertension stable and well-controlled; hyperlipidemia with LDL at target, mildly elevated triglycerides; type 2 diabetes with neuropathy, improved A1C of 7.9%; congestive heart failure stable with EF of 50% (low normal)  - Noted patient's self-reported improvement in symptoms after halving Jardiance dose  - Considered exercise program to address fatigue and improve overall health  - Agreed to use stool test for cancer screening instead of colonoscopy due to patient's decline for colonoscopy    HYPERTENSION:  - Continue current antihypertensive medications.  - Hypertension is assessed as stable and well-controlled.  - Continue monitoring and evaluating as a chronic condition.    CONGESTIVE HEART FAILURE:  - Explained normal EF range (55-70%) and contextualized the patient's current 50% as slightly below normal, clarifying misconception about 100% being normal.  - Condition assessed as stable with patient well-compensated.  - Noted presence of an implanted device (defibrillator).  - Continue diuretic therapy to manage fluid retention.  - Recommend exercise program to improve cardiovascular health.  - Monitor ongoing cardiology follow-ups.    DIABETES:  - Continue current therapy and endocrinology consultations  - Most recent A1C improved to 7.9%, which is good progress (below 8%).  - Continue current medications, maintain healthy nutrition, and ongoing management with endocrinology.  - Monitor type 2 diabetes associated with neuropathy and long-term insulin therapy.    HYPERLIPIDEMIA:  - Continue rosuvastatin 20 mg.  - Last lipid profile showed LDL within target (below 70), but mildly elevated triglycerides.  - Advise continuation of healthy nutrition and diabetes control to  manage hyperlipidemia.  - Continue monitoring as a chronic condition.    DEPRESSION:  - Ms. Mari reports experiencing depression for 3-4 weeks due to financial stress, expressing feelings of anger.  - Assessed and validated the appropriateness of the patient's emotional state given the circumstances.  - Noted patient's engagement in activities like knitting and pet care for stress management.    ALLERGIES:  - Continue treatment with Zyrtec, eye drops, and nasal spray for ongoing allergy symptom management.    WEIGHT MANAGEMENT:  - Ms. Mari reports weight loss, noting looser-fitting clothes.  - Evaluated weight loss from 210-220 lbs to 197 lbs over a few years.  - Acknowledged the gradual weight loss over time.    FATIGUE:  - Ms. Mari reports fatigue and easy tiring, especially with physical activity.  - Discussed importance of exercise for improving fatigue and overall health.  - Recommend exercise program to improve stamina, suggesting pedaling and walking.    OTHER INSTRUCTIONS:.  - Continue working on healthy nutrition and diabetes control.  - Start an exercise program, combining pedaling and walking.    LABS:  - Ordered annual stool test for cancer screening.  - Complete stool test within the next month.    FOLLOW UP:  - Follow up in 3 months.         CODING, ORDERS, AND ADDITIONAL DOCUMENTATION RELATED TO THIS ENCOUNTER:  (note that the diagnoses and clinical summaries above were generated with the assistance of ambient Catmoji software and represents my assessment and plan.  This software does not always generate the precise nor most specific diagnosis codes.  Therefore the specific diagnosis codes and orders for billing purposes are detailed below along with any additional documentation if needed)      1. Primary hypertension    2. Mixed hyperlipidemia    3. Type 2 diabetes mellitus with diabetic neuropathy, with long-term current use of insulin    4. CHF (NYHA class III, ACC/AHA stage  C)  Overview:  EF 35 - 40%      5. ICD (implantable cardioverter-defibrillator) in place  Overview:  Biotronik ICD, bi-ventricular      6. Colon cancer screening  -     Fecal Immunochemical Test (iFOBT); Future; Expected date: 02/25/2025           Follow Up:  3 months    5 minutes of total time spent on the encounter, time includes face to face time, and some or all of the following: review of chart, lab, imaging, consultant notes, ER, hospital, documentation, care coordination, etc.  Advance Care Planning     Date: 02/25/2025  Patient did not wish or was not able to name a surrogate decision maker or provide an Advance Care Plan.             Subjective:       HPI    Patient is a/an 74 y.o.  female     For complete problem list, past medical history, surgical history, social history, etc., see appropriate section in the electronic medical record    History of Present Illness    CHIEF COMPLAINT:  Ms. Mari presents today for follow-up of several chronic conditions.    CARDIOVASCULAR:  She has chronic hypertension which is currently stable and well controlled on medications. She was diagnosed with congestive heart failure at age 41. Her EF has fluctuated over time, initially improving from 25% to 70%, and is currently stable at 50%. She reports fluid retention with associated bloating and takes a diuretic twice weekly on Tuesdays and Fridays.    DIABETES:  She has type 2 diabetes with associated neuropathy and is currently on insulin therapy. She reports self-adjusting Jardiance dose to half tablet due to experiencing side effects, with subsequent resolution of breathing problems.    CURRENT SYMPTOMS:  She reports significant fatigue with easy fatigability, noting difficulty keeping pace while walking short distances. She has experienced unintentional weight loss from 210-220 lbs to approximately 197 lbs.    ALLERGIES:  She currently takes Zyrtec, eye drops, and nasal spray for allergy management, reporting these  medications provide relief for a few minutes.    MEDICAL HISTORY:  She has hyperlipidemia managed with Rosuvastatin 20mg. She underwent emergent cholecystectomy in her 20s for gallbladder disease complicated by severe infection with suspected pancreatic involvement. She currently experiences occasional sharp, intermittent abdominal pains that spontaneously resolve.      ROS:  General: +fatigue, +weight loss  Respiratory: -difficulty breathing  Gastrointestinal: +abdominal pain, +bloating  Psychiatric: +depression       Review of Systems   Constitutional:  Negative for activity change and unexpected weight change.   HENT:  Positive for rhinorrhea. Negative for hearing loss and trouble swallowing.    Eyes:  Positive for discharge. Negative for visual disturbance.   Respiratory:  Negative for chest tightness and wheezing.    Cardiovascular:  Positive for chest pain. Negative for palpitations.   Gastrointestinal:  Negative for blood in stool, constipation, diarrhea and vomiting.   Endocrine: Negative for polydipsia and polyuria.   Genitourinary:  Negative for difficulty urinating, dysuria, hematuria and menstrual problem.   Musculoskeletal:  Positive for arthralgias and joint swelling. Negative for neck pain.   Neurological:  Negative for weakness and headaches.   Psychiatric/Behavioral:  Positive for dysphoric mood. Negative for confusion.        Objective     Physical Exam  Vitals reviewed.   Constitutional:       General: She is not in acute distress.     Appearance: She is well-developed. She is not ill-appearing or toxic-appearing.   HENT:      Head: Normocephalic and atraumatic.   Eyes:      General: No scleral icterus.     Conjunctiva/sclera: Conjunctivae normal.   Cardiovascular:      Rate and Rhythm: Normal rate and regular rhythm.      Heart sounds: Normal heart sounds. No murmur heard.  Pulmonary:      Effort: Pulmonary effort is normal. No respiratory distress.      Breath sounds: Normal breath sounds. No  "wheezing or rales.   Skin:     General: Skin is dry.   Neurological:      Mental Status: She is alert and oriented to person, place, and time.   Psychiatric:         Behavior: Behavior normal.       Vitals:    02/25/25 1056   BP: 110/74   BP Location: Right arm   Patient Position: Sitting   Pulse: 60   Resp: 16   Temp: 97.9 °F (36.6 °C)   TempSrc: Oral   SpO2: 98%   Weight: 94.8 kg (209 lb 1.7 oz)   Height: 5' 2" (1.575 m)     Physical Exam                This note was generated with the assistance of ambient listening technology. Verbal consent was obtained by the patient and accompanying visitor(s) for the recording of patient appointment to facilitate this note. I attest to having reviewed and edited the generated note for accuracy, though some syntax or spelling errors may persist. Please contact the author of this note for any clarification.  Parts of the note were also generated with voice recognition software.  Occasionally this software will misinterpreted words or phrases    "

## 2025-02-28 ENCOUNTER — PATIENT MESSAGE (OUTPATIENT)
Dept: ENDOCRINOLOGY | Facility: CLINIC | Age: 75
End: 2025-02-28
Payer: MEDICARE

## 2025-02-28 ENCOUNTER — LAB VISIT (OUTPATIENT)
Dept: LAB | Facility: HOSPITAL | Age: 75
End: 2025-02-28
Attending: FAMILY MEDICINE
Payer: MEDICARE

## 2025-02-28 DIAGNOSIS — Z12.11 COLON CANCER SCREENING: ICD-10-CM

## 2025-02-28 PROCEDURE — 82274 ASSAY TEST FOR BLOOD FECAL: CPT | Mod: HCNC | Performed by: FAMILY MEDICINE

## 2025-03-03 ENCOUNTER — PATIENT MESSAGE (OUTPATIENT)
Dept: ENDOCRINOLOGY | Facility: CLINIC | Age: 75
End: 2025-03-03
Payer: MEDICARE

## 2025-03-05 DIAGNOSIS — K21.9 GASTROESOPHAGEAL REFLUX DISEASE WITHOUT ESOPHAGITIS: ICD-10-CM

## 2025-03-05 RX ORDER — PANTOPRAZOLE SODIUM 40 MG/1
40 TABLET, DELAYED RELEASE ORAL
Qty: 30 TABLET | Refills: 11 | Status: SHIPPED | OUTPATIENT
Start: 2025-03-05

## 2025-03-06 LAB — HEMOCCULT STL QL IA: NEGATIVE

## 2025-03-07 RX ORDER — PANTOPRAZOLE SODIUM 40 MG/1
40 TABLET, DELAYED RELEASE ORAL
Qty: 30 TABLET | Refills: 11 | Status: SHIPPED | OUTPATIENT
Start: 2025-03-07

## 2025-03-22 DIAGNOSIS — I50.22 CHRONIC SYSTOLIC CONGESTIVE HEART FAILURE: ICD-10-CM

## 2025-03-24 RX ORDER — TELMISARTAN 20 MG/1
20 TABLET ORAL
Qty: 90 TABLET | Refills: 3 | Status: SHIPPED | OUTPATIENT
Start: 2025-03-24

## 2025-03-30 DIAGNOSIS — E11.40 TYPE 2 DIABETES MELLITUS WITH DIABETIC NEUROPATHY, WITH LONG-TERM CURRENT USE OF INSULIN: ICD-10-CM

## 2025-03-30 DIAGNOSIS — Z79.4 TYPE 2 DIABETES MELLITUS WITH DIABETIC NEUROPATHY, WITH LONG-TERM CURRENT USE OF INSULIN: ICD-10-CM

## 2025-03-31 RX ORDER — INSULIN ASPART 100 [IU]/ML
INJECTION, SOLUTION INTRAVENOUS; SUBCUTANEOUS
Qty: 45 ML | Refills: 3 | Status: SHIPPED | OUTPATIENT
Start: 2025-03-31

## 2025-04-02 ENCOUNTER — CLINICAL SUPPORT (OUTPATIENT)
Dept: CARDIOLOGY | Facility: HOSPITAL | Age: 75
End: 2025-04-02
Payer: MEDICARE

## 2025-04-02 ENCOUNTER — HOSPITAL ENCOUNTER (OUTPATIENT)
Dept: CARDIOLOGY | Facility: HOSPITAL | Age: 75
Discharge: HOME | End: 2025-04-02
Attending: INTERNAL MEDICINE
Payer: MEDICARE

## 2025-04-02 DIAGNOSIS — Z95.810 PRESENCE OF AUTOMATIC (IMPLANTABLE) CARDIAC DEFIBRILLATOR: ICD-10-CM

## 2025-04-02 PROCEDURE — 93296 REM INTERROG EVL PM/IDS: CPT | Mod: HCNC,PO | Performed by: INTERNAL MEDICINE

## 2025-04-02 PROCEDURE — 93295 DEV INTERROG REMOTE 1/2/MLT: CPT | Mod: HCNC,,, | Performed by: INTERNAL MEDICINE

## 2025-04-07 ENCOUNTER — PATIENT MESSAGE (OUTPATIENT)
Dept: PRIMARY CARE CLINIC | Facility: CLINIC | Age: 75
End: 2025-04-07
Payer: MEDICARE

## 2025-04-07 NOTE — TELEPHONE ENCOUNTER
Spoke with pt, scheduled her to see SEEMA Jack tomorrow and then Dr. Gardner tomorrow as well for a HFU appt.

## 2025-04-08 ENCOUNTER — OFFICE VISIT (OUTPATIENT)
Dept: PRIMARY CARE CLINIC | Facility: CLINIC | Age: 75
End: 2025-04-08
Payer: MEDICARE

## 2025-04-08 ENCOUNTER — OFFICE VISIT (OUTPATIENT)
Dept: PAIN MEDICINE | Facility: CLINIC | Age: 75
End: 2025-04-08
Payer: MEDICARE

## 2025-04-08 VITALS
BODY MASS INDEX: 38.6 KG/M2 | HEART RATE: 71 BPM | HEIGHT: 62 IN | SYSTOLIC BLOOD PRESSURE: 118 MMHG | WEIGHT: 209.75 LBS | DIASTOLIC BLOOD PRESSURE: 72 MMHG | OXYGEN SATURATION: 97 %

## 2025-04-08 VITALS
WEIGHT: 208.56 LBS | HEIGHT: 62 IN | SYSTOLIC BLOOD PRESSURE: 112 MMHG | DIASTOLIC BLOOD PRESSURE: 54 MMHG | HEART RATE: 66 BPM | BODY MASS INDEX: 38.38 KG/M2

## 2025-04-08 DIAGNOSIS — S22.080A T12 COMPRESSION FRACTURE, INITIAL ENCOUNTER: ICD-10-CM

## 2025-04-08 DIAGNOSIS — S22.080D COMPRESSION FRACTURE OF T12 VERTEBRA WITH ROUTINE HEALING, SUBSEQUENT ENCOUNTER: Primary | ICD-10-CM

## 2025-04-08 PROCEDURE — 4010F ACE/ARB THERAPY RXD/TAKEN: CPT | Mod: CPTII,S$GLB,, | Performed by: PHYSICIAN ASSISTANT

## 2025-04-08 PROCEDURE — 99999 PR PBB SHADOW E&M-EST. PATIENT-LVL V: CPT | Mod: PBBFAC,,, | Performed by: FAMILY MEDICINE

## 2025-04-08 PROCEDURE — 4010F ACE/ARB THERAPY RXD/TAKEN: CPT | Mod: CPTII,S$GLB,, | Performed by: FAMILY MEDICINE

## 2025-04-08 PROCEDURE — 1160F RVW MEDS BY RX/DR IN RCRD: CPT | Mod: CPTII,S$GLB,, | Performed by: PHYSICIAN ASSISTANT

## 2025-04-08 PROCEDURE — 99214 OFFICE O/P EST MOD 30 MIN: CPT | Mod: S$GLB,,, | Performed by: FAMILY MEDICINE

## 2025-04-08 PROCEDURE — 3288F FALL RISK ASSESSMENT DOCD: CPT | Mod: CPTII,S$GLB,, | Performed by: FAMILY MEDICINE

## 2025-04-08 PROCEDURE — 99999 PR PBB SHADOW E&M-EST. PATIENT-LVL V: CPT | Mod: PBBFAC,HCNC,, | Performed by: PHYSICIAN ASSISTANT

## 2025-04-08 PROCEDURE — 3008F BODY MASS INDEX DOCD: CPT | Mod: CPTII,S$GLB,, | Performed by: PHYSICIAN ASSISTANT

## 2025-04-08 PROCEDURE — 3078F DIAST BP <80 MM HG: CPT | Mod: CPTII,S$GLB,, | Performed by: PHYSICIAN ASSISTANT

## 2025-04-08 PROCEDURE — 1160F RVW MEDS BY RX/DR IN RCRD: CPT | Mod: CPTII,S$GLB,, | Performed by: FAMILY MEDICINE

## 2025-04-08 PROCEDURE — 3074F SYST BP LT 130 MM HG: CPT | Mod: CPTII,S$GLB,, | Performed by: FAMILY MEDICINE

## 2025-04-08 PROCEDURE — 3008F BODY MASS INDEX DOCD: CPT | Mod: CPTII,S$GLB,, | Performed by: FAMILY MEDICINE

## 2025-04-08 PROCEDURE — 1159F MED LIST DOCD IN RCRD: CPT | Mod: CPTII,S$GLB,, | Performed by: FAMILY MEDICINE

## 2025-04-08 PROCEDURE — 3288F FALL RISK ASSESSMENT DOCD: CPT | Mod: CPTII,S$GLB,, | Performed by: PHYSICIAN ASSISTANT

## 2025-04-08 PROCEDURE — 1159F MED LIST DOCD IN RCRD: CPT | Mod: CPTII,S$GLB,, | Performed by: PHYSICIAN ASSISTANT

## 2025-04-08 PROCEDURE — 3078F DIAST BP <80 MM HG: CPT | Mod: CPTII,S$GLB,, | Performed by: FAMILY MEDICINE

## 2025-04-08 PROCEDURE — 1100F PTFALLS ASSESS-DOCD GE2>/YR: CPT | Mod: CPTII,S$GLB,, | Performed by: PHYSICIAN ASSISTANT

## 2025-04-08 PROCEDURE — 1100F PTFALLS ASSESS-DOCD GE2>/YR: CPT | Mod: CPTII,S$GLB,, | Performed by: FAMILY MEDICINE

## 2025-04-08 PROCEDURE — 3051F HG A1C>EQUAL 7.0%<8.0%: CPT | Mod: CPTII,S$GLB,, | Performed by: FAMILY MEDICINE

## 2025-04-08 PROCEDURE — 1125F AMNT PAIN NOTED PAIN PRSNT: CPT | Mod: CPTII,S$GLB,, | Performed by: FAMILY MEDICINE

## 2025-04-08 PROCEDURE — 3051F HG A1C>EQUAL 7.0%<8.0%: CPT | Mod: CPTII,S$GLB,, | Performed by: PHYSICIAN ASSISTANT

## 2025-04-08 PROCEDURE — 3074F SYST BP LT 130 MM HG: CPT | Mod: CPTII,S$GLB,, | Performed by: PHYSICIAN ASSISTANT

## 2025-04-08 PROCEDURE — 99204 OFFICE O/P NEW MOD 45 MIN: CPT | Mod: S$GLB,,, | Performed by: PHYSICIAN ASSISTANT

## 2025-04-08 PROCEDURE — 1125F AMNT PAIN NOTED PAIN PRSNT: CPT | Mod: CPTII,S$GLB,, | Performed by: PHYSICIAN ASSISTANT

## 2025-04-08 RX ORDER — CELECOXIB 200 MG/1
200 CAPSULE ORAL DAILY
Qty: 7 CAPSULE | Refills: 0 | Status: SHIPPED | OUTPATIENT
Start: 2025-04-08 | End: 2025-04-15

## 2025-04-08 RX ORDER — CALCITONIN SALMON 200 [IU]/.09ML
1 SPRAY, METERED NASAL DAILY
Qty: 3.7 ML | Refills: 3 | Status: SHIPPED | OUTPATIENT
Start: 2025-04-08 | End: 2026-04-08

## 2025-04-08 NOTE — LETTER
April 8, 2025      [facility] [place]  [address] [address]  Phone: [phone]  Fax: [phone]       Patient: Mckenzie Mari   YOB: 1950  Date of Visit: 04/08/2025    To Whom It May Concern:    Eriberto Mari  was at [facility] . Tyrel Nulller was taking care of patient. He may return to work on 4/9/25 with no restrictions. If you have any questions or concerns, or if I can be of further assistance, please do not hesitate to contact me.    Sincerely,    Lara Palmer CMA

## 2025-04-08 NOTE — PROGRESS NOTES
Primary Care Provider Appointment   [facility] [place]       Patient ID: Mckenzie Mari is a 74 y.o. female.    ASSESSMENT/PLAN by Problem List:    Assessment & Plan    IMPRESSION:  - Assessed recent T12 compression fracture from fall, noting significant pain and functional limitations.  - Considered kyphoplasty, but opted for conservative management initially given medication sensitivities.  - Noted mild bone loss on previous densitometry, but fracture now indicates osteoporosis diagnosis.  - Reviewed imaging, confirming isolated T12 fracture   - Plan to discuss long-term osteoporosis management options at follow-up after acute pain resolves.    COMPRESSION FRACTURE:  - Explained nature of compression fracture, typical healing timeframe of 4-6 weeks, and expectation of full recovery.  - Discussed that while painful, specific fracture type is not likely to cause long-term disability.    PAIN MANAGEMENT:  - Started Miacalcin nasal spray: 1 spray in alternating nostrils daily for about a month or until back improves, to potentially reduce fracture-related pain.  ** patient was advised that sometimes this can help with pain from an acute compression fracture but it is not considered affective for osteoporosis.  Once the acute situation has resolved we will discuss long-term treatments for reducing future risk of fracture and consider a bisphosphonate.    - Started Celebrex for 1 week for pain management, weighing benefits against cardiovascular risks.  ** patient has allergy and are sensitivity to opioids and even tramadol.  Therefore we have limited pain options.  Considered trying oxycodone cautiously but with previous rash to hydrocodone and morphine, express concern.  Therefore will cautiously try Celebrex for one week, along with the Miacalcin nasal spray.  But if pain is uncontrolled will look at other options and likely need to consider vertebroplasty or kyphoplasty at that  point.    FOLLOW-UP:  - Follow up to report back on effectiveness of prescribed medications.         CODING, ORDERS, AND ADDITIONAL DOCUMENTATION RELATED TO THIS ENCOUNTER:  (note that the diagnoses and clinical summaries above were generated with the assistance of ambient Spunkmobile software and represents my assessment and plan.  This software does not always generate the precise nor most specific diagnosis codes.  Therefore the specific diagnosis codes and orders for billing purposes are detailed below along with any additional documentation if needed)      1. Compression fracture of T12 vertebra with routine healing, subsequent encounter    Other orders  -     calcitonin, salmon, (FORTICAL) 200 unit/actuation nasal spray; 1 spray by Nasal route once daily. Alternate nostrils daily.  This may help with pain from vertebral fractures but is not considered effective for osteoporosis  Dispense: 3.7 mL; Refill: 3  -     celecoxib (CELEBREX) 200 MG capsule; Take 1 capsule (200 mg total) by mouth once daily. for 7 days  Dispense: 7 capsule; Refill: 0           Follow Up:  In a few months as scheduled      Subjective:       HPI    Patient is a/an 74 y.o.  female     For complete problem list, past medical history, surgical history, social history, etc., see appropriate section in the electronic medical record    History of Present Illness    CHIEF COMPLAINT:  Ms. Mari presents today for hospital follow-up after a fall resulting in a compression fracture    HISTORY OF PRESENT ILLNESS:  She fell backwards while gardening when her foot became stuck in rain-softened ground. She managed to get up using a pillow to cushion her knees and her cane for support. Emergency room imaging revealed an anterior wedge compression fracture of T12. Pain severity has decreased from 9/10 to 6/10, described as feeling sore at lower intensities. Pain occurs every three hours throughout the night, disrupting sleep. Movement, particularly  turning in bed and bending, exacerbates the pain and increases pressure in the affected area. [facility] treatment options including kyphoplasty were discussed. A supportive device provided in the ER was difficult to apply and became displaced at home.    MEDICAL HISTORY:  She has a history of mild bone loss documented on previous bone density scans. She experiences sudden bodily twitches while sitting still.    MEDICATIONS:  She takes aspirin 80mg daily as prescribed by Dr. Mtz. Currently using ibuprofen and muscle relaxants (Zanaflex) for pain management, reporting inadequate relief. The muscle relaxant provides better relief when combined with Benadryl as it helps with sleep. She tolerates ibuprofen well when taken with food.    ALLERGIES:  She has multiple medication allergies including morphine (itching and nausea), hydrocodone (hives and rash). She experiences throat swelling, rashes, and GI upset with various pain medications.      ROS:  Musculoskeletal: +joint pain, +back pain, +nightime pain, +limb pain, +difficulty standing up, +muscle spasms  Neurological: +excessive drowsiness       Review of Systems   Constitutional:  Positive for activity change.   Respiratory: Negative.     Cardiovascular: Negative.    Gastrointestinal: Negative.    Genitourinary: Negative.    Musculoskeletal:  Positive for back pain and gait problem.       Objective     Physical Exam  Vitals reviewed.   Constitutional:       General: She is not in acute distress.     Appearance: She is well-developed. She is not diaphoretic.   HENT:      Head: Normocephalic and atraumatic.   Eyes:      General: No scleral icterus.  Pulmonary:      Effort: Pulmonary effort is normal. No respiratory distress.   Neurological:      Mental Status: She is alert and oriented to person, place, and time.      Comments: Ambulatory with a cane.  She is wearing a back brace.   Psychiatric:         Mood and  "Affect: Mood normal.         Behavior: Behavior normal.       Vitals:    04/08/25 1341   BP: 118/72   Patient Position: Sitting   Pulse: 71   SpO2: 97%   Weight: 95.1 kg (209 lb 12.3 oz)   Height: 5' 2" (1.575 m)     Physical Exam                This note was generated with the assistance of ambient listening technology. Verbal consent was obtained by the patient and accompanying visitor(s) for the recording of patient appointment to facilitate this note. I attest to having reviewed and edited the generated note for accuracy, though some syntax or spelling errors may persist. Please contact the author of this note for any clarification.  Parts of the note were also generated with voice recognition software.  Occasionally this software will misinterpreted words or phrases    "

## 2025-04-08 NOTE — PROGRESS NOTES
"[facility] New Patient Evaluation      Referred by: Dr. Ene Rucker    PCP:  Bacilio Gold MD    CC:   Chief Complaint   Patient presents with    Pain    Low-back Pain     Onset of Sunday morning. "Started to get worse" ED on 4/6    Back Pain          HPI:   Mckenzie Mari is a 74 y.o. year old female patient who has a past medical history of Allergy, Anticoagulant long-term use, Arthritis, Asthma, Atrial fibrillation, Breast cancer, Bundle branch block, Cardiomyopathy, CHF (congestive heart failure), Chronic sinusitis, Colon cancer screening, Coronary artery disease, CVID (common variable immunodeficiency), Diabetes mellitus, Diabetes mellitus, type 2, GERD (gastroesophageal reflux disease), Headache(784.0), HTN (hypertension), Hyperlipidemia, Hypothyroid, Malignant hyperthermia, Miscarriage, Otitis media, Presence of combination internal cardiac defibrillator (ICD) and pacemaker, Thyroid cancer, Thyroid cancer, and Thyroid disease. She presents in referral from Dr. Ene Rucker for thoracic compression fracture.    She fell 4-6-25.  She tried to move a plant when her foot got stuck in the dirt/ mud.  As she pulled her foot out, she fell back landing on her butocks.  She had onset of lower back pain with the fall.  Pain is felt midline lower thoracic/ upper lumbar region.  She denies any radicular leg pain, numbness or tingling.  Today she also feels some pain and soreness across the lower lumbar region and hips.  She was seen in the ER 4-6-25.  Surgical intervention was not recommended.  She was given a TLSO brace, but not wearing the provided one because it is cumbersome for her to put on and she is unsure if she is wearing it correctly.  She is wearing a soft TLSO brace.  She is taking advil and tizanidine.      Denies bowel/ bladder incontinence.    Past and current medications:  Antineuropathics:  NSAIDs: advil  Antidepressants:  Muscle relaxers: " tizanidine  Opioids:  Antiplatelets/Anticoagulants:  Others:    Physical Therapy/ Chiropractic care:  none    Pain Intervention History:  none    Past Spine Surgical History:  none        History:  Current Medications[1]    Past Medical History:   Diagnosis Date    Allergy     Anticoagulant long-term use     Arthritis     Asthma     as a child    Atrial fibrillation     Breast cancer 2005    s/p lumpectomy, chemo and now cancer free over 5 years    Bundle branch block     Cardiomyopathy     EF 35 - 40%    CHF (congestive heart failure)     FC II    Chronic sinusitis     Colon cancer screening 2/28/2024    Coronary artery disease     CVID (common variable immunodeficiency)     Diabetes mellitus     Diabetes mellitus, type 2     GERD (gastroesophageal reflux disease)     Headache(784.0)     HTN (hypertension)     Hyperlipidemia     Hypothyroid 07/25/2012    Malignant hyperthermia     daughter and supposedly mother have had this    Miscarriage 1971    Otitis media     Presence of combination internal cardiac defibrillator (ICD) and pacemaker     Thyroid cancer     Thyroid cancer 07/25/2012    Thyroid disease     hypothyroid after papillary thyroid cancer with thyroid resection       Past Surgical History:   Procedure Laterality Date    BREAST LUMPECTOMY      left    CARDIAC PACEMAKER PLACEMENT      CATARACT EXTRACTION W/  INTRAOCULAR LENS IMPLANT Bilateral     CHOLECYSTECTOMY      COLONOSCOPY N/A 05/11/2016    Procedure: COLONOSCOPY;  Surgeon: Alfonso Serrano Jr., MD;  Location: [facility];  Service: Endoscopy;  Laterality: N/A;    COLONOSCOPY W/ POLYPECTOMY  10/05/2009    [facility]   One 2 mm polyp in the cecum.  TUBULAR ADENOMA.  Tortuous colon.    ESOPHAGOGASTRODUODENOSCOPY  09/12/2006    [facility]   Dysphagia.  NERD.  Patulous LES.  Atrophic mucosa.  Benign fundal polyps.  Dilated, 42 Fr.    HYSTERECTOMY      JOINT REPLACEMENT Bilateral     knee    LEFT HEART CATHETERIZATION N/A 06/25/2021    Procedure: Left heart cath;   Surgeon: Joseph Hansen MD;  Location: [facility];  Service: Cardiology;  Laterality: N/A;    MOLE REMOVAL      back    pacemaker defibrillator      THYROID SURGERY  x2    TONSILLECTOMY         Family History   Problem Relation Name Age of Onset    Stroke Mother      Hypertension Mother      Arthritis Mother      Allergic rhinitis Mother      Cancer Mother          bladder    Heart disease Mother      Allergic rhinitis Father 94 yrs     Heart disease Father 94 yrs     Allergic rhinitis Brother      Heart disease Brother      Cancer Brother          lung    Hearing loss Brother      Heart disease Brother      Learning disabilities Daughter      Cancer Maternal Aunt          breast     Cancer Paternal Aunt           breast    Diabetes Paternal Aunt      Cancer Paternal Aunt          lung    Angioedema Neg Hx      Asthma Neg Hx      Atopy Neg Hx      Eczema Neg Hx      Immunodeficiency Neg Hx      Urticaria Neg Hx         Social History     Socioeconomic History    Marital status:     Number of children: 3   Tobacco Use    Smoking status: Never     Passive exposure: Never    Smokeless tobacco: Never   Substance and Sexual Activity    Alcohol use: No    Drug use: No    Sexual activity: Yes     Partners: Male   Social History Narrative    Lives at home with , likes to knit, cook, sew and play games.      Social Drivers of Health     Financial Resource Strain: Low Risk  (9/18/2023)    Overall Financial Resource Strain (CARDIA)     Difficulty of Paying Living Expenses: Not hard at all   Food Insecurity: Patient Declined (1/21/2024)    Hunger Vital Sign     Worried About Running Out of Food in the Last Year: Patient declined     Ran Out of Food in the Last Year: Patient declined   Transportation Needs: Unknown (1/21/2024)    PRAPARE - Transportation     Lack of Transportation (Medical): No     Lack of Transportation (Non-Medical): Patient declined   Physical Activity: Unknown (1/21/2024)    Exercise  "Vital Sign     Days of Exercise per Week: 3 days   Stress: Patient Declined (1/21/2024)    [employer] - Occupational Stress Questionnaire     Feeling of Stress : Patient declined   Housing Stability: Patient Declined (1/21/2024)    Housing Stability Vital Sign     Unable to Pay for Housing in the Last Year: Patient declined     Number of Places Lived in the Last Year: 1     Unstable Housing in the Last Year: Patient declined       Review of patient's allergies indicates:   Allergen Reactions    Cayenne pepper Swelling    Covid-19 vacc,mrna(pfizer)(pf) Rash    Lantus [insulin glargine] Shortness Of Breath and Swelling     Toujeo also    Adhesive      rash    Iodine and iodide containing products      Topical iodine reaction-rash/itching    Other Nausea Only     "Mycin" drugs, such as erythromycin and azithromycin.  No specific allergy mentioned to Aminoglycosides    Adhesive tape-silicones Itching    Amoxil [amoxicillin]      Once diagnosed with penicillin allergy.  Underwent skin testing that was apparently negative in approximately 2011.  However developed a rash and swelling after taking amoxicillin in 2012.    Aspirin Swelling     Other reaction(s): Stomach upset    Avelox [moxifloxacin] Itching    Betadine [povidone-iodine] Itching    Cephalexin Other (See Comments)     Blurred vision    Doxycycline Itching    Erythromycin      Other reaction(s): Stomach upset  Other reaction(s): Flatulence    Lactose intolerance [lactase] Diarrhea    Levaquin [levofloxacin] Hives     Other reaction(s): Achilles tendinitis/bursitis    Tramadol Other (See Comments)      Twitching/jumping of muscles      Hydrocodone Hives and Rash    Latex Rash    Morphine Itching and Nausea Only    Penicillins Itching, Nausea Only, Rash and Edema    Sulfa (sulfonamide antibiotics) Rash     Other reaction(s): Unknown       Labs:  Lab Results   Component Value Date    HGBA1C 7.9 (H) 01/08/2025       Lab Results " "  Component Value Date    WBC 7.44 05/23/2024    HGB 15.2 05/23/2024    HCT 46.7 05/23/2024    MCV 98 05/23/2024     05/23/2024           Review of Systems:  Lower back pain.  Hip pain. .  Balance of review of systems is negative.    Physical Exam:  Vitals:    04/08/25 1122   BP: (!) 112/54   Pulse: 66   Weight: 94.6 kg (208 lb 8.9 oz)   Height: 5' 2" (1.575 m)   PainSc:   9   PainLoc: Back     Body mass index is 38.15 kg/m².    Pain disability index:      4/8/2025    11:21 AM   Last 3 PDI Scores   Pain Disability Index (PDI) 44       Gen: NAD  Psych: mood appropriate for given condition  HEENT: eyes anicteric   CV: RRR, 2+ radial pulse  Respiratory: non-labored, no signs of respiratory distress  Abd: non-distended  Skin: warm, dry and intact.  Gait: Antalgic gait.     Cervical spine: ROM is full in flexion, extension and lateral rotation without increased pain.  Spurling's maneuver causes no neck pain to either side.  Myofascial exam: No Tenderness to palpation across cervical paraspinous region bilaterally.    Lumbar spine:  Lumbar spine: ROM is full with flexion extension and oblique extension with no increased pain.    Skip's test causes no increased pain on either side.    Supine straight leg raise is negative bilaterally.    Internal and external rotation of the hip causes no increased pain on either side.  Myofascial exam: Tenderness to palpation across lumbar paraspinous muscles. No tenderness to palpation over the bilateral greater trochanters and bilateral SI joint    Sensory:  Intact and symmetrical to light touch in C4-T1 dermatomes bilaterally. Intact and symmetrical to light touch in L1-S1 dermatomes bilaterally.    Motor:    Right Left   C4 Shoulder Abduction  5  5   C5 Elbow Flexion    5  5   C6 Wrist Extension  5  5   C7 Elbow Extension   5  5   C8/T1 Hand Intrinsics   5  5        Right Left   L2/3 Iliacus Hip flexion  5  5   L3/4 Qudratus Femoris Knee Extension  5  5   L4/5 Tib Anterior " Ankle Dorsiflexion   5  5   L5/S1 Extensor Hallicus Longus Great toe extension  5  5   S1/S2 Gastroc/Soleus Plantar Flexion  5  5      Right Left   Triceps DTR 2+ 2+   Biceps DTR 2+ 2+   Brachioradialis DTR 2+ 2+   Patellar DTR 2+ 2+   Achilles DTR 2+ 2+   Flores Absent  Absent   Clonus Absent Absent   Babinski Absent Absent       Imaging:    CT lumbar spine 4-6-25  FINDINGS:  Recent appearing anterior superior endplate fracture of the T12 vertebral body.  Minimal vertebral body height loss with no retropulsion.  No other fracture seen.  Vertebral body heights otherwise maintained.     L1-L2 demonstrates a central osteophyte protrusion without spinal canal or foraminal narrowing.  L2-L3 mild moderate disc space narrowing, disc bulging partially calcified with central osteophytic protrusion, no spinal canal or foraminal narrowing.  L3-L4 mild moderate disc space narrowing, mild partially calcified disc bulging, no spinal canal or osseous foraminal narrowing.  L4-5 demonstrates mild moderate disc space narrowing, partially calcified disc bulge, mild facet arthrosis, no significant osseous spinal canal or foraminal narrowing.  L5-S1 demonstrates mild moderate disc space narrowing, partially calcified disc bulge, severe bilateral facet arthrosis, no significant osseous spinal canal or foraminal narrowing.    Mild degenerative change of the sacroiliac joints bilaterally.  Atherosclerotic calcification.     Impression:  Recent appearing superior endplate anterior wedge compression fracture of T12 with minimal vertebral body height loss and no retropulsion.       Assessment:   Mckenzie Mari is a 74 y.o. year old female patient who has a past medical history of Allergy, Anticoagulant long-term use, Arthritis, Asthma, Atrial fibrillation, Breast cancer, Bundle branch block, Cardiomyopathy, CHF (congestive heart failure), Chronic sinusitis, Colon cancer screening, Coronary artery disease, CVID (common variable  "immunodeficiency), Diabetes mellitus, Diabetes mellitus, type 2, GERD (gastroesophageal reflux disease), Headache(784.0), HTN (hypertension), Hyperlipidemia, Hypothyroid, Malignant hyperthermia, Miscarriage, Otitis media, Presence of combination internal cardiac defibrillator (ICD) and pacemaker, Thyroid cancer, Thyroid cancer, and Thyroid disease. She presents in referral from Dr. Ene Rucker for thoracolumbar back pain.    T12 superior endplate compression fracture.  No radiculoapthy.  Back pain.    Plan:  - reveiwed CT scan with her and discussed natural healing of T12 compression fracture  - fractures usually heal with time in 4-6 weeks with significantly less pain; if pain is not consistently improving we can also consider kyphoplasty.  - recomemnd wering TLSO brace provided by the hospital as it has more support than the elastic brace she is currently wearing.  She may return to clinic at any point for an education session with the brace fitter and proper fitting.  - no lifting greater than 10 pounds.  - may use tylenol and zanaflex for pain.  She does not want any other medications as she has multiple allergeies and sensitivities.offered tramadol and norco for pain ; she declined.    Follow Up: RTC in 2 weeks with xrays of the thoracolumbar spine to check fracture    Problem List Items Addressed This Visit    None  Visit Diagnoses         T12 compression fracture, initial encounter        Relevant Orders    X-Ray Thoracolumbar Spine AP Lateral                : Reviewed and consistent with medication use as prescribed.    Thank you for referring this interesting patient, and I look forward to continuing to collaborate in her care.        Kenia Thomas PA-C  [facility]         [1]   Current Outpatient Medications:     aspirin (ECOTRIN) 81 MG EC tablet, Take 81 mg by mouth once daily., Disp: , Rfl:     BD SHANTELLE 2ND GEN PEN NEEDLE 32 gauge x 5/32" Ndle, USE FOUR TIMES DAILY, Disp: 400 " each, Rfl: 3    biotin 5,000 mcg TbDL, Take by mouth., Disp: , Rfl:     CALCIUM CIT-MAGNESIUM-D3-ZINC ORAL, Take 2 tablets by mouth once daily., Disp: , Rfl:     calcium citrate (CALCITRATE) 200 mg (950 mg) tablet, Take 1 tablet by mouth once daily., Disp: , Rfl:     carvediloL (COREG) 25 MG tablet, TAKE 1 TABLET BY MOUTH TWICE DAILY WITH FOOD, Disp: 180 tablet, Rfl: 3    cholecalciferol, vitamin D3, (VITAMIN D3) 50 mcg (2,000 unit) Tab, Take 5,000 Units by mouth once daily., Disp: , Rfl:     empagliflozin (JARDIANCE) 10 mg tablet, Take 1 tablet (10 mg total) by mouth once daily. (Patient taking differently: Take 10 mg by mouth once daily. Taking 5 mg daily - tolerating better than taking 10 mg daily), Disp: 30 tablet, Rfl: 11    furosemide (LASIX) 40 MG tablet, TAKE 1 TABLET BY MOUTH EVERY MORNING AS NEEDED (Patient taking differently: Take 40 mg by mouth twice a week.), Disp: 45 tablet, Rfl: 3    multivit-min/ferrous fumarate (MULTI VITAMIN ORAL), Take 1 Dose by mouth once daily., Disp: , Rfl:     NOVOLOG FLEXPEN U-100 INSULIN 100 unit/mL (3 mL) InPn pen, INJECT 12 UNITS UNDER THE SKIN THREE TIMES DAILY WITH MEALS. PLUS CORRECTION SCALE, MAXIMUM TOTAL DAILY DOSE OF 66 UNITS, Disp: 45 mL, Rfl: 3    omega-3 fatty acids/fish oil (FISH OIL-OMEGA-3 FATTY ACIDS) 300-1,000 mg capsule, Take 1 capsule by mouth once daily., Disp: , Rfl:     pantoprazole (PROTONIX) 40 MG tablet, TAKE 1 TABLET(40 MG) BY MOUTH EVERY DAY, Disp: 30 tablet, Rfl: 11    pantoprazole (PROTONIX) 40 MG tablet, TAKE 1 TABLET(40 MG) BY MOUTH EVERY DAY, Disp: 30 tablet, Rfl: 11    polyethylene glycol (GLYCOLAX) 17 gram/dose powder, Take 17 g by mouth once daily., Disp: , Rfl:     potassium chloride SA (K-DUR,KLOR-CON) 20 MEQ tablet, TAKE 1 TABLET BY MOUTH ONCE DAILY, Disp: 90 tablet, Rfl: 3    rosuvastatin (CRESTOR) 20 MG tablet, TAKE 1 TABLET(20 MG) BY MOUTH EVERY EVENING, Disp: 90 tablet, Rfl: 3    spironolactone (ALDACTONE) 25 MG tablet, Take 1 tablet  (25 mg total) by mouth once daily., Disp: 90 tablet, Rfl: 3    SYNTHROID 112 mcg tablet, TAKE 1 TABLET(112 MCG) BY MOUTH BEFORE BREAKFAST, Disp: 90 tablet, Rfl: 1    telmisartan (MICARDIS) 20 MG Tab, TAKE 1 TABLET(20 MG) BY MOUTH EVERY DAY, Disp: 90 tablet, Rfl: 3    tiZANidine 4 mg Cap, Take 4 mg by mouth as needed (muscle spasms)., Disp: , Rfl:     TRESIBA FLEXTOUCH U-200 200 unit/mL (3 mL) insulin pen, ADMINISTER 34 UNITS UNDER THE SKIN EVERY DAY (Patient taking differently: 32 Units. ADMINISTER 34 UNITS UNDER THE SKIN EVERY DAY), Disp: 18 mL, Rfl: 3    albuterol (PROVENTIL/VENTOLIN HFA) 90 mcg/actuation inhaler, TAKE 2 PUFFS INTO THE LUNGS EVERY 6 HOURS AS NEEDED FOR WHEEZING-RESCUE (Patient not taking: Reported on 4/8/2025), Disp: 18 g, Rfl: 1

## 2025-04-09 ENCOUNTER — TELEPHONE (OUTPATIENT)
Dept: PAIN MEDICINE | Facility: CLINIC | Age: 75
End: 2025-04-09
Payer: MEDICARE

## 2025-04-09 ENCOUNTER — PATIENT MESSAGE (OUTPATIENT)
Dept: DIABETES | Facility: CLINIC | Age: 75
End: 2025-04-09
Payer: MEDICARE

## 2025-04-09 NOTE — TELEPHONE ENCOUNTER
Spoke with patient and instructed her to come in whenever is convenient with her to ask for Александр from DME to be seen for fitting and education on back brace. Patient verbalized understanding.

## 2025-04-09 NOTE — TELEPHONE ENCOUNTER
----- Message from Luciaon sent at 4/9/2025  7:35 AM CDT -----  Type:  Appointment RequestCaller is requesting a  appointment.  Name of Caller:pts Katie is the first available appointment?none seen Symptoms:Needs back brace adjustedWould the patient rather a call back or a response via [facility]? Call [facility] Call Back Number:[phone] Information: Pts  and pt were in yesterday and Provider told them if she needed her back brace adjusted to call and they can see another provider to adjust.   Please call back to advise. Thanks!

## 2025-04-12 ENCOUNTER — PATIENT MESSAGE (OUTPATIENT)
Dept: PRIMARY CARE CLINIC | Facility: CLINIC | Age: 75
End: 2025-04-12
Payer: MEDICARE

## 2025-04-14 NOTE — TELEPHONE ENCOUNTER
Tell her thanks for the update.  I do not really have anything else to offer so if symptoms are not improving she will need to follow back with Neurosurgery and consider vertebroplasty or similar.

## 2025-04-14 NOTE — TELEPHONE ENCOUNTER
Please see pt's MyChart message.  I listed Fortical as an allergy due to pt's side effects experienced with medication.  Additionally, I discontinued Fortical with pt's pharmacy.

## 2025-04-17 ENCOUNTER — TELEPHONE (OUTPATIENT)
Dept: ENDOCRINOLOGY | Facility: CLINIC | Age: 75
End: 2025-04-17
Payer: MEDICARE

## 2025-04-17 ENCOUNTER — PATIENT MESSAGE (OUTPATIENT)
Dept: ENDOCRINOLOGY | Facility: CLINIC | Age: 75
End: 2025-04-17
Payer: MEDICARE

## 2025-04-17 ENCOUNTER — PATIENT MESSAGE (OUTPATIENT)
Dept: PRIMARY CARE CLINIC | Facility: CLINIC | Age: 75
End: 2025-04-17
Payer: MEDICARE

## 2025-04-17 DIAGNOSIS — R30.0 DYSURIA: Primary | ICD-10-CM

## 2025-04-17 NOTE — TELEPHONE ENCOUNTER
S/w pt to r/s her appt on 5/28.pt states she had an accident(fall) so she does not know when will she be able to come. She will call back to r/s.Appt cancelled

## 2025-04-21 ENCOUNTER — LAB VISIT (OUTPATIENT)
Dept: LAB | Facility: HOSPITAL | Age: 75
End: 2025-04-21
Attending: FAMILY MEDICINE
Payer: MEDICARE

## 2025-04-21 DIAGNOSIS — I50.9 CHF (NYHA CLASS III, ACC/AHA STAGE C): ICD-10-CM

## 2025-04-21 DIAGNOSIS — R30.0 DYSURIA: ICD-10-CM

## 2025-04-21 LAB
BACTERIA #/AREA URNS AUTO: NORMAL /HPF
BACTERIA #/AREA URNS AUTO: NORMAL /HPF
BILIRUB UR QL STRIP.AUTO: NEGATIVE
CLARITY UR: CLEAR
COLOR UR AUTO: YELLOW
GLUCOSE UR QL STRIP: ABNORMAL
HGB UR QL STRIP: NEGATIVE
KETONES UR QL STRIP: NEGATIVE
LEUKOCYTE ESTERASE UR QL STRIP: NEGATIVE
MICROSCOPIC COMMENT: NORMAL
MICROSCOPIC COMMENT: NORMAL
NITRITE UR QL STRIP: NEGATIVE
PH UR STRIP: 6 [PH]
PROT UR QL STRIP: NEGATIVE
RBC #/AREA URNS AUTO: 1 /HPF (ref 0–4)
RBC #/AREA URNS AUTO: <1 /HPF (ref 0–4)
SP GR UR STRIP: 1.02
SQUAMOUS #/AREA URNS AUTO: <1 /HPF
SQUAMOUS #/AREA URNS AUTO: <1 /HPF
UROBILINOGEN UR STRIP-ACNC: NEGATIVE EU/DL
WBC #/AREA URNS AUTO: <1 /HPF (ref 0–5)
WBC #/AREA URNS AUTO: <1 /HPF (ref 0–5)
YEAST UR QL AUTO: NORMAL /HPF

## 2025-04-21 PROCEDURE — 81001 URINALYSIS AUTO W/SCOPE: CPT | Mod: 91

## 2025-04-21 RX ORDER — POTASSIUM CHLORIDE 20 MEQ/1
20 TABLET, EXTENDED RELEASE ORAL
Qty: 90 TABLET | Refills: 3 | Status: SHIPPED | OUTPATIENT
Start: 2025-04-21

## 2025-04-21 NOTE — TELEPHONE ENCOUNTER
Spoke with pt regarding MyChart message.  Informed pt that our office was closed for Good Friday.  Pt reports that she suspects that she has a UTI as she is having  pressure in her lower abdomen and low back pain since last week.  Pt states that she also is urinating frequently; however, she is also drinking a lot of water.  She states that she wishes to r/o UTI.  Orders placed.

## 2025-04-22 ENCOUNTER — OFFICE VISIT (OUTPATIENT)
Dept: PAIN MEDICINE | Facility: CLINIC | Age: 75
End: 2025-04-22
Payer: MEDICARE

## 2025-04-22 ENCOUNTER — HOSPITAL ENCOUNTER (OUTPATIENT)
Dept: RADIOLOGY | Facility: HOSPITAL | Age: 75
Discharge: HOME | End: 2025-04-22
Attending: PHYSICIAN ASSISTANT
Payer: MEDICARE

## 2025-04-22 ENCOUNTER — RESULTS FOLLOW-UP (OUTPATIENT)
Dept: PRIMARY CARE CLINIC | Facility: CLINIC | Age: 75
End: 2025-04-22

## 2025-04-22 VITALS
SYSTOLIC BLOOD PRESSURE: 114 MMHG | HEIGHT: 62 IN | WEIGHT: 205.94 LBS | DIASTOLIC BLOOD PRESSURE: 61 MMHG | HEART RATE: 71 BPM | BODY MASS INDEX: 37.9 KG/M2

## 2025-04-22 DIAGNOSIS — S22.080A T12 COMPRESSION FRACTURE, INITIAL ENCOUNTER: Primary | ICD-10-CM

## 2025-04-22 DIAGNOSIS — S22.080A T12 COMPRESSION FRACTURE, INITIAL ENCOUNTER: ICD-10-CM

## 2025-04-22 PROCEDURE — 1125F AMNT PAIN NOTED PAIN PRSNT: CPT | Mod: CPTII,S$GLB,, | Performed by: PHYSICIAN ASSISTANT

## 2025-04-22 PROCEDURE — 72080 X-RAY EXAM THORACOLMB 2/> VW: CPT | Mod: TC,PO

## 2025-04-22 PROCEDURE — 1160F RVW MEDS BY RX/DR IN RCRD: CPT | Mod: CPTII,S$GLB,, | Performed by: PHYSICIAN ASSISTANT

## 2025-04-22 PROCEDURE — 3288F FALL RISK ASSESSMENT DOCD: CPT | Mod: CPTII,S$GLB,, | Performed by: PHYSICIAN ASSISTANT

## 2025-04-22 PROCEDURE — 99999 PR PBB SHADOW E&M-EST. PATIENT-LVL IV: CPT | Mod: PBBFAC,,, | Performed by: PHYSICIAN ASSISTANT

## 2025-04-22 PROCEDURE — 3051F HG A1C>EQUAL 7.0%<8.0%: CPT | Mod: CPTII,S$GLB,, | Performed by: PHYSICIAN ASSISTANT

## 2025-04-22 PROCEDURE — 1159F MED LIST DOCD IN RCRD: CPT | Mod: CPTII,S$GLB,, | Performed by: PHYSICIAN ASSISTANT

## 2025-04-22 PROCEDURE — 3078F DIAST BP <80 MM HG: CPT | Mod: CPTII,S$GLB,, | Performed by: PHYSICIAN ASSISTANT

## 2025-04-22 PROCEDURE — 1100F PTFALLS ASSESS-DOCD GE2>/YR: CPT | Mod: CPTII,S$GLB,, | Performed by: PHYSICIAN ASSISTANT

## 2025-04-22 PROCEDURE — 3008F BODY MASS INDEX DOCD: CPT | Mod: CPTII,S$GLB,, | Performed by: PHYSICIAN ASSISTANT

## 2025-04-22 PROCEDURE — 4010F ACE/ARB THERAPY RXD/TAKEN: CPT | Mod: CPTII,S$GLB,, | Performed by: PHYSICIAN ASSISTANT

## 2025-04-22 PROCEDURE — 3074F SYST BP LT 130 MM HG: CPT | Mod: CPTII,S$GLB,, | Performed by: PHYSICIAN ASSISTANT

## 2025-04-22 PROCEDURE — 72080 X-RAY EXAM THORACOLMB 2/> VW: CPT | Mod: 26,,, | Performed by: RADIOLOGY

## 2025-04-22 PROCEDURE — 99213 OFFICE O/P EST LOW 20 MIN: CPT | Mod: S$GLB,,, | Performed by: PHYSICIAN ASSISTANT

## 2025-04-22 NOTE — PROGRESS NOTES
[facility] Follow Up      Referred by: No ref. provider found    PCP:  Bacilio Gold MD    CC:   Chief Complaint   Patient presents with    Follow-up     Xr 4/22/25          HPI:   Mckenzie Mari is a 74 y.o. year old female patient who has a past medical history of Allergy, Anticoagulant long-term use, Arthritis, Asthma, Atrial fibrillation, Breast cancer, Bundle branch block, Cardiomyopathy, CHF (congestive heart failure), Chronic sinusitis, Colon cancer screening, Coronary artery disease, CVID (common variable immunodeficiency), Diabetes mellitus, Diabetes mellitus, type 2, GERD (gastroesophageal reflux disease), Headache(784.0), HTN (hypertension), Hyperlipidemia, Hypothyroid, Malignant hyperthermia, Miscarriage, Otitis media, Presence of combination internal cardiac defibrillator (ICD) and pacemaker, Thyroid cancer, Thyroid cancer, and Thyroid disease. She presents for thoracic compression fracture.    She presents for follow up of T12 compression fracture.  Reports wearing the TLSO brace given to her from the hospital more around the house.  The brace has really been bothering her.  She reports pain of long the front of the abdomen pain towards the hips and buttock area where the brace has been rubbing.  The braces not allow her to sit down very comfortably.  She is able to walk in the brace, however she does not want to walk or stand all day.  She continues with pain in at the level of the T12 fracture.  No significant improvement as she continues to have days of 8/10 pain.  She is allergic to multiple medications and only able to tolerate Tylenol Arthritis to control pain.  She is fairly sedentary.      HPI 4-8-25:  She fell 4-6-25.  She tried to move a plant when her foot got stuck in the dirt/ mud.  As she pulled her foot out, she fell back landing on her butocks.  She had onset of lower back pain with the fall.  Pain is felt midline lower thoracic/ upper lumbar region.  She denies any  radicular leg pain, numbness or tingling.  Today she also feels some pain and soreness across the lower lumbar region and hips.  She was seen in the ER 4-6-25.  Surgical intervention was not recommended.  She was given a TLSO brace, but not wearing the provided one because it is cumbersome for her to put on and she is unsure if she is wearing it correctly.  She is wearing a soft TLSO brace.  She is taking advil and tizanidine.      Denies bowel/ bladder incontinence.    Past and current medications:  Antineuropathics:  NSAIDs: advil  Antidepressants:  Muscle relaxers: tizanidine  Opioids:  Antiplatelets/Anticoagulants:  Others: tylenol arthritis    Physical Therapy/ Chiropractic care:  none    Pain Intervention History:  none    Past Spine Surgical History:  none        History:  Current Medications[1]    Past Medical History:   Diagnosis Date    Allergy     Anticoagulant long-term use     Arthritis     Asthma     as a child    Atrial fibrillation     Breast cancer 2005    s/p lumpectomy, chemo and now cancer free over 5 years    Bundle branch block     Cardiomyopathy     EF 35 - 40%    CHF (congestive heart failure)     FC II    Chronic sinusitis     Colon cancer screening 2/28/2024    Coronary artery disease     CVID (common variable immunodeficiency)     Diabetes mellitus     Diabetes mellitus, type 2     GERD (gastroesophageal reflux disease)     Headache(784.0)     HTN (hypertension)     Hyperlipidemia     Hypothyroid 07/25/2012    Malignant hyperthermia     daughter and supposedly mother have had this    Miscarriage 1971    Otitis media     Presence of combination internal cardiac defibrillator (ICD) and pacemaker     Thyroid cancer     Thyroid cancer 07/25/2012    Thyroid disease     hypothyroid after papillary thyroid cancer with thyroid resection       Past Surgical History:   Procedure Laterality Date    BREAST LUMPECTOMY      left    CARDIAC PACEMAKER PLACEMENT      CATARACT EXTRACTION W/  INTRAOCULAR  LENS IMPLANT Bilateral     CHOLECYSTECTOMY      COLONOSCOPY N/A 05/11/2016    Procedure: COLONOSCOPY;  Surgeon: Alfonso Serrano Jr., MD;  Location: [facility] ENDO;  Service: Endoscopy;  Laterality: N/A;    COLONOSCOPY W/ POLYPECTOMY  10/05/2009    [facility]   One 2 mm polyp in the cecum.  TUBULAR ADENOMA.  Tortuous colon.    ESOPHAGOGASTRODUODENOSCOPY  09/12/2006    [facility]   Dysphagia.  NERD.  Patulous LES.  Atrophic mucosa.  Benign fundal polyps.  Dilated, 42 Fr.    HYSTERECTOMY      JOINT REPLACEMENT Bilateral     knee    LEFT HEART CATHETERIZATION N/A 06/25/2021    Procedure: Left heart cath;  Surgeon: Joseph Hansen MD;  Location: [facility] CATH;  Service: Cardiology;  Laterality: N/A;    MOLE REMOVAL      back    pacemaker defibrillator      THYROID SURGERY  x2    TONSILLECTOMY         Family History   Problem Relation Name Age of Onset    Stroke Mother      Hypertension Mother      Arthritis Mother      Allergic rhinitis Mother      Cancer Mother          bladder    Heart disease Mother      Allergic rhinitis Father 94 yrs     Heart disease Father 94 yrs     Allergic rhinitis Brother      Heart disease Brother      Cancer Brother          lung    Hearing loss Brother      Heart disease Brother      Learning disabilities Daughter      Cancer Maternal Aunt          breast     Cancer Paternal Aunt           breast    Diabetes Paternal Aunt      Cancer Paternal Aunt          lung    Angioedema Neg Hx      Asthma Neg Hx      Atopy Neg Hx      Eczema Neg Hx      Immunodeficiency Neg Hx      Urticaria Neg Hx         Social History     Socioeconomic History    Marital status:     Number of children: 3   Tobacco Use    Smoking status: Never     Passive exposure: Never    Smokeless tobacco: Never   Substance and Sexual Activity    Alcohol use: No    Drug use: No    Sexual activity: Yes     Partners: Male   Social History Narrative    Lives at home with , likes to knit, cook, sew and play games.      Social  "Drivers of Health     Financial Resource Strain: Low Risk  (9/18/2023)    Overall Financial Resource Strain (CARDIA)     Difficulty of Paying Living Expenses: Not hard at all   Food Insecurity: Patient Declined (1/21/2024)    Hunger Vital Sign     Worried About Running Out of Food in the Last Year: Patient declined     Ran Out of Food in the Last Year: Patient declined   Transportation Needs: Unknown (1/21/2024)    PRAPARE - Transportation     Lack of Transportation (Medical): No     Lack of Transportation (Non-Medical): Patient declined   Physical Activity: Unknown (1/21/2024)    Exercise Vital Sign     Days of Exercise per Week: 3 days   Stress: Patient Declined (1/21/2024)    [employer] [employer] [place] [employer] - Occupational Stress Questionnaire     Feeling of Stress : Patient declined   Housing Stability: Patient Declined (1/21/2024)    Housing Stability Vital Sign     Unable to Pay for Housing in the Last Year: Patient declined     Number of Places Lived in the Last Year: 1     Unstable Housing in the Last Year: Patient declined       Review of patient's allergies indicates:   Allergen Reactions    Cayenne pepper Swelling    Covid-19 vacc,mrna(pfizer)(pf) Rash    Lantus [insulin glargine] Shortness Of Breath and Swelling     Toujeo also    Adhesive      rash    Iodine and iodide containing products      Topical iodine reaction-rash/itching    Other Nausea Only     "Mycin" drugs, such as erythromycin and azithromycin.  No specific allergy mentioned to Aminoglycosides    Adhesive tape-silicones Itching    Amoxil [amoxicillin]      Once diagnosed with penicillin allergy.  Underwent skin testing that was apparently negative in approximately 2011.  However developed a rash and swelling after taking amoxicillin in 2012.    Aspirin Swelling     Other reaction(s): Stomach upset    Avelox [moxifloxacin] Itching    Betadine [povidone-iodine] Itching    Cephalexin Other (See Comments)     Blurred vision    " "Doxycycline Itching    Erythromycin      Other reaction(s): Stomach upset  Other reaction(s): Flatulence    Fortical [calcitonin (salmon)] Other (See Comments)     Tongue became "thick"    Lactose intolerance [lactase] Diarrhea    Levaquin [levofloxacin] Hives     Other reaction(s): Achilles tendinitis/bursitis    Tramadol Other (See Comments)      Twitching/jumping of muscles      Hydrocodone Hives and Rash    Latex Rash    Morphine Itching and Nausea Only    Penicillins Itching, Nausea Only, Rash and Edema    Sulfa (sulfonamide antibiotics) Rash     Other reaction(s): Unknown       Labs:  Lab Results   Component Value Date    HGBA1C 7.9 (H) 01/08/2025       Lab Results   Component Value Date    WBC 7.44 05/23/2024    HGB 15.2 05/23/2024    HCT 46.7 05/23/2024    MCV 98 05/23/2024     05/23/2024           Review of Systems:  Lower back pain.  Hip pain. .  Balance of review of systems is negative.    Physical Exam:  Vitals:    04/22/25 1500   BP: 114/61   Pulse: 71   Weight: 93.4 kg (205 lb 14.6 oz)   Height: 5' 2" (1.575 m)   PainSc:   8   PainLoc: Back     Body mass index is 37.66 kg/m².    Pain disability index:      4/22/2025     3:03 PM 4/8/2025    11:21 AM   Last 3 PDI Scores   Pain Disability Index (PDI) 8 44       Gen: NAD  Psych: mood appropriate for given condition  HEENT: eyes anicteric   CV: RRR, 2+ radial pulse  Respiratory: non-labored, no signs of respiratory distress  Abd: non-distended  Skin: warm, dry and intact.  Gait: Antalgic gait.     Lumbar spine:  Lumbar spine: ROM is full with flexion extension and oblique extension with no increased pain.    Skip's test causes no increased pain on either side.    Supine straight leg raise is negative bilaterally.    Internal and external rotation of the hip causes no increased pain on either side.  Myofascial exam: Tenderness to palpation across lumbar paraspinous muscles. No tenderness to palpation over the bilateral greater trochanters and " bilateral SI joint    Sensory:  Intact and symmetrical to light touch in C4-T1 dermatomes bilaterally. Intact and symmetrical to light touch in L1-S1 dermatomes bilaterally.    Motor:     Right Left   L2/3 Iliacus Hip flexion  5  5   L3/4 Qudratus Femoris Knee Extension  5  5   L4/5 Tib Anterior Ankle Dorsiflexion   5  5   L5/S1 Extensor Hallicus Longus Great toe extension  5  5   S1/S2 Gastroc/Soleus Plantar Flexion  5  5      Right Left   Triceps DTR 2+ 2+   Biceps DTR 2+ 2+   Brachioradialis DTR 2+ 2+   Patellar DTR 2+ 2+   Achilles DTR 2+ 2+   Flores Absent  Absent   Clonus Absent Absent   Babinski Absent Absent       Imaging:    CT lumbar spine 4-6-25  FINDINGS:  Recent appearing anterior superior endplate fracture of the T12 vertebral body.  Minimal vertebral body height loss with no retropulsion.  No other fracture seen.  Vertebral body heights otherwise maintained.     L1-L2 demonstrates a central osteophyte protrusion without spinal canal or foraminal narrowing.  L2-L3 mild moderate disc space narrowing, disc bulging partially calcified with central osteophytic protrusion, no spinal canal or foraminal narrowing.  L3-L4 mild moderate disc space narrowing, mild partially calcified disc bulging, no spinal canal or osseous foraminal narrowing.  L4-5 demonstrates mild moderate disc space narrowing, partially calcified disc bulge, mild facet arthrosis, no significant osseous spinal canal or foraminal narrowing.  L5-S1 demonstrates mild moderate disc space narrowing, partially calcified disc bulge, severe bilateral facet arthrosis, no significant osseous spinal canal or foraminal narrowing.    Mild degenerative change of the sacroiliac joints bilaterally.  Atherosclerotic calcification.     Impression:  Recent appearing superior endplate anterior wedge compression fracture of T12 with minimal vertebral body height loss and no retropulsion.       Xray thoracolumbar spine from 4-22-25:  T12 compression fracture  stable with no major changes when compared to CT study from 4-6-25.    Assessment:   Mckenzie Mari is a 74 y.o. year old female patient who has a past medical history of Allergy, Anticoagulant long-term use, Arthritis, Asthma, Atrial fibrillation, Breast cancer, Bundle branch block, Cardiomyopathy, CHF (congestive heart failure), Chronic sinusitis, Colon cancer screening, Coronary artery disease, CVID (common variable immunodeficiency), Diabetes mellitus, Diabetes mellitus, type 2, GERD (gastroesophageal reflux disease), Headache(784.0), HTN (hypertension), Hyperlipidemia, Hypothyroid, Malignant hyperthermia, Miscarriage, Otitis media, Presence of combination internal cardiac defibrillator (ICD) and pacemaker, Thyroid cancer, Thyroid cancer, and Thyroid disease. She presents  for thoracolumbar back pain.    T12 superior endplate compression fracture.  No radiculoapthy.  Back pain.  Fracture stable with no appreciated worsening over the last 2 weeks.     Plan:  - reveiwed CT scan and thoracolumbar xrays from today with her and discussed natural healing of T12 compression fracture  - fractures usually heal with time in 4-6 weeks with significantly less pain; if pain is not consistently improving we can also consider kyphoplasty.  - recommend she continue with bracing.  With current brace causing her so much discomfort and limiting her activity because she does not want to move with the brace, she will try wearing her looser fitting more flexible brace that she has at home when she is at home.  If she does ride in a vehicle or go out in public to any place, I would recommend putting on the traditional TLSO brace provided by the hospital.  I do encourage her to walk short distances throughout the daytime and to slowly increase activity on a regular basis.  - no lifting greater than 10 pounds.  - may use tylenol and zanaflex for pain.  She does not want any other medications as she has multiple allergeies and  "sensitivities.offered tramadol and norco for pain ; she declined.  - with pain still rated as 8/10 we discussed the role of kyphoplasty.  She has a pacemaker and we would need to make sure it is MRI compatible.  If not MRI compatible we would obtain nuclear medicine bone scan to check if the fracture is amenable with kyphoplasty.  She does not want to proceed with kyphoplasty at this time because she has concerns over using anesthesia- family members have had malignant hyperthermia, and she is concerned about coming off of Plavix.  - therefore she elects to give pain more time to resolve and not seek any other additional treatment at this time.  Should she change her mind and want to pursue other options she should call the office.  Follow-up in 2-3 weeks to monitor progress.    Follow Up: RTC in 2-3 weeks to monitor progress    Problem List Items Addressed This Visit    None  Visit Diagnoses         T12 compression fracture, initial encounter    -  Primary                  : Reviewed and consistent with medication use as prescribed.          Kenia Thomas PA-C  [facility]         [1]   Current Outpatient Medications:     BD SHANTELLE 2ND GEN PEN NEEDLE 32 gauge x 5/32" Ndle, USE FOUR TIMES DAILY, Disp: 400 each, Rfl: 3    biotin 5,000 mcg TbDL, Take by mouth., Disp: , Rfl:     CALCIUM CIT-MAGNESIUM-D3-ZINC ORAL, Take 2 tablets by mouth once daily., Disp: , Rfl:     calcium citrate (CALCITRATE) 200 mg (950 mg) tablet, Take 1 tablet by mouth once daily., Disp: , Rfl:     carvediloL (COREG) 25 MG tablet, TAKE 1 TABLET BY MOUTH TWICE DAILY WITH FOOD, Disp: 180 tablet, Rfl: 3    cholecalciferol, vitamin D3, (VITAMIN D3) 50 mcg (2,000 unit) Tab, Take 5,000 Units by mouth once daily., Disp: , Rfl:     empagliflozin (JARDIANCE) 10 mg tablet, Take 1 tablet (10 mg total) by mouth once daily. (Patient taking differently: Take 10 mg by mouth once daily. Taking 5 mg daily - tolerating better than taking 10 mg " daily), Disp: 30 tablet, Rfl: 11    furosemide (LASIX) 40 MG tablet, TAKE 1 TABLET BY MOUTH EVERY MORNING AS NEEDED (Patient taking differently: Take 40 mg by mouth twice a week.), Disp: 45 tablet, Rfl: 3    multivit-min/ferrous fumarate (MULTI VITAMIN ORAL), Take 1 Dose by mouth once daily., Disp: , Rfl:     NOVOLOG FLEXPEN U-100 INSULIN 100 unit/mL (3 mL) InPn pen, INJECT 12 UNITS UNDER THE SKIN THREE TIMES DAILY WITH MEALS. PLUS CORRECTION SCALE, MAXIMUM TOTAL DAILY DOSE OF 66 UNITS, Disp: 45 mL, Rfl: 3    omega-3 fatty acids/fish oil (FISH OIL-OMEGA-3 FATTY ACIDS) 300-1,000 mg capsule, Take 1 capsule by mouth once daily., Disp: , Rfl:     pantoprazole (PROTONIX) 40 MG tablet, TAKE 1 TABLET(40 MG) BY MOUTH EVERY DAY, Disp: 30 tablet, Rfl: 11    potassium chloride SA (K-DUR,KLOR-CON) 20 MEQ tablet, TAKE 1 TABLET BY MOUTH EVERY DAY, Disp: 90 tablet, Rfl: 3    rosuvastatin (CRESTOR) 20 MG tablet, TAKE 1 TABLET(20 MG) BY MOUTH EVERY EVENING, Disp: 90 tablet, Rfl: 3    spironolactone (ALDACTONE) 25 MG tablet, Take 1 tablet (25 mg total) by mouth once daily., Disp: 90 tablet, Rfl: 3    SYNTHROID 112 mcg tablet, TAKE 1 TABLET(112 MCG) BY MOUTH BEFORE BREAKFAST, Disp: 90 tablet, Rfl: 1    telmisartan (MICARDIS) 20 MG Tab, TAKE 1 TABLET(20 MG) BY MOUTH EVERY DAY, Disp: 90 tablet, Rfl: 3    tiZANidine 4 mg Cap, Take 4 mg by mouth as needed (muscle spasms)., Disp: , Rfl:     albuterol (PROVENTIL/VENTOLIN HFA) 90 mcg/actuation inhaler, TAKE 2 PUFFS INTO THE LUNGS EVERY 6 HOURS AS NEEDED FOR WHEEZING-RESCUE (Patient not taking: Reported on 4/8/2025), Disp: 18 g, Rfl: 1    aspirin (ECOTRIN) 81 MG EC tablet, Take 81 mg by mouth once daily., Disp: , Rfl:     pantoprazole (PROTONIX) 40 MG tablet, TAKE 1 TABLET(40 MG) BY MOUTH EVERY DAY, Disp: 30 tablet, Rfl: 11    polyethylene glycol (GLYCOLAX) 17 gram/dose powder, Take 17 g by mouth once daily., Disp: , Rfl:     TRESIBA FLEXTOUCH U-200 200 unit/mL (3 mL) insulin pen, ADMINISTER  34 UNITS UNDER THE SKIN EVERY DAY, Disp: 18 mL, Rfl: 3

## 2025-04-23 ENCOUNTER — RESULTS FOLLOW-UP (OUTPATIENT)
Dept: PAIN MEDICINE | Facility: CLINIC | Age: 75
End: 2025-04-23

## 2025-05-06 ENCOUNTER — OFFICE VISIT (OUTPATIENT)
Dept: PAIN MEDICINE | Facility: CLINIC | Age: 75
End: 2025-05-06
Payer: MEDICARE

## 2025-05-06 VITALS
HEART RATE: 66 BPM | DIASTOLIC BLOOD PRESSURE: 70 MMHG | WEIGHT: 203.94 LBS | BODY MASS INDEX: 37.53 KG/M2 | HEIGHT: 62 IN | SYSTOLIC BLOOD PRESSURE: 135 MMHG

## 2025-05-06 DIAGNOSIS — S22.080A T12 COMPRESSION FRACTURE, INITIAL ENCOUNTER: Primary | ICD-10-CM

## 2025-05-06 PROCEDURE — 99213 OFFICE O/P EST LOW 20 MIN: CPT | Mod: HCNC,S$GLB,, | Performed by: PHYSICIAN ASSISTANT

## 2025-05-06 PROCEDURE — 3075F SYST BP GE 130 - 139MM HG: CPT | Mod: CPTII,HCNC,S$GLB, | Performed by: PHYSICIAN ASSISTANT

## 2025-05-06 PROCEDURE — 3008F BODY MASS INDEX DOCD: CPT | Mod: CPTII,HCNC,S$GLB, | Performed by: PHYSICIAN ASSISTANT

## 2025-05-06 PROCEDURE — 1160F RVW MEDS BY RX/DR IN RCRD: CPT | Mod: CPTII,HCNC,S$GLB, | Performed by: PHYSICIAN ASSISTANT

## 2025-05-06 PROCEDURE — 3078F DIAST BP <80 MM HG: CPT | Mod: CPTII,HCNC,S$GLB, | Performed by: PHYSICIAN ASSISTANT

## 2025-05-06 PROCEDURE — 3051F HG A1C>EQUAL 7.0%<8.0%: CPT | Mod: CPTII,HCNC,S$GLB, | Performed by: PHYSICIAN ASSISTANT

## 2025-05-06 PROCEDURE — 1159F MED LIST DOCD IN RCRD: CPT | Mod: CPTII,HCNC,S$GLB, | Performed by: PHYSICIAN ASSISTANT

## 2025-05-06 PROCEDURE — 1101F PT FALLS ASSESS-DOCD LE1/YR: CPT | Mod: CPTII,HCNC,S$GLB, | Performed by: PHYSICIAN ASSISTANT

## 2025-05-06 PROCEDURE — 99999 PR PBB SHADOW E&M-EST. PATIENT-LVL IV: CPT | Mod: PBBFAC,HCNC,, | Performed by: PHYSICIAN ASSISTANT

## 2025-05-06 PROCEDURE — 1125F AMNT PAIN NOTED PAIN PRSNT: CPT | Mod: CPTII,HCNC,S$GLB, | Performed by: PHYSICIAN ASSISTANT

## 2025-05-06 PROCEDURE — 4010F ACE/ARB THERAPY RXD/TAKEN: CPT | Mod: CPTII,HCNC,S$GLB, | Performed by: PHYSICIAN ASSISTANT

## 2025-05-06 PROCEDURE — 3288F FALL RISK ASSESSMENT DOCD: CPT | Mod: CPTII,HCNC,S$GLB, | Performed by: PHYSICIAN ASSISTANT

## 2025-05-06 NOTE — PROGRESS NOTES
[facility] Follow Up      Referred by: No ref. provider found    PCP:  Bacilio Gold MD    CC:   Chief Complaint   Patient presents with    Back Pain          HPI:   Mckenzie Mari is a 74 y.o. year old female patient who has a past medical history of Allergy, Anticoagulant long-term use, Arthritis, Asthma, Atrial fibrillation, Breast cancer, Bundle branch block, Cardiomyopathy, CHF (congestive heart failure), Chronic sinusitis, Colon cancer screening, Coronary artery disease, CVID (common variable immunodeficiency), Diabetes mellitus, Diabetes mellitus, type 2, GERD (gastroesophageal reflux disease), Headache(784.0), HTN (hypertension), Hyperlipidemia, Hypothyroid, Malignant hyperthermia, Miscarriage, Otitis media, Presence of combination internal cardiac defibrillator (ICD) and pacemaker, Thyroid cancer, Thyroid cancer, and Thyroid disease. She presents for thoracic compression fracture.     Ms. Rincon presents for follow up of lower back pain and T12 compression fracture.  She has been wearing thoracolumbar support brace that she has purchased on her own and is more flexible than TLSO issued medically.  She has seen overall improvement in pain from 4 weeks ago.  Pain is still present though and she has intermittent more severe pain.  She is manageing with tyenol as needed.     HPI 4-22-25:  She presents for follow up of T12 compression fracture.  Reports wearing the TLSO brace given to her from the hospital more around the house.  The brace has really been bothering her.  She reports pain of long the front of the abdomen pain towards the hips and buttock area where the brace has been rubbing.  The braces not allow her to sit down very comfortably.  She is able to walk in the brace, however she does not want to walk or stand all day.  She continues with pain in at the level of the T12 fracture.  No significant improvement as she continues to have days of 8/10 pain.  She is allergic to multiple  medications and only able to tolerate Tylenol Arthritis to control pain.  She is fairly sedentary.      HPI 4-8-25:  She fell 4-6-25.  She tried to move a plant when her foot got stuck in the dirt/ mud.  As she pulled her foot out, she fell back landing on her butocks.  She had onset of lower back pain with the fall.  Pain is felt midline lower thoracic/ upper lumbar region.  She denies any radicular leg pain, numbness or tingling.  Today she also feels some pain and soreness across the lower lumbar region and hips.  She was seen in the ER 4-6-25.  Surgical intervention was not recommended.  She was given a TLSO brace, but not wearing the provided one because it is cumbersome for her to put on and she is unsure if she is wearing it correctly.  She is wearing a soft TLSO brace.  She is taking advil and tizanidine.      Denies bowel/ bladder incontinence.    Past and current medications:  Antineuropathics:  NSAIDs: advil  Antidepressants:  Muscle relaxers: tizanidine  Opioids:  Antiplatelets/Anticoagulants:  Others: tylenol arthritis    Physical Therapy/ Chiropractic care:  none    Pain Intervention History:  none    Past Spine Surgical History:  none        History:  Current Medications[1]    Past Medical History:   Diagnosis Date    Allergy     Anticoagulant long-term use     Arthritis     Asthma     as a child    Atrial fibrillation     Breast cancer 2005    s/p lumpectomy, chemo and now cancer free over 5 years    Bundle branch block     Cardiomyopathy     EF 35 - 40%    CHF (congestive heart failure)     FC II    Chronic sinusitis     Colon cancer screening 2/28/2024    Coronary artery disease     CVID (common variable immunodeficiency)     Diabetes mellitus     Diabetes mellitus, type 2     GERD (gastroesophageal reflux disease)     Headache(784.0)     HTN (hypertension)     Hyperlipidemia     Hypothyroid 07/25/2012    Malignant hyperthermia     daughter and supposedly mother have had this    Miscarriage 1971     Otitis media     Presence of combination internal cardiac defibrillator (ICD) and pacemaker     Thyroid cancer     Thyroid cancer 07/25/2012    Thyroid disease     hypothyroid after papillary thyroid cancer with thyroid resection       Past Surgical History:   Procedure Laterality Date    BREAST LUMPECTOMY      left    CARDIAC PACEMAKER PLACEMENT      CATARACT EXTRACTION W/  INTRAOCULAR LENS IMPLANT Bilateral     CHOLECYSTECTOMY      COLONOSCOPY N/A 05/11/2016    Procedure: COLONOSCOPY;  Surgeon: Alfonso Serrano Jr., MD;  Location: [facility];  Service: Endoscopy;  Laterality: N/A;    COLONOSCOPY W/ POLYPECTOMY  10/05/2009    [facility]   One 2 mm polyp in the cecum.  TUBULAR ADENOMA.  Tortuous colon.    ESOPHAGOGASTRODUODENOSCOPY  09/12/2006    [facility]   Dysphagia.  NERD.  Patulous LES.  Atrophic mucosa.  Benign fundal polyps.  Dilated, 42 Fr.    HYSTERECTOMY      JOINT REPLACEMENT Bilateral     knee    LEFT HEART CATHETERIZATION N/A 06/25/2021    Procedure: Left heart cath;  Surgeon: Joseph Hansen MD;  Location: [facility];  Service: Cardiology;  Laterality: N/A;    MOLE REMOVAL      back    pacemaker defibrillator      THYROID SURGERY  x2    TONSILLECTOMY         Family History   Problem Relation Name Age of Onset    Stroke Mother      Hypertension Mother      Arthritis Mother      Allergic rhinitis Mother      Cancer Mother          bladder    Heart disease Mother      Allergic rhinitis Father 94 yrs     Heart disease Father 94 yrs     Allergic rhinitis Brother      Heart disease Brother      Cancer Brother          lung    Hearing loss Brother      Heart disease Brother      Learning disabilities Daughter      Cancer Maternal Aunt          breast     Cancer Paternal Aunt           breast    Diabetes Paternal Aunt      Cancer Paternal Aunt          lung    Angioedema Neg Hx      Asthma Neg Hx      Atopy Neg Hx      Eczema Neg Hx      Immunodeficiency Neg Hx      Urticaria Neg Hx         Social History  "    Socioeconomic History    Marital status:     Number of children: 3   Tobacco Use    Smoking status: Never     Passive exposure: Never    Smokeless tobacco: Never   Substance and Sexual Activity    Alcohol use: No    Drug use: No    Sexual activity: Yes     Partners: Male   Social History Narrative    Lives at home with , likes to knit, cook, sew and play games.      Social Drivers of Health     Financial Resource Strain: Low Risk  (9/18/2023)    Overall Financial Resource Strain (CARDIA)     Difficulty of Paying Living Expenses: Not hard at all   Food Insecurity: Patient Declined (1/21/2024)    Hunger Vital Sign     Worried About Running Out of Food in the Last Year: Patient declined     Ran Out of Food in the Last Year: Patient declined   Transportation Needs: Unknown (1/21/2024)    PRAPARE - Transportation     Lack of Transportation (Medical): No     Lack of Transportation (Non-Medical): Patient declined   Physical Activity: Unknown (1/21/2024)    Exercise Vital Sign     Days of Exercise per Week: 3 days   Stress: Patient Declined (1/21/2024)    [employer] - Occupational Stress Questionnaire     Feeling of Stress : Patient declined   Housing Stability: Patient Declined (1/21/2024)    Housing Stability Vital Sign     Unable to Pay for Housing in the Last Year: Patient declined     Number of Places Lived in the Last Year: 1     Unstable Housing in the Last Year: Patient declined       Review of patient's allergies indicates:   Allergen Reactions    Cayenne pepper Swelling    Covid-19 vacc,mrna(pfizer)(pf) Rash    Lantus [insulin glargine] Shortness Of Breath and Swelling     Toujeo also    Adhesive      rash    Iodine and iodide containing products      Topical iodine reaction-rash/itching    Other Nausea Only     "Mycin" drugs, such as erythromycin and azithromycin.  No specific allergy mentioned to Aminoglycosides    Adhesive tape-silicones Itching    Amoxil " "[amoxicillin]      Once diagnosed with penicillin allergy.  Underwent skin testing that was apparently negative in approximately 2011.  However developed a rash and swelling after taking amoxicillin in 2012.    Aspirin Swelling     Other reaction(s): Stomach upset    Avelox [moxifloxacin] Itching    Betadine [povidone-iodine] Itching    Cephalexin Other (See Comments)     Blurred vision    Doxycycline Itching    Erythromycin      Other reaction(s): Stomach upset  Other reaction(s): Flatulence    Fortical [calcitonin (salmon)] Other (See Comments)     Tongue became "thick"    Lactose intolerance [lactase] Diarrhea    Levaquin [levofloxacin] Hives     Other reaction(s): Achilles tendinitis/bursitis    Tramadol Other (See Comments)      Twitching/jumping of muscles      Hydrocodone Hives and Rash    Latex Rash    Morphine Itching and Nausea Only    Penicillins Itching, Nausea Only, Rash and Edema    Sulfa (sulfonamide antibiotics) Rash     Other reaction(s): Unknown       Labs:  Lab Results   Component Value Date    HGBA1C 7.9 (H) 01/08/2025       Lab Results   Component Value Date    WBC 7.44 05/23/2024    HGB 15.2 05/23/2024    HCT 46.7 05/23/2024    MCV 98 05/23/2024     05/23/2024           Review of Systems:  Lower back pain.  Hip pain. .  Balance of review of systems is negative.    Physical Exam:  Vitals:    05/06/25 1437   BP: 135/70   Pulse: 66   Weight: 92.5 kg (203 lb 14.8 oz)   Height: 5' 2" (1.575 m)   PainSc:   5   PainLoc: Back     Body mass index is 37.3 kg/m².    Pain disability index:      5/6/2025     2:36 PM 4/22/2025     3:03 PM 4/8/2025    11:21 AM   Last 3 PDI Scores   Pain Disability Index (PDI) 24 8 44       Gen: NAD  Psych: mood appropriate for given condition  HEENT: eyes anicteric   CV: RRR, 2+ radial pulse  Respiratory: non-labored, no signs of respiratory distress  Abd: non-distended  Skin: warm, dry and intact.  Gait: Antalgic gait.     Lumbar spine:  Lumbar spine: ROM is full with " flexion extension and oblique extension with no increased pain.    Skip's test causes no increased pain on either side.    Supine straight leg raise is negative bilaterally.    Internal and external rotation of the hip causes no increased pain on either side.  Myofascial exam: Tenderness to palpation across lumbar paraspinous muscles. No tenderness to palpation over the bilateral greater trochanters and bilateral SI joint    Sensory:  Intact and symmetrical to light touch in C4-T1 dermatomes bilaterally. Intact and symmetrical to light touch in L1-S1 dermatomes bilaterally.    Motor:     Right Left   L2/3 Iliacus Hip flexion  5  5   L3/4 Qudratus Femoris Knee Extension  5  5   L4/5 Tib Anterior Ankle Dorsiflexion   5  5   L5/S1 Extensor Hallicus Longus Great toe extension  5  5   S1/S2 Gastroc/Soleus Plantar Flexion  5  5      Right Left   Triceps DTR 2+ 2+   Biceps DTR 2+ 2+   Brachioradialis DTR 2+ 2+   Patellar DTR 2+ 2+   Achilles DTR 2+ 2+   Flores Absent  Absent   Clonus Absent Absent   Babinski Absent Absent       Imaging:    CT lumbar spine 4-6-25  FINDINGS:  Recent appearing anterior superior endplate fracture of the T12 vertebral body.  Minimal vertebral body height loss with no retropulsion.  No other fracture seen.  Vertebral body heights otherwise maintained.     L1-L2 demonstrates a central osteophyte protrusion without spinal canal or foraminal narrowing.  L2-L3 mild moderate disc space narrowing, disc bulging partially calcified with central osteophytic protrusion, no spinal canal or foraminal narrowing.  L3-L4 mild moderate disc space narrowing, mild partially calcified disc bulging, no spinal canal or osseous foraminal narrowing.  L4-5 demonstrates mild moderate disc space narrowing, partially calcified disc bulge, mild facet arthrosis, no significant osseous spinal canal or foraminal narrowing.  L5-S1 demonstrates mild moderate disc space narrowing, partially calcified disc bulge, severe  bilateral facet arthrosis, no significant osseous spinal canal or foraminal narrowing.    Mild degenerative change of the sacroiliac joints bilaterally.  Atherosclerotic calcification.     Impression:  Recent appearing superior endplate anterior wedge compression fracture of T12 with minimal vertebral body height loss and no retropulsion.       Xray thoracolumbar spine from 4-22-25:  T12 compression fracture stable with no major changes when compared to CT study from 4-6-25.    Assessment:   Mckenzie Mari is a 74 y.o. year old female patient who has a past medical history of Allergy, Anticoagulant long-term use, Arthritis, Asthma, Atrial fibrillation, Breast cancer, Bundle branch block, Cardiomyopathy, CHF (congestive heart failure), Chronic sinusitis, Colon cancer screening, Coronary artery disease, CVID (common variable immunodeficiency), Diabetes mellitus, Diabetes mellitus, type 2, GERD (gastroesophageal reflux disease), Headache(784.0), HTN (hypertension), Hyperlipidemia, Hypothyroid, Malignant hyperthermia, Miscarriage, Otitis media, Presence of combination internal cardiac defibrillator (ICD) and pacemaker, Thyroid cancer, Thyroid cancer, and Thyroid disease. She presents  for thoracolumbar back pain.    T12 superior endplate compression fracture.  No radiculoapthy.  Back pain.  Fracture stable with no appreciated worsening over the last 2 weeks.     Plan:  - reveiwed CT scan and thoracolumbar xrays from today with her and discussed natural healing of T12 compression fracture  - fractures usually heal with time in 4-6 weeks with significantly less pain; if pain is not consistently improving we can also consider kyphoplasty.  - recommend she continue with bracing.    - no lifting greater than 10 pounds.  - may use tylenol and zanaflex for pain.  She does not want any other medications as she has multiple allergeies and sensitivities.offered tramadol and norco for pain ; she declined.  - Pain is 5/10 today   "-   - we again discussed the role of kyphoplasty.  She is not interested at this time.  Should she want to consider, - she has a pacemaker and we would need to make sure it is MRI compatible.  If not MRI compatible we would obtain nuclear medicine bone scan to check if the fracture is amenable with kyphoplasty.  She does not want to proceed with kyphoplasty at this time because she has concerns over multiple allergies and using anesthesia- family members have had malignant hyperthermia, and she is concerned about coming off of Plavix.  - therefore she elects to give pain more time to resolve and not seek any other additional treatment at this time.  Should she change her mind and want to pursue other options she should call the office.  Follow-up in 4 weeks to monitor progress.      Problem List Items Addressed This Visit    None  Visit Diagnoses         T12 compression fracture, initial encounter    -  Primary                    : Reviewed and consistent with medication use as prescribed.          Kenia Thomas PA-C  [facility]         [1]   Current Outpatient Medications:     aspirin (ECOTRIN) 81 MG EC tablet, Take 81 mg by mouth once daily., Disp: , Rfl:     BD SHANTELLE 2ND GEN PEN NEEDLE 32 gauge x 5/32" Ndle, USE FOUR TIMES DAILY, Disp: 400 each, Rfl: 3    biotin 5,000 mcg TbDL, Take by mouth., Disp: , Rfl:     CALCIUM CIT-MAGNESIUM-D3-ZINC ORAL, Take 2 tablets by mouth once daily., Disp: , Rfl:     calcium citrate (CALCITRATE) 200 mg (950 mg) tablet, Take 1 tablet by mouth once daily., Disp: , Rfl:     carvediloL (COREG) 25 MG tablet, TAKE 1 TABLET BY MOUTH TWICE DAILY WITH FOOD, Disp: 180 tablet, Rfl: 3    celecoxib (CELEBREX) 200 MG capsule, TAKE 1 CAPSULE(200 MG) BY MOUTH DAILY FOR 7 DAYS, Disp: 7 capsule, Rfl: 0    cholecalciferol, vitamin D3, (VITAMIN D3) 50 mcg (2,000 unit) Tab, Take 5,000 Units by mouth once daily., Disp: , Rfl:     empagliflozin (JARDIANCE) 10 mg tablet, Take 1 " tablet (10 mg total) by mouth once daily. (Patient taking differently: Take 10 mg by mouth once daily. Taking 5 mg daily - tolerating better than taking 10 mg daily), Disp: 30 tablet, Rfl: 11    furosemide (LASIX) 40 MG tablet, TAKE 1 TABLET BY MOUTH EVERY MORNING AS NEEDED (Patient taking differently: Take 40 mg by mouth twice a week.), Disp: 45 tablet, Rfl: 3    multivit-min/ferrous fumarate (MULTI VITAMIN ORAL), Take 1 Dose by mouth once daily., Disp: , Rfl:     NOVOLOG FLEXPEN U-100 INSULIN 100 unit/mL (3 mL) InPn pen, INJECT 12 UNITS UNDER THE SKIN THREE TIMES DAILY WITH MEALS. PLUS CORRECTION SCALE, MAXIMUM TOTAL DAILY DOSE OF 66 UNITS, Disp: 45 mL, Rfl: 3    omega-3 fatty acids/fish oil (FISH OIL-OMEGA-3 FATTY ACIDS) 300-1,000 mg capsule, Take 1 capsule by mouth once daily., Disp: , Rfl:     pantoprazole (PROTONIX) 40 MG tablet, TAKE 1 TABLET(40 MG) BY MOUTH EVERY DAY, Disp: 30 tablet, Rfl: 11    pantoprazole (PROTONIX) 40 MG tablet, TAKE 1 TABLET(40 MG) BY MOUTH EVERY DAY, Disp: 30 tablet, Rfl: 11    polyethylene glycol (GLYCOLAX) 17 gram/dose powder, Take 17 g by mouth once daily., Disp: , Rfl:     potassium chloride SA (K-DUR,KLOR-CON) 20 MEQ tablet, TAKE 1 TABLET BY MOUTH EVERY DAY, Disp: 90 tablet, Rfl: 3    rosuvastatin (CRESTOR) 20 MG tablet, TAKE 1 TABLET(20 MG) BY MOUTH EVERY EVENING, Disp: 90 tablet, Rfl: 3    spironolactone (ALDACTONE) 25 MG tablet, Take 1 tablet (25 mg total) by mouth once daily., Disp: 90 tablet, Rfl: 3    SYNTHROID 112 mcg tablet, TAKE 1 TABLET(112 MCG) BY MOUTH BEFORE BREAKFAST, Disp: 90 tablet, Rfl: 1    telmisartan (MICARDIS) 20 MG Tab, TAKE 1 TABLET(20 MG) BY MOUTH EVERY DAY, Disp: 90 tablet, Rfl: 3    tiZANidine 4 mg Cap, Take 4 mg by mouth as needed (muscle spasms)., Disp: , Rfl:     TRESIBA FLEXTOUCH U-200 200 unit/mL (3 mL) insulin pen, ADMINISTER 34 UNITS UNDER THE SKIN EVERY DAY, Disp: 18 mL, Rfl: 3    albuterol (PROVENTIL/VENTOLIN HFA) 90 mcg/actuation inhaler, TAKE 2  PUFFS INTO THE LUNGS EVERY 6 HOURS AS NEEDED FOR WHEEZING-RESCUE (Patient not taking: Reported on 5/6/2025), Disp: 18 g, Rfl: 1

## 2025-05-12 ENCOUNTER — OFFICE VISIT (OUTPATIENT)
Dept: CARDIOLOGY | Facility: CLINIC | Age: 75
End: 2025-05-12
Payer: MEDICARE

## 2025-05-12 VITALS
HEART RATE: 67 BPM | BODY MASS INDEX: 38.09 KG/M2 | WEIGHT: 207 LBS | SYSTOLIC BLOOD PRESSURE: 104 MMHG | HEIGHT: 62 IN | DIASTOLIC BLOOD PRESSURE: 60 MMHG

## 2025-05-12 DIAGNOSIS — I25.10 CORONARY ARTERY DISEASE INVOLVING NATIVE CORONARY ARTERY WITHOUT ANGINA PECTORIS, UNSPECIFIED WHETHER NATIVE OR TRANSPLANTED HEART: Primary | ICD-10-CM

## 2025-05-12 DIAGNOSIS — I10 PRIMARY HYPERTENSION: Chronic | ICD-10-CM

## 2025-05-12 DIAGNOSIS — I44.7 LBBB (LEFT BUNDLE BRANCH BLOCK): Chronic | ICD-10-CM

## 2025-05-12 DIAGNOSIS — I50.9 CHF (NYHA CLASS III, ACC/AHA STAGE C): ICD-10-CM

## 2025-05-12 DIAGNOSIS — E78.2 MIXED HYPERLIPIDEMIA: Chronic | ICD-10-CM

## 2025-05-12 DIAGNOSIS — Z95.810 ICD (IMPLANTABLE CARDIOVERTER-DEFIBRILLATOR) IN PLACE: Chronic | ICD-10-CM

## 2025-05-12 PROCEDURE — 3051F HG A1C>EQUAL 7.0%<8.0%: CPT | Mod: CPTII,HCNC,S$GLB, | Performed by: INTERNAL MEDICINE

## 2025-05-12 PROCEDURE — 99999 PR PBB SHADOW E&M-EST. PATIENT-LVL IV: CPT | Mod: PBBFAC,HCNC,, | Performed by: INTERNAL MEDICINE

## 2025-05-12 PROCEDURE — 3078F DIAST BP <80 MM HG: CPT | Mod: CPTII,HCNC,S$GLB, | Performed by: INTERNAL MEDICINE

## 2025-05-12 PROCEDURE — 4010F ACE/ARB THERAPY RXD/TAKEN: CPT | Mod: CPTII,HCNC,S$GLB, | Performed by: INTERNAL MEDICINE

## 2025-05-12 PROCEDURE — 1159F MED LIST DOCD IN RCRD: CPT | Mod: CPTII,HCNC,S$GLB, | Performed by: INTERNAL MEDICINE

## 2025-05-12 PROCEDURE — 99214 OFFICE O/P EST MOD 30 MIN: CPT | Mod: HCNC,S$GLB,, | Performed by: INTERNAL MEDICINE

## 2025-05-12 PROCEDURE — 1100F PTFALLS ASSESS-DOCD GE2>/YR: CPT | Mod: CPTII,HCNC,S$GLB, | Performed by: INTERNAL MEDICINE

## 2025-05-12 PROCEDURE — 3288F FALL RISK ASSESSMENT DOCD: CPT | Mod: CPTII,HCNC,S$GLB, | Performed by: INTERNAL MEDICINE

## 2025-05-12 PROCEDURE — 3074F SYST BP LT 130 MM HG: CPT | Mod: CPTII,HCNC,S$GLB, | Performed by: INTERNAL MEDICINE

## 2025-05-12 PROCEDURE — 3008F BODY MASS INDEX DOCD: CPT | Mod: CPTII,HCNC,S$GLB, | Performed by: INTERNAL MEDICINE

## 2025-05-12 NOTE — PROGRESS NOTES
Subjective:    Patient ID:  Mckenzie Mari is a 74 y.o. female who presents for follow-up of ischemic cardiomyopathy, left bundle branch block, AICD    HPI  She comes with no complaints, no chest pain, no shortness of breath  Had a fall a few months back and had a T12 fracture.  Recovering from that with the brace.    Blood pressure normal at home.    No other cardiac complaints.        Review of Systems   Constitutional: Negative for decreased appetite, malaise/fatigue, weight gain and weight loss.   Cardiovascular:  Negative for chest pain, dyspnea on exertion, leg swelling, palpitations and syncope.   Respiratory:  Negative for cough and shortness of breath.    Gastrointestinal: Negative.    Neurological:  Negative for weakness.   All other systems reviewed and are negative.     Objective:      Physical Exam  Vitals and nursing note reviewed.   Constitutional:       Appearance: Normal appearance. She is well-developed.   HENT:      Head: Normocephalic.   Eyes:      Pupils: Pupils are equal, round, and reactive to light.   Neck:      Thyroid: No thyromegaly.      Vascular: No carotid bruit or JVD.   Cardiovascular:      Rate and Rhythm: Normal rate and regular rhythm.      Chest Wall: PMI is not displaced.      Pulses: Normal pulses and intact distal pulses.      Heart sounds: Normal heart sounds. No murmur heard.     No gallop.   Pulmonary:      Effort: Pulmonary effort is normal.      Breath sounds: Normal breath sounds.   Abdominal:      Palpations: Abdomen is soft. There is no mass.      Tenderness: There is no abdominal tenderness.   Musculoskeletal:         General: Normal range of motion.      Cervical back: Normal range of motion and neck supple.   Skin:     General: Skin is warm.   Neurological:      Mental Status: She is alert and oriented to person, place, and time.      Sensory: No sensory deficit.      Deep Tendon Reflexes: Reflexes are normal and symmetric.         Most Recent EKG Results  Results  for orders placed or performed during the hospital encounter of 10/04/21   EKG 12-lead    Collection Time: 10/04/21 10:56 AM    Narrative    Test Reason : I49.9,    Vent. Rate : 068 BPM     Atrial Rate : 068 BPM     P-R Int : 144 ms          QRS Dur : 136 ms      QT Int : 426 ms       P-R-T Axes : 060 148 070 degrees     QTc Int : 452 ms    Atrial-sensed ventricular-paced rhythm  Biventricular pacemaker detected  Abnormal ECG  When compared with ECG of 04-OCT-2021 08:06,  No significant change was found  Confirmed by Carlos Velázquez MD ([id number]) on 10/12/2021 5:52:42 PM    Referred By:             Confirmed By:Carlos Velázquez MD       Most Recent Echocardiogram Results  Results for orders placed during the hospital encounter of 01/30/25    Echo    Interpretation Summary    Left Ventricle: The left ventricle is normal in size. There is low normal systolic function. Ejection fraction is approximately 50%. Global longitudinal strain is --15.8%.    Right Ventricle: Normal right ventricular cavity size. Systolic function is normal. Pacemaker lead present in the ventricle.    Mitral Valve: There is mild regurgitation.    Pulmonary Artery: The estimated pulmonary artery systolic pressure is 28 mmHg.    IVC/SVC: Normal venous pressure at 3 mmHg.      Most Recent Nuclear Stress Test Results  Results for orders placed during the hospital encounter of 05/04/23    Nuclear Stress - Cardiology Interpreted    Interpretation Summary    There is a small to moderate sized, fixed perfusion abnormality  in the anteroapical wall(s), probably secondary to paced rhythm    There are no other significant perfusion abnormalities.  No evidence of reversible ischemia    The gated perfusion images showed an ejection fraction of 74% at rest.    The ECG portion of the study is uninterpretable, due to AV pacing.    The patient reported no chest pain during the stress test.    There were no arrhythmias during stress.      Most Recent Cardiac PET Stress  Test Results  No results found for this or any previous visit.      Most Recent Cardiovascular Angiogram results  Results for orders placed during the hospital encounter of 06/25/21    Cardiac catheterization    Conclusion  · The coronary arteries were normal..  · The left ventricular systolic function was normal.  · The left ventricular end diastolic pressure was elevated.    The procedure log was documented by Documenter: RT Porsche and verified by Joseph Hansen MD.    Date: 6/25/2021  Time: 9:31 AM      Other Most Recent Cardiology Results  Results for orders placed in visit on 04/02/25    Cardiac device check - Remote      Labs reviewed    Assessment:         1. Coronary artery disease involving native coronary artery without angina pectoris, unspecified whether native or transplanted heart    2. ICD (implantable cardioverter-defibrillator) in place    3. LBBB (left bundle branch block)    4. Primary hypertension    5. Mixed hyperlipidemia    6. CHF (NYHA class III, ACC/AHA stage C)         Plan:   Continue:  ARB, ASA, Beta blocker, Diuretic, MRA, and Statin  Regular exercise program  Weight loss  Low cholesterol diet     Follow-up in six-month

## 2025-05-20 ENCOUNTER — PATIENT OUTREACH (OUTPATIENT)
Dept: ADMINISTRATIVE | Facility: HOSPITAL | Age: 75
End: 2025-05-20
Payer: MEDICARE

## 2025-05-20 DIAGNOSIS — Z78.0 POST-MENOPAUSAL: Primary | ICD-10-CM

## 2025-05-20 NOTE — PROGRESS NOTES
OSTEOPOROSIS SCREENING   The Patient had a recent fracture and due for an Osteoporosis screening.  A DEXA is to be completed at least 180 days after the fracture date 10/3/2025 IMPACT date     Outreach to patient in reference to an OSTEOPOROSIS SCREENING.      scheduled

## 2025-05-20 NOTE — PROGRESS NOTES
Subjective:       Patient ID:  Mckenzie Mari is a 72 y.o. female who presents for   Chief Complaint   Patient presents with    Skin Check     FBSC     HPI   Established patient.  Hx of BCC at R upper back s/p EDC in 10/2021.   Here today for total body skin exam.   C/o lesions to arms and legs that get dark and peeling. Not treating.  No further complaints today.    Past Medical History:   Diagnosis Date    Allergy     Anticoagulant long-term use     Arthritis     Asthma     as a child    Breast cancer 2005    s/p lumpectomy, chemo and now cancer free over 5 years    Bundle branch block     Cardiomyopathy     EF 35 - 40%    CHF (congestive heart failure)     FC II    Chronic sinusitis     CVID (common variable immunodeficiency)     Diabetes mellitus     GERD (gastroesophageal reflux disease)     Headache(784.0)     HTN (hypertension)     Hyperlipidemia     Hypothyroid 7/25/2012    Malignant hyperthermia     daughter and supposedly mother have had this    Otitis media     Presence of combination internal cardiac defibrillator (ICD) and pacemaker     Thyroid cancer     Thyroid cancer 7/25/2012    Thyroid disease     hypothyroid after papillary thyroid cancer with thyroid resection     Review of Systems   Constitutional: Negative for fever and chills.   HENT: Negative for congestion and sore throat.    Respiratory: Negative for cough and shortness of breath.    Skin: Positive for activity-related sunscreen use.        Objective:    Physical Exam   Constitutional: She appears well-developed and well-nourished. No distress.   Neurological: She is alert and oriented to person, place, and time. She is not disoriented.   Psychiatric: She has a normal mood and affect.   Skin:   Areas Examined (abnormalities noted in diagram):   Scalp / Hair Palpated and Inspected  Head / Face Inspection Performed  Neck Inspection Performed  Chest / Axilla Inspection Performed  Abdomen Inspection  -These lesions have benign, reassuring patterns on dermoscopy  -Recommend continued self observation, and to contact the office if any changes in nevi are noticed   Performed  Genitals / Buttocks / Groin Inspection Performed  Back Inspection Performed  RUE Inspected  LUE Inspection Performed  RLE Inspected  LLE Inspection Performed  Nails and Digits Inspection Performed                       Diagram Legend     Erythematous scaling macule/papule c/w actinic keratosis       Vascular papule c/w angioma      Pigmented verrucoid papule/plaque c/w seborrheic keratosis      Yellow umbilicated papule c/w sebaceous hyperplasia      Irregularly shaped tan macule c/w lentigo     1-2 mm smooth white papules consistent with Milia      Movable subcutaneous cyst with punctum c/w epidermal inclusion cyst      Subcutaneous movable cyst c/w pilar cyst      Firm pink to brown papule c/w dermatofibroma      Pedunculated fleshy papule(s) c/w skin tag(s)      Evenly pigmented macule c/w junctional nevus     Mildly variegated pigmented, slightly irregular-bordered macule c/w mildly atypical nevus      Flesh colored to evenly pigmented papule c/w intradermal nevus       Pink pearly papule/plaque c/w basal cell carcinoma      Erythematous hyperkeratotic cursted plaque c/w SCC      Surgical scar with no sign of skin cancer recurrence      Open and closed comedones      Inflammatory papules and pustules      Verrucoid papule consistent consistent with wart     Erythematous eczematous patches and plaques     Dystrophic onycholytic nail with subungual debris c/w onychomycosis     Umbilicated papule    Erythematous-base heme-crusted tan verrucoid plaque consistent with inflamed seborrheic keratosis     Erythematous Silvery Scaling Plaque c/w Psoriasis     See annotation      Assessment / Plan:        Multiple benign nevi  - Discussed diagnosis, etiology, and benign-nature of condition.  - Reassured; no lesions suspicious for malignancy noted on exam today.   - Recommended routine self examination of skin. Discussed the ABCDEs of melanoma and ugly duckling sign.   - Recommended daily sun protection,  including the use of OTC broad-spectrum sunscreen (SPF 30 or greater) and sun-protective clothing.      Blue nevus of face  - Benign-appearing; reassured treatment not necessary at this time.   - Discussed diagnosis and counseled that lesion should not change; if change noted, patient to RTC for re-evaluation.       Lentigines  - Benign; reassured treatment not necessary.   - Recommended daily sun protection, including the use of OTC broad-spectrum sunscreen (SPF 30 or greater) and sun-protective clothing.       Seborrheic keratoses  Cherry angioma  Sebaceous hyperplasia  - Benign; reassured treatment not necessary.     Dermatofibroma  - Benign-appearing; reassured treatment not necessary at this time.   - Discussed diagnosis and counseled that lesion should not change; if change noted, patient to RTC for re-evaluation.       Hx of nonmelanoma skin cancer  Screening for skin cancer  -     Ambulatory referral/consult to Dermatology  - Total body skin examination performed today.  - Findings listed above.   - Sites of prior malignancy examined - no concern for recurrence today.   - Recommended routine self examination of skin.    - Recommended daily sun protection, including the use of OTC broad-spectrum sunscreen (SPF 30 or greater) and sun-protective clothing.             Follow up for annual skin checks, sooner PRN.

## 2025-05-21 ENCOUNTER — LAB VISIT (OUTPATIENT)
Dept: LAB | Facility: HOSPITAL | Age: 75
End: 2025-05-21
Attending: NURSE PRACTITIONER
Payer: MEDICARE

## 2025-05-21 DIAGNOSIS — Z79.4 TYPE 2 DIABETES MELLITUS WITH DIABETIC NEUROPATHY, WITH LONG-TERM CURRENT USE OF INSULIN: ICD-10-CM

## 2025-05-21 DIAGNOSIS — E11.40 TYPE 2 DIABETES MELLITUS WITH DIABETIC NEUROPATHY, WITH LONG-TERM CURRENT USE OF INSULIN: ICD-10-CM

## 2025-05-21 LAB
ANION GAP (OHS): 9 MMOL/L (ref 8–16)
BUN SERPL-MCNC: 22 MG/DL (ref 8–23)
CALCIUM SERPL-MCNC: 9.7 MG/DL (ref 8.7–10.5)
CHLORIDE SERPL-SCNC: 100 MMOL/L (ref 95–110)
CO2 SERPL-SCNC: 29 MMOL/L (ref 23–29)
CREAT SERPL-MCNC: 1 MG/DL (ref 0.5–1.4)
EAG (OHS): 174 MG/DL (ref 68–131)
GFR SERPLBLD CREATININE-BSD FMLA CKD-EPI: 60 ML/MIN/1.73/M2
GLUCOSE SERPL-MCNC: 171 MG/DL (ref 70–110)
HBA1C MFR BLD: 7.7 % (ref 4–5.6)
POTASSIUM SERPL-SCNC: 4 MMOL/L (ref 3.5–5.1)
SODIUM SERPL-SCNC: 138 MMOL/L (ref 136–145)

## 2025-05-21 PROCEDURE — 83036 HEMOGLOBIN GLYCOSYLATED A1C: CPT | Mod: HCNC

## 2025-05-21 PROCEDURE — 36415 COLL VENOUS BLD VENIPUNCTURE: CPT | Mod: HCNC,PN

## 2025-05-21 PROCEDURE — 80048 BASIC METABOLIC PNL TOTAL CA: CPT | Mod: HCNC

## 2025-05-22 ENCOUNTER — RESULTS FOLLOW-UP (OUTPATIENT)
Dept: ENDOCRINOLOGY | Facility: CLINIC | Age: 75
End: 2025-05-22

## 2025-05-25 ENCOUNTER — PATIENT MESSAGE (OUTPATIENT)
Dept: PRIMARY CARE CLINIC | Facility: CLINIC | Age: 75
End: 2025-05-25
Payer: MEDICARE

## 2025-05-28 ENCOUNTER — OFFICE VISIT (OUTPATIENT)
Dept: PAIN MEDICINE | Facility: CLINIC | Age: 75
End: 2025-05-28
Payer: MEDICARE

## 2025-05-28 VITALS
BODY MASS INDEX: 37.98 KG/M2 | HEART RATE: 67 BPM | WEIGHT: 206.38 LBS | DIASTOLIC BLOOD PRESSURE: 71 MMHG | HEIGHT: 62 IN | SYSTOLIC BLOOD PRESSURE: 138 MMHG

## 2025-05-28 DIAGNOSIS — S22.080A T12 COMPRESSION FRACTURE, INITIAL ENCOUNTER: Primary | ICD-10-CM

## 2025-05-28 PROCEDURE — 99213 OFFICE O/P EST LOW 20 MIN: CPT | Mod: HCNC,S$GLB,, | Performed by: PHYSICIAN ASSISTANT

## 2025-05-28 PROCEDURE — 1159F MED LIST DOCD IN RCRD: CPT | Mod: CPTII,HCNC,S$GLB, | Performed by: PHYSICIAN ASSISTANT

## 2025-05-28 PROCEDURE — 3078F DIAST BP <80 MM HG: CPT | Mod: CPTII,HCNC,S$GLB, | Performed by: PHYSICIAN ASSISTANT

## 2025-05-28 PROCEDURE — 3288F FALL RISK ASSESSMENT DOCD: CPT | Mod: CPTII,HCNC,S$GLB, | Performed by: PHYSICIAN ASSISTANT

## 2025-05-28 PROCEDURE — 3008F BODY MASS INDEX DOCD: CPT | Mod: CPTII,HCNC,S$GLB, | Performed by: PHYSICIAN ASSISTANT

## 2025-05-28 PROCEDURE — 1160F RVW MEDS BY RX/DR IN RCRD: CPT | Mod: CPTII,HCNC,S$GLB, | Performed by: PHYSICIAN ASSISTANT

## 2025-05-28 PROCEDURE — 3075F SYST BP GE 130 - 139MM HG: CPT | Mod: CPTII,HCNC,S$GLB, | Performed by: PHYSICIAN ASSISTANT

## 2025-05-28 PROCEDURE — 1125F AMNT PAIN NOTED PAIN PRSNT: CPT | Mod: CPTII,HCNC,S$GLB, | Performed by: PHYSICIAN ASSISTANT

## 2025-05-28 PROCEDURE — 1100F PTFALLS ASSESS-DOCD GE2>/YR: CPT | Mod: CPTII,HCNC,S$GLB, | Performed by: PHYSICIAN ASSISTANT

## 2025-05-28 PROCEDURE — 99999 PR PBB SHADOW E&M-EST. PATIENT-LVL IV: CPT | Mod: PBBFAC,HCNC,, | Performed by: PHYSICIAN ASSISTANT

## 2025-05-28 PROCEDURE — 3051F HG A1C>EQUAL 7.0%<8.0%: CPT | Mod: CPTII,HCNC,S$GLB, | Performed by: PHYSICIAN ASSISTANT

## 2025-05-28 PROCEDURE — 4010F ACE/ARB THERAPY RXD/TAKEN: CPT | Mod: CPTII,HCNC,S$GLB, | Performed by: PHYSICIAN ASSISTANT

## 2025-05-28 NOTE — PROGRESS NOTES
[facility] Follow Up      Referred by: No ref. provider found    PCP:  Bacilio Gold MD    CC:   Chief Complaint   Patient presents with    Mid-back Pain    Back Pain    Pain          HPI:   Mckenzie Mari is a 74 y.o. year old female patient who has a past medical history of Allergy, Anticoagulant long-term use, Arthritis, Asthma, Atrial fibrillation, Breast cancer, Bundle branch block, Cardiomyopathy, CHF (congestive heart failure), Chronic sinusitis, Colon cancer screening, Coronary artery disease, CVID (common variable immunodeficiency), Diabetes mellitus, Diabetes mellitus, type 2, GERD (gastroesophageal reflux disease), Headache(784.0), HTN (hypertension), Hyperlipidemia, Hypothyroid, Malignant hyperthermia, Miscarriage, Otitis media, Presence of combination internal cardiac defibrillator (ICD) and pacemaker, Thyroid cancer, Thyroid cancer, and Thyroid disease. She presents for thoracic compression fracture.     Ms. Rincon presents for follow up of T12 compression fracture.  She has been wearing flexible TLSO brace at home.  In the morning she has 1-2/10 thoracolumbar back pain.  Some times in the afternoons after she has been doing activity (cooking, standing) pain can get up to a 8-9/10.  She takes tylenol to manage pain when needed. She reports history of chronic lower back pain prior to the fall.  Overall pain is improving and she is getting closer to her baseline level of pain.     HPI 5-6-25:  Ms. Rincon presents for follow up of lower back pain and T12 compression fracture.  She has been wearing thoracolumbar support brace that she has purchased on her own and is more flexible than TLSO issued medically.  She has seen overall improvement in pain from 4 weeks ago.  Pain is still present though and she has intermittent more severe pain.  She is manageing with tyenol as needed.     HPI 4-22-25:  She presents for follow up of T12 compression fracture.  Reports wearing the TLSO brace given  to her from the hospital more around the house.  The brace has really been bothering her.  She reports pain of long the front of the abdomen pain towards the hips and buttock area where the brace has been rubbing.  The braces not allow her to sit down very comfortably.  She is able to walk in the brace, however she does not want to walk or stand all day.  She continues with pain in at the level of the T12 fracture.  No significant improvement as she continues to have days of 8/10 pain.  She is allergic to multiple medications and only able to tolerate Tylenol Arthritis to control pain.  She is fairly sedentary.      HPI 4-8-25:  She fell 4-6-25.  She tried to move a plant when her foot got stuck in the dirt/ mud.  As she pulled her foot out, she fell back landing on her butocks.  She had onset of lower back pain with the fall.  Pain is felt midline lower thoracic/ upper lumbar region.  She denies any radicular leg pain, numbness or tingling.  Today she also feels some pain and soreness across the lower lumbar region and hips.  She was seen in the ER 4-6-25.  Surgical intervention was not recommended.  She was given a TLSO brace, but not wearing the provided one because it is cumbersome for her to put on and she is unsure if she is wearing it correctly.  She is wearing a soft TLSO brace.  She is taking advil and tizanidine.      Denies bowel/ bladder incontinence.    Past and current medications:  Antineuropathics:  NSAIDs:   Antidepressants:  Muscle relaxers: tizanidine  Opioids:  Antiplatelets/Anticoagulants:  Others: tylenol arthritis    Physical Therapy/ Chiropractic care:  none    Pain Intervention History:  none    Past Spine Surgical History:  none        History:  Current Medications[1]    Past Medical History:   Diagnosis Date    Allergy     Anticoagulant long-term use     Arthritis     Asthma     as a child    Atrial fibrillation     Breast cancer 2005    s/p lumpectomy, chemo and now cancer free over 5  years    Bundle branch block     Cardiomyopathy     EF 35 - 40%    CHF (congestive heart failure)     FC II    Chronic sinusitis     Colon cancer screening 2/28/2024    Coronary artery disease     CVID (common variable immunodeficiency)     Diabetes mellitus     Diabetes mellitus, type 2     GERD (gastroesophageal reflux disease)     Headache(784.0)     HTN (hypertension)     Hyperlipidemia     Hypothyroid 07/25/2012    Malignant hyperthermia     daughter and supposedly mother have had this    Miscarriage 1971    Otitis media     Presence of combination internal cardiac defibrillator (ICD) and pacemaker     Thyroid cancer     Thyroid cancer 07/25/2012    Thyroid disease     hypothyroid after papillary thyroid cancer with thyroid resection       Past Surgical History:   Procedure Laterality Date    BREAST LUMPECTOMY      left    CARDIAC PACEMAKER PLACEMENT      CATARACT EXTRACTION W/  INTRAOCULAR LENS IMPLANT Bilateral     CHOLECYSTECTOMY      COLONOSCOPY N/A 05/11/2016    Procedure: COLONOSCOPY;  Surgeon: Alfonso Serrano Jr., MD;  Location: [facility];  Service: Endoscopy;  Laterality: N/A;    COLONOSCOPY W/ POLYPECTOMY  10/05/2009    [facility]   One 2 mm polyp in the cecum.  TUBULAR ADENOMA.  Tortuous colon.    ESOPHAGOGASTRODUODENOSCOPY  09/12/2006    [facility]   Dysphagia.  NERD.  Patulous LES.  Atrophic mucosa.  Benign fundal polyps.  Dilated, 42 Fr.    HYSTERECTOMY      JOINT REPLACEMENT Bilateral     knee    LEFT HEART CATHETERIZATION N/A 06/25/2021    Procedure: Left heart cath;  Surgeon: Joseph Hansen MD;  Location: [facility] CATH;  Service: Cardiology;  Laterality: N/A;    MOLE REMOVAL      back    pacemaker defibrillator      THYROID SURGERY  x2    TONSILLECTOMY         Family History   Problem Relation Name Age of Onset    Stroke Mother      Hypertension Mother      Arthritis Mother      Allergic rhinitis Mother      Cancer Mother          bladder    Heart disease Mother      Allergic rhinitis Father 94 yrs      Heart disease Father 94 yrs     Allergic rhinitis Brother      Heart disease Brother      Cancer Brother          lung    Hearing loss Brother      Heart disease Brother      Learning disabilities Daughter      Cancer Maternal Aunt          breast     Cancer Paternal Aunt           breast    Diabetes Paternal Aunt      Cancer Paternal Aunt          lung    Angioedema Neg Hx      Asthma Neg Hx      Atopy Neg Hx      Eczema Neg Hx      Immunodeficiency Neg Hx      Urticaria Neg Hx         Social History     Socioeconomic History    Marital status:     Number of children: 3   Tobacco Use    Smoking status: Never     Passive exposure: Never    Smokeless tobacco: Never   Substance and Sexual Activity    Alcohol use: No    Drug use: No    Sexual activity: Yes     Partners: Male   Social History Narrative    Lives at home with , likes to knit, cook, sew and play games.      Social Drivers of Health     Financial Resource Strain: Low Risk  (9/18/2023)    Overall Financial Resource Strain (CARDIA)     Difficulty of Paying Living Expenses: Not hard at all   Food Insecurity: Patient Declined (1/21/2024)    Hunger Vital Sign     Worried About Running Out of Food in the Last Year: Patient declined     Ran Out of Food in the Last Year: Patient declined   Transportation Needs: Unknown (1/21/2024)    PRAPARE - Transportation     Lack of Transportation (Medical): No     Lack of Transportation (Non-Medical): Patient declined   Physical Activity: Unknown (1/21/2024)    Exercise Vital Sign     Days of Exercise per Week: 3 days   Stress: Patient Declined (1/21/2024)    [employer] - Occupational Stress Questionnaire     Feeling of Stress : Patient declined   Housing Stability: Patient Declined (1/21/2024)    Housing Stability Vital Sign     Unable to Pay for Housing in the Last Year: Patient declined     Number of Places Lived in the Last Year: 1     Unstable Housing in the Last Year:  "Patient declined       Review of patient's allergies indicates:   Allergen Reactions    Cayenne pepper Swelling    Covid-19 vacc,mrna(pfizer)(pf) Rash    Lantus [insulin glargine] Shortness Of Breath and Swelling     Toujeo also    Adhesive      rash    Iodine and iodide containing products      Topical iodine reaction-rash/itching    Other Nausea Only     "Mycin" drugs, such as erythromycin and azithromycin.  No specific allergy mentioned to Aminoglycosides    Adhesive tape-silicones Itching    Amoxil [amoxicillin]      Once diagnosed with penicillin allergy.  Underwent skin testing that was apparently negative in approximately 2011.  However developed a rash and swelling after taking amoxicillin in 2012.    Aspirin Swelling     Other reaction(s): Stomach upset    Avelox [moxifloxacin] Itching    Betadine [povidone-iodine] Itching    Cephalexin Other (See Comments)     Blurred vision    Doxycycline Itching    Erythromycin      Other reaction(s): Stomach upset  Other reaction(s): Flatulence    Fortical [calcitonin (salmon)] Other (See Comments)     Tongue became "thick"    Lactose intolerance [lactase] Diarrhea    Levaquin [levofloxacin] Hives     Other reaction(s): Achilles tendinitis/bursitis    Tramadol Other (See Comments)      Twitching/jumping of muscles      Hydrocodone Hives and Rash    Latex Rash    Morphine Itching and Nausea Only    Penicillins Itching, Nausea Only, Rash and Edema    Sulfa (sulfonamide antibiotics) Rash     Other reaction(s): Unknown       Labs:  Lab Results   Component Value Date    HGBA1C 7.7 (H) 05/21/2025       Lab Results   Component Value Date    WBC 7.44 05/23/2024    HGB 15.2 05/23/2024    HCT 46.7 05/23/2024    MCV 98 05/23/2024     05/23/2024           Review of Systems:  Lower back pain.  Hip pain. .  Balance of review of systems is negative.    Physical Exam:  Vitals:    05/28/25 0756   BP: 138/71   Pulse: 67   Weight: 93.6 kg (206 lb 5.6 oz)   Height: 5' 2" (1.575 m) "   PainSc:   2   PainLoc: Back     Body mass index is 37.74 kg/m².    Pain disability index:      5/28/2025     7:55 AM 5/6/2025     2:36 PM 4/22/2025     3:03 PM   Last 3 PDI Scores   Pain Disability Index (PDI) 31 24 8       Gen: NAD  Psych: mood appropriate for given condition  HEENT: eyes anicteric   CV: RRR, 2+ radial pulse  Respiratory: non-labored, no signs of respiratory distress  Abd: non-distended  Skin: warm, dry and intact.  Gait: Antalgic gait.     General soreness to palpation entire thoracic spine.  No significant or increased tenderness over the T12 area.     Lumbar spine:  Lumbar spine: ROM is full with flexion extension and oblique extension with no increased pain.    Skip's test causes no increased pain on either side.    Supine straight leg raise is negative bilaterally.    Internal and external rotation of the hip causes no increased pain on either side.  Myofascial exam: Tenderness to palpation across lumbar paraspinous muscles. No tenderness to palpation over the bilateral greater trochanters and bilateral SI joint    Sensory:  Intact and symmetrical to light touch in C4-T1 dermatomes bilaterally. Intact and symmetrical to light touch in L1-S1 dermatomes bilaterally.    Motor:     Right Left   L2/3 Iliacus Hip flexion  5  5   L3/4 Qudratus Femoris Knee Extension  5  5   L4/5 Tib Anterior Ankle Dorsiflexion   5  5   L5/S1 Extensor Hallicus Longus Great toe extension  5  5   S1/S2 Gastroc/Soleus Plantar Flexion  5  5      Right Left   Triceps DTR 2+ 2+   Biceps DTR 2+ 2+   Brachioradialis DTR 2+ 2+   Patellar DTR 2+ 2+   Achilles DTR 2+ 2+   Flores Absent  Absent   Clonus Absent Absent   Babinski Absent Absent       Imaging:    CT lumbar spine 4-6-25  FINDINGS:  Recent appearing anterior superior endplate fracture of the T12 vertebral body.  Minimal vertebral body height loss with no retropulsion.  No other fracture seen.  Vertebral body heights otherwise maintained.     L1-L2 demonstrates a  central osteophyte protrusion without spinal canal or foraminal narrowing.  L2-L3 mild moderate disc space narrowing, disc bulging partially calcified with central osteophytic protrusion, no spinal canal or foraminal narrowing.  L3-L4 mild moderate disc space narrowing, mild partially calcified disc bulging, no spinal canal or osseous foraminal narrowing.  L4-5 demonstrates mild moderate disc space narrowing, partially calcified disc bulge, mild facet arthrosis, no significant osseous spinal canal or foraminal narrowing.  L5-S1 demonstrates mild moderate disc space narrowing, partially calcified disc bulge, severe bilateral facet arthrosis, no significant osseous spinal canal or foraminal narrowing.    Mild degenerative change of the sacroiliac joints bilaterally.  Atherosclerotic calcification.     Impression:  Recent appearing superior endplate anterior wedge compression fracture of T12 with minimal vertebral body height loss and no retropulsion.       Xray thoracolumbar spine from 4-22-25:  T12 compression fracture stable with no major changes when compared to CT study from 4-6-25.    Assessment:   Mckenzie Mari is a 74 y.o. year old female patient who has a past medical history of Allergy, Anticoagulant long-term use, Arthritis, Asthma, Atrial fibrillation, Breast cancer, Bundle branch block, Cardiomyopathy, CHF (congestive heart failure), Chronic sinusitis, Colon cancer screening, Coronary artery disease, CVID (common variable immunodeficiency), Diabetes mellitus, Diabetes mellitus, type 2, GERD (gastroesophageal reflux disease), Headache(784.0), HTN (hypertension), Hyperlipidemia, Hypothyroid, Malignant hyperthermia, Miscarriage, Otitis media, Presence of combination internal cardiac defibrillator (ICD) and pacemaker, Thyroid cancer, Thyroid cancer, and Thyroid disease. She presents  for thoracolumbar back pain.    T12 superior endplate compression fracture.  No radiculoapthy.  Back pain.  Fracture stable  "on prior imaging with no appreciated worsening over the last 2 weeks.     Plan:  - Again discussed natural healing of T12 compression fracture  - fractures usually heal with time in 4-6 weeks with significantly less pain; if pain is not consistently improving we can also consider kyphoplasty.  -  She is not interested at this time.  She does not want to proceed with kyphoplasty at this time because she has concerns over multiple allergies and using anesthesia- family members have had malignant hyperthermia, and she is concerned about coming off of Plavix.  - therefore she elects to give pain more time to resolve and not seek any other additional treatment at this time.    - recommend she slowly wean from the use of the back brace over the next 2 weeks.  She may have some muscle spasms in the bas as she weans from the brace.  - try to resume normal movements and activities.   - follow up in 4 weeks.       Problem List Items Addressed This Visit    None  Visit Diagnoses         T12 compression fracture, initial encounter    -  Primary                      : Reviewed and consistent with medication use as prescribed.          Kenia Thomas PA-C  [facility]         [1]   Current Outpatient Medications:     albuterol (PROVENTIL/VENTOLIN HFA) 90 mcg/actuation inhaler, TAKE 2 PUFFS INTO THE LUNGS EVERY 6 HOURS AS NEEDED FOR WHEEZING-RESCUE, Disp: 18 g, Rfl: 1    aspirin (ECOTRIN) 81 MG EC tablet, Take 81 mg by mouth once daily., Disp: , Rfl:     BD SHANTELLE 2ND GEN PEN NEEDLE 32 gauge x 5/32" Ndle, USE FOUR TIMES DAILY, Disp: 400 each, Rfl: 3    biotin 5,000 mcg TbDL, Take by mouth., Disp: , Rfl:     CALCIUM CIT-MAGNESIUM-D3-ZINC ORAL, Take 2 tablets by mouth once daily., Disp: , Rfl:     calcium citrate (CALCITRATE) 200 mg (950 mg) tablet, Take 1 tablet by mouth once daily., Disp: , Rfl:     carvediloL (COREG) 25 MG tablet, TAKE 1 TABLET BY MOUTH TWICE DAILY WITH FOOD, Disp: 180 tablet, Rfl: 3    " celecoxib (CELEBREX) 200 MG capsule, TAKE 1 CAPSULE(200 MG) BY MOUTH DAILY FOR 7 DAYS, Disp: 7 capsule, Rfl: 0    cholecalciferol, vitamin D3, (VITAMIN D3) 50 mcg (2,000 unit) Tab, Take 5,000 Units by mouth once daily., Disp: , Rfl:     empagliflozin (JARDIANCE) 10 mg tablet, Take 1 tablet (10 mg total) by mouth once daily. (Patient taking differently: Take 10 mg by mouth once daily. Taking 5 mg daily - tolerating better than taking 10 mg daily), Disp: 30 tablet, Rfl: 11    furosemide (LASIX) 40 MG tablet, TAKE 1 TABLET BY MOUTH EVERY MORNING AS NEEDED (Patient taking differently: Take 40 mg by mouth twice a week.), Disp: 45 tablet, Rfl: 3    multivit-min/ferrous fumarate (MULTI VITAMIN ORAL), Take 1 Dose by mouth once daily., Disp: , Rfl:     NOVOLOG FLEXPEN U-100 INSULIN 100 unit/mL (3 mL) InPn pen, INJECT 12 UNITS UNDER THE SKIN THREE TIMES DAILY WITH MEALS. PLUS CORRECTION SCALE, MAXIMUM TOTAL DAILY DOSE OF 66 UNITS, Disp: 45 mL, Rfl: 3    omega-3 fatty acids/fish oil (FISH OIL-OMEGA-3 FATTY ACIDS) 300-1,000 mg capsule, Take 1 capsule by mouth once daily., Disp: , Rfl:     pantoprazole (PROTONIX) 40 MG tablet, TAKE 1 TABLET(40 MG) BY MOUTH EVERY DAY, Disp: 30 tablet, Rfl: 11    pantoprazole (PROTONIX) 40 MG tablet, TAKE 1 TABLET(40 MG) BY MOUTH EVERY DAY, Disp: 30 tablet, Rfl: 11    polyethylene glycol (GLYCOLAX) 17 gram/dose powder, Take 17 g by mouth once daily., Disp: , Rfl:     potassium chloride SA (K-DUR,KLOR-CON) 20 MEQ tablet, TAKE 1 TABLET BY MOUTH EVERY DAY, Disp: 90 tablet, Rfl: 3    rosuvastatin (CRESTOR) 20 MG tablet, TAKE 1 TABLET(20 MG) BY MOUTH EVERY EVENING, Disp: 90 tablet, Rfl: 3    spironolactone (ALDACTONE) 25 MG tablet, Take 1 tablet (25 mg total) by mouth once daily., Disp: 90 tablet, Rfl: 3    SYNTHROID 112 mcg tablet, TAKE 1 TABLET(112 MCG) BY MOUTH BEFORE BREAKFAST, Disp: 90 tablet, Rfl: 1    telmisartan (MICARDIS) 20 MG Tab, TAKE 1 TABLET(20 MG) BY MOUTH EVERY DAY, Disp: 90 tablet,  Rfl: 3    tiZANidine 4 mg Cap, Take 4 mg by mouth as needed (muscle spasms)., Disp: , Rfl:     TRESIBA FLEXTOUCH U-200 200 unit/mL (3 mL) insulin pen, ADMINISTER 34 UNITS UNDER THE SKIN EVERY DAY, Disp: 18 mL, Rfl: 3

## 2025-05-29 ENCOUNTER — OFFICE VISIT (OUTPATIENT)
Dept: PRIMARY CARE CLINIC | Facility: CLINIC | Age: 75
End: 2025-05-29
Payer: MEDICARE

## 2025-05-29 VITALS
DIASTOLIC BLOOD PRESSURE: 72 MMHG | SYSTOLIC BLOOD PRESSURE: 134 MMHG | HEART RATE: 70 BPM | WEIGHT: 206.13 LBS | OXYGEN SATURATION: 98 % | BODY MASS INDEX: 37.93 KG/M2 | HEIGHT: 62 IN

## 2025-05-29 VITALS
BODY MASS INDEX: 37.91 KG/M2 | WEIGHT: 206 LBS | DIASTOLIC BLOOD PRESSURE: 72 MMHG | SYSTOLIC BLOOD PRESSURE: 134 MMHG | HEART RATE: 70 BPM | HEIGHT: 62 IN | OXYGEN SATURATION: 96 %

## 2025-05-29 DIAGNOSIS — D83.9 CVID (COMMON VARIABLE IMMUNODEFICIENCY): ICD-10-CM

## 2025-05-29 DIAGNOSIS — E66.812 CLASS 2 SEVERE OBESITY WITH BODY MASS INDEX (BMI) OF 35 TO 39.9 WITH SERIOUS COMORBIDITY: ICD-10-CM

## 2025-05-29 DIAGNOSIS — E78.2 MIXED HYPERLIPIDEMIA: Chronic | ICD-10-CM

## 2025-05-29 DIAGNOSIS — Z79.4 TYPE 2 DIABETES MELLITUS WITH DIABETIC NEUROPATHY, WITH LONG-TERM CURRENT USE OF INSULIN: Primary | Chronic | ICD-10-CM

## 2025-05-29 DIAGNOSIS — E11.40 TYPE 2 DIABETES MELLITUS WITH DIABETIC NEUROPATHY, WITH LONG-TERM CURRENT USE OF INSULIN: Primary | Chronic | ICD-10-CM

## 2025-05-29 DIAGNOSIS — Z95.810 ICD (IMPLANTABLE CARDIOVERTER-DEFIBRILLATOR) IN PLACE: Chronic | ICD-10-CM

## 2025-05-29 DIAGNOSIS — N18.31 CHRONIC KIDNEY DISEASE, STAGE 3A: ICD-10-CM

## 2025-05-29 DIAGNOSIS — I10 PRIMARY HYPERTENSION: Chronic | ICD-10-CM

## 2025-05-29 DIAGNOSIS — I50.9 CHF (NYHA CLASS III, ACC/AHA STAGE C): ICD-10-CM

## 2025-05-29 DIAGNOSIS — M79.7 FIBROMYALGIA: ICD-10-CM

## 2025-05-29 DIAGNOSIS — Z85.3 HISTORY OF BREAST CANCER: ICD-10-CM

## 2025-05-29 DIAGNOSIS — E11.40 TYPE 2 DIABETES MELLITUS WITH DIABETIC NEUROPATHY, WITH LONG-TERM CURRENT USE OF INSULIN: Chronic | ICD-10-CM

## 2025-05-29 DIAGNOSIS — Z00.00 ENCOUNTER FOR MEDICARE ANNUAL WELLNESS EXAM: Primary | ICD-10-CM

## 2025-05-29 DIAGNOSIS — E66.01 CLASS 2 SEVERE OBESITY WITH BODY MASS INDEX (BMI) OF 35 TO 39.9 WITH SERIOUS COMORBIDITY: ICD-10-CM

## 2025-05-29 DIAGNOSIS — E89.0 POSTOPERATIVE HYPOTHYROIDISM: Chronic | ICD-10-CM

## 2025-05-29 DIAGNOSIS — S22.080S CLOSED WEDGE COMPRESSION FRACTURE OF T12 VERTEBRA, SEQUELA: ICD-10-CM

## 2025-05-29 DIAGNOSIS — Z79.4 TYPE 2 DIABETES MELLITUS WITH DIABETIC NEUROPATHY, WITH LONG-TERM CURRENT USE OF INSULIN: Chronic | ICD-10-CM

## 2025-05-29 PROCEDURE — 99999 PR PBB SHADOW E&M-EST. PATIENT-LVL V: CPT | Mod: PBBFAC,HCNC,, | Performed by: PHYSICIAN ASSISTANT

## 2025-05-29 PROCEDURE — 99999 PR PBB SHADOW E&M-EST. PATIENT-LVL III: CPT | Mod: PBBFAC,HCNC,, | Performed by: FAMILY MEDICINE

## 2025-05-29 NOTE — PROGRESS NOTES
"  Mckenzie Mari presented for a follow-up Medicare AWV today. The following components were reviewed and updated:    Medical history  Family History  Social history  Allergies and Current Medications  Health Risk Assessment  Health Maintenance  Care Team    **See Completed Assessments for Annual Wellness visit with in the encounter summary    The following assessments were completed:  Depression Screening  Cognitive function Screening  Timed Get Up Test  Whisper Test        Opioid documentation:      Patient does not have a current opioid prescription.          Vitals:    05/29/25 1020   BP: 134/72   BP Location: Right arm   Patient Position: Sitting   Pulse: 70   SpO2: 96%   Weight: 93.5 kg (206 lb 0.3 oz)   Height: 5' 2" (1.575 m)     Body mass index is 37.68 kg/m².       Physical Exam  Vitals and nursing note reviewed.   Constitutional:       General: She is not in acute distress.     Appearance: Normal appearance. She is not ill-appearing.   HENT:      Head: Normocephalic and atraumatic.      Right Ear: External ear normal.      Left Ear: External ear normal.   Eyes:      General:         Right eye: No discharge.         Left eye: No discharge.      Extraocular Movements: Extraocular movements intact.      Conjunctiva/sclera: Conjunctivae normal.   Cardiovascular:      Rate and Rhythm: Normal rate.   Pulmonary:      Effort: Pulmonary effort is normal. No respiratory distress.   Skin:     General: Skin is warm and dry.      Coloration: Skin is not jaundiced or pale.   Neurological:      Mental Status: She is alert.   Psychiatric:         Mood and Affect: Mood normal.         Behavior: Behavior normal.         Thought Content: Thought content normal.         Judgment: Judgment normal.           Diagnoses and health risks identified today and associated recommendations/orders:  1. Encounter for Medicare annual wellness exam  AWV completed today  Mammogram and eye exam set up at outside practices    2. CHF (NYHA " class III, ACC/AHA stage C)  No recent exacerbations  Most recent EF 50%  Lasix when needed  Followed by Cardiology    3. Chronic kidney disease, stage 3a  GFR 59.9 5/2024, 59.5 1/2025  Monitor sodium and NSAIDs  Followed by PCP    4. CVID (common variable immunodeficiency)  Most recent sinus infection 2 months ago  Was given diagnosis before 2012  Was originally on IVIG treatment, but adverse effects  Followed by PCP    5. Type 2 diabetes mellitus with diabetic neuropathy, with long-term current use of insulin  Last A1c 7.7, but down from previous 2 readings  On Tresiba and Jardiance  Followed by Endocrinology    6. Class 2 severe obesity with body mass index (BMI) of 35 to 39.9 with serious comorbidity  Weight fluctuates  Has accompanying DM, CHF, Thyroid disease, asthma  Currently recovering from T12 vertebral fracture  Encouraged more activity when healed  Followed by PCP    7. Closed wedge compression fracture of T12 vertebra, sequela  Fell April 6th  Diagnosed in ED  Still wearing back brace  Takes Tylenol for pain  Followed by [facility]      Provided Mckenzie with a 5-10 year written screening schedule and personal prevention plan. Recommendations were developed using the USPSTF age appropriate recommendations. Education, counseling, and referrals were provided as needed.  After Visit Summary printed and given to patient which includes a list of additional screenings\tests needed.    Follow up in about 1 year (around 5/29/2026).      SEEMA Villatoro Dr. and rachell Abbasi offered to discuss advanced care planning, including how to pick a person who would make decisions for you if you were unable to make them for yourself, called a health care power of , and what kind of decisions you might make such as use of life sustaining treatments such as ventilators and tube feeding when faced with a life limiting illness recorded on a living will that they will need to know. (How you  want to be cared for as you near the end of your natural life)     X  Patient is unwilling to engage in a discussion regarding advance directives at this time.

## 2025-05-29 NOTE — PATIENT INSTRUCTIONS
Counseling and Referral of Other Preventative  (Italic type indicates deductible and co-insurance are waived)    Patient Name: Mckenzie Mari  Today's Date: 5/29/2025    Health Maintenance       Date Due Completion Date    Foot Exam 02/28/2025 2/28/2024    Colorectal Cancer Screening 03/01/2025 2/28/2025    Override on 10/5/2009: Done    Diabetic Eye Exam 06/18/2025 6/18/2024    Override on 6/13/2023: Done    Override on 6/7/2022: Done (Dr. Mcgregor)    Override on 2/26/2020: Done (no DR per pt. cannot recall provider's name)    Override on 6/24/2016: Done (Dr Tong)    Override on 5/13/2015: Done    Override on 12/20/2013: Done    Mammogram 08/26/2025 8/26/2024    Override on 9/17/2015: Done    Override on 7/16/2010: Done    RSV Vaccine (Age 60+ and Pregnant patients) (1 - Risk 60-74 years 1-dose series) 05/29/2025 (Originally 7/27/2011) ---    COVID-19 Vaccine (4 - 2024-25 season) 05/29/2026 (Originally 9/1/2024) 6/13/2022    Diabetes Urine Screening 08/22/2025 8/22/2024    Lipid Panel 08/22/2025 8/22/2024    DEXA Scan 09/06/2025 9/6/2022    Hemoglobin A1c 11/21/2025 5/21/2025    Aspirin/Antiplatelet Therapy 05/29/2026 5/29/2025    TETANUS VACCINE 07/29/2033 7/29/2023        No orders of the defined types were placed in this encounter.    The following information is provided to all patients.  This information is to help you find resources for any of the problems found today that may be affecting your health:                  Living healthy guide: www.[facility].[place].gov      Understanding Diabetes: www.diabetes.org      Eating healthy: www.cdc.gov/healthyweight      CDC home safety checklist: www.cdc.gov/steadi/patient.html      Agency on Aging: www.goea.[place].gov      Alcoholics anonymous (AA): www.aa.org      Physical Activity: www.lauri.nih.gov/mu3tzlz      Tobacco use: www.quitwithusla.org

## 2025-05-29 NOTE — PROGRESS NOTES
Primary Care Provider Appointment   [facility]       Patient ID: Mckenzie Mari is a 74 y.o. female.    ASSESSMENT/PLAN by Problem List:    Assessment & Plan    IMPRESSION:  - Type 2 diabetes improved with last A1C of 7.7%.  - HTN stable and satisfactory.  - Excellent LDL of 57 (target <70) for HLD management.  - Hypothyroidism status normal with TSH of 1.21.  - Congestive heart failure clinically stable; ICD in place.  - Discussed fibromyalgia management strategies, including potential use of SSRIs or Gabapentin, she continues to decline both  - Discussed osteoporosis management, recent compression fracture.  Will wait for DEXA scan which is somewhat of a formality but nevertheless will use it to establish baseline.  We discussed various treatment options and she is hesitant for any medication because of her history of side effects but would be willing to try something she would lean toward Prolia if covered by her insurance.  She denies any active dental problems.    ## FIBROMYALGIA:  - Monitored the patient's fibromyalgia, noting three attacks in the last 2 months which is unusual for her.  - Attacks are characterized by severe muscle pain from head to toes, feeling like muscles are being squeezed.  - Assessed potential triggers including stress, lack of sleep, and untreated sleep apnea.  - Continue acetaminophen for pain management (keeping daily dose below 3,000 mg).  - Ms. Mari takes muscle relaxants and diphenhydramine during attacks for relief.  - Suggested considering SSRIs or gabapentin for management, though patient is not keen on additional medications.    ## CHRONIC SYSTOLIC HEART FAILURE:  - Monitored chronic heart failure with reduced EF, noting clinical stability since diagnosis many years ago.  - Continue current therapy including ARB, spironolactone, furosemide, and SGLT2 inhibitor.  - Ms. Mari has an ICD in place.  - Continue current fluid  management.    ## TYPE 2 DIABETES MELLITUS:  - Last A1C at 7.7%, which is considered very good for her condition.  - Explained the difference between spot glucose checks and A1C as a 3-month average.  - Continue current medications.  - Ms. Mari continues to follow with endocrinology.    ## ESSENTIAL HYPERTENSION:  - Hypertension has been stable and satisfactory as a chronic condition.  - Continue current medications.    ## HYPERLIPIDEMIA:  - Last lipid profile shows excellent LDL at 57 (target below 70).  - Continue atorvastatin 20 mg daily.  - Ordered lipid panel.  - Advised patient to maintain healthy nutrition.    ## HYPOTHYROIDISM:  - Last TSH was normal at 1.21.  - Continue levothyroxine 112 mcg daily.  - Ordered TSH test.    ## back pain/compression fracture:  - Back pain has improved.  - Advised patient to gradually decrease use of back brace to prevent muscle weakness.  - Recommend using heating pad or ice pack for pain relief as needed.  - Follow up in 4 weeks for evaluation.    ## OSTEOPOROSIS:  - Assessed bone health and discussed potential treatment options including oral medications and injections.  - Continue vitamin D and calcium supplements.  - Ordered bone density test.  Likely recommend Prolia after our discussion today    ## FOLLOW-UP:  - Ms. Mari to continue weight loss efforts.         CODING, ORDERS, AND ADDITIONAL DOCUMENTATION RELATED TO THIS ENCOUNTER:  (note that the diagnoses and clinical summaries above were generated with the assistance of [employer] software and represents my assessment and plan.  This software does not always generate the precise nor most specific diagnosis codes.  Therefore the specific diagnosis codes and orders for billing purposes are detailed below along with any additional documentation if needed)      1. Type 2 diabetes mellitus with diabetic neuropathy, with long-term current use of insulin    2. Primary hypertension    3. Mixed hyperlipidemia  -      Lipid Panel; Future; Expected date: 05/29/2025    4. Postoperative hypothyroidism  -     TSH; Future; Expected date: 05/29/2025    5. CHF (NYHA class III, ACC/AHA stage C)  Overview:  EF 35 - 40%      6. ICD (implantable cardioverter-defibrillator) in place  Overview:  Biotronik ICD, bi-ventricular      7. Chronic kidney disease, stage 3a    8. History of breast cancer    9. CVID (common variable immunodeficiency)    10. Fibromyalgia           Follow Up:  Three months          Subjective:       HPI    Patient is a/an 74 y.o.  female     For complete problem list, past medical history, surgical history, social history, etc., see appropriate section in the electronic medical record    History of Present Illness    CHIEF COMPLAINT:  Ms. Mari presents today for follow up of multiple chronic conditions.    FIBROMYALGIA AND CHRONIC PAIN:  She reports experiencing severe fibromyalgia attacks, with three episodes in the last two months, which is unusual. During attacks, she experiences whole body muscle pain described as a squeezing sensation from head to toe, causing her to assume a fetal position. For symptom management, she takes a combination of Tylenol, muscle relaxants, and Benadryl, which provides relief after approximately one hour of rest. She was diagnosed with both chronic fatigue syndrome and fibromyalgia 20 years ago. She takes Tylenol arthritis strength in the morning and at night for pain relief, with muscle relaxants as needed for severe pain episodes.    BACK PAIN:  She reports limitations with bending, stooping, and stretching due to back pain. Pain management includes heat/ice therapy and rest. Sitting down with a heating pad or ice pack provides relief when pain starts. She can go about 5-6 hours a day without needing pain management interventions.    TYPE 2 DIABETES:  She has experienced weight loss, now at 206 lbs. A1C was 7.7%. She continues to follow with endocrinology.    PHLEBOTOMY CONCERNS:  She  "has difficulty with blood draws due to poor veins, with flow often stopping abruptly after initial success. Her left arm cannot be used for blood draws due to previous lymph node removal. Blood draws are currently focused on the right arm.      ROS:  General: +weight loss, +change in weight  Musculoskeletal: +muscle pain, +body aches, +back pain, +pain with movement  Neurological: +balance issues       Review of Systems   HENT: Negative.     Respiratory: Negative.     Cardiovascular: Negative.    Gastrointestinal: Negative.    Genitourinary: Negative.    Musculoskeletal:  Positive for back pain and myalgias.       Objective     Physical Exam  Vitals reviewed.   Constitutional:       General: She is not in acute distress.     Appearance: She is well-developed. She is not diaphoretic.   HENT:      Head: Normocephalic and atraumatic.   Eyes:      General: No scleral icterus.  Pulmonary:      Effort: Pulmonary effort is normal. No respiratory distress.   Neurological:      Mental Status: She is alert and oriented to person, place, and time.      Comments: Ambulatory.  She is wearing a back brace.   Psychiatric:         Mood and Affect: Mood normal.         Behavior: Behavior normal.       Vitals:    05/29/25 1107   BP: 134/72   BP Location: Right arm   Patient Position: Sitting   Pulse: 70   SpO2: 98%   Weight: 93.5 kg (206 lb 2.1 oz)   Height: 5' 2" (1.575 m)     Physical Exam                This note was generated with the assistance of ambient listening technology. Verbal consent was obtained by the patient and accompanying visitor(s) for the recording of patient appointment to facilitate this note. I attest to having reviewed and edited the generated note for accuracy, though some syntax or spelling errors may persist. Please contact the author of this note for any clarification.  Parts of the note were also generated with voice recognition software.  Occasionally this software will misinterpreted words or " phrases

## 2025-06-06 DIAGNOSIS — E89.0 POSTOPERATIVE HYPOTHYROIDISM: Chronic | ICD-10-CM

## 2025-06-06 RX ORDER — LEVOTHYROXINE SODIUM 112 UG/1
112 TABLET ORAL
Qty: 90 TABLET | Refills: 1 | Status: SHIPPED | OUTPATIENT
Start: 2025-06-06

## 2025-06-10 NOTE — PROGRESS NOTES
CC: Ms. Mckenzie Mari arrives today for management of Type 2 DM and review of chronic medical conditions.     HPI: Ms. Mckenzie Mari was diagnosed with Type 2 DM in 2004. She was diagnosed based on lab work. Initial treatment consisted of metformin and glimepiride. Insulin added in 8/2016 - began Toujeo. She states that she felt that this caused nausea, abd pain, chest pain. Lantus caused throat swelling, left arm pain. She was then changed to Novolog 70/30 but this was only used for a short period because her insurance didn't cover.  Then changed to Humalog 50-50 in 12/2016. In 2017, she began MDI with Tresiba and Humalog. Jardiance added in 8/2021. Began use of Dexcom G6 in 2021.  + FH of DM in maternal aunt and cousin. Denies hospitalizations due to DM. Previously seen in endocrine by Richard Gupta, and MAGALI Eng, ARIELLE. She has a history of thyroid cancer/post-surgical hypothyroidism.   Of note, she has a h/o pancreatitis.   She follows annually with cardiology (Dr. Mtz) for CHF, CAD.    Patient prefers to only make small changes to insulin doses when adjustments are recommended. Feels that insulin makes her blood sugars higher.    Last seen by me in January.    She had mechanical fall in April, which resulted in compression fracture. Following with Pain Management.     She states that she feels poorly when blood sugars are < 120.    BG monitoring per Dexcom G6.     Hypoglycemia: Rare      Missing Insulin/PO medication doses: No  Timing prandial insulin 5-15 minutes before meals: yes    Exercise: no    Dietary Habits: Eats 3 meals/day. Occasionally snack. Avoids sugary drinks.    Last DM education: 1/2019         CURRENT DIABETIC MEDS: Jardiance 25 mg daily (1/2 of tablet), Tresiba 32 units QAM, Novolog 13 units AC + correction scale, target 180, ISF 25  Vial or pen: pen  Glucometer type: [facility] True Metrix    Previous DM treatments:   Invokana - nausea  Glimepiride - stopped when prandial insulin  "added  Touejo -  Nausea, abd pain, chest pain  Lantus - throat swelling, left arm pain  Novolog 70/30 - not covered by insurance  Metformin - headaches    Last Eye Exam: 6/18/2024 - No DR. Dr. Mcgregor.   Last Podiatry Exam: no    REVIEW OF SYSTEMS  Constitutional: no c/o fatigue, weakness. + 4# weight loss.   Cardiac: no palpitations. Reports brief, intermittent episodes of chest pain. States that cardiology is aware. Feels it's correlated to when she is having an allergic reaction to a substance.  Respiratory: no cough. C/o dyspnea that is chronic.   GI: no c/o nausea, abdominal pain. + H/o pancreatitis in her 20s due to gallbladder disease, now S/P cholecystectomy.   Skin: no rashes, lesions.  Neuro: + intermittent mild numbness, tingling in feet.   Endocrine: denies polyphagia, polydipsia, polyuria      Personally reviewed Past Medical, Surgical, Social History.    Vital Signs  /70   Pulse 66   Ht 5' 2" (1.575 m)   Wt 92.5 kg (203 lb 14.8 oz)   BMI 37.30 kg/m²     Personally reviewed the below labs:    Hemoglobin A1C   Date Value Ref Range Status   01/08/2025 7.9 (H) 4.0 - 5.6 % Final     Comment:     ADA Screening Guidelines:  5.7-6.4%  Consistent with prediabetes  >or=6.5%  Consistent with diabetes    High levels of fetal hemoglobin interfere with the HbA1C  assay. Heterozygous hemoglobin variants (HbS, HgC, etc)do  not significantly interfere with this assay.   However, presence of multiple variants may affect accuracy.     08/22/2024 8.0 (H) 4.0 - 5.6 % Final     Comment:     ADA Screening Guidelines:  5.7-6.4%  Consistent with prediabetes  >or=6.5%  Consistent with diabetes    High levels of fetal hemoglobin interfere with the HbA1C  assay. Heterozygous hemoglobin variants (HbS, HgC, etc)do  not significantly interfere with this assay.   However, presence of multiple variants may affect accuracy.     05/23/2024 8.0 (H) 4.0 - 5.6 % Final     Comment:     ADA Screening Guidelines:  5.7-6.4%  " Consistent with prediabetes  >or=6.5%  Consistent with diabetes    High levels of fetal hemoglobin interfere with the HbA1C  assay. Heterozygous hemoglobin variants (HbS, HgC, etc)do  not significantly interfere with this assay.   However, presence of multiple variants may affect accuracy.       Hemoglobin A1c   Date Value Ref Range Status   05/21/2025 7.7 (H) 4.0 - 5.6 % Final     Comment:     ADA Screening Guidelines:  5.7-6.4%  Consistent with prediabetes  >=6.5%  Consistent with diabetes    High levels of fetal hemoglobin interfere with the HbA1C  assay. Heterozygous hemoglobin variants (HbS, HgC, etc)do  not significantly interfere with this assay.   However, presence of multiple variants may affect accuracy.       Chemistry        Component Value Date/Time     05/21/2025 0658     01/08/2025 0711    K 4.0 05/21/2025 0658    K 4.4 01/08/2025 0711     05/21/2025 0658     01/08/2025 0711    CO2 29 05/21/2025 0658    CO2 27 01/08/2025 0711    BUN 22 05/21/2025 0658    CREATININE 1.0 05/21/2025 0658     (H) 05/21/2025 0658    GLU 84 01/08/2025 0711        Component Value Date/Time    CALCIUM 9.7 05/21/2025 0658    CALCIUM 9.9 01/08/2025 0711    ALKPHOS 85 01/08/2025 0711    AST 20 01/08/2025 0711    ALT 20 01/08/2025 0711    BILITOT 1.3 (H) 01/08/2025 0711          Lab Results   Component Value Date    CHOL 139 08/22/2024    CHOL 151 05/23/2024    CHOL 152 06/07/2023     Lab Results   Component Value Date    HDL 51 08/22/2024    HDL 51 05/23/2024    HDL 47 06/07/2023     Lab Results   Component Value Date    LDLCALC 57.2 (L) 08/22/2024    LDLCALC 70.2 05/23/2024    LDLCALC 66.4 06/07/2023     Lab Results   Component Value Date    TRIG 154 (H) 08/22/2024    TRIG 149 05/23/2024    TRIG 193 (H) 06/07/2023     Lab Results   Component Value Date    CHOLHDL 36.7 08/22/2024    CHOLHDL 33.8 05/23/2024    CHOLHDL 30.9 06/07/2023       Lab Results   Component Value Date    MICALBCREAT 6.9  08/22/2024     Lab Results   Component Value Date    TSH 1.210 08/22/2024       CrCl cannot be calculated (Patient's most recent lab result is older than the maximum 7 days allowed.).    Vit D, 25-Hydroxy   Date Value Ref Range Status   11/08/2014 51 30 - 96 ng/mL Final     Comment:     Vitamin D deficiency.........<10 ng/mL                              Vitamin D insufficiency......10-29 ng/mL       Vitamin D sufficiency........> or equal to 30 ng/mL  Vitamin D toxicity............>100 ng/mL          Ref. Range 6/25/2020 07:27   FRUCTOSAMINE Latest Ref Range: SeeBelow umol /L 291         PHYSICAL EXAMINATION  Constitutional: Appears well, no distress.  Respiratory: CTA, even and unlabored.  Cardiovascular: RRR, no murmurs, no carotid bruits.   Skin: warm and dry  Neuro: oriented to person, place, time.   Feet: appropriate footwear.        DEXCOM DOWNLOAD: Fasting glucoses are often > 180. Sporadic prandial excursions noted.  Rare hypoglycemia.             Goals        HEMOGLOBIN A1C < 7.5          due to CAD, CHF, patient's desire for glucose to remain >150.         Assessment/Plan  1. Type 2 diabetes mellitus with diabetic neuropathy, with long-term current use of insulin  -- A1c is above goal but patient has been unable to tolerate tighter glucose control. Fasting hyperglycemia noted.   -- suggested increasing Tresiba to 34 units. However, she is more comfortable with changing timing from AM to PM and continue on 32 units.   -- continue current insulin doses  -- asked pt to verify her Jardiance pill form. 10 mg Iisted on med list but she thinks it may be the 25 mg. Taking a 1/2 tablet.  -- BG monitoring per Dexcom G6.   -- Would not use Actos, due to CHF. Cannot tolerate metformin.     -- Discussed diagnosis of DM, A1c goals, progression of disease, long term complications and tx options.  Advised patient to check BG before activities, such as driving or exercise.  -- Reviewed hypoglycemia management: treat with  1/2 glass of juice, 1/2 can regular coke, or 4 glucose tablets. Monitor and repeat treatment every 15 minutes until BG is >70 Then have a snack, which includes a complex carbohydrate and protein.    -- takes statin and ARB     2. Coronary artery disease involving native coronary artery without angina pectoris, unspecified whether native or transplanted heart  -- stable  -- Jardiance may offer cardio-protective benefit.     3. CHF (NYHA class III, ACC/AHA stage C)  -- follows with cardiology   4. Postoperative hypothyroidism  -- controlled  -- h/o thyroid cancer  -- managed by PCP   5. Essential hypertension  -- controlled   -- continue ARB   6. Hyperlipidemia, unspecified hyperlipidemia type  -- controlled  -- continue Crestor every other day   7.  Class 2 severe obesity with body mass index (BMI) of 35 to 39.9 with serious comorbidity  -- increases insulin resistance  Body mass index is 37.3 kg/m².       FOLLOW UP  Follow up in about 4 months (around 10/11/2025).   Patient instructed to bring BG logs to each follow up.   Patient encouraged to call for any BG/medication issues, concerns, or questions.      Orders Placed This Encounter   Procedures    Hemoglobin A1C    Comprehensive Metabolic Panel    Microalbumin/Creatinine Ratio, Urine

## 2025-06-11 ENCOUNTER — PATIENT MESSAGE (OUTPATIENT)
Dept: ENDOCRINOLOGY | Facility: CLINIC | Age: 75
End: 2025-06-11

## 2025-06-11 ENCOUNTER — OFFICE VISIT (OUTPATIENT)
Dept: ENDOCRINOLOGY | Facility: CLINIC | Age: 75
End: 2025-06-11
Payer: MEDICARE

## 2025-06-11 ENCOUNTER — RESULTS FOLLOW-UP (OUTPATIENT)
Dept: PRIMARY CARE CLINIC | Facility: CLINIC | Age: 75
End: 2025-06-11

## 2025-06-11 ENCOUNTER — HOSPITAL ENCOUNTER (OUTPATIENT)
Dept: RADIOLOGY | Facility: HOSPITAL | Age: 75
Discharge: HOME | End: 2025-06-11
Attending: FAMILY MEDICINE
Payer: MEDICARE

## 2025-06-11 VITALS
SYSTOLIC BLOOD PRESSURE: 110 MMHG | HEIGHT: 62 IN | BODY MASS INDEX: 37.53 KG/M2 | DIASTOLIC BLOOD PRESSURE: 70 MMHG | HEART RATE: 66 BPM | WEIGHT: 203.94 LBS

## 2025-06-11 DIAGNOSIS — E78.5 HYPERLIPIDEMIA, UNSPECIFIED HYPERLIPIDEMIA TYPE: ICD-10-CM

## 2025-06-11 DIAGNOSIS — Z78.0 POST-MENOPAUSAL: ICD-10-CM

## 2025-06-11 DIAGNOSIS — E66.01 CLASS 2 SEVERE OBESITY WITH BODY MASS INDEX (BMI) OF 35 TO 39.9 WITH SERIOUS COMORBIDITY: ICD-10-CM

## 2025-06-11 DIAGNOSIS — E66.812 CLASS 2 SEVERE OBESITY WITH BODY MASS INDEX (BMI) OF 35 TO 39.9 WITH SERIOUS COMORBIDITY: ICD-10-CM

## 2025-06-11 DIAGNOSIS — Z79.4 TYPE 2 DIABETES MELLITUS WITH DIABETIC NEUROPATHY, WITH LONG-TERM CURRENT USE OF INSULIN: Primary | ICD-10-CM

## 2025-06-11 DIAGNOSIS — I50.9 CHF (NYHA CLASS III, ACC/AHA STAGE C): ICD-10-CM

## 2025-06-11 DIAGNOSIS — I25.10 CORONARY ARTERY DISEASE INVOLVING NATIVE CORONARY ARTERY WITHOUT ANGINA PECTORIS, UNSPECIFIED WHETHER NATIVE OR TRANSPLANTED HEART: ICD-10-CM

## 2025-06-11 DIAGNOSIS — E89.0 POSTOPERATIVE HYPOTHYROIDISM: ICD-10-CM

## 2025-06-11 DIAGNOSIS — I10 ESSENTIAL HYPERTENSION: ICD-10-CM

## 2025-06-11 DIAGNOSIS — E11.40 TYPE 2 DIABETES MELLITUS WITH DIABETIC NEUROPATHY, WITH LONG-TERM CURRENT USE OF INSULIN: Primary | ICD-10-CM

## 2025-06-11 PROCEDURE — G2211 COMPLEX E/M VISIT ADD ON: HCPCS | Mod: S$GLB,,, | Performed by: NURSE PRACTITIONER

## 2025-06-11 PROCEDURE — 3008F BODY MASS INDEX DOCD: CPT | Mod: CPTII,S$GLB,, | Performed by: NURSE PRACTITIONER

## 2025-06-11 PROCEDURE — 1160F RVW MEDS BY RX/DR IN RCRD: CPT | Mod: CPTII,S$GLB,, | Performed by: NURSE PRACTITIONER

## 2025-06-11 PROCEDURE — 77080 DXA BONE DENSITY AXIAL: CPT | Mod: TC,PO

## 2025-06-11 PROCEDURE — 95251 CONT GLUC MNTR ANALYSIS I&R: CPT | Mod: S$GLB,,, | Performed by: NURSE PRACTITIONER

## 2025-06-11 PROCEDURE — 1101F PT FALLS ASSESS-DOCD LE1/YR: CPT | Mod: CPTII,S$GLB,, | Performed by: NURSE PRACTITIONER

## 2025-06-11 PROCEDURE — 99999 PR PBB SHADOW E&M-EST. PATIENT-LVL V: CPT | Mod: PBBFAC,,, | Performed by: NURSE PRACTITIONER

## 2025-06-11 PROCEDURE — 3074F SYST BP LT 130 MM HG: CPT | Mod: CPTII,S$GLB,, | Performed by: NURSE PRACTITIONER

## 2025-06-11 PROCEDURE — 3288F FALL RISK ASSESSMENT DOCD: CPT | Mod: CPTII,S$GLB,, | Performed by: NURSE PRACTITIONER

## 2025-06-11 PROCEDURE — 3078F DIAST BP <80 MM HG: CPT | Mod: CPTII,S$GLB,, | Performed by: NURSE PRACTITIONER

## 2025-06-11 PROCEDURE — 4010F ACE/ARB THERAPY RXD/TAKEN: CPT | Mod: CPTII,S$GLB,, | Performed by: NURSE PRACTITIONER

## 2025-06-11 PROCEDURE — 3051F HG A1C>EQUAL 7.0%<8.0%: CPT | Mod: CPTII,S$GLB,, | Performed by: NURSE PRACTITIONER

## 2025-06-11 PROCEDURE — 1125F AMNT PAIN NOTED PAIN PRSNT: CPT | Mod: CPTII,S$GLB,, | Performed by: NURSE PRACTITIONER

## 2025-06-11 PROCEDURE — 1159F MED LIST DOCD IN RCRD: CPT | Mod: CPTII,S$GLB,, | Performed by: NURSE PRACTITIONER

## 2025-06-11 PROCEDURE — 99214 OFFICE O/P EST MOD 30 MIN: CPT | Mod: S$GLB,,, | Performed by: NURSE PRACTITIONER

## 2025-06-11 PROCEDURE — 77080 DXA BONE DENSITY AXIAL: CPT | Mod: 26,,, | Performed by: RADIOLOGY

## 2025-06-16 ENCOUNTER — PATIENT MESSAGE (OUTPATIENT)
Dept: ENDOCRINOLOGY | Facility: CLINIC | Age: 75
End: 2025-06-16
Payer: MEDICARE

## 2025-06-25 ENCOUNTER — OFFICE VISIT (OUTPATIENT)
Dept: PAIN MEDICINE | Facility: CLINIC | Age: 75
End: 2025-06-25
Payer: MEDICARE

## 2025-06-25 VITALS
BODY MASS INDEX: 37.88 KG/M2 | SYSTOLIC BLOOD PRESSURE: 145 MMHG | DIASTOLIC BLOOD PRESSURE: 66 MMHG | HEART RATE: 69 BPM | WEIGHT: 207.13 LBS

## 2025-06-25 DIAGNOSIS — S22.080A T12 COMPRESSION FRACTURE, INITIAL ENCOUNTER: Primary | ICD-10-CM

## 2025-06-25 PROCEDURE — 3077F SYST BP >= 140 MM HG: CPT | Mod: CPTII,S$GLB,, | Performed by: PHYSICIAN ASSISTANT

## 2025-06-25 PROCEDURE — 3288F FALL RISK ASSESSMENT DOCD: CPT | Mod: CPTII,S$GLB,, | Performed by: PHYSICIAN ASSISTANT

## 2025-06-25 PROCEDURE — 3051F HG A1C>EQUAL 7.0%<8.0%: CPT | Mod: CPTII,S$GLB,, | Performed by: PHYSICIAN ASSISTANT

## 2025-06-25 PROCEDURE — 99999 PR PBB SHADOW E&M-EST. PATIENT-LVL IV: CPT | Mod: PBBFAC,,, | Performed by: PHYSICIAN ASSISTANT

## 2025-06-25 PROCEDURE — 3008F BODY MASS INDEX DOCD: CPT | Mod: CPTII,S$GLB,, | Performed by: PHYSICIAN ASSISTANT

## 2025-06-25 PROCEDURE — 1100F PTFALLS ASSESS-DOCD GE2>/YR: CPT | Mod: CPTII,S$GLB,, | Performed by: PHYSICIAN ASSISTANT

## 2025-06-25 PROCEDURE — 1125F AMNT PAIN NOTED PAIN PRSNT: CPT | Mod: CPTII,S$GLB,, | Performed by: PHYSICIAN ASSISTANT

## 2025-06-25 PROCEDURE — 1159F MED LIST DOCD IN RCRD: CPT | Mod: CPTII,S$GLB,, | Performed by: PHYSICIAN ASSISTANT

## 2025-06-25 PROCEDURE — 3078F DIAST BP <80 MM HG: CPT | Mod: CPTII,S$GLB,, | Performed by: PHYSICIAN ASSISTANT

## 2025-06-25 PROCEDURE — 1160F RVW MEDS BY RX/DR IN RCRD: CPT | Mod: CPTII,S$GLB,, | Performed by: PHYSICIAN ASSISTANT

## 2025-06-25 PROCEDURE — 99213 OFFICE O/P EST LOW 20 MIN: CPT | Mod: S$GLB,,, | Performed by: PHYSICIAN ASSISTANT

## 2025-06-25 PROCEDURE — 4010F ACE/ARB THERAPY RXD/TAKEN: CPT | Mod: CPTII,S$GLB,, | Performed by: PHYSICIAN ASSISTANT

## 2025-06-25 NOTE — PROGRESS NOTES
[facility] Follow Up      Referred by: No ref. provider found    PCP:  Bacilio Gold MD    CC:   Chief Complaint   Patient presents with    Low-back Pain          HPI:   Mckenzie Mari is a 74 y.o. year old female patient who has a past medical history of Allergy, Anticoagulant long-term use, Arthritis, Asthma, Atrial fibrillation, Breast cancer, Bundle branch block, Cardiomyopathy, CHF (congestive heart failure), Chronic sinusitis, Colon cancer screening, Coronary artery disease, CVID (common variable immunodeficiency), Diabetes mellitus, Diabetes mellitus, type 2, GERD (gastroesophageal reflux disease), Headache(784.0), HTN (hypertension), Hyperlipidemia, Hypothyroid, Malignant hyperthermia, Miscarriage, Otitis media, Presence of combination internal cardiac defibrillator (ICD) and pacemaker, Thyroid cancer, Thyroid cancer, and Thyroid disease. She presents for thoracic compression fracture.     Ms. Rincon presents for follow up of T12 compression fracture.  She has stopped wearing flexible TLSO brace at home.  Overall she feels she is continuing to get a little bit better with time.  She reports no pain in the morning.  As the day goes on she develops pain towards the afternoon and evening.  With use of heat and cold packs she is usually able to completely resolve the pain at that time.  She has been able to do more lately than prior.  For example she was able to paint a bathroom over the weekend and still manages to cook daily.    HPI 5-28-25  Ms. Rincon presents for follow up of T12 compression fracture.  She has been wearing flexible TLSO brace at home.  In the morning she has 1-2/10 thoracolumbar back pain.  Some times in the afternoons after she has been doing activity (cooking, standing) pain can get up to a 8-9/10.  She takes tylenol to manage pain when needed. She reports history of chronic lower back pain prior to the fall.  Overall pain is improving and she is getting closer to her  baseline level of pain.     HPI 5-6-25:  Ms. Rincon presents for follow up of lower back pain and T12 compression fracture.  She has been wearing thoracolumbar support brace that she has purchased on her own and is more flexible than TLSO issued medically.  She has seen overall improvement in pain from 4 weeks ago.  Pain is still present though and she has intermittent more severe pain.  She is manageing with tyenol as needed.     HPI 4-22-25:  She presents for follow up of T12 compression fracture.  Reports wearing the TLSO brace given to her from the hospital more around the house.  The brace has really been bothering her.  She reports pain of long the front of the abdomen pain towards the hips and buttock area where the brace has been rubbing.  The braces not allow her to sit down very comfortably.  She is able to walk in the brace, however she does not want to walk or stand all day.  She continues with pain in at the level of the T12 fracture.  No significant improvement as she continues to have days of 8/10 pain.  She is allergic to multiple medications and only able to tolerate Tylenol Arthritis to control pain.  She is fairly sedentary.      HPI 4-8-25:  She fell 4-6-25.  She tried to move a plant when her foot got stuck in the dirt/ mud.  As she pulled her foot out, she fell back landing on her butocks.  She had onset of lower back pain with the fall.  Pain is felt midline lower thoracic/ upper lumbar region.  She denies any radicular leg pain, numbness or tingling.  Today she also feels some pain and soreness across the lower lumbar region and hips.  She was seen in the ER 4-6-25.  Surgical intervention was not recommended.  She was given a TLSO brace, but not wearing the provided one because it is cumbersome for her to put on and she is unsure if she is wearing it correctly.  She is wearing a soft TLSO brace.  She is taking advil and tizanidine.      Denies bowel/ bladder incontinence.    Past and  current medications:  Antineuropathics:  NSAIDs:   Antidepressants:  Muscle relaxers: tizanidine  Opioids:  Antiplatelets/Anticoagulants:  Others: tylenol arthritis    Physical Therapy/ Chiropractic care:  none    Pain Intervention History:  none    Past Spine Surgical History:  none        History:  Current Medications[1]    Past Medical History:   Diagnosis Date    Allergy     Anticoagulant long-term use     Arthritis     Asthma     as a child    Atrial fibrillation     Breast cancer 2005    s/p lumpectomy, chemo and now cancer free over 5 years    Bundle branch block     Cardiomyopathy     EF 35 - 40%    CHF (congestive heart failure)     FC II    Chronic sinusitis     Colon cancer screening 2/28/2024    Coronary artery disease     CVID (common variable immunodeficiency)     Diabetes mellitus     Diabetes mellitus, type 2     GERD (gastroesophageal reflux disease)     Headache(784.0)     HTN (hypertension)     Hyperlipidemia     Hypothyroid 07/25/2012    Malignant hyperthermia     daughter and supposedly mother have had this    Miscarriage 1971    Otitis media     Presence of combination internal cardiac defibrillator (ICD) and pacemaker     Thyroid cancer     Thyroid cancer 07/25/2012    Thyroid disease     hypothyroid after papillary thyroid cancer with thyroid resection       Past Surgical History:   Procedure Laterality Date    BREAST LUMPECTOMY      left    CARDIAC PACEMAKER PLACEMENT      CATARACT EXTRACTION W/  INTRAOCULAR LENS IMPLANT Bilateral     CHOLECYSTECTOMY      COLONOSCOPY N/A 05/11/2016    Procedure: COLONOSCOPY;  Surgeon: Alfonso Serrano Jr., MD;  Location: [facility];  Service: Endoscopy;  Laterality: N/A;    COLONOSCOPY W/ POLYPECTOMY  10/05/2009    [facility]   One 2 mm polyp in the cecum.  TUBULAR ADENOMA.  Tortuous colon.    ESOPHAGOGASTRODUODENOSCOPY  09/12/2006    [facility]   Dysphagia.  NERD.  Patulous LES.  Atrophic mucosa.  Benign fundal polyps.  Dilated, 42 Fr.    HYSTERECTOMY      JOINT  REPLACEMENT Bilateral     knee    LEFT HEART CATHETERIZATION N/A 06/25/2021    Procedure: Left heart cath;  Surgeon: Joseph Hansen MD;  Location: [facility] CATH;  Service: Cardiology;  Laterality: N/A;    MOLE REMOVAL      back    pacemaker defibrillator      THYROID SURGERY  x2    TONSILLECTOMY         Family History   Problem Relation Name Age of Onset    Stroke Mother      Hypertension Mother      Arthritis Mother      Allergic rhinitis Mother      Cancer Mother          bladder    Heart disease Mother      Allergic rhinitis Father 94 yrs     Heart disease Father 94 yrs     Allergic rhinitis Brother      Heart disease Brother      Cancer Brother          lung    Hearing loss Brother      Heart disease Brother      Learning disabilities Daughter      Cancer Maternal Aunt          breast     Cancer Paternal Aunt           breast    Diabetes Paternal Aunt      Cancer Paternal Aunt          lung    Angioedema Neg Hx      Asthma Neg Hx      Atopy Neg Hx      Eczema Neg Hx      Immunodeficiency Neg Hx      Urticaria Neg Hx         Social History     Socioeconomic History    Marital status:     Number of children: 3   Tobacco Use    Smoking status: Never     Passive exposure: Never    Smokeless tobacco: Never   Substance and Sexual Activity    Alcohol use: No    Drug use: No    Sexual activity: Yes     Partners: Male   Social History Narrative    Lives at home with , likes to knit, cook, sew and play games.      Social Drivers of Health     Financial Resource Strain: Low Risk  (6/11/2025)    Overall Financial Resource Strain (CARDIA)     Difficulty of Paying Living Expenses: Not hard at all   Food Insecurity: No Food Insecurity (6/11/2025)    Hunger Vital Sign     Worried About Running Out of Food in the Last Year: Never true     Ran Out of Food in the Last Year: Never true   Transportation Needs: No Transportation Needs (6/11/2025)    PRAPARE - Transportation     Lack of Transportation (Medical): No  "    Lack of Transportation (Non-Medical): No   Physical Activity: Sufficiently Active (6/11/2025)    Exercise Vital Sign     Days of Exercise per Week: 4 days     Minutes of Exercise per Session: 60 min   Stress: No Stress Concern Present (6/11/2025)    Citizen of [place] [employer] - Occupational Stress Questionnaire     Feeling of Stress : Not at all   Recent Concern: Stress - Stress Concern Present (5/29/2025)    Citizen of [place] [employer] - Occupational Stress Questionnaire     Feeling of Stress : To some extent   Housing Stability: Low Risk  (6/11/2025)    Housing Stability Vital Sign     Unable to Pay for Housing in the Last Year: No     Number of Times Moved in the Last Year: 0     Homeless in the Last Year: No       Review of patient's allergies indicates:   Allergen Reactions    Cayenne pepper Swelling    Covid-19 vacc,mrna(pfizer)(pf) Rash    Lantus [insulin glargine] Shortness Of Breath and Swelling     Toujeo also    Adhesive      rash    Iodine and iodide containing products      Topical iodine reaction-rash/itching    Other Nausea Only     "Mycin" drugs, such as erythromycin and azithromycin.  No specific allergy mentioned to Aminoglycosides    Adhesive tape-silicones Itching    Amoxil [amoxicillin]      Once diagnosed with penicillin allergy.  Underwent skin testing that was apparently negative in approximately 2011.  However developed a rash and swelling after taking amoxicillin in 2012.    Aspirin Swelling     Other reaction(s): Stomach upset    Avelox [moxifloxacin] Itching    Betadine [povidone-iodine] Itching    Cephalexin Other (See Comments)     Blurred vision    Doxycycline Itching    Erythromycin      Other reaction(s): Stomach upset  Other reaction(s): Flatulence    Fortical [calcitonin (salmon)] Other (See Comments)     Tongue became "thick"    Lactose intolerance [lactase] Diarrhea    Levaquin [levofloxacin] Hives     Other reaction(s): Achilles tendinitis/bursitis "    Tramadol Other (See Comments)      Twitching/jumping of muscles      Hydrocodone Hives and Rash    Latex Rash    Morphine Itching and Nausea Only    Penicillins Itching, Nausea Only, Rash and Edema    Sulfa (sulfonamide antibiotics) Rash     Other reaction(s): Unknown       Labs:  Lab Results   Component Value Date    HGBA1C 7.7 (H) 05/21/2025       Lab Results   Component Value Date    WBC 7.44 05/23/2024    HGB 15.2 05/23/2024    HCT 46.7 05/23/2024    MCV 98 05/23/2024     05/23/2024           Review of Systems:  Lower back pain.  Hip pain. .  Balance of review of systems is negative.    Physical Exam:  Vitals:    06/25/25 0922   BP: (!) 145/66   Pulse: 69   Weight: 93.9 kg (207 lb 2 oz)   PainSc:   2   PainLoc: Back     Body mass index is 37.88 kg/m².    Pain disability index:      6/25/2025     9:18 AM 5/28/2025     7:55 AM 5/6/2025     2:36 PM   Last 3 PDI Scores   Pain Disability Index (PDI) 18 31 24       Gen: NAD  Psych: mood appropriate for given condition  HEENT: eyes anicteric   CV: RRR, 2+ radial pulse  Respiratory: non-labored, no signs of respiratory distress  Abd: non-distended  Skin: warm, dry and intact.  Gait: Antalgic gait.     No significant or increased tenderness over the T12 area.     Lumbar spine:  Lumbar spine: ROM is full with flexion extension and oblique extension with no increased pain.    Skip's test causes no increased pain on either side.    Supine straight leg raise is negative bilaterally.    Internal and external rotation of the hip causes no increased pain on either side.  Myofascial exam: Tenderness to palpation across lumbar paraspinous muscles. No tenderness to palpation over the bilateral greater trochanters and bilateral SI joint    Sensory:  Intact and symmetrical to light touch in C4-T1 dermatomes bilaterally. Intact and symmetrical to light touch in L1-S1 dermatomes bilaterally.    Motor:     Right Left   L2/3 Iliacus Hip flexion  5  5   L3/4 Qudratus Femoris  Knee Extension  5  5   L4/5 Tib Anterior Ankle Dorsiflexion   5  5   L5/S1 Extensor Hallicus Longus Great toe extension  5  5   S1/S2 Gastroc/Soleus Plantar Flexion  5  5      Right Left   Triceps DTR 2+ 2+   Biceps DTR 2+ 2+   Brachioradialis DTR 2+ 2+   Patellar DTR 2+ 2+   Achilles DTR 2+ 2+   Flores Absent  Absent   Clonus Absent Absent   Babinski Absent Absent       Imaging:    CT lumbar spine 4-6-25  FINDINGS:  Recent appearing anterior superior endplate fracture of the T12 vertebral body.  Minimal vertebral body height loss with no retropulsion.  No other fracture seen.  Vertebral body heights otherwise maintained.     L1-L2 demonstrates a central osteophyte protrusion without spinal canal or foraminal narrowing.  L2-L3 mild moderate disc space narrowing, disc bulging partially calcified with central osteophytic protrusion, no spinal canal or foraminal narrowing.  L3-L4 mild moderate disc space narrowing, mild partially calcified disc bulging, no spinal canal or osseous foraminal narrowing.  L4-5 demonstrates mild moderate disc space narrowing, partially calcified disc bulge, mild facet arthrosis, no significant osseous spinal canal or foraminal narrowing.  L5-S1 demonstrates mild moderate disc space narrowing, partially calcified disc bulge, severe bilateral facet arthrosis, no significant osseous spinal canal or foraminal narrowing.    Mild degenerative change of the sacroiliac joints bilaterally.  Atherosclerotic calcification.     Impression:  Recent appearing superior endplate anterior wedge compression fracture of T12 with minimal vertebral body height loss and no retropulsion.       Xray thoracolumbar spine from 4-22-25:  T12 compression fracture stable with no major changes when compared to CT study from 4-6-25.    Assessment:   Mckenzie Mari is a 74 y.o. year old female patient who has a past medical history of Allergy, Anticoagulant long-term use, Arthritis, Asthma, Atrial fibrillation, Breast  cancer, Bundle branch block, Cardiomyopathy, CHF (congestive heart failure), Chronic sinusitis, Colon cancer screening, Coronary artery disease, CVID (common variable immunodeficiency), Diabetes mellitus, Diabetes mellitus, type 2, GERD (gastroesophageal reflux disease), Headache(784.0), HTN (hypertension), Hyperlipidemia, Hypothyroid, Malignant hyperthermia, Miscarriage, Otitis media, Presence of combination internal cardiac defibrillator (ICD) and pacemaker, Thyroid cancer, Thyroid cancer, and Thyroid disease. She presents  for thoracolumbar back pain.    T12 superior endplate compression fracture.  No radiculoapthy.  Back pain.  Fracture stable on prior imaging with no appreciated worsening.  Overall pain is increasing, but still present at the end of the day.  She is not tender to palpation over the T12 area, suggesting fracture site is healing well.    Plan:  - Again discussed natural healing of T12 compression fracture  - she is now almost 3 months from fracture onset.  I think she is recovering well she is not tender over the site in overall pain is improving.  She may be having more myofascial pain towards the end of the day  - with still having pain at the end of the day, I did offer to repeat x-rays to double-check fracture instability.  She declined.  - recommend physical therapy to help with continued myofascial based pain.  She declines PT at this time  -she prefers to continue on her own at home with slowly increasing level of activity  -will notify us if pain levels increase in the future if she has any questions.  She will follow-up as needed at this time    Problem List Items Addressed This Visit    None  Visit Diagnoses         T12 compression fracture, initial encounter    -  Primary                        : Reviewed and consistent with medication use as prescribed.          Kenia Thomas PA-C  [facility]         [1]   Current Outpatient Medications:     albuterol  "(PROVENTIL/VENTOLIN HFA) 90 mcg/actuation inhaler, TAKE 2 PUFFS INTO THE LUNGS EVERY 6 HOURS AS NEEDED FOR WHEEZING-RESCUE, Disp: 18 g, Rfl: 1    aspirin (ECOTRIN) 81 MG EC tablet, Take 81 mg by mouth once daily., Disp: , Rfl:     BD SHANTELLE 2ND GEN PEN NEEDLE 32 gauge x 5/32" Ndle, USE FOUR TIMES DAILY, Disp: 400 each, Rfl: 3    biotin 5,000 mcg TbDL, Take by mouth., Disp: , Rfl:     CALCIUM CIT-MAGNESIUM-D3-ZINC ORAL, Take 2 tablets by mouth once daily., Disp: , Rfl:     calcium citrate (CALCITRATE) 200 mg (950 mg) tablet, Take 1 tablet by mouth once daily., Disp: , Rfl:     carvediloL (COREG) 25 MG tablet, TAKE 1 TABLET BY MOUTH TWICE DAILY WITH FOOD, Disp: 180 tablet, Rfl: 3    celecoxib (CELEBREX) 200 MG capsule, TAKE 1 CAPSULE(200 MG) BY MOUTH DAILY FOR 7 DAYS, Disp: 7 capsule, Rfl: 0    cholecalciferol, vitamin D3, (VITAMIN D3) 50 mcg (2,000 unit) Tab, Take 5,000 Units by mouth once daily., Disp: , Rfl:     empagliflozin (JARDIANCE) 25 mg tablet, Take 1 tablet (25 mg total) by mouth once daily., Disp: 30 tablet, Rfl: 6    furosemide (LASIX) 40 MG tablet, TAKE 1 TABLET BY MOUTH EVERY MORNING AS NEEDED (Patient taking differently: Take 40 mg by mouth twice a week.), Disp: 45 tablet, Rfl: 3    multivit-min/ferrous fumarate (MULTI VITAMIN ORAL), Take 1 Dose by mouth once daily., Disp: , Rfl:     NOVOLOG FLEXPEN U-100 INSULIN 100 unit/mL (3 mL) InPn pen, INJECT 12 UNITS UNDER THE SKIN THREE TIMES DAILY WITH MEALS. PLUS CORRECTION SCALE, MAXIMUM TOTAL DAILY DOSE OF 66 UNITS, Disp: 45 mL, Rfl: 3    omega-3 fatty acids/fish oil (FISH OIL-OMEGA-3 FATTY ACIDS) 300-1,000 mg capsule, Take 1 capsule by mouth once daily., Disp: , Rfl:     pantoprazole (PROTONIX) 40 MG tablet, TAKE 1 TABLET(40 MG) BY MOUTH EVERY DAY, Disp: 30 tablet, Rfl: 11    polyethylene glycol (GLYCOLAX) 17 gram/dose powder, Take 17 g by mouth once daily., Disp: , Rfl:     potassium chloride SA (K-DUR,KLOR-CON) 20 MEQ tablet, TAKE 1 TABLET BY MOUTH EVERY " DAY, Disp: 90 tablet, Rfl: 3    rosuvastatin (CRESTOR) 20 MG tablet, TAKE 1 TABLET(20 MG) BY MOUTH EVERY EVENING, Disp: 90 tablet, Rfl: 3    spironolactone (ALDACTONE) 25 MG tablet, Take 1 tablet (25 mg total) by mouth once daily., Disp: 90 tablet, Rfl: 3    SYNTHROID 112 mcg tablet, TAKE 1 TABLET(112 MCG) BY MOUTH BEFORE BREAKFAST, Disp: 90 tablet, Rfl: 1    telmisartan (MICARDIS) 20 MG Tab, TAKE 1 TABLET(20 MG) BY MOUTH EVERY DAY, Disp: 90 tablet, Rfl: 3    tiZANidine 4 mg Cap, Take 4 mg by mouth as needed (muscle spasms)., Disp: , Rfl:     TRESIBA FLEXTOUCH U-200 200 unit/mL (3 mL) insulin pen, ADMINISTER 34 UNITS UNDER THE SKIN EVERY DAY, Disp: 18 mL, Rfl: 3

## 2025-07-02 ENCOUNTER — CLINICAL SUPPORT (OUTPATIENT)
Dept: CARDIOLOGY | Facility: HOSPITAL | Age: 75
End: 2025-07-02
Payer: MEDICARE

## 2025-07-02 ENCOUNTER — HOSPITAL ENCOUNTER (OUTPATIENT)
Dept: CARDIOLOGY | Facility: HOSPITAL | Age: 75
Discharge: HOME | End: 2025-07-02
Attending: INTERNAL MEDICINE
Payer: MEDICARE

## 2025-07-02 DIAGNOSIS — Z95.810 PRESENCE OF AUTOMATIC (IMPLANTABLE) CARDIAC DEFIBRILLATOR: ICD-10-CM

## 2025-07-02 PROCEDURE — 93295 DEV INTERROG REMOTE 1/2/MLT: CPT | Mod: ,,, | Performed by: INTERNAL MEDICINE

## 2025-07-02 PROCEDURE — 93296 REM INTERROG EVL PM/IDS: CPT | Mod: PO | Performed by: INTERNAL MEDICINE

## 2025-07-18 DIAGNOSIS — I50.9 CHF (NYHA CLASS III, ACC/AHA STAGE C): ICD-10-CM

## 2025-07-18 RX ORDER — FUROSEMIDE 40 MG/1
40 TABLET ORAL DAILY PRN
Qty: 45 TABLET | Refills: 3 | Status: SHIPPED | OUTPATIENT
Start: 2025-07-18

## 2025-08-21 DIAGNOSIS — I10 PRIMARY HYPERTENSION: Chronic | ICD-10-CM

## 2025-08-21 RX ORDER — CARVEDILOL 25 MG/1
25 TABLET ORAL 2 TIMES DAILY WITH MEALS
Qty: 180 TABLET | Refills: 3 | Status: SHIPPED | OUTPATIENT
Start: 2025-08-21

## 2025-08-27 ENCOUNTER — LAB VISIT (OUTPATIENT)
Dept: LAB | Facility: HOSPITAL | Age: 75
End: 2025-08-27
Attending: COLON & RECTAL SURGERY
Payer: MEDICARE

## 2025-08-27 DIAGNOSIS — E78.2 MIXED HYPERLIPIDEMIA: Chronic | ICD-10-CM

## 2025-08-27 DIAGNOSIS — E89.0 POSTOPERATIVE HYPOTHYROIDISM: Chronic | ICD-10-CM

## 2025-08-27 LAB
CHOLEST SERPL-MCNC: 127 MG/DL (ref 120–199)
CHOLEST/HDLC SERPL: 3.1 {RATIO} (ref 2–5)
HDLC SERPL-MCNC: 41 MG/DL (ref 40–75)
HDLC SERPL: 32.3 % (ref 20–50)
LDLC SERPL CALC-MCNC: 47.6 MG/DL (ref 63–159)
NONHDLC SERPL-MCNC: 86 MG/DL
TRIGL SERPL-MCNC: 192 MG/DL (ref 30–150)

## 2025-08-27 PROCEDURE — 36415 COLL VENOUS BLD VENIPUNCTURE: CPT | Mod: HCNC,PN

## 2025-08-27 PROCEDURE — 84443 ASSAY THYROID STIM HORMONE: CPT | Mod: HCNC

## 2025-08-27 PROCEDURE — 80061 LIPID PANEL: CPT | Mod: HCNC

## 2025-08-28 ENCOUNTER — OFFICE VISIT (OUTPATIENT)
Dept: PRIMARY CARE CLINIC | Facility: CLINIC | Age: 75
End: 2025-08-28
Payer: MEDICARE

## 2025-08-28 VITALS
BODY MASS INDEX: 36.94 KG/M2 | HEART RATE: 76 BPM | HEIGHT: 62 IN | WEIGHT: 200.75 LBS | SYSTOLIC BLOOD PRESSURE: 120 MMHG | OXYGEN SATURATION: 97 % | DIASTOLIC BLOOD PRESSURE: 76 MMHG

## 2025-08-28 DIAGNOSIS — J40 BRONCHITIS: ICD-10-CM

## 2025-08-28 DIAGNOSIS — E78.2 MIXED HYPERLIPIDEMIA: Primary | Chronic | ICD-10-CM

## 2025-08-28 DIAGNOSIS — E89.0 POSTOPERATIVE HYPOTHYROIDISM: Chronic | ICD-10-CM

## 2025-08-28 DIAGNOSIS — I10 PRIMARY HYPERTENSION: Chronic | ICD-10-CM

## 2025-08-28 LAB — TSH SERPL-ACNC: 2.35 UIU/ML (ref 0.4–4)

## 2025-08-28 PROCEDURE — 99999 PR PBB SHADOW E&M-EST. PATIENT-LVL V: CPT | Mod: PBBFAC,HCNC,, | Performed by: FAMILY MEDICINE

## 2025-08-29 ENCOUNTER — PATIENT OUTREACH (OUTPATIENT)
Dept: PRIMARY CARE CLINIC | Facility: CLINIC | Age: 75
End: 2025-08-29
Payer: MEDICARE

## 2025-08-29 ENCOUNTER — PATIENT MESSAGE (OUTPATIENT)
Dept: PRIMARY CARE CLINIC | Facility: CLINIC | Age: 75
End: 2025-08-29
Payer: MEDICARE

## 2025-08-29 ENCOUNTER — LAB VISIT (OUTPATIENT)
Dept: LAB | Facility: HOSPITAL | Age: 75
End: 2025-08-29
Attending: FAMILY MEDICINE
Payer: MEDICARE

## 2025-08-29 DIAGNOSIS — R06.02 SHORTNESS OF BREATH: ICD-10-CM

## 2025-08-29 DIAGNOSIS — R06.02 SHORTNESS OF BREATH: Primary | ICD-10-CM

## 2025-08-29 LAB
ABSOLUTE EOSINOPHIL (OHS): 0.24 K/UL
ABSOLUTE MONOCYTE (OHS): 0.71 K/UL (ref 0.3–1)
ABSOLUTE NEUTROPHIL COUNT (OHS): 4.32 K/UL (ref 1.8–7.7)
BASOPHILS # BLD AUTO: 0.06 K/UL
BASOPHILS NFR BLD AUTO: 0.9 %
ERYTHROCYTE [DISTWIDTH] IN BLOOD BY AUTOMATED COUNT: 13.3 % (ref 11.5–14.5)
HCT VFR BLD AUTO: 45.8 % (ref 37–48.5)
HGB BLD-MCNC: 14.7 GM/DL (ref 12–16)
IMM GRANULOCYTES # BLD AUTO: 0.05 K/UL (ref 0–0.04)
IMM GRANULOCYTES NFR BLD AUTO: 0.7 % (ref 0–0.5)
LYMPHOCYTES # BLD AUTO: 1.66 K/UL (ref 1–4.8)
MCH RBC QN AUTO: 31.3 PG (ref 27–31)
MCHC RBC AUTO-ENTMCNC: 32.1 G/DL (ref 32–36)
MCV RBC AUTO: 97 FL (ref 82–98)
NT-PROBNP SERPL-MCNC: 21 PG/ML
NUCLEATED RBC (/100WBC) (OHS): 0 /100 WBC
PLATELET # BLD AUTO: 224 K/UL (ref 150–450)
PMV BLD AUTO: 11.4 FL (ref 9.2–12.9)
RBC # BLD AUTO: 4.7 M/UL (ref 4–5.4)
RELATIVE EOSINOPHIL (OHS): 3.4 %
RELATIVE LYMPHOCYTE (OHS): 23.6 % (ref 18–48)
RELATIVE MONOCYTE (OHS): 10.1 % (ref 4–15)
RELATIVE NEUTROPHIL (OHS): 61.3 % (ref 38–73)
WBC # BLD AUTO: 7.04 K/UL (ref 3.9–12.7)

## 2025-08-29 PROCEDURE — 83880 ASSAY OF NATRIURETIC PEPTIDE: CPT | Mod: HCNC

## 2025-08-29 PROCEDURE — 36415 COLL VENOUS BLD VENIPUNCTURE: CPT | Mod: HCNC,PN

## 2025-08-29 PROCEDURE — 85025 COMPLETE CBC W/AUTO DIFF WBC: CPT | Mod: HCNC
